# Patient Record
Sex: FEMALE | Race: WHITE | Employment: OTHER | URBAN - METROPOLITAN AREA
[De-identification: names, ages, dates, MRNs, and addresses within clinical notes are randomized per-mention and may not be internally consistent; named-entity substitution may affect disease eponyms.]

---

## 2017-03-02 ENCOUNTER — HOSPITAL ENCOUNTER (OUTPATIENT)
Age: 67
Setting detail: OBSERVATION
Discharge: HOME | End: 2017-03-03
Attending: EMERGENCY MEDICINE | Admitting: INTERNAL MEDICINE
Payer: MEDICARE

## 2017-03-02 ENCOUNTER — APPOINTMENT (OUTPATIENT)
Dept: GENERAL RADIOLOGY | Age: 67
End: 2017-03-02
Attending: EMERGENCY MEDICINE
Payer: MEDICARE

## 2017-03-02 DIAGNOSIS — I20.0 UNSTABLE ANGINA (HCC): Primary | ICD-10-CM

## 2017-03-02 PROBLEM — I10 HTN (HYPERTENSION), BENIGN: Status: ACTIVE | Noted: 2017-03-02

## 2017-03-02 PROBLEM — R07.9 CHEST PAIN WITH MODERATE RISK FOR CARDIAC ETIOLOGY: Status: ACTIVE | Noted: 2017-03-02

## 2017-03-02 LAB
ALBUMIN SERPL BCP-MCNC: 3.6 G/DL (ref 3.5–5)
ALBUMIN/GLOB SERPL: 1.1 {RATIO} (ref 1.1–2.2)
ALP SERPL-CCNC: 109 U/L (ref 45–117)
ALT SERPL-CCNC: 24 U/L (ref 12–78)
ANION GAP BLD CALC-SCNC: 6 MMOL/L (ref 5–15)
AST SERPL W P-5'-P-CCNC: 16 U/L (ref 15–37)
BASOPHILS # BLD AUTO: 0 K/UL (ref 0–0.1)
BASOPHILS # BLD: 0 % (ref 0–1)
BILIRUB SERPL-MCNC: 0.5 MG/DL (ref 0.2–1)
BUN SERPL-MCNC: 27 MG/DL (ref 6–20)
BUN/CREAT SERPL: 36 (ref 12–20)
CALCIUM SERPL-MCNC: 8.5 MG/DL (ref 8.5–10.1)
CHLORIDE SERPL-SCNC: 105 MMOL/L (ref 97–108)
CK MB CFR SERPL CALC: 2.3 % (ref 0–2.5)
CK MB SERPL-MCNC: 1.3 NG/ML (ref 5–25)
CK SERPL-CCNC: 57 U/L (ref 26–192)
CO2 SERPL-SCNC: 30 MMOL/L (ref 21–32)
CREAT SERPL-MCNC: 0.74 MG/DL (ref 0.55–1.02)
DIFFERENTIAL METHOD BLD: ABNORMAL
EOSINOPHIL # BLD: 0.3 K/UL (ref 0–0.4)
EOSINOPHIL NFR BLD: 2 % (ref 0–7)
ERYTHROCYTE [DISTWIDTH] IN BLOOD BY AUTOMATED COUNT: 13.8 % (ref 11.5–14.5)
GLOBULIN SER CALC-MCNC: 3.2 G/DL (ref 2–4)
GLUCOSE SERPL-MCNC: 115 MG/DL (ref 65–100)
HCT VFR BLD AUTO: 42.8 % (ref 35–47)
HGB BLD-MCNC: 14 G/DL (ref 11.5–16)
LYMPHOCYTES # BLD AUTO: 15 % (ref 12–49)
LYMPHOCYTES # BLD: 2.6 K/UL (ref 0.8–3.5)
MCH RBC QN AUTO: 30.2 PG (ref 26–34)
MCHC RBC AUTO-ENTMCNC: 32.7 G/DL (ref 30–36.5)
MCV RBC AUTO: 92.4 FL (ref 80–99)
MONOCYTES # BLD: 1.6 K/UL (ref 0–1)
MONOCYTES NFR BLD AUTO: 9 % (ref 5–13)
NEUTS SEG # BLD: 12.4 K/UL (ref 1.8–8)
NEUTS SEG NFR BLD AUTO: 74 % (ref 32–75)
PLATELET # BLD AUTO: 264 K/UL (ref 150–400)
POTASSIUM SERPL-SCNC: 4.2 MMOL/L (ref 3.5–5.1)
PROT SERPL-MCNC: 6.8 G/DL (ref 6.4–8.2)
RBC # BLD AUTO: 4.63 M/UL (ref 3.8–5.2)
SODIUM SERPL-SCNC: 141 MMOL/L (ref 136–145)
TROPONIN I SERPL-MCNC: 0.09 NG/ML
WBC # BLD AUTO: 16.8 K/UL (ref 3.6–11)

## 2017-03-02 PROCEDURE — 74011250636 HC RX REV CODE- 250/636: Performed by: INTERNAL MEDICINE

## 2017-03-02 PROCEDURE — 93005 ELECTROCARDIOGRAM TRACING: CPT

## 2017-03-02 PROCEDURE — 99218 HC RM OBSERVATION: CPT

## 2017-03-02 PROCEDURE — 94761 N-INVAS EAR/PLS OXIMETRY MLT: CPT

## 2017-03-02 PROCEDURE — 84484 ASSAY OF TROPONIN QUANT: CPT | Performed by: EMERGENCY MEDICINE

## 2017-03-02 PROCEDURE — 36415 COLL VENOUS BLD VENIPUNCTURE: CPT | Performed by: EMERGENCY MEDICINE

## 2017-03-02 PROCEDURE — 74011000250 HC RX REV CODE- 250: Performed by: EMERGENCY MEDICINE

## 2017-03-02 PROCEDURE — 96361 HYDRATE IV INFUSION ADD-ON: CPT

## 2017-03-02 PROCEDURE — 96374 THER/PROPH/DIAG INJ IV PUSH: CPT

## 2017-03-02 PROCEDURE — 80053 COMPREHEN METABOLIC PANEL: CPT | Performed by: EMERGENCY MEDICINE

## 2017-03-02 PROCEDURE — 74011250637 HC RX REV CODE- 250/637: Performed by: EMERGENCY MEDICINE

## 2017-03-02 PROCEDURE — 82550 ASSAY OF CK (CPK): CPT | Performed by: EMERGENCY MEDICINE

## 2017-03-02 PROCEDURE — 85025 COMPLETE CBC W/AUTO DIFF WBC: CPT | Performed by: EMERGENCY MEDICINE

## 2017-03-02 PROCEDURE — 74011250636 HC RX REV CODE- 250/636: Performed by: EMERGENCY MEDICINE

## 2017-03-02 PROCEDURE — 99285 EMERGENCY DEPT VISIT HI MDM: CPT

## 2017-03-02 PROCEDURE — 71010 XR CHEST PORT: CPT

## 2017-03-02 RX ORDER — SODIUM CHLORIDE 0.9 % (FLUSH) 0.9 %
5-10 SYRINGE (ML) INJECTION EVERY 8 HOURS
Status: DISCONTINUED | OUTPATIENT
Start: 2017-03-02 | End: 2017-03-03 | Stop reason: HOSPADM

## 2017-03-02 RX ORDER — SODIUM CHLORIDE 0.9 % (FLUSH) 0.9 %
5-10 SYRINGE (ML) INJECTION AS NEEDED
Status: DISCONTINUED | OUTPATIENT
Start: 2017-03-02 | End: 2017-03-03 | Stop reason: HOSPADM

## 2017-03-02 RX ORDER — ASPIRIN 325 MG
325 TABLET ORAL ONCE
Status: COMPLETED | OUTPATIENT
Start: 2017-03-02 | End: 2017-03-02

## 2017-03-02 RX ORDER — MELATONIN
1000 DAILY
Status: DISCONTINUED | OUTPATIENT
Start: 2017-03-03 | End: 2017-03-03 | Stop reason: HOSPADM

## 2017-03-02 RX ORDER — FAMOTIDINE 10 MG/ML
20 INJECTION INTRAVENOUS
Status: COMPLETED | OUTPATIENT
Start: 2017-03-02 | End: 2017-03-02

## 2017-03-02 RX ORDER — LANOLIN ALCOHOL/MO/W.PET/CERES
1 CREAM (GRAM) TOPICAL
Status: DISCONTINUED | OUTPATIENT
Start: 2017-03-03 | End: 2017-03-03 | Stop reason: HOSPADM

## 2017-03-02 RX ORDER — LEVOTHYROXINE SODIUM 112 UG/1
112 TABLET ORAL
Status: DISCONTINUED | OUTPATIENT
Start: 2017-03-03 | End: 2017-03-02

## 2017-03-02 RX ORDER — DIPHENHYDRAMINE HYDROCHLORIDE 50 MG/ML
12.5 INJECTION, SOLUTION INTRAMUSCULAR; INTRAVENOUS
Status: DISCONTINUED | OUTPATIENT
Start: 2017-03-02 | End: 2017-03-03 | Stop reason: HOSPADM

## 2017-03-02 RX ORDER — ENOXAPARIN SODIUM 100 MG/ML
40 INJECTION SUBCUTANEOUS EVERY 24 HOURS
Status: DISCONTINUED | OUTPATIENT
Start: 2017-03-02 | End: 2017-03-03 | Stop reason: HOSPADM

## 2017-03-02 RX ORDER — PANTOPRAZOLE SODIUM 40 MG/1
40 TABLET, DELAYED RELEASE ORAL
Status: DISCONTINUED | OUTPATIENT
Start: 2017-03-03 | End: 2017-03-03

## 2017-03-02 RX ORDER — PROCHLORPERAZINE EDISYLATE 5 MG/ML
5 INJECTION INTRAMUSCULAR; INTRAVENOUS
Status: DISCONTINUED | OUTPATIENT
Start: 2017-03-02 | End: 2017-03-03 | Stop reason: HOSPADM

## 2017-03-02 RX ORDER — LEVOTHYROXINE SODIUM 150 UG/1
150 TABLET ORAL
Status: DISCONTINUED | OUTPATIENT
Start: 2017-03-03 | End: 2017-03-03 | Stop reason: HOSPADM

## 2017-03-02 RX ORDER — SODIUM CHLORIDE 9 MG/ML
75 INJECTION, SOLUTION INTRAVENOUS CONTINUOUS
Status: DISCONTINUED | OUTPATIENT
Start: 2017-03-02 | End: 2017-03-03 | Stop reason: HOSPADM

## 2017-03-02 RX ADMIN — NITROGLYCERIN 0.5 INCH: 20 OINTMENT TOPICAL at 17:35

## 2017-03-02 RX ADMIN — SODIUM CHLORIDE 500 ML: 900 INJECTION, SOLUTION INTRAVENOUS at 18:12

## 2017-03-02 RX ADMIN — ASPIRIN 325 MG: 325 TABLET ORAL at 17:35

## 2017-03-02 RX ADMIN — Medication 10 ML: at 22:24

## 2017-03-02 RX ADMIN — FAMOTIDINE 20 MG: 10 INJECTION, SOLUTION INTRAVENOUS at 18:12

## 2017-03-02 RX ADMIN — SODIUM CHLORIDE 75 ML/HR: 900 INJECTION, SOLUTION INTRAVENOUS at 22:42

## 2017-03-02 NOTE — ED TRIAGE NOTES
Pt brought to treatment area with c/o \"right chest/breast pain and nausea that started 5 mins ago while I was standing. \"  Pt reports \"SOB, nausea and pain that radiates to my neck. \"  Dr. Micah Shukla at bedside to evaluate.

## 2017-03-02 NOTE — IP AVS SNAPSHOT
[address] [place] [address] 
[phone] Patient: Reford Collet MRN: [id number] ITX:0/1/8507 You are allergic to the following Allergen Reactions Adhesive Tape-Silicones Other (comments) BAD BLISTERS AND TENDS TO GET INFECTED Albuterol Palpitations Aspirin Hives Other (comments) \"it makes my stomach bleed\" Butter Flavor Anaphylaxis Butter AND CREME Demerol (Meperidine) Other (comments) HYPOTENSION Fentanyl Other (comments) HYPOTENSION Gluten Diarrhea  
 bloating Keflex (Cephalexin) Anaphylaxis Levaquin (Levofloxacin) Unproven on Challenge PT DOESN'T REMEMBER HAVING THAT Morphine Other (comments) HYPOTENSION Nitroglycerin Other (comments) IN PILL FORM  - HYPOTENSION 
CAN TOLERATE PATCH FOR SHORT TIME Pcn (Penicillins) Anaphylaxis Percocet (Oxycodone-Acetaminophen) Unknown (comments) HYPOTENSION AND HALLUCINATIONS. Can take tylenol ok, oxycodone is allergy per patient. Tapazole (Methimazole) Unknown (comments) Recent Documentation Height 1.6 m Emergency Contacts Name Discharge Info Relation Home Work Mobile Autumn Mini  Spouse [3] [phone] Refused,Refused DECLINED CAREGIVER [4] Edgar Matt  Daughter [21] [phone] About your hospitalization You were admitted on:  March 2, 2017 You last received care in the:  [facility] 3 PRO CARE TELE 2 You were discharged on:  March 3, 2017 Unit phone number:  [phone] Why you were hospitalized Your primary diagnosis was:  Chest Pain With Moderate Risk For Cardiac Etiology Your diagnoses also included:  Cad (Coronary Artery Disease), Iron Deficiency Anemia, Unspecified, Unspecified Hypothyroidism, Esophageal Reflux, Htn (Hypertension), Benign Providers Seen During Your Hospitalizations Provider Role Specialty Primary office phone Josue Franklin MD Attending Provider Emergency Medicine [phone] Sheyla Parra MD Attending Provider Internal Medicine [phone] Virgina Bosworth, MD Attending Provider Hospitalist [phone] Your Primary Care Physician (PCP) Primary Care Physician Office Phone Office Fax Josue BLAIR [phone] Follow-up Information Follow up With Details Comments Contact Info Chapin Stroud MD   [facility] [address] [place] [address] 
[phone] Ronit Lewis MD Schedule an appointment as soon as possible for a visit in 1 week  [address] [facility] 1007 [facility] 
[phone] Current Discharge Medication List  
  
CONTINUE these medications which have NOT CHANGED Dose & Instructions Dispensing Information Comments Morning Noon Evening Bedtime  
 ascorbic acid (vitamin C) 500 mg tablet Commonly known as:  VITAMIN C Your next dose is: Today, Tomorrow Other:  _________ Take  by mouth daily. Refills:  0 Calcium Carbonate-Vit D3-Min 1,200 mgcalcium -1,000 unit Karina Proffer Your next dose is: Today, Tomorrow Other:  _________ Take  by mouth. Refills:  0  
     
   
   
   
  
 cyanocobalamin 1,000 mcg/mL injection Commonly known as:  VITAMIN B12 Your next dose is: Today, Tomorrow Other:  _________ INJECT 1 ML INTO THE MUSCLE EVERY 30 DAYS Quantity:  10 mL Refills:  0  
     
   
   
   
  
 dilTIAZem 30 mg tablet Commonly known as:  CARDIZEM Your next dose is: Today, Tomorrow Other:  _________ Dose:  30 mg Take 30 mg by mouth as needed. PT STATES USES ONLY AS NEEDED Refills:  0  
     
   
   
   
  
 doxycycline 100 mg capsule Commonly known as:  Anabel Rodas Your next dose is: Today, Tomorrow Other:  _________ Dose:  100 mg Take 100 mg by mouth once. Refills:  0  
     
   
   
   
  
 ferrous sulfate 325 mg (65 mg iron) tablet Your next dose is: Today, Tomorrow Other:  _________ Take  by mouth Daily (before breakfast). Refills:  0  
     
   
   
   
  
 fluconazole 200 mg tablet Commonly known as:  DIFLUCAN Your next dose is: Today, Tomorrow Other:  _________ Dose:  200 mg Take 200 mg by mouth daily. Refills:  0  
     
   
   
   
  
 LASIX 20 mg tablet Generic drug:  furosemide Your next dose is: Today, Tomorrow Other:  _________ Dose:  20 mg Take 20 mg by mouth as needed. Refills:  0  
     
   
   
   
  
 levothyroxine 150 mcg tablet Commonly known as:  SYNTHROID Your next dose is: Today, Tomorrow Other:  _________ Dose:  150 mcg Take 150 mcg by mouth Daily (before breakfast). Refills:  0  
     
   
   
   
  
 multivitamin, tx-iron-ca-min 9 mg iron-400 mcg Tab tablet Commonly known as:  THERA-M w/ IRON Your next dose is: Today, Tomorrow Other:  _________ Dose:  1 Tab Take 1 Tab by mouth daily. Refills:  0 phenazopyridine 200 mg tablet Commonly known as:  PYRIDIUM Your next dose is: Today, Tomorrow Other:  _________ Dose:  200 mg Take 200 mg by mouth three (3) times daily as needed for Pain. Refills:  0 PREVACID 30 mg capsule Generic drug:  lansoprazole Your next dose is: Today, Tomorrow Other:  _________ Dose:  30 mg Take 30 mg by mouth Daily (before breakfast). Refills:  0 Syringe with Needle (Disp) 3 mL 25 gauge x 1\" Syrg Your next dose is: Today, Tomorrow Other:  _________ Dose:  1 Units 1 Units by Does Not Apply route every thirty (30) days. Quantity:  50 Units Refills:  1  
     
   
   
   
  
 traMADol 50 mg tablet Commonly known as:  ULTRAM  
   
Your next dose is: Today, Tomorrow Other:  _________ Dose:  50 mg Take 1 Tab by mouth every six (6) hours as needed for Pain. Max Daily Amount: 200 mg. Quantity:  15 Tab Refills:  0  
     
   
   
   
  
 URIBEL PO Your next dose is: Today, Tomorrow Other:  _________ Dose:  118 mg Take 118 mg by mouth two (2) times a day. Refills:  0  
     
   
   
   
  
 VITAMIN D3 1,000 unit Cap Generic drug:  cholecalciferol Your next dose is: Today, Tomorrow Other:  _________ Dose:  1 Cap Take 1 Cap by mouth daily. Refills:  0 Discharge Instructions ACUTE DIAGNOSES: 
Chest pain CHRONIC MEDICAL DIAGNOSES: 
Problem List as of 3/3/2017  Date Reviewed: 3/3/2017 Codes Class Noted - Resolved HTN (hypertension), benign ICD-10-CM: I10 
ICD-9-CM: 401.1  3/2/2017 - Present Bony exostosis ICD-10-CM: Z85.3Y6 ICD-9-CM: 726.91  1/10/2013 - Present Synovitis of ankle ICD-10-CM: M65.9 ICD-9-CM: 727.06  1/10/2013 - Present Pes cavus ICD-10-CM: M21.6X9 ICD-9-CM: 736.73  1/10/2013 - Present Unspecified hypothyroidism ICD-10-CM: E03.9 ICD-9-CM: 244.9  3/23/2012 - Present Iron deficiency anemia, unspecified ICD-10-CM: D50.9 ICD-9-CM: 280.9  10/13/2011 - Present Gluten intolerance ICD-10-CM: K90.0 ICD-9-CM: 579.0  Unknown - Present Esophageal reflux ICD-10-CM: K21.9 ICD-9-CM: 530.81  1/8/2011 - Present Pernicious anemia ICD-10-CM: D51.0 ICD-9-CM: 281.0  12/5/2009 - Present Asthma ICD-10-CM: A56.332 ICD-9-CM: 493.90  6/29/2009 - Present CAD (coronary artery disease) ICD-10-CM: I25.10 ICD-9-CM: 414.00  6/29/2009 - Present * (Principal)RESOLVED: Chest pain with moderate risk for cardiac etiology ICD-10-CM: R07.9 ICD-9-CM: 786.50  3/2/2017 - 3/3/2017 RESOLVED: Ankle instability ICD-10-CM: M25.373 ICD-9-CM: 718.87  6/15/2012 - 6/16/2012 RESOLVED: Sprain of calcaneofibular ligament of left ankle ICD-10-CM: T81.413T ICD-9-CM: 845.02  6/15/2012 - 6/16/2012 RESOLVED: Sprain of posterior talofibular ligament of ankle ICD-10-CM: R64.881E ICD-9-CM: 845.09  6/15/2012 - 6/16/2012 RESOLVED: Exostosis ICD-10-CM: P22.9F6 ICD-9-CM: 726.91  6/15/2012 - 6/16/2012 RESOLVED: Pes cavus ICD-10-CM: M21.6X9 ICD-9-CM: 736.73  6/15/2012 - 6/16/2012 RESOLVED: Atrial fibrillation ([facility] [address] ICD-10-CM: I48.91 
ICD-9-CM: 427.31  6/29/2009 - 5/14/2012 RESOLVED: Hypothyroidism ICD-10-CM: E03.9 ICD-9-CM: 244.9  6/29/2009 - 12/17/2012 RESOLVED: Vitamin B12 deficiency ICD-10-CM: E53.8 ICD-9-CM: 266.2  6/29/2009 - 12/5/2009 DISCHARGE MEDICATIONS:  
  
 
 
· It is important that you take the medication exactly as they are prescribed. · Keep your medication in the bottles provided by the pharmacist and keep a list of the medication names, dosages, and times to be taken in your wallet. · Do not take other medications without consulting your doctor. DIET:  Cardiac Diet ACTIVITY: Activity as tolerated ADDITIONAL INFORMATION: If you experience any of the following symptoms then please call your primary care physician or return to the emergency room if you cannot get hold of your doctor: Fever, chills, nausea, vomiting, diarrhea, change in mentation, falling, bleeding, shortness of breath. FOLLOW UP CARE: 
Dr. Brittany Crowley MD  you are to call and set up an appointment to see them in 2 weeks. Follow-up with cardiologist  
 
 
Information obtained by : 
I understand that if any problems occur once I am at home I am to contact my physician. I understand and acknowledge receipt of the instructions indicated above. Physician's or R.N.'s Signature                                                                  Date/Time Patient or Representative Signature                                                          Date/Time Discharge Orders None [employer] Announcement We are excited to announce that we are making your provider's discharge notes available to you in [employer]. You will see these notes when they are completed and signed by the physician that discharged you from your recent hospital stay. If you have any questions or concerns about any information you see in [employer], please call the Health Information Department where you were seen or reach out to your Primary Care Provider for more information about your plan of care. Introducing [facility]! Dear Kriss Hooker: Thank you for requesting a [employer] account. Our records indicate that you already have an active [employer] account. You can access your account anytime at [email or web address]. [email or web address] Did you know that you can access your hospital and ER discharge instructions at any time in [employer]? You can also review all of your test results from your hospital stay or ER visit. Additional Information If you have questions, please visit the Frequently Asked Questions section of the [employer] website at [email or web address] Remember, [employer] is NOT to be used for urgent needs. For medical emergencies, dial 911. Now available from your iPhone and Android! General Information Please provide this summary of care documentation to your next provider. Patient Signature:  ____________________________________________________________ Date:  ____________________________________________________________  
  
Orbie Calixto Provider Signature:  ____________________________________________________________ Date:  ____________________________________________________________

## 2017-03-02 NOTE — ED NOTES
Multiple IV attempts unsuccessful. Dr. Lopez Heading at bedside with US machine to find peripherial IV.

## 2017-03-02 NOTE — ED NOTES
Dr. Treviño Poster inserted 20G angiocath into R AC. IVF infusing to gravity without difficulty. Will continue to monitor closely.

## 2017-03-02 NOTE — ED PROVIDER NOTES
HPI Comments: 70 yo WF with hx cad, afib, stroke presents with c/o chest pain which began 10 minutes pta. Reports pain as under right breast initially and pressure now moving to mid chest. States it is going into her neck, + nausea no vomiting, + sob. Was standing in the pet store when it happened. States has had MI in the past and last stent placed by Dr Lorenzo Herrera in 2008. Last cath was 3 years ago and no intervention at that time. Has not had a stress test since. Pt reports not feeling well over the past few days and feels like her heart is beating slow and thumping at times. Pt reports she is not on any blood thinners because she did not do well with them and states she was pulled off aspirin after having a work up for anemia by heme onc and told she had some bleeding and to stop it. Denies having hives and swelling from aspirin. Reports having low bp with nitro and pain meds in the past    Patient is a 69 y.o. female presenting with chest pain. The history is provided by the patient. Chest Pain (Angina)    Associated symptoms include diaphoresis, nausea and shortness of breath. Pertinent negatives include no abdominal pain, no fever and no vomiting. Past Medical History:   Diagnosis Date    Anemia     Arrhythmia     Arthritis     Asthma 6/29/2009    CAUSED BY MOLD    Atrial fibrillation ([address] 6/29/2009    CAD (coronary artery disease) 6/29/2009    MI X2     Chronic pain     RT. ANKLE    GERD (gastroesophageal reflux disease)     Gluten intolerance     Hypothyroidism 6/29/2009    Personal history of MI (myocardial infarction)     Stroke ([address]     TIA (NO RESIDUAL)    Syncope     Thromboembolus ([address] 2004    RT.     Thyroid disease     HYPO    TIA (transient ischemic attack)     2004 & 2006    Unspecified adverse effect of anesthesia     \"IS A LIGHT WEIGHT\"    Unspecified adverse effect of anesthesia     DIFFICULTY AWAKENING    Vitamin B12 deficiency 6/29/2009       Past Surgical History:   Procedure Laterality Date    CARDIAC SURG PROCEDURE UNLIST      ABLATION    HX ADENOIDECTOMY      HX APPENDECTOMY      HX CHOLECYSTECTOMY  6/16/11    HX GI      COLONOSCOPY AND ENDOSCOPY    HX HEART CATHETERIZATION  2010    2 STENTS    HX HYSTERECTOMY      HX LUMBAR LAMINECTOMY  2000    L4-L5    HX ORTHOPAEDIC  1993-6/2012    RT. FOOT SURGERY X 6/calcaneal osteotomy    HX TONSILLECTOMY  1964    STENT INSERTION           Family History:   Problem Relation Age of Onset   [facility] Delayed Awakening Mother     Heart Disease Mother     Coronary Artery Disease Mother     Hypertension Mother     Stroke Mother     Delayed Awakening Sister     Hypertension Sister     Lung Disease Father     Cancer Father      colon    Hypertension Father     Hypertension Brother        Social History     Social History    Marital status:      Spouse name: N/A    Number of children: N/A    Years of education: N/A     Occupational History    Not on file. Social History Main Topics    Smoking status: Former Smoker     Packs/day: 1.00     Years: 30.00     Quit date: 6/14/2002    Smokeless tobacco: Never Used    Alcohol use No    Drug use: No    Sexual activity: No     Other Topics Concern    Not on file     Social History Narrative         ALLERGIES: Adhesive tape-silicones; Albuterol; Aspirin; Butter flavor; Demerol [meperidine]; Fentanyl; Gluten; Keflex [cephalexin]; Levaquin [levofloxacin]; Morphine; Nitroglycerin; Pcn [penicillins]; Percocet [oxycodone-acetaminophen]; and Tapazole [methimazole]    Review of Systems   Constitutional: Positive for diaphoresis. Negative for fever. Respiratory: Positive for shortness of breath. Cardiovascular: Positive for chest pain. Gastrointestinal: Positive for nausea. Negative for abdominal pain and vomiting. All other systems reviewed and are negative.       Vitals:    03/02/17 1708 03/02/17 1711   BP: (!) 161/99    Pulse: 80    Resp: 10    SpO2: 96% Weight:  72.6 kg (160 lb)   Height:  5' 3\" (1.6 m)            Physical Exam   Constitutional: She is oriented to person, place, and time. She appears well-developed and well-nourished. No distress. HENT:   Head: Normocephalic and atraumatic. Eyes: EOM are normal. Pupils are equal, round, and reactive to light. Neck: Normal range of motion. Neck supple. Cardiovascular: Normal rate, regular rhythm, normal heart sounds and intact distal pulses. Exam reveals no friction rub. No murmur heard. Pulmonary/Chest: Effort normal and breath sounds normal. No respiratory distress. She has no wheezes. She has no rales. Abdominal: Soft. Bowel sounds are normal. She exhibits no distension. There is no tenderness. There is no rebound and no guarding. Musculoskeletal: Normal range of motion. She exhibits no edema. Neurological: She is alert and oriented to person, place, and time. Coordination normal.   Skin: Skin is warm and dry. She is not diaphoretic. No pallor. Psychiatric: She has a normal mood and affect. Her behavior is normal.   Nursing note and vitals reviewed. MDM  Number of Diagnoses or Management Options  Diagnosis management comments: No concern for PE sounds cardiac and given hx will give aspirin with pepcid and nitro paste and fluids to prevent hypotension. Check troponin. Spoke with pt and she will need admission. She wants to be transferred to [facility] where her cardiologist is       Amount and/or Complexity of Data Reviewed  Clinical lab tests: ordered and reviewed  Tests in the radiology section of CPT®: ordered and reviewed  Discuss the patient with other providers: yes (Cardiology and hospitalist)  Independent visualization of images, tracings, or specimens: yes (ekg)    Patient Progress  Patient progress: stable    ED Course       Procedures  EKG interpretation: (Preliminary)  Rhythm: normal sinus rhythm; and regular .  Rate (approx.): 85; Axis: normal; P wave: normal; QRS interval: normal ; ST/T wave: depressed; minimal depression less than 1 mm in v2 - v6    EKG interpretation: (Preliminary)  Rhythm: normal sinus rhythm; and regular . Rate (approx.): 70; Axis: normal; P wave: normal; QRS interval: normal ; ST/T wave: non-specific changes     Procedure Note- Peripheral IV Access  6:10 PM  Performed by: Naman Quan MD     gained IV access using  20 gauge needle because the patient had no vascular access. After cleaning the site with alcohol prep, the Right  brachial vein was localized with ultrasound guidance in an anterior approach. Line confirmation was obtained by direct visualization and good blood return. No anaesthetic was used. The line was successfully flushed with normal saline and was secured with transparent tape. The procedure took 1-15 minutes, and pt tolerated well. Written by Naman Quan MD,    6:16 PM  Pt reports feeling much better. Has had her aspirin and nitropaste. States she does not want to go to  to see Dr Cassidy Maldonado and wants to go to [facility]. Awaiting blood work    7:00 PM  Spoke with dr gorman. Discussed hx and lab results. agrees with plan. Does not recommend anything further than asprin at this time. Will trend troponins. Pt is currently pain free    7:06 PM  Spoke with dr Nel Lora who will admit pt    7:12 PM  Change of shift. Care of patient signed over to dr Keiry Quiroga  Handoff complete.

## 2017-03-03 ENCOUNTER — APPOINTMENT (OUTPATIENT)
Dept: NUCLEAR MEDICINE | Age: 67
End: 2017-03-03
Attending: INTERNAL MEDICINE
Payer: MEDICARE

## 2017-03-03 VITALS
RESPIRATION RATE: 18 BRPM | SYSTOLIC BLOOD PRESSURE: 131 MMHG | OXYGEN SATURATION: 96 % | TEMPERATURE: 97.5 F | HEIGHT: 63 IN | HEART RATE: 54 BPM | BODY MASS INDEX: 29.46 KG/M2 | WEIGHT: 166.3 LBS | DIASTOLIC BLOOD PRESSURE: 82 MMHG

## 2017-03-03 PROBLEM — R07.9 CHEST PAIN WITH MODERATE RISK FOR CARDIAC ETIOLOGY: Status: RESOLVED | Noted: 2017-03-02 | Resolved: 2017-03-03

## 2017-03-03 LAB
APPEARANCE UR: CLEAR
ATRIAL RATE: 72 BPM
ATRIAL RATE: 86 BPM
ATTENDING PHYSICIAN, CST07: NORMAL
BACTERIA URNS QL MICRO: NEGATIVE /HPF
BASOPHILS # BLD AUTO: 0 K/UL (ref 0–0.1)
BASOPHILS # BLD: 0 % (ref 0–1)
BILIRUB UR QL: NEGATIVE
CALCULATED P AXIS, ECG09: 68 DEGREES
CALCULATED P AXIS, ECG09: 74 DEGREES
CALCULATED R AXIS, ECG10: 19 DEGREES
CALCULATED R AXIS, ECG10: 5 DEGREES
CALCULATED T AXIS, ECG11: 52 DEGREES
CALCULATED T AXIS, ECG11: 59 DEGREES
COLOR UR: ABNORMAL
DIAGNOSIS, 93000: NORMAL
DUKE TM SCORE RESULT, CST14: NORMAL
DUKE TREADMILL SCORE, CST13: NORMAL
ECG INTERP BEFORE EX, CST11: NORMAL
ECG INTERP DURING EX, CST12: NORMAL
EOSINOPHIL # BLD: 0.3 K/UL (ref 0–0.4)
EOSINOPHIL NFR BLD: 2 % (ref 0–7)
EPITH CASTS URNS QL MICRO: ABNORMAL /LPF
ERYTHROCYTE [DISTWIDTH] IN BLOOD BY AUTOMATED COUNT: 13.9 % (ref 11.5–14.5)
FUNCTIONAL CAPACITY, CST17: NORMAL
GLUCOSE UR STRIP.AUTO-MCNC: NEGATIVE MG/DL
HCT VFR BLD AUTO: 44.4 % (ref 35–47)
HGB BLD-MCNC: 14.6 G/DL (ref 11.5–16)
HGB UR QL STRIP: NEGATIVE
HYALINE CASTS URNS QL MICRO: ABNORMAL /LPF (ref 0–5)
KETONES UR QL STRIP.AUTO: NEGATIVE MG/DL
KNOWN CARDIAC CONDITION, CST08: NORMAL
LEUKOCYTE ESTERASE UR QL STRIP.AUTO: ABNORMAL
LYMPHOCYTES # BLD AUTO: 17 % (ref 12–49)
LYMPHOCYTES # BLD: 2.3 K/UL (ref 0.8–3.5)
MAX. DIASTOLIC BP, CST04: 80 MMHG
MAX. HEART RATE, CST05: 136 BPM
MAX. SYSTOLIC BP, CST03: 160 MMHG
MCH RBC QN AUTO: 30.2 PG (ref 26–34)
MCHC RBC AUTO-ENTMCNC: 32.9 G/DL (ref 30–36.5)
MCV RBC AUTO: 91.9 FL (ref 80–99)
MONOCYTES # BLD: 1 K/UL (ref 0–1)
MONOCYTES NFR BLD AUTO: 7 % (ref 5–13)
NEUTS SEG # BLD: 10 K/UL (ref 1.8–8)
NEUTS SEG NFR BLD AUTO: 74 % (ref 32–75)
NITRITE UR QL STRIP.AUTO: NEGATIVE
OVERALL BP RESPONSE TO EXERCISE, CST16: NORMAL
OVERALL HR RESPONSE TO EXERCISE, CST15: NORMAL
P-R INTERVAL, ECG05: 138 MS
P-R INTERVAL, ECG05: 140 MS
PEAK EX METS, CST10: 4.6 METS
PH UR STRIP: 6.5 [PH] (ref 5–8)
PLATELET # BLD AUTO: 259 K/UL (ref 150–400)
PROT UR STRIP-MCNC: NEGATIVE MG/DL
PROTOCOL NAME, CST01: NORMAL
Q-T INTERVAL, ECG07: 370 MS
Q-T INTERVAL, ECG07: 422 MS
QRS DURATION, ECG06: 74 MS
QRS DURATION, ECG06: 76 MS
QTC CALCULATION (BEZET), ECG08: 442 MS
QTC CALCULATION (BEZET), ECG08: 462 MS
RBC # BLD AUTO: 4.83 M/UL (ref 3.8–5.2)
RBC #/AREA URNS HPF: ABNORMAL /HPF (ref 0–5)
SP GR UR REFRACTOMETRY: 1.01 (ref 1–1.03)
TEST INDICATION, CST09: NORMAL
TROPONIN I SERPL-MCNC: <0.04 NG/ML
TROPONIN I SERPL-MCNC: <0.04 NG/ML
TSH SERPL DL<=0.05 MIU/L-ACNC: 1.41 UIU/ML (ref 0.36–3.74)
UROBILINOGEN UR QL STRIP.AUTO: 0.2 EU/DL (ref 0.2–1)
VENTRICULAR RATE, ECG03: 72 BPM
VENTRICULAR RATE, ECG03: 86 BPM
WBC # BLD AUTO: 13.6 K/UL (ref 3.6–11)
WBC URNS QL MICRO: ABNORMAL /HPF (ref 0–4)

## 2017-03-03 PROCEDURE — 96361 HYDRATE IV INFUSION ADD-ON: CPT

## 2017-03-03 PROCEDURE — 81001 URINALYSIS AUTO W/SCOPE: CPT | Performed by: INTERNAL MEDICINE

## 2017-03-03 PROCEDURE — 84484 ASSAY OF TROPONIN QUANT: CPT | Performed by: INTERNAL MEDICINE

## 2017-03-03 PROCEDURE — 99218 HC RM OBSERVATION: CPT

## 2017-03-03 PROCEDURE — 84443 ASSAY THYROID STIM HORMONE: CPT | Performed by: INTERNAL MEDICINE

## 2017-03-03 PROCEDURE — 93306 TTE W/DOPPLER COMPLETE: CPT

## 2017-03-03 PROCEDURE — 93017 CV STRESS TEST TRACING ONLY: CPT

## 2017-03-03 PROCEDURE — 74011250636 HC RX REV CODE- 250/636: Performed by: INTERNAL MEDICINE

## 2017-03-03 PROCEDURE — 96372 THER/PROPH/DIAG INJ SC/IM: CPT

## 2017-03-03 PROCEDURE — 74011250637 HC RX REV CODE- 250/637: Performed by: INTERNAL MEDICINE

## 2017-03-03 PROCEDURE — 36415 COLL VENOUS BLD VENIPUNCTURE: CPT | Performed by: INTERNAL MEDICINE

## 2017-03-03 PROCEDURE — A9500 TC99M SESTAMIBI: HCPCS

## 2017-03-03 PROCEDURE — 85025 COMPLETE CBC W/AUTO DIFF WBC: CPT | Performed by: INTERNAL MEDICINE

## 2017-03-03 RX ORDER — LEVOTHYROXINE SODIUM 150 UG/1
150 TABLET ORAL
COMMUNITY
End: 2019-02-22

## 2017-03-03 RX ORDER — LIDOCAINE 50 MG/G
1 PATCH TOPICAL EVERY 24 HOURS
Status: DISCONTINUED | OUTPATIENT
Start: 2017-03-03 | End: 2017-03-03 | Stop reason: HOSPADM

## 2017-03-03 RX ORDER — ACETAMINOPHEN 325 MG/1
650 TABLET ORAL
Status: DISCONTINUED | OUTPATIENT
Start: 2017-03-03 | End: 2017-03-03 | Stop reason: HOSPADM

## 2017-03-03 RX ORDER — PANTOPRAZOLE SODIUM 40 MG/1
40 TABLET, DELAYED RELEASE ORAL
Status: DISCONTINUED | OUTPATIENT
Start: 2017-03-03 | End: 2017-03-03 | Stop reason: HOSPADM

## 2017-03-03 RX ADMIN — VITAMIN D, TAB 1000IU (100/BT) 1000 UNITS: 25 TAB at 08:50

## 2017-03-03 RX ADMIN — PANTOPRAZOLE SODIUM 40 MG: 40 TABLET, DELAYED RELEASE ORAL at 08:50

## 2017-03-03 RX ADMIN — Medication 325 MG: at 08:50

## 2017-03-03 RX ADMIN — ENOXAPARIN SODIUM 40 MG: 40 INJECTION SUBCUTANEOUS at 00:06

## 2017-03-03 RX ADMIN — ACETAMINOPHEN 650 MG: 325 TABLET ORAL at 09:16

## 2017-03-03 RX ADMIN — Medication 10 ML: at 06:05

## 2017-03-03 RX ADMIN — LEVOTHYROXINE SODIUM 150 MCG: 150 TABLET ORAL at 10:03

## 2017-03-03 RX ADMIN — Medication 10 ML: at 14:17

## 2017-03-03 NOTE — ED NOTES
TRANSFER - OUT REPORT:    Verbal report given to JINNY SALINAS RN(name) on Tima Gregg  being transferred to [address] [place] [address] 33O(unit) for routine progression of care       Report consisted of patients Situation, Background, Assessment and   Recommendations(SBAR). Information from the following report(s) SBAR, ED Summary, MAR, Recent Results and Cardiac Rhythm NSR was reviewed with the receiving nurse. Lines:   Peripheral IV 03/02/17 Right Antecubital (Active)   Site Assessment Clean, dry, & intact 3/2/2017  6:15 PM   Phlebitis Assessment 0 3/2/2017  6:15 PM   Infiltration Assessment 0 3/2/2017  6:15 PM   Dressing Status Clean, dry, & intact 3/2/2017  6:15 PM   Dressing Type Transparent 3/2/2017  6:15 PM   Hub Color/Line Status Pink 3/2/2017  6:15 PM        Opportunity for questions and clarification was provided.       Patient transported with:   Monitor   EKG X2  EMTALA  20 gauge saline lock RAC

## 2017-03-03 NOTE — ED NOTES
Bedside and Verbal shift change report given to Jocelin Layne RN (oncoming nurse) by Ben Wyman RN (offgoing nurse). Report included the following information SBAR, Kardex, ED Summary, [facility] and Recent Results.

## 2017-03-03 NOTE — DISCHARGE INSTRUCTIONS
ACUTE DIAGNOSES:  Chest pain    CHRONIC MEDICAL DIAGNOSES:  Problem List as of 3/3/2017  Date Reviewed: 3/3/2017          Codes Class Noted - Resolved    HTN (hypertension), benign ICD-10-CM: I10  ICD-9-CM: 401.1  3/2/2017 - Present        Bony exostosis ICD-10-CM: M89.8X9  ICD-9-CM: 726.91  1/10/2013 - Present        Synovitis of ankle ICD-10-CM: M65.9  ICD-9-CM: 727.06  1/10/2013 - Present        Pes cavus ICD-10-CM: U10.2L8  ICD-9-CM: 736.73  1/10/2013 - Present        Unspecified hypothyroidism ICD-10-CM: E03.9  ICD-9-CM: 244.9  3/23/2012 - Present        Iron deficiency anemia, unspecified ICD-10-CM: D50.9  ICD-9-CM: 280.9  10/13/2011 - Present        Gluten intolerance ICD-10-CM: K90.0  ICD-9-CM: 579.0  Unknown - Present        Esophageal reflux ICD-10-CM: K21.9  ICD-9-CM: 530.81  1/8/2011 - Present        Pernicious anemia ICD-10-CM: D51.0  ICD-9-CM: 281.0  12/5/2009 - Present        Asthma ICD-10-CM: J45.909  ICD-9-CM: 493.90  6/29/2009 - Present        CAD (coronary artery disease) ICD-10-CM: I25.10  ICD-9-CM: 414.00  6/29/2009 - Present        * (Principal)RESOLVED: Chest pain with moderate risk for cardiac etiology ICD-10-CM: R07.9  ICD-9-CM: 786.50  3/2/2017 - 3/3/2017        RESOLVED: Ankle instability ICD-10-CM: M25.373  ICD-9-CM: 718.87  6/15/2012 - 6/16/2012        RESOLVED: Sprain of calcaneofibular ligament of left ankle ICD-10-CM: S14.715P  ICD-9-CM: 845.02  6/15/2012 - 6/16/2012        RESOLVED: Sprain of posterior talofibular ligament of ankle ICD-10-CM: X38.444Z  ICD-9-CM: 845.09  6/15/2012 - 6/16/2012        RESOLVED: Exostosis ICD-10-CM: M89.8X9  ICD-9-CM: 726.91  6/15/2012 - 6/16/2012        RESOLVED: Pes cavus ICD-10-CM: M21.6X9  ICD-9-CM: 736.73  6/15/2012 - 6/16/2012        RESOLVED: Atrial fibrillation ([facility] [address] ICD-10-CM: I48.91  ICD-9-CM: 427.31  6/29/2009 - 5/14/2012        RESOLVED: Hypothyroidism ICD-10-CM: E03.9  ICD-9-CM: 244.9  6/29/2009 - 12/17/2012        RESOLVED: Vitamin B12 deficiency ICD-10-CM: E53.8  ICD-9-CM: 266.2  6/29/2009 - 12/5/2009              DISCHARGE MEDICATIONS:          · It is important that you take the medication exactly as they are prescribed. · Keep your medication in the bottles provided by the pharmacist and keep a list of the medication names, dosages, and times to be taken in your wallet. · Do not take other medications without consulting your doctor. DIET:  Cardiac Diet    ACTIVITY: Activity as tolerated    ADDITIONAL INFORMATION: If you experience any of the following symptoms then please call your primary care physician or return to the emergency room if you cannot get hold of your doctor: Fever, chills, nausea, vomiting, diarrhea, change in mentation, falling, bleeding, shortness of breath. FOLLOW UP CARE:  Dr. Reina Trejo MD  you are to call and set up an appointment to see them in 2 weeks. Follow-up with cardiologist       Information obtained by :  I understand that if any problems occur once I am at home I am to contact my physician. I understand and acknowledge receipt of the instructions indicated above.                                                                                                                                            Physician's or R.N.'s Signature                                                                  Date/Time                                                                                                                                              Patient or Representative Signature                                                          Date/Time

## 2017-03-03 NOTE — DISCHARGE SUMMARY
Hospitalist Discharge Summary     Patient ID:    Tima Gregg  [id number]  70 y.o.  1950    Admit date: 3/2/2017    Discharge date and time: 3/3/2017    Admission Diagnoses: Chest pain    Chronic Diagnoses:    Problem List as of 3/3/2017  Date Reviewed: 3/3/2017          Codes Class Noted - Resolved    HTN (hypertension), benign ICD-10-CM: I10  ICD-9-CM: 401.1  3/2/2017 - Present        Bony exostosis ICD-10-CM: M89.8X9  ICD-9-CM: 726.91  1/10/2013 - Present        Synovitis of ankle ICD-10-CM: M65.9  ICD-9-CM: 727.06  1/10/2013 - Present        Pes cavus ICD-10-CM: M21.6X9  ICD-9-CM: 736.73  1/10/2013 - Present        Unspecified hypothyroidism ICD-10-CM: E03.9  ICD-9-CM: 244.9  3/23/2012 - Present        Iron deficiency anemia, unspecified ICD-10-CM: D50.9  ICD-9-CM: 280.9  10/13/2011 - Present        Gluten intolerance ICD-10-CM: K90.0  ICD-9-CM: 579.0  Unknown - Present        Esophageal reflux ICD-10-CM: K21.9  ICD-9-CM: 530.81  1/8/2011 - Present        Pernicious anemia ICD-10-CM: D51.0  ICD-9-CM: 281.0  12/5/2009 - Present        Asthma ICD-10-CM: J45.909  ICD-9-CM: 493.90  6/29/2009 - Present        CAD (coronary artery disease) ICD-10-CM: I25.10  ICD-9-CM: 414.00  6/29/2009 - Present        * (Principal)RESOLVED: Chest pain with moderate risk for cardiac etiology ICD-10-CM: R07.9  ICD-9-CM: 786.50  3/2/2017 - 3/3/2017        RESOLVED: Ankle instability ICD-10-CM: M25.373  ICD-9-CM: 718.87  6/15/2012 - 6/16/2012        RESOLVED: Sprain of calcaneofibular ligament of left ankle ICD-10-CM: E24.588K  ICD-9-CM: 845.02  6/15/2012 - 6/16/2012        RESOLVED: Sprain of posterior talofibular ligament of ankle ICD-10-CM: A77.186H  ICD-9-CM: 845.09  6/15/2012 - 6/16/2012        RESOLVED: Exostosis ICD-10-CM: M89.8X9  ICD-9-CM: 726.91  6/15/2012 - 6/16/2012        RESOLVED: Pes cavus ICD-10-CM: M21.6X9  ICD-9-CM: 736.73  6/15/2012 - 6/16/2012        RESOLVED: Atrial fibrillation ([address] ICD-10-CM: I48.91  ICD-9-CM: 427.31  6/29/2009 - 5/14/2012        RESOLVED: Hypothyroidism ICD-10-CM: E03.9  ICD-9-CM: 244.9  6/29/2009 - 12/17/2012        RESOLVED: Vitamin B12 deficiency ICD-10-CM: E53.8  ICD-9-CM: 266.2  6/29/2009 - 12/5/2009              Discharge Medications:   Current Discharge Medication List      CONTINUE these medications which have NOT CHANGED    Details   levothyroxine (SYNTHROID) 150 mcg tablet Take 150 mcg by mouth Daily (before breakfast). multivitamin, tx-iron-ca-min (THERA-M W/ IRON) 9 mg iron-400 mcg tab tablet Take 1 Tab by mouth daily. ascorbic acid (VITAMIN C) 500 mg tablet Take  by mouth daily. ferrous sulfate 325 mg (65 mg iron) tablet Take  by mouth Daily (before breakfast). lansoprazole (PREVACID) 30 mg capsule Take 30 mg by mouth Daily (before breakfast). Associated Diagnoses: Esophageal reflux      doxycycline (MONODOX) 100 mg capsule Take 100 mg by mouth once. phenazopyridine (PYRIDIUM) 200 mg tablet Take 200 mg by mouth three (3) times daily as needed for Pain. fluconazole (DIFLUCAN) 200 mg tablet Take 200 mg by mouth daily. MTH/ME BLUE/SOD PHOS/PHEN/HYOS (URIBEL PO) Take 118 mg by mouth two (2) times a day. traMADol (ULTRAM) 50 mg tablet Take 1 Tab by mouth every six (6) hours as needed for Pain. Max Daily Amount: 200 mg. Qty: 15 Tab, Refills: 0      cyanocobalamin (VITAMIN B12) 1,000 mcg/mL injection INJECT 1 ML INTO THE MUSCLE EVERY 30 DAYS  Qty: 10 mL, Refills: 0      CALCIUM CARB/VIT D3/MINERALS (CALCIUM CARBONATE-VIT D3-MIN) 1,200 mgcalcium -1,000 unit Chew Take  by mouth. Syringe with Needle, Disp, 3 mL 25 x 1\" Syrg 1 Units by Does Not Apply route every thirty (30) days. Qty: 50 Units, Refills: 1    Associated Diagnoses: Pernicious anemia      Cholecalciferol, Vitamin D3, (VITAMIN D3) 1,000 unit Cap Take 1 Cap by mouth daily. furosemide (LASIX) 20 mg tablet Take 20 mg by mouth as needed.       diltiazem (CARDIZEM) 30 mg tablet Take 30 mg by mouth as needed. PT STATES USES ONLY AS NEEDED             Follow up Care:    1. Yvonne Arriola MD in 1-2 weeks  2. cardiology    Diet:  Cardiac Diet    Disposition:  Home. Advanced Directive:    Discharge Exam:  See today's note. CONSULTATIONS: Cardiology    Significant Diagnostic Studies:   Recent Labs      03/03/17   0840  03/02/17   1808   WBC  13.6*  16.8*   HGB  14.6  14.0   HCT  44.4  42.8   PLT  259  264     Recent Labs      03/02/17   1808   NA  141   K  4.2   CL  105   CO2  30   BUN  27*   CREA  0.74   GLU  115*   CA  8.5     Recent Labs      03/02/17   1808   SGOT  16   ALT  24   AP  109   TBILI  0.5   TP  6.8   ALB  3.6   GLOB  3.2     No results for input(s): INR, PTP, APTT in the last 72 hours. No lab exists for component: INREXT   No results for input(s): FE, TIBC, PSAT, FERR in the last 72 hours. No results for input(s): PH, PCO2, PO2 in the last 72 hours. Recent Labs      03/02/17   1808   CPK  57   CKMB  1.3     Lab Results   Component Value Date/Time    Glucose (POC) 144 06/09/2014 03:00 AM    Glucose (POC) 141 01/25/2013 02:11 PM    Glucose (POC) 113 06/15/2012 12:40 PM    Glucose (POC) 108 12/03/2010 10:59 AM    Glucose (POC) 148 09/22/2010 11:41 AM             HOSPITAL COURSE & TREATMENT RENDERED:   1. Chest pain with moderate risk for cardiac etiology (3/2/2017)/ CAD (coronary artery disease) (6/29/2009): Check serial cardiac enzymes. Echocardiogram and stress test are unremarkable. Evaluated by Cardiology. Allergic to Asprin although she did take it in the ED. FU with her cardiologist.        2. Iron deficiency anemia, unspecified (10/13/2011)/ Pernicious anemia (12/5/2009): continue iron supplementation      3. Esophageal reflux (1/8/2011): On PPIs      4. Unspecified hypothyroidism (3/23/2012): TSH is normal. On Levothyroxine      5. HTN (hypertension), benign (3/2/2017): not on meds. BP well controlled.       6. Leukocytosis POA: unclear etiology. Afebrile. No focus of infection. Pt is discharged in improved condition.        Signed:  Mojgan Pérez MD  3/3/2017  2:17 PM

## 2017-03-03 NOTE — ED NOTES
Dr. Irene Boogie at bedside to explain results and admission to [facility]. Pt and family verbalize good understanding of plan.

## 2017-03-03 NOTE — PROGRESS NOTES
1518 - Chart accessed for d/c purposes. Pt provided with printed d/c instructions and education materials. Pt verbalizes understanding of given information. Telemetry and IV removed. Pt A&O and in no acute distress. Pt denies further questions/concerns at this time. Personal belongings accounted for.

## 2017-03-03 NOTE — PROGRESS NOTES
7559: Patient with mild headache, requesting pain medication. Notified Dr. Josue Schmitz. Orders for tylenol q6 PRN. Will continue to monitor. 1410: Patient back from second part of stress test. Patient requesting a diet. Notified Dr. Josue Schmitz. Verbal orders for diet. OK to D/C fluids.

## 2017-03-03 NOTE — PROGRESS NOTES
3-3-2017 CASE MANAGEMENT NOTE:  I met with the pt and , Hieu Gamble ([phone]), to determine potential discharge needs. The live in a one story house. She is independent with her ADL's and drives. She has a cane, rollator, wheelchair, shower chair, bedside commode and raised toilet seat but rarely uses any DME-from previous foot surgeries. Her PCP is Dr. Celi Rushing. She has prescription drug coverage and gets her medications from [facility] in [facility]. After the pt went down for a test, I took the Observation letter to the room and her  signed it, was given a copy and the original placed on her chart. No discharge needs were identified. Care Management Interventions  PCP Verified by CM:  Yes (Dr. Celi Rushing)  Discharge Durable Medical Equipment: No  Physical Therapy Consult: No  Occupational Therapy Consult: No  Speech Therapy Consult: No  Current Support Network: Lives with Spouse, Own Home  Confirm Follow Up Transport: Family  Plan discussed with Pt/Family/Caregiver: Yes  Discharge Location  Discharge Placement:  (Home)    NIDHI Jay, CM

## 2017-03-03 NOTE — ED NOTES
Patient notified that bed has been assigned but waiting for room to be cleaned before calling report.

## 2017-03-03 NOTE — PROGRESS NOTES
TSBAR, Kardex, ED Summary, Intake/Output, Recent Results, Med Rec Status and Cardiac Rhythm RANSFER - IN REPORT:    Verbal report received from Jason Marin RN(name) on Javier Fuchs  being received from ED(unit) for routine progression of care      Report consisted of patients Situation, Background, Assessment and   Recommendations(SBAR). Information from the following report(NSRSBAR, Kardex, ED Summary, Intake/Output, Recent Results, Med Rec Status and Cardiac Rhythm NSR was reviewed with the receiving nurse. Opportunity for questions and clarification was provided. Assessment completed upon patients arrival to unit and care assumed. Duel Skin Assessment    Primary Nurse Kojo Callejas and Hugo Gonzalez RN performed a dual skin assessment on this patient; Scabbed dime size area on back of Left hand, No drainage noted. Clean, dry, open to air. No impairment noted  Saúl score is 23    Shift Change Report:    Bedside and Verbal shift change report given to Mala Gordon RN (oncoming nurse) by Taisha Ch RN (offgoing nurse).  Report included the following information SBAR, Kardex, ED Summary, Intake/Output, Recent Results, Med Rec Status and Cardiac Rhythm SA.

## 2017-03-03 NOTE — PROGRESS NOTES
[address]  [address] [facility], [address] [place] [address]  [phone]      Medical Progress Note      NAME: Siddharth Bernard   :  1950  MRM:  [id number]    Date/Time: 3/3/2017  7:52 AM       Assessment and Plan:   1. Chest pain with moderate risk for cardiac etiology (3/2/2017)/ CAD (coronary artery disease) (2009): Check serial cardiac enzymes. Echocardiogram. Consult Cardiology. Allergic to Asprin although she did take it in the ED      2. Iron deficiency anemia, unspecified (10/13/2011)/ Pernicious anemia (2009): continue iron supplementation     3.  Esophageal reflux (2011): On PPIs     4. Unspecified hypothyroidism (3/23/2012): TSH is normal. On Levothyroxine     5. HTN (hypertension), benign (3/2/2017): not on meds. BP well controlled.       6.  Leukocytosis POA: unclear etiology. Afebrile. No focus of infection. Subjective:     Chief Complaint:  Denies chest pain     ROS:  (bold if positive, if negative)      Tolerating PT  Tolerating Diet        Objective:     Last 24hrs VS reviewed since prior progress note.  Most recent are:    Visit Vitals    /55 (BP 1 Location: Left arm, BP Patient Position: At rest)    Pulse (!) 57    Temp 98.8 °F (37.1 °C)    Resp 18    Ht 5' 3\" (1.6 m)    Wt 75.4 kg (166 lb 4.8 oz)    SpO2 95%    BMI 29.46 kg/m2     SpO2 Readings from Last 6 Encounters:   17 95%   16 95%   01/11/15 95%   14 94%   14 95%   14 97%          Intake/Output Summary (Last 24 hours) at 17 0752  Last data filed at 17 0606   Gross per 24 hour   Intake                0 ml   Output              100 ml   Net             -100 ml        Physical Exam:    Gen:  Well-developed, well-nourished, in no acute distress  HEENT:  Pink conjunctivae, PERRL, hearing intact to voice, moist mucous membranes  Neck:  Supple, without masses, thyroid non-tender  Resp:  No accessory muscle use, clear breath sounds without wheezes rales or rhonchi  Card:  No murmurs, normal S1, S2 without thrills, bruits or peripheral edema  Abd:  Soft, non-tender, non-distended, normoactive bowel sounds are present, no palpable organomegaly and no detectable hernias  Lymph:  No cervical or inguinal adenopathy  Musc:  No cyanosis or clubbing  Skin:  No rashes or ulcers, skin turgor is good  Neuro:  Cranial nerves are grossly intact, no focal motor weakness, follows commands appropriately  Psych:  Good insight, oriented to person, place and time, alert  __________________________________________________________________  Medications Reviewed: (see below)  Medications:     Current Facility-Administered Medications   Medication Dose Route Frequency    lidocaine (LIDODERM) 5 % patch 1 Patch  1 Patch TransDERmal Q24H    pantoprazole (PROTONIX) tablet 40 mg  40 mg Oral ACB    sodium chloride (NS) flush 5-10 mL  5-10 mL IntraVENous Q8H    sodium chloride (NS) flush 5-10 mL  5-10 mL IntraVENous PRN    diphenhydrAMINE (BENADRYL) injection 12.5 mg  12.5 mg IntraVENous Q4H PRN    prochlorperazine (COMPAZINE) injection 5 mg  5 mg IntraVENous Q8H PRN    enoxaparin (LOVENOX) injection 40 mg  40 mg SubCUTAneous Q24H    0.9% sodium chloride infusion  75 mL/hr IntraVENous CONTINUOUS    ferrous sulfate tablet 325 mg  1 Tab Oral ACB    cholecalciferol (VITAMIN D3) tablet 1,000 Units  1,000 Units Oral DAILY    levothyroxine (SYNTHROID) tablet 112 mcg  112 mcg Oral ACB        Lab Data Reviewed: (see below)  Lab Review:     Recent Labs      03/02/17   1808   WBC  16.8*   HGB  14.0   HCT  42.8   PLT  264     Recent Labs      03/02/17   1808   NA  141   K  4.2   CL  105   CO2  30   GLU  115*   BUN  27*   CREA  0.74   CA  8.5   ALB  3.6   TBILI  0.5   SGOT  16   ALT  24     Lab Results   Component Value Date/Time    Glucose (POC) 144 06/09/2014 03:00 AM    Glucose (POC) 141 01/25/2013 02:11 PM    Glucose (POC) 113 06/15/2012 12:40 PM    Glucose (POC) 108 12/03/2010 10:59 AM    Glucose (POC) 148 09/22/2010 11:41 AM     No results for input(s): PH, PCO2, PO2, HCO3, FIO2 in the last 72 hours. No results for input(s): INR in the last 72 hours. No lab exists for component: INREXT  All Micro Results     None          I have reviewed notes of prior 24hr. Other pertinent lab:       Total time spent with patient: [address] discussed with: Patient, Nursing Staff and >50% of time spent in counseling and coordination of care    Discussed:  Care Plan    Prophylaxis:  Lovenox    Disposition:  Home w/Family           ___________________________________________________    Attending Physician: Keiry Condon MD

## 2017-03-03 NOTE — H&P
[facility]  [address]  [phone]    Admission History and Physical      NAME:              Joshua Francis   :   1950   MRN:  [id number]     PCP:  Ana Miguel MD     Date:     3/2/2017     Chief  Complaint: Chest pain    History Of Presenting Illness:       Ms. Ekaterina Murray is a 69 y.o. female who is being admitted for chest pain with moderate risk for cardiac etiology. Ms. Ekaterina Murray presented to the [facility] Emergency Department today complaining of mid chest pressure like pain while at a pet store. This was radiating to her neck, associated with SOB and felt better after she was given NGT. She denies any cough or fever. No chills or rigors. She has a hx of CAD and has had stents x2, last done in . She will be observed in hospital for further cardiac evaluations. Allergies   Allergen Reactions    Adhesive Tape-Silicones Other (comments)     BAD BLISTERS AND TENDS TO GET INFECTED    Albuterol Palpitations    Aspirin Hives and Other (comments)     \"it makes my stomach bleed\"      Butter Flavor Anaphylaxis     Butter AND CREME    Demerol [Meperidine] Other (comments)     HYPOTENSION    Fentanyl Other (comments)     HYPOTENSION    Gluten Diarrhea     bloating    Keflex [Cephalexin] Anaphylaxis    Levaquin [Levofloxacin] Unproven on Challenge     PT DOESN'T REMEMBER HAVING THAT    Morphine Other (comments)     HYPOTENSION    Nitroglycerin Other (comments)     IN PILL FORM  - HYPOTENSION  CAN TOLERATE PATCH FOR SHORT TIME    Pcn [Penicillins] Anaphylaxis    Percocet [Oxycodone-Acetaminophen] Unknown (comments)     HYPOTENSION AND HALLUCINATIONS. Can take tylenol ok, oxycodone is allergy per patient.  Tapazole [Methimazole] Unknown (comments)       Prior to Admission medications    Medication Sig Start Date End Date Taking?  Authorizing Provider ascorbic acid (VITAMIN C) 500 mg tablet Take  by mouth daily. Yolanda Lujan MD   ferrous sulfate 325 mg (65 mg iron) tablet Take  by mouth Daily (before breakfast). Yolanda Lujan MD   doxycycline (MONODOX) 100 mg capsule Take 100 mg by mouth once. Yolanda Lujan MD   phenazopyridine (PYRIDIUM) 200 mg tablet Take 200 mg by mouth three (3) times daily as needed for Pain. Yolanda Lujan MD   fluconazole (DIFLUCAN) 200 mg tablet Take 200 mg by mouth daily. MD WOJCIECH Patel/ME BLUE/SOD PHOS/PHEN/HYOS (URIBEL PO) Take 118 mg by mouth two (2) times a day. Yolanda Lujan MD   traMADol (ULTRAM) 50 mg tablet Take 1 Tab by mouth every six (6) hours as needed for Pain. Max Daily Amount: 200 mg. 4/23/16   Lady Talbot DO   cyanocobalamin (VITAMIN B12) 1,000 mcg/mL injection INJECT 1 ML INTO THE MUSCLE EVERY 30 DAYS 3/24/14   Dory Luveano MD   CALCIUM CARB/VIT D3/MINERALS (CALCIUM CARBONATE-VIT D3-MIN) 1,200 mgcalcium -1,000 unit Chew Take  by mouth. Historical Provider   Syringe with Needle, Disp, 3 mL 25 x 1\" Syrg 1 Units by Does Not Apply route every thirty (30) days. 8/30/13   Dory Luevano MD   Cholecalciferol, Vitamin D3, (VITAMIN D3) 1,000 unit Cap Take 1 Cap by mouth daily. Historical Provider   levothyroxine (SYNTHROID) 112 mcg tablet Take 1 Tab by mouth Daily (before breakfast). 6/12/12   Dory Luevano MD   furosemide (LASIX) 20 mg tablet Take 20 mg by mouth as needed. Historical Provider   diltiazem (CARDIZEM) 30 mg tablet Take 30 mg by mouth as needed. PT STATES USES ONLY AS NEEDED 1/24/11   Historical Provider   lansoprazole (PREVACID) 30 mg capsule Take 30 mg by mouth Daily (before breakfast). Historical Provider       Past Medical History:   Diagnosis Date    Anemia     Arrhythmia     Arthritis     Asthma 6/29/2009    CAUSED BY MOLD    Atrial fibrillation ([facility] [address] 6/29/2009    CAD (coronary artery disease) 6/29/2009    MI X2     Chronic pain     RT.  ANKLE    GERD (gastroesophageal reflux disease)     Gluten intolerance     Hypothyroidism 6/29/2009    Personal history of MI (myocardial infarction)     Stroke ([facility] [address]     TIA (NO RESIDUAL)    Syncope     Thromboembolus ([facility] [address] 2004    RT.  Thyroid disease     HYPO    TIA (transient ischemic attack)     2004 & 2006    Unspecified adverse effect of anesthesia     \"IS A LIGHT WEIGHT\"    Unspecified adverse effect of anesthesia     DIFFICULTY AWAKENING    Vitamin B12 deficiency 6/29/2009        Past Surgical History:   Procedure Laterality Date    CARDIAC SURG PROCEDURE UNLIST      ABLATION    HX ADENOIDECTOMY      HX APPENDECTOMY      HX CHOLECYSTECTOMY  6/16/11    HX GI      COLONOSCOPY AND ENDOSCOPY    HX HEART CATHETERIZATION  2010    2 STENTS    HX HYSTERECTOMY      HX LUMBAR LAMINECTOMY  2000    L4-L5    HX ORTHOPAEDIC  1993-6/2012    RT. FOOT SURGERY X 6/calcaneal osteotomy    HX TONSILLECTOMY  1964    STENT INSERTION         Social History   Substance Use Topics    Smoking status: Former Smoker     Packs/day: 1.00     Years: 30.00     Quit date: 6/14/2002    Smokeless tobacco: Never Used    Alcohol use No        Family History   Problem Relation Age of Onset   [facility] Delayed Awakening Mother     Heart Disease Mother     Coronary Artery Disease Mother    [facility] Hypertension Mother    [facility] Stroke Mother    [facility] Delayed Awakening Sister     Hypertension Sister     Lung Disease Father     Cancer Father      colon    Hypertension Father     Hypertension Brother       Review of Systems:    Constitutional ROS: no fever, chills, rigors or night sweats  Respiratory ROS: no cough, sputum, hemoptysis, dyspnea or pleuritic pain. Cardiovascular ROS: no palpitations, orthopnea, PND or syncope  Endocrine ROS: no polydispsia, polyuria, heat or cold intolerance or major weight change.   Gastrointestinal ROS: no dysphagia, abdominal pain, nausea, vomiting, diarrhea or any bleeding   Genito-Urinary ROS: no dysuria, frequency, hematuria, retention or flank pain  Musculoskeletal ROS: no joint pain, swelling or muscular tenderness  Neurological ROS: no headache, confusion, focal weakness or any other neurological symptoms  Psychiatric ROS: no depression, anxiety, mood swings  Dermatological ROS: no rash, pruritis, or urticaria    Examination:    Vital signs:    Visit Vitals    /56 (BP 1 Location: Left arm, BP Patient Position: Sitting)    Pulse 64    Resp 16    Ht 5' 3\" (1.6 m)    Wt 72.6 kg (160 lb)    LMP 09/29/1980    SpO2 94%    BMI 28.34 kg/m2         Physical Examination:    General:  Weak and ill looking patient in no acute distress  Eyes: Pink conjunctivae, PERRLA with no discharge. Ear, Nose & Throat: No ottorrhea, rhinorrhea and has moist mucous membranes  Respiratory:  No accessory muscle use, clear breath sounds without crackles or wheezes  Cardiovascular:  No JVD or murmurs, regular and normal S1, S2 without thrills, bruits or peripheral edema  GI & :  Soft abdomen, non-tender, non-distended, normoactive bowel sounds with no palpable organomegaly  Musculoskeletal:  No cyanosis, clubbing, atrophy or deformities  Skin:  No rashes, bruising or ulcers   Neurological: Awake and alert, speech is clear, CNs 2-12 are grossly intact and otherwise non focal  Psychiatric:  Has a good insight and is oriented x 3  ________________________________________________________________________    Data Review:    Labs:    Recent Labs      03/02/17   1808   WBC  16.8*   HGB  14.0   HCT  42.8   PLT  264     Recent Labs      03/02/17   1808   NA  141   K  4.2   CL  105   CO2  30   GLU  115*   BUN  27*   CREA  0.74   CA  8.5   ALB  3.6   SGOT  16   ALT  24     No components found for: GLPOC  No results for input(s): PH, PCO2, PO2, HCO3, FIO2 in the last 72 hours. No results for input(s): INR in the last 72 hours.     No lab exists for component: INREXT    Radiological Studies:      Chest X-ray - No acute abnormality and no change. Other Medical tests:    Personally reviewed EKG: Normal rate, rhythm, axis and intervals. and No acute changes suggestive of ischemia    I have reviewed old medical records available. Assessment & Impression:     Ms. Facundo Andres is a 69 y.o. female being evaluated for:     Principal Problem:    Chest pain with moderate risk for cardiac etiology (3/2/2017)    Active Problems:    CAD (coronary artery disease) (6/29/2009)      Pernicious anemia (12/5/2009)      Esophageal reflux (1/8/2011)      Iron deficiency anemia, unspecified (10/13/2011)      Unspecified hypothyroidism (3/23/2012)      HTN (hypertension), benign (3/2/2017)         Plan of management:    Chest pain with moderate risk for cardiac etiology (3/2/2017)/ CAD (coronary artery disease) (6/29/2009): Observe in telemetry. Serial cardiac enzymes. Echocardiogram. Consult Cardiology. Allergic to Asprin although she did take it in the ED     Iron deficiency anemia, unspecified (10/13/2011)/ Pernicious anemia (12/5/2009): resume iron supplementation    Esophageal reflux (1/8/2011): resume PPIs    Unspecified hypothyroidism (3/23/2012): check TSH. Resume Levothyroxine    HTN (hypertension), benign (3/2/2017): BP well controlled. Not on a BB. Monitor for now. Leukocytosis POA: unclear etiology. Afebrile. No focus of infection.  Re-check in AM    Code Status:  Full    Surrogate decision maker: Family    Risk of deterioration: high      Total time spent for the care of the patient: [address] discussed with: Patient, Family, Nursing Staff and ED physician    Discussed:  Code Status, Care Plan and D/C Planning    Prophylaxis:  Lovenox    Probable Disposition:  Home w/Family           ___________________________________________________    Attending Physician: Fidel Luna MD

## 2017-03-03 NOTE — ED NOTES
Transported by [facility] to [facility]; \"Feeling better. \"

## 2017-03-03 NOTE — ED NOTES
TRANSFER - OUT REPORT:    Verbal report given to Mahendra(name) on Naida Tubbs  being transferred to [facility](unit) for routine progression of care       Report consisted of patients Situation, Background, Assessment and   Recommendations(SBAR). Information from the following report(s) ED Summary was reviewed with the receiving nurse. Lines:   Peripheral IV 03/02/17 Right Antecubital (Active)   Site Assessment Clean, dry, & intact 3/2/2017  6:15 PM   Phlebitis Assessment 0 3/2/2017  6:15 PM   Infiltration Assessment 0 3/2/2017  6:15 PM   Dressing Status Clean, dry, & intact 3/2/2017  6:15 PM   Dressing Type Transparent 3/2/2017  6:15 PM   Hub Color/Line Status Pink 3/2/2017  6:15 PM        Opportunity for questions and clarification was provided.       Patient transported with:   Monitor   EKG X2  EMTALA  20 gauge saline lock RAC

## 2017-03-03 NOTE — CONSULTS
HISTORY OF PRESENTING ILLNESS      Matt Crocker is a 69 y.o. female with CAD s/p PCI (2008), HTN, PAF, hypothyroidism admitted for prolonged right sided chest pain associated with shortness of breath/nausea similar to previous anginal pain leading to PCI that persisted for 35 minutes and terminated with administration of nitroglycerin. EKG failed to demonstrate ischemic changes. Cardiac enzymes have been negative x 2. CXR unremarkable. Telemetry demonstrates sinus overnight. No recurrent chest pain. Of note, she reports undergoing hematologic workup in the past for thrombocytopenia attributed to aspirin use. She has previously been on plavix and eliquis and reports intolerance to these as well. She however cannot recall whether 'intolerance' refers to thrombocytopenia or actual bleeding episodes.         ACTIVE PROBLEM LIST     Patient Active Problem List    Diagnosis Date Noted    Chest pain with moderate risk for cardiac etiology 03/02/2017    HTN (hypertension), benign 03/02/2017    Bony exostosis 01/10/2013    Synovitis of ankle 01/10/2013    Pes cavus 01/10/2013    Unspecified hypothyroidism 03/23/2012    Iron deficiency anemia, unspecified 10/13/2011    Gluten intolerance     Esophageal reflux 01/08/2011    Pernicious anemia 12/05/2009    Asthma 06/29/2009    CAD (coronary artery disease) 06/29/2009           MEDICATIONS     Current Facility-Administered Medications   Medication Dose Route Frequency    lidocaine (LIDODERM) 5 % patch 1 Patch  1 Patch TransDERmal Q24H    pantoprazole (PROTONIX) tablet 40 mg  40 mg Oral ACB    sodium chloride (NS) flush 5-10 mL  5-10 mL IntraVENous Q8H    sodium chloride (NS) flush 5-10 mL  5-10 mL IntraVENous PRN    diphenhydrAMINE (BENADRYL) injection 12.5 mg  12.5 mg IntraVENous Q4H PRN    prochlorperazine (COMPAZINE) injection 5 mg  5 mg IntraVENous Q8H PRN    enoxaparin (LOVENOX) injection 40 mg  40 mg SubCUTAneous Q24H    0.9% sodium chloride infusion  75 mL/hr IntraVENous CONTINUOUS    ferrous sulfate tablet 325 mg  1 Tab Oral ACB    cholecalciferol (VITAMIN D3) tablet 1,000 Units  1,000 Units Oral DAILY    levothyroxine (SYNTHROID) tablet 112 mcg  112 mcg Oral ACB           PAST MEDICAL HISTORY     Past Medical History:   Diagnosis Date    Anemia     Arrhythmia     Arthritis     Asthma 6/29/2009    CAUSED BY MOLD    Atrial fibrillation ([facility] [address] 6/29/2009    CAD (coronary artery disease) 6/29/2009    MI X2     Chronic pain     RT. ANKLE    GERD (gastroesophageal reflux disease)     Gluten intolerance     Hypothyroidism 6/29/2009    Personal history of MI (myocardial infarction)     Stroke ([facility] [address]     TIA (NO RESIDUAL)    Syncope     Thromboembolus ([facility] [address] 2004    RT.  Thyroid disease     HYPO    TIA (transient ischemic attack)     2004 & 2006    Unspecified adverse effect of anesthesia     \"IS A LIGHT WEIGHT\"    Unspecified adverse effect of anesthesia     DIFFICULTY AWAKENING    Vitamin B12 deficiency 6/29/2009           PAST SURGICAL HISTORY     Past Surgical History:   Procedure Laterality Date    CARDIAC SURG PROCEDURE UNLIST      ABLATION    HX ADENOIDECTOMY      HX APPENDECTOMY      HX CHOLECYSTECTOMY  6/16/11    HX GI      COLONOSCOPY AND ENDOSCOPY    HX HEART CATHETERIZATION  2010    2 STENTS    HX HYSTERECTOMY      HX LUMBAR LAMINECTOMY  2000    L4-L5    HX ORTHOPAEDIC  1993-6/2012    RT.  FOOT SURGERY X 6/calcaneal osteotomy    HX TONSILLECTOMY  1964    STENT INSERTION            ALLERGIES     Allergies   Allergen Reactions    Adhesive Tape-Silicones Other (comments)     BAD BLISTERS AND TENDS TO GET INFECTED    Albuterol Palpitations    Aspirin Hives and Other (comments)     \"it makes my stomach bleed\"      Butter Flavor Anaphylaxis     Butter AND CREME    Demerol [Meperidine] Other (comments)     HYPOTENSION    Fentanyl Other (comments)     HYPOTENSION    Gluten Diarrhea     bloating    Keflex [Cephalexin] Anaphylaxis    Levaquin [Levofloxacin] Unproven on Challenge     PT DOESN'T REMEMBER HAVING THAT    Morphine Other (comments)     HYPOTENSION    Nitroglycerin Other (comments)     IN PILL FORM  - HYPOTENSION  CAN TOLERATE PATCH FOR SHORT TIME    Pcn [Penicillins] Anaphylaxis    Percocet [Oxycodone-Acetaminophen] Unknown (comments)     HYPOTENSION AND HALLUCINATIONS. Can take tylenol ok, oxycodone is allergy per patient.      Tapazole [Methimazole] Unknown (comments)          FAMILY HISTORY     Family History   Problem Relation Age of Onset   Cassandra Bourne Delayed Awakening Mother     Heart Disease Mother     Coronary Artery Disease Mother     Hypertension Mother    Cassandra Bourne Stroke Mother     Delayed Awakening Sister     Hypertension Sister     Lung Disease Father     Cancer Father      colon    Hypertension Father     Hypertension Brother     negative for cardiac disease       SOCIAL HISTORY     Social History     Social History    Marital status:      Spouse name: N/A    Number of children: N/A    Years of education: N/A     Social History Main Topics    Smoking status: Former Smoker     Packs/day: 1.00     Years: 30.00     Quit date: 6/14/2002    Smokeless tobacco: Never Used    Alcohol use No    Drug use: No    Sexual activity: No     Other Topics Concern    None     Social History Narrative         MEDICATIONS     Current Facility-Administered Medications   Medication Dose Route Frequency    lidocaine (LIDODERM) 5 % patch 1 Patch  1 Patch TransDERmal Q24H    pantoprazole (PROTONIX) tablet 40 mg  40 mg Oral ACB    sodium chloride (NS) flush 5-10 mL  5-10 mL IntraVENous Q8H    sodium chloride (NS) flush 5-10 mL  5-10 mL IntraVENous PRN    diphenhydrAMINE (BENADRYL) injection 12.5 mg  12.5 mg IntraVENous Q4H PRN    prochlorperazine (COMPAZINE) injection 5 mg  5 mg IntraVENous Q8H PRN    enoxaparin (LOVENOX) injection 40 mg  40 mg SubCUTAneous Q24H    0.9% sodium chloride infusion  75 mL/hr IntraVENous CONTINUOUS    ferrous sulfate tablet 325 mg  1 Tab Oral ACB    cholecalciferol (VITAMIN D3) tablet 1,000 Units  1,000 Units Oral DAILY    levothyroxine (SYNTHROID) tablet 112 mcg  112 mcg Oral ACB       I have reviewed the nurses notes, vitals, problem list, allergy list, medical history, family, social history and medications. REVIEW OF SYMPTOMS      General: Pt denies excessive weight gain or loss. Pt is able to conduct ADL's  HEENT: Denies blurred vision, headaches, hearing loss, epistaxis and difficulty swallowing. Respiratory: Denies cough, congestion, shortness of breath, CHACON, wheezing or stridor. Cardiovascular: Denies precordial pain, palpitations, edema or PND  Gastrointestinal: Denies poor appetite, indigestion, abdominal pain or blood in stool  Genitourinary: Denies hematuria, dysuria, increased urinary frequency  Musculoskeletal: Denies joint pain or swelling from muscles or joints  Neurologic: Denies tremor, paresthesias, headache, or sensory motor disturbance  Psychiatric: Denies confusion, insomnia, depression  Integumentray: Denies rash, itching or ulcers. Hematologic: Denies easy bruising, bleeding       PHYSICAL EXAMINATION      Vitals:    03/03/17 0023 03/03/17 0522 03/03/17 0700 03/03/17 0757   BP: 135/63 112/55  132/72   Pulse: (!) 59 69 (!) 57 60   Resp: 18 18  18   Temp: 97.8 °F (36.6 °C) 98.8 °F (37.1 °C)  97.3 °F (36.3 °C)   SpO2: 96% 95%  98%   Weight:  166 lb 4.8 oz (75.4 kg)     Height:  5' 3\" (1.6 m)       General: Well developed, in no acute distress. HEENT: No jaundice, oral mucosa moist, no oral ulcers  Neck: Supple, no stiffness, no lymphadenopathy, supple  Heart:  Normal S1/S2 negative S3 or S4. Regular, no murmur, gallop or rub, no jugular venous distention  Respiratory: Clear bilaterally x 4, no wheezing or rales  Abdomen:   Soft, non-tender, bowel sounds are active.   Extremities:  No edema, normal cap refill, no cyanosis.   Musculoskeletal: No clubbing, no deformities  Neuro: A&Ox3, speech clear, gait stable, cooperative, no focal neurologic deficits  Skin: Skin color is normal. No rashes or lesions. Non diaphoretic, moist.  Vascular: 2+ pulses symmetric in all extremities       DIAGNOSTIC DATA      EKG: Sinus rhythm without ischemic ST-T deviations    TELEMETRY: Sinus rhythm       LABORATORY DATA      Lab Results   Component Value Date/Time    WBC 16.8 03/02/2017 06:08 PM    Hemoglobin (POC) 15.6 06/09/2014 03:00 AM    HGB 14.0 03/02/2017 06:08 PM    Hematocrit (POC) 46 06/09/2014 03:00 AM    HCT 42.8 03/02/2017 06:08 PM    PLATELET 477 37/18/2844 06:08 PM    MCV 92.4 03/02/2017 06:08 PM      Lab Results   Component Value Date/Time    Sodium 141 03/02/2017 06:08 PM    Potassium 4.2 03/02/2017 06:08 PM    Chloride 105 03/02/2017 06:08 PM    CO2 30 03/02/2017 06:08 PM    Anion gap 6 03/02/2017 06:08 PM    Glucose 115 03/02/2017 06:08 PM    BUN 27 03/02/2017 06:08 PM    Creatinine 0.74 03/02/2017 06:08 PM    BUN/Creatinine ratio 36 03/02/2017 06:08 PM    GFR est AA >60 03/02/2017 06:08 PM    GFR est non-AA >60 03/02/2017 06:08 PM    Calcium 8.5 03/02/2017 06:08 PM    Bilirubin, total 0.5 03/02/2017 06:08 PM    AST (SGOT) 16 03/02/2017 06:08 PM    Alk. phosphatase 109 03/02/2017 06:08 PM    Protein, total 6.8 03/02/2017 06:08 PM    Albumin 3.6 03/02/2017 06:08 PM    Globulin 3.2 03/02/2017 06:08 PM    A-G Ratio 1.1 03/02/2017 06:08 PM    ALT (SGPT) 24 03/02/2017 06:08 PM           ASSESSMENT      1. Chest pain  2. CAD, native  3. Percutaneous transluminal angioplasty   A. PCI 2008  4. Atrial fibrillation   A. Paroxysmal  5. Hx CVA  6. Hypothyroidism       PLAN     Chest pain of unclear etiology. Plan for echocardiogram and nuclear stress test. Follow up with Dr. Andrey Cueto as outpatient. Thank you, Meagan Hernández MD and Dr. Diane Crews for involving me in the care of this extraordinarily pleasant female.  Please do not hesitate to contact me for further questions/concerns.          Caleen Meigs, MD  Cardiac Electrophysiology / Cardiology    [facility] [address]  [address], [address] [facility], [address] [place] [address]  [phone] / [phone] Fax   [phone] / [phone] Fax

## 2017-03-09 NOTE — ED NOTES
The pt presents to ED with complaint of right 2nd finger tingling and right upper arm discomfort that has been present since seen in ED last week,  the discomfort and numbness has not worsened. The pt states that she does not want to check in and see MD she wants counseling on the stated issue. Pt was a difficult stick and ultrasound used by MD to find a vein. There is old slight bruising remaining at the attempted IV sites. There is no swelling noted. The pt is advised to see PCP or if wanted see ED MD today. Pt states that she will wait and see if said condition improves and will return if worsening condition occurs. Good PPM noted to extremity.

## 2017-06-09 ENCOUNTER — HOSPITAL ENCOUNTER (OUTPATIENT)
Dept: MAMMOGRAPHY | Age: 67
Discharge: HOME | End: 2017-06-09
Attending: FAMILY MEDICINE
Payer: MEDICARE

## 2017-06-09 DIAGNOSIS — M89.9 DISORDER OF BONE: ICD-10-CM

## 2017-06-09 PROCEDURE — 77080 DXA BONE DENSITY AXIAL: CPT

## 2017-07-30 ENCOUNTER — HOSPITAL ENCOUNTER (EMERGENCY)
Age: 67
Discharge: HOME | End: 2017-07-30
Attending: EMERGENCY MEDICINE
Payer: MEDICARE

## 2017-07-30 ENCOUNTER — APPOINTMENT (OUTPATIENT)
Dept: CT IMAGING | Age: 67
End: 2017-07-30
Attending: EMERGENCY MEDICINE
Payer: MEDICARE

## 2017-07-30 VITALS
SYSTOLIC BLOOD PRESSURE: 128 MMHG | TEMPERATURE: 97.9 F | WEIGHT: 171.96 LBS | DIASTOLIC BLOOD PRESSURE: 66 MMHG | RESPIRATION RATE: 16 BRPM | BODY MASS INDEX: 30.47 KG/M2 | HEART RATE: 66 BPM | OXYGEN SATURATION: 94 % | HEIGHT: 63 IN

## 2017-07-30 DIAGNOSIS — R42 VERTIGO: Primary | ICD-10-CM

## 2017-07-30 PROCEDURE — 99283 EMERGENCY DEPT VISIT LOW MDM: CPT

## 2017-07-30 PROCEDURE — 74011250636 HC RX REV CODE- 250/636: Performed by: EMERGENCY MEDICINE

## 2017-07-30 PROCEDURE — 70450 CT HEAD/BRAIN W/O DYE: CPT

## 2017-07-30 RX ORDER — MECLIZINE HYDROCHLORIDE 25 MG/1
25 TABLET ORAL
Status: COMPLETED | OUTPATIENT
Start: 2017-07-30 | End: 2017-07-30

## 2017-07-30 RX ORDER — LIDOCAINE 50 MG/G
1 PATCH TOPICAL EVERY 24 HOURS
Status: ON HOLD | COMMUNITY
End: 2020-11-17 | Stop reason: SDUPTHER

## 2017-07-30 RX ORDER — MECLIZINE HYDROCHLORIDE 25 MG/1
25 TABLET ORAL
Qty: 20 TAB | Refills: 0 | Status: SHIPPED | OUTPATIENT
Start: 2017-07-30 | End: 2019-02-22

## 2017-07-30 RX ADMIN — MECLIZINE HYDROCHLORIDE 25 MG: 25 TABLET ORAL at 12:20

## 2017-07-30 NOTE — ED PROVIDER NOTES
Patient is a 69 y.o. female presenting with dizziness. The history is provided by the patient. Dizziness   This is a new problem. Episode onset: about 2 weeks ago, much worse this morning. There was no focality noted. There has been no fever. Pertinent negatives include no shortness of breath, no chest pain, no vomiting, no altered mental status, no confusion, no headaches and no nausea. There were no medications administered prior to arrival. Associated medical issues include trauma. Past Medical History:   Diagnosis Date    Anemia     Arrhythmia     Arthritis     Asthma 6/29/2009    CAUSED BY MOLD    Atrial fibrillation ([address] 6/29/2009    CAD (coronary artery disease) 6/29/2009    MI X2     Chronic pain     RT. ANKLE    GERD (gastroesophageal reflux disease)     Gluten intolerance     Hypothyroidism 6/29/2009    Personal history of MI (myocardial infarction)     Stroke ([facility] [address]     TIA (NO RESIDUAL)    Syncope     Thromboembolus ([facility] [address] 2004    RT.  Thyroid disease     HYPO    TIA (transient ischemic attack)     2004 & 2006    Unspecified adverse effect of anesthesia     \"IS A LIGHT WEIGHT\"    Unspecified adverse effect of anesthesia     DIFFICULTY AWAKENING    Vitamin B12 deficiency 6/29/2009       Past Surgical History:   Procedure Laterality Date    CARDIAC SURG PROCEDURE UNLIST      ABLATION    HX ADENOIDECTOMY      HX APPENDECTOMY      HX CHOLECYSTECTOMY  6/16/11    HX GI      COLONOSCOPY AND ENDOSCOPY    HX HEART CATHETERIZATION  2010    2 STENTS    HX HYSTERECTOMY      HX LUMBAR LAMINECTOMY  2000    L4-L5    HX ORTHOPAEDIC  1993-6/2012    RT.  FOOT SURGERY X 6/calcaneal osteotomy    HX TONSILLECTOMY  1964    STENT INSERTION           Family History:   Problem Relation Age of Onset   [facility] Delayed Awakening Mother     Heart Disease Mother     Coronary Artery Disease Mother     Hypertension Mother     Stroke Mother     Delayed Awakening Sister     Hypertension Sister    [facility] Lung Disease Father     Cancer Father      colon    Hypertension Father     Hypertension Brother        Social History     Social History    Marital status:      Spouse name: N/A    Number of children: N/A    Years of education: N/A     Occupational History    Not on file. Social History Main Topics    Smoking status: Former Smoker     Packs/day: 1.00     Years: 30.00     Quit date: 6/14/2002    Smokeless tobacco: Never Used    Alcohol use No    Drug use: No    Sexual activity: No     Other Topics Concern    Not on file     Social History Narrative         ALLERGIES: Adhesive tape-silicones; Albuterol; Aspirin; Butter flavor; Demerol [meperidine]; Fentanyl; Gluten; Keflex [cephalexin]; Levaquin [levofloxacin]; Morphine; Nitroglycerin; Pcn [penicillins]; Percocet [oxycodone-acetaminophen]; and Tapazole [methimazole]    Review of Systems   Constitutional: Negative for chills and fever. Respiratory: Negative for chest tightness and shortness of breath. Cardiovascular: Negative for chest pain. Gastrointestinal: Negative for abdominal pain, nausea and vomiting. Musculoskeletal: Negative for back pain and neck pain. Neurological: Positive for dizziness. Negative for headaches. Psychiatric/Behavioral: Negative for confusion. All other systems reviewed and are negative. Vitals:    07/30/17 1204   BP: 146/70   Pulse: 74   Resp: 16   Temp: 97.9 °F (36.6 °C)   SpO2: 96%   Weight: 78 kg (171 lb 15.3 oz)   Height: 5' 3\" (1.6 m)            Physical Exam   Constitutional: She is oriented to person, place, and time. She appears well-developed and well-nourished. No distress. HENT:   Head: Normocephalic and atraumatic.    Right Ear: Tympanic membrane and ear canal normal.   Left Ear: Tympanic membrane and ear canal normal.   Nose: Nose normal.   Mouth/Throat: Uvula is midline and oropharynx is clear and moist.   Eyes: Conjunctivae and EOM are normal. Pupils are equal, round, and reactive to light. No nystagmus noted   Neck: Normal range of motion. Neck supple. Cardiovascular: Normal rate, regular rhythm, normal heart sounds and intact distal pulses. No murmur heard. Pulmonary/Chest: Effort normal and breath sounds normal. No stridor. No respiratory distress. Abdominal: Soft. Bowel sounds are normal. There is no tenderness. Musculoskeletal: Normal range of motion. She exhibits no edema, tenderness or deformity. Neurological: She is alert and oriented to person, place, and time. No cranial nerve deficit. Skin: Skin is warm and dry. She is not diaphoretic. Psychiatric: She has a normal mood and affect. Nursing note and vitals reviewed. MDM  Number of Diagnoses or Management Options  Diagnosis management comments: Patient with positional vertigo - likely benign but patient has had symptoms for 2 weeks since a fall with head injury (hit the back of her head)  -- give po meclizine and check head Ct, will re-eval    1250 - patient feels better after meclizine, no ICB on head CT, d/c home as vertigo       Amount and/or Complexity of Data Reviewed  Tests in the radiology section of CPT®: ordered and reviewed    Patient Progress  Patient progress: improved    ED Course       Procedures    Final result (Exam End: 7/30/2017 12:37 PM) Open        Study Result      EXAM:  CT HEAD WO CONT  Clinical history: Dizziness after head trauma  INDICATION:   dizziness after head trauma     COMPARISON: None.     CONTRAST:  None.     TECHNIQUE: Unenhanced CT of the head was performed using 5 mm images. Brain and  bone windows were generated. CT dose reduction was achieved through use of a  standardized protocol tailored for this examination and automatic exposure  control for dose modulation. Adaptive statistical iterative reconstruction  (ASIR) was utilized.     FINDINGS:  Scattered hypodensities in the cerebral white matter. Sulcal and ventricular  prominence. . There is no intracranial hemorrhage, extra-axial collection, mass,  mass effect or midline shift. The basilar cisterns are open. No acute infarct  is identified. The bone windows demonstrate no abnormalities.  The visualized  portions of the paranasal sinuses and mastoid air cells are clear.     IMPRESSION:      Findings consistent with moderate chronic microvascular ischemic change.     No acute process is demonstrated.

## 2017-07-30 NOTE — ED TRIAGE NOTES
Pt presents to ED with c/o dizziness since a fall two weeks ago. Pt states it had started to resolve but is worse this am. Gait is steady. There is no nausea or vomiting.

## 2017-07-30 NOTE — DISCHARGE INSTRUCTIONS
Vertigo: Care Instructions  Your Care Instructions  Vertigo is the feeling that you or your surroundings are moving when there is no actual movement. It is often described as a feeling of spinning, whirling, falling, or tilting. Vertigo may make you vomit or feel nauseated. You may have trouble standing or walking and may lose your balance. Vertigo is often related to an inner ear problem, but it can have other more serious causes. If vertigo continues, you may need more tests to find its cause. Follow-up care is a key part of your treatment and safety. Be sure to make and go to all appointments, and call your doctor if you are having problems. Its also a good idea to know your test results and keep a list of the medicines you take. How can you care for yourself at home? · Do not lie flat on your back. Prop yourself up slightly. This may reduce the spinning feeling. Keep your eyes open. · Move slowly so that you do not fall. · If your doctor recommends medicine, take it exactly as directed. · Do not drive while you are having vertigo. Certain exercises, called Feldman-Daroff exercises, can help decrease vertigo. To do Feldman-Daroff exercises:  · Sit on the edge of a bed or sofa and quickly lie down on the side that causes the worst vertigo. Lie on your side with your ear down. · Stay in this position for at least 30 seconds or until the vertigo goes away. · Sit up. If this causes vertigo, wait for it to stop. · Repeat the procedure on the other side. · Repeat this 10 times. Do these exercises 2 times a day until the vertigo is gone. When should you call for help? Call 911 anytime you think you may need emergency care. For example, call if:  · You passed out (lost consciousness). · You have symptoms of a stroke. These may include:  ¨ Sudden numbness, tingling, weakness, or loss of movement in your face, arm, or leg, especially on only one side of your body. ¨ Sudden vision changes.   ¨ Sudden trouble speaking. ¨ Sudden confusion or trouble understanding simple statements. ¨ Sudden problems with walking or balance. ¨ A sudden, severe headache that is different from past headaches. Call your doctor now or seek immediate medical care if:  · Vertigo occurs with a fever, a headache, or ringing in your ears. · You have new or increased nausea and vomiting. Watch closely for changes in your health, and be sure to contact your doctor if:  · Vertigo gets worse or happens more often. · Vertigo has not gotten better after 2 weeks. Where can you learn more? Go to http://wayneZazubaricky.info/. Enter F665 in the search box to learn more about \"Vertigo: Care Instructions. \"  Current as of: July 29, 2016  Content Version: 11.3  © 4838-7724 [employer]. Care instructions adapted under license by [employer] (which disclaims liability or warranty for this information). If you have questions about a medical condition or this instruction, always ask your healthcare professional. [place] [address] any warranty or liability for your use of this information. We hope that we have addressed all of your medical concerns. The examination and treatment you received in the Emergency Department were for an emergent problem and were not intended as complete care. It is important that you follow up with your healthcare provider(s) for ongoing care. If your symptoms worsen or do not improve as expected, and you are unable to reach your usual health care provider(s), you should return to the Emergency Department. Today's healthcare is undergoing tremendous change, and patient satisfaction surveys are one of the many tools to assess the quality of medical care. You may receive a survey from the [employer] organization regarding your experience in the Emergency Department.   I hope that your experience has been completely positive, particularly the medical care that I provided. As such, please participate in the survey; anything less than excellent does not meet my expectations or intentions. [facility] and [facility] participate in nationally recognized quality of care measures. If your blood pressure is greater than 120/80, as reported below, we urge that you seek medical care to address the potential of high blood pressure, commonly known as hypertension. Hypertension can be hereditary or can be caused by certain medical conditions, pain, stress, or \"white coat syndrome. \"       Please make an appointment with your health care provider(s) for follow up of your Emergency Department visit. VITALS:   Patient Vitals for the past 8 hrs:   Temp Pulse Resp BP SpO2   07/30/17 1249 - 66 16 128/66 94 %   07/30/17 1204 97.9 °F (36.6 °C) 74 16 146/70 96 %          Thank you for allowing us to provide you with medical care today. We realize that you have many choices for your emergency care needs. Please choose us in the future for any continued health care needs. Sera Gross, [facility]: [phone]            No results found for this or any previous visit (from the past 24 hour(s)). Ct Head Wo Cont    Result Date: 7/30/2017  EXAM:  CT HEAD WO CONT Clinical history: Dizziness after head trauma INDICATION:   dizziness after head trauma COMPARISON: None. CONTRAST:  None. TECHNIQUE: Unenhanced CT of the head was performed using 5 mm images. Brain and bone windows were generated. CT dose reduction was achieved through use of a standardized protocol tailored for this examination and automatic exposure control for dose modulation. Adaptive statistical iterative reconstruction (ASIR) was utilized. FINDINGS: Scattered hypodensities in the cerebral white matter. Sulcal and ventricular prominence. . There is no intracranial hemorrhage, extra-axial collection, mass, mass effect or midline shift. The basilar cisterns are open. No acute infarct is identified. The bone windows demonstrate no abnormalities. The visualized portions of the paranasal sinuses and mastoid air cells are clear. IMPRESSION: Findings consistent with moderate chronic microvascular ischemic change. No acute process is demonstrated.

## 2017-09-28 ENCOUNTER — HOSPITAL ENCOUNTER (OUTPATIENT)
Dept: LAB | Age: 67
Discharge: HOME | End: 2017-09-28

## 2017-10-25 ENCOUNTER — APPOINTMENT (OUTPATIENT)
Dept: MAMMOGRAPHY | Age: 67
End: 2017-10-25
Attending: OBSTETRICS & GYNECOLOGY
Payer: MEDICARE

## 2017-10-25 ENCOUNTER — HOSPITAL ENCOUNTER (OUTPATIENT)
Dept: MAMMOGRAPHY | Age: 67
Discharge: HOME | End: 2017-10-25
Attending: OBSTETRICS & GYNECOLOGY
Payer: MEDICARE

## 2017-10-25 DIAGNOSIS — Z12.31 VISIT FOR SCREENING MAMMOGRAM: ICD-10-CM

## 2017-10-25 PROCEDURE — 77067 SCR MAMMO BI INCL CAD: CPT

## 2017-11-27 ENCOUNTER — HOSPITAL ENCOUNTER (OUTPATIENT)
Dept: GENERAL RADIOLOGY | Age: 67
Discharge: HOME | End: 2017-11-27
Attending: FAMILY MEDICINE
Payer: MEDICARE

## 2017-11-27 DIAGNOSIS — M25.512 LEFT SHOULDER PAIN: ICD-10-CM

## 2017-11-27 PROCEDURE — 73030 X-RAY EXAM OF SHOULDER: CPT

## 2018-01-04 ENCOUNTER — HOSPITAL ENCOUNTER (OUTPATIENT)
Dept: PHYSICAL THERAPY | Age: 68
Discharge: HOME | End: 2018-01-04
Payer: MEDICARE

## 2018-01-04 PROCEDURE — G8987 SELF CARE CURRENT STATUS: HCPCS | Performed by: PHYSICAL THERAPIST

## 2018-01-04 PROCEDURE — 97162 PT EVAL MOD COMPLEX 30 MIN: CPT | Performed by: PHYSICAL THERAPIST

## 2018-01-04 PROCEDURE — G8988 SELF CARE GOAL STATUS: HCPCS | Performed by: PHYSICAL THERAPIST

## 2018-01-04 PROCEDURE — 97110 THERAPEUTIC EXERCISES: CPT | Performed by: PHYSICAL THERAPIST

## 2018-01-04 PROCEDURE — 97014 ELECTRIC STIMULATION THERAPY: CPT | Performed by: PHYSICAL THERAPIST

## 2018-01-04 PROCEDURE — 97016 VASOPNEUMATIC DEVICE THERAPY: CPT | Performed by: PHYSICAL THERAPIST

## 2018-01-04 NOTE — PROGRESS NOTES
PT INITIAL EVALUATION NOTE 2-15    Patient Name: Natali Lancaster  Date:2018  : 1950  [x]  Patient  Verified  Payor: [place] MEDICARE / Plan: [place] MEDICARE PART A & B / Product Type: Medicare /    In time:1:40 PM  Out time:2:40 PM  Total Treatment Time (min): 60  Visit #: 1     Treatment Area: Pain in left shoulder [M25.512]    SUBJECTIVE  Pain Level (0-10 scale): 6  Any medication changes, allergies to medications, adverse drug reactions, diagnosis change, or new procedure performed?: [] No    [x] Yes (see summary sheet for update)  Subjective:     Chronic left shoulder pain  PLOF: No limitations with sleeping, reaching overhead, dressing  Mechanism of Injury: Patient fell in September and landed on her back. For several weeks she had a muscular knot located in between her shoulder blades. The knot resolved, however in October she began to have difficulty sleeping on the left side. The pain gradually became worse to the point where she has had significant difficulty reaching behind her back and overhead. Previous Treatment/Compliance: She saw her PCP who gave her pain medication and referred her to PT. X-rays were negative. PMHx/Surgical Hx: Lumbar fusion at L3-L4 in . Multiple R foot surgeries following a fracture in . Heart disease. Osteoporosis. Work Hx: Retired  Living Situation: lives at home with family  Pt Goals: to improve ROM and decrease pain  Barriers: chronicity, PMH  Motivation: very motivated  Substance use: low   FABQ Score: elevated  Cognition: A & O x 3        OBJECTIVE/EXAMINATION  Posture: Forward head, rounded shoulders, kyphotic thoracic spine   Palpation: TTP along the lateral shoulder/deltoid  Joint Mobility: NT  Scapulohumoral Control / Rhythm:   Able to eccentrically lower with good control?  Left: [] Yes  [x] No         Right: [x] Yes  [] No      Shoulder AROM, PROM:         R   L  Shoulder Flexion  170   140, pain       Shoulder Abduction  170   130, pain  Shoulder Extension  45   30, pain  Shoulder ER   60   45, pain  Shoulder IR   45   45   FIR   T12   Greater trochanter             MMT:   Shoulder flexion  5/5   4/5, pain  Shoulder ER   5/5   4/5, pain  Shoulder IR   5/5   5/5    Neurological: Sensations: Intact     Special Tests:   Painful Arc: Positive   Gallo: Positive   Neer's: Positive   Empty/Full Can Test: Positive   Drop Arm:Negative   ER Lag: Negative   Lift Off test: Negative         Modality rationale: decrease pain and increase tissue extensibility to improve the patients ability to reach overhead without pain   Min Type Additional Details   15 [x] Estim: []Att   [x]Unatt        []TENS instruct                  [x]IFC  []Premod   []NMES                     []Other:  []w/US   []w/ice   [x]w/heat  Position: supine  Location: left shoulder    []  Traction: [] Cervical       []Lumbar                       [] Prone          []Supine                       []Intermittent   []Continuous Lbs:  [] before manual  [] after manual  []w/heat    []  Ultrasound: []Continuous   [] Pulsed at:                            []1MHz   []3MHz Location:  W/cm2:    []  Paraffin         Location:  []w/heat    []  Ice     []  Heat  []  Ice massage Position:  Location:    []  Laser  []  Other: Position:  Location:    []  Vasopneumatic Device Pressure:       [] lo [] med [] hi   Temperature:    [x] Skin assessment post-treatment:  [x]intact []redness- no adverse reaction    []redness - adverse reaction:     25 min Therapeutic Exercise:  [x] See flow sheet :   Rationale: increase ROM, increase strength and improve coordination to improve the patients ability to reach overhead without pain          With   [] TE   [] TA   [] neuro   [] other: Patient Education: [x] Review HEP    [] Progressed/Changed HEP based on:   [] positioning   [] body mechanics   [] transfers   [] heat/ice application    [] other:        Other Objective/Functional Measures:    Pain Level (0-10 scale) post treatment: 2      ASSESSMENT:      [x]  See Plan of [employer], PT , DPT, OCS, Cert.  DN   1/4/2018  2:04 PM

## 2018-01-04 NOTE — PROGRESS NOTES
[address] [facility] [address]  Phone: [phone]  Fax: [phone]    Plan of Care/ Statement of Necessity for Physical Therapy Services 2-15    Patient name: Vernon Etienne  : 1950  Provider#: [id number]  Referral source: Melissa Roldan MD      Medical/Treatment Diagnosis: Pain in left shoulder [M25.512]     Prior Hospitalization: see medical history     Comorbidities: PMH of l/s surgery, foot surgery, heart disease, osteoporosis  Prior Level of Function: see initial eval  Medications: Verified on Patient Summary List    Start of Care: 18      Onset Date: 2017       The Plan of Care and following information is based on the information from the initial evaluation. Assessment/ key information: Patient presents with left shoulder pain, likely subacromial impingement, following a fall several months ago. Today she presented with decreased ROM, decreased rotator cuff and scapular strength, and poor ability to reach overhead. Evaluation Complexity History MEDIUM  Complexity : 1-2 comorbidities / personal factors will impact the outcome/ POC ; Examination MEDIUM Complexity : 3 Standardized tests and measures addressing body structure, function, activity limitation and / or participation in recreation  ;Presentation MEDIUM Complexity : Evolving with changing characteristics  ; Clinical Decision Making MEDIUM Complexity : FOTO score of 26-74  Overall Complexity Rating: MEDIUM    Problem List: pain affecting function, decrease ROM, decrease strength, decrease ADL/ functional abilitiies, decrease activity tolerance, decrease flexibility/ joint mobility and decrease transfer abilities   Treatment Plan may include any combination of the following: Therapeutic exercise, Therapeutic activities, Neuromuscular re-education, Physical agent/modality, Manual therapy, Patient education, Self Care training and Functional mobility training  Patient / Family readiness to learn indicated by: asking questions, trying to perform skills and interest  Persons(s) to be included in education: patient (P)  Barriers to Learning/Limitations: None  Patient Goal (s): I want to be able to reach overhead without pain.   Patient Self Reported Health Status: excellent  Rehabilitation Potential: excellent    Short Term Goals: To be accomplished in 8 treatments:  1. Patient will be able to demonstrate FIR >L3 to allow for improved ability to tuck in a shirt. 2. Patient will be able to demonstrate >120 degrees of AROM flexion with <2/10 pain to allow for improved ability to reach a high shelf. 3. Patient will be able to carry 20# at her left side for 50 ft. With <2/10 pain   Long Term Goals: To be accomplished in 16 treatments:  1. Patient will be able to demonstrate FIR >L1 to allow for improved ability to put on a jacket. 2. Patient will be able to lift 1# over her head with no pain or limitation. 3. Patient will be able to sleep through the night without being woken up by shoulder pain. Frequency / Duration: Patient to be seen 2 times per week for 8 weeks. Patient/ Caregiver education and instruction: self care, activity modification and exercises    G-Codes (GP)  Self Care   Current  CJ= 20-39%   Goal  CJ= 20-39%    The severity rating is based on clinical judgment and the FOTO Score score. Certification Period: 1/4/18 - 4/4/18    [x]  Plan of care has been reviewed with PTA    Claudette Andrew, PT , DPT, OCS, Cert. DN   1/4/2018 2:23 PM    ________________________________________________________________________    I certify that the above Therapy Services are being furnished while the patient is under my care. I agree with the treatment plan and certify that this therapy is necessary.     [de-identified] Signature:____________________  Date:____________Time: _________

## 2018-01-09 ENCOUNTER — HOSPITAL ENCOUNTER (OUTPATIENT)
Dept: PHYSICAL THERAPY | Age: 68
Discharge: HOME | End: 2018-01-09
Payer: MEDICARE

## 2018-01-09 PROCEDURE — 97014 ELECTRIC STIMULATION THERAPY: CPT | Performed by: PHYSICAL THERAPIST

## 2018-01-09 PROCEDURE — 97110 THERAPEUTIC EXERCISES: CPT | Performed by: PHYSICAL THERAPIST

## 2018-01-09 NOTE — PROGRESS NOTES
PT DAILY TREATMENT NOTE - [facility] 2-15    Patient Name: Tara Garcia  Date:2018  : 1950  [x]  Patient  Verified  Payor: [place] MEDICARE / Plan: [place] MEDICARE PART A & B / Product Type: Medicare /    In time:10:00 AM  Out time:10:55 AM  Total Treatment Time (min): 55  Total Timed Codes (min): 40  1:1 Treatment Time ( only): 40   Visit #: 2     Treatment Area: Pain in left shoulder [M25.512]    SUBJECTIVE  Pain Level (0-10 scale): 3  Any medication changes, allergies to medications, adverse drug reactions, diagnosis change, or new procedure performed?: [x] No    [] Yes (see summary sheet for update)  Subjective functional status/changes:   [] No changes reported  Patient reports that she continues to have to sleep in a recliner or with a pillow propped under her arm to reduce the shoulder pain. She has been able to perform her HEP daily since her last visit.     OBJECTIVE    Modality rationale: decrease pain and increase tissue extensibility to improve the patients ability to reach overhead without pain   Min Type Additional Details   15 [x] Estim: []Att   [x]Unatt        []TENS instruct                  [x]IFC  []Premod   []NMES                     []Other:  []w/US   []w/ice   [x]w/heat  Position: supine  Location: left shoulder    []  Traction: [] Cervical       []Lumbar                       [] Prone          []Supine                       []Intermittent   []Continuous Lbs:  [] before manual  [] after manual  []w/heat    []  Ultrasound: []Continuous   [] Pulsed at:                           []1MHz   []3MHz Location:  W/cm2:    [] Paraffin         Location:   []w/heat    []  Ice     []  Heat  []  Ice massage Position:  Location:    []  Laser  []  Other: Position:  Location:      []  Vasopneumatic Device Pressure:       [] lo [] med [] hi   Temperature:      [x] Skin assessment post-treatment:  [x]intact []redness- no adverse reaction    []redness - adverse reaction:     40 min Therapeutic Exercise:  [x] See flow sheet :   Rationale: increase ROM, increase strength and improve coordination to improve the patients ability to lift overhead without pain          With   [] TE   [] TA   [] neuro   [] other: Patient Education: [x] Review HEP    [] Progressed/Changed HEP based on:   [] positioning   [] body mechanics   [] transfers   [] heat/ice application    [] other:      Other Objective/Functional Measures:      Pain Level (0-10 scale) post treatment: 0    ASSESSMENT/Changes in Function:     Patient will continue to benefit from skilled PT services to modify and progress therapeutic interventions, address functional mobility deficits, address ROM deficits, address strength deficits, analyze and address soft tissue restrictions, analyze and cue movement patterns, analyze and modify body mechanics/ergonomics and assess and modify postural abnormalities to attain remaining goals. []  See Plan of Care  []  See progress note/recertification  []  See Discharge Summary         Progress towards goals / Updated goals:  Patient tolerated today's progression of therapeutic exercises very well and is showing good progress towards short term goals. PLAN  [x]  Upgrade activities as tolerated     [x]  Continue plan of care  []  Update interventions per flow sheet       []  Discharge due to:_  []  Other:_      Subha Other, PT  , DPT, OCS, Cert.  DN   1/9/2018  10:50 AM

## 2018-01-11 ENCOUNTER — HOSPITAL ENCOUNTER (OUTPATIENT)
Dept: PHYSICAL THERAPY | Age: 68
Discharge: HOME | End: 2018-01-11
Payer: MEDICARE

## 2018-01-11 PROCEDURE — 97014 ELECTRIC STIMULATION THERAPY: CPT | Performed by: PHYSICAL THERAPIST

## 2018-01-11 PROCEDURE — 97110 THERAPEUTIC EXERCISES: CPT | Performed by: PHYSICAL THERAPIST

## 2018-01-11 NOTE — PROGRESS NOTES
PT DAILY TREATMENT NOTE - [facility] 2-15    Patient Name: Griffin Rayo  Date:2018  : 1950  [x]  Patient  Verified  Payor: Tawanna Salomon / Plan: [place] MEDICARE PART A & B / Product Type: Medicare /    In time:10:30 AM  Out time:11:25 AM  Total Treatment Time (min): 55  Total Timed Codes (min): 40  1:1 Treatment Time ( only): 40   Visit #: 4    Treatment Area: Pain in left shoulder [M25.512]    SUBJECTIVE  Pain Level (0-10 scale): 2  Any medication changes, allergies to medications, adverse drug reactions, diagnosis change, or new procedure performed?: [x] No    [] Yes (see summary sheet for update)  Subjective functional status/changes:   [] No changes reported  Patient reports that a significant improvement in her overall pain level since her last visit and is very happy with her progress.     OBJECTIVE    Modality rationale: decrease pain and increase tissue extensibility to improve the patients ability to reach overhead without pain   Min Type Additional Details   15 [x] Estim: []Att   [x]Unatt        []TENS instruct                  [x]IFC  []Premod   []NMES                     []Other:  []w/US   []w/ice   [x]w/heat  Position: supine  Location: left shoulder    []  Traction: [] Cervical       []Lumbar                       [] Prone          []Supine                       []Intermittent   []Continuous Lbs:  [] before manual  [] after manual  []w/heat    []  Ultrasound: []Continuous   [] Pulsed at:                           []1MHz   []3MHz Location:  W/cm2:    [] Paraffin         Location:   []w/heat    []  Ice     []  Heat  []  Ice massage Position:  Location:    []  Laser  []  Other: Position:  Location:      []  Vasopneumatic Device Pressure:       [] lo [] med [] hi   Temperature:      [x] Skin assessment post-treatment:  [x]intact []redness- no adverse reaction    []redness - adverse reaction:     40 min Therapeutic Exercise:  [x] See flow sheet :   Rationale: increase ROM, increase strength and improve coordination to improve the patients ability to lift overhead without pain          With   [] TE   [] TA   [] neuro   [] other: Patient Education: [x] Review HEP    [] Progressed/Changed HEP based on:   [] positioning   [] body mechanics   [] transfers   [] heat/ice application    [] other:      Other Objective/Functional Measures:      Pain Level (0-10 scale) post treatment: 0    ASSESSMENT/Changes in Function:     Patient will continue to benefit from skilled PT services to modify and progress therapeutic interventions, address functional mobility deficits, address ROM deficits, address strength deficits, analyze and address soft tissue restrictions, analyze and cue movement patterns, analyze and modify body mechanics/ergonomics and assess and modify postural abnormalities to attain remaining goals. []  See Plan of Care  []  See progress note/recertification  []  See Discharge Summary         Progress towards goals / Updated goals:  Patient continues to show excellent improvement towards short term goals and demonstrates improved AROM flexion. PLAN  [x]  Upgrade activities as tolerated     [x]  Continue plan of care  []  Update interventions per flow sheet       []  Discharge due to:_  []  Other:_      Brandt Armenta PT  , DPT, OCS, Cert.  DN   1/11/2018  10:50 AM

## 2018-01-16 ENCOUNTER — HOSPITAL ENCOUNTER (OUTPATIENT)
Dept: PHYSICAL THERAPY | Age: 68
Discharge: HOME | End: 2018-01-16
Payer: MEDICARE

## 2018-01-16 PROCEDURE — 97014 ELECTRIC STIMULATION THERAPY: CPT

## 2018-01-16 PROCEDURE — 97110 THERAPEUTIC EXERCISES: CPT

## 2018-01-16 NOTE — PROGRESS NOTES
PT DAILY TREATMENT NOTE - [facility] 2-15    Patient Name: Andre Mcclain  Date:2018  : 1950  [x]  Patient  Verified  Payor: Anabell Bang / Plan: [place] MEDICARE PART A & B / Product Type: Medicare /    In time:3:30a  Out time:4:15p  Total Treatment Time (min): 45  Total Timed Codes (min): 30  1:1 Treatment Time ( only): 30  Visit #: 4    Treatment Area: Pain in left shoulder [M25.512]    SUBJECTIVE  Pain Level (0-10 scale): 7  Any medication changes, allergies to medications, adverse drug reactions, diagnosis change, or new procedure performed?: [x] No    [] Yes (see summary sheet for update)  Subjective functional status/changes:   [] No changes reported  Patient reports she thinks might have done too much last session as she woke up the next day and was unable to use left arm without sharp pain anterior shoulder.       OBJECTIVE    Modality rationale: decrease pain and increase tissue extensibility to improve the patients ability to reach overhead without pain   Min Type Additional Details   15 [x] Estim: []Att   [x]Unatt        []TENS instruct                  [x]IFC  []Premod   []NMES                     []Other:  []w/US   []w/ice   [x]w/heat  Position: supine  Location: left shoulder    []  Traction: [] Cervical       []Lumbar                       [] Prone          []Supine                       []Intermittent   []Continuous Lbs:  [] before manual  [] after manual  []w/heat    []  Ultrasound: []Continuous   [] Pulsed at:                           []1MHz   []3MHz Location:  W/cm2:    [] Paraffin         Location:   []w/heat    []  Ice     []  Heat  []  Ice massage Position:  Location:    []  Laser  []  Other: Position:  Location:      []  Vasopneumatic Device Pressure:       [] lo [] med [] hi   Temperature:      [x] Skin assessment post-treatment:  [x]intact []redness- no adverse reaction    []redness - adverse reaction:     30 min Therapeutic Exercise:  [x] See flow sheet :   Rationale: increase ROM, increase strength and improve coordination to improve the patients ability to lift overhead without pain          With   [] TE   [] TA   [] neuro   [] other: Patient Education: [x] Review HEP    [] Progressed/Changed HEP based on:   [] positioning   [] body mechanics   [] transfers   [] heat/ice application    [] other:      Other Objective/Functional Measures:      Pain Level (0-10 scale) post treatment: 5    ASSESSMENT/Changes in Function:     Patient will continue to benefit from skilled PT services to modify and progress therapeutic interventions, address functional mobility deficits, address ROM deficits, address strength deficits, analyze and address soft tissue restrictions, analyze and cue movement patterns, analyze and modify body mechanics/ergonomics and assess and modify postural abnormalities to attain remaining goals. []  See Plan of Care  []  See progress note/recertification  []  See Discharge Summary         Progress towards goals / Updated goals:   Patient with poor exercise tolerance today and was unable to advance at this time. Will continue to monitor patient and advance as tolerated at next visit.      PLAN  [x]  Upgrade activities as tolerated     [x]  Continue plan of care  []  Update interventions per flow sheet       []  Discharge due to:_  []  Other:_      Piyush Yamilka  PTA  1/16/2018  10:50 AM

## 2018-01-23 ENCOUNTER — APPOINTMENT (OUTPATIENT)
Dept: PHYSICAL THERAPY | Age: 68
End: 2018-01-23
Payer: MEDICARE

## 2018-01-25 ENCOUNTER — APPOINTMENT (OUTPATIENT)
Dept: PHYSICAL THERAPY | Age: 68
End: 2018-01-25
Payer: MEDICARE

## 2018-01-30 ENCOUNTER — HOSPITAL ENCOUNTER (OUTPATIENT)
Dept: CT IMAGING | Age: 68
Discharge: HOME | End: 2018-01-30
Attending: FAMILY MEDICINE

## 2018-01-30 ENCOUNTER — APPOINTMENT (OUTPATIENT)
Dept: PHYSICAL THERAPY | Age: 68
End: 2018-01-30
Payer: MEDICARE

## 2018-01-30 DIAGNOSIS — M99.07 SEGMENTAL AND SOMATIC DYSFUNCTION OF UPPER EXTREMITY: ICD-10-CM

## 2018-02-01 ENCOUNTER — APPOINTMENT (OUTPATIENT)
Dept: PHYSICAL THERAPY | Age: 68
End: 2018-02-01
Payer: MEDICARE

## 2018-02-07 ENCOUNTER — HOSPITAL ENCOUNTER (OUTPATIENT)
Dept: MRI IMAGING | Age: 68
Discharge: HOME | End: 2018-02-07
Attending: FAMILY MEDICINE
Payer: MEDICARE

## 2018-02-07 DIAGNOSIS — M25.512 LEFT SHOULDER PAIN: ICD-10-CM

## 2018-02-07 PROCEDURE — 73221 MRI JOINT UPR EXTREM W/O DYE: CPT

## 2018-02-20 ENCOUNTER — HOSPITAL ENCOUNTER (OUTPATIENT)
Dept: PHYSICAL THERAPY | Age: 68
Discharge: HOME | End: 2018-02-20
Payer: MEDICARE

## 2018-02-20 PROCEDURE — 97014 ELECTRIC STIMULATION THERAPY: CPT | Performed by: PHYSICAL THERAPIST

## 2018-02-20 PROCEDURE — 97110 THERAPEUTIC EXERCISES: CPT | Performed by: PHYSICAL THERAPIST

## 2018-02-20 PROCEDURE — 97140 MANUAL THERAPY 1/> REGIONS: CPT | Performed by: PHYSICAL THERAPIST

## 2018-02-20 NOTE — PROGRESS NOTES
[facility]  [place]  [address]  Phone: [phone]      Fax:  [phone]    Progress Note    Name: Fede Lancaster   : 1950   MD: Anabel Ferguson MD       Treatment Diagnosis: Pain in right shoulder [M25.511]  Start of Care: 18    Visits from Start of Care: 5  Missed Visits: 0    Summary of Care:Mrs. Urbano Marques has shown improvement since her recent cortisone injection, however she continues to have difficulty reaching overhead and demonstrates impingement symptoms. Recent MRI diagnosed a tear at the supraspinatus tendon, but the orthopedist has decided that PT continues to be the best option. She should still continue to tolerate therapy well and will gradually progress therapeutic exercises towards short term goals. Assessment / Recommendations:     Short Term Goals: To be accomplished in 8 treatments:  1. Patient will be able to demonstrate FIR >L3 to allow for improved ability to tuck in a shirt. Progressing towards goal.  2. Patient will be able to demonstrate >120 degrees of AROM flexion with <2/10 pain to allow for improved ability to reach a high shelf. Progressing towards goal.  3. Patient will be able to carry 20# at her left side for 50 ft. With <2/10 pain. Progressing towards goal.    Patient will continue to benefit from PT under the current plan of care to achieve all functional goals. G-Codes (GP)  Self Care   Current  CK= 40-59%  [id number] Goal  CJ= 20-39%    The severity rating is based on the FOTO Score score. Brannon Marshall, PT , DPT, OCS, Cert. DN   2018 10:34 AM    ________________________________________________________________________  NOTE TO PHYSICIAN:  Please complete the following and fax to: [facility]: Fax: [phone]. Leila Mesa Retain this original for your records.   If you are unable to process this request in 24 hours, please contact our office.        ____ I have read the above report and request that my patient continue therapy with the following changes/special instructions:  ____ I have read the above report and request that my patient be discharged from therapy    Physician's Signature:_________________ Date:___________Time:__________

## 2018-02-20 NOTE — PROGRESS NOTES
PT DAILY TREATMENT NOTE - [facility] 2-15    Patient Name: Manish Lehman  Date:2018  : 1950  [x]  Patient  Verified  Payor: Gerald Mendoza / Plan: [place] MED[place] / Product Type: Medicare /    In time:9:30 AM  Out time:10:25 AM  Total Treatment Time (min): 55  Total Timed Codes (min): 40  1:1 Treatment Time ( [address] only): 40  Visit #: 5    Treatment Area: There are no admission diagnoses documented for this encounter. SUBJECTIVE  Pain Level (0-10 scale): 4  Any medication changes, allergies to medications, adverse drug reactions, diagnosis change, or new procedure performed?: [x] No    [] Yes (see summary sheet for update)  Subjective functional status/changes:   [] No changes reported  Patient returns to the clinic following a 4 week absence in which she was getting an MRI done. She saw Dr. Fanny Yanez and he diagnosed her with frozen shoulder secondary to a RTC tear. The tear at the supraspinatus was small enough that he felt that she wasn't a good candidate for surgery. He gave her a cortisone injection and told her to continue with PT. She feels better since the injection, but still is having difficulty with reaching overhead.       OBJECTIVE    Modality rationale: decrease pain and increase tissue extensibility to improve the patients ability to reach overhead without pain   Min Type Additional Details   15 [x] Estim: []Att   [x]Unatt        []TENS instruct                  [x]IFC  []Premod   []NMES                     []Other:  []w/US   []w/ice   [x]w/heat  Position: supine  Location: left shoulder    []  Traction: [] Cervical       []Lumbar                       [] Prone          []Supine                       []Intermittent   []Continuous Lbs:  [] before manual  [] after manual  []w/heat    []  Ultrasound: []Continuous   [] Pulsed at:                           []1MHz   []3MHz Location:  W/cm2:    [] Paraffin         Location:   []w/heat    []  Ice     []  Heat  []  Ice massage Position:  Location: []  Laser  []  Other: Position:  Location:      []  Vasopneumatic Device Pressure:       [] lo [] med [] hi   Temperature:      [x] Skin assessment post-treatment:  [x]intact []redness- no adverse reaction    []redness - adverse reaction:     30 min Therapeutic Exercise:  [x] See flow sheet :   Rationale: increase ROM, increase strength and improve coordination to improve the patients ability to lift overhead without pain    10 min Manual Therapy:  Grade II posterior mobilizations at the glenohumeral joint  STM to the lateral deltoid at the deltoid tubercle   Rationale: increase ROM, increase strength and improve coordination to improve the patients ability to lift overhead without pain          With   [] TE   [] TA   [] neuro   [] other: Patient Education: [x] Review HEP    [] Progressed/Changed HEP based on:   [] positioning   [] body mechanics   [] transfers   [] heat/ice application    [] other:      Other Objective/Functional Measures:    Patient continues to have difficulty tolerating any ther ex involving abduction, but tolerated all other therapeutic exercises well without pain. Pain Level (0-10 scale) post treatment: 4    ASSESSMENT/Changes in Function:     Patient will continue to benefit from skilled PT services to modify and progress therapeutic interventions, address functional mobility deficits, address ROM deficits, address strength deficits, analyze and address soft tissue restrictions, analyze and cue movement patterns, analyze and modify body mechanics/ergonomics and assess and modify postural abnormalities to attain remaining goals. []  See Plan of Care  [x]  See progress note/recertification  []  See Discharge Summary           PLAN  [x]  Upgrade activities as tolerated     [x]  Continue plan of care  []  Update interventions per flow sheet       []  Discharge due to:_  []  Other:_      Yari Boudreaux, PT  , DPT, OCS, Cert.  DN   2/20/2018  10:50 AM

## 2018-02-22 ENCOUNTER — HOSPITAL ENCOUNTER (OUTPATIENT)
Dept: PHYSICAL THERAPY | Age: 68
End: 2018-02-22
Payer: MEDICARE

## 2018-02-27 ENCOUNTER — HOSPITAL ENCOUNTER (OUTPATIENT)
Dept: PHYSICAL THERAPY | Age: 68
Discharge: HOME | End: 2018-02-27
Payer: MEDICARE

## 2018-02-27 PROCEDURE — 97014 ELECTRIC STIMULATION THERAPY: CPT

## 2018-02-27 PROCEDURE — 97110 THERAPEUTIC EXERCISES: CPT

## 2018-02-27 PROCEDURE — 97140 MANUAL THERAPY 1/> REGIONS: CPT

## 2018-02-27 NOTE — PROGRESS NOTES
PT DAILY TREATMENT NOTE - [facility] 2-15    Patient Name: Jonathon Arnold  Date:2018  : 1950  [x]  Patient  Verified  Payor: [place] MEDICARE / Plan: [place] MEDICARE PART A & B / Product Type: Medicare /    In time:1:55p  Out time: 2:50p  Total Treatment Time (min): 55  Total Timed Codes (min): 40  1:1 Treatment Time ( [address] only): 40  Visit #: 6    Treatment Area: Pain in right shoulder [M25.511]    SUBJECTIVE  Pain Level (0-10 scale): 4  Any medication changes, allergies to medications, adverse drug reactions, diagnosis change, or new procedure performed?: [x] No    [] Yes (see summary sheet for update)  Subjective functional status/changes:   [] No changes reported  Patient returns to the clinic following a 4 week absence in which she was getting an MRI done. She saw Dr. Ana Lilia Soriano and he diagnosed her with frozen shoulder secondary to a RTC tear. The tear at the supraspinatus was small enough that he felt that she wasn't a good candidate for surgery. He gave her a cortisone injection and told her to continue with PT. She feels better since the injection, but still is having difficulty with reaching overhead.       OBJECTIVE    Modality rationale: decrease pain and increase tissue extensibility to improve the patients ability to reach overhead without pain   Min Type Additional Details   15 [x] Estim: []Att   [x]Unatt        []TENS instruct                  [x]IFC  []Premod   []NMES                     []Other:  []w/US   []w/ice   [x]w/heat  Position: supine  Location: left shoulder    []  Traction: [] Cervical       []Lumbar                       [] Prone          []Supine                       []Intermittent   []Continuous Lbs:  [] before manual  [] after manual  []w/heat    []  Ultrasound: []Continuous   [] Pulsed at:                           []1MHz   []3MHz Location:  W/cm2:    [] Paraffin         Location:   []w/heat    []  Ice     []  Heat  []  Ice massage Position:  Location:    []  Laser  []  Other: Position:  Location:      []  Vasopneumatic Device Pressure:       [] lo [] med [] hi   Temperature:      [x] Skin assessment post-treatment:  [x]intact []redness- no adverse reaction    []redness - adverse reaction:     30 min Therapeutic Exercise:  [x] See flow sheet :   Rationale: increase ROM, increase strength and improve coordination to improve the patients ability to lift overhead without pain    10 min Manual Therapy:  Grade II posterior mobilizations at the glenohumeral joint  STM to the lateral deltoid at the deltoid tubercle   Rationale: increase ROM, increase strength and improve coordination to improve the patients ability to lift overhead without pain          With   [] TE   [] TA   [] neuro   [] other: Patient Education: [x] Review HEP    [] Progressed/Changed HEP based on:   [] positioning   [] body mechanics   [] transfers   [] heat/ice application    [] other:      Other Objective/Functional Measures:    Patient continues to have difficulty tolerating any ther ex involving abduction, but tolerated all other therapeutic exercises well without pain. Pain Level (0-10 scale) post treatment: 2    ASSESSMENT/Changes in Function:     Patient will continue to benefit from skilled PT services to modify and progress therapeutic interventions, address functional mobility deficits, address ROM deficits, address strength deficits, analyze and address soft tissue restrictions, analyze and cue movement patterns, analyze and modify body mechanics/ergonomics and assess and modify postural abnormalities to attain remaining goals. []  See Plan of Care  []  See progress note/recertification  []  See Discharge Summary         Progress towards goals / Updated goals:   Patient continues to demonstrate decreased tolerance for interventions and will do well with continued focus on ROM to tolerance.  Patient will do well with continued slow steady progression to reach goals.      PLAN  [x]  Upgrade activities as tolerated     [x]  Continue plan of care  []  Update interventions per flow sheet       []  Discharge due to:_  []  Other:_      Binh Soler  PTA  2/27/2018  1:55 PM

## 2018-03-01 ENCOUNTER — HOSPITAL ENCOUNTER (OUTPATIENT)
Dept: PHYSICAL THERAPY | Age: 68
Discharge: HOME | End: 2018-03-01
Payer: MEDICARE

## 2018-03-01 PROCEDURE — 97140 MANUAL THERAPY 1/> REGIONS: CPT

## 2018-03-01 PROCEDURE — 97014 ELECTRIC STIMULATION THERAPY: CPT

## 2018-03-01 PROCEDURE — 97110 THERAPEUTIC EXERCISES: CPT

## 2018-03-01 NOTE — PROGRESS NOTES
PT DAILY TREATMENT NOTE - [facility] 2-15    Patient Name: Tori Guadalupe  Date:3/1/2018  : 1950  [x]  Patient  Verified  Payor: [place] MEDICARE / Plan: [place] MEDICARE PART A & B / Product Type: Medicare /    In time:3:00p Out time:3:45p  Total Treatment Time (min): 45  Total Timed Codes (min): 45  1:1 Treatment Time ( [address] only): 45  Visit #: 6    Treatment Area: Pain in right shoulder [M25.511]    SUBJECTIVE  Pain Level (0-10 scale): 7  Any medication changes, allergies to medications, adverse drug reactions, diagnosis change, or new procedure performed?: [x] No    [] Yes (see summary sheet for update)  Subjective functional status/changes:   [] No changes reported  Patient reports she is having a bad day with more pain through her shoulder and feels like her shoulder is having spasms.       OBJECTIVE    Modality rationale: decrease pain and increase tissue extensibility to improve the patients ability to reach overhead without pain   Min Type Additional Details   15 [x] Estim: []Att   [x]Unatt        []TENS instruct                  [x]IFC  []Premod   []NMES                     []Other:  []w/US   []w/ice   [x]w/heat  Position: supine  Location: left shoulder    []  Traction: [] Cervical       []Lumbar                       [] Prone          []Supine                       []Intermittent   []Continuous Lbs:  [] before manual  [] after manual  []w/heat    []  Ultrasound: []Continuous   [] Pulsed at:                           []1MHz   []3MHz Location:  W/cm2:    [] Paraffin         Location:   []w/heat    []  Ice     []  Heat  []  Ice massage Position:  Location:    []  Laser  []  Other: Position:  Location:      []  Vasopneumatic Device Pressure:       [] lo [] med [] hi   Temperature:      [x] Skin assessment post-treatment:  [x]intact []redness- no adverse reaction    []redness - adverse reaction:     20 min Therapeutic Exercise:  [x] See flow sheet :   Rationale: increase ROM, increase strength and improve coordination to improve the patients ability to lift overhead without pain    10 min Manual Therapy:  Grade II posterior mobilizations at the glenohumeral joint  STM to the lateral deltoid at the deltoid tubercle   Rationale: increase ROM, increase strength and improve coordination to improve the patients ability to lift overhead without pain          With   [] TE   [] TA   [] neuro   [] other: Patient Education: [x] Review HEP    [] Progressed/Changed HEP based on:   [] positioning   [] body mechanics   [] transfers   [] heat/ice application    [] other:      Other Objective/Functional Measures:    Pt with decreased tolerance for interventions due to increased pain. Pain Level (0-10 scale) post treatment: 5    ASSESSMENT/Changes in Function:     Patient will continue to benefit from skilled PT services to modify and progress therapeutic interventions, address functional mobility deficits, address ROM deficits, address strength deficits, analyze and address soft tissue restrictions, analyze and cue movement patterns, analyze and modify body mechanics/ergonomics and assess and modify postural abnormalities to attain remaining goals. []  See Plan of Care  []  See progress note/recertification  []  See Discharge Summary         Progress towards goals / Updated goals:   Patient continues to demonstrate decreased tolerance for interventions and will do well with continued focus on ROM to tolerance.  Patient will do well with continued slow steady progression to reach goals.      PLAN  [x]  Upgrade activities as tolerated     [x]  Continue plan of care  []  Update interventions per flow sheet       []  Discharge due to:_  []  Other:_      Guadalupe Traylor  PTA  3/1/2018  1:55 PM

## 2018-03-06 ENCOUNTER — HOSPITAL ENCOUNTER (OUTPATIENT)
Dept: PHYSICAL THERAPY | Age: 68
Discharge: HOME | End: 2018-03-06
Payer: MEDICARE

## 2018-03-06 PROCEDURE — 97110 THERAPEUTIC EXERCISES: CPT

## 2018-03-06 PROCEDURE — 97140 MANUAL THERAPY 1/> REGIONS: CPT

## 2018-03-06 PROCEDURE — 97014 ELECTRIC STIMULATION THERAPY: CPT

## 2018-03-06 NOTE — PROGRESS NOTES
PT DAILY TREATMENT NOTE - [facility] 2-15    Patient Name: Ana Wagoner  Date:3/6/2018  : 1950  [x]  Patient  Verified  Payor: [place] MEDICARE / Plan: [place] MEDICARE PART A & B / Product Type: Medicare /    In time:3:35p Out time:4:30p  Total Treatment Time (min): 55  Total Timed Codes (min): 40  1:1 Treatment Time ( only): 40  Visit #: 8    Treatment Area: Pain in right shoulder [M25.511]    SUBJECTIVE  Pain Level (0-10 scale): 4  Any medication changes, allergies to medications, adverse drug reactions, diagnosis change, or new procedure performed?: [x] No    [] Yes (see summary sheet for update)  Subjective functional status/changes:   [] No changes reported  Patient reports she is feeling better today, but still has some tenderness along the back of her shoulder.       OBJECTIVE    Modality rationale: decrease pain and increase tissue extensibility to improve the patients ability to reach overhead without pain   Min Type Additional Details   15 [x] Estim: []Att   [x]Unatt        []TENS instruct                  [x]IFC  []Premod   []NMES                     []Other:  []w/US   []w/ice   [x]w/heat  Position: supine  Location: left shoulder    []  Traction: [] Cervical       []Lumbar                       [] Prone          []Supine                       []Intermittent   []Continuous Lbs:  [] before manual  [] after manual  []w/heat    []  Ultrasound: []Continuous   [] Pulsed at:                           []1MHz   []3MHz Location:  W/cm2:    [] Paraffin         Location:   []w/heat    []  Ice     []  Heat  []  Ice massage Position:  Location:    []  Laser  []  Other: Position:  Location:      []  Vasopneumatic Device Pressure:       [] lo [] med [] hi   Temperature:      [x] Skin assessment post-treatment:  [x]intact []redness- no adverse reaction    []redness - adverse reaction:     30 min Therapeutic Exercise:  [x] See flow sheet :   Rationale: increase ROM, increase strength and improve coordination to improve the patients ability to lift overhead without pain    10 min Manual Therapy:  Grade II posterior mobilizations at the glenohumeral joint  STM to the lateral deltoid at the deltoid tubercle, UT, levator   Rationale: increase ROM, increase strength and improve coordination to improve the patients ability to lift overhead without pain          With   [] TE   [] TA   [] neuro   [] other: Patient Education: [x] Review HEP    [] Progressed/Changed HEP based on:   [] positioning   [] body mechanics   [] transfers   [] heat/ice application    [] other:      Other Objective/Functional Measures:    Pt with decreased tolerance for interventions due to increased pain. Pain Level (0-10 scale) post treatment: 5    ASSESSMENT/Changes in Function:     Patient will continue to benefit from skilled PT services to modify and progress therapeutic interventions, address functional mobility deficits, address ROM deficits, address strength deficits, analyze and address soft tissue restrictions, analyze and cue movement patterns, analyze and modify body mechanics/ergonomics and assess and modify postural abnormalities to attain remaining goals. []  See Plan of Care  []  See progress note/recertification  []  See Discharge Summary         Progress towards goals / Updated goals:   Patient continues to demonstrate decreased tolerance for interventions and will do well with continued focus on ROM to tolerance.  Patient will do well with continued slow steady progression to reach goals.      PLAN  [x]  Upgrade activities as tolerated     [x]  Continue plan of care  []  Update interventions per flow sheet       []  Discharge due to:_  []  Other:_      Lora Vásquez  PTA  3/6/2018  1:55 PM

## 2018-03-08 ENCOUNTER — HOSPITAL ENCOUNTER (OUTPATIENT)
Dept: PHYSICAL THERAPY | Age: 68
Discharge: HOME | End: 2018-03-08
Payer: MEDICARE

## 2018-03-08 PROCEDURE — 97014 ELECTRIC STIMULATION THERAPY: CPT

## 2018-03-08 PROCEDURE — 97140 MANUAL THERAPY 1/> REGIONS: CPT

## 2018-03-08 PROCEDURE — 97110 THERAPEUTIC EXERCISES: CPT

## 2018-03-08 NOTE — PROGRESS NOTES
PT DAILY TREATMENT NOTE - [facility] 2-15    Patient Name: Otis Blackwood  Date:3/8/2018  : 1950  [x]  Patient  Verified  Payor: [place] MEDICARE / Plan: [place] MEDICARE PART A & B / Product Type: Medicare /    In time:3:30p Out time:4:30p  Total Treatment Time (min): 55  Total Timed Codes (min): 40  1:1 Treatment Time ( only): 40  Visit #: 9    Treatment Area: Pain in right shoulder [M25.511]    SUBJECTIVE  Pain Level (0-10 scale): 2-3  Any medication changes, allergies to medications, adverse drug reactions, diagnosis change, or new procedure performed?: [x] No    [] Yes (see summary sheet for update)  Subjective functional status/changes:   [] No changes reported  Patient reports she is feeling much better today and feels like it is getting stronger.       OBJECTIVE    Modality rationale: decrease pain and increase tissue extensibility to improve the patients ability to reach overhead without pain   Min Type Additional Details   15 [x] Estim: []Att   [x]Unatt        []TENS instruct                  [x]IFC  []Premod   []NMES                     []Other:  []w/US   []w/ice   [x]w/heat  Position: supine  Location: left shoulder    []  Traction: [] Cervical       []Lumbar                       [] Prone          []Supine                       []Intermittent   []Continuous Lbs:  [] before manual  [] after manual  []w/heat    []  Ultrasound: []Continuous   [] Pulsed at:                           []1MHz   []3MHz Location:  W/cm2:    [] Paraffin         Location:   []w/heat    []  Ice     []  Heat  []  Ice massage Position:  Location:    []  Laser  []  Other: Position:  Location:      []  Vasopneumatic Device Pressure:       [] lo [] med [] hi   Temperature:      [x] Skin assessment post-treatment:  [x]intact []redness- no adverse reaction    []redness - adverse reaction:     30 min Therapeutic Exercise:  [x] See flow sheet :   Rationale: increase ROM, increase strength and improve coordination to improve the patients ability to lift overhead without pain    10 min Manual Therapy:  Grade II posterior mobilizations at the glenohumeral joint  STM to the lateral deltoid at the deltoid tubercle, UT, levator   Rationale: increase ROM, increase strength and improve coordination to improve the patients ability to lift overhead without pain          With   [] TE   [] TA   [] neuro   [] other: Patient Education: [x] Review HEP    [] Progressed/Changed HEP based on:   [] positioning   [] body mechanics   [] transfers   [] heat/ice application    [] other:      Other Objective/Functional Measures:    Pt demonstrates improved AROM with flexion, extension and IR, however continues to be limited with abduction     AROM L Shoulder:  Flexion:145 deg  Extension: 50 deg  ER: 45 deg   FIR: L5    Pain Level (0-10 scale) post treatment:  2    ASSESSMENT/Changes in Function:     Patient will continue to benefit from skilled PT services to modify and progress therapeutic interventions, address functional mobility deficits, address ROM deficits, address strength deficits, analyze and address soft tissue restrictions, analyze and cue movement patterns, analyze and modify body mechanics/ergonomics and assess and modify postural abnormalities to attain remaining goals. []  See Plan of Care  []  See progress note/recertification  []  See Discharge Summary         Progress towards goals / Updated goals:   Patient continues to demonstrate decreased tolerance for interventions and will do well with continued focus on ROM to tolerance. PAtient requires verbal cues for postural awareness with interventions. Short Term Goals: To be accomplished in 8 treatments:  1. Patient will be able to demonstrate FIR >L3 to allow for improved ability to tuck in a shirt. Progressing  2. Patient will be able to demonstrate >120 degrees of AROM flexion with <2/10 pain to allow for improved ability to reach a high shelf. Progressing-pain  3.  Patient will be able to carry 20# at her left side for 50 ft. With <2/10 pain Progressing  Long Term Goals: To be accomplished in 16 treatments:  1. Patient will be able to demonstrate FIR >L1 to allow for improved ability to put on a jacket. 2. Patient will be able to lift 1# over her head with no pain or limitation. 3. Patient will be able to sleep through the night without being woken up by shoulder pain.   Frequency / Duration: Patient to be seen 2 times per week for 8 weeks.        PLAN  [x]  Upgrade activities as tolerated     [x]  Continue plan of care  []  Update interventions per flow sheet       []  Discharge due to:_  []  Other:_      Pearla Organ  PTA  3/8/2018  1:55 PM

## 2018-03-15 ENCOUNTER — HOSPITAL ENCOUNTER (OUTPATIENT)
Dept: PHYSICAL THERAPY | Age: 68
Discharge: HOME | End: 2018-03-15
Payer: MEDICARE

## 2018-03-15 PROCEDURE — 97014 ELECTRIC STIMULATION THERAPY: CPT

## 2018-03-15 PROCEDURE — 97140 MANUAL THERAPY 1/> REGIONS: CPT

## 2018-03-15 PROCEDURE — 97110 THERAPEUTIC EXERCISES: CPT

## 2018-03-15 NOTE — PROGRESS NOTES
PT DAILY TREATMENT NOTE - [facility] 2-15    Patient Name: Randal Huffman  Date:3/15/2018  : 1950  [x]  Patient  Verified  Payor: [place] MEDICARE / Plan: [place] MEDICARE PART A & B / Product Type: Medicare /    In time:4:00p Out time:5:00p  Total Treatment Time (min): 60  Total Timed Codes (min): 45  1:1 Treatment Time ([address] only): 39  Visit #: 10    Treatment Area: Pain in right shoulder [M25.511]    SUBJECTIVE  Pain Level (0-10 scale): 2-3  Any medication changes, allergies to medications, adverse drug reactions, diagnosis change, or new procedure performed?: [x] No    [] Yes (see summary sheet for update)  Subjective functional status/changes:   [] No changes reported  PAtient reports her left shoulder is feeling much better, but her right shoulder is hurting really bad today.       OBJECTIVE    Modality rationale: decrease pain and increase tissue extensibility to improve the patients ability to reach overhead without pain   Min Type Additional Details   15 [x] Estim: []Att   [x]Unatt        []TENS instruct                  []IFC  [x]Premod   []NMES                     []Other:  []w/US   []w/ice   [x]w/heat  Position: supine  [facility]    []  Traction: [] Cervical       []Lumbar                       [] Prone          []Supine                       []Intermittent   []Continuous Lbs:  [] before manual  [] after manual  []w/heat    []  Ultrasound: []Continuous   [] Pulsed at:                           []1MHz   []3MHz Location:  W/cm2:    [] Paraffin         Location:   []w/heat    []  Ice     []  Heat  []  Ice massage Position:  Location:    []  Laser  []  Other: Position:  Location:      []  Vasopneumatic Device Pressure:       [] lo [] med [] hi   Temperature:      [x] Skin assessment post-treatment:  [x]intact []redness- no adverse reaction    []redness - adverse reaction:     30 min Therapeutic Exercise:  [x] See flow sheet :   Rationale: increase ROM, increase strength and improve coordination to improve the patients ability to lift overhead without pain    15 min Manual Therapy:  Grade II posterior mobilizations B glenohumeral joint  STM to the L lateral deltoid at the deltoid tubercle, B UT, B levator, R biceps   Rationale: increase ROM, increase strength and improve coordination to improve the patients ability to lift overhead without pain          With   [] TE   [] TA   [] neuro   [] other: Patient Education: [x] Review HEP    [] Progressed/Changed HEP based on:   [] positioning   [] body mechanics   [] transfers   [] heat/ice application    [] other:      Other Objective/Functional Measures: pain with cane Abduction, ER       Pain Level (0-10 scale) post treatment:  1    ASSESSMENT/Changes in Function:     Patient will continue to benefit from skilled PT services to modify and progress therapeutic interventions, address functional mobility deficits, address ROM deficits, address strength deficits, analyze and address soft tissue restrictions, analyze and cue movement patterns, analyze and modify body mechanics/ergonomics and assess and modify postural abnormalities to attain remaining goals. []  See Plan of Care  []  See progress note/recertification  []  See Discharge Summary         Progress towards goals / Updated goals:   Patient continues to be limited by pain with interventions. Patient making slow steady progress towards goals and increased ROM. Patient will do well on current program with focus on tissue extensibility and improving postural awareness. Short Term Goals: To be accomplished in 8 treatments:  1. Patient will be able to demonstrate FIR >L3 to allow for improved ability to tuck in a shirt. Progressing  2. Patient will be able to demonstrate >120 degrees of AROM flexion with <2/10 pain to allow for improved ability to reach a high shelf. Progressing-pain  3. Patient will be able to carry 20# at her left side for 50 ft. With <2/10 pain Progressing  Long Term Goals:  To be accomplished in 16 treatments:  1. Patient will be able to demonstrate FIR >L1 to allow for improved ability to put on a jacket. 2. Patient will be able to lift 1# over her head with no pain or limitation. 3. Patient will be able to sleep through the night without being woken up by shoulder pain.   Frequency / Duration: Patient to be seen 2 times per week for 8 weeks.        PLAN  [x]  Upgrade activities as tolerated     [x]  Continue plan of care  []  Update interventions per flow sheet       []  Discharge due to:_  []  Other:_      Burnfabiana Gibson  PTA  3/15/2018  1:55 PM

## 2018-03-20 ENCOUNTER — HOSPITAL ENCOUNTER (OUTPATIENT)
Dept: PHYSICAL THERAPY | Age: 68
Discharge: HOME | End: 2018-03-20
Payer: MEDICARE

## 2018-03-20 PROCEDURE — 97110 THERAPEUTIC EXERCISES: CPT

## 2018-03-20 PROCEDURE — 97014 ELECTRIC STIMULATION THERAPY: CPT

## 2018-03-20 PROCEDURE — G8985 CARRY GOAL STATUS: HCPCS | Performed by: PHYSICAL THERAPIST

## 2018-03-20 PROCEDURE — 97140 MANUAL THERAPY 1/> REGIONS: CPT

## 2018-03-20 PROCEDURE — G8984 CARRY CURRENT STATUS: HCPCS | Performed by: PHYSICAL THERAPIST

## 2018-03-20 NOTE — PROGRESS NOTES
PT DAILY TREATMENT NOTE - [facility] 2-15    Patient Name: Mariana Winston  Date:3/20/2018  : 1950  [x]  Patient  Verified  Payor: [place] MEDICARE / Plan: VA MEDICARE PART A & B / Product Type: Medicare /    In time:3:30p Out time:4:30p  Total Treatment Time (min): 60  Total Timed Codes (min): 45  1:1 Treatment Time ( [address] only): 30  Visit #: 11    Treatment Area: Pain in right shoulder [M25.511]    SUBJECTIVE  Pain Level (0-10 scale): 4  Any medication changes, allergies to medications, adverse drug reactions, diagnosis change, or new procedure performed?: [x] No    [] Yes (see summary sheet for update)  Subjective functional status/changes:   [] No changes reported  Patient reports she is a little more sore today. Patient states she is able to move her arm more without increased pain.       OBJECTIVE    Modality rationale: decrease pain and increase tissue extensibility to improve the patients ability to reach overhead without pain   Min Type Additional Details   15 [x] Estim: []Att   [x]Unatt        []TENS instruct                  []IFC  [x]Premod   []NMES                     []Other:  []w/US   []w/ice   [x]w/heat  Position: supine  [facility]    []  Traction: [] Cervical       []Lumbar                       [] Prone          []Supine                       []Intermittent   []Continuous Lbs:  [] before manual  [] after manual  []w/heat    []  Ultrasound: []Continuous   [] Pulsed at:                           []1MHz   []3MHz Location:  W/cm2:    [] Paraffin         Location:   []w/heat    []  Ice     []  Heat  []  Ice massage Position:  Location:    []  Laser  []  Other: Position:  Location:      []  Vasopneumatic Device Pressure:       [] lo [] med [] hi   Temperature:      [x] Skin assessment post-treatment:  [x]intact []redness- no adverse reaction    []redness - adverse reaction:     30 min Therapeutic Exercise:  [x] See flow sheet :   Rationale: increase ROM, increase strength and improve coordination to improve the patients ability to lift overhead without pain    15 min Manual Therapy:  Grade II posterior mobilizations B glenohumeral joint  STM to the L lateral deltoid at the deltoid tubercle, B UT, B levator, R biceps   Rationale: increase ROM, increase strength and improve coordination to improve the patients ability to lift overhead without pain          With   [] TE   [] TA   [] neuro   [] other: Patient Education: [x] Review HEP    [] Progressed/Changed HEP based on:   [] positioning   [] body mechanics   [] transfers   [] heat/ice application    [] other:      Other Objective/Functional Measures: mod fatigue with advanced exercises, min pain which subsided with verbal cues for proper mechanics      Pain Level (0-10 scale) post treatment:  3    ASSESSMENT/Changes in Function:     Patient will continue to benefit from skilled PT services to modify and progress therapeutic interventions, address functional mobility deficits, address ROM deficits, address strength deficits, analyze and address soft tissue restrictions, analyze and cue movement patterns, analyze and modify body mechanics/ergonomics and assess and modify postural abnormalities to attain remaining goals. []  See Plan of Care  []  See progress note/recertification  []  See Discharge Summary         Progress towards goals / Updated goals:   Patient continues to be limited by pain with interventions, however is able to advance several with no increased pain. Patient making slow steady progress towards goals and increased ROM. Patient will do well on current program with focus on tissue extensibility and improving postural awareness. Short Term Goals: To be accomplished in 8 treatments:  1. Patient will be able to demonstrate FIR >L3 to allow for improved ability to tuck in a shirt. Progressing  2. Patient will be able to demonstrate >120 degrees of AROM flexion with <2/10 pain to allow for improved ability to reach a high shelf. Progressing-pain  3. Patient will be able to carry 20# at her left side for 50 ft. With <2/10 pain Progressing  Long Term Goals: To be accomplished in 16 treatments:  1. Patient will be able to demonstrate FIR >L1 to allow for improved ability to put on a jacket. 2. Patient will be able to lift 1# over her head with no pain or limitation. 3. Patient will be able to sleep through the night without being woken up by shoulder pain.   Frequency / Duration: Patient to be seen 2 times per week for 8 weeks.        PLAN  [x]  Upgrade activities as tolerated     [x]  Continue plan of care  []  Update interventions per flow sheet       []  Discharge due to:_  []  Other:_      Baldomero Subramanian  PTA  3/20/2018  1:55 PM

## 2018-03-22 ENCOUNTER — HOSPITAL ENCOUNTER (OUTPATIENT)
Dept: PHYSICAL THERAPY | Age: 68
Discharge: HOME | End: 2018-03-22
Payer: MEDICARE

## 2018-03-22 PROCEDURE — 97014 ELECTRIC STIMULATION THERAPY: CPT

## 2018-03-22 PROCEDURE — 97110 THERAPEUTIC EXERCISES: CPT

## 2018-03-22 PROCEDURE — 97140 MANUAL THERAPY 1/> REGIONS: CPT

## 2018-03-22 NOTE — PROGRESS NOTES
PT DAILY TREATMENT NOTE - [facility] 2-15    Patient Name: Sunny Arambula  Date:3/22/2018  : 1950  [x]  Patient  Verified  Payor: VA MEDICARE / Plan: VA MEDICARE PART A & B / Product Type: Medicare /    In time:3:30p Out time:4:30p  Total Treatment Time (min): 60  Total Timed Codes (min): 45  1:1 Treatment Time ( [address] only): 30  Visit #: 12    Treatment Area: Pain in right shoulder [M25.511]    SUBJECTIVE  Pain Level (0-10 scale): 2-L shoulder, 4-R shoulder  Any medication changes, allergies to medications, adverse drug reactions, diagnosis change, or new procedure performed?: [x] No    [] Yes (see summary sheet for update)  Subjective functional status/changes:   [] No changes reported  Patient reports she is a little more sore today R>L UE. Patient states she is able to move her arm more without increased pain.       OBJECTIVE    Modality rationale: decrease pain and increase tissue extensibility to improve the patients ability to reach overhead without pain   Min Type Additional Details   15 [x] Estim: []Att   [x]Unatt        []TENS instruct                  []IFC  [x]Premod   []NMES                     []Other:  []w/US   []w/ice   [x]w/heat  Position: supine  Location: [facility]    []  Traction: [] Cervical       []Lumbar                       [] Prone          []Supine                       []Intermittent   []Continuous Lbs:  [] before manual  [] after manual  []w/heat    []  Ultrasound: []Continuous   [] Pulsed at:                           []1MHz   []3MHz Location:  W/cm2:    [] Paraffin         Location:   []w/heat    []  Ice     []  Heat  []  Ice massage Position:  Location:    []  Laser  []  Other: Position:  Location:      []  Vasopneumatic Device Pressure:       [] lo [] med [] hi   Temperature:      [x] Skin assessment post-treatment:  [x]intact []redness- no adverse reaction    []redness - adverse reaction:     30 min Therapeutic Exercise:  [x] See flow sheet :   Rationale: increase ROM, increase strength and improve coordination to improve the patients ability to lift overhead without pain    15 min Manual Therapy:  Grade II posterior mobilizations B glenohumeral joint  STM to the L lateral deltoid at the deltoid tubercle, B UT, B levator, B biceps   Rationale: increase ROM, increase strength and improve coordination to improve the patients ability to lift overhead without pain          With   [] TE   [] TA   [] neuro   [] other: Patient Education: [x] Review HEP    [] Progressed/Changed HEP based on:   [] positioning   [] body mechanics   [] transfers   [] heat/ice application    [] other:      Other Objective/Functional Measures: mod fatigue with advanced exercises, min pain which subsided with verbal cues for proper mechanics, pt reports right shoulder is more sore by end of session. Pain Level (0-10 scale) post treatment:  2-L shoulder, 5-R shoulder    ASSESSMENT/Changes in Function:     Patient will continue to benefit from skilled PT services to modify and progress therapeutic interventions, address functional mobility deficits, address ROM deficits, address strength deficits, analyze and address soft tissue restrictions, analyze and cue movement patterns, analyze and modify body mechanics/ergonomics and assess and modify postural abnormalities to attain remaining goals. []  See Plan of Care  []  See progress note/recertification  []  See Discharge Summary         Progress towards goals / Updated goals:   Patient continues to be limited by pain with interventions, however is able to advance several with no increased pain. Patient making slow steady progress towards goals and increased ROM. Patient will do well on current program with focus on tissue extensibility and improving postural awareness. Short Term Goals: To be accomplished in 8 treatments:  1. Patient will be able to demonstrate FIR >L3 to allow for improved ability to tuck in a shirt. Progressing  2.  Patient will be able to demonstrate >120 degrees of AROM flexion with <2/10 pain to allow for improved ability to reach a high shelf. Progressing-pain 2-3/10  3. Patient will be able to carry 20# at her left side for 50 ft. With <2/10 pain Progressing, pain 2-3/10  Long Term Goals: To be accomplished in 16 treatments:  1. Patient will be able to demonstrate FIR >L1 to allow for improved ability to put on a jacket. 2. Patient will be able to lift 1# over her head with no pain or limitation. 3. Patient will be able to sleep through the night without being woken up by shoulder pain.   Frequency / Duration: Patient to be seen 2 times per week for 8 weeks.        PLAN  [x]  Upgrade activities as tolerated     [x]  Continue plan of care  []  Update interventions per flow sheet       []  Discharge due to:_  []  Other:_      Marion Abreu  PTA  3/22/2018  1:55 PM

## 2018-03-27 ENCOUNTER — HOSPITAL ENCOUNTER (OUTPATIENT)
Dept: PHYSICAL THERAPY | Age: 68
Discharge: HOME | End: 2018-03-27
Payer: MEDICARE

## 2018-03-27 PROCEDURE — 97110 THERAPEUTIC EXERCISES: CPT

## 2018-03-27 PROCEDURE — 97014 ELECTRIC STIMULATION THERAPY: CPT

## 2018-03-27 PROCEDURE — 97140 MANUAL THERAPY 1/> REGIONS: CPT

## 2018-03-27 NOTE — PROGRESS NOTES
PT DAILY TREATMENT NOTE - [facility] 2-15    Patient Name: Jamie Willis  Date:3/27/2018  : 1950  [x]  Patient  Verified  Payor: VA MEDICARE / Plan: VA MEDICARE PART A & B / Product Type: Medicare /    In time:2:30p Out time:3:30p  Total Treatment Time (min): 60  Total Timed Codes (min): 45  1:1 Treatment Time ( [address] only): 40  Visit #: 13    Treatment Area: Pain in right shoulder [M25.511]    SUBJECTIVE  Pain Level (0-10 scale): 3-L shoulder, 5-R shoulder  Any medication changes, allergies to medications, adverse drug reactions, diagnosis change, or new procedure performed?: [x] No    [] Yes (see summary sheet for update)  Subjective functional status/changes:   [] No changes reported  Patient reports she continues to have bad days and today she feel the weather is affecting her shoulder. Patient states she felt really good yesterday with little pain through both shoulders and was able to make her coffee.         OBJECTIVE    Modality rationale: decrease pain and increase tissue extensibility to improve the patients ability to reach overhead without pain   Min Type Additional Details   15 [x] Estim: []Att   [x]Unatt        []TENS instruct                  []IFC  [x]Premod   []NMES                     []Other:  []w/US   []w/ice   [x]w/heat  Position: supine  [facility]    []  Traction: [] Cervical       []Lumbar                       [] Prone          []Supine                       []Intermittent   []Continuous Lbs:  [] before manual  [] after manual  []w/heat    []  Ultrasound: []Continuous   [] Pulsed at:                           []1MHz   []3MHz Location:  W/cm2:    [] Paraffin         Location:   []w/heat    []  Ice     []  Heat  []  Ice massage Position:  Location:    []  Laser  []  Other: Position:  Location:      []  Vasopneumatic Device Pressure:       [] lo [] med [] hi   Temperature:      [x] Skin assessment post-treatment:  [x]intact []redness- no adverse reaction    []redness - adverse reaction:     30 min Therapeutic Exercise:  [x] See flow sheet :   Rationale: increase ROM, increase strength and improve coordination to improve the patients ability to lift overhead without pain    15 min Manual Therapy:  Grade II posterior mobilizations B glenohumeral joint  STM to the R lateral deltoid at the deltoid tubercle, L UT, L levator, R biceps, PROM L shoulder   Rationale: increase ROM, increase strength and improve coordination to improve the patients ability to lift overhead without pain          With   [] TE   [] TA   [] neuro   [] other: Patient Education: [x] Review HEP    [] Progressed/Changed HEP based on:   [] positioning   [] body mechanics   [] transfers   [] heat/ice application    [] other:      Other Objective/Functional Measures: mod fatigue with advanced exercises, min pain which subsided with verbal cues for proper mechanics, pt reports right shoulder is more sore by end of session. Pain Level (0-10 scale) post treatment:  1-L shoulder, 3-R shoulder    ASSESSMENT/Changes in Function:     Patient will continue to benefit from skilled PT services to modify and progress therapeutic interventions, address functional mobility deficits, address ROM deficits, address strength deficits, analyze and address soft tissue restrictions, analyze and cue movement patterns, analyze and modify body mechanics/ergonomics and assess and modify postural abnormalities to attain remaining goals. []  See Plan of Care  []  See progress note/recertification  []  See Discharge Summary         Progress towards goals / Updated goals:   Patient demonstrates good tolerance for interventions with verbal cues for postural awareness. Patient making slow steady progress towards goals and increased ROM. Short Term Goals: To be accomplished in 8 treatments:  1. Patient will be able to demonstrate FIR >L3 to allow for improved ability to tuck in a shirt. Met  2.  Patient will be able to demonstrate >120 degrees of AROM flexion with <2/10 pain to allow for improved ability to reach a high shelf. Progressing-pain 2-3/10  3. Patient will be able to carry 20# at her left side for 50 ft. With <2/10 pain Progressing, pain 2-3/10  Long Term Goals: To be accomplished in 16 treatments:  1. Patient will be able to demonstrate FIR >L1 to allow for improved ability to put on a jacket. 2. Patient will be able to lift 1# over her head with no pain or limitation. 3. Patient will be able to sleep through the night without being woken up by shoulder pain.   Frequency / Duration: Patient to be seen 2 times per week for 8 weeks.        PLAN  [x]  Upgrade activities as tolerated     [x]  Continue plan of care  []  Update interventions per flow sheet       []  Discharge due to:_  []  Other:_      Teena Standard  PTA  3/27/2018  1:55 PM

## 2018-03-29 ENCOUNTER — HOSPITAL ENCOUNTER (OUTPATIENT)
Dept: PHYSICAL THERAPY | Age: 68
Discharge: HOME | End: 2018-03-29
Payer: MEDICARE

## 2018-03-29 PROCEDURE — 97110 THERAPEUTIC EXERCISES: CPT | Performed by: PHYSICAL THERAPIST

## 2018-03-29 PROCEDURE — 97016 VASOPNEUMATIC DEVICE THERAPY: CPT | Performed by: PHYSICAL THERAPIST

## 2018-03-29 PROCEDURE — 97140 MANUAL THERAPY 1/> REGIONS: CPT | Performed by: PHYSICAL THERAPIST

## 2018-03-29 PROCEDURE — 97014 ELECTRIC STIMULATION THERAPY: CPT | Performed by: PHYSICAL THERAPIST

## 2018-03-29 NOTE — PROGRESS NOTES
PT DAILY TREATMENT NOTE - [facility] 2-15    Patient Name: Dequan Montiel  Date:3/29/2018  : 1950  [x]  Patient  Verified  Payor: VA MEDICARE / Plan: VA MEDICARE PART A & B / Product Type: Medicare /    In time:3:30 PM Out time:4:25 PM  Total Treatment Time (min): 55  Total Timed Codes (min): 40  1:1 Treatment Time ( only): 40  Visit #: 13    Treatment Area: Pain in right shoulder [M25.511]    SUBJECTIVE  Pain Level (0-10 scale): 0-L shoulder, 4-R shoulder  Any medication changes, allergies to medications, adverse drug reactions, diagnosis change, or new procedure performed?: [x] No    [] Yes (see summary sheet for update)  Subjective functional status/changes:   [] No changes reported  Patient reports that her left shoulder continues to do very well, however her right shoulder now has similar symptoms. She has the most difficulty reaching overhead and behind her back.      OBJECTIVE    Modality rationale: decrease pain and increase tissue extensibility to improve the patients ability to reach overhead without pain   Min Type Additional Details   15 [x] Estim: []Att   [x]Unatt        []TENS instruct                  []IFC  [x]Premod   []NMES                     []Other:  []w/US   []w/ice   [x]w/heat  Position: supine  Location: Bshoulder    []  Traction: [] Cervical       []Lumbar                       [] Prone          []Supine                       []Intermittent   []Continuous Lbs:  [] before manual  [] after manual  []w/heat    []  Ultrasound: []Continuous   [] Pulsed at:                           []1MHz   []3MHz Location:  W/cm2:    [] Paraffin         Location:   []w/heat    []  Ice     []  Heat  []  Ice massage Position:  Location:    []  Laser  []  Other: Position:  Location:      []  Vasopneumatic Device Pressure:       [] lo [] med [] hi   Temperature:      [x] Skin assessment post-treatment:  [x]intact []redness- no adverse reaction    []redness - adverse reaction:     25 min Therapeutic Exercise: [x] See flow sheet :   Rationale: increase ROM, increase strength and improve coordination to improve the patients ability to lift overhead without pain    15 min Manual Therapy:  Grade II posterior mobilizations R glenohumeral joint  STM to the R lateral deltoid at the deltoid tubercle R biceps, PROM R shoulder   Rationale: increase ROM, increase strength and improve coordination to improve the patients ability to lift overhead without pain          With   [] TE   [] TA   [] neuro   [] other: Patient Education: [x] Review HEP    [] Progressed/Changed HEP based on:   [] positioning   [] body mechanics   [] transfers   [] heat/ice application    [] other:      Other Objective/Functional Measures:   Patient reports TTP along the long head of the right biceps    Pain Level (0-10 scale) post treatment:  0-L shoulder, 3-R shoulder    ASSESSMENT/Changes in Function:     Patient will continue to benefit from skilled PT services to modify and progress therapeutic interventions, address functional mobility deficits, address ROM deficits, address strength deficits, analyze and address soft tissue restrictions, analyze and cue movement patterns, analyze and modify body mechanics/ergonomics and assess and modify postural abnormalities to attain remaining goals. []  See Plan of Care  []  See progress note/recertification  []  See Discharge Summary         Progress towards goals / Updated goals:   Patient continues to make slow progress towards long term goals, especially at the right shoulder, but the left shoulder continues to show excellent long term gains from PT compared to her initial visit. She will continue to benefit from a gradual progression of overhead ROM and strength to achieve all patient goals.        PLAN  [x]  Upgrade activities as tolerated     [x]  Continue plan of care  []  Update interventions per flow sheet       []  Discharge due to:_  []  Other:_      Deana Bonilla, PT  , DPT, OCS, Cert. DN   3/29/2018  1:55 PM

## 2018-04-02 ENCOUNTER — APPOINTMENT (OUTPATIENT)
Dept: PHYSICAL THERAPY | Age: 68
End: 2018-04-02
Payer: MEDICARE

## 2018-04-05 ENCOUNTER — APPOINTMENT (OUTPATIENT)
Dept: PHYSICAL THERAPY | Age: 68
End: 2018-04-05
Payer: MEDICARE

## 2018-04-05 ENCOUNTER — HOSPITAL ENCOUNTER (OUTPATIENT)
Dept: PHYSICAL THERAPY | Age: 68
Discharge: HOME | End: 2018-04-05
Payer: MEDICARE

## 2018-04-05 PROCEDURE — 97014 ELECTRIC STIMULATION THERAPY: CPT

## 2018-04-05 PROCEDURE — 97110 THERAPEUTIC EXERCISES: CPT

## 2018-04-05 PROCEDURE — 97140 MANUAL THERAPY 1/> REGIONS: CPT

## 2018-04-05 NOTE — PROGRESS NOTES
PT DAILY TREATMENT NOTE - [facility] 2-15    Patient Name: Gretta Posada  Date:2018  : 1950  [x]  Patient  Verified  Payor: [place] MEDICARE / Plan: [place] MEDICARE PART A & B / Product Type: Medicare /    In time:3:00p Out time: 4:10p  Total Treatment Time (min): 70  Total Timed Codes (min): 55  1:1 Treatment Time ( [address] only): 54  Visit #: 15    Treatment Area: Pain in right shoulder [M25.511]    SUBJECTIVE  Pain Level (0-10 scale): 4-L shoulder, 7-R shoulder  Any medication changes, allergies to medications, adverse drug reactions, diagnosis change, or new procedure performed?: [x] No    [] Yes (see summary sheet for update)  Subjective functional status/changes:   [] No changes reported  Patient reports bother her shoulders are a little more sore since last visit. Patient states she went out of town and has been more painful since.     OBJECTIVE    Modality rationale: decrease pain and increase tissue extensibility to improve the patients ability to reach overhead without pain   Min Type Additional Details   15 [x] Estim: []Att   [x]Unatt        []TENS instruct                  []IFC  [x]Premod   []NMES                     []Other:  []w/US   []w/ice   [x]w/heat  Position: supine  [facility]    []  Traction: [] Cervical       []Lumbar                       [] Prone          []Supine                       []Intermittent   []Continuous Lbs:  [] before manual  [] after manual  []w/heat    []  Ultrasound: []Continuous   [] Pulsed at:                           []1MHz   []3MHz Location:  W/cm2:    [] Paraffin         Location:   []w/heat    []  Ice     []  Heat  []  Ice massage Position:  Location:    []  Laser  []  Other: Position:  Location:      []  Vasopneumatic Device Pressure:       [] lo [] med [] hi   Temperature:      [x] Skin assessment post-treatment:  [x]intact []redness- no adverse reaction    []redness - adverse reaction:     25 min Therapeutic Exercise:  [x] See flow sheet :   Rationale: increase ROM, increase strength and improve coordination to improve the patients ability to lift overhead without pain    30 min Manual Therapy:  Grade II posterior mobilizations R glenohumeral joint, B clavicular inferior mobs mobs  STM to the B lateral deltoid at the deltoid tubercle B biceps    Rationale: increase ROM, increase strength and improve coordination to improve the patients ability to lift overhead without pain          With   [] TE   [] TA   [] neuro   [] other: Patient Education: [x] Review HEP    [] Progressed/Changed HEP based on:   [] positioning   [] body mechanics   [] transfers   [] heat/ice application    [] other:      Other Objective/Functional Measures:   Pt completed foto    Pain Level (0-10 scale) post treatment:  0-L shoulder, 3-R shoulder    ASSESSMENT/Changes in Function:     Patient will continue to benefit from skilled PT services to modify and progress therapeutic interventions, address functional mobility deficits, address ROM deficits, address strength deficits, analyze and address soft tissue restrictions, analyze and cue movement patterns, analyze and modify body mechanics/ergonomics and assess and modify postural abnormalities to attain remaining goals. []  See Plan of Care  []  See progress note/recertification  []  See Discharge Summary         Progress towards goals / Updated goals:   Patient demonstrates good tolerance for interventions despite increased pain.  Will continue slow steady progress of interventions to reach goals.         PLAN  [x]  Upgrade activities as tolerated     [x]  Continue plan of care  []  Update interventions per flow sheet       []  Discharge due to:_  []  Other:_      Duane Sánchez  , ERIK  4/5/2018  3:55 PM

## 2018-04-10 ENCOUNTER — HOSPITAL ENCOUNTER (OUTPATIENT)
Dept: PHYSICAL THERAPY | Age: 68
Discharge: HOME | End: 2018-04-10
Payer: MEDICARE

## 2018-04-10 PROCEDURE — 97110 THERAPEUTIC EXERCISES: CPT | Performed by: PHYSICAL THERAPIST

## 2018-04-10 PROCEDURE — 97014 ELECTRIC STIMULATION THERAPY: CPT | Performed by: PHYSICAL THERAPIST

## 2018-04-10 PROCEDURE — 97140 MANUAL THERAPY 1/> REGIONS: CPT | Performed by: PHYSICAL THERAPIST

## 2018-04-10 NOTE — PROGRESS NOTES
PT DAILY TREATMENT NOTE - [facility] 2-15    Patient Name: Jasmyn Jeff  Date:4/10/2018  : 1950  [x]  Patient  Verified  Payor: [place] MEDICARE / Plan: [place] MEDICARE PART A & B / Product Type: Medicare /    In time:3:00 PM Out time: 4:10 PM  Total Treatment Time (min): 70  Total Timed Codes (min): 55  1:1 Treatment Time ( [address] only): 30  Visit #: 16    Treatment Area: Pain in right shoulder [M25.511]    SUBJECTIVE  Pain Level (0-10 scale): 3-L shoulder, 5-R shoulder  Any medication changes, allergies to medications, adverse drug reactions, diagnosis change, or new procedure performed?: [x] No    [] Yes (see summary sheet for update)  Subjective functional status/changes:   [] No changes reported  Patient reports an improvement at both shoulders, but sleeping still makes things much worse.     OBJECTIVE    Modality rationale: decrease pain and increase tissue extensibility to improve the patients ability to reach overhead without pain   Min Type Additional Details   15 [x] Estim: []Att   [x]Unatt        []TENS instruct                  []IFC  [x]Premod   []NMES                     []Other:  []w/US   []w/ice   [x]w/heat  Position: supine  [facility]    []  Traction: [] Cervical       []Lumbar                       [] Prone          []Supine                       []Intermittent   []Continuous Lbs:  [] before manual  [] after manual  []w/heat    []  Ultrasound: []Continuous   [] Pulsed at:                           []1MHz   []3MHz Location:  W/cm2:    [] Paraffin         Location:   []w/heat    []  Ice     []  Heat  []  Ice massage Position:  Location:    []  Laser  []  Other: Position:  Location:      []  Vasopneumatic Device Pressure:       [] lo [] med [] hi   Temperature:      [x] Skin assessment post-treatment:  [x]intact []redness- no adverse reaction    []redness - adverse reaction:     25 min Therapeutic Exercise:  [x] See flow sheet :   Rationale: increase ROM, increase strength and improve coordination to improve the patients ability to lift overhead without pain    30 min Manual Therapy:  Grade II posterior mobilizations R glenohumeral joint, B clavicular inferior mobs mobs  STM to the B lateral deltoid at the deltoid tubercle B biceps    Rationale: increase ROM, increase strength and improve coordination to improve the patients ability to lift overhead without pain          With   [] TE   [] TA   [] neuro   [] other: Patient Education: [x] Review HEP    [] Progressed/Changed HEP based on:   [] positioning   [] body mechanics   [] transfers   [] heat/ice application    [] other:      Other Objective/Functional Measures:       Pain Level (0-10 scale) post treatment:  0-L shoulder, 3-R shoulder    ASSESSMENT/Changes in Function:     Patient will continue to benefit from skilled PT services to modify and progress therapeutic interventions, address functional mobility deficits, address ROM deficits, address strength deficits, analyze and address soft tissue restrictions, analyze and cue movement patterns, analyze and modify body mechanics/ergonomics and assess and modify postural abnormalities to attain remaining goals. []  See Plan of Care  []  See progress note/recertification  []  See Discharge Summary         Progress towards goals / Updated goals:   Patient continues to do well with PT, but has poor carryover in between visits.  Will continue to progress rotator cuff strengthening exercises next visit as tolerated.     PLAN  [x]  Upgrade activities as tolerated     [x]  Continue plan of care  []  Update interventions per flow sheet       []  Discharge due to:_  []  Other:_      Enriqueta Pantoja, PT  , PTA  4/10/2018  3:55 PM

## 2018-04-12 ENCOUNTER — HOSPITAL ENCOUNTER (OUTPATIENT)
Dept: PHYSICAL THERAPY | Age: 68
Discharge: HOME | End: 2018-04-12
Payer: MEDICARE

## 2018-04-12 PROCEDURE — 97014 ELECTRIC STIMULATION THERAPY: CPT

## 2018-04-12 PROCEDURE — 97140 MANUAL THERAPY 1/> REGIONS: CPT

## 2018-04-12 PROCEDURE — 97110 THERAPEUTIC EXERCISES: CPT

## 2018-04-12 NOTE — PROGRESS NOTES
PT DAILY TREATMENT NOTE - [facility] 2-15    Patient Name: Willy Jules  Date:2018  : 1950  [x]  Patient  Verified  Payor: [place] MEDICARE / Plan: VA MEDICARE PART A & B / Product Type: Medicare /    In time:3:30 PM Out time: 4:40 PM  Total Treatment Time (min): 70  Total Timed Codes (min): 55  1:1 Treatment Time ( only): 40  Visit #: 17    Treatment Area: Pain in right shoulder [M25.511]    SUBJECTIVE  Pain Level (0-10 scale): 5-L shoulder, 7-R shoulder  Any medication changes, allergies to medications, adverse drug reactions, diagnosis change, or new procedure performed?: [x] No    [] Yes (see summary sheet for update)  Subjective functional status/changes:   [] No changes reported  Patient reports her niece went to hug her and lifted her shoulder up and caused more pain.     OBJECTIVE    Modality rationale: decrease pain and increase tissue extensibility to improve the patients ability to reach overhead without pain   Min Type Additional Details   15 [x] Estim: []Att   [x]Unatt        []TENS instruct                  []IFC  [x]Premod   []NMES                     []Other:  []w/US   []w/ice   [x]w/heat  Position: supine  [facility]    []  Traction: [] Cervical       []Lumbar                       [] Prone          []Supine                       []Intermittent   []Continuous Lbs:  [] before manual  [] after manual  []w/heat    []  Ultrasound: []Continuous   [] Pulsed at:                           []1MHz   []3MHz Location:  W/cm2:    [] Paraffin         Location:   []w/heat    []  Ice     []  Heat  []  Ice massage Position:  Location:    []  Laser  []  Other: Position:  Location:      []  Vasopneumatic Device Pressure:       [] lo [] med [] hi   Temperature:      [x] Skin assessment post-treatment:  [x]intact []redness- no adverse reaction    []redness - adverse reaction:     25 min Therapeutic Exercise:  [x] See flow sheet :   Rationale: increase ROM, increase strength and improve coordination to improve the patients ability to lift overhead without pain    30 min Manual Therapy:  Grade II posterior mobilizations R glenohumeral joint, B clavicular inferior mobs, STM to the B lateral deltoid at the deltoid tubercle B biceps    Rationale: increase ROM, increase strength and improve coordination to improve the patients ability to lift overhead without pain          With   [] TE   [] TA   [] neuro   [] other: Patient Education: [x] Review HEP    [] Progressed/Changed HEP based on:   [] positioning   [] body mechanics   [] transfers   [] heat/ice application    [] other:      Other Objective/Functional Measures:       Pain Level (0-10 scale) post treatment:  2-L shoulder, 5-R shoulder    ASSESSMENT/Changes in Function:     Patient will continue to benefit from skilled PT services to modify and progress therapeutic interventions, address functional mobility deficits, address ROM deficits, address strength deficits, analyze and address soft tissue restrictions, analyze and cue movement patterns, analyze and modify body mechanics/ergonomics and assess and modify postural abnormalities to attain remaining goals. []  See Plan of Care  []  See progress note/recertification  []  See Discharge Summary         Progress towards goals / Updated goals:   Patient continues to do well with PT, but has poor carryover in between visits.  Patient unable to progress interventions at visit due to increased pain, will progress at next visit as tolerated.     PLAN  [x]  Upgrade activities as tolerated     [x]  Continue plan of care  []  Update interventions per flow sheet       []  Discharge due to:_  []  Other:_      José Miguel Alamo  , ERIK  4/12/2018  3:55 PM

## 2018-04-17 ENCOUNTER — HOSPITAL ENCOUNTER (OUTPATIENT)
Dept: PHYSICAL THERAPY | Age: 68
Discharge: HOME | End: 2018-04-17
Payer: MEDICARE

## 2018-04-17 PROCEDURE — 97110 THERAPEUTIC EXERCISES: CPT

## 2018-04-17 PROCEDURE — G8986 CARRY D/C STATUS: HCPCS | Performed by: PHYSICAL THERAPIST

## 2018-04-17 PROCEDURE — 97014 ELECTRIC STIMULATION THERAPY: CPT

## 2018-04-17 PROCEDURE — G8985 CARRY GOAL STATUS: HCPCS | Performed by: PHYSICAL THERAPIST

## 2018-04-17 PROCEDURE — 97140 MANUAL THERAPY 1/> REGIONS: CPT

## 2018-04-17 NOTE — PROGRESS NOTES
PT DAILY TREATMENT NOTE - [facility] 2-15    Patient Name: Liliam Cruz  Date:2018  : 1950  [x]  Patient  Verified  Payor: Eva Mathias / Plan: [place] MEDICARE PART A & B / Product Type: Medicare /    In time:3:00p Out time: 4:00p  Total Treatment Time (min): 60  Total Timed Codes (min): 45  1:1 Treatment Time ( [address] only): 45  Visit #: 18    Treatment Area: Pain in right shoulder [M25.511]    SUBJECTIVE  Pain Level (0-10 scale): 4-L shoulder, 7-R shoulder  Any medication changes, allergies to medications, adverse drug reactions, diagnosis change, or new procedure performed?: [x] No    [] Yes (see summary sheet for update)  Subjective functional status/changes:   [] No changes reported  Patient reports she has felt about the same since last session.     OBJECTIVE    Modality rationale: decrease pain and increase tissue extensibility to improve the patients ability to reach overhead without pain   Min Type Additional Details   15 [x] Estim: []Att   [x]Unatt        []TENS instruct                  []IFC  [x]Premod   []NMES                     []Other:  []w/US   []w/ice   [x]w/heat  Position: supine  Location: B shoulder    []  Traction: [] Cervical       []Lumbar                       [] Prone          []Supine                       []Intermittent   []Continuous Lbs:  [] before manual  [] after manual  []w/heat    []  Ultrasound: []Continuous   [] Pulsed at:                           []1MHz   []3MHz Location:  W/cm2:    [] Paraffin         Location:   []w/heat    []  Ice     []  Heat  []  Ice massage Position:  Location:    []  Laser  []  Other: Position:  Location:      []  Vasopneumatic Device Pressure:       [] lo [] med [] hi   Temperature:      [x] Skin assessment post-treatment:  [x]intact []redness- no adverse reaction    []redness - adverse reaction:     15 min Therapeutic Exercise:  [x] See flow sheet :   Rationale: increase ROM, increase strength and improve coordination to improve the patients ability to lift overhead without pain    30 min Manual Therapy:  Grade II posterior mobilizations R glenohumeral joint, B clavicular inferior mobs, STM to the B lateral deltoid at the deltoid tubercle B biceps, R triceps   Rationale: increase ROM, increase strength and improve coordination to improve the patients ability to lift overhead without pain          With   [] TE   [] TA   [] neuro   [] other: Patient Education: [x] Review HEP    [] Progressed/Changed HEP based on:   [] positioning   [] body mechanics   [] transfers   [] heat/ice application    [] other:      Other Objective/Functional Measures:       Pain Level (0-10 scale) post treatment:  2-L shoulder, 5-R shoulder    ASSESSMENT/Changes in Function:     Patient will continue to benefit from skilled PT services to modify and progress therapeutic interventions, address functional mobility deficits, address ROM deficits, address strength deficits, analyze and address soft tissue restrictions, analyze and cue movement patterns, analyze and modify body mechanics/ergonomics and assess and modify postural abnormalities to attain remaining goals. []  See Plan of Care  []  See progress note/recertification  []  See Discharge Summary         Progress towards goals / Updated goals:   Patient continues to do well with PT, but has poor carryover in between visits.  Patient unable to progress interventions at visit due to increased pain, will progress at next visit as tolerated.     PLAN  [x]  Upgrade activities as tolerated     [x]  Continue plan of care  []  Update interventions per flow sheet       []  Discharge due to:_  []  Other:_      Paz Hastings  , ERIK  4/17/2018  3:55 PM

## 2018-04-19 ENCOUNTER — HOSPITAL ENCOUNTER (OUTPATIENT)
Dept: PHYSICAL THERAPY | Age: 68
End: 2018-04-19
Payer: MEDICARE

## 2018-04-24 ENCOUNTER — APPOINTMENT (OUTPATIENT)
Dept: PHYSICAL THERAPY | Age: 68
End: 2018-04-24
Payer: MEDICARE

## 2018-04-26 ENCOUNTER — APPOINTMENT (OUTPATIENT)
Dept: PHYSICAL THERAPY | Age: 68
End: 2018-04-26
Payer: MEDICARE

## 2018-06-11 NOTE — PROGRESS NOTES
[facility]. [address] [place] [address] Justo Echols  Phone: [phone] Fax: [phone]      Discharge Summary 2-15    Patient name: Myrna Shahid  : 1950  Provider#: [id number]  Referral source: Michelle Trent MD      Medical/Treatment Diagnosis: Pain in right shoulder [M25.511]     Prior Hospitalization: see medical history     Comorbidities: See Plan of Care  Prior Level of Function: See Plan of Care  Medications: Verified on Patient Summary List    Start of Care: 18      Onset Date:2017   Visits from Start of Care: 18     Missed Visits: Two cancellations  Reporting Period : 18 to 18    Assessment/Summary of care: Mrs. Hudson Began tolerated an additional two weeks of PT following her last progress report on 18. She did not exhibit a change in functional status and was referred back to her orthopedist. After consulting with Dr. Mayur Hamlin, they decided to take a hold on PT until further diagnostic treatment was done. She requested discharge from PT, but is welcome to return once therapy is deemed more appropraite. Short Term Goals: To be accomplished in 8 treatments:  1. Patient will be able to demonstrate FIR >L3 to allow for improved ability to tuck in a shirt. Not met  2. Patient will be able to demonstrate >120 degrees of AROM flexion with <2/10 pain to allow for improved ability to reach a high shelf. Not met  3. Patient will be able to carry 20# at her left side for 50 ft. With <2/10 pain Not met  Long Term Goals: To be accomplished in 16 treatments:  1. Patient will be able to demonstrate FIR >L1 to allow for improved ability to put on a jacket. Not met  2. Patient will be able to lift 1# over her head with no pain or limitation. Not met  3. Patient will be able to sleep through the night without being woken up by shoulder pain.  Not met    G-Codes (GP)  Carry    Goal  CJ= 20-39%   D/C  CK= 40-59%    The severity rating is based on clinical judgment and the FOTO Score score. RECOMMENDATIONS:  [x]Discontinue therapy: []Patient has reached or is progressing toward set goals     []Patient is non-compliant or has abdicated     [x]Due to lack of appreciable progress towards set goals     []Other  Gema Montes PT , DPT, OCS, Cert.  DN   6/11/2018 10:56 AM

## 2018-07-10 ENCOUNTER — HOSPITAL ENCOUNTER (OUTPATIENT)
Dept: GENERAL RADIOLOGY | Age: 68
Discharge: HOME | End: 2018-07-10
Attending: NURSE PRACTITIONER
Payer: MEDICARE

## 2018-07-10 DIAGNOSIS — R06.02 SHORTNESS OF BREATH: ICD-10-CM

## 2018-07-10 PROCEDURE — 71046 X-RAY EXAM CHEST 2 VIEWS: CPT

## 2018-07-15 ENCOUNTER — HOSPITAL ENCOUNTER (EMERGENCY)
Age: 68
Discharge: HOME | End: 2018-07-15
Attending: EMERGENCY MEDICINE
Payer: MEDICARE

## 2018-07-15 VITALS
OXYGEN SATURATION: 96 % | HEIGHT: 63 IN | HEART RATE: 103 BPM | WEIGHT: 175 LBS | TEMPERATURE: 97.8 F | SYSTOLIC BLOOD PRESSURE: 119 MMHG | DIASTOLIC BLOOD PRESSURE: 67 MMHG | RESPIRATION RATE: 16 BRPM | BODY MASS INDEX: 31.01 KG/M2

## 2018-07-15 DIAGNOSIS — T78.40XA ALLERGIC REACTION, INITIAL ENCOUNTER: Primary | ICD-10-CM

## 2018-07-15 LAB
ANION GAP SERPL CALC-SCNC: 10 MMOL/L (ref 5–15)
BASOPHILS # BLD: 0.1 K/UL (ref 0–0.1)
BASOPHILS NFR BLD: 0 % (ref 0–1)
BUN SERPL-MCNC: 19 MG/DL (ref 6–20)
BUN/CREAT SERPL: 21 (ref 12–20)
CALCIUM SERPL-MCNC: 9.4 MG/DL (ref 8.5–10.1)
CHLORIDE SERPL-SCNC: 103 MMOL/L (ref 97–108)
CO2 SERPL-SCNC: 28 MMOL/L (ref 21–32)
CREAT SERPL-MCNC: 0.9 MG/DL (ref 0.55–1.02)
DIFFERENTIAL METHOD BLD: ABNORMAL
EOSINOPHIL # BLD: 0 K/UL (ref 0–0.4)
EOSINOPHIL NFR BLD: 0 % (ref 0–7)
ERYTHROCYTE [DISTWIDTH] IN BLOOD BY AUTOMATED COUNT: 12.7 % (ref 11.5–14.5)
GLUCOSE SERPL-MCNC: 286 MG/DL (ref 65–100)
HCT VFR BLD AUTO: 44.3 % (ref 35–47)
HGB BLD-MCNC: 14.6 G/DL (ref 11.5–16)
IMM GRANULOCYTES # BLD: 0.3 K/UL (ref 0–0.04)
IMM GRANULOCYTES NFR BLD AUTO: 2 % (ref 0–0.5)
LYMPHOCYTES # BLD: 1.6 K/UL (ref 0.8–3.5)
LYMPHOCYTES NFR BLD: 8 % (ref 12–49)
MCH RBC QN AUTO: 31.6 PG (ref 26–34)
MCHC RBC AUTO-ENTMCNC: 33 G/DL (ref 30–36.5)
MCV RBC AUTO: 95.9 FL (ref 80–99)
MONOCYTES # BLD: 0.7 K/UL (ref 0–1)
MONOCYTES NFR BLD: 4 % (ref 5–13)
NEUTS SEG # BLD: 17.9 K/UL (ref 1.8–8)
NEUTS SEG NFR BLD: 87 % (ref 32–75)
NRBC # BLD: 0 K/UL (ref 0–0.01)
NRBC BLD-RTO: 0 PER 100 WBC
PLATELET # BLD AUTO: 326 K/UL (ref 150–400)
PMV BLD AUTO: 10 FL (ref 8.9–12.9)
POTASSIUM SERPL-SCNC: 3.9 MMOL/L (ref 3.5–5.1)
RBC # BLD AUTO: 4.62 M/UL (ref 3.8–5.2)
SODIUM SERPL-SCNC: 141 MMOL/L (ref 136–145)
TROPONIN I SERPL-MCNC: <0.05 NG/ML
WBC # BLD AUTO: 20.6 K/UL (ref 3.6–11)

## 2018-07-15 PROCEDURE — 85025 COMPLETE CBC W/AUTO DIFF WBC: CPT | Performed by: EMERGENCY MEDICINE

## 2018-07-15 PROCEDURE — 80048 BASIC METABOLIC PNL TOTAL CA: CPT | Performed by: EMERGENCY MEDICINE

## 2018-07-15 PROCEDURE — 84484 ASSAY OF TROPONIN QUANT: CPT | Performed by: EMERGENCY MEDICINE

## 2018-07-15 PROCEDURE — 93005 ELECTROCARDIOGRAM TRACING: CPT

## 2018-07-15 PROCEDURE — 99285 EMERGENCY DEPT VISIT HI MDM: CPT

## 2018-07-15 RX ORDER — FAMOTIDINE 20 MG/1
20 TABLET, FILM COATED ORAL 2 TIMES DAILY
Qty: 20 TAB | Refills: 0 | Status: SHIPPED | OUTPATIENT
Start: 2018-07-15 | End: 2018-07-25

## 2018-07-15 NOTE — ED NOTES
Assumed care of patient. Introduced self as primary nurse using [address] [place] [address] Nw. Stretcher in low locked position with call bell within reach. Pt updated on plan of care and wait times. Pt instructed to use call bell if assistance is needed.

## 2018-07-15 NOTE — ED TRIAGE NOTES
Patient arrives by EMS from [facility] with complaints of shortness of breath after possibly eating butter, known butter allergy. EMS called to speak with provider en route for treatment, as patient has significant allergy hx. Atrovent, dexamethasone 10mg given IM, epi 0.3 given en route. Pt took benadryl PTA to [facility]. Patient arrives on non-rebreather mask.

## 2018-07-15 NOTE — ED NOTES
Bedside and Verbal shift change report given to Yuko Reaves (oncoming nurse) by Zaid Marshall (offgoing nurse). Report included the following information SBAR, ED Summary, MAR, Recent Results and Cardiac Rhythm sinus tach.

## 2018-07-15 NOTE — ED PROVIDER NOTES
HPI Comments: 77 y.o. female with past medical history significant for A-fib, TIA, MI, CAD, GERD, arrhythmia, hypothyroidism, anemia, and asthma who presents via EMS from home with chief complaint of an allergic reaction. Patient states she was out to eat with her daughter - both allergic to butter - she made sure to inform the staff prior to ordering. Patient and her daughter then ate dinner rolls that she states, \"tasted better than usual.\" Patient asked the staff if the rolls contained butter - they said yes. Patient states \"(she) felt like (her) throat was closing up\" with associated wheezing and shortness of breath. Patient called EMS - took 2 Benadryl PTA. Patient denies Hx of COPD and asthma. Patient denies fever, chills, chest pain, and leg swelling. There are no other acute medical concerns at this time. Social hx: Former tobacco smoker (1ppd, quit 6/14/02); Denies EtOH use; Denies illicit drug use  PCP: Chinyere Martinez MD    Note written by Ramona Baird, as dictated by Sofy Huitron. Amanda Hyde MD 7:08 PM    The history is provided by the patient. No  was used. Past Medical History:   Diagnosis Date    Anemia     Arrhythmia     Arthritis     Asthma 6/29/2009    CAUSED BY MOLD    Atrial fibrillation ([address] 6/29/2009    CAD (coronary artery disease) 6/29/2009    MI X2     Chronic pain     RT. ANKLE    GERD (gastroesophageal reflux disease)     Gluten intolerance     Hypothyroidism 6/29/2009    Personal history of MI (myocardial infarction)     Stroke ([address]     TIA (NO RESIDUAL)    Syncope     Thromboembolus ([address] 2004    RT.     Thyroid disease     HYPO    TIA (transient ischemic attack)     2004 & 2006    Unspecified adverse effect of anesthesia     \"IS A LIGHT WEIGHT\"    Unspecified adverse effect of anesthesia     DIFFICULTY AWAKENING    Vitamin B12 deficiency 6/29/2009       Past Surgical History:   Procedure Laterality Date    CARDIAC SURG PROCEDURE UNLIST      ABLATION    HX ADENOIDECTOMY      HX APPENDECTOMY      HX CHOLECYSTECTOMY  6/16/11    HX GI      COLONOSCOPY AND ENDOSCOPY    HX HEART CATHETERIZATION  2010    2 STENTS    HX HYSTERECTOMY      HX LUMBAR LAMINECTOMY  2000    L4-L5    HX ORTHOPAEDIC  1993-6/2012    RT. FOOT SURGERY X 6/calcaneal osteotomy    HX TONSILLECTOMY  1964    STENT INSERTION           Family History:   Problem Relation Age of Onset    Delayed Awakening Mother     Heart Disease Mother     Coronary Artery Disease Mother     Hypertension Mother     Stroke Mother     Delayed Awakening Sister     Hypertension Sister     Lung Disease Father     Cancer Father      colon    Hypertension Father     Hypertension Brother     Breast Cancer Maternal Aunt     Breast Cancer Maternal Aunt     Breast Cancer Cousin     Breast Cancer Maternal Aunt        Social History     Social History    Marital status:      Spouse name: N/A    Number of children: N/A    Years of education: N/A     Occupational History    Not on file. Social History Main Topics    Smoking status: Former Smoker     Packs/day: 1.00     Years: 30.00     Quit date: 6/14/2002    Smokeless tobacco: Never Used    Alcohol use No    Drug use: No    Sexual activity: No     Other Topics Concern    Not on file     Social History Narrative         ALLERGIES: Adhesive tape-silicones; Albuterol; Aspirin; Butter flavor; Demerol [meperidine]; Fentanyl; Gluten; Keflex [cephalexin]; Levaquin [levofloxacin]; Morphine; Nitroglycerin; Pcn [penicillins]; Percocet [oxycodone-acetaminophen]; and Tapazole [methimazole]    Review of Systems   Constitutional: Negative for chills and fever. HENT: Negative for ear pain and sore throat. Eyes: Negative for pain. Respiratory: Positive for shortness of breath and wheezing. Negative for chest tightness. Cardiovascular: Negative for chest pain and leg swelling.    Gastrointestinal: Negative for abdominal pain, nausea and vomiting. Genitourinary: Negative for dysuria and flank pain. Musculoskeletal: Negative for back pain. Skin: Negative for rash. Allergic/Immunologic: Positive for food allergies (butter). Neurological: Negative for headaches. All other systems reviewed and are negative. Vitals:    07/15/18 1830 07/15/18 1845 07/15/18 1852 07/15/18 1915   BP: 160/81 (!) 170/126  135/66   Pulse:   (!) 116 (!) 105   Resp:   17 17   Temp:       SpO2: 95% 91% 98% 93%   Weight:       Height:                Physical Exam   Constitutional: No distress. Appears elderly. HENT:   Head: Normocephalic and atraumatic. Mouth/Throat: Oropharynx is clear and moist.   Eyes: Conjunctivae are normal. Pupils are equal, round, and reactive to light. No scleral icterus. Neck: Neck supple. No tracheal deviation present. Cardiovascular: Regular rhythm and intact distal pulses. Tachycardia present. Tachycardic. Pulmonary/Chest: Effort normal. No respiratory distress. She has no wheezes. She has no rales. Abdominal: Soft. She exhibits no distension. There is no tenderness. Genitourinary:   Genitourinary Comments: deferred   Musculoskeletal: She exhibits no edema or deformity. Neurological: She is alert. Skin: Skin is warm and dry. Psychiatric: She has a normal mood and affect. Nursing note and vitals reviewed. Note written by Ramona Decker, as dictated by Ludwin Heller. Omer Lu MD 7:08 PM    MDM  Number of Diagnoses or Management Options  Allergic reaction, initial encounter:   Diagnosis management comments: 76 yo female with anaphylactic reaction from butter. EMS called ahead for permission to give IM epi. - Patient observed for 2 hours with resolution of symptoms and wished to be discharged. Advised her that the normal observation in 4 hours. Pt stated she will return if symptoms come back.   - Rx pepcid  - f//u with PCP  - Given return precautions            ED Course uncomplicated    Procedures    ED EKG interpretation:  Rhythm: sinus tachycardia with frequent PVCs; and irregular. Rate (approx.): 113. Note written by Ramona Anderson, as dictated by Arlet Slade. Valerie Martinez MD 6:44 PM      Arlet Slade.  Valerie Martinez MD

## 2018-07-16 LAB
ATRIAL RATE: 113 BPM
CALCULATED P AXIS, ECG09: 56 DEGREES
CALCULATED R AXIS, ECG10: 5 DEGREES
CALCULATED T AXIS, ECG11: 66 DEGREES
DIAGNOSIS, 93000: NORMAL
P-R INTERVAL, ECG05: 160 MS
Q-T INTERVAL, ECG07: 324 MS
QRS DURATION, ECG06: 74 MS
QTC CALCULATION (BEZET), ECG08: 444 MS
VENTRICULAR RATE, ECG03: 113 BPM

## 2018-07-16 NOTE — DISCHARGE INSTRUCTIONS
Allergic Reaction: Care Instructions  Your Care Instructions    An allergic reaction is an excessive response from your immune system to a medicine, chemical, food, insect bite, or other substance. A reaction can range from mild to life-threatening. Some people have a mild rash, hives, and itching or stomach cramps. In severe reactions, swelling of your tongue and throat can close up your airway so that you cannot breathe. Follow-up care is a key part of your treatment and safety. Be sure to make and go to all appointments, and call your doctor if you are having problems. It's also a good idea to know your test results and keep a list of the medicines you take. How can you care for yourself at home? · If you know what caused your allergic reaction, be sure to avoid it. Your allergy may become more severe each time you have a reaction. · Take an over-the-counter antihistamine, such as cetirizine (Zyrtec) or loratadine (Claritin), to treat mild symptoms. Read and follow directions on the label. Some antihistamines can make you feel sleepy. Do not give antihistamines to a child unless you have checked with your doctor first. Mild symptoms include sneezing or an itchy or runny nose; an itchy mouth; a few hives or mild itching; and mild nausea or stomach discomfort. · Do not scratch hives or a rash. Put a cold, moist towel on them or take cool baths to relieve itching. Put ice packs on hives, swelling, or insect stings for 10 to 15 minutes at a time. Put a thin cloth between the ice pack and your skin. Do not take hot baths or showers. They will make the itching worse. · Your doctor may prescribe a shot of epinephrine to carry with you in case you have a severe reaction. Learn how to give yourself the shot and keep it with you at all times. Make sure it is not . · Go to the emergency room every time you have a severe reaction, even if you have used your shot of epinephrine and are feeling better. Symptoms can come back after a shot. · Wear medical alert jewelry that lists your allergies. You can buy this at most drugstores. · If your child has a severe allergy, make sure that his or her teachers, babysitters, coaches, and other caregivers know about the allergy. They should have an epinephrine shot, know how and when to give it, and have a plan to take your child to the hospital.  When should you call for help? Give an epinephrine shot if:    · You think you are having a severe allergic reaction.     · You have symptoms in more than one body area, such as mild nausea and an itchy mouth.    After giving an epinephrine shot call 911, even if you feel better.   Call 911 if:    · You have symptoms of a severe allergic reaction. These may include:  ¨ Sudden raised, red areas (hives) all over your body. ¨ Swelling of the throat, mouth, lips, or tongue. ¨ Trouble breathing. ¨ Passing out (losing consciousness). Or you may feel very lightheaded or suddenly feel weak, confused, or restless.     · You have been given an epinephrine shot, even if you feel better.    Call your doctor now or seek immediate medical care if:    · You have symptoms of an allergic reaction, such as:  ¨ A rash or hives (raised, red areas on the skin). ¨ Itching. ¨ Swelling. ¨ Belly pain, nausea, or vomiting.    Watch closely for changes in your health, and be sure to contact your doctor if:    · You do not get better as expected. Where can you learn more? Go to http://wayne-ricky.info/. Enter H125 in the search box to learn more about \"Allergic Reaction: Care Instructions. \"  Current as of: October 6, 2017  Content Version: 11.7  © 8658-2516 [employer]. Care instructions adapted under license by [employer] (which disclaims liability or warranty for this information).  If you have questions about a medical condition or this instruction, always ask your healthcare professional. [employer] disclaims any warranty or liability for your use of this information.

## 2018-07-20 ENCOUNTER — APPOINTMENT (OUTPATIENT)
Dept: CT IMAGING | Age: 68
End: 2018-07-20
Attending: EMERGENCY MEDICINE
Payer: MEDICARE

## 2018-07-20 ENCOUNTER — HOSPITAL ENCOUNTER (EMERGENCY)
Age: 68
Discharge: HOME | End: 2018-07-20
Attending: EMERGENCY MEDICINE
Payer: MEDICARE

## 2018-07-20 VITALS
HEIGHT: 63 IN | HEART RATE: 77 BPM | OXYGEN SATURATION: 96 % | DIASTOLIC BLOOD PRESSURE: 92 MMHG | BODY MASS INDEX: 30.12 KG/M2 | WEIGHT: 170 LBS | RESPIRATION RATE: 16 BRPM | SYSTOLIC BLOOD PRESSURE: 109 MMHG | TEMPERATURE: 99.2 F

## 2018-07-20 DIAGNOSIS — N39.0 URINARY TRACT INFECTION WITHOUT HEMATURIA, SITE UNSPECIFIED: Primary | ICD-10-CM

## 2018-07-20 DIAGNOSIS — N20.0 KIDNEY STONE: ICD-10-CM

## 2018-07-20 LAB
ANION GAP SERPL CALC-SCNC: 10 MMOL/L (ref 5–15)
APPEARANCE UR: ABNORMAL
BACTERIA URNS QL MICRO: ABNORMAL /HPF
BASOPHILS # BLD: 0 K/UL (ref 0–0.1)
BASOPHILS NFR BLD: 0 % (ref 0–1)
BILIRUB UR QL: NEGATIVE
BUN SERPL-MCNC: 18 MG/DL (ref 6–20)
BUN/CREAT SERPL: 24 (ref 12–20)
CALCIUM SERPL-MCNC: 9 MG/DL (ref 8.5–10.1)
CHLORIDE SERPL-SCNC: 104 MMOL/L (ref 97–108)
CO2 SERPL-SCNC: 29 MMOL/L (ref 21–32)
COLOR UR: ABNORMAL
CREAT SERPL-MCNC: 0.75 MG/DL (ref 0.55–1.02)
DIFFERENTIAL METHOD BLD: ABNORMAL
EOSINOPHIL # BLD: 0 K/UL (ref 0–0.4)
EOSINOPHIL NFR BLD: 0 % (ref 0–7)
EPITH CASTS URNS QL MICRO: ABNORMAL /LPF
ERYTHROCYTE [DISTWIDTH] IN BLOOD BY AUTOMATED COUNT: 13.2 % (ref 11.5–14.5)
GLUCOSE SERPL-MCNC: 175 MG/DL (ref 65–100)
GLUCOSE UR STRIP.AUTO-MCNC: NEGATIVE MG/DL
HCT VFR BLD AUTO: 44.4 % (ref 35–47)
HGB BLD-MCNC: 14.8 G/DL (ref 11.5–16)
HGB UR QL STRIP: ABNORMAL
KETONES UR QL STRIP.AUTO: NEGATIVE MG/DL
LEUKOCYTE ESTERASE UR QL STRIP.AUTO: ABNORMAL
LYMPHOCYTES # BLD: 1.9 K/UL (ref 0.8–3.5)
LYMPHOCYTES NFR BLD: 7 % (ref 12–49)
MCH RBC QN AUTO: 31.8 PG (ref 26–34)
MCHC RBC AUTO-ENTMCNC: 33.3 G/DL (ref 30–36.5)
MCV RBC AUTO: 95.3 FL (ref 80–99)
MONOCYTES # BLD: 2.4 K/UL (ref 0–1)
MONOCYTES NFR BLD: 9 % (ref 5–13)
NEUTS SEG # BLD: 22.8 K/UL (ref 1.8–8)
NEUTS SEG NFR BLD: 84 % (ref 32–75)
NITRITE UR QL STRIP.AUTO: POSITIVE
PH UR STRIP: 6.5 [PH] (ref 5–8)
PLATELET # BLD AUTO: 257 K/UL (ref 150–400)
PMV BLD AUTO: 10.4 FL (ref 8.9–12.9)
POTASSIUM SERPL-SCNC: 4.2 MMOL/L (ref 3.5–5.1)
PROT UR STRIP-MCNC: ABNORMAL MG/DL
RBC # BLD AUTO: 4.66 M/UL (ref 3.8–5.2)
RBC #/AREA URNS HPF: ABNORMAL /HPF (ref 0–5)
RBC MORPH BLD: ABNORMAL
SODIUM SERPL-SCNC: 143 MMOL/L (ref 136–145)
SP GR UR REFRACTOMETRY: 1 (ref 1–1.03)
UR CULT HOLD, URHOLD: NORMAL
UROBILINOGEN UR QL STRIP.AUTO: 0.2 EU/DL (ref 0.2–1)
WBC # BLD AUTO: 27.1 K/UL (ref 3.6–11)
WBC URNS QL MICRO: ABNORMAL /HPF (ref 0–4)
XXWBCSUS: 1

## 2018-07-20 PROCEDURE — 96361 HYDRATE IV INFUSION ADD-ON: CPT

## 2018-07-20 PROCEDURE — 87077 CULTURE AEROBIC IDENTIFY: CPT | Performed by: EMERGENCY MEDICINE

## 2018-07-20 PROCEDURE — 81001 URINALYSIS AUTO W/SCOPE: CPT | Performed by: EMERGENCY MEDICINE

## 2018-07-20 PROCEDURE — 74176 CT ABD & PELVIS W/O CONTRAST: CPT

## 2018-07-20 PROCEDURE — 96374 THER/PROPH/DIAG INJ IV PUSH: CPT

## 2018-07-20 PROCEDURE — 74011250636 HC RX REV CODE- 250/636: Performed by: EMERGENCY MEDICINE

## 2018-07-20 PROCEDURE — 85025 COMPLETE CBC W/AUTO DIFF WBC: CPT | Performed by: EMERGENCY MEDICINE

## 2018-07-20 PROCEDURE — 87086 URINE CULTURE/COLONY COUNT: CPT | Performed by: EMERGENCY MEDICINE

## 2018-07-20 PROCEDURE — 74011250637 HC RX REV CODE- 250/637: Performed by: EMERGENCY MEDICINE

## 2018-07-20 PROCEDURE — 80048 BASIC METABOLIC PNL TOTAL CA: CPT | Performed by: EMERGENCY MEDICINE

## 2018-07-20 PROCEDURE — 99283 EMERGENCY DEPT VISIT LOW MDM: CPT

## 2018-07-20 PROCEDURE — 87186 SC STD MICRODIL/AGAR DIL: CPT | Performed by: EMERGENCY MEDICINE

## 2018-07-20 PROCEDURE — 36415 COLL VENOUS BLD VENIPUNCTURE: CPT | Performed by: EMERGENCY MEDICINE

## 2018-07-20 RX ORDER — LEVOFLOXACIN 750 MG/1
750 TABLET ORAL
Status: COMPLETED | OUTPATIENT
Start: 2018-07-20 | End: 2018-07-20

## 2018-07-20 RX ORDER — KETOROLAC TROMETHAMINE 30 MG/ML
15 INJECTION, SOLUTION INTRAMUSCULAR; INTRAVENOUS
Status: COMPLETED | OUTPATIENT
Start: 2018-07-20 | End: 2018-07-20

## 2018-07-20 RX ORDER — LEVOFLOXACIN 750 MG/1
750 TABLET ORAL DAILY
Qty: 5 TAB | Refills: 0 | Status: SHIPPED | OUTPATIENT
Start: 2018-07-20 | End: 2018-07-25

## 2018-07-20 RX ADMIN — SODIUM CHLORIDE 1000 ML: 900 INJECTION, SOLUTION INTRAVENOUS at 16:18

## 2018-07-20 RX ADMIN — KETOROLAC TROMETHAMINE 15 MG: 30 INJECTION INTRAMUSCULAR; INTRAVENOUS at 16:18

## 2018-07-20 RX ADMIN — LEVOFLOXACIN 750 MG: 750 TABLET, FILM COATED ORAL at 17:14

## 2018-07-20 NOTE — DISCHARGE INSTRUCTIONS
Kidney Stone: Care Instructions  Your Care Instructions    Kidney stones are formed when salts, minerals, and other substances normally found in the urine clump together. They can be as small as grains of sand or, rarely, as large as golf balls. While the stone is traveling through the ureter, which is the tube that carries urine from the kidney to the bladder, you will probably feel pain. The pain may be mild or very severe. You may also have some blood in your urine. As soon as the stone reaches the bladder, any intense pain should go away. If a stone is too large to pass on its own, you may need a medical procedure to help you pass the stone. The doctor has checked you carefully, but problems can develop later. If you notice any problems or new symptoms, get medical treatment right away. Follow-up care is a key part of your treatment and safety. Be sure to make and go to all appointments, and call your doctor if you are having problems. It's also a good idea to know your test results and keep a list of the medicines you take. How can you care for yourself at home? · Drink plenty of fluids, enough so that your urine is light yellow or clear like water. If you have kidney, heart, or liver disease and have to limit fluids, talk with your doctor before you increase the amount of fluids you drink. · Take pain medicines exactly as directed. Call your doctor if you think you are having a problem with your medicine. ¨ If the doctor gave you a prescription medicine for pain, take it as prescribed. ¨ If you are not taking a prescription pain medicine, ask your doctor if you can take an over-the-counter medicine. Read and follow all instructions on the label. · Your doctor may ask you to strain your urine so that you can collect your kidney stone when it passes. You can use a kitchen strainer or a tea strainer to catch the stone. Store it in a plastic bag until you see your doctor again.   Preventing future kidney stones  Some changes in your diet may help prevent kidney stones. Depending on the cause of your stones, your doctor may recommend that you:  · Drink plenty of fluids, enough so that your urine is light yellow or clear like water. If you have kidney, heart, or liver disease and have to limit fluids, talk with your doctor before you increase the amount of fluids you drink. · Limit coffee, tea, and alcohol. Also avoid grapefruit juice. · Do not take more than the recommended daily dose of vitamins C and D.  · Avoid antacids such as Gaviscon, Maalox, Mylanta, or Tums. · Limit the amount of salt (sodium) in your diet. · Eat a balanced diet that is not too high in protein. · Limit foods that are high in a substance called oxalate, which can cause kidney stones. These foods include dark green vegetables, rhubarb, chocolate, wheat bran, nuts, cranberries, and beans. When should you call for help? Call your doctor now or seek immediate medical care if:    · You cannot keep down fluids.     · Your pain gets worse.     · You have a fever or chills.     · You have new or worse pain in your back just below your rib cage (the flank area).     · You have new or more blood in your urine.    Watch closely for changes in your health, and be sure to contact your doctor if:    · You do not get better as expected. Where can you learn more? Go to http://wayne-ricky.info/. Enter C699 in the search box to learn more about \"Kidney Stone: Care Instructions. \"  Current as of: May 12, 2017  Content Version: 11.7  © 7159-4629 [employer]. Care instructions adapted under license by [employer] (which disclaims liability or warranty for this information). If you have questions about a medical condition or this instruction, always ask your healthcare professional. [address] any warranty or liability for your use of this information.          Urinary Tract Infection in Women: Care Instructions  Your Care Instructions    A urinary tract infection, or UTI, is a general term for an infection anywhere between the kidneys and the urethra (where urine comes out). Most UTIs are bladder infections. They often cause pain or burning when you urinate. UTIs are caused by bacteria and can be cured with antibiotics. Be sure to complete your treatment so that the infection goes away. Follow-up care is a key part of your treatment and safety. Be sure to make and go to all appointments, and call your doctor if you are having problems. It's also a good idea to know your test results and keep a list of the medicines you take. How can you care for yourself at home? · Take your antibiotics as directed. Do not stop taking them just because you feel better. You need to take the full course of antibiotics. · Drink extra water and other fluids for the next day or two. This may help wash out the bacteria that are causing the infection. (If you have kidney, heart, or liver disease and have to limit fluids, talk with your doctor before you increase your fluid intake.)  · Avoid drinks that are carbonated or have caffeine. They can irritate the bladder. · Urinate often. Try to empty your bladder each time. · To relieve pain, take a hot bath or lay a heating pad set on low over your lower belly or genital area. Never go to sleep with a heating pad in place. To prevent UTIs  · Drink plenty of water each day. This helps you urinate often, which clears bacteria from your system. (If you have kidney, heart, or liver disease and have to limit fluids, talk with your doctor before you increase your fluid intake.)  · Urinate when you need to. · Urinate right after you have sex. · Change sanitary pads often. · Avoid douches, bubble baths, feminine hygiene sprays, and other feminine hygiene products that have deodorants. · After going to the bathroom, wipe from front to back.   When should you call for help? Call your doctor now or seek immediate medical care if:    · Symptoms such as fever, chills, nausea, or vomiting get worse or appear for the first time.     · You have new pain in your back just below your rib cage. This is called flank pain.     · There is new blood or pus in your urine.     · You have any problems with your antibiotic medicine.    Watch closely for changes in your health, and be sure to contact your doctor if:    · You are not getting better after taking an antibiotic for 2 days.     · Your symptoms go away but then come back. Where can you learn more? Go to http://wayne-ricky.info/. Enter R451 in the search box to learn more about \"Urinary Tract Infection in Women: Care Instructions. \"  Current as of: May 12, 2017  Content Version: 11.7  © 5778-8867 [employer]. Care instructions adapted under license by [employer] (which disclaims liability or warranty for this information). If you have questions about a medical condition or this instruction, always ask your healthcare professional. [place] [address] any warranty or liability for your use of this information.

## 2018-07-20 NOTE — ED NOTES
Patient discharged by Provider. IV removed without difficulty. Pt did no finish IV fluids. Dr. Liu Pages okay with discharging patient without completion of IV fluids because she is able to drink and is not nauseous. No questions regarding discharge.

## 2018-07-21 NOTE — ED PROVIDER NOTES
Patient is a 77 y.o. female presenting with abdominal pain and hematuria. The history is provided by the patient. Abdominal Pain    This is a new problem. The current episode started 6 to 12 hours ago. The problem occurs constantly. The problem has not changed since onset. Associated with: urinary frequency, hematuria. The pain is located in the RLQ. The pain is at a severity of 7/10. The pain is moderate. Associated symptoms include nausea, frequency and hematuria. Pertinent negatives include no fever, no diarrhea, no vomiting, no constipation, no dysuria, no headaches, no chest pain and no back pain. Nothing worsens the pain. The pain is relieved by nothing. Her past medical history is significant for kidney stones. The patient's surgical history includes appendectomy. Blood in Urine    Associated symptoms include nausea, frequency, hematuria and abdominal pain. Pertinent negatives include no chills, no vomiting, no flank pain and no back pain. Her past medical history is significant for kidney stones. Past Medical History:   Diagnosis Date    Anemia     Arrhythmia     Arthritis     Asthma 6/29/2009    CAUSED BY MOLD    Atrial fibrillation ([address] 6/29/2009    CAD (coronary artery disease) 6/29/2009    MI X2     Chronic pain     RT. ANKLE    GERD (gastroesophageal reflux disease)     Gluten intolerance     Hypothyroidism 6/29/2009    Personal history of MI (myocardial infarction)     Stroke ([address]     TIA (NO RESIDUAL)    Syncope     Thromboembolus ([address] 2004    RT.     Thyroid disease     HYPO    TIA (transient ischemic attack)     2004 & 2006    Unspecified adverse effect of anesthesia     \"IS A LIGHT WEIGHT\"    Unspecified adverse effect of anesthesia     DIFFICULTY AWAKENING    Vitamin B12 deficiency 6/29/2009       Past Surgical History:   Procedure Laterality Date    CARDIAC SURG PROCEDURE UNLIST      ABLATION    HX ADENOIDECTOMY      HX APPENDECTOMY      HX CHOLECYSTECTOMY 6/16/11    HX GI      COLONOSCOPY AND ENDOSCOPY    HX HEART CATHETERIZATION  2010    2 STENTS    HX HYSTERECTOMY      HX LUMBAR LAMINECTOMY  2000    L4-L5    HX ORTHOPAEDIC  1993-6/2012    RT. FOOT SURGERY X 6/calcaneal osteotomy    HX TONSILLECTOMY  1964    STENT INSERTION           Family History:   Problem Relation Age of Onset    Delayed Awakening Mother     Heart Disease Mother     Coronary Artery Disease Mother     Hypertension Mother     Stroke Mother     Delayed Awakening Sister     Hypertension Sister     Lung Disease Father     Cancer Father      colon    Hypertension Father     Hypertension Brother     Breast Cancer Maternal Aunt     Breast Cancer Maternal Aunt     Breast Cancer Cousin     Breast Cancer Maternal Aunt        Social History     Social History    Marital status:      Spouse name: N/A    Number of children: N/A    Years of education: N/A     Occupational History    Not on file. Social History Main Topics    Smoking status: Former Smoker     Packs/day: 1.00     Years: 30.00     Quit date: 6/14/2002    Smokeless tobacco: Never Used    Alcohol use No    Drug use: No    Sexual activity: No     Other Topics Concern    Not on file     Social History Narrative         ALLERGIES: Adhesive tape-silicones; Albuterol; Aspirin; Butter flavor; Demerol [meperidine]; Fentanyl; Gluten; Keflex [cephalexin]; Levaquin [levofloxacin]; Morphine; Nitroglycerin; Pcn [penicillins]; Percocet [oxycodone-acetaminophen]; and Tapazole [methimazole]    Review of Systems   Constitutional: Negative for chills and fever. HENT: Negative for ear pain and sore throat. Eyes: Negative for pain. Respiratory: Negative for chest tightness and shortness of breath. Cardiovascular: Negative for chest pain and leg swelling. Gastrointestinal: Positive for abdominal pain and nausea. Negative for constipation, diarrhea and vomiting.    Genitourinary: Positive for frequency and hematuria. Negative for dysuria and flank pain. Musculoskeletal: Negative for back pain. Skin: Negative for rash. Neurological: Negative for headaches. All other systems reviewed and are negative. Vitals:    07/20/18 1528 07/20/18 1718 07/20/18 1730   BP: 175/80 119/87 (!) 109/92   Pulse: (!) 106 78 77   Resp: 16 16    Temp: 99.2 °F (37.3 °C)     SpO2: 97% 98% 96%   Weight: 77.1 kg (170 lb)     Height: 5' 3\" (1.6 m)              Physical Exam   Constitutional: No distress. Elderly female   HENT:   Head: Normocephalic and atraumatic. Mouth/Throat: Oropharynx is clear and moist.   Eyes: Conjunctivae are normal. Pupils are equal, round, and reactive to light. No scleral icterus. Neck: Neck supple. No tracheal deviation present. Cardiovascular: Normal rate, regular rhythm and intact distal pulses. Pulmonary/Chest: Effort normal. No respiratory distress. She has no wheezes. She has no rales. Abdominal: Soft. She exhibits no distension. There is tenderness (mild RLQ). Genitourinary:   Genitourinary Comments: deferred   Musculoskeletal: She exhibits no edema or deformity. Neurological: She is alert. Skin: Skin is warm and dry. Psychiatric: She has a normal mood and affect. Nursing note and vitals reviewed. MDM  Number of Diagnoses or Management Options  Kidney stone:   Urinary tract infection without hematuria, site unspecified:   Diagnosis management comments: Plan: levaquin (tolerated dose in ED). F/u with PCP. Likely passed kidney stone. Return to the emergency department for new/ worsening symptoms or other concerns     Sheila Arzate.  Jodene Siemens, MD        ED Course       Procedures

## 2018-07-22 LAB
BACTERIA SPEC CULT: ABNORMAL
CC UR VC: ABNORMAL
SERVICE CMNT-IMP: ABNORMAL

## 2018-10-29 ENCOUNTER — HOSPITAL ENCOUNTER (OUTPATIENT)
Dept: MAMMOGRAPHY | Age: 68
Discharge: HOME | End: 2018-10-29
Attending: OBSTETRICS & GYNECOLOGY

## 2018-10-29 DIAGNOSIS — Z12.39 SCREENING BREAST EXAMINATION: ICD-10-CM

## 2018-10-30 ENCOUNTER — TELEPHONE (OUTPATIENT)
Dept: OBGYN CLINIC | Age: 68
End: 2018-10-30

## 2018-10-30 DIAGNOSIS — N63.10 LUMP OF RIGHT BREAST: Primary | ICD-10-CM

## 2018-10-30 NOTE — TELEPHONE ENCOUNTER
Patient is needs to have a diagnostic mammogram RIGHT breast and RIGHT breast ultrasound order placed for the [facility]. Please fax order to [phone]. She went in recently for her screening mammogram and a right breast lump was founded that had been present x 1 week.

## 2018-11-05 ENCOUNTER — HOSPITAL ENCOUNTER (OUTPATIENT)
Dept: MAMMOGRAPHY | Age: 68
Discharge: HOME | End: 2018-11-05
Attending: OBSTETRICS & GYNECOLOGY
Payer: MEDICARE

## 2018-11-05 DIAGNOSIS — N63.10 LUMP OF BREAST, RIGHT: ICD-10-CM

## 2018-11-05 DIAGNOSIS — N63.10 LUMP OF RIGHT BREAST: ICD-10-CM

## 2018-11-05 PROCEDURE — 77066 DX MAMMO INCL CAD BI: CPT

## 2018-11-05 PROCEDURE — 76642 ULTRASOUND BREAST LIMITED: CPT

## 2019-02-22 ENCOUNTER — HOSPITAL ENCOUNTER (EMERGENCY)
Age: 69
Discharge: HOME | End: 2019-02-22
Attending: EMERGENCY MEDICINE
Payer: MEDICARE

## 2019-02-22 ENCOUNTER — APPOINTMENT (OUTPATIENT)
Dept: GENERAL RADIOLOGY | Age: 69
End: 2019-02-22
Attending: EMERGENCY MEDICINE
Payer: MEDICARE

## 2019-02-22 VITALS
DIASTOLIC BLOOD PRESSURE: 74 MMHG | TEMPERATURE: 97.6 F | SYSTOLIC BLOOD PRESSURE: 129 MMHG | OXYGEN SATURATION: 93 % | RESPIRATION RATE: 17 BRPM | HEART RATE: 76 BPM

## 2019-02-22 DIAGNOSIS — R06.09 DOE (DYSPNEA ON EXERTION): ICD-10-CM

## 2019-02-22 DIAGNOSIS — R07.89 ATYPICAL CHEST PAIN: Primary | ICD-10-CM

## 2019-02-22 LAB
ALBUMIN SERPL-MCNC: 3.6 G/DL (ref 3.5–5)
ALBUMIN/GLOB SERPL: 0.9 {RATIO} (ref 1.1–2.2)
ALP SERPL-CCNC: 135 U/L (ref 45–117)
ALT SERPL-CCNC: 27 U/L (ref 12–78)
ANION GAP SERPL CALC-SCNC: 10 MMOL/L (ref 5–15)
APPEARANCE UR: CLEAR
AST SERPL-CCNC: 19 U/L (ref 15–37)
ATRIAL RATE: 93 BPM
BACTERIA URNS QL MICRO: NEGATIVE /HPF
BASOPHILS # BLD: 0 K/UL (ref 0–0.1)
BASOPHILS NFR BLD: 0 % (ref 0–1)
BILIRUB SERPL-MCNC: 0.4 MG/DL (ref 0.2–1)
BILIRUB UR QL: NEGATIVE
BNP SERPL-MCNC: 45 PG/ML (ref 0–125)
BUN SERPL-MCNC: 20 MG/DL (ref 6–20)
BUN/CREAT SERPL: 22 (ref 12–20)
CALCIUM SERPL-MCNC: 9.3 MG/DL (ref 8.5–10.1)
CALCULATED P AXIS, ECG09: 67 DEGREES
CALCULATED R AXIS, ECG10: 4 DEGREES
CALCULATED T AXIS, ECG11: 69 DEGREES
CHLORIDE SERPL-SCNC: 104 MMOL/L (ref 97–108)
CO2 SERPL-SCNC: 28 MMOL/L (ref 21–32)
COLOR UR: ABNORMAL
COMMENT, HOLDF: NORMAL
CREAT SERPL-MCNC: 0.89 MG/DL (ref 0.55–1.02)
D DIMER PPP FEU-MCNC: 0.33 MG/L FEU (ref 0–0.65)
DIAGNOSIS, 93000: NORMAL
DIFFERENTIAL METHOD BLD: ABNORMAL
EOSINOPHIL # BLD: 0 K/UL (ref 0–0.4)
EOSINOPHIL NFR BLD: 0 % (ref 0–7)
EPITH CASTS URNS QL MICRO: ABNORMAL /LPF
ERYTHROCYTE [DISTWIDTH] IN BLOOD BY AUTOMATED COUNT: 13.3 % (ref 11.5–14.5)
GLOBULIN SER CALC-MCNC: 3.8 G/DL (ref 2–4)
GLUCOSE SERPL-MCNC: 160 MG/DL (ref 65–100)
GLUCOSE UR STRIP.AUTO-MCNC: NEGATIVE MG/DL
HCT VFR BLD AUTO: 44.3 % (ref 35–47)
HGB BLD-MCNC: 14 G/DL (ref 11.5–16)
HGB UR QL STRIP: NEGATIVE
IMM GRANULOCYTES # BLD AUTO: 0 K/UL (ref 0–0.04)
IMM GRANULOCYTES NFR BLD AUTO: 0 % (ref 0–0.5)
KETONES UR QL STRIP.AUTO: NEGATIVE MG/DL
LEUKOCYTE ESTERASE UR QL STRIP.AUTO: ABNORMAL
LYMPHOCYTES # BLD: 1.5 K/UL (ref 0.8–3.5)
LYMPHOCYTES NFR BLD: 8 % (ref 12–49)
MCH RBC QN AUTO: 30.5 PG (ref 26–34)
MCHC RBC AUTO-ENTMCNC: 31.6 G/DL (ref 30–36.5)
MCV RBC AUTO: 96.5 FL (ref 80–99)
MONOCYTES # BLD: 0.9 K/UL (ref 0–1)
MONOCYTES NFR BLD: 5 % (ref 5–13)
NEUTS BAND NFR BLD MANUAL: 2 %
NEUTS SEG # BLD: 15.9 K/UL (ref 1.8–8)
NEUTS SEG NFR BLD: 85 % (ref 32–75)
NITRITE UR QL STRIP.AUTO: NEGATIVE
P-R INTERVAL, ECG05: 134 MS
PH UR STRIP: 7 [PH] (ref 5–8)
PLATELET # BLD AUTO: 270 K/UL (ref 150–400)
PMV BLD AUTO: 10.1 FL (ref 8.9–12.9)
POTASSIUM SERPL-SCNC: 4.1 MMOL/L (ref 3.5–5.1)
PROT SERPL-MCNC: 7.4 G/DL (ref 6.4–8.2)
PROT UR STRIP-MCNC: NEGATIVE MG/DL
Q-T INTERVAL, ECG07: 356 MS
QRS DURATION, ECG06: 72 MS
QTC CALCULATION (BEZET), ECG08: 442 MS
RBC # BLD AUTO: 4.59 M/UL (ref 3.8–5.2)
RBC #/AREA URNS HPF: ABNORMAL /HPF (ref 0–5)
RBC MORPH BLD: ABNORMAL
SAMPLES BEING HELD,HOLD: NORMAL
SODIUM SERPL-SCNC: 142 MMOL/L (ref 136–145)
SP GR UR REFRACTOMETRY: 1 (ref 1–1.03)
T4 FREE SERPL-MCNC: 0.9 NG/DL (ref 0.8–1.5)
TROPONIN I BLD-MCNC: <0.04 NG/ML (ref 0–0.08)
TROPONIN I BLD-MCNC: <0.04 NG/ML (ref 0–0.08)
TSH SERPL DL<=0.05 MIU/L-ACNC: 4.33 UIU/ML (ref 0.36–3.74)
UR CULT HOLD, URHOLD: NORMAL
UROBILINOGEN UR QL STRIP.AUTO: 0.2 EU/DL (ref 0.2–1)
VENTRICULAR RATE, ECG03: 93 BPM
WBC # BLD AUTO: 18.3 K/UL (ref 3.6–11)
WBC URNS QL MICRO: ABNORMAL /HPF (ref 0–4)

## 2019-02-22 PROCEDURE — 93005 ELECTROCARDIOGRAM TRACING: CPT

## 2019-02-22 PROCEDURE — 84439 ASSAY OF FREE THYROXINE: CPT

## 2019-02-22 PROCEDURE — 36415 COLL VENOUS BLD VENIPUNCTURE: CPT

## 2019-02-22 PROCEDURE — 83880 ASSAY OF NATRIURETIC PEPTIDE: CPT

## 2019-02-22 PROCEDURE — 84484 ASSAY OF TROPONIN QUANT: CPT

## 2019-02-22 PROCEDURE — 71045 X-RAY EXAM CHEST 1 VIEW: CPT

## 2019-02-22 PROCEDURE — 80053 COMPREHEN METABOLIC PANEL: CPT

## 2019-02-22 PROCEDURE — 99285 EMERGENCY DEPT VISIT HI MDM: CPT

## 2019-02-22 PROCEDURE — 84443 ASSAY THYROID STIM HORMONE: CPT

## 2019-02-22 PROCEDURE — 81001 URINALYSIS AUTO W/SCOPE: CPT

## 2019-02-22 PROCEDURE — 85379 FIBRIN DEGRADATION QUANT: CPT

## 2019-02-22 PROCEDURE — 85025 COMPLETE CBC W/AUTO DIFF WBC: CPT

## 2019-02-22 RX ORDER — BUMETANIDE 2 MG/1
2 TABLET ORAL DAILY
COMMUNITY
End: 2019-06-27 | Stop reason: SDUPTHER

## 2019-02-22 RX ORDER — LEVOTHYROXINE AND LIOTHYRONINE 57; 13.5 UG/1; UG/1
90 TABLET ORAL DAILY
COMMUNITY
End: 2020-02-21

## 2019-02-22 NOTE — DISCHARGE INSTRUCTIONS
Patient Education        Chest Pain: Care Instructions  Your Care Instructions    There are many things that can cause chest pain. Some are not serious and will get better on their own in a few days. But some kinds of chest pain need more testing and treatment. Your doctor may have recommended a follow-up visit in the next 8 to 12 hours. If you are not getting better, you may need more tests or treatment. Even though your doctor has released you, you still need to watch for any problems. The doctor carefully checked you, but sometimes problems can develop later. If you have new symptoms or if your symptoms do not get better, get medical care right away. If you have worse or different chest pain or pressure that lasts more than 5 minutes or you passed out (lost consciousness), call 911 or seek other emergency help right away. A medical visit is only one step in your treatment. Even if you feel better, you still need to do what your doctor recommends, such as going to all suggested follow-up appointments and taking medicines exactly as directed. This will help you recover and help prevent future problems. How can you care for yourself at home? · Rest until you feel better. · Take your medicine exactly as prescribed. Call your doctor if you think you are having a problem with your medicine. · Do not drive after taking a prescription pain medicine. When should you call for help? Call 911 if:    · You passed out (lost consciousness).     · You have severe difficulty breathing.     · You have symptoms of a heart attack. These may include:  ? Chest pain or pressure, or a strange feeling in your chest.  ? Sweating. ? Shortness of breath. ? Nausea or vomiting. ? Pain, pressure, or a strange feeling in your back, neck, jaw, or upper belly or in one or both shoulders or arms. ? Lightheadedness or sudden weakness. ? A fast or irregular heartbeat.   After you call 911, the  may tell you to chew 1 adult-strength or 2 to 4 low-dose aspirin. Wait for an ambulance. Do not try to drive yourself.    Call your doctor today if:    · You have any trouble breathing.     · Your chest pain gets worse.     · You are dizzy or lightheaded, or you feel like you may faint.     · You are not getting better as expected.     · You are having new or different chest pain. Where can you learn more? Go to http://wayne-ricky.info/. Enter A120 in the search box to learn more about \"Chest Pain: Care Instructions. \"  Current as of: September 23, 2018  Content Version: 11.9  © 5240-2878 [employer]. Care instructions adapted under license by [employer] (which disclaims liability or warranty for this information). If you have questions about a medical condition or this instruction, always ask your healthcare professional. [place] [address] any warranty or liability for your use of this information. Patient Education        Shortness of Breath: Care Instructions  Your Care Instructions  Shortness of breath has many causes. Sometimes conditions such as anxiety can lead to shortness of breath. Some people get mild shortness of breath when they exercise. Trouble breathing also can be a symptom of a serious problem, such as asthma, lung disease, emphysema, heart problems, and pneumonia. If your shortness of breath continues, you may need tests and treatment. Watch for any changes in your breathing and other symptoms. Follow-up care is a key part of your treatment and safety. Be sure to make and go to all appointments, and call your doctor if you are having problems. It's also a good idea to know your test results and keep a list of the medicines you take. How can you care for yourself at home? · Do not smoke or allow others to smoke around you. If you need help quitting, talk to your doctor about stop-smoking programs and medicines.  These can increase your chances of quitting for good. · Get plenty of rest and sleep. · Take your medicines exactly as prescribed. Call your doctor if you think you are having a problem with your medicine. · Find healthy ways to deal with stress. ? Exercise daily. ? Get plenty of sleep. ? Eat regularly and well. When should you call for help? Call 911 anytime you think you may need emergency care. For example, call if:    · You have severe shortness of breath.     · You have symptoms of a heart attack. These may include:  ? Chest pain or pressure, or a strange feeling in the chest.  ? Sweating. ? Shortness of breath. ? Nausea or vomiting. ? Pain, pressure, or a strange feeling in the back, neck, jaw, or upper belly or in one or both shoulders or arms. ? Lightheadedness or sudden weakness. ? A fast or irregular heartbeat. After you call 911, the  may tell you to chew 1 adult-strength or 2 to 4 low-dose aspirin. Wait for an ambulance. Do not try to drive yourself.    Call your doctor now or seek immediate medical care if:    · Your shortness of breath gets worse or you start to wheeze. Wheezing is a high-pitched sound when you breathe.     · You wake up at night out of breath or have to prop your head up on several pillows to breathe.     · You are short of breath after only light activity or while at rest.    Watch closely for changes in your health, and be sure to contact your doctor if:    · You do not get better over the next 1 to 2 days. Where can you learn more? Go to http://wayne-ricky.info/. Enter S780 in the search box to learn more about \"Shortness of Breath: Care Instructions. \"  Current as of: September 5, 2018  Content Version: 11.9  © 9698-9928 [employer]. Care instructions adapted under license by [employer] (which disclaims liability or warranty for this information).  If you have questions about a medical condition or this instruction, always ask your healthcare professional. [address] any warranty or liability for your use of this information.

## 2019-02-22 NOTE — ED NOTES
Bedside, Verbal and Written shift change report given to Julio Friedman by Max Perera. Report included the following information SBAR, Kardex, ED Summary, [facility] and Recent Results.

## 2019-02-22 NOTE — ED NOTES
6:53 AM  Change of shift. Care of patient taken over from Dr. Roxane Alejo; H&P reviewed, bedside handoff complete. Awaiting repeat trop. 7:46 AM  Second trop is negative. Discussed results and plan with patient. Patient will be discharged home with Cardiology and endocrinology follow up. Patient instructed to return to the emergency room for any worsening symptoms or any other concerns.

## 2019-02-22 NOTE — ED NOTES
Purposeful rounding done. Pt sitting up on stretcher. Offered assist with any needs. Pt states \"pain 0 level  and no needs at this time. \" Call bell in reach will continue to monitor.

## 2019-02-22 NOTE — ED NOTES
Pt advised of a repeat troponin draw at 0730. Pt's lights are dimmed so the she may rest. Will continue to monitor pt. Pt denies any needs at this time.

## 2019-02-22 NOTE — ED NOTES
Pt is resting comfortably on stretcher. Family at bedside. Call bell within reach. Pt is awaiting test results. Pt denies any needs at this time.

## 2019-02-22 NOTE — ED TRIAGE NOTES
Pt presents with rt sided chest pain and shortness of breath. Pt has hx of 2 MIs and 2 stents. Pt had cardiac cath in January with no blockages. Pt is on a diuretic for holding fluid. Family at bedside. Call bell within reach.

## 2019-02-22 NOTE — ED NOTES
Pt was discharged and given instructions by Dr Neil Winkler. Pt verbalized good understanding of all discharge instructions, and F/U care. All questions answered. Pt in stable condition on discharge.

## 2019-02-22 NOTE — ED NOTES
Dr. Jesisca Mosley at bedside to discuss plan of care. Pt denies any chest pain at this time. Pt remains on monitor. Family at bedside. Call bell within reach.

## 2019-02-22 NOTE — ED PROVIDER NOTES
Petr Mendoza is a 67 yo F with history of a-fib, MI, TIA, GERD and hypothyroidism who presents to the ED with chest pain and shortness of breath. She states that she has had shortness of breath and dyspnea with exertion for over a month and she saw her cardiologist, Dr Gala Marroquin who did a cardiac catheterization and stress test and the patient states that the results did not show a problem. She has had increased swelling in her legs and her PCP changed her diuretic from Lasix to Bumex. This morning around 2 am she awoke with chest pain. She wiated and hoped that it would go away but it did not. When she stood up from bed it improved so she decided to wait but when she lied back down it returned so she came in. She is currently pain free. The pain was a pressure located towards the right side of the middle of her chest.  It did not radiate. She is currently on prednisone for management of her RA             Past Medical History:   Diagnosis Date    Anemia     Arrhythmia     Arthritis     Asthma 6/29/2009    CAUSED BY MOLD    Atrial fibrillation ([address] 6/29/2009    CAD (coronary artery disease) 6/29/2009    MI X2     Chronic pain     RT. ANKLE    GERD (gastroesophageal reflux disease)     Gluten intolerance     Hypothyroidism 6/29/2009    Personal history of MI (myocardial infarction)     Stroke ([address]     TIA (NO RESIDUAL)    Syncope     Thromboembolus ([address] 2004    RT.     Thyroid disease     HYPO    TIA (transient ischemic attack)     2004 & 2006    Unspecified adverse effect of anesthesia     \"IS A LIGHT WEIGHT\"    Unspecified adverse effect of anesthesia     DIFFICULTY AWAKENING    Vitamin B12 deficiency 6/29/2009       Past Surgical History:   Procedure Laterality Date    CARDIAC SURG PROCEDURE UNLIST      ABLATION    HX ADENOIDECTOMY      HX APPENDECTOMY      HX CHOLECYSTECTOMY  6/16/11    HX GI      COLONOSCOPY AND ENDOSCOPY    HX HEART CATHETERIZATION  2010    2 STENTS    HX HYSTERECTOMY      HX LUMBAR LAMINECTOMY  2000    L4-L5    HX ORTHOPAEDIC  [id number]    RT. FOOT SURGERY X 6/calcaneal osteotomy    HX TONSILLECTOMY  1964    STENT INSERTION           Family History:   Problem Relation Age of Onset    Delayed Awakening Mother     Heart Disease Mother     Coronary Artery Disease Mother     Hypertension Mother     Stroke Mother     Delayed Awakening Sister     Hypertension Sister     Lung Disease Father     Cancer Father         colon    Hypertension Father     Hypertension Brother     Breast Cancer Maternal Aunt     Breast Cancer Maternal Aunt     Breast Cancer Cousin         maternal 1st cousin    Breast Cancer Maternal Aunt     Breast Cancer Cousin         maternal 1st cousin    Breast Cancer Cousin         maternal 1st cousin       Social History     Socioeconomic History    Marital status:      Spouse name: Not on file    Number of children: Not on file    Years of education: Not on file    Highest education level: Not on file   Social Needs    Financial resource strain: Not on file    Food insecurity - worry: Not on file    Food insecurity - inability: Not on file   [employer] needs - medical: Not on file   [employer] needs - non-medical: Not on file   Occupational History    Not on file   Tobacco Use    Smoking status: Former Smoker     Packs/day: 1.00     Years: 30.00     Pack years: 30.00     Last attempt to quit: 2002     Years since quittin.7    Smokeless tobacco: Never Used   Substance and Sexual Activity    Alcohol use: No    Drug use: No    Sexual activity: No   Other Topics Concern    Not on file   Social History Narrative    Not on file         ALLERGIES: Adhesive tape-silicones; Albuterol; Aspirin; Butter flavor; Demerol [meperidine]; Fentanyl; Gluten; Keflex [cephalexin]; Levaquin [levofloxacin]; Morphine; Nitroglycerin; Pcn [penicillins];  Percocet [oxycodone-acetaminophen]; and Tapazole [methimazole]    Review of Systems   Constitutional: Negative for fever. HENT: Negative for sore throat. Eyes: Negative for visual disturbance. Respiratory: Positive for shortness of breath. Negative for cough. Cardiovascular: Positive for chest pain and leg swelling. Gastrointestinal: Negative for abdominal pain. Genitourinary: Negative for dysuria. Musculoskeletal: Negative for back pain. Skin: Negative for rash. Neurological: Negative for headaches. Vitals:    02/22/19 0330 02/22/19 0345 02/22/19 0347 02/22/19 0400   BP: (!) 168/94 143/81 (!) 163/95 145/71   Pulse:  85 81 87   Resp:  18 15 18   Temp:   97.6 °F (36.4 °C)    SpO2: 98% 96% 98% 95%            Physical Exam   Constitutional: She appears well-developed and well-nourished. No distress. HENT:   Head: Normocephalic and atraumatic. Mouth/Throat: Oropharynx is clear and moist.   Eyes: Conjunctivae and EOM are normal.   Neck: Normal range of motion and phonation normal.   Cardiovascular: Normal rate. No murmur heard. Pulmonary/Chest: Effort normal and breath sounds normal. No respiratory distress. She has no wheezes. She has no rales. Abdominal: She exhibits no distension. Musculoskeletal: Normal range of motion. She exhibits no tenderness. Neurological: She is alert. She is not disoriented. She exhibits normal muscle tone. Skin: Skin is warm and dry. Nursing note and vitals reviewed. ED EKG interpretation:  Rhythm: normal sinus rhythm; and regular . Rate (approx.): 93; Axis: normal; P wave: normal; QRS interval: normal ; ST/T wave: normal; Other findings: normal. This EKG was interpreted by Alfredito Loza MD,ED Provider. [facility]       4:27 AM  Obtained Cardiac Cath report from 1/4/19 at [facility].  All stents widely patent with no new stenosis. 5:48 AM  Patient reassessed and remains chest pain free. Reviewed labs with patient. Her TSH is elevated at 4.33. Added on T4 but is send off.   Patient states that she will follow-up with her endocrinologist with the results to have her medications adjusted. Patient's WBC also elevated at 10.3 but this is consistent with prior values and is likely due to her chronic prednisone use for RA. UA and CXR with no signs of infectious process. Plan for repeat trop at 7:30am.  If normal will discharge home with instructions to follow-up with her cardiologist.       6:51 AM  Change of shift. Care of patient signed over to Dr. Alber Calles. Bedside handoff complete.   Procedures

## 2019-03-12 ENCOUNTER — OFFICE VISIT (OUTPATIENT)
Dept: CARDIOLOGY CLINIC | Age: 69
End: 2019-03-12

## 2019-03-12 VITALS
HEART RATE: 100 BPM | SYSTOLIC BLOOD PRESSURE: 128 MMHG | DIASTOLIC BLOOD PRESSURE: 86 MMHG | WEIGHT: 186 LBS | HEIGHT: 63 IN | BODY MASS INDEX: 32.96 KG/M2 | RESPIRATION RATE: 16 BRPM

## 2019-03-12 DIAGNOSIS — I10 HTN (HYPERTENSION), BENIGN: ICD-10-CM

## 2019-03-12 DIAGNOSIS — R07.9 CHEST PAIN, UNSPECIFIED TYPE: ICD-10-CM

## 2019-03-12 DIAGNOSIS — R06.02 SOB (SHORTNESS OF BREATH): Primary | ICD-10-CM

## 2019-03-12 DIAGNOSIS — I25.10 CORONARY ARTERY DISEASE INVOLVING NATIVE CORONARY ARTERY OF NATIVE HEART WITHOUT ANGINA PECTORIS: ICD-10-CM

## 2019-03-12 RX ORDER — PREDNISONE 2.5 MG/1
2.5 TABLET ORAL EVERY EVENING
Status: ON HOLD | COMMUNITY
Start: 2019-02-21 | End: 2020-01-28 | Stop reason: CLARIF

## 2019-03-12 RX ORDER — IBUPROFEN 200 MG
200 CAPSULE ORAL EVERY EVENING
COMMUNITY
End: 2019-09-04

## 2019-03-12 RX ORDER — METOPROLOL TARTRATE 50 MG/1
0.5 TABLET ORAL 2 TIMES DAILY
COMMUNITY
End: 2019-04-11

## 2019-03-12 RX ORDER — PREDNISONE 10 MG/1
5 TABLET ORAL DAILY
COMMUNITY
Start: 2019-02-21 | End: 2020-01-02 | Stop reason: DRUGHIGH

## 2019-03-12 RX ORDER — POTASSIUM CHLORIDE 750 MG/1
CAPSULE, EXTENDED RELEASE ORAL
COMMUNITY
Start: 2015-06-29 | End: 2019-04-11 | Stop reason: SDUPTHER

## 2019-03-12 NOTE — PROGRESS NOTES
Manuel Mosley MD. [facility]              Patient: Shirlene Lizama  : 1950      Today's Date: 3/14/2019          HISTORY OF PRESENT ILLNESS:     History of Present Illness:  She is here to establish a cardiology relationship for SOB. She has seen Dr. Bailey Schmidt. She saw pulmonary and asked for a further opinion on her heart. She recently had a stress test which she says was Raine Mela. She can;t do much without SOB. Was OK prior to  so there has been a big change recently. She had chest tightness in  and Feb but no she just stops and does not push herself. She has SOB lying down, but goes away when still. She had a MI and cath Juma 3, 2019 -  SOB /CP since then. PAST MEDICAL HISTORY:     Past Medical History:   Diagnosis Date    Anemia     iron defic anemia    Arthritis     Asthma 2009    CAUSED BY MOLD    Atrial fibrillation ([address] 2009    CAD (coronary artery disease) 2009    MI X2  -- prior stenting     Chronic pain     RT. ANKLE    GERD (gastroesophageal reflux disease)     Gluten intolerance     Hypothyroidism 2009    Personal history of MI (myocardial infarction)     Rheumatoid arthritis ([address]     Stroke ([address]     TIA (NO RESIDUAL)    Syncope     Thromboembolus ([address]     RT. -- DVT after surgery     Thyroid disease     HYPO    TIA (transient ischemic attack)      &     Unspecified adverse effect of anesthesia     \"IS A LIGHT WEIGHT\"    Unspecified adverse effect of anesthesia     DIFFICULTY AWAKENING    Vitamin B12 deficiency 2009       Past Surgical History:   Procedure Laterality Date    CARDIAC SURG PROCEDURE UNLIST      ABLATION    HX ADENOIDECTOMY      HX APPENDECTOMY      HX CHOLECYSTECTOMY  11    HX GI      COLONOSCOPY AND ENDOSCOPY    HX HEART CATHETERIZATION  2010    2 STENTS    HX HYSTERECTOMY      HX LUMBAR LAMINECTOMY      L4-L5    HX ORTHOPAEDIC  -2012    RT.  FOOT SURGERY X 6/calcaneal osteotomy    HX TONSILLECTOMY  1964    STENT INSERTION             MEDICATIONS:     Current Outpatient Medications   Medication Sig Dispense Refill    metoprolol tartrate (LOPRESSOR) 50 mg tablet Take 0.5 Tabs by mouth two (2) times a day.  predniSONE (DELTASONE) 10 mg tablet Take 10 mg by mouth daily.  predniSONE (DELTASONE) 2.5 mg tablet Take 2.5 mg by mouth daily.  potassium chloride SA (MICRO-K) 10 mEq capsule potassium chloride ER 10 mEq capsule,extended release      calcium citrate 200 mg (950 mg) tablet Take 2,000 mg by mouth daily.  flaxseed oil (OMEGA 3 PO) Take  by mouth.  vit C/vit E ac/lut/copper/zinc (PRESERVISION LUTEIN PO) Take  by mouth daily.  bumetanide (BUMEX) 2 mg tablet Take 2 mg by mouth daily.  thyroid, Pork, (NP THYROID) 90 mg tablet Take 90 mg by mouth daily.  lidocaine (LIDODERM) 5 % 1 Patch by TransDERmal route every twenty-four (24) hours. Apply patch to the affected area for 12 hours a day and remove for 12 hours a day.  ascorbic acid (VITAMIN C) 500 mg tablet Take  by mouth daily.  ferrous sulfate 325 mg (65 mg iron) tablet Take  by mouth Daily (before breakfast).  cyanocobalamin (VITAMIN B12) 1,000 mcg/mL injection INJECT 1 ML INTO THE MUSCLE EVERY 30 DAYS 10 mL 0    lansoprazole (PREVACID) 30 mg capsule Take 30 mg by mouth Daily (before breakfast).  CALCIUM CARB/VIT D3/MINERALS (CALCIUM CARBONATE-VIT D3-MIN) 1,200 mgcalcium -1,000 unit Chew Take  by mouth.  Syringe with Needle, Disp, 3 mL 25 x 1\" Syrg 1 Units by Does Not Apply route every thirty (30) days. 50 Units 1    Cholecalciferol, Vitamin D3, (VITAMIN D3) 1,000 unit Cap Take 1 Cap by mouth daily.            Allergies   Allergen Reactions    Adhesive Tape-Silicones Other (comments)     BAD BLISTERS AND TENDS TO GET INFECTED    Albuterol Palpitations    Aspirin Hives and Other (comments)     \"it makes my stomach bleed\"      Butter Flavor Anaphylaxis Butter AND CREME    Demerol [Meperidine] Other (comments)     HYPOTENSION    Fentanyl Other (comments)     HYPOTENSION    Gluten Diarrhea     bloating    Keflex [Cephalexin] Anaphylaxis    Levaquin [Levofloxacin] Unproven on Challenge     PT DOESN'T REMEMBER HAVING THAT    Morphine Other (comments)     HYPOTENSION    Nitroglycerin Other (comments)     IN PILL FORM  - HYPOTENSION  CAN TOLERATE PATCH FOR SHORT TIME    Pcn [Penicillins] Anaphylaxis    Percocet [Oxycodone-Acetaminophen] Unknown (comments)     HYPOTENSION AND HALLUCINATIONS. Can take tylenol ok, oxycodone is allergy per patient.  Tapazole [Methimazole] Unknown (comments)             SOCIAL HISTORY:     Social History     Tobacco Use    Smoking status: Former Smoker     Packs/day: 1.00     Years: 30.00     Pack years: 30.00     Last attempt to quit: 2002     Years since quittin.7    Smokeless tobacco: Never Used   Substance Use Topics    Alcohol use: No    Drug use: No         FAMILY HISTORY:     Family History   Problem Relation Age of Onset    Delayed Awakening Mother     Heart Disease Mother     Coronary Artery Disease Mother     Hypertension Mother     Stroke Mother     Delayed Awakening Sister     Hypertension Sister     Lung Disease Father     Cancer Father         colon    Hypertension Father     Hypertension Brother     Breast Cancer Maternal Aunt     Breast Cancer Maternal Aunt     Breast Cancer Cousin         maternal 1st cousin    Breast Cancer Maternal Aunt     Breast Cancer Cousin         maternal 1st cousin    Breast Cancer Cousin         maternal 1st cousin             REVIEW OF SYMPTOMS:     Review of Symptoms:  Constitutional: Negative for fever, chills  HEENT: Negative for nosebleeds, tinnitus  Respiratory: + wheezing  Cardiovascular: Negative for syncope;  + orthopnea   Gastrointestinal: Negative for abdominal pain, diarrhea, melena.    Genitourinary: Negative for dysuria  Musculoskeletal: + joint pain, RA  Skin: Negative for rash  Heme: No problems bleeding. Neurological: Negative for speech change and focal weakness. PHYSICAL EXAM:     Physical Exam:  Visit Vitals  /86 (BP 1 Location: Left arm, BP Patient Position: Sitting)   Pulse 100   Resp 16   Ht 5' 3\" (1.6 m)   Wt 186 lb (84.4 kg)   LMP 09/29/1980 (LMP Unknown)   BMI 32.95 kg/m²     Patient appears generally well, mood and affect are appropriate and pleasant. HEENT:  Hearing intact, non-icteric, normocephalic, atraumatic. Neck Exam: Supple  Lung Exam: Clear to auscultation, even breath sounds. Cardiac Exam: Regular rate and rhythm with no murmur  Abdomen: Soft, non-tender, normal bowel sounds. Extremities: Moves all ext well. Mild bilat lower extremity edema. Vascular: 2+ dorsalis pedis pulses bilaterally. Psych: Appropriate affect  Neuro - Grossly intact        LABS / OTHER STUDIES:     Lab Results   Component Value Date/Time    Sodium 142 02/22/2019 03:48 AM    Potassium 4.1 02/22/2019 03:48 AM    Chloride 104 02/22/2019 03:48 AM    CO2 28 02/22/2019 03:48 AM    Anion gap 10 02/22/2019 03:48 AM    Glucose 160 (H) 02/22/2019 03:48 AM    BUN 20 02/22/2019 03:48 AM    Creatinine 0.89 02/22/2019 03:48 AM    BUN/Creatinine ratio 22 (H) 02/22/2019 03:48 AM    GFR est AA >60 02/22/2019 03:48 AM    GFR est non-AA >60 02/22/2019 03:48 AM    Calcium 9.3 02/22/2019 03:48 AM    Bilirubin, total 0.4 02/22/2019 03:48 AM    AST (SGOT) 19 02/22/2019 03:48 AM    Alk.  phosphatase 135 (H) 02/22/2019 03:48 AM    Protein, total 7.4 02/22/2019 03:48 AM    Albumin 3.6 02/22/2019 03:48 AM    Globulin 3.8 02/22/2019 03:48 AM    A-G Ratio 0.9 (L) 02/22/2019 03:48 AM    ALT (SGPT) 27 02/22/2019 03:48 AM     Lab Results   Component Value Date/Time    WBC 18.3 (H) 02/22/2019 03:48 AM    Hemoglobin (POC) 15.6 06/09/2014 03:00 AM    HGB 14.0 02/22/2019 03:48 AM    Hematocrit (POC) 46 06/09/2014 03:00 AM    HCT 44.3 02/22/2019 03:48 AM    PLATELET 270 02/22/2019 03:48 AM    MCV 96.5 02/22/2019 03:48 AM     Lab Results   Component Value Date/Time    Cholesterol, total 169 01/26/2013 02:15 AM    HDL Cholesterol 38 01/26/2013 02:15 AM    LDL, calculated 79.2 01/26/2013 02:15 AM    VLDL, calculated 51.8 01/26/2013 02:15 AM    Triglyceride 259 (H) 01/26/2013 02:15 AM    CHOL/HDL Ratio 4.4 01/26/2013 02:15 AM     Component      Latest Ref Rng & Units 2/22/2019           3:48 AM   D-dimer      0.00 - 0.65 mg/L FEU 0.33       Component      Latest Ref Rng & Units 2/22/2019           3:48 AM   NT pro-BNP      0 - 125 PG/ML 45         CARDIAC DIAGNOSTICS:     Cardiac Evaluation Includes:    Cardiac Cath 6/09 - severe mLCX disease --> stenting    Cath 8/19/10 - RCA and LCX stents patent; MLI in LAD, LVEF 60%    Event Monitor 3/11 - PVC's  Holter 3/16 - PVC's  Echo 3/17 - LVEF 60%  Lexiscan Cardiolite 3/17 - normal MPI, LVEF 58%  Cardiac Cath 1/4/19 - (Dr. Kirit Quick) - patent vessels   Holter 1/23/19 - NSR, normal study     Stress Echo 2/11/19 - walked 3:21 (2.4 METS), baseline /90, Max /90, normal stress EKG and stress echo     CXR 2/22/19 - normal     EKG 2/22/19 - NSR, normal       ASSESSMENT AND PLAN:     Assessment and Plan:  1) Chest pain and SOB   - She notes symptoms since Jan 2019   - reviewed records from other physicians  - Her recent cardiac cath 1/19 showed patent vessels. - Her stress echo 3/19 was negative for ischemia  - Her symptoms could be due to small vessel disease and/or diastolic dysfunction. Pulmonary workup is underway. Of note, proBNP 2/19 was normal (45). - Will get an echo in our office for baseline diastology and PA pressures (maybe stress test diastology study later). Have requested Cath images from [address] [place] [address] to review. Can consider RHC (with stress) later. - Will try adding Imdur 30 mg for symptoms to start. Consider Renexa or adactone later.    - needs better BP control (was high during stress test)     2) History of Iron Deficiency Anemia   - She takes no blood thinners (not even aspirin) due to anemia in past   - Dr. Deni Peters record 12/16 reviewed - He was seeing her for iron deficiency anemia thought to be from GI Bleed from aspirin and plavix     3) Lipids -   - Does not take a statin due to having muscle aches (from RA at baseline)    4) History of Afib   - ablation in past? - need to get records  - not on any blood thinners due to anemia in past (see above)   - has been in sinus lately     5) HTN  - BP seems high at times  - will address going forward     6) Dr. Kylah Herrmann record reviewed 1/31/19 - plan was for CTA for PE and PFT's    6) See me in 3-4 weeks. Patient expressed understanding of the plan - questions were answered. On a side note, I see her brother. Manuel Mosley MD, [place] [address]. [address], [address] [place] [address]  Ph: [phone]   Ph [phone]      ADDENDUM   3/21/2019  Echo 3/21/19 - LVEF 61%; grade 1 diastology (normal for age), AV sclerosis. RVSP 26 mmHg. Echo with essentially normal findings. Will have nurse call. ADDENDUM   3/25/2019  [facility] records reviewed. Went there for chest pain on 1/3/19. Labs 1/3/19 - Trop < 0.02, BNP 18, Cr 0.61, Hgb 13  Cath 1/4/19 - LAD stent patent, LCX without sig disease, RCA stent patent. Medical management was recommended. ADDENDUM   3/25/2019  Dr. Bailey Schmidt records reviewed. Records updated above and scanned.

## 2019-03-12 NOTE — PROGRESS NOTES
Chief Complaint   Patient presents with    Shortness of Breath    Leg Swelling    New Patient    Other     PT states having a slight heart attack in January 3rd, 2019 - Dr. Zunilda Jonas ([facility])    [facility] Chest Pain (Angina)    Dizziness    Other     PT has hx of 2 stents      Visit Vitals  /86 (BP 1 Location: Left arm, BP Patient Position: Sitting)   Pulse 100   Resp 16   Ht 5' 3\" (1.6 m)   Wt 186 lb (84.4 kg)   LMP 09/29/1980 (LMP Unknown)   BMI 32.95 kg/m²

## 2019-03-18 ENCOUNTER — TELEPHONE (OUTPATIENT)
Dept: CARDIOLOGY CLINIC | Age: 69
End: 2019-03-18

## 2019-03-18 RX ORDER — ISOSORBIDE MONONITRATE 30 MG/1
15 TABLET, EXTENDED RELEASE ORAL
Qty: 45 TAB | Refills: 0 | Status: SHIPPED | OUTPATIENT
Start: 2019-03-18 | End: 2019-03-25 | Stop reason: SINTOL

## 2019-03-18 NOTE — TELEPHONE ENCOUNTER
Pt notified and her echo is set for this Thursday. She is a bit concerned about the isosorbide since it is a long acting NTG. She is \"not allowed to take NTG sl because it drops my blood pressure too quickly\"  It is listed as an allergy.

## 2019-03-18 NOTE — TELEPHONE ENCOUNTER
How about this - try taking half of the 30 mg tablet to start if she wants - up to her - if she does not want to try it - I'll just see her later for other options.    Thanks,   SK

## 2019-03-18 NOTE — TELEPHONE ENCOUNTER
----- Message from Gary Reardon MD sent at 3/14/2019 11:50 PM EDT -----  Regarding: please call patient - start Imdur - get an echo   Hi Cody Underwood - can you please call patient. I reviewed her records. Still waiting to get [facility] cath CD to look at images. Can you let her know that I'd like try Imdur 30 mg daily to try and treat her chest pain -- her large vessels are open on recent cath - maybe she has small vessel disease (which is diagnosis of exclusion mostly). Also - can you please set her up for an echo as well  -- need a good look at PA pressures and diastology on the echo. I see her back in a couple of weeks.      Thanks,  [employer]

## 2019-03-22 ENCOUNTER — TELEPHONE (OUTPATIENT)
Dept: CARDIOLOGY CLINIC | Age: 69
End: 2019-03-22

## 2019-03-22 NOTE — TELEPHONE ENCOUNTER
Patient states that she gets extremely bad headaches when she takes the isosorbide mononitrate.  She would like a call back telling her what she should be taking instead    She can be reached at [phone]

## 2019-03-25 ENCOUNTER — TELEPHONE (OUTPATIENT)
Dept: CARDIOLOGY CLINIC | Age: 69
End: 2019-03-25

## 2019-03-25 RX ORDER — RANOLAZINE 500 MG/1
500 TABLET, EXTENDED RELEASE ORAL 2 TIMES DAILY
Qty: 180 TAB | Refills: 2 | Status: SHIPPED | OUTPATIENT
Start: 2019-03-25 | End: 2019-04-11

## 2019-03-25 NOTE — TELEPHONE ENCOUNTER
----- Message from Kobe Lucero MD sent at 3/21/2019 10:11 PM EDT -----  Regarding: please call pt  Please call pt. Echo 3/21/19 - LVEF 61%; grade 1 diastology (normal for age), AV sclerosis. RVSP 26 mmHg. Echo with essentially normal findings. Will have nurse call.        Thanks,  SK

## 2019-03-25 NOTE — TELEPHONE ENCOUNTER
MD Dov Kang, RN   Caller: Unspecified (3 days ago,  4:49 PM)             Stop Imdur - try renaxa 500 mg BID instead (this does not affect BP and should not cause a HA

## 2019-03-25 NOTE — TELEPHONE ENCOUNTER
Normal side effect. Will check with Dr Ashwin Reyes to see if taking tylenol 20 mins before would help.

## 2019-04-11 ENCOUNTER — OFFICE VISIT (OUTPATIENT)
Dept: CARDIOLOGY CLINIC | Age: 69
End: 2019-04-11

## 2019-04-11 VITALS
WEIGHT: 189 LBS | RESPIRATION RATE: 16 BRPM | OXYGEN SATURATION: 94 % | BODY MASS INDEX: 33.49 KG/M2 | SYSTOLIC BLOOD PRESSURE: 148 MMHG | HEART RATE: 81 BPM | HEIGHT: 63 IN | DIASTOLIC BLOOD PRESSURE: 82 MMHG

## 2019-04-11 DIAGNOSIS — R06.02 SOB (SHORTNESS OF BREATH): Primary | ICD-10-CM

## 2019-04-11 DIAGNOSIS — I25.10 CORONARY ARTERY DISEASE INVOLVING NATIVE CORONARY ARTERY OF NATIVE HEART WITHOUT ANGINA PECTORIS: ICD-10-CM

## 2019-04-11 RX ORDER — POTASSIUM CHLORIDE 750 MG/1
10 CAPSULE, EXTENDED RELEASE ORAL DAILY
Qty: 30 CAP | Refills: 12 | Status: SHIPPED | OUTPATIENT
Start: 2019-04-11 | End: 2019-04-11

## 2019-04-11 RX ORDER — SPIRONOLACTONE 25 MG/1
25 TABLET ORAL DAILY
Qty: 30 TAB | Refills: 12 | Status: SHIPPED | OUTPATIENT
Start: 2019-04-11 | End: 2019-04-25 | Stop reason: SINTOL

## 2019-04-11 RX ORDER — METOPROLOL TARTRATE 50 MG/1
50 TABLET ORAL 2 TIMES DAILY
Qty: 60 TAB | Refills: 12 | Status: SHIPPED | OUTPATIENT
Start: 2019-04-11 | End: 2019-05-16 | Stop reason: SDUPTHER

## 2019-04-11 RX ORDER — RANOLAZINE 500 MG/1
500 TABLET, EXTENDED RELEASE ORAL 2 TIMES DAILY
Qty: 180 TAB | Refills: 2
Start: 2019-04-11 | End: 2019-05-16

## 2019-04-11 NOTE — PROGRESS NOTES
Hemanth Sebastian MD. [facility]              Patient: Laurinda Severance  : 1950      Today's Date: 2019          HISTORY OF PRESENT ILLNESS:     History of Present Illness:  Here for follow-up. Chest pain is better. Still with CHACON. Has HA's. Stopped Ranexa. Has blood in urine and stool - saw Urology yesterday. PAST MEDICAL HISTORY:     Past Medical History:   Diagnosis Date    Anemia     iron defic anemia    Arthritis     Asthma 2009    CAUSED BY MOLD    Atrial fibrillation ([address] 2009    CAD (coronary artery disease) 2009    MI X2  -- prior stenting     Chronic pain     RT. ANKLE    GERD (gastroesophageal reflux disease)     Gluten intolerance     Hypothyroidism 2009    Personal history of MI (myocardial infarction)     Rheumatoid arthritis ([address]     Stroke ([address]     TIA (NO RESIDUAL)    Syncope     Thromboembolus ([address]     RT. -- DVT after surgery     Thyroid disease     HYPO    TIA (transient ischemic attack)      &     Unspecified adverse effect of anesthesia     \"IS A LIGHT WEIGHT\"    Unspecified adverse effect of anesthesia     DIFFICULTY AWAKENING    Vitamin B12 deficiency 2009       Past Surgical History:   Procedure Laterality Date    CARDIAC SURG PROCEDURE UNLIST      ABLATION    HX ADENOIDECTOMY      HX APPENDECTOMY      HX CHOLECYSTECTOMY  11    HX GI      COLONOSCOPY AND ENDOSCOPY    HX HEART CATHETERIZATION      2 STENTS    HX HYSTERECTOMY      HX LUMBAR LAMINECTOMY  2000    L4-L5    HX ORTHOPAEDIC  -2012    RT. FOOT SURGERY X 6/calcaneal osteotomy    HX TONSILLECTOMY  1964    STENT INSERTION           MEDICATIONS:     Current Outpatient Medications   Medication Sig Dispense Refill    metoprolol tartrate (LOPRESSOR) 50 mg tablet Take 0.5 Tabs by mouth two (2) times a day.  predniSONE (DELTASONE) 10 mg tablet Take 10 mg by mouth daily.       predniSONE (DELTASONE) 2.5 mg tablet Take 2.5 mg by mouth every evening.  potassium chloride SA (MICRO-K) 10 mEq capsule potassium chloride ER 10 mEq capsule,extended release      calcium citrate 200 mg (950 mg) tablet Take 2,000 mg by mouth daily.  flaxseed oil (OMEGA 3 PO) Take  by mouth.  vit C/vit E ac/lut/copper/zinc (PRESERVISION LUTEIN PO) Take  by mouth daily.  bumetanide (BUMEX) 2 mg tablet Take 2 mg by mouth daily.  thyroid, Pork, (NP THYROID) 90 mg tablet Take 90 mg by mouth daily.  lidocaine (LIDODERM) 5 % 1 Patch by TransDERmal route every twenty-four (24) hours. Apply patch to the affected area for 12 hours a day and remove for 12 hours a day.  ascorbic acid (VITAMIN C) 500 mg tablet Take  by mouth daily.  ferrous sulfate 325 mg (65 mg iron) tablet Take 85 mg by mouth Daily (before breakfast).  cyanocobalamin (VITAMIN B12) 1,000 mcg/mL injection INJECT 1 ML INTO THE MUSCLE EVERY 30 DAYS 10 mL 0    Cholecalciferol, Vitamin D3, (VITAMIN D3) 1,000 unit Cap Take 2 Caps by mouth daily.  lansoprazole (PREVACID) 30 mg capsule Take 30 mg by mouth Daily (before breakfast). Allergies   Allergen Reactions    Adhesive Tape-Silicones Other (comments)     BAD BLISTERS AND TENDS TO GET INFECTED    Albuterol Palpitations    Aspirin Hives and Other (comments)     \"it makes my stomach bleed\"      Butter Flavor Anaphylaxis     Butter AND CREME    Demerol [Meperidine] Other (comments)     HYPOTENSION    Fentanyl Other (comments)     HYPOTENSION    Gluten Diarrhea     bloating    Keflex [Cephalexin] Anaphylaxis    Levaquin [Levofloxacin] Unproven on Challenge     PT DOESN'T REMEMBER HAVING THAT    Morphine Other (comments)     HYPOTENSION    Nitroglycerin Other (comments)     IN PILL FORM  - HYPOTENSION  CAN TOLERATE PATCH FOR SHORT TIME    Pcn [Penicillins] Anaphylaxis    Percocet [Oxycodone-Acetaminophen] Unknown (comments)     HYPOTENSION AND HALLUCINATIONS.  Can take tylenol ok, oxycodone is allergy per patient.  Tapazole [Methimazole] Unknown (comments)           SOCIAL HISTORY:     Social History     Tobacco Use    Smoking status: Former Smoker     Packs/day: 1.00     Years: 30.00     Pack years: 30.00     Last attempt to quit: 2002     Years since quittin.8    Smokeless tobacco: Never Used   Substance Use Topics    Alcohol use: No    Drug use: No         FAMILY HISTORY:     Family History   Problem Relation Age of Onset    Delayed Awakening Mother     Heart Disease Mother     Coronary Artery Disease Mother     Hypertension Mother     Stroke Mother     Delayed Awakening Sister     Hypertension Sister     Lung Disease Father     Cancer Father         colon    Hypertension Father     Hypertension Brother     Breast Cancer Maternal Aunt     Breast Cancer Maternal Aunt     Breast Cancer Cousin         maternal 1st cousin    Breast Cancer Maternal Aunt     Breast Cancer Cousin         maternal 1st cousin    Breast Cancer Cousin         maternal 1st cousin              REVIEW OF SYMPTOMS:      Review of Symptoms:  Constitutional: Negative for fever, chills  HEENT: Negative for nosebleeds, tinnitus  Respiratory: + wheezing  Cardiovascular: Negative for syncope;  + orthopnea   Gastrointestinal: Negative for abdominal pain, diarrhea, melena. Genitourinary: Negative for dysuria  Musculoskeletal: + joint pain, RA  Skin: Negative for rash  Heme: + bleeding problems  Neurological: Negative for speech change and focal weakness.               PHYSICAL EXAM:      Physical Exam:  Visit Vitals  /82 (BP 1 Location: Left arm, BP Patient Position: Sitting)   Pulse 81   Resp 16   Ht 5' 3\" (1.6 m)   Wt 189 lb (85.7 kg)   LMP 1980 (LMP Unknown)   SpO2 94%   BMI 33.48 kg/m²       Patient appears generally well, mood and affect are appropriate and pleasant. HEENT:  Hearing intact, non-icteric, normocephalic, atraumatic.    Neck Exam: Supple, no JVD  Lung Exam: Clear to auscultation, even breath sounds. Cardiac Exam: Regular rate and rhythm with no murmur  Abdomen: Soft, non-tender, normal bowel sounds. Extremities: Moves all ext well. Mild bilat lower extremity edema. Psych: Appropriate affect  Neuro - Grossly intact           LABS / OTHER STUDIES:            Lab Results   Component Value Date/Time     Sodium 142 02/22/2019 03:48 AM     Potassium 4.1 02/22/2019 03:48 AM     Chloride 104 02/22/2019 03:48 AM     CO2 28 02/22/2019 03:48 AM     Anion gap 10 02/22/2019 03:48 AM     Glucose 160 (H) 02/22/2019 03:48 AM     BUN 20 02/22/2019 03:48 AM     Creatinine 0.89 02/22/2019 03:48 AM     BUN/Creatinine ratio 22 (H) 02/22/2019 03:48 AM     GFR est AA >60 02/22/2019 03:48 AM     GFR est non-AA >60 02/22/2019 03:48 AM     Calcium 9.3 02/22/2019 03:48 AM     Bilirubin, total 0.4 02/22/2019 03:48 AM     AST (SGOT) 19 02/22/2019 03:48 AM     Alk.  phosphatase 135 (H) 02/22/2019 03:48 AM     Protein, total 7.4 02/22/2019 03:48 AM     Albumin 3.6 02/22/2019 03:48 AM     Globulin 3.8 02/22/2019 03:48 AM     A-G Ratio 0.9 (L) 02/22/2019 03:48 AM     ALT (SGPT) 27 02/22/2019 03:48 AM            Lab Results   Component Value Date/Time     WBC 18.3 (H) 02/22/2019 03:48 AM     Hemoglobin (POC) 15.6 06/09/2014 03:00 AM     HGB 14.0 02/22/2019 03:48 AM     Hematocrit (POC) 46 06/09/2014 03:00 AM     HCT 44.3 02/22/2019 03:48 AM     PLATELET 528 28/16/4069 03:48 AM     MCV 96.5 02/22/2019 03:48 AM            Lab Results   Component Value Date/Time     Cholesterol, total 169 01/26/2013 02:15 AM     HDL Cholesterol 38 01/26/2013 02:15 AM     LDL, calculated 79.2 01/26/2013 02:15 AM     VLDL, calculated 51.8 01/26/2013 02:15 AM     Triglyceride 259 (H) 01/26/2013 02:15 AM     CHOL/HDL Ratio 4.4 01/26/2013 02:15 AM      Component      Latest Ref Rng & Units 2/22/2019           3:48 AM   D-dimer      0.00 - 0.65 mg/L FEU 0.33         Component      Latest Ref Rng & Units 2/22/2019           3:48 AM NT pro-BNP      0 - 125 PG/ML 45            CARDIAC DIAGNOSTICS:      Cardiac Evaluation Includes:     Cardiac Cath 6/09 - severe mLCX disease --> stenting     Cath 8/19/10 - RCA and LCX stents patent; MLI in LAD, LVEF 60%     Event Monitor 3/11 - PVC's  Holter 3/16 - PVC's  Echo 3/17 - LVEF 60%  Lexiscan Cardiolite 3/17 - normal MPI, LVEF 58%    [facility] records reviewed. Went there for chest pain on 1/3/19. Labs 1/3/19 - Trop < 0.02, BNP 18, Cr 0.61, Hgb 13  Cath 1/4/19 - LAD stent patent, LCX without sig disease, RCA stent patent. Holter 1/23/19 - NSR, normal study      Stress Echo 2/11/19 - walked 3:21 (2.4 METS), baseline /90, Max /90, normal stress EKG and stress echo      CXR 2/22/19 - normal     Echo 3/21/19 - LVEF 61%; grade 1 diastology (normal for age), AV sclerosis. RVSP 26 mmHg.         EKG 2/22/19 - NSR, normal         ASSESSMENT AND PLAN:      Assessment and Plan:  1) Chest pain and SOB   - She notes symptoms since Jan 2019   - reviewed records from other physicians  - Her recent cardiac cath 1/19 showed patent vessels. - Her stress echo 3/19 was negative for ischemia  - Her symptoms could be due to small vessel disease and/or diastolic dysfunction. Pulmonary workup is underway. Of note, proBNP 2/19 was normal (45). - [address] [place] [address] records reviewed. Went there for chest pain on 1/3/19. Labs 1/3/19 - Trop < 0.02, BNP 18, Cr 0.61, Hgb 13. Cath 1/4/19 - LAD stent patent, LCX without sig disease, RCA stent patent. Medical management was recommended. - Echo 3/21/19 - LVEF 61%; grade 1 diastology (normal for age), AV sclerosis. RVSP 26 mmHg. - Not taking Imdur due to a bad HA  - Will continue Renexa if tolerated  - needs better BP control (was high during stress test) --> increase metoprolol to 50 BID and add aldactone 25 mg daily (stop Kdur) - repeat labs in a week. Get cath films from [facility] to review. - Consider RHC if problems persist despite other measures.    - OK for Rheum to adjust RA meds as they see fit        2) History of Iron Deficiency Anemia   - She takes no blood thinners (not even aspirin) due to anemia in past   - Dr. Klaudia Moran record 12/16 reviewed - He was seeing her for iron deficiency anemia thought to be from GI Bleed from aspirin and plavix   - On 4/11/19 - notes some blood in stool and urine --> asked her to FU with Dr. Andra Schilder for this      3) Lipids -   - Does not take a statin due to having muscle aches (from RA at baseline)     4) History of Afib   - ablation in past? - need to get records  - not on any blood thinners due to anemia in past (see above)   - has been in sinus lately      5) HTN  - BP seems high at times  - Will work on optimizing meds -- increase metoprolol to 50 mg BID and add aldactone      6) Dr. Jeremi Butler record reviewed 1/31/19   - plan was for CTA for PE and PFT's -- patient says testing was OK      6) See me in 3-4 weeks. Patient expressed understanding of the plan - questions were answered.     On a side note, I see her brother.    Clive Araya MD, [address], [address]      [facility].  [address] [facility], [address] [place] [address]  Ph: [phone]                               Ph [phone]

## 2019-04-11 NOTE — PROGRESS NOTES
Chief Complaint   Patient presents with    Coronary Artery Disease    Hypertension    Shortness of Breath     Visit Vitals  /82 (BP 1 Location: Left arm, BP Patient Position: Sitting)   Pulse 81   Resp 16   Ht 5' 3\" (1.6 m)   Wt 189 lb (85.7 kg)   SpO2 94%   BMI 33.48 kg/m²       Ranexa hasn't taken since Sunday  Needs to discuss metoprolol dosage in evening?   Want to change to Cizmia; need heart clearance

## 2019-04-15 ENCOUNTER — HOSPITAL ENCOUNTER (OUTPATIENT)
Dept: LAB | Age: 69
Discharge: HOME | End: 2019-04-15
Payer: MEDICARE

## 2019-04-15 PROCEDURE — 85027 COMPLETE CBC AUTOMATED: CPT

## 2019-04-15 PROCEDURE — 83880 ASSAY OF NATRIURETIC PEPTIDE: CPT

## 2019-04-15 PROCEDURE — 36415 COLL VENOUS BLD VENIPUNCTURE: CPT

## 2019-04-15 PROCEDURE — 80048 BASIC METABOLIC PNL TOTAL CA: CPT

## 2019-04-16 ENCOUNTER — TELEPHONE (OUTPATIENT)
Dept: CARDIOLOGY CLINIC | Age: 69
End: 2019-04-16

## 2019-04-16 LAB
BUN SERPL-MCNC: 21 MG/DL (ref 8–27)
BUN/CREAT SERPL: 30 (ref 12–28)
CALCIUM SERPL-MCNC: 9.2 MG/DL (ref 8.7–10.3)
CHLORIDE SERPL-SCNC: 98 MMOL/L (ref 96–106)
CO2 SERPL-SCNC: 26 MMOL/L (ref 20–29)
CREAT SERPL-MCNC: 0.7 MG/DL (ref 0.57–1)
GLUCOSE SERPL-MCNC: 131 MG/DL (ref 65–99)
HCT VFR BLD AUTO: NORMAL %
HGB BLD-MCNC: NORMAL G/DL
NRBC BLD AUTO-RTO: NORMAL %
NT-PROBNP SERPL-MCNC: 51 PG/ML (ref 0–301)
PLATELET # BLD AUTO: NORMAL 10*3/UL
POTASSIUM SERPL-SCNC: 4 MMOL/L (ref 3.5–5.2)
RBC # BLD AUTO: NORMAL 10*6/UL
SODIUM SERPL-SCNC: 144 MMOL/L (ref 134–144)
WBC # BLD AUTO: NORMAL X10E3/UL

## 2019-04-16 NOTE — TELEPHONE ENCOUNTER
Cosmo Brower called from [facility] stating she has an issue with the patients specimen. She stated she would like to discuss this.   Phone #[phone]  Thanks

## 2019-04-17 NOTE — TELEPHONE ENCOUNTER
Patient states she received an e-mail on [facility] stating they did not collect enough blood for her blood tests and she is calling to ask how she would go about fixing this.      Phone: [phone]

## 2019-04-18 NOTE — TELEPHONE ENCOUNTER
Spoke with patient, verified two patient identifiers. Advised patient per Ryan Lugo that they did not have enough blood specimen to complete the CBC lab test, and that per Dr. Angeles Myrick, it isn't crucial to have this lab done at this time. Patient verbalized understanding and has not other questions at this time.

## 2019-04-22 ENCOUNTER — TELEPHONE (OUTPATIENT)
Dept: CARDIOLOGY CLINIC | Age: 69
End: 2019-04-22

## 2019-04-22 ENCOUNTER — APPOINTMENT (OUTPATIENT)
Dept: GENERAL RADIOLOGY | Age: 69
End: 2019-04-22
Attending: EMERGENCY MEDICINE
Payer: MEDICARE

## 2019-04-22 ENCOUNTER — HOSPITAL ENCOUNTER (EMERGENCY)
Age: 69
Discharge: HOME | End: 2019-04-23
Attending: EMERGENCY MEDICINE | Admitting: EMERGENCY MEDICINE
Payer: MEDICARE

## 2019-04-22 DIAGNOSIS — E87.6 HYPOKALEMIA: ICD-10-CM

## 2019-04-22 DIAGNOSIS — R07.89 ATYPICAL CHEST PAIN: Primary | ICD-10-CM

## 2019-04-22 DIAGNOSIS — E87.6 HYPOKALEMIA: Primary | ICD-10-CM

## 2019-04-22 LAB
ALBUMIN SERPL-MCNC: 3.6 G/DL (ref 3.5–5)
ALBUMIN/GLOB SERPL: 1 {RATIO} (ref 1.1–2.2)
ALP SERPL-CCNC: 132 U/L (ref 45–117)
ALT SERPL-CCNC: 26 U/L (ref 12–78)
ANION GAP SERPL CALC-SCNC: 11 MMOL/L (ref 5–15)
AST SERPL-CCNC: 10 U/L (ref 15–37)
BASOPHILS # BLD: 0 K/UL (ref 0–0.1)
BASOPHILS NFR BLD: 0 % (ref 0–1)
BILIRUB SERPL-MCNC: 0.4 MG/DL (ref 0.2–1)
BUN SERPL-MCNC: 26 MG/DL (ref 6–20)
BUN/CREAT SERPL: 26 (ref 12–20)
CALCIUM SERPL-MCNC: 9.1 MG/DL (ref 8.5–10.1)
CHLORIDE SERPL-SCNC: 99 MMOL/L (ref 97–108)
CO2 SERPL-SCNC: 30 MMOL/L (ref 21–32)
COMMENT, HOLDF: NORMAL
CREAT SERPL-MCNC: 1.01 MG/DL (ref 0.55–1.02)
D DIMER PPP FEU-MCNC: 0.3 MG/L FEU (ref 0–0.65)
DIFFERENTIAL METHOD BLD: ABNORMAL
EOSINOPHIL # BLD: 0.2 K/UL (ref 0–0.4)
EOSINOPHIL NFR BLD: 1 % (ref 0–7)
ERYTHROCYTE [DISTWIDTH] IN BLOOD BY AUTOMATED COUNT: 13.2 % (ref 11.5–14.5)
GLOBULIN SER CALC-MCNC: 3.7 G/DL (ref 2–4)
GLUCOSE SERPL-MCNC: 242 MG/DL (ref 65–100)
HCT VFR BLD AUTO: 44.8 % (ref 35–47)
HGB BLD-MCNC: 14.9 G/DL (ref 11.5–16)
IMM GRANULOCYTES # BLD AUTO: 0 K/UL (ref 0–0.04)
IMM GRANULOCYTES NFR BLD AUTO: 0 % (ref 0–0.5)
LYMPHOCYTES # BLD: 4.3 K/UL (ref 0.8–3.5)
LYMPHOCYTES NFR BLD: 21 % (ref 12–49)
MAGNESIUM SERPL-MCNC: 2 MG/DL (ref 1.6–2.4)
MCH RBC QN AUTO: 31.3 PG (ref 26–34)
MCHC RBC AUTO-ENTMCNC: 33.3 G/DL (ref 30–36.5)
MCV RBC AUTO: 94.1 FL (ref 80–99)
MONOCYTES # BLD: 0.8 K/UL (ref 0–1)
MONOCYTES NFR BLD: 4 % (ref 5–13)
NEUTS BAND NFR BLD MANUAL: 2 %
NEUTS SEG # BLD: 15.3 K/UL (ref 1.8–8)
NEUTS SEG NFR BLD: 72 % (ref 32–75)
NRBC # BLD: 0 K/UL
NRBC BLD-RTO: 0 PER 100 WBC
PLATELET # BLD AUTO: 295 K/UL (ref 150–400)
PMV BLD AUTO: 10.4 FL (ref 8.9–12.9)
POTASSIUM SERPL-SCNC: 3.3 MMOL/L (ref 3.5–5.1)
PROT SERPL-MCNC: 7.3 G/DL (ref 6.4–8.2)
RBC # BLD AUTO: 4.76 M/UL (ref 3.8–5.2)
RBC MORPH BLD: ABNORMAL
SAMPLES BEING HELD,HOLD: NORMAL
SODIUM SERPL-SCNC: 140 MMOL/L (ref 136–145)
TROPONIN I BLD-MCNC: <0.04 NG/ML (ref 0–0.08)
WBC # BLD AUTO: 20.6 K/UL (ref 3.6–11)

## 2019-04-22 PROCEDURE — 83735 ASSAY OF MAGNESIUM: CPT

## 2019-04-22 PROCEDURE — 85025 COMPLETE CBC W/AUTO DIFF WBC: CPT

## 2019-04-22 PROCEDURE — 36415 COLL VENOUS BLD VENIPUNCTURE: CPT

## 2019-04-22 PROCEDURE — 85379 FIBRIN DEGRADATION QUANT: CPT

## 2019-04-22 PROCEDURE — 74011250637 HC RX REV CODE- 250/637: Performed by: EMERGENCY MEDICINE

## 2019-04-22 PROCEDURE — 71046 X-RAY EXAM CHEST 2 VIEWS: CPT

## 2019-04-22 PROCEDURE — 93005 ELECTROCARDIOGRAM TRACING: CPT

## 2019-04-22 PROCEDURE — 84484 ASSAY OF TROPONIN QUANT: CPT

## 2019-04-22 PROCEDURE — 80053 COMPREHEN METABOLIC PANEL: CPT

## 2019-04-22 PROCEDURE — 99284 EMERGENCY DEPT VISIT MOD MDM: CPT

## 2019-04-22 RX ORDER — POTASSIUM CHLORIDE 750 MG/1
40 TABLET, FILM COATED, EXTENDED RELEASE ORAL
Status: COMPLETED | OUTPATIENT
Start: 2019-04-22 | End: 2019-04-22

## 2019-04-22 RX ADMIN — POTASSIUM CHLORIDE 40 MEQ: 750 TABLET, EXTENDED RELEASE ORAL at 21:48

## 2019-04-22 NOTE — TELEPHONE ENCOUNTER
Patient is worried she may be having some reactions to her medications and she's unsure what to do   Phone: [phone]

## 2019-04-22 NOTE — TELEPHONE ENCOUNTER
Return call placed to pt (IDx2). She states that she has made the medication adjustments suggested by Dr. Carmelo De La Fuente at last office visit. She has increased her metoprolol to 50mg BID, added spironolactone 25mg daily, and stopped KDur. She states is taking her Bumex 2mg BID (per Dr. Franca Lee, not daily as is marked in system). She states she doesn't have her BP or HR values written down, but they have been within goal.     Pt is having intense cramping in her chest since starting Aldactone. And she states she is up all night with \"peeing and chest cramps. \" She stake Aldactone in AM and Bumex BID.

## 2019-04-23 ENCOUNTER — TELEPHONE (OUTPATIENT)
Dept: CARDIOLOGY CLINIC | Age: 69
End: 2019-04-23

## 2019-04-23 VITALS
OXYGEN SATURATION: 94 % | RESPIRATION RATE: 20 BRPM | DIASTOLIC BLOOD PRESSURE: 59 MMHG | WEIGHT: 184.75 LBS | BODY MASS INDEX: 32.73 KG/M2 | TEMPERATURE: 97.9 F | HEART RATE: 100 BPM | SYSTOLIC BLOOD PRESSURE: 117 MMHG

## 2019-04-23 LAB
ATRIAL RATE: 118 BPM
CALCULATED P AXIS, ECG09: 52 DEGREES
CALCULATED T AXIS, ECG11: 78 DEGREES
DIAGNOSIS, 93000: NORMAL
P-R INTERVAL, ECG05: 182 MS
Q-T INTERVAL, ECG07: 306 MS
QRS DURATION, ECG06: 70 MS
QTC CALCULATION (BEZET), ECG08: 428 MS
TROPONIN I BLD-MCNC: <0.04 NG/ML (ref 0–0.08)
VENTRICULAR RATE, ECG03: 118 BPM

## 2019-04-23 PROCEDURE — 84484 ASSAY OF TROPONIN QUANT: CPT

## 2019-04-23 NOTE — ED PROVIDER NOTES
Lou Huang is a 69 yo F with history of CAD, afib and hypothyroidism who was recently started on Bumex and spironolactone by her cardiologist.  She states that yesterday she started having intermittent cramping pain in her bilateral ower chest.  The cramping has been getting progressively worse. She has had problems with dyspnea with exertion that she states proceeded her starting the new diuretics and she states that this is unchanged but she states that her leg edema is improved from prior. Past Medical History:   Diagnosis Date    Anemia     iron defic anemia    Arthritis     Asthma 6/29/2009    CAUSED BY MOLD    Atrial fibrillation ([address] 6/29/2009    CAD (coronary artery disease) 06/29/2009    MI X2  -- prior stenting     Chronic pain     RT. ANKLE    GERD (gastroesophageal reflux disease)     Gluten intolerance     Hypothyroidism 6/29/2009    Personal history of MI (myocardial infarction)     Rheumatoid arthritis ([address]     St[address]     TIA (NO RESIDUAL)    Syncope     Thromboembolus ([facility] [address] 2004    RT. -- DVT after surgery     Thyroid disease     HYPO    TIA (transient ischemic attack)     2004 & 2006    Unspecified adverse effect of anesthesia     \"IS A LIGHT WEIGHT\"    Unspecified adverse effect of anesthesia     DIFFICULTY AWAKENING    Vitamin B12 deficiency 6/29/2009       Past Surgical History:   Procedure Laterality Date    CARDIAC SURG PROCEDURE UNLIST      ABLATION    HX ADENOIDECTOMY      HX APPENDECTOMY      HX CHOLECYSTECTOMY  6/16/11    HX GI      COLONOSCOPY AND ENDOSCOPY    HX HEART CATHETERIZATION  2010    2 STENTS    HX HYSTERECTOMY      HX LUMBAR LAMINECTOMY  2000    L4-L5    HX ORTHOPAEDIC  1993-6/2012    RT.  FOOT SURGERY X 6/calcaneal osteotomy    HX TONSILLECTOMY  1964    STENT INSERTION           Family History:   Problem Relation Age of Onset    Delayed Awakening Mother     Heart Disease Mother     Coronary Artery Disease Mother    [employer] Hypertension Mother     Stroke Mother     Delayed Awakening Sister     Hypertension Sister     Lung Disease Father     Cancer Father         colon    Hypertension Father     Hypertension Brother     Breast Cancer Maternal Aunt     Breast Cancer Maternal Aunt     Breast Cancer Cousin         maternal 1st cousin    Breast Cancer Maternal Aunt     Breast Cancer Cousin         maternal 1st cousin    Breast Cancer Cousin         maternal 1st cousin       Social History     Socioeconomic History    Marital status:      Spouse name: Not on file    Number of children: Not on file    Years of education: Not on file    Highest education level: Not on file   Occupational History    Not on file   Social Needs    Financial resource strain: Not on file    Food insecurity:     Worry: Not on file     Inability: Not on file    Transportation needs:     Medical: Not on file     Non-medical: Not on file   Tobacco Use    Smoking status: Former Smoker     Packs/day: 1.00     Years: 30.00     Pack years: 30.00     Last attempt to quit: 2002     Years since quittin.8    Smokeless tobacco: Never Used   Substance and Sexual Activity    Alcohol use: No    Drug use: No    Sexual activity: Never   Lifestyle    Physical activity:     Days per week: Not on file     Minutes per session: Not on file    Stress: Not on file   Relationships    Social connections:     Talks on phone: Not on file     Gets together: Not on file     Attends Taoism service: Not on file     Active member of club or organization: Not on file     Attends meetings of clubs or organizations: Not on file     Relationship status: Not on file    Intimate partner violence:     Fear of current or ex partner: Not on file     Emotionally abused: Not on file     Physically abused: Not on file     Forced sexual activity: Not on file   Other Topics Concern    Not on file   Social History Narrative    Not on file         ALLERGIES: Adhesive tape-silicones; Albuterol; Aspirin; Butter flavor; Demerol [meperidine]; Fentanyl; Gluten; Keflex [cephalexin]; Levaquin [levofloxacin]; Morphine; Nitroglycerin; Pcn [penicillins]; Percocet [oxycodone-acetaminophen]; and Tapazole [methimazole]    Review of Systems   Constitutional: Negative for fever. HENT: Negative for sore throat. Eyes: Negative for visual disturbance. Respiratory: Negative for cough. Dyspnea with exertion, unchanged   Cardiovascular: Positive for chest pain and leg swelling (improved). Gastrointestinal: Negative for abdominal pain. Genitourinary: Negative for dysuria. Musculoskeletal: Negative for back pain. Skin: Negative for rash. Neurological: Negative for headaches. Vitals:    04/22/19 2115 04/22/19 2204 04/22/19 2232 04/22/19 2300   BP: 144/77      Pulse: (!) 101 100 100 99   Resp: 18 18 19 19   Temp:       SpO2: 92% 94% 94% 93%   Weight:                Physical Exam   Constitutional: She appears well-developed and well-nourished. No distress. HENT:   Head: Normocephalic and atraumatic. Mouth/Throat: Oropharynx is clear and moist.   Eyes: Conjunctivae and EOM are normal.   Neck: Normal range of motion and phonation normal.   Cardiovascular: Tachycardia present. Pulmonary/Chest: Effort normal. No respiratory distress. She has no rales. Abdominal: She exhibits no distension. Musculoskeletal: Normal range of motion. She exhibits no tenderness. Right lower leg: She exhibits edema. Left lower leg: She exhibits edema. Neurological: She is alert. She is not disoriented. She exhibits normal muscle tone. Skin: Skin is warm and dry. Nursing note and vitals reviewed. ED EKG interpretation:  Rhythm: sinus tachycardia; and regular . Rate (approx.): 118; Axis: normal; P wave: normal; QRS interval: normal ; ST/T wave: non-specific changes; Other findings: abnormal ekg.  This EKG was interpreted by Sweta Locke MD,ED Provider. [facility]       11:32 PM  Patient reassessed and states that she is feeling better after PO potassium. Will obtain repeat trop at midnight. ddimer normal. WBC elevated but this is chronic due to prednisone use for RA. Will obtain CXR. 12:47 AM  Repeat trop normal.  CXR with no infiltrate. Will discharge home.    Procedures

## 2019-04-23 NOTE — TELEPHONE ENCOUNTER
Return call placed to pt (IDx2). Pt states that she went to ED last night for chest cramping. They stated her potassium was low (3.3). They suggested she call the office today to reconcile her medications and confirm a regimen and when she is to get her potassium level re-checked. Pt also states that her HR is maintain 110s with rest. She states was as high as 128 last night with EKG showing 118 bpm. BP elevated today as well 140s-150s/80s. Spoke with Nancy Herrera, NP will forward to Dr. Alicia Henry for further recommendation.

## 2019-04-23 NOTE — ED TRIAGE NOTES
Pt arrived via walk in for complaint of \"leg cramps in my ribs and chest area\". Onset yesterday. Today pt feels like her heart is racing. Alert and oriented. Airway patent, breathing with regular, increased rate. Skin warm and dry and appropriate for ethnicity.

## 2019-04-23 NOTE — DISCHARGE INSTRUCTIONS
Patient Education        Chest Pain: Care Instructions  Your Care Instructions    There are many things that can cause chest pain. Some are not serious and will get better on their own in a few days. But some kinds of chest pain need more testing and treatment. Your doctor may have recommended a follow-up visit in the next 8 to 12 hours. If you are not getting better, you may need more tests or treatment. Even though your doctor has released you, you still need to watch for any problems. The doctor carefully checked you, but sometimes problems can develop later. If you have new symptoms or if your symptoms do not get better, get medical care right away. If you have worse or different chest pain or pressure that lasts more than 5 minutes or you passed out (lost consciousness), call 911 or seek other emergency help right away. A medical visit is only one step in your treatment. Even if you feel better, you still need to do what your doctor recommends, such as going to all suggested follow-up appointments and taking medicines exactly as directed. This will help you recover and help prevent future problems. How can you care for yourself at home? · Rest until you feel better. · Take your medicine exactly as prescribed. Call your doctor if you think you are having a problem with your medicine. · Do not drive after taking a prescription pain medicine. When should you call for help? Call 911 if:    · You passed out (lost consciousness).     · You have severe difficulty breathing.     · You have symptoms of a heart attack. These may include:  ? Chest pain or pressure, or a strange feeling in your chest.  ? Sweating. ? Shortness of breath. ? Nausea or vomiting. ? Pain, pressure, or a strange feeling in your back, neck, jaw, or upper belly or in one or both shoulders or arms. ? Lightheadedness or sudden weakness. ? A fast or irregular heartbeat.   After you call 911, the  may tell you to chew 1 adult-strength or 2 to 4 low-dose aspirin. Wait for an ambulance. Do not try to drive yourself.    Call your doctor today if:    · You have any trouble breathing.     · Your chest pain gets worse.     · You are dizzy or lightheaded, or you feel like you may faint.     · You are not getting better as expected.     · You are having new or different chest pain. Where can you learn more? Go to http://wayne-ricky.info/. Enter A120 in the search box to learn more about \"Chest Pain: Care Instructions. \"  Current as of: September 23, 2018  Content Version: 11.9  © 3688-9084 [employer] Care instructions adapted under license by [employer] (which disclaims liability or warranty for this information). If you have questions about a medical condition or this instruction, always ask your healthcare professional. [place] [address] any warranty or liability for your use of this information. Patient Education        Hypokalemia: Care Instructions  Your Care Instructions    Hypokalemia (say \"zc-sy-ool-JESSICA-elena-uh\") is a low level of potassium. The heart, muscles, kidneys, and nervous system all need potassium to work well. This problem has many different causes. Kidney problems, diet, and medicines like diuretics and laxatives can cause it. So can vomiting or diarrhea. In some cases, cancer is the cause. Your doctor may do tests to find the cause of your low potassium levels. You may need medicines to bring your potassium levels back to normal. You may also need regular blood tests to check your potassium. If you have very low potassium, you may need intravenous (IV) medicines. You also may need tests to check the electrical activity of your heart. Heart problems caused by low potassium levels can be very serious. Follow-up care is a key part of your treatment and safety.  Be sure to make and go to all appointments, and call your doctor if you are having problems. It's also a good idea to know your test results and keep a list of the medicines you take. How can you care for yourself at home? · If your doctor recommends it, eat foods that have a lot of potassium. These include fresh fruits, juices, and vegetables. They also include nuts, beans, and milk. · Be safe with medicines. If your doctor prescribes medicines or potassium supplements, take them exactly as directed. Call your doctor if you have any problems with your medicines. · Get your potassium levels tested as often as your doctor tells you. When should you call for help? Call 911 anytime you think you may need emergency care. For example, call if:    · You feel like your heart is missing beats. Heart problems caused by low potassium can cause death.     · You passed out (lost consciousness).     · You have a seizure.    Call your doctor now or seek immediate medical care if:    · You feel weak or unusually tired.     · You have severe arm or leg cramps.     · You have tingling or numbness.     · You feel sick to your stomach, or you vomit.     · You have belly cramps.     · You feel bloated or constipated.     · You have to urinate a lot.     · You feel very thirsty most of the time.     · You are dizzy or lightheaded, or you feel like you may faint.     · You feel depressed, or you lose touch with reality.    Watch closely for changes in your health, and be sure to contact your doctor if:    · You do not get better as expected. Where can you learn more? Go to http://wayne-ricky.info/. Enter G358 in the search box to learn more about \"Hypokalemia: Care Instructions. \"  Current as of: March 14, 2018  Content Version: 11.9  © 5080-2206 [employer]. Care instructions adapted under license by [employer] (which disclaims liability or warranty for this information).  If you have questions about a medical condition or this instruction, always ask your healthcare professional. [address] any warranty or liability for your use of this information.

## 2019-04-23 NOTE — ED NOTES
The patient was discharged home by   in stable condition. The patient is alert and oriented, in no respiratory distress and discharge vital signs obtained. The patient's diagnosis, condition and treatment were explained. The patient expressed understanding. No prescriptions given. No work/school note given. A discharge plan has been developed. A  was not involved in the process. Aftercare instructions were given. Pt ambulatory out of the ED.

## 2019-04-23 NOTE — TELEPHONE ENCOUNTER
Bishop Hoyt stated did not know if she should take her Bumex or Aldactone after ER visit yesterday  Reviewed with Colt Wilkerson . Instructed patient to take Bumex 2 mg Daily and Aldactone 25mg once a day until  evaluates further. Patient verbalized understanding and will wait for follow up call.      advise

## 2019-04-25 RX ORDER — EPLERENONE 25 MG/1
25 TABLET, FILM COATED ORAL DAILY
Qty: 30 TAB | Refills: 0 | Status: SHIPPED | OUTPATIENT
Start: 2019-04-25 | End: 2019-05-26 | Stop reason: SDUPTHER

## 2019-04-25 NOTE — TELEPHONE ENCOUNTER
Return call placed to pt (IDx2). Discussed Dr. Mogran Loving recommendations. Pt has decided to STOP Aldactone 25mg daily and START Eplerenone 25mg daily. Advised that there may be a price diffence in medications as well. Rx sent to pt preferred pharmacy. She will come  her lab slip and get labs checked in 7days. Lab slip printed and placed at [address]. Debra- Please order 30-day loop per VO of Dr. Cathi Parekh. Dx: Elevated HR, Palps    Pt expressed understanding.

## 2019-04-26 ENCOUNTER — CLINICAL SUPPORT (OUTPATIENT)
Dept: CARDIOLOGY CLINIC | Age: 69
End: 2019-04-26

## 2019-04-26 DIAGNOSIS — R00.0 TACHYCARDIA: ICD-10-CM

## 2019-04-26 DIAGNOSIS — R00.2 PALPITATIONS: Primary | ICD-10-CM

## 2019-05-02 ENCOUNTER — TELEPHONE (OUTPATIENT)
Dept: CARDIOLOGY CLINIC | Age: 69
End: 2019-05-02

## 2019-05-03 LAB
BUN SERPL-MCNC: 19 MG/DL (ref 8–27)
BUN/CREAT SERPL: 21 (ref 12–28)
CALCIUM SERPL-MCNC: 9.7 MG/DL (ref 8.7–10.3)
CHLORIDE SERPL-SCNC: 97 MMOL/L (ref 96–106)
CO2 SERPL-SCNC: 22 MMOL/L (ref 20–29)
CREAT SERPL-MCNC: 0.91 MG/DL (ref 0.57–1)
GLUCOSE SERPL-MCNC: 233 MG/DL (ref 65–99)
POTASSIUM SERPL-SCNC: 4.1 MMOL/L (ref 3.5–5.2)
SODIUM SERPL-SCNC: 142 MMOL/L (ref 134–144)

## 2019-05-16 ENCOUNTER — OFFICE VISIT (OUTPATIENT)
Dept: CARDIOLOGY CLINIC | Age: 69
End: 2019-05-16

## 2019-05-16 VITALS
HEIGHT: 63 IN | RESPIRATION RATE: 16 BRPM | HEART RATE: 80 BPM | DIASTOLIC BLOOD PRESSURE: 76 MMHG | BODY MASS INDEX: 33.84 KG/M2 | OXYGEN SATURATION: 95 % | SYSTOLIC BLOOD PRESSURE: 126 MMHG | WEIGHT: 191 LBS

## 2019-05-16 DIAGNOSIS — R06.02 SOB (SHORTNESS OF BREATH): Primary | ICD-10-CM

## 2019-05-16 DIAGNOSIS — I10 HTN (HYPERTENSION), BENIGN: ICD-10-CM

## 2019-05-16 DIAGNOSIS — I25.10 CORONARY ARTERY DISEASE INVOLVING NATIVE CORONARY ARTERY OF NATIVE HEART WITHOUT ANGINA PECTORIS: ICD-10-CM

## 2019-05-16 RX ORDER — METOPROLOL TARTRATE 50 MG/1
75 TABLET ORAL 2 TIMES DAILY
Qty: 90 TAB | Refills: 12 | Status: SHIPPED | OUTPATIENT
Start: 2019-05-16 | End: 2019-06-29 | Stop reason: SDUPTHER

## 2019-05-16 NOTE — PROGRESS NOTES
Lisa Alas MD. [facility]              Patient: Kanu Roe  : 1950      Today's Date: 2019          HISTORY OF PRESENT ILLNESS:     History of Present Illness:  Here for follow-up. BP is better but still high at times (morning and night). Still having CHACON and heart racing if she walks a lot. Feeling better overall with RA treatment. PAST MEDICAL HISTORY:     Past Medical History:   Diagnosis Date    Anemia     iron defic anemia    Arthritis     Asthma 2009    CAUSED BY MOLD    Atrial fibrillation ([address] 2009    CAD (coronary artery disease) 2009    MI X2  -- prior stenting     Chronic pain     RT. ANKLE    GERD (gastroesophageal reflux disease)     Gluten intolerance     Hypothyroidism 2009    Personal history of MI (myocardial infarction)     Rheumatoid arthritis ([address]     Stroke ([address]     TIA (NO RESIDUAL)    Syncope     Th[address]boembolus [address]     RT. -- DVT after surgery     Thyroid disease     HYPO    TIA (transient ischemic attack)      &     Unspecified adverse effect of anesthesia     \"IS A LIGHT WEIGHT\"    Unspecified adverse effect of anesthesia     DIFFICULTY AWAKENING    Vitamin B12 deficiency 2009         Past Surgical History:   Procedure Laterality Date    CARDIAC SURG PROCEDURE UNLIST      ABLATION    HX ADENOIDECTOMY      HX APPENDECTOMY      HX CHOLECYSTECTOMY  11    HX GI      COLONOSCOPY AND ENDOSCOPY    HX HEART CATHETERIZATION  2010    2 STENTS    HX HYSTERECTOMY      HX LUMBAR LAMINECTOMY  2000    L4-L5    HX ORTHOPAEDIC  -2012    RT. FOOT SURGERY X 6/calcaneal osteotomy    HX TONSILLECTOMY  1964    STENT INSERTION           MEDICATIONS:     Current Outpatient Medications   Medication Sig Dispense Refill    eplerenone (INSPRA) 25 mg tablet Take 1 Tab by mouth daily. 30 Tab 0    metoprolol tartrate (LOPRESSOR) 50 mg tablet Take 1 Tab by mouth two (2) times a day.  60 Tab 12    predniSONE (DELTASONE) 10 mg tablet Take 10 mg by mouth daily.  predniSONE (DELTASONE) 2.5 mg tablet Take 2.5 mg by mouth every evening.  calcium citrate 200 mg (950 mg) tablet Take 2,000 mg by mouth daily.  flaxseed oil (OMEGA 3 PO) Take  by mouth.  vit C/vit E ac/lut/copper/zinc (PRESERVISION LUTEIN PO) Take  by mouth daily.  bumetanide (BUMEX) 2 mg tablet Take 2 mg by mouth daily.  thyroid, Pork, (NP THYROID) 90 mg tablet Take 90 mg by mouth daily.  lidocaine (LIDODERM) 5 % 1 Patch by TransDERmal route every twenty-four (24) hours. Apply patch to the affected area for 12 hours a day and remove for 12 hours a day.  ascorbic acid (VITAMIN C) 500 mg tablet Take  by mouth daily.  ferrous sulfate 325 mg (65 mg iron) tablet Take 85 mg by mouth Daily (before breakfast).  cyanocobalamin (VITAMIN B12) 1,000 mcg/mL injection INJECT 1 ML INTO THE MUSCLE EVERY 30 DAYS 10 mL 0    Cholecalciferol, Vitamin D3, (VITAMIN D3) 1,000 unit Cap Take 2 Caps by mouth daily.  lansoprazole (PREVACID) 30 mg capsule Take 30 mg by mouth Daily (before breakfast). Allergies   Allergen Reactions    Adhesive Tape-Silicones Other (comments)     BAD BLISTERS AND TENDS TO GET INFECTED    Albuterol Palpitations    Aspirin Hives and Other (comments)     \"it makes my stomach bleed\"      Butter Flavor Anaphylaxis     Butter AND CREME    Demerol [Meperidine] Other (comments)     HYPOTENSION    Fentanyl Other (comments)     HYPOTENSION    Gluten Diarrhea     bloating    Keflex [Cephalexin] Anaphylaxis    Levaquin [Levofloxacin] Unproven on [employer]     PT DOESN'T REMEMBER HAVING THAT    Morphine Other (comments)     HYPOTENSION    Nitroglycerin Other (comments)     IN PILL FORM  - HYPOTENSION  CAN TOLERATE PATCH FOR SHORT TIME    Pcn [Penicillins] Anaphylaxis    Percocet [Oxycodone-Acetaminophen] Unknown (comments)     HYPOTENSION AND HALLUCINATIONS.  Can take tylenol ok, oxycodone is allergy per patient.  Tapazole [Methimazole] Unknown (comments)             SOCIAL HISTORY:     Social History     Tobacco Use    Smoking status: Former Smoker     Packs/day: 1.00     Years: 30.00     Pack years: 30.00     Last attempt to quit: 2002     Years since quittin.9    Smokeless tobacco: Never Used   Substance Use Topics    Alcohol use: No    Drug use: No         FAMILY HISTORY:     Family History   Problem Relation Age of Onset    Delayed Awakening Mother     Heart Disease Mother     Coronary Artery Disease Mother     Hypertension Mother     Stroke Mother     Delayed Awakening Sister     Hypertension Sister     Lung Disease Father     Cancer Father         colon    Hypertension Father     Hypertension Brother     Breast Cancer Maternal Aunt     Breast Cancer Maternal Aunt     Breast Cancer Cousin         maternal 1st cousin    Breast Cancer Maternal Aunt     Breast Cancer Cousin         maternal 1st cousin    Breast Cancer Cousin         maternal 1st cousin            REVIEW OF SYMPTOMS:      Review of Symptoms:  Constitutional: Negative for fever, chills  HEENT: Negative for nosebleeds, tinnitus  Respiratory: + wheezing  Cardiovascular: Negative for syncope;  + orthopnea   Gastrointestinal: Negative for abdominal pain, diarrhea, melena. Genitourinary: Negative for dysuria  Musculoskeletal: + joint pain, RA  Skin: Negative for rash  Heme: + bleeding problems  Neurological: Negative for speech change and focal weakness.               PHYSICAL EXAM:      Physical Exam:  Visit Vitals  /76 (BP 1 Location: Right arm, BP Patient Position: Sitting)   Pulse 80   Resp 16   Ht 5' 3\" (1.6 m)   Wt 191 lb (86.6 kg)   LMP 1980 (LMP Unknown)   SpO2 95%   BMI 33.83 kg/m²       Patient appears generally well, mood and affect are appropriate and pleasant. HEENT:  Hearing intact, non-icteric, normocephalic, atraumatic.    Neck Exam: Supple, no JVD  Lung Exam: Clear to auscultation, even breath sounds. Cardiac Exam: Regular rate and rhythm with no murmur  Abdomen: Soft, non-tender, normal bowel sounds. Extremities: Moves all ext well. Mild bilat lower extremity edema. Psych: Appropriate affect  Neuro - Grossly intact           LABS / OTHER STUDIES:      Lab Results   Component Value Date/Time    Sodium 142 05/02/2019 01:06 PM    Potassium 4.1 05/02/2019 01:06 PM    Chloride 97 05/02/2019 01:06 PM    CO2 22 05/02/2019 01:06 PM    Anion gap 11 04/22/2019 08:51 PM    Glucose 233 (H) 05/02/2019 01:06 PM    BUN 19 05/02/2019 01:06 PM    Creatinine 0.91 05/02/2019 01:06 PM    BUN/Creatinine ratio 21 05/02/2019 01:06 PM    GFR est AA 73 05/02/2019 01:06 PM    GFR est non-AA 64 05/02/2019 01:06 PM    Calcium 9.7 05/02/2019 01:06 PM    Bilirubin, total 0.4 04/22/2019 08:51 PM    AST (SGOT) 10 (L) 04/22/2019 08:51 PM    Alk.  phosphatase 132 (H) 04/22/2019 08:51 PM    Protein, total 7.3 04/22/2019 08:51 PM    Albumin 3.6 04/22/2019 08:51 PM    Globulin 3.7 04/22/2019 08:51 PM    A-G Ratio 1.0 (L) 04/22/2019 08:51 PM    ALT (SGPT) 26 04/22/2019 08:51 PM       Lab Results   Component Value Date/Time    WBC 20.6 (H) 04/22/2019 08:51 PM    Hemoglobin (POC) 15.6 06/09/2014 03:00 AM    HGB 14.9 04/22/2019 08:51 PM    Hematocrit (POC) 46 06/09/2014 03:00 AM    HCT 44.8 04/22/2019 08:51 PM    PLATELET 225 62/09/5800 08:51 PM    MCV 94.1 04/22/2019 08:51 PM         Component      Latest Ref Rng & Units 2/22/2019           3:48 AM   D-dimer      0.00 - 0.65 mg/L FEU 0.33         Component      Latest Ref Rng & Units 2/22/2019           3:48 AM   NT pro-BNP      0 - 125 PG/ML 45            CARDIAC DIAGNOSTICS:      Cardiac Evaluation Includes:     Cardiac Cath 6/09 - severe mLCX disease --> stenting     Cath 8/19/10 - RCA and LCX stents patent; MLI in LAD, LVEF 60%     Event Monitor 3/11 - PVC's  Holter 3/16 - PVC's  Echo 3/17 - LVEF 60%  Lexiscan Cardiolite 3/17 - normal MPI, LVEF 58%     [facility] records reviewed.  Went there for chest pain on 1/3/19. Labs 1/3/19 - Trop < 0.02, BNP 18, Cr 0.61, Hgb 13  Cath 1/4/19 - LAD stent patent, LCX without sig disease, RCA stent patent.          Holter 1/23/19 - NSR, normal study      Stress Echo 2/11/19 - walked 3:21 (2.4 METS), baseline /90, Max /90, normal stress EKG and stress echo      CXR 2/22/19 - normal      Echo 3/21/19 - LVEF 61%; grade 1 diastology (normal for age), AV sclerosis.  RVSP 26 mmHg.          EKG 2/22/19 - NSR, normal         ASSESSMENT AND PLAN:      Assessment and Plan:  1) Chest pain and SOB   - She notes symptoms since Jan 2019   - reviewed records from other physicians  - Her recent cardiac cath 1/19 showed patent vessels. - Her stress echo 3/19 was negative for ischemia  - Her symptoms could be due to small vessel disease and/or diastolic dysfunction. Pulmonary workup is underway.  Of note, proBNP 2/19 was normal (45). - [facility] records reviewed.  Went there for chest pain on 1/3/19. Labs 1/3/19 - Trop < 0.02, BNP 18, Cr 0.61, Hgb 13. Cath 1/4/19 - LAD stent patent, LCX without sig disease, RCA stent patent.    Medical management was recommended. - Echo 3/21/19 - LVEF 61%; grade 1 diastology (normal for age), AV sclerosis.  RVSP 26 mmHg.    - Not taking Imdur or Ranexa due to a bad HA  - needs better BP control (was high during stress test)  - On 4/19 -  increased metoprolol to 50 BID and added eplerenone  - Trying to get cath films from [address] [place] [address] to review. - Consider RHC if problems persist despite other measures. - She is taking Bumex 2mg daily and eplerenone and volume looks OK.      - She is wearing an event monitor   - On 5/16/19 --> will increase metoprolol to 75 mg BID for tighter BP control. She is trying to come off of steroids which may be the cause for a lot of her symptoms.         2) History of Iron Deficiency Anemia   - She takes no blood thinners (not even aspirin) due to anemia in past   -  [facility] record 12/16 reviewed - He was seeing her for iron deficiency anemia thought to be from GI Bleed from aspirin and plavix      3) Lipids -   - Does not take a statin due to having muscle aches (from RA at baseline)     4) History of Afib   - ablation in past? - need to get records  - not on any blood thinners due to anemia in past (see above)   - has been in sinus lately      5) HTN  - BP seems high at times at home   - see above      6) Dr. Lay record reviewed 1/31/19   - plan was for CTA for PE and PFT's -- patient says testing was OK      7) RA  - she is on prednisone --> glucose high on steroids --> ask her to see PCP for this    8) See me in 3-4 weeks.  Patient expressed understanding of the plan - questions were answered.     On a side note, I see her brother.          Cayla Sarabia MD, [facility] [address] [place], [address] [id number] [address]  [address]  [facility], [address] [place] [address]  Ph: [phone]                                [phone]

## 2019-05-16 NOTE — PROGRESS NOTES
1. Have you been to the ER, urgent care clinic since your last visit? Hospitalized since your last visit? Yes When: 4/22/2019 Where: [facility] Reason for visit: Chest pain    2. Have you seen or consulted any other health care providers outside of the [address] [place] [address] since your last visit? Include any pap smears or colon screening. No    Patient reports Med. Rec. Completed.      Chief Complaint   Patient presents with    Chest Pain (Angina)    Shortness of Breath       Visit Vitals  /76 (BP 1 Location: Right arm, BP Patient Position: Sitting)   Pulse 80   Resp 16   Ht 5' 3\" (1.6 m)   Wt 191 lb (86.6 kg)   SpO2 95%   BMI 33.83 kg/m²

## 2019-05-30 RX ORDER — EPLERENONE 25 MG/1
TABLET, FILM COATED ORAL
Qty: 30 TAB | Refills: 1 | Status: SHIPPED | OUTPATIENT
Start: 2019-05-30 | End: 2019-06-24 | Stop reason: SDUPTHER

## 2019-06-10 ENCOUNTER — TELEPHONE (OUTPATIENT)
Dept: CARDIOLOGY CLINIC | Age: 69
End: 2019-06-10

## 2019-06-10 NOTE — TELEPHONE ENCOUNTER
Fax received from Dr. Ranulfo Abarca's office requesting that we do an PARDEEP on patient. Please advise.     PARDEEP performed 6/7/19

## 2019-06-13 ENCOUNTER — TELEPHONE (OUTPATIENT)
Dept: CARDIOLOGY CLINIC | Age: 69
End: 2019-06-13

## 2019-06-13 NOTE — TELEPHONE ENCOUNTER
Call placed to discuss PARDEEP results with pt per VO of Dr. Mariam Murray. Advised of normal PARDEEP results.

## 2019-06-20 ENCOUNTER — OFFICE VISIT (OUTPATIENT)
Dept: CARDIOLOGY CLINIC | Age: 69
End: 2019-06-20

## 2019-06-20 VITALS
BODY MASS INDEX: 33.49 KG/M2 | HEART RATE: 70 BPM | SYSTOLIC BLOOD PRESSURE: 110 MMHG | RESPIRATION RATE: 15 BRPM | HEIGHT: 63 IN | WEIGHT: 189 LBS | DIASTOLIC BLOOD PRESSURE: 68 MMHG | OXYGEN SATURATION: 96 %

## 2019-06-20 DIAGNOSIS — I25.10 CORONARY ARTERY DISEASE INVOLVING NATIVE CORONARY ARTERY OF NATIVE HEART WITHOUT ANGINA PECTORIS: Primary | ICD-10-CM

## 2019-06-20 DIAGNOSIS — R06.09 DOE (DYSPNEA ON EXERTION): ICD-10-CM

## 2019-06-20 NOTE — PROGRESS NOTES
Daphne Mann is a 68 y.o. female    Room 4    Visit Vitals  /68   Pulse 70   Resp 15   Ht 5' 3\" (1.6 m)   Wt 189 lb (85.7 kg)   SpO2 96%   BMI 33.48 kg/m²       Chief Complaint   Patient presents with    Hypertension     1 mo follow up    Coronary Artery Disease     SOB, heart monitor results    Medication Refill     Bumex and Inspra refills to [facility] mail order requested     1. Have you been to the ER, urgent care clinic since your last visit? Hospitalized since your last visit? no    2. Have you seen or consulted any other health care providers outside of the [address] [place] [address] since your last visit? Include any pap smears or colon screening.  no

## 2019-06-20 NOTE — PROGRESS NOTES
Steven Wilkins MD. Formerly [facility] [place]              Patient: Tucker Fowler  : 1950      Today's Date: 2019          HISTORY OF PRESENT ILLNESS:     History of Present Illness:  She is here for follow-up. She says her palpitations is much better. Has CHACON which persists. Sleeps with head raised. No CP. She does feel a little better on meds. PAST MEDICAL HISTORY:     Past Medical History:   Diagnosis Date    Anemia     iron defic anemia    Arthritis     Asthma 2009    CAUSED BY MOLD    Atrial fibrillation ([address] 2009    CAD (coronary artery disease) 2009    MI X2  -- prior stenting     Chronic pain     RT. ANKLE    GERD (gastroesophageal reflux disease)     Gluten intolerance     Hypothyroidism 2009    Personal history of MI (myocardial infarction)     Rheumatoid arthritis ([address]     Stroke ([address]     TIA (NO RESIDUAL)    Syncope     Thromboembolus ([facility]     RT. -- DVT after surgery     Thyroid disease     HYPO    TIA (transient ischemic attack)      &     Unspecified adverse effect of anesthesia     \"IS A LIGHT WEIGHT\"    Unspecified adverse effect of anesthesia     DIFFICULTY AWAKENING    Vitamin B12 deficiency 2009       Past Surgical History:   Procedure Laterality Date    CARDIAC SURG PROCEDURE UNLIST      ABLATION    HX ADENOIDECTOMY      HX APPENDECTOMY      HX CHOLECYSTECTOMY  11    HX GI      COLONOSCOPY AND ENDOSCOPY    HX HEART CATHETERIZATION      2 STENTS    HX HYSTERECTOMY      HX LUMBAR LAMINECTOMY  2000    L4-L5    HX ORTHOPAEDIC  -2012    RT. FOOT SURGERY X 6/calcaneal osteotomy    HX TONSILLECTOMY  1964    STENT INSERTION           MEDICATIONS:     Current Outpatient Medications   Medication Sig Dispense Refill    SITagliptin (JANUVIA) 100 mg tablet Januvia 100 mg tablet   Take 1 tablet every day by oral route.       eplerenone (INSPRA) 25 mg tablet TAKE 1 TABLET BY MOUTH ONCE DAILY 30 Tab 1    metoprolol tartrate (LOPRESSOR) 50 mg tablet Take 1.5 Tabs by mouth two (2) times a day. 90 Tab 12    predniSONE (DELTASONE) 10 mg tablet Take 10 mg by mouth daily.  predniSONE (DELTASONE) 2.5 mg tablet Take 2.5 mg by mouth every evening.  calcium citrate 200 mg (950 mg) tablet Take 2,000 mg by mouth daily.  flaxseed oil (OMEGA 3 PO) Take  by mouth.  vit C/vit E ac/lut/copper/zinc (PRESERVISION LUTEIN PO) Take  by mouth daily.  bumetanide (BUMEX) 2 mg tablet Take 2 mg by mouth daily.  thyroid, Pork, (NP THYROID) 90 mg tablet Take 90 mg by mouth daily.  lidocaine (LIDODERM) 5 % 1 Patch by TransDERmal route every twenty-four (24) hours. Apply patch to the affected area for 12 hours a day and remove for 12 hours a day.  ascorbic acid (VITAMIN C) 500 mg tablet Take  by mouth daily.  ferrous sulfate 325 mg (65 mg iron) tablet Take 85 mg by mouth Daily (before breakfast).  cyanocobalamin (VITAMIN B12) 1,000 mcg/mL injection INJECT 1 ML INTO THE MUSCLE EVERY 30 DAYS 10 mL 0    Cholecalciferol, Vitamin D3, (VITAMIN D3) 1,000 unit Cap Take 2 Caps by mouth daily.  lansoprazole (PREVACID) 30 mg capsule Take 30 mg by mouth Daily (before breakfast).          Allergies   Allergen Reactions    Adhesive Tape-Silicones Other (comments)     BAD BLISTERS AND TENDS TO GET INFECTED    Albuterol Palpitations    Aspirin Hives and Other (comments)     \"it makes my stomach bleed\"      Butter Flavor Anaphylaxis     Butter AND CREME    Demerol [Meperidine] Other (comments)     HYPOTENSION    Fentanyl Other (comments)     HYPOTENSION    Gluten Diarrhea     bloating    Keflex [Cephalexin] Anaphylaxis    Levaquin [Levofloxacin] Unproven on Challenge     PT DOESN'T REMEMBER HAVING THAT    Morphine Other (comments)     HYPOTENSION    Nitroglycerin Other (comments)     IN PILL FORM  - HYPOTENSION  CAN TOLERATE PATCH FOR SHORT TIME    Pcn [Penicillins] Anaphylaxis    Percocet [Oxycodone-Acetaminophen] Unknown (comments)     HYPOTENSION AND HALLUCINATIONS. Can take tylenol ok, oxycodone is allergy per patient.  Tapazole [Methimazole] Unknown (comments)           SOCIAL HISTORY:     Social History     Tobacco Use    Smoking status: Former Smoker     Packs/day: 1.00     Years: 30.00     Pack years: 30.00     Last attempt to quit: 2002     Years since quittin.0    Smokeless tobacco: Never Used   Substance Use Topics    Alcohol use: No    Drug use: No         FAMILY HISTORY:     Family History   Problem Relation Age of Onset    Delayed Awakening Mother     Heart Disease Mother     Coronary Artery Disease Mother     Hypertension Mother     Stroke Mother     Delayed Awakening Sister     Hypertension Sister     Lung Disease Father     Cancer Father         colon    Hypertension Father     Hypertension Brother     Breast Cancer Maternal Aunt     Breast Cancer Maternal Aunt     Breast Cancer Cousin         maternal 1st cousin    Breast Cancer Maternal Aunt     Breast Cancer Cousin         maternal 1st cousin    Breast Cancer Cousin         maternal 1st cousin            REVIEW OF SYMPTOMS:      Review of Symptoms:  Constitutional: Negative for fever, chills  HEENT: Negative for nosebleeds, tinnitus  Respiratory: + wheezing  Cardiovascular: Negative for syncope;  + orthopnea   Gastrointestinal: Negative for abdominal pain, diarrhea, melena. Genitourinary: Negative for dysuria  Musculoskeletal: + joint pain, RA  Skin: Negative for rash  Heme: + bleeding problems  Neurological: Negative for speech change and focal weakness.               PHYSICAL EXAM:      Physical Exam:  Visit Vitals  /68   Pulse 70   Resp 15   Ht 5' 3\" (1.6 m)   Wt 189 lb (85.7 kg)   LMP 1980 (LMP Unknown)   SpO2 96%   BMI 33.48 kg/m²          Patient appears generally well, mood and affect are appropriate and pleasant.   HEENT:  Hearing intact, non-icteric, normocephalic, atraumatic. Neck Exam: Supple, no JVD  Lung Exam: Clear to auscultation, even breath sounds. Cardiac Exam: Regular rate and rhythm with no murmur  Abdomen: Soft, non-tender, normal bowel sounds. Extremities: Moves all ext well. Mild bilat lower extremity edema. Psych: Appropriate affect  Neuro - Grossly intact           LABS / OTHER STUDIES:      Lab Results   Component Value Date/Time    Sodium 142 05/02/2019 01:06 PM    Potassium 4.1 05/02/2019 01:06 PM    Chloride 97 05/02/2019 01:06 PM    CO2 22 05/02/2019 01:06 PM    Anion gap 11 04/22/2019 08:51 PM    Glucose 233 (H) 05/02/2019 01:06 PM    BUN 19 05/02/2019 01:06 PM    Creatinine 0.91 05/02/2019 01:06 PM    BUN/Creatinine ratio 21 05/02/2019 01:06 PM    GFR est AA 73 05/02/2019 01:06 PM    GFR est non-AA 64 05/02/2019 01:06 PM    Calcium 9.7 05/02/2019 01:06 PM    Bilirubin, total 0.4 04/22/2019 08:51 PM    AST (SGOT) 10 (L) 04/22/2019 08:51 PM    Alk.  phosphatase 132 (H) 04/22/2019 08:51 PM    Protein, total 7.3 04/22/2019 08:51 PM    Albumin 3.6 04/22/2019 08:51 PM    Globulin 3.7 04/22/2019 08:51 PM    A-G Ratio 1.0 (L) 04/22/2019 08:51 PM    ALT (SGPT) 26 04/22/2019 08:51 PM       Lab Results   Component Value Date/Time    WBC 20.6 (H) 04/22/2019 08:51 PM    Hemoglobin (POC) 15.6 06/09/2014 03:00 AM    HGB 14.9 04/22/2019 08:51 PM    Hematocrit (POC) 46 06/09/2014 03:00 AM    HCT 44.8 04/22/2019 08:51 PM    PLATELET 606 58/18/8204 08:51 PM    MCV 94.1 04/22/2019 08:51 PM          Component      Latest Ref Rng & Units 2/22/2019           3:48 AM   D-dimer      0.00 - 0.65 mg/L FEU 0.33         Component      Latest Ref Rng & Units 2/22/2019           3:48 AM   NT pro-BNP      0 - 125 PG/ML 45            CARDIAC DIAGNOSTICS:      Cardiac Evaluation Includes:     Cardiac Cath 6/09 - severe mLCX disease --> stenting     Cath 8/19/10 - RCA and LCX stents patent; MLI in LAD, LVEF 60%     Event Monitor 3/11 - PVC's  Holter 3/16 - PVC's  Echo 3/17 - LVEF 60%  Lexiscan Cardiolite 3/17 - normal MPI, LVEF 58%     [facility] records reviewed.  Went there for chest pain on 1/3/19. Labs 1/3/19 - Trop < 0.02, BNP 18, Cr 0.61, Hgb 13  Cath 1/4/19 - LAD stent patent, LCX without sig disease, RCA stent patent.          Holter 1/23/19 - NSR, normal study      Stress Echo 2/11/19 - walked 3:21 (2.4 METS), baseline /90, Max /90, normal stress EKG and stress echo      CXR 2/22/19 - normal      Echo 3/21/19 - LVEF 61%; grade 1 diastology (normal for age), AV sclerosis.  RVSP 26 mmHg.      PARDEEP's with exercise 6/7/19 - normal       EKG 2/22/19 - NSR, normal         ASSESSMENT AND PLAN:      Assessment and Plan:  1) Chest pain and SOB   - She notes symptoms since Jan 2019   - reviewed records from other physicians  - Her recent cardiac cath 1/19 showed patent vessels. - Her stress echo 3/19 was negative for ischemia  - Her symptoms could be due to small vessel disease and/or diastolic dysfunction.  Of note, proBNP 2/19 was normal (45). - She saw Pulmonary ([facility]) and she says workup was OK.    - [facility] records reviewed.  Went there for chest pain on 1/3/19.  Labs 1/3/19 - Trop < 0.02, BNP 18, Cr 0.61, Hgb 13.   Cath 1/4/19 - LAD stent patent, LCX without sig disease, RCA stent patent.    Medical management was recommended.    - Echo 3/21/19 - LVEF 61%; grade 1 diastology (normal for age), AV sclerosis.  RVSP 26 mmHg.    - Not taking Imdur or Ranexa due to a bad HA  - needs good BP control (was high during stress test)  - On 4/19 -  increased metoprolol to 50 BID and added eplerenone  - She just brought cath films for me to review --> will look at it   - Consider RHC if problems persist despite other measures.   - She is taking Bumex 2mg daily and eplerenone and volume looks OK.      - She wore an event monitor for 30 days - results pending   - On 5/16/19 --> will increase metoprolol to 75 mg BID for tighter BP control.   She is trying to come off of steroids which may be the cause for a lot of her symptoms.   - On 6/20/19 - her CHACON continues but better on eplerenone. Still unable to walk much.       2) History of Iron Deficiency Anemia   - She takes no blood thinners (not even aspirin) due to anemia in past   - Dr. Moriah Peters record 12/16 reviewed - He was seeing her for iron deficiency anemia thought to be from GI Bleed from aspirin and plavix      3) Lipids -   - Does not take a statin due to having muscle aches (from RA at baseline)     4) History of Afib   - ablation in past? - need to get records  - not on any blood thinners due to anemia in past (see above)   - has been in sinus lately      5) HTN  - BP looks better lately   - see above      6) Dr. Lay record reviewed 1/31/19   - plan was for CTA for PE and PFT's -- patient says testing was OK      7) RA  - she is on prednisone     8) DM on steroids   - seeing PCP     9) Consider RHC after reviewing [facility] images. See me in 4 weeks.   Patient expressed understanding of the plan - questions were answered.     On a side note, I see her brother.          Mario Kent MD, [address] [place], [address]      [id number]  [address]  Ph: [phone]                                [phone]       ADDENDUM   6/20/2019  Event Monitor 5/15/19-6/6/19 - normal study; symptoms correlated with sinus     I viewed cath images from 1/4/19 - the cath is without obstructive disease as the report states    Will call patient. Not sure why she is so SOB. Cardiac workup has been unremarkable. Symptoms persist despite trial of meds. We could check a RHC to assess PCWP and PA pressures if patient wants. ADDENDUM   6/21/2019  I spoke to patient and gave her results. She would like to proceed with a RHC. Will get that set up.         ADDENDUM 7/7/2019  Right Heart Cath 6/28/19 - RHC findings:   RAP=   Mean 17    mmHg  RVSP= 40/20    mmHg  PAP=     42/30/34  mmHg  PCWP=  27 mmHg  CO=        Thermal 4.1  L/min,             Faraz pending  CI=           Thermal 3.08  L/min/m2,     Faraz pending      Findings:  1. Elevated right and left sided filling pressure  2. Pulmonary hypertension, WHO group 2  3. Perserved cardiac index by Thermal    Given elevated left heart pressures, will try and lower that to see if that helps CHACON. May consider making eplerenone 50 mg BID. Will call pt. ADDENDUM   7/8/2019  I called patient. Still has CHACON. Will see her back in 10 days before making an further changes. Consider cardiac MRI. Consider sending her to [facility] to see if they have anything to add.

## 2019-06-20 NOTE — H&P (VIEW-ONLY)
Marie Nolan MD. [facility] Patient: Darshana Min : 1950 Today's Date: 2019 HISTORY OF PRESENT ILLNESS:  
 
History of Present Illness: 
She is here for follow-up. She says her palpitations is much better. Has CHACON which persists. Sleeps with head raised. No CP. She does feel a little better on meds. PAST MEDICAL HISTORY:  
 
Past Medical History:  
Diagnosis Date  Anemia   
 iron defic anemia  Arthritis  Asthma 2009 CAUSED BY MOLD  Atrial fibrillation ([address] 2009  CAD (coronary artery disease) 2009 MI X2  -- prior stenting  Chronic pain RT. ANKLE  
 GERD (gastroesophageal reflux disease)  Gluten intolerance  Hypothyroidism 2009  Personal history of MI (myocardial infarction)  Rheumatoid arthritis ([address]  Stroke ([facility] TIA (NO RESIDUAL)  Syncope  Thromboembolus ([facility]   
 RT. -- DVT after surgery  Thyroid disease HYPO  
 TIA (transient ischemic attack)  &   Unspecified adverse effect of anesthesia \"IS A LIGHT WEIGHT\"  Unspecified adverse effect of anesthesia DIFFICULTY AWAKENING  
 Vitamin B12 deficiency 2009 Past Surgical History:  
Procedure Laterality Date  CARDIAC SURG PROCEDURE UNLIST ABLATION  
 HX ADENOIDECTOMY  HX APPENDECTOMY  HX CHOLECYSTECTOMY  11  HX GI    
 COLONOSCOPY AND ENDOSCOPY  
 HX HEART CATHETERIZATION    
 2 STENTS  
 HX HYSTERECTOMY [address] L4-L5  HX ORTHOPAEDIC  -2012  
 RT. [facility]  STENT INSERTION    
 
 
 
MEDICATIONS:  
 
Current Outpatient Medications Medication Sig Dispense Refill  SITagliptin (JANUVIA) 100 mg tablet Januvia 100 mg tablet Take 1 tablet every day by oral route.     
 eplerenone (INSPRA) 25 mg tablet TAKE 1 TABLET BY MOUTH ONCE DAILY 30 Tab 1  
  metoprolol tartrate (LOPRESSOR) 50 mg tablet Take 1.5 Tabs by mouth two (2) times a day. 90 Tab 12  
 predniSONE (DELTASONE) 10 mg tablet Take 10 mg by mouth daily.  predniSONE (DELTASONE) 2.5 mg tablet Take 2.5 mg by mouth every evening.  calcium citrate 200 mg (950 mg) tablet Take 2,000 mg by mouth daily.  flaxseed oil (OMEGA 3 PO) Take  by mouth.  vit C/vit E ac/lut/copper/zinc (PRESERVISION LUTEIN PO) Take  by mouth daily.  bumetanide (BUMEX) 2 mg tablet Take 2 mg by mouth daily.  thyroid, Pork, (NP THYROID) 90 mg tablet Take 90 mg by mouth daily.  lidocaine (LIDODERM) 5 % 1 Patch by TransDERmal route every twenty-four (24) hours. Apply patch to the affected area for 12 hours a day and remove for 12 hours a day.  ascorbic acid (VITAMIN C) 500 mg tablet Take  by mouth daily.  ferrous sulfate 325 mg (65 mg iron) tablet Take 85 mg by mouth Daily (before breakfast).  cyanocobalamin (VITAMIN B12) 1,000 mcg/mL injection INJECT 1 ML INTO THE MUSCLE EVERY 30 DAYS 10 mL 0  Cholecalciferol, Vitamin D3, (VITAMIN D3) 1,000 unit Cap Take 2 Caps by mouth daily.  lansoprazole (PREVACID) 30 mg capsule Take 30 mg by mouth Daily (before breakfast). Allergies Allergen Reactions  Adhesive Tape-Silicones Other (comments) BAD BLISTERS AND TENDS TO GET INFECTED  Albuterol Palpitations  Aspirin Hives and Other (comments) \"it makes my stomach bleed\"  Butter Flavor Anaphylaxis Butter AND CREME  Demerol [Meperidine] Other (comments) HYPOTENSION  
 Fentanyl Other (comments) HYPOTENSION  
 Gluten Diarrhea  
  bloating  Keflex [Cephalexin] Anaphylaxis  Levaquin [Levofloxacin] Unproven on Challenge PT DOESN'T REMEMBER HAVING THAT  Morphine Other (comments) HYPOTENSION  
 Nitroglycerin Other (comments) IN PILL FORM  - HYPOTENSION 
CAN TOLERATE PATCH FOR SHORT TIME  Pcn [Penicillins] Anaphylaxis  Percocet [Oxycodone-Acetaminophen] Unknown (comments) HYPOTENSION AND HALLUCINATIONS. Can take tylenol ok, oxycodone is allergy per patient.  Tapazole [Methimazole] Unknown (comments) SOCIAL HISTORY:  
 
Social History Tobacco Use  Smoking status: Former Smoker Packs/day: 1.00 Years: 30.00 Pack years: 30.00 Last attempt to quit: 2002 Years since quittin.0  Smokeless tobacco: Never Used Substance Use Topics  Alcohol use: No  
 Drug use: No  
 
 
 
FAMILY HISTORY:  
 
Family History Problem Relation Age of Onset  Delayed Awakening Mother  Heart Disease Mother  Coronary Artery Disease Mother  Hypertension Mother  Stroke Mother  Delayed Awakening Sister  Hypertension Sister  Lung Disease Father  Cancer Father   
     colon  Hypertension Father  Hypertension Brother  Breast Cancer Maternal Aunt  Breast Cancer Maternal Aunt  Breast Cancer Cousin   
     maternal 1st cousin  Breast Cancer Maternal Aunt  Breast Cancer Cousin   
     maternal 1st cousin  Breast Cancer Cousin   
     maternal 1st cousin  
 
 
 
  
REVIEW OF SYMPTOMS:  
  
Review of Symptoms: 
Constitutional: Negative for fever, chills HEENT: Negative for nosebleeds, tinnitus Respiratory: + wheezing Cardiovascular: Negative for syncope;  + orthopnea Gastrointestinal: Negative for abdominal pain, diarrhea, melena. Genitourinary: Negative for dysuria Musculoskeletal: + joint pain, RA Skin: Negative for rash Heme: + bleeding problems Neurological: Negative for speech change and focal weakness.  
  
  
  
  
PHYSICAL EXAM:  
  
Physical Exam: 
Visit Vitals /68 Pulse 70 Resp 15 Ht 5' 3\" (1.6 m) Wt 189 lb (85.7 kg) LMP 1980 (LMP Unknown) SpO2 96% BMI 33.48 kg/m²  
 
 
  
Patient appears generally well, mood and affect are appropriate and pleasant. HEENT:  Hearing intact, non-icteric, normocephalic, atraumatic. Neck Exam: Supple, no JVD Lung Exam: Clear to auscultation, even breath sounds. Cardiac Exam: Regular rate and rhythm with no murmur Abdomen: Soft, non-tender, normal bowel sounds. Extremities: Moves all ext well. Mild bilat lower extremity edema. Psych: Appropriate affect Neuro - Grossly intact 
  
  
  
LABS / OTHER STUDIES:  
  
Lab Results Component Value Date/Time Sodium 142 05/02/2019 01:06 PM  
 Potassium 4.1 05/02/2019 01:06 PM  
 Chloride 97 05/02/2019 01:06 PM  
 CO2 22 05/02/2019 01:06 PM  
 Anion gap 11 04/22/2019 08:51 PM  
 Glucose 233 (H) 05/02/2019 01:06 PM  
 BUN 19 05/02/2019 01:06 PM  
 Creatinine 0.91 05/02/2019 01:06 PM  
 BUN/Creatinine ratio 21 05/02/2019 01:06 PM  
 GFR est AA 73 05/02/2019 01:06 PM  
 GFR est non-AA 64 05/02/2019 01:06 PM  
 Calcium 9.7 05/02/2019 01:06 PM  
 Bilirubin, total 0.4 04/22/2019 08:51 PM  
 AST (SGOT) 10 (L) 04/22/2019 08:51 PM  
 Alk. phosphatase 132 (H) 04/22/2019 08:51 PM  
 Protein, total 7.3 04/22/2019 08:51 PM  
 Albumin 3.6 04/22/2019 08:51 PM  
 Globulin 3.7 04/22/2019 08:51 PM  
 A-G Ratio 1.0 (L) 04/22/2019 08:51 PM  
 ALT (SGPT) 26 04/22/2019 08:51 PM  
 
 
Lab Results Component Value Date/Time  WBC 20.6 (H) 04/22/2019 08:51 PM  
 Hemoglobin (POC) 15.6 06/09/2014 03:00 AM  
 HGB 14.9 04/22/2019 08:51 PM  
 Hematocrit (POC) 46 06/09/2014 03:00 AM  
 HCT 44.8 04/22/2019 08:51 PM  
 PLATELET 523 05/58/3395 08:51 PM  
 MCV 94.1 04/22/2019 08:51 PM  
 
 
  
Component 
    Latest Ref Rng & Units 2/22/2019  
 
      3:48 AM  
D-dimer 
    0.00 - 0.65 mg/L FEU 0.33  
  
  
Component 
    Latest Ref Rng & Units 2/22/2019  
 
      3:48 AM  
NT pro-BNP 
    0 - 125 PG/ML 45  
  
  
  
CARDIAC DIAGNOSTICS:  
  
Cardiac Evaluation Includes: 
  
Cardiac Cath 6/09 - severe mLCX disease --> stenting 
  
Cath 8/19/10 - RCA and LCX stents patent; MLI in LAD, LVEF 60% 
  
 Event Monitor 3/11 - PVC's Holter 3/16 - PVC's Echo 3/17 - LVEF 60% Lexiscan Cardiolite 3/17 - normal MPI, LVEF 58% 
  
[facility] records reviewed.  Went there for chest pain on 1/3/19. Labs 1/3/19 - Trop < 0.02, BNP 18, Cr 0.61, Hgb 13 Cath 1/4/19 - LAD stent patent, LCX without sig disease, RCA stent patent.   
  
  
Holter 1/23/19 - NSR, normal study  
  
Stress Echo 2/11/19 - walked 3:21 (2.4 METS), baseline /90, Max /90, normal stress EKG and stress echo  
  
CXR 2/22/19 - normal  
  
Echo 3/21/19 - LVEF 61%; grade 1 diastology (normal for age), AV sclerosis.  RVSP 26 mmHg.   
 
PARDEEP's with exercise 6/7/19 - normal   
  
EKG 2/22/19 - NSR, normal  
  
  
ASSESSMENT AND PLAN:  
  
Assessment and Plan: 
1) Chest pain and SOB  
- She notes symptoms since Jan 2019  
- reviewed records from other physicians - Her recent cardiac cath 1/19 showed patent vessels. - Her stress echo 3/19 was negative for ischemia 
- Her symptoms could be due to small vessel disease and/or diastolic dysfunction.  Of note, proBNP 2/19 was normal (45). - She saw Pulmonary ([facility]) and she says workup was OK.   
- [facility] records reviewed.  Went there for chest pain on 1/3/19.  Labs 1/3/19 - Trop < 0.02, BNP 18, Cr 0.61, Hgb 13.   Cath 1/4/19 - LAD stent patent, LCX without sig disease, RCA stent patent.    Medical management was recommended.   
- Echo 3/21/19 - LVEF 61%; grade 1 diastology (normal for age), AV sclerosis.  RVSP 26 mmHg.   
- Not taking Imdur or Ranexa due to a bad HA 
- needs good BP control (was high during stress test) - On 4/19 -  increased metoprolol to 50 BID and added eplerenone - She just brought cath films for me to review --> will look at it  
- Consider RHC if problems persist despite other measures.  
- She is taking Bumex 2mg daily and eplerenone and volume looks OK.     
- She wore an event monitor for 30 days - results pending - On 5/16/19 --> will increase metoprolol to 75 mg BID for tighter BP control. She is trying to come off of steroids which may be the cause for a lot of her symptoms.  
- On 6/20/19 - her CHACON continues but better on eplerenone. Still unable to walk much.   
  
2) History of Iron Deficiency Anemia - She takes no blood thinners (not even aspirin) due to anemia in past  
- Dr. Lucien Cordova record 12/16 reviewed - He was seeing her for iron deficiency anemia thought to be from GI Bleed from aspirin and plavix  
  
3) Lipids -  
- Does not take a statin due to having muscle aches (from RA at baseline)   
4) History of Afib  
- ablation in past? - need to get records 
- not on any blood thinners due to anemia in past (see above)  
- has been in sinus lately   
5) HTN 
- BP looks better lately  
- see above   
6) Dr. Lay record reviewed 1/31/19  
- plan was for CTA for PE and PFT's -- patient says testing was OK  
  
7) RA 
- she is on prednisone 8) DM on steroids - seeing PCP 
  
9) Consider RHC after reviewing [facility] images. See me in 4 weeks.   Patient expressed understanding of the plan - questions were answered. 
  
On a side note, I see her brother.   
  
  
Erin Whelan MD, [facility] [address] [address], [address]      [id number] [address] [place] [address]. [address] [place] [address] Ph: [phone]                                [phone] 
  
 
ADDENDUM   6/20/2019 Event Monitor 5/15/19-6/6/19 - normal study; symptoms correlated with sinus I viewed cath images from 1/4/19 - the cath is without obstructive disease as the report states Will call patient. Not sure why she is so SOB. Cardiac workup has been unremarkable. Symptoms persist despite trial of meds. We could check a RHC to assess PCWP and PA pressures if patient wants. ADDENDUM   6/21/2019 I spoke to patient and gave her results. She would like to proceed with a C. Will get that set up.

## 2019-06-24 RX ORDER — EPLERENONE 25 MG/1
TABLET, FILM COATED ORAL
Qty: 90 TAB | Refills: 3 | Status: SHIPPED | OUTPATIENT
Start: 2019-06-24 | End: 2019-07-19 | Stop reason: SDUPTHER

## 2019-06-25 DIAGNOSIS — I25.10 CORONARY ARTERY DISEASE INVOLVING NATIVE CORONARY ARTERY OF NATIVE HEART WITHOUT ANGINA PECTORIS: Primary | ICD-10-CM

## 2019-06-25 DIAGNOSIS — R06.09 DOE (DYSPNEA ON EXERTION): ICD-10-CM

## 2019-06-26 LAB
BASOPHILS # BLD AUTO: 0.1 X10E3/UL (ref 0–0.2)
BASOPHILS NFR BLD AUTO: 0 %
BUN SERPL-MCNC: 26 MG/DL (ref 8–27)
BUN/CREAT SERPL: 37 (ref 12–28)
CALCIUM SERPL-MCNC: 9.2 MG/DL (ref 8.7–10.3)
CHLORIDE SERPL-SCNC: 100 MMOL/L (ref 96–106)
CO2 SERPL-SCNC: 25 MMOL/L (ref 20–29)
CREAT SERPL-MCNC: 0.71 MG/DL (ref 0.57–1)
EOSINOPHIL # BLD AUTO: 0.1 X10E3/UL (ref 0–0.4)
EOSINOPHIL NFR BLD AUTO: 0 %
ERYTHROCYTE [DISTWIDTH] IN BLOOD BY AUTOMATED COUNT: 13.3 % (ref 12.3–15.4)
GLUCOSE SERPL-MCNC: 143 MG/DL (ref 65–99)
HCT VFR BLD AUTO: 41.3 % (ref 34–46.6)
HGB BLD-MCNC: 14 G/DL (ref 11.1–15.9)
IMM GRANULOCYTES # BLD AUTO: 0.2 X10E3/UL (ref 0–0.1)
IMM GRANULOCYTES NFR BLD AUTO: 1 %
LYMPHOCYTES # BLD AUTO: 1.4 X10E3/UL (ref 0.7–3.1)
LYMPHOCYTES NFR BLD AUTO: 7 %
MCH RBC QN AUTO: 30.7 PG (ref 26.6–33)
MCHC RBC AUTO-ENTMCNC: 33.9 G/DL (ref 31.5–35.7)
MCV RBC AUTO: 91 FL (ref 79–97)
MONOCYTES # BLD AUTO: 1.1 X10E3/UL (ref 0.1–0.9)
MONOCYTES NFR BLD AUTO: 6 %
NEUTROPHILS # BLD AUTO: 16.3 X10E3/UL (ref 1.4–7)
NEUTROPHILS NFR BLD AUTO: 86 %
PLATELET # BLD AUTO: 268 X10E3/UL (ref 150–450)
POTASSIUM SERPL-SCNC: 4.5 MMOL/L (ref 3.5–5.2)
RBC # BLD AUTO: 4.56 X10E6/UL (ref 3.77–5.28)
SODIUM SERPL-SCNC: 144 MMOL/L (ref 134–144)
WBC # BLD AUTO: 19.1 X10E3/UL (ref 3.4–10.8)

## 2019-06-27 ENCOUNTER — TELEPHONE (OUTPATIENT)
Dept: CARDIOLOGY CLINIC | Age: 69
End: 2019-06-27

## 2019-06-27 DIAGNOSIS — R06.09 DYSPNEA ON EXERTION: Primary | ICD-10-CM

## 2019-06-27 RX ORDER — SODIUM CHLORIDE 9 MG/ML
100 INJECTION, SOLUTION INTRAVENOUS CONTINUOUS
Status: CANCELLED | OUTPATIENT
Start: 2019-06-28

## 2019-06-27 RX ORDER — SODIUM CHLORIDE 0.9 % (FLUSH) 0.9 %
5-40 SYRINGE (ML) INJECTION AS NEEDED
Status: CANCELLED | OUTPATIENT
Start: 2019-06-28

## 2019-06-27 RX ORDER — SODIUM CHLORIDE 0.9 % (FLUSH) 0.9 %
5-40 SYRINGE (ML) INJECTION EVERY 8 HOURS
Status: CANCELLED | OUTPATIENT
Start: 2019-06-28

## 2019-06-27 NOTE — TELEPHONE ENCOUNTER
Pharmacy confirmed. Pt will be out in a week. Pt stated it has 0 refills left.   Phone #[phone]  Thanks

## 2019-06-28 ENCOUNTER — HOSPITAL ENCOUNTER (OUTPATIENT)
Age: 69
Setting detail: OUTPATIENT SURGERY
Discharge: HOME | End: 2019-06-28
Attending: STUDENT IN AN ORGANIZED HEALTH CARE EDUCATION/TRAINING PROGRAM | Admitting: STUDENT IN AN ORGANIZED HEALTH CARE EDUCATION/TRAINING PROGRAM
Payer: MEDICARE

## 2019-06-28 VITALS
HEIGHT: 63 IN | TEMPERATURE: 97.8 F | DIASTOLIC BLOOD PRESSURE: 68 MMHG | BODY MASS INDEX: 33.63 KG/M2 | SYSTOLIC BLOOD PRESSURE: 129 MMHG | OXYGEN SATURATION: 96 % | WEIGHT: 189.82 LBS | RESPIRATION RATE: 15 BRPM | HEART RATE: 68 BPM

## 2019-06-28 DIAGNOSIS — R06.9 ABNORMAL RESPIRATORY RATE: ICD-10-CM

## 2019-06-28 DIAGNOSIS — R06.09 DYSPNEA ON EXERTION: ICD-10-CM

## 2019-06-28 LAB
COHGB MFR BLD: 1.1 % (ref 1–2)
GLUCOSE BLD STRIP.AUTO-MCNC: 118 MG/DL (ref 65–100)
HGB BLD OXIMETRY-MCNC: 14.1 G/DL (ref 14–17)
METHGB MFR BLD: 0.4 % (ref 0–1.4)
OXYHGB MFR BLD: 69.1 % (ref 94–97)
SAO2 % BLD: 70 % (ref 95–99)
SERVICE CMNT-IMP: ABNORMAL

## 2019-06-28 PROCEDURE — 99153 MOD SED SAME PHYS/QHP EA: CPT | Performed by: STUDENT IN AN ORGANIZED HEALTH CARE EDUCATION/TRAINING PROGRAM

## 2019-06-28 PROCEDURE — 93451 RIGHT HEART CATH: CPT | Performed by: STUDENT IN AN ORGANIZED HEALTH CARE EDUCATION/TRAINING PROGRAM

## 2019-06-28 PROCEDURE — 74011250636 HC RX REV CODE- 250/636: Performed by: STUDENT IN AN ORGANIZED HEALTH CARE EDUCATION/TRAINING PROGRAM

## 2019-06-28 PROCEDURE — C1894 INTRO/SHEATH, NON-LASER: HCPCS | Performed by: STUDENT IN AN ORGANIZED HEALTH CARE EDUCATION/TRAINING PROGRAM

## 2019-06-28 PROCEDURE — 77030008543 HC TBNG MON PRSS MRTM -A: Performed by: STUDENT IN AN ORGANIZED HEALTH CARE EDUCATION/TRAINING PROGRAM

## 2019-06-28 PROCEDURE — 82810 BLOOD GASES O2 SAT ONLY: CPT | Performed by: STUDENT IN AN ORGANIZED HEALTH CARE EDUCATION/TRAINING PROGRAM

## 2019-06-28 PROCEDURE — C1751 CATH, INF, PER/CENT/MIDLINE: HCPCS | Performed by: STUDENT IN AN ORGANIZED HEALTH CARE EDUCATION/TRAINING PROGRAM

## 2019-06-28 PROCEDURE — 82375 ASSAY CARBOXYHB QUANT: CPT

## 2019-06-28 PROCEDURE — 82962 GLUCOSE BLOOD TEST: CPT

## 2019-06-28 PROCEDURE — 77030029065 HC DRSG HEMO QCLOT ZMED -B: Performed by: STUDENT IN AN ORGANIZED HEALTH CARE EDUCATION/TRAINING PROGRAM

## 2019-06-28 PROCEDURE — 99152 MOD SED SAME PHYS/QHP 5/>YRS: CPT | Performed by: STUDENT IN AN ORGANIZED HEALTH CARE EDUCATION/TRAINING PROGRAM

## 2019-06-28 RX ORDER — SODIUM CHLORIDE 0.9 % (FLUSH) 0.9 %
5-40 SYRINGE (ML) INJECTION EVERY 8 HOURS
Status: DISCONTINUED | OUTPATIENT
Start: 2019-06-28 | End: 2019-06-28 | Stop reason: HOSPADM

## 2019-06-28 RX ORDER — SODIUM CHLORIDE 0.9 % (FLUSH) 0.9 %
5-40 SYRINGE (ML) INJECTION AS NEEDED
Status: DISCONTINUED | OUTPATIENT
Start: 2019-06-28 | End: 2019-06-28 | Stop reason: HOSPADM

## 2019-06-28 RX ORDER — SODIUM CHLORIDE 9 MG/ML
100 INJECTION, SOLUTION INTRAVENOUS CONTINUOUS
Status: DISCONTINUED | OUTPATIENT
Start: 2019-06-28 | End: 2019-06-28 | Stop reason: HOSPADM

## 2019-06-28 RX ORDER — HEPARIN SODIUM 200 [USP'U]/100ML
INJECTION, SOLUTION INTRAVENOUS
Status: COMPLETED | OUTPATIENT
Start: 2019-06-28 | End: 2019-06-28

## 2019-06-28 RX ORDER — BUMETANIDE 2 MG/1
2 TABLET ORAL DAILY
Qty: 90 TAB | Refills: 3 | Status: ON HOLD | OUTPATIENT
Start: 2019-06-28 | End: 2019-08-12

## 2019-06-28 RX ORDER — BUMETANIDE 2 MG/1
2 TABLET ORAL DAILY
Qty: 90 TAB | Refills: 3 | Status: ON HOLD | OUTPATIENT
Start: 2019-06-28 | End: 2019-06-28 | Stop reason: SDUPTHER

## 2019-06-28 NOTE — DISCHARGE INSTRUCTIONS
Discharge instructions given to patient and . Watch for bleeding or swelling at right neck site, hold firm pressure at right side of neck if it doesn't resolve call 911, if you see any swelling at right  Neck site hold firm pressure at site if it doesn't resolve call 911, look for any signs of infection , eg. Redness and warmth at the site or low grade temperature call dr. Janette Hernandez office All questions answered. Patient and  verbalized understanding.

## 2019-06-28 NOTE — INTERVAL H&P NOTE
H&P Update:  Mahendra Guardian was seen and examined. History and physical has been reviewed. The patient has been examined.  There have been no significant clinical changes since the completion of the originally dated History and Physical.

## 2019-06-28 NOTE — PROGRESS NOTES
1045 Received patient from waiting area. Armband and allergies verbally confirmed with patient. Procedure explained and consents signed. 1235 TRANSFER - OUT REPORT:    Verbal report given to Leighann(name) on Tashia Price  being transferred to cath unit(unit) for routine progression of care       Report consisted of patients Situation, Background, Assessment and   Recommendations(SBAR). Information from the following report(s) Procedure Summary was reviewed with the receiving nurse. Lines:       Opportunity for questions and clarification was provided. Patient transported with:   Registered Nurse   1335 TRANSFER - IN REPORT:    Verbal report received from Ozzie(name) on Tashia Price  being received from cath lab(unit) for routine progression of care      Report consisted of patients Situation, Background, Assessment and   Recommendations(SBAR). Information from the following report(s) Procedure Summary was reviewed with the receiving nurse. Opportunity for questions and clarification was provided. Assessment completed upon patients arrival to unit and care assumed. Dressing on right side of neck dry in tact, pt awake alert   1500 right neck dressing dry in tact  18 preparing for discharge, reviewed discharge instructions with pt and , both verbalized understanding, Copy of printed discharge instructions given to patient and  , Patient escorted via wheelchair to entrance.  driving. Patient discharged into care of . Francisco Enamorado

## 2019-06-28 NOTE — Clinical Note
TRANSFER - OUT REPORT:  
 
Verbal report given to: James. Report consisted of patient's Situation, Background, Assessment and  
Recommendations(SBAR). Opportunity for questions and clarification was provided. Patient transported with a Registered Nurse and [address] [place] [address] / [facility]. Patient transported to: Nitza Andrews.   
Requested bedside report

## 2019-06-28 NOTE — Clinical Note
TRANSFER - IN REPORT:  
 
Verbal report received from: [facility]. Report consisted of patient's Situation, Background, Assessment and  
Recommendations(SBAR). Opportunity for questions and clarification was provided. Assessment completed upon patient's arrival to unit and care assumed. Patient transported with a Registered Nurse.

## 2019-06-28 NOTE — PROCEDURES
BRIEF PROCEDURE NOTE    Date of Procedure: 6/28/2019   Preoperative Diagnosis: CHACON  Postoperative Diagnosis: same    Procedure:Right heart cath  Interventional Cardiologist: Denzel Ward DO  Anesthesia: local   Estimated Blood Loss: Minimal        RHC findings:    RAP=   Mean 17    mmHg  RVSP= 40/20    mmHg  PAP=     42/30/34  mmHg  PCWP=  27 mmHg  CO=        Thermal 4.1  L/min,  Faraz pending  CI=     Thermal 3.08  L/min/m2, Faraz pending     Complications: none    Findings:  1. Elevated right and left sided filling pressure  2. Pulmonary hypertension, WHO group 2  3.    Perserved cardiac index by Thermal    DO Samra Brown DO  [facility] [address] [place] [address]                                       Office [phone],BRITT [phone]

## 2019-06-28 NOTE — Clinical Note
Left internal jugular vein. Accessed successfully. using ultrasound guidance.  Number of attempts =  1.

## 2019-06-29 RX ORDER — METOPROLOL TARTRATE 50 MG/1
75 TABLET ORAL 2 TIMES DAILY
Qty: 90 TAB | Refills: 12 | Status: SHIPPED | OUTPATIENT
Start: 2019-06-29 | End: 2019-07-19

## 2019-06-29 RX ORDER — EPLERENONE 25 MG/1
TABLET, FILM COATED ORAL
Qty: 90 TAB | Refills: 3 | Status: CANCELLED | OUTPATIENT
Start: 2019-06-29

## 2019-06-29 NOTE — TELEPHONE ENCOUNTER
Refill for lopressor 50 mg BID per Dr. Cathy Rubin.    [address] [place] [address] 6/20/19  NOV 7/19/19

## 2019-07-19 ENCOUNTER — TELEPHONE (OUTPATIENT)
Dept: CARDIOLOGY CLINIC | Age: 69
End: 2019-07-19

## 2019-07-19 ENCOUNTER — OFFICE VISIT (OUTPATIENT)
Dept: CARDIOLOGY CLINIC | Age: 69
End: 2019-07-19

## 2019-07-19 VITALS
HEIGHT: 63 IN | HEART RATE: 60 BPM | DIASTOLIC BLOOD PRESSURE: 98 MMHG | SYSTOLIC BLOOD PRESSURE: 142 MMHG | WEIGHT: 190.2 LBS | BODY MASS INDEX: 33.7 KG/M2

## 2019-07-19 DIAGNOSIS — I10 HTN (HYPERTENSION), BENIGN: ICD-10-CM

## 2019-07-19 DIAGNOSIS — I25.10 CORONARY ARTERY DISEASE INVOLVING NATIVE CORONARY ARTERY OF NATIVE HEART WITHOUT ANGINA PECTORIS: Primary | ICD-10-CM

## 2019-07-19 DIAGNOSIS — R06.09 DOE (DYSPNEA ON EXERTION): ICD-10-CM

## 2019-07-19 RX ORDER — EPLERENONE 25 MG/1
25 TABLET, FILM COATED ORAL 2 TIMES DAILY
Qty: 180 TAB | Refills: 3 | Status: SHIPPED | OUTPATIENT
Start: 2019-07-19 | End: 2019-08-22

## 2019-07-19 RX ORDER — METOPROLOL TARTRATE 50 MG/1
50 TABLET ORAL AS NEEDED
COMMUNITY
End: 2019-08-22 | Stop reason: CLARIF

## 2019-07-19 NOTE — PROGRESS NOTES
Visit Vitals  BP (!) 142/98 (BP 1 Location: Left arm)   Pulse 60   Ht 5' 3\" (1.6 m)   Wt 190 lb 3.2 oz (86.3 kg)   LMP 09/29/1980 (LMP Unknown)   BMI 33.69 kg/m²     Patient is her for follow up. Still c/o being SOB and fatigued. Did not increase her Lopressor as instructed.

## 2019-07-19 NOTE — TELEPHONE ENCOUNTER
New referral received from Dr. Tala Garcia. Called patient who was busy . Informed  I would call again on Monday. Holding appointment on August 9th w/Dr. Anna Okeefe.

## 2019-07-19 NOTE — PROGRESS NOTES
Dhaval Ortiz MD. [facility]              Patient: Tomi Connors  : 1950      Today's Date: 2019            HISTORY OF PRESENT ILLNESS:     History of Present Illness:  Here for follow-up. Still having CHACON. Getting shots for RA. A little nauseated. She does have orthopnea and sleeps elevated. No CP. Some leg edema. PAST MEDICAL HISTORY:     Past Medical History:   Diagnosis Date    Anemia     iron defic anemia    Arthritis     Asthma 2009    CAUSED BY MOLD    Atrial fibrillation ([address] 2009    CAD (coronary artery disease) 2009    MI X2  -- prior stenting     Chronic pain     RT. ANKLE    Diastolic dysfunction     GERD (gastroesophageal reflux disease)     Gluten intolerance     Hypothyroidism 2009    Personal history of MI (myocardial infarction)     Rheumatoid arthritis ([address]     Stroke ([address]     TIA (NO RESIDUAL)    Syncope     Thromboembolus ([address]     RT. -- DVT after surgery     Thyroid disease     HYPO    TIA (transient ischemic attack)      &     Unspecified adverse effect of anesthesia     \"IS A LIGHT WEIGHT\"    Unspecified adverse effect of anesthesia     DIFFICULTY AWAKENING    Vitamin B12 deficiency 2009         Past Surgical History:   Procedure Laterality Date    CARDIAC SURG PROCEDURE UNLIST      ABLATION    HX ADENOIDECTOMY      HX APPENDECTOMY      HX CHOLECYSTECTOMY  11    HX GI      COLONOSCOPY AND ENDOSCOPY    HX HEART CATHETERIZATION      2 STENTS    HX HYSTERECTOMY      HX LUMBAR LAMINECTOMY  2000    L4-L5    HX ORTHOPAEDIC  -2012    RT. FOOT SURGERY X 6/calcaneal osteotomy    HX TONSILLECTOMY  1964    STENT INSERTION             MEDICATIONS:     Current Outpatient Medications   Medication Sig Dispense Refill    metoprolol tartrate (LOPRESSOR) 50 mg tablet Take  by mouth two (2) times a day.  bumetanide (BUMEX) 2 mg tablet Take 1 Tab by mouth daily.  90 Tab 3    eplerenone (INSPRA) 25 mg tablet TAKE 1 TABLET BY MOUTH ONCE DAILY 90 Tab 3    SITagliptin (JANUVIA) 100 mg tablet Januvia 100 mg tablet   Take 1 tablet every day by oral route.  predniSONE (DELTASONE) 10 mg tablet Take 10 mg by mouth daily.  predniSONE (DELTASONE) 2.5 mg tablet Take 2.5 mg by mouth every evening.  calcium citrate 200 mg (950 mg) tablet Take 2,000 mg by mouth daily.  flaxseed oil (OMEGA 3 PO) Take  by mouth.  vit C/vit E ac/lut/copper/zinc (PRESERVISION LUTEIN PO) Take  by mouth daily.  thyroid, Pork, (NP THYROID) 90 mg tablet Take 90 mg by mouth daily. Indications: pt takes 97.5 mg      lidocaine (LIDODERM) 5 % 1 Patch by TransDERmal route every twenty-four (24) hours. Apply patch to the affected area for 12 hours a day and remove for 12 hours a day.  ascorbic acid (VITAMIN C) 500 mg tablet Take  by mouth daily.  ferrous sulfate 325 mg (65 mg iron) tablet Take 85 mg by mouth Daily (before breakfast).  cyanocobalamin (VITAMIN B12) 1,000 mcg/mL injection INJECT 1 ML INTO THE MUSCLE EVERY 30 DAYS 10 mL 0    Cholecalciferol, Vitamin D3, (VITAMIN D3) 1,000 unit Cap Take 2 Caps by mouth daily.  lansoprazole (PREVACID) 30 mg capsule Take 30 mg by mouth Daily (before breakfast).          Allergies   Allergen Reactions    Adhesive Tape-Silicones Other (comments)     BAD BLISTERS AND TENDS TO GET INFECTED    Albuterol Palpitations    Aspirin Hives and Other (comments)     \"it makes my stomach bleed\"      Butter Flavor Anaphylaxis     Butter AND CREME    Demerol [Meperidine] Other (comments)     HYPOTENSION    Fentanyl Other (comments)     HYPOTENSION    Gluten Diarrhea     bloating    Keflex [Cephalexin] Anaphylaxis    Levaquin [Levofloxacin] Unproven on Challenge     PT DOESN'T REMEMBER HAVING THAT    Morphine Other (comments)     HYPOTENSION    Nitroglycerin Other (comments)     IN PILL FORM  - HYPOTENSION  CAN TOLERATE PATCH FOR SHORT TIME    Pcn [Penicillins] Anaphylaxis    Percocet [Oxycodone-Acetaminophen] Unknown (comments)     HYPOTENSION AND HALLUCINATIONS. Can take tylenol ok, oxycodone is allergy per patient.  Tapazole [Methimazole] Unknown (comments)           SOCIAL HISTORY:     Social History     Tobacco Use    Smoking status: Former Smoker     Packs/day: 1.00     Years: 30.00     Pack years: 30.00     Last attempt to quit: 2002     Years since quittin.1    Smokeless tobacco: Never Used   Substance Use Topics    Alcohol use: No    Drug use: No         FAMILY HISTORY:     Family History   Problem Relation Age of Onset    Delayed Awakening Mother     Heart Disease Mother     Coronary Artery Disease Mother     Hypertension Mother     Stroke Mother     Delayed Awakening Sister     Hypertension Sister     Lung Disease Father     Cancer Father         colon    Hypertension Father     Hypertension Brother     Breast Cancer Maternal Aunt     Breast Cancer Maternal Aunt     Breast Cancer Cousin         maternal 1st cousin    Breast Cancer Maternal Aunt     Breast Cancer Cousin         maternal 1st cousin    Breast Cancer Cousin         maternal 1st cousin              REVIEW OF SYMPTOMS:      Review of Symptoms:  Constitutional: Negative for fever, chills  HEENT: Negative for nosebleeds, tinnitus  Respiratory: + wheezing  Cardiovascular: Negative for syncope;  + orthopnea   Gastrointestinal: Negative for abdominal pain, diarrhea, melena.    Genitourinary: Negative for dysuria  Musculoskeletal: + joint pain, RA  Skin: Negative for rash  Heme: + bleeding problems  Neurological: Negative for speech change and focal weakness.               PHYSICAL EXAM:      Physical Exam:  Visit Vitals  BP (!) 142/98 (BP 1 Location: Left arm)   Pulse 60   Ht 5' 3\" (1.6 m)   Wt 190 lb 3.2 oz (86.3 kg)   LMP 1980 (LMP Unknown)   BMI 33.69 kg/m²          Patient appears generally well, mood and affect are appropriate and pleasant. HEENT:  Hearing intact, non-icteric, normocephalic, atraumatic. Neck Exam: Supple, no JVD sitting up   Lung Exam: Clear to auscultation, even breath sounds. Cardiac Exam: Regular rate and rhythm with no murmur  Abdomen: Soft, non-tender, normal bowel sounds. Extremities: Moves all ext well. Mild bilat lower extremity edema. Psych: Appropriate affect  Neuro - Grossly intact           LABS / OTHER STUDIES:            Lab Results   Component Value Date/Time     Sodium 142 05/02/2019 01:06 PM     Potassium 4.1 05/02/2019 01:06 PM     Chloride 97 05/02/2019 01:06 PM     CO2 22 05/02/2019 01:06 PM     Anion gap 11 04/22/2019 08:51 PM     Glucose 233 (H) 05/02/2019 01:06 PM     BUN 19 05/02/2019 01:06 PM     Creatinine 0.91 05/02/2019 01:06 PM     BUN/Creatinine ratio 21 05/02/2019 01:06 PM     GFR est AA 73 05/02/2019 01:06 PM     GFR est non-AA 64 05/02/2019 01:06 PM     Calcium 9.7 05/02/2019 01:06 PM     Bilirubin, total 0.4 04/22/2019 08:51 PM     AST (SGOT) 10 (L) 04/22/2019 08:51 PM     Alk.  phosphatase 132 (H) 04/22/2019 08:51 PM     Protein, total 7.3 04/22/2019 08:51 PM     Albumin 3.6 04/22/2019 08:51 PM     Globulin 3.7 04/22/2019 08:51 PM     A-G Ratio 1.0 (L) 04/22/2019 08:51 PM     ALT (SGPT) 26 04/22/2019 08:51 PM               Lab Results   Component Value Date/Time     WBC 20.6 (H) 04/22/2019 08:51 PM     Hemoglobin (POC) 15.6 06/09/2014 03:00 AM     HGB 14.9 04/22/2019 08:51 PM     Hematocrit (POC) 46 06/09/2014 03:00 AM     HCT 44.8 04/22/2019 08:51 PM     PLATELET 824 04/61/2659 08:51 PM     MCV 94.1 04/22/2019 08:51 PM            Component      Latest Ref Rng & Units 2/22/2019           3:48 AM   D-dimer      0.00 - 0.65 mg/L FEU 0.33         Component      Latest Ref Rng & Units 2/22/2019           3:48 AM   NT pro-BNP      0 - 125 PG/ML 45      Lab Results   Component Value Date/Time    Sodium 144 06/25/2019 04:16 PM    Potassium 4.5 06/25/2019 04:16 PM    Chloride 100 06/25/2019 04:16 PM    CO2 25 06/25/2019 04:16 PM    Anion gap 11 04/22/2019 08:51 PM    Glucose 143 (H) 06/25/2019 04:16 PM    BUN 26 06/25/2019 04:16 PM    Creatinine 0.71 06/25/2019 04:16 PM    BUN/Creatinine ratio 37 (H) 06/25/2019 04:16 PM    GFR est AA 99 06/25/2019 04:16 PM    GFR est non-AA 86 06/25/2019 04:16 PM    Calcium 9.2 06/25/2019 04:16 PM             CARDIAC DIAGNOSTICS:      Cardiac Evaluation Includes:     Cardiac Cath 6/09 - severe mLCX disease --> stenting     Cath 8/19/10 - RCA and LCX stents patent; MLI in LAD, LVEF 60%     Event Monitor 3/11 - PVC's  Holter 3/16 - PVC's  Echo 3/17 - LVEF 60%  Lexiscan Cardiolite 3/17 - normal MPI, LVEF 58%     [facility] records reviewed.  Went there for chest pain on 1/3/19. Labs 1/3/19 - Trop < 0.02, BNP 18, Cr 0.61, Hgb 13  Cath 1/4/19 - LAD stent patent, LCX without sig disease, RCA stent patent.          Holter 1/23/19 - NSR, normal study      Stress Echo 2/11/19 - walked 3:21 (2.4 METS), baseline /90, Max /90, normal stress EKG and stress echo      CXR 2/22/19 - normal      Echo 3/21/19 - LVEF 61%; grade 1 diastology (normal for age), AV sclerosis.  RVSP 26 mmHg.       PARDEEP's with exercise 6/7/19 - normal      Event Monitor 5/15/19-6/6/19 - normal study; symptoms correlated with sinus     Right Heart Cath 6/28/19 - RHC findings:   RAP=   Mean 17    mmHg  RVSP= 40/20    mmHg  PAP=     42/30/34  mmHg  PCWP=  27 mmHg  CO=        Thermal 4.1  L/min,             Faraz pending  CI=           Thermal 3.08  L/min/m2,     Faraz pending      Findings:  1.   Elevated right and left sided filling pressure  2.   Pulmonary hypertension, WHO group 2  3.   Perserved cardiac index by Thermal     EKG 2/22/19 - NSR, normal         ASSESSMENT AND PLAN:      Assessment and Plan:  1) Chest pain and SOB   - She notes symptoms since Jan 2019   - reviewed records from other physicians  - Her recent cardiac cath 1/19 showed patent vessels.    - Her stress echo 3/19 was negative for ischemia  - Her symptoms could be due to small vessel disease and/or diastolic dysfunction.  Of note, proBNP 2/19 was normal (45). - She saw Pulmonary ([facility]) and she says workup was OK.    - [facility] records reviewed.  Went there for chest pain on 1/3/19.  Labs 1/3/19 - Trop < 0.02, BNP 18, Cr 0.61, Hgb 13.   Cath 1/4/19 - LAD stent patent, LCX without sig disease, RCA stent patent.    Medical management was recommended.    - Echo 3/21/19 - LVEF 61%; grade 1 diastology (normal for age), AV sclerosis.  RVSP 26 mmHg.    - Not taking Imdur or Ranexa due to a bad HA  - needs good BP control (was high during stress test)  - On 4/19 -  increased metoprolol to 50 BID and added eplerenone  - She just brought cath films for me to review --> will look at it   - Consider RHC if problems persist despite other measures.   - She is taking Bumex 2mg daily and eplerenone and volume looks OK.       - On 6/20/19 - her CHACON continues but better on eplerenone. Still unable to walk much.    - I viewed cath images from 1/4/19 - the cath is without obstructive disease as the report states  - RHC showed elevated   - On 7/19/19 - still CHACON. PCWP was high. Suspect problems primarily due to diastolic dysfunction. She can only do Bumex once daily. Will try and increase eplerenone to 25 mg BID - follow K+. - Consider also cardiac MRI in future.  Will [facility] to [facility] to see if they have anything to add.    - She is taking cimzia -- CHF is a precaution - she will continue carefully      2) History of Iron Deficiency Anemia   - She takes no blood thinners (not even aspirin) due to anemia in past   - Dr. Mignon Marley record 12/16 reviewed - He was seeing her for iron deficiency anemia thought to be from GI Bleed from aspirin and plavix      3) Lipids -   - Does not take a statin due to having muscle aches (from RA at baseline)     4) History of Afib   - ablation in past? - need to get records  - not on any blood thinners due to anemia in past (see above)   - has been in sinus lately      5) HTN  - BP looks excellent at home (~120/70)  - see above      6) Dr. Lay record reviewed 1/31/19   - plan was for CTA for PE and PFT's -- patient says testing was OK      7) RA  - she is on prednisone      8) DM on steroids   - seeing PCP     9)  See me in 4 weeks.   Patient expressed understanding of the plan - questions were answered.     On a side note, I see her brother.          Yancy Paige MD, [address], [address] [id number]  [address]  [facility], [address] [place] [address]  Ph: [phone]                               V [phone]

## 2019-07-30 LAB
BUN SERPL-MCNC: 17 MG/DL (ref 8–27)
BUN/CREAT SERPL: 23 (ref 12–28)
CALCIUM SERPL-MCNC: 9.6 MG/DL (ref 8.7–10.3)
CHLORIDE SERPL-SCNC: 96 MMOL/L (ref 96–106)
CO2 SERPL-SCNC: 25 MMOL/L (ref 20–29)
CREAT SERPL-MCNC: 0.73 MG/DL (ref 0.57–1)
GLUCOSE SERPL-MCNC: 146 MG/DL (ref 65–99)
POTASSIUM SERPL-SCNC: 4.1 MMOL/L (ref 3.5–5.2)
SODIUM SERPL-SCNC: 143 MMOL/L (ref 134–144)

## 2019-08-08 ENCOUNTER — TELEPHONE (OUTPATIENT)
Dept: CARDIOLOGY CLINIC | Age: 69
End: 2019-08-08

## 2019-08-08 NOTE — TELEPHONE ENCOUNTER
Telephone Call RE:  Appointment reminder     Outcome:     [x] Patient confirmed appointment   [] Patient rescheduled appointment for    [] Unable to reach   [] Left message              [] Other:       Irene Sawant

## 2019-08-09 ENCOUNTER — OFFICE VISIT (OUTPATIENT)
Dept: CARDIOLOGY CLINIC | Age: 69
End: 2019-08-09

## 2019-08-09 VITALS
HEART RATE: 67 BPM | SYSTOLIC BLOOD PRESSURE: 150 MMHG | TEMPERATURE: 96.4 F | BODY MASS INDEX: 33.35 KG/M2 | HEIGHT: 63 IN | OXYGEN SATURATION: 96 % | RESPIRATION RATE: 20 BRPM | WEIGHT: 188.2 LBS | DIASTOLIC BLOOD PRESSURE: 90 MMHG

## 2019-08-09 DIAGNOSIS — I25.10 CORONARY ARTERY DISEASE INVOLVING NATIVE CORONARY ARTERY OF NATIVE HEART WITHOUT ANGINA PECTORIS: ICD-10-CM

## 2019-08-09 DIAGNOSIS — I10 HTN (HYPERTENSION), BENIGN: ICD-10-CM

## 2019-08-09 DIAGNOSIS — I50.32 CHRONIC HEART FAILURE WITH PRESERVED EJECTION FRACTION (HCC): ICD-10-CM

## 2019-08-09 DIAGNOSIS — I50.9 CHRONIC CONGESTIVE HEART FAILURE, UNSPECIFIED HEART FAILURE TYPE (HCC): Primary | ICD-10-CM

## 2019-08-09 DIAGNOSIS — R06.02 SHORTNESS OF BREATH: ICD-10-CM

## 2019-08-09 RX ORDER — LANCETS 33 GAUGE
EACH MISCELLANEOUS
Status: ON HOLD | COMMUNITY
End: 2019-08-12

## 2019-08-09 RX ORDER — LISINOPRIL 2.5 MG/1
2.5 TABLET ORAL
Qty: 30 TAB | Refills: 2 | Status: SHIPPED | OUTPATIENT
Start: 2019-08-09 | End: 2019-08-09 | Stop reason: SDUPTHER

## 2019-08-09 RX ORDER — BLOOD-GLUCOSE METER
EACH MISCELLANEOUS
Status: ON HOLD | COMMUNITY
End: 2019-08-12

## 2019-08-09 RX ORDER — LISINOPRIL 2.5 MG/1
2.5 TABLET ORAL DAILY
Qty: 90 TAB | Refills: 0 | Status: SHIPPED | OUTPATIENT
Start: 2019-08-09 | End: 2019-08-22

## 2019-08-09 RX ORDER — ONDANSETRON 4 MG/1
1 TABLET, ORALLY DISINTEGRATING ORAL AS NEEDED
COMMUNITY
Start: 2018-02-09 | End: 2019-08-12

## 2019-08-09 NOTE — PATIENT INSTRUCTIONS
Medication changes:    START  Lisinopril 2.5mg- Take one tablet by mouth daily    Please contact our office if you start to notice dizziness and lightheadedness. Please monitor your blood pressures daily prior to medications and 2 hours after taking medications. Bring a written record of your blood pressures to your next appointment. Please monitor your weights daily upon waking and after using the bathroom. Keep a written records of your weights and bring to your next appointment. If you have a weight gain of 2 or more pounds overnight OR 5 or more pounds in one week, please contact our office. Testing Ordered:    FASTING blood work (lipid and NT pro-BNP)- Please have this done within a week at [facility]. Please have nothing by mouth for 12 hours prior to the lab work.      Blood draw today    Follow up in one month with Dr. Nikki Arteaga

## 2019-08-09 NOTE — PROGRESS NOTES
[facility] Consultation Note      DOS:   8/9/2019  NAME:  Jared Ramachandran   MRN:   [id number]   REFERRING PROVIDER:  Staci Llanes MD  PRIMARY CARE PHYSICIAN: Nathan Reyes MD  PRIMARY CARDIOLOGIST: Staci Llanes MD      Chief Complaint:   Chief Complaint   Patient presents with    Shortness of Breath     Started in January    Chest Pain (Angina)     episode at the beginning this week with drop in blood pressure lasted about 25 minutes     Leg Swelling     patient reports she is also on prednisone    Fatigue     Started in January and getting worse     Nausea     For about a month       HPI: 68y.o. year old female with a history of HTN, HFpEF, CAD s/p PCI to LAD and RCA who presents for further evaluation of her chronic HF with preserved EF. She underwent LHC in 6/19 which demonstrated an elevated PCWP of 27 mmHg. Her eplerenone was increased without improvement in her dyspnea and chest pain with exertion. She denies any recent palpitations, presyncope, syncope. She admits to CHACON, orthopnea, edema, but denies PND. She does not snore and has not experienced apnea. Her home BP readings are normal with -120 mmHg. History:  Past Medical History:   Diagnosis Date    Anemia     iron defic anemia    Arthritis     Asthma 6/29/2009    CAUSED BY MOLD    Atrial fibrillation ([address] 6/29/2009    CAD (coronary artery disease) 06/29/2009    MI X2  -- prior stenting     Chronic pain     RT. ANKLE    Diastolic dysfunction     GERD (gastroesophageal reflux disease)     Gluten intolerance     Hypothyroidism 6/29/2009    Personal history of MI (myocardial infarction)     Rheumatoid arthritis ([address]     Stroke ([address]     TIA (NO RESIDUAL)    Syncope     Thromboembolus ([address] 2004    RT.  -- DVT after surgery     Thyroid disease     HYPO    TIA (transient ischemic attack)     2004 & 2006    Unspecified adverse effect of anesthesia     \"IS A LIGHT WEIGHT\"    Unspecified adverse effect of anesthesia     DIFFICULTY AWAKENING    Vitamin B12 deficiency 2009     Past Surgical History:   Procedure Laterality Date    CARDIAC SURG PROCEDURE UNLIST      ABLATION    HX ADENOIDECTOMY      HX APPENDECTOMY      HX CHOLECYSTECTOMY  11    HX GI      COLONOSCOPY AND ENDOSCOPY    HX HEART CATHETERIZATION  2010    2 STENTS    HX HYSTERECTOMY      HX LUMBAR LAMINECTOMY  2000    L4-L5    HX ORTHOPAEDIC  -2012    RT.  FOOT SURGERY X 6/calcaneal osteotomy    HX TONSILLECTOMY  1964    STENT INSERTION       Social History     Socioeconomic History    Marital status:      Spouse name: Not on file    Number of children: Not on file    Years of education: Not on file    Highest education level: Not on file   Occupational History    Not on file   Social Needs    Financial resource strain: Not on file    Food insecurity:     Worry: Not on file     Inability: Not on file    Transportation needs:     Medical: Not on file     Non-medical: Not on file   Tobacco Use    Smoking status: Former Smoker     Packs/day: 1.00     Years: 30.00     Pack years: 30.00     Last attempt to quit: 2002     Years since quittin.1    Smokeless tobacco: Never Used   Substance and Sexual Activity    Alcohol use: No    Drug use: No    Sexual activity: Never   Lifestyle    Physical activity:     Days per week: Not on file     Minutes per session: Not on file    Stress: Not on file   Relationships    Social connections:     Talks on phone: Not on file     Gets together: Not on file     Attends Buddhism service: Not on file     Active member of club or organization: Not on file     Attends meetings of clubs or organizations: Not on file     Relationship status: Not on file    Intimate partner violence:     Fear of current or ex partner: Not on file     Emotionally abused: Not on file     Physically abused: Not on file     Forced sexual activity: Not on file   Other Topics Concern    Not on file   Social History Narrative    Not on file     Family History   Problem Relation Age of Onset    Delayed Awakening Mother     Heart Disease Mother     Coronary Artery Disease Mother     Hypertension Mother     Stroke Mother     Delayed Awakening Sister     Hypertension Sister     Lung Disease Father     Cancer Father         colon    Hypertension Father     Hypertension Brother     Breast Cancer Maternal Aunt     Breast Cancer Maternal Aunt     Breast Cancer Cousin         maternal 1st cousin    Breast Cancer Maternal Aunt     Breast Cancer Cousin         maternal 1st cousin    Breast Cancer Cousin         maternal 1st cousin       Current Medications:   Current Outpatient Medications   Medication Sig Dispense Refill    bumetanide (BUMEX) 2 mg tablet Take 1 Tab by mouth daily. 90 Tab 3    metoprolol tartrate (LOPRESSOR) 50 mg tablet Take  by mouth two (2) times a day.  eplerenone (INSPRA) 25 mg tablet Take 1 Tab by mouth two (2) times a day. 180 Tab 3    SITagliptin (JANUVIA) 100 mg tablet Januvia 100 mg tablet   Take 1 tablet every day by oral route.  predniSONE (DELTASONE) 10 mg tablet Take 10 mg by mouth daily.  predniSONE (DELTASONE) 2.5 mg tablet Take 2.5 mg by mouth every evening.  calcium citrate 200 mg (950 mg) tablet Take 2,000 mg by mouth daily.  flaxseed oil (OMEGA 3 PO) Take  by mouth.  vit C/vit E ac/lut/copper/zinc (PRESERVISION LUTEIN PO) Take  by mouth daily.  thyroid, Pork, (NP THYROID) 90 mg tablet Take 90 mg by mouth daily. Indications: pt takes 97.5 mg      lidocaine (LIDODERM) 5 % 1 Patch by TransDERmal route every twenty-four (24) hours. Apply patch to the affected area for 12 hours a day and remove for 12 hours a day.  ascorbic acid (VITAMIN C) 500 mg tablet Take  by mouth daily.  ferrous sulfate 325 mg (65 mg iron) tablet Take 85 mg by mouth Daily (before breakfast).       cyanocobalamin (VITAMIN B12) 1,000 mcg/mL injection INJECT 1 ML INTO THE MUSCLE EVERY 30 DAYS 10 mL 0    Cholecalciferol, Vitamin D3, (VITAMIN D3) 1,000 unit Cap Take 2 Caps by mouth daily.  lansoprazole (PREVACID) 30 mg capsule Take 30 mg by mouth Daily (before breakfast). Allergies: Allergies   Allergen Reactions    Clopidogrel Other (comments)    Sulfa (Sulfonamide Antibiotics) Swelling    Adhesive Tape-Silicones Other (comments)     BAD BLISTERS AND TENDS TO GET INFECTED    Albuterol Palpitations    Aspirin Hives and Other (comments)     \"it makes my stomach bleed\"      Butter Flavor Anaphylaxis     Butter AND CREME    Cimzia [Certolizumab Pegol] Other (comments)     GI symptoms, blisters in the mouth and esophagus    Demerol [Meperidine] Other (comments)     HYPOTENSION    Fentanyl Other (comments)     HYPOTENSION    Gluten Diarrhea     bloating    Keflex [Cephalexin] Anaphylaxis    Levaquin [Levofloxacin] Unproven on Challenge     PT DOESN'T REMEMBER HAVING THAT    Morphine Other (comments)     HYPOTENSION    Nitroglycerin Other (comments)     IN PILL FORM  - HYPOTENSION  CAN TOLERATE PATCH FOR SHORT TIME    Pcn [Penicillins] Anaphylaxis    Percocet [Oxycodone-Acetaminophen] Unknown (comments)     HYPOTENSION AND HALLUCINATIONS. Can take tylenol ok, oxycodone is allergy per patient.      Tapazole [Methimazole] Unknown (comments)     Patient stated \"it made me feel like I had the flu\"       ROS:    General:  positive for  - fatigue  HEENT: negative  Pulmonary: positive for - orthopnea and shortness of breath  Cardiac: positive for chest pressure/discomfort, dyspnea, orthopnea, lower extremity edema, dyspnea on exertion  GI:  no abdominal pain, change in bowel habits, or black or bloody stools  Musculo: negative  Neuro: no TIA or stroke symptoms  Skin:  positive for - ecchymoses  Allergies: REMINDER:DOCUMENT ALLERGIES IN ALLERGY ACTIVITY  Psych: negative    Admission Weight: Last Weight   Weight: 188 lb 3.2 oz (85.4 kg) Weight: 188 lb 3.2 oz (85.4 kg)       Physical Exam:   Vitals:    Visit Vitals  /90 (BP 1 Location: Left arm, BP Patient Position: Sitting)   Pulse 67   Temp 96.4 °F (35.8 °C)   Resp 20   Ht 5' 3\" (1.6 m)   Wt 188 lb 3.2 oz (85.4 kg)   SpO2 96%   BMI 33.34 kg/m²       General:  alert, fatigued, cooperative  HEENT: PERRLA, EOMI and Sclera clear, anicteric  Neck:  supple, no significant adenopathy, carotids upstroke normal bilaterally, no bruits  CVP:  8 cm  ( - ) HJR  Cardiac: regular rate, regular rhythm, S1, S2 normal and S4 present  Chest: breath sounds clear and equal bilaterally  Abdomen: soft, non-tender. Bowel sounds normal. No masses, no organomegaly. Extremity: extremities normal, atraumatic, no cyanosis or edema  Neuro: Alert and oriented to person, place, and time; normal strength and tone. Normal symmetric reflexes. Normal coordination and gait  Skin:   ecchymoses - arm(s) bilateral    Recent Labs:   Labs Latest Ref Rng & Units 7/29/2019 6/25/2019   WBC 3.4 - 10.8 x10E3/uL - 19. 1(H)   RBC 3.77 - 5.28 x10E6/uL - 4.56   Hemoglobin 11.1 - 15.9 g/dL - 14.0   Hematocrit 34.0 - 46.6 % - 41.3   MCV 79 - 97 fL - 91   Platelets 938 - 871 Q15F1/XA - 268   Monocytes Not Estab. % - 6   Eosinophils Not Estab. % - 0   Basophils Not Estab. % - 0   Calcium 8.7 - 10.3 mg/dL 9.6 9.2   Glucose 65 - 99 mg/dL 146(H) 143(H)   BUN 8 - 27 mg/dL 17 26   Creatinine 0.57 - 1.00 mg/dL 0.73 0.71   Sodium 134 - 144 mmol/L 143 144   Potassium 3.5 - 5.2 mmol/L 4.1 4.5   Some recent data might be hidden     EKG:   SR, rate 65 bpm    ECHO 2008: EF 60%  ECHO 3/3/2017: EF 60%, mild TR   ECHO 3/21/2019: EF 61%, AoV sclerosis  CATH 2009: stent LCX  CATH 8/2010: LAD: m: MLI, , LCX: m 20%, patent RCA, LCX stents, EF 60%   CATH 1/4/2019: patent LAD stent, LCX without significant disease, patent RCA stent    STRESS: Myoview 2014: no ischemia  STRESS Nuc 2/2016: no ischemia,   STRESS Nuc 3/3/2017: no ischemia, EF 58%   STRESS Echo 2/11/19: normal stress EKG and stress echo    EVENT monitor 2011: PVCs  HOLTER 2013: frequent PVCs  HOLTER 1/23/19: SR, normal study    Right Heart Cath 6/28/19 [facility] findings:   RAP=   Mean 17    mmHg  RVSP= 40/20    mmHg  PAP=     42/30/34  mmHg  PCWP=  27 mmHg  CO=        Thermal 4.1  L/min,             Faraz pending  CI=           Thermal 3.08  L/min/m2,     Faraz pending        Impression / Plan:   1. CAD s/p PCI to LAD , RCA   LHC in 1/19 with patent CP - no change in anginal pattern since 1/19   Statin intolerant d/t myalgias   AS intolerant d/t GI bleeding   On metoprolol tartrate      2. HFpEF - Stage C, NYHA Class III   RHC with PCWP 27 mmHg   On bumex, eplerenone, metoprolol   Start lisinopril 2.5 mg daily   Cardiac MRI   Check iron profile, SPEP/SFLC, ATTR, TFTs    3. RA   On prednisone - attempting to wean dose    4. T2DM - steroid induced   Low carb diet   On Januvia   Wean prednisone as toelrated    5. HTN - Stage 2   Start lisinopril   Low sodium diet   Continue home BP log    6. PAF   [id number] = 4   No AC d/t history of anemia   On metoprolol tartrate for rate control   Recent Holter without AF    7. HLD   Statin intolerant d/t myalgias   Fasting lipid profile   May benefit from PCSK9 inhibitor    8. Iron Deficiency Anemia   Recent Hgb 14   Check iron profile    9. Vitamin D Deficiency   On vitamin D3   Check vitamin D level         Ayanna Hung MD, [facility], [address] [place] [address]  Chief of Cardiology, [address] [place] [address] Director  [facility] [place]  [facility], [address] [place] [address], [address] [place] [address]  Office [phone]  Fax [phone]

## 2019-08-09 NOTE — PROGRESS NOTES
ATTR genetic testing and cardiac MRI ordered by MALINI Maloney MD. ATTR drawn at clinic visit and sent in for results. Contacted coordination of care for MRI scheduling. Patient and  educated on testing and medication changes. After visit summary reviewed with patient and . They verbalized understanding and had no further question. Arcelia Moncada RN.

## 2019-08-12 ENCOUNTER — HOSPITAL ENCOUNTER (INPATIENT)
Age: 69
LOS: 1 days | Discharge: HOME | DRG: 287 | End: 2019-08-13
Attending: EMERGENCY MEDICINE | Admitting: INTERNAL MEDICINE
Payer: MEDICARE

## 2019-08-12 ENCOUNTER — APPOINTMENT (OUTPATIENT)
Dept: GENERAL RADIOLOGY | Age: 69
DRG: 287 | End: 2019-08-12
Attending: EMERGENCY MEDICINE
Payer: MEDICARE

## 2019-08-12 DIAGNOSIS — I50.9 CHRONIC CONGESTIVE HEART FAILURE, UNSPECIFIED HEART FAILURE TYPE (HCC): ICD-10-CM

## 2019-08-12 DIAGNOSIS — I25.10 CORONARY ARTERY DISEASE INVOLVING NATIVE CORONARY ARTERY OF NATIVE HEART WITHOUT ANGINA PECTORIS: ICD-10-CM

## 2019-08-12 DIAGNOSIS — I20.0 UNSTABLE ANGINA (HCC): Primary | ICD-10-CM

## 2019-08-12 DIAGNOSIS — R06.02 SHORTNESS OF BREATH: ICD-10-CM

## 2019-08-12 DIAGNOSIS — I25.10 CORONARY ARTERY DISEASE INVOLVING NATIVE CORONARY ARTERY OF NATIVE HEART WITHOUT ANGINA PECTORIS: Primary | ICD-10-CM

## 2019-08-12 DIAGNOSIS — I50.32 CHRONIC HEART FAILURE WITH PRESERVED EJECTION FRACTION (HCC): ICD-10-CM

## 2019-08-12 DIAGNOSIS — I10 HTN (HYPERTENSION), BENIGN: ICD-10-CM

## 2019-08-12 DIAGNOSIS — R07.9 CHEST PAIN, UNSPECIFIED TYPE: ICD-10-CM

## 2019-08-12 PROBLEM — M06.9 RHEUMATOID ARTHRITIS (HCC): Status: ACTIVE | Noted: 2019-08-12

## 2019-08-12 PROBLEM — R73.9 ELEVATED BLOOD SUGAR: Status: ACTIVE | Noted: 2019-08-12

## 2019-08-12 PROBLEM — E11.9 DIABETES MELLITUS TYPE 2, CONTROLLED (HCC): Status: ACTIVE | Noted: 2019-08-12

## 2019-08-12 PROBLEM — D72.829 LEUKOCYTOSIS: Status: ACTIVE | Noted: 2019-08-12

## 2019-08-12 LAB
ALBUMIN SERPL-MCNC: 3.5 G/DL (ref 3.5–5)
ALBUMIN/GLOB SERPL: 1.1 {RATIO} (ref 1.1–2.2)
ALP SERPL-CCNC: 93 U/L (ref 45–117)
ALT SERPL-CCNC: 36 U/L (ref 12–78)
ANION GAP SERPL CALC-SCNC: 7 MMOL/L (ref 5–15)
AST SERPL-CCNC: 14 U/L (ref 15–37)
ATRIAL RATE: 74 BPM
BASOPHILS # BLD: 0 K/UL (ref 0–0.1)
BASOPHILS NFR BLD: 0 % (ref 0–1)
BILIRUB SERPL-MCNC: 0.4 MG/DL (ref 0.2–1)
BNP SERPL-MCNC: 89 PG/ML (ref 0–125)
BUN SERPL-MCNC: 24 MG/DL (ref 6–20)
BUN/CREAT SERPL: 23 (ref 12–20)
CALCIUM SERPL-MCNC: 9.4 MG/DL (ref 8.5–10.1)
CALCULATED P AXIS, ECG09: 60 DEGREES
CALCULATED R AXIS, ECG10: 16 DEGREES
CALCULATED T AXIS, ECG11: 65 DEGREES
CHLORIDE SERPL-SCNC: 102 MMOL/L (ref 97–108)
CK MB CFR SERPL CALC: 2.8 % (ref 0–2.5)
CK MB SERPL-MCNC: 1.1 NG/ML (ref 5–25)
CK SERPL-CCNC: 40 U/L (ref 26–192)
CO2 SERPL-SCNC: 32 MMOL/L (ref 21–32)
COMMENT, HOLDF: NORMAL
CREAT SERPL-MCNC: 1.03 MG/DL (ref 0.55–1.02)
DIAGNOSIS, 93000: NORMAL
DIFFERENTIAL METHOD BLD: ABNORMAL
EOSINOPHIL # BLD: 0.2 K/UL (ref 0–0.4)
EOSINOPHIL NFR BLD: 1 % (ref 0–7)
ERYTHROCYTE [DISTWIDTH] IN BLOOD BY AUTOMATED COUNT: 13.7 % (ref 11.5–14.5)
GLOBULIN SER CALC-MCNC: 3.3 G/DL (ref 2–4)
GLUCOSE BLD STRIP.AUTO-MCNC: 164 MG/DL (ref 65–100)
GLUCOSE BLD STRIP.AUTO-MCNC: 211 MG/DL (ref 65–100)
GLUCOSE SERPL-MCNC: 155 MG/DL (ref 65–100)
HCT VFR BLD AUTO: 43.3 % (ref 35–47)
HGB BLD-MCNC: 14.3 G/DL (ref 11.5–16)
LYMPHOCYTES # BLD: 2.7 K/UL (ref 0.8–3.5)
LYMPHOCYTES NFR BLD: 14 % (ref 12–49)
MCH RBC QN AUTO: 32.1 PG (ref 26–34)
MCHC RBC AUTO-ENTMCNC: 33 G/DL (ref 30–36.5)
MCV RBC AUTO: 97.1 FL (ref 80–99)
MONOCYTES # BLD: 1.7 K/UL (ref 0–1)
MONOCYTES NFR BLD: 9 % (ref 5–13)
NEUTS SEG # BLD: 14.7 K/UL (ref 1.8–8)
NEUTS SEG NFR BLD: 76 % (ref 32–75)
P-R INTERVAL, ECG05: 144 MS
PLATELET # BLD AUTO: 253 K/UL (ref 150–400)
PMV BLD AUTO: 10.3 FL (ref 8.9–12.9)
POTASSIUM SERPL-SCNC: 3.5 MMOL/L (ref 3.5–5.1)
PROT SERPL-MCNC: 6.8 G/DL (ref 6.4–8.2)
Q-T INTERVAL, ECG07: 416 MS
QRS DURATION, ECG06: 74 MS
QTC CALCULATION (BEZET), ECG08: 461 MS
RBC # BLD AUTO: 4.46 M/UL (ref 3.8–5.2)
RBC MORPH BLD: ABNORMAL
SAMPLES BEING HELD,HOLD: NORMAL
SERVICE CMNT-IMP: ABNORMAL
SERVICE CMNT-IMP: ABNORMAL
SODIUM SERPL-SCNC: 141 MMOL/L (ref 136–145)
TROPONIN I SERPL-MCNC: <0.05 NG/ML
TROPONIN I SERPL-MCNC: <0.05 NG/ML
VENTRICULAR RATE, ECG03: 74 BPM
WBC # BLD AUTO: 19.3 K/UL (ref 3.6–11)

## 2019-08-12 PROCEDURE — 82962 GLUCOSE BLOOD TEST: CPT

## 2019-08-12 PROCEDURE — 99285 EMERGENCY DEPT VISIT HI MDM: CPT

## 2019-08-12 PROCEDURE — 80053 COMPREHEN METABOLIC PANEL: CPT

## 2019-08-12 PROCEDURE — 93005 ELECTROCARDIOGRAM TRACING: CPT

## 2019-08-12 PROCEDURE — 82550 ASSAY OF CK (CPK): CPT

## 2019-08-12 PROCEDURE — 74011250637 HC RX REV CODE- 250/637: Performed by: EMERGENCY MEDICINE

## 2019-08-12 PROCEDURE — 74011636637 HC RX REV CODE- 636/637: Performed by: INTERNAL MEDICINE

## 2019-08-12 PROCEDURE — 74011250637 HC RX REV CODE- 250/637: Performed by: INTERNAL MEDICINE

## 2019-08-12 PROCEDURE — 36415 COLL VENOUS BLD VENIPUNCTURE: CPT

## 2019-08-12 PROCEDURE — 65660000000 HC RM CCU STEPDOWN

## 2019-08-12 PROCEDURE — 71045 X-RAY EXAM CHEST 1 VIEW: CPT

## 2019-08-12 PROCEDURE — 94762 N-INVAS EAR/PLS OXIMTRY CONT: CPT

## 2019-08-12 PROCEDURE — 84484 ASSAY OF TROPONIN QUANT: CPT

## 2019-08-12 PROCEDURE — 74011250636 HC RX REV CODE- 250/636: Performed by: INTERNAL MEDICINE

## 2019-08-12 PROCEDURE — 85025 COMPLETE CBC W/AUTO DIFF WBC: CPT

## 2019-08-12 PROCEDURE — 83880 ASSAY OF NATRIURETIC PEPTIDE: CPT

## 2019-08-12 RX ORDER — IBUPROFEN 200 MG
200 CAPSULE ORAL DAILY
Status: DISCONTINUED | OUTPATIENT
Start: 2019-08-13 | End: 2019-08-13 | Stop reason: HOSPADM

## 2019-08-12 RX ORDER — SODIUM CHLORIDE 0.9 % (FLUSH) 0.9 %
5-40 SYRINGE (ML) INJECTION AS NEEDED
Status: DISCONTINUED | OUTPATIENT
Start: 2019-08-12 | End: 2019-08-13 | Stop reason: HOSPADM

## 2019-08-12 RX ORDER — LISINOPRIL 5 MG/1
2.5 TABLET ORAL DAILY
Status: DISCONTINUED | OUTPATIENT
Start: 2019-08-12 | End: 2019-08-13 | Stop reason: HOSPADM

## 2019-08-12 RX ORDER — METOPROLOL TARTRATE 50 MG/1
50 TABLET ORAL 2 TIMES DAILY
Status: DISCONTINUED | OUTPATIENT
Start: 2019-08-12 | End: 2019-08-13 | Stop reason: HOSPADM

## 2019-08-12 RX ORDER — LEVOTHYROXINE AND LIOTHYRONINE 19; 4.5 UG/1; UG/1
105 TABLET ORAL DAILY
Status: DISCONTINUED | OUTPATIENT
Start: 2019-08-13 | End: 2019-08-12

## 2019-08-12 RX ORDER — MELATONIN
2000 DAILY
Status: DISCONTINUED | OUTPATIENT
Start: 2019-08-12 | End: 2019-08-13 | Stop reason: HOSPADM

## 2019-08-12 RX ORDER — LEVOTHYROXINE AND LIOTHYRONINE 9.5; 2.25 UG/1; UG/1
7.5 TABLET ORAL DAILY
COMMUNITY
End: 2019-08-22 | Stop reason: SDUPTHER

## 2019-08-12 RX ORDER — ENOXAPARIN SODIUM 100 MG/ML
1 INJECTION SUBCUTANEOUS ONCE
Status: COMPLETED | OUTPATIENT
Start: 2019-08-12 | End: 2019-08-12

## 2019-08-12 RX ORDER — NITROGLYCERIN 20 MG/1
1 PATCH TRANSDERMAL
Status: COMPLETED | OUTPATIENT
Start: 2019-08-12 | End: 2019-08-13

## 2019-08-12 RX ORDER — ENOXAPARIN SODIUM 100 MG/ML
40 INJECTION SUBCUTANEOUS EVERY 24 HOURS
Status: DISCONTINUED | OUTPATIENT
Start: 2019-08-12 | End: 2019-08-12

## 2019-08-12 RX ORDER — INSULIN LISPRO 100 [IU]/ML
INJECTION, SOLUTION INTRAVENOUS; SUBCUTANEOUS
Status: DISCONTINUED | OUTPATIENT
Start: 2019-08-12 | End: 2019-08-13 | Stop reason: HOSPADM

## 2019-08-12 RX ORDER — EPLERENONE 25 MG/1
25 TABLET, FILM COATED ORAL 2 TIMES DAILY
Status: DISCONTINUED | OUTPATIENT
Start: 2019-08-13 | End: 2019-08-13 | Stop reason: HOSPADM

## 2019-08-12 RX ORDER — BUMETANIDE 1 MG/1
1 TABLET ORAL DAILY
Status: DISCONTINUED | OUTPATIENT
Start: 2019-08-13 | End: 2019-08-13 | Stop reason: HOSPADM

## 2019-08-12 RX ORDER — ASCORBIC ACID 500 MG
500 TABLET ORAL DAILY
Status: DISCONTINUED | OUTPATIENT
Start: 2019-08-12 | End: 2019-08-13 | Stop reason: HOSPADM

## 2019-08-12 RX ORDER — NALOXONE HYDROCHLORIDE 0.4 MG/ML
0.4 INJECTION, SOLUTION INTRAMUSCULAR; INTRAVENOUS; SUBCUTANEOUS AS NEEDED
Status: DISCONTINUED | OUTPATIENT
Start: 2019-08-12 | End: 2019-08-13 | Stop reason: HOSPADM

## 2019-08-12 RX ORDER — PREDNISONE 5 MG/1
2.5 TABLET ORAL EVERY EVENING
Status: DISCONTINUED | OUTPATIENT
Start: 2019-08-12 | End: 2019-08-13 | Stop reason: HOSPADM

## 2019-08-12 RX ORDER — ACETAMINOPHEN 325 MG/1
650 TABLET ORAL
Status: DISCONTINUED | OUTPATIENT
Start: 2019-08-12 | End: 2019-08-13 | Stop reason: HOSPADM

## 2019-08-12 RX ORDER — MAGNESIUM SULFATE 100 %
4 CRYSTALS MISCELLANEOUS AS NEEDED
Status: DISCONTINUED | OUTPATIENT
Start: 2019-08-12 | End: 2019-08-13 | Stop reason: HOSPADM

## 2019-08-12 RX ORDER — DEXTROSE MONOHYDRATE 100 MG/ML
125-250 INJECTION, SOLUTION INTRAVENOUS AS NEEDED
Status: DISCONTINUED | OUTPATIENT
Start: 2019-08-12 | End: 2019-08-13 | Stop reason: HOSPADM

## 2019-08-12 RX ORDER — LANSOPRAZOLE 30 MG/1
30 CAPSULE, DELAYED RELEASE ORAL
Status: DISCONTINUED | OUTPATIENT
Start: 2019-08-13 | End: 2019-08-12 | Stop reason: CLARIF

## 2019-08-12 RX ORDER — PANTOPRAZOLE SODIUM 40 MG/1
40 TABLET, DELAYED RELEASE ORAL
Status: DISCONTINUED | OUTPATIENT
Start: 2019-08-12 | End: 2019-08-13 | Stop reason: HOSPADM

## 2019-08-12 RX ORDER — BUMETANIDE 1 MG/1
2 TABLET ORAL DAILY
Status: DISCONTINUED | OUTPATIENT
Start: 2019-08-12 | End: 2019-08-12

## 2019-08-12 RX ORDER — PREDNISONE 5 MG/1
10 TABLET ORAL DAILY
Status: DISCONTINUED | OUTPATIENT
Start: 2019-08-13 | End: 2019-08-13 | Stop reason: HOSPADM

## 2019-08-12 RX ORDER — LANOLIN ALCOHOL/MO/W.PET/CERES
325 CREAM (GRAM) TOPICAL
Status: DISCONTINUED | OUTPATIENT
Start: 2019-08-13 | End: 2019-08-13 | Stop reason: HOSPADM

## 2019-08-12 RX ORDER — ATORVASTATIN CALCIUM 20 MG/1
40 TABLET, FILM COATED ORAL
Status: DISCONTINUED | OUTPATIENT
Start: 2019-08-12 | End: 2019-08-13

## 2019-08-12 RX ORDER — SODIUM CHLORIDE 0.9 % (FLUSH) 0.9 %
5-40 SYRINGE (ML) INJECTION EVERY 8 HOURS
Status: DISCONTINUED | OUTPATIENT
Start: 2019-08-12 | End: 2019-08-13 | Stop reason: HOSPADM

## 2019-08-12 RX ORDER — BUMETANIDE 1 MG/1
1 TABLET ORAL DAILY
COMMUNITY
End: 2019-08-22 | Stop reason: SDUPTHER

## 2019-08-12 RX ORDER — LEVOTHYROXINE AND LIOTHYRONINE 19; 4.5 UG/1; UG/1
90 TABLET ORAL DAILY
Status: DISCONTINUED | OUTPATIENT
Start: 2019-08-13 | End: 2019-08-13 | Stop reason: HOSPADM

## 2019-08-12 RX ORDER — THERA TABS 400 MCG
1 TAB ORAL DAILY
Status: DISCONTINUED | OUTPATIENT
Start: 2019-08-13 | End: 2019-08-13 | Stop reason: HOSPADM

## 2019-08-12 RX ADMIN — LISINOPRIL 2.5 MG: 5 TABLET ORAL at 17:57

## 2019-08-12 RX ADMIN — OXYCODONE HYDROCHLORIDE AND ACETAMINOPHEN 500 MG: 500 TABLET ORAL at 22:35

## 2019-08-12 RX ADMIN — INSULIN LISPRO 2 UNITS: 100 INJECTION, SOLUTION INTRAVENOUS; SUBCUTANEOUS at 17:58

## 2019-08-12 RX ADMIN — ENOXAPARIN SODIUM 90 MG: 100 INJECTION SUBCUTANEOUS at 16:51

## 2019-08-12 RX ADMIN — PREDNISONE 2.5 MG: 5 TABLET ORAL at 17:58

## 2019-08-12 RX ADMIN — ACETAMINOPHEN 650 MG: 325 TABLET ORAL at 16:07

## 2019-08-12 RX ADMIN — PANTOPRAZOLE SODIUM 40 MG: 40 TABLET, DELAYED RELEASE ORAL at 17:58

## 2019-08-12 RX ADMIN — Medication 10 ML: at 15:57

## 2019-08-12 RX ADMIN — Medication 10 ML: at 22:41

## 2019-08-12 RX ADMIN — VITAMIN D, TAB 1000IU (100/BT) 2000 UNITS: 25 TAB at 17:57

## 2019-08-12 NOTE — ED NOTES
Timeout performed with [facility]. Crew verbalizes understanding of patient condition and destination of [facility]. Patient stable at time of transport. Crew given EMTALA, Summary, Facesheet, and EKG. Transfer Assessment: Patient A&O x4 and in no distress. Physical re-examination reveals mild improvement in pts condition with reassessment of vital signs completed at the time of admission transfer and/or discharge.

## 2019-08-12 NOTE — H&P
[address]  [phone]    Hospitalist Admission Note      NAME:  Sarah Watkins   :   1950   MRN:  [id number]     PCP:  Waylon Gilliland MD     Date/Time:  2019 4:23 PM         Assessment / Plan:       68 y.o. female with hx of CAD, PAF, HFpEF, HTN, hypothyroid, asthma presenting with substernal chest tightness with radiation associated with dyspnea and nausea concerning for [place]      Chest pain /  CAD (coronary artery disease): 2 episodes of chest pain already today, concerning for unstable angina (described as \"squeezing\", with radiation to R arm and jaw, associated with dyspnea, nausea, and dizziness). EKG with subtle TWI. Currently CP-free after nitropaste. Complex cardiac history. Per chart review she had LHC in 2019 showing LAD stent patent, LCX without sig disease, RCA stent patent. Discussed with cardiology. Will give one dose of therapeutic Lovenox now. Antiplatelet options are limited given hx of bleeding, and numerous allergies including ASA and Plavix. No morphine due to hypotension. Place on O2. Trend cardiac biomarkers. Check TTE. Send FLP. Start atorvastatin         HFpEF /  Diastolic dysfunction: followed by advanced heart failure clinic. On eplerenone, Bumex, lisinopril, and metoprolol which we will continue      PAF (paroxysmal atrial fibrillation): in sinus. [id number] of 4 but not anticoagulated due to history of bleeding. Defer to cardiology. Continue metoprolol      HTN (hypertension), benign: BP controlled. Continue antihypertensives as above      Rheumatoid arthritis: on chronic prednisone, likely the cause of her chronic leukocytosis. Continue steroids      Elevated blood sugar / Diabetes mellitus type 2, controlled: continue Januvia. Start SSI. Send A1c      Asthma: no wheezing.  Monitor off nebs to avoid beta agonist for now      Hypothyroidism: continue thyroid replacement therapy Pernicious anemia /   Iron deficiency anemia, unspecified: on B12 injections outpatient and on iron supplements. Hg WNR      Esophageal reflux: continue PPI      Gluten intolerance (): gluten-free diet      Obesity: Body mass index is 34.39 kg/m². Would benefit from weight loss and dietary / lifestyle modifications          Code Status: FULL     Surrogate decision maker: spouse      Previous notes and lab results reviewed, including cardiology notes      Total time spent with patient: 79 Minutes   Time spent in the care of this patient included reviewing records, discussing with nursing, obtaining history and examining the patient, and discussing treatment plans, with >50% time spent counseling/coordinating care    Risk of deterioration: High                 Care Plan discussed with: ED provider, Patient, Nursing Staff, Consultant/Specialist and >50% of time spent in counseling and coordination of care    Discussed:  Care Plan and D/C Planning    Prophylaxis:  Lovenox    Disposition:  Home w/Family                 Subjective:     CHIEF COMPLAINT:     HISTORY OF PRESENT ILLNESS:     Ms. Laura Smith is a 68 y.o. female w/ hx of CAD, PAF, HFpEF who presents with chest pain. Started at about 1pm. Was sitting down, felt moderate, substernal chest pain described as squeezing, with radiation to her jaw and R arm, associated with dyspnea, nausea, and dizziness. Lasted 10 minutes before going away. Later, symptoms returned, and reminiscent of how she felt when she had her MI.    ED workup included EKG showing subtle TWI, negative troponin, and negative CXR. Her symptoms resolved after nitropaste. Ms. Laura Smith is admitted for further evaluation and management. Past Medical History:   Diagnosis Date    Anemia     iron defic anemia    Arthritis     Asthma 6/29/2009    CAUSED BY MOLD    Atrial fibrillation ([facility] [address] 6/29/2009    CAD (coronary artery disease) 06/29/2009    MI X2  -- prior stenting     Chronic pain     RT. ANKLE    Diastolic dysfunction     GERD (gastroesophageal reflux disease)     Gluten intolerance     Hypothyroidism 2009    Personal history of MI (myocardial infarction)     Rheumatoid arthritis ([facility]     Stroke ([facility]     TIA (NO RESIDUAL)    Syncope     Thromboembolus ([facility] 2004    RT. -- DVT after surgery     Thyroid disease     HYPO    TIA (transient ischemic attack)      &     Unspecified adverse effect of anesthesia     \"IS A LIGHT WEIGHT\"    Unspecified adverse effect of anesthesia     DIFFICULTY AWAKENING    Vitamin B12 deficiency 2009        Past Surgical History:   Procedure Laterality Date    CARDIAC SURG PROCEDURE UNLIST      ABLATION    HX ADENOIDECTOMY      HX APPENDECTOMY      HX CHOLECYSTECTOMY  11    HX GI      COLONOSCOPY AND ENDOSCOPY    HX HEART CATHETERIZATION  2010    2 STENTS    HX HYSTERECTOMY      HX LUMBAR LAMINECTOMY  2000    L4-L5    HX ORTHOPAEDIC  -2012    RT.  FOOT SURGERY X 6/calcaneal osteotomy    HX TONSILLECTOMY  1964    STENT INSERTION         Social History     Tobacco Use    Smoking status: Former Smoker     Packs/day: 1.00     Years: 30.00     Pack years: 30.00     Last attempt to quit: 2002     Years since quittin.1    Smokeless tobacco: Never Used   Substance Use Topics    Alcohol use: No        Family History   Problem Relation Age of Onset    Delayed Awakening Mother     Heart Disease Mother     Coronary Artery Disease Mother     Hypertension Mother    [facility] Stroke Mother     Delayed Awakening Sister     Hypertension Sister     Lung Disease Father     Cancer Father         colon    Hypertension Father     Hypertension Brother     Breast Cancer Maternal Aunt     Breast Cancer Maternal Aunt     Breast Cancer Cousin         maternal 1st cousin    Breast Cancer Maternal Aunt     Breast Cancer Cousin         maternal 1st cousin    Breast Cancer Cousin         maternal 1st cousin        Allergies Allergen Reactions    Clopidogrel Other (comments)    Sulfa (Sulfonamide Antibiotics) Swelling    Adhesive Tape-Silicones Other (comments)     BAD BLISTERS AND TENDS TO GET INFECTED    Albuterol Palpitations    Aspirin Hives and Other (comments)     \"it makes my stomach bleed\"      Butter Flavor Anaphylaxis     Butter AND CREME    Cimzia [Certolizumab Pegol] Other (comments)     GI symptoms, blisters in the mouth and esophagus    Demerol [Meperidine] Other (comments)     HYPOTENSION    Fentanyl Other (comments)     HYPOTENSION    Gluten Diarrhea     bloating    Keflex [Cephalexin] Anaphylaxis    Levaquin [Levofloxacin] Unproven on Challenge     PT DOESN'T REMEMBER HAVING THAT    Morphine Other (comments)     HYPOTENSION    Nitroglycerin Other (comments)     IN PILL FORM  - HYPOTENSION  CAN TOLERATE PATCH FOR SHORT TIME    Pcn [Penicillins] Anaphylaxis    Percocet [Oxycodone-Acetaminophen] Unknown (comments)     HYPOTENSION AND HALLUCINATIONS. Can take tylenol ok, oxycodone is allergy per patient.  Tapazole [Methimazole] Unknown (comments)     Patient stated \"it made me feel like I had the flu\"        Prior to Admission medications    Medication Sig Start Date End Date Taking? Authorizing Provider   lancets (ONE TOUCH DELICA) 33 gauge misc OneTouch Delica Lancets 33 gauge   USE AS DIRECTED DAILY   Yes Provider, Historical   Blood-Glucose Meter ([address]) [facility] OneTouch Verio System   Yes Provider, Historical   metoprolol tartrate (LOPRESSOR) 50 mg tablet Take 50 mg by mouth two (2) times a day. Yes Provider, Historical   eplerenone (INSPRA) 25 mg tablet Take 1 Tab by mouth two (2) times a day. 7/19/19  Yes Jacklyn Kilpatrick MD   bumetanide (BUMEX) 2 mg tablet Take 1 Tab by mouth daily. 6/28/19  Yes Ye Bustillo,    SITagliptin (JANUVIA) 100 mg tablet Januvia 100 mg tablet   Take 1 tablet every day by oral route.    Yes Provider, Historical   predniSONE (DELTASONE) 10 mg tablet Take 10 mg by mouth daily. Indications: taken differently; verify with patient RE dosage 2/21/19  Yes Provider, Historical   predniSONE (DELTASONE) 2.5 mg tablet Take 2.5 mg by mouth every evening. 2/21/19  Yes Provider, Historical   calcium citrate 200 mg (950 mg) tablet Take 2,000 mg by mouth daily. Yes Provider, Historical   flaxseed oil (OMEGA 3 PO) Take 1 Tab by mouth daily. Indications: FISH OIL   Yes Provider, Historical   vit C/vit E ac/lut/copper/zinc (PRESERVISION LUTEIN PO) Take 1 Tab by mouth daily. Yes Provider, Historical   thyroid, [place], (NP THYROID) 90 mg tablet Take 105 mg by mouth daily. Indications: pt takes 97.5 mg   Yes Other, MD Yolanda   lidocaine (LIDODERM) 5 % 1 Patch by TransDERmal route every twenty-four (24) hours. Apply patch to the affected area for 12 hours a day and remove for 12 hours a day. Yes Other, MD Yolanda   ascorbic acid (VITAMIN C) 500 mg tablet Take 500 mg by mouth daily. Yes Other, MD Yolanda   ferrous sulfate 325 mg (65 mg iron) tablet Take 85 mg by mouth Daily (before breakfast). Yes Other, MD Yolanda   Cholecalciferol, Vitamin D3, (VITAMIN D3) 1,000 unit Cap Take 2 Caps by mouth daily. Yes Provider, Historical   lansoprazole (PREVACID) 30 mg capsule Take 30 mg by mouth Daily (before breakfast). Yes Provider, Historical   lisinopril (PRINIVIL, ZESTRIL) 2.5 mg tablet Take 1 Tab by mouth daily. Patient taking differently: Take 2.5 mg by mouth daily. Indications: new RX.   Has not started yet 8/9/19   Ana Gutierrez MD   cyanocobalamin (VITAMIN B12) 1,000 mcg/mL injection INJECT 1 ML INTO THE MUSCLE EVERY 30 DAYS 3/24/14   Patricia Yeboah MD       Review of Systems:  (bold if positive, if negative)    Gen:  Eyes:  ENT:  CVS:  chest paindizzinessedemaPulm:  dyspneaGI:  nausea  :    MS:  Skin:  Psych:  Endo:    Hem:  Renal:    Neuro:            Objective:      VITALS:    Vital signs reviewed; most recent are:    Visit Vitals  /82 (BP 1 Location: Left arm, BP Patient Position: At rest)   Pulse 72   Temp 97.5 °F (36.4 °C)   Resp 12   Ht 5' 2\" (1.575 m)   Wt 85.3 kg (188 lb)   SpO2 98%   Breastfeeding? No   BMI 34.39 kg/m²     SpO2 Readings from Last 6 Encounters:   08/12/19 98%   08/09/19 96%   06/28/19 96%   06/20/19 96%   05/16/19 95%   04/23/19 94%        No intake or output data in the 24 hours ending 08/12/19 1623         Exam:     Physical Exam:    Gen:  Well-developed, well-nourished, in no acute distress. Pleasant   HEENT:  No scleral icterus, hearing intact to voice, moist mucous membranes  Neck:  Supple, without masses. Thyroid non-tender  Resp:  No accessory muscle use. CTAB without wheezing, rales, rhonchi  Card: RRR. Normal S1 and S2 without murmurs, rubs, or gallops. 1+ peripheral lower extremity edema. No JVD. Peripheral pulses in tact. Abd:  Normoactive bowel sounds. Soft, non-tender, non-distended. No rebound, no guarding. No appreciable hepatosplenomegaly   Lymph:  No cervical adenopathy  Musc:  No cyanosis or clubbing  Skin:  No rashes or ulcers; turgor intact. Neuro:  Cranial nerves are grossly intact, no focal motor weakness, follows commands appropriately  Psych:  Good insight, normal affect. Alert, oriented x 3. Answers questions appropriately       Labs:    Recent Labs     08/12/19  1205   WBC 19.3*   HGB 14.3   HCT 43.3        Recent Labs     08/12/19  1205      K 3.5      CO2 32   *   BUN 24*   CREA 1.03*   CA 9.4   ALB 3.5   SGOT 14*   ALT 36     No components found for: GLPOC  No results for input(s): PH, PCO2, PO2, HCO3, FIO2 in the last 72 hours. No results for input(s): INR in the last 72 hours. No lab exists for component: INREXT  No results found for: SDES  Lab Results   Component Value Date/Time    Culture result: ESCHERICHIA COLI (A) 07/20/2018 04:16 PM    Culture result: NO GROWTH 1 DAY 04/23/2016 01:40 AM     All other current labs reviewed in the computer.       Imaging/Studies:    CXR: no acute process    EKG: NSR, NSSTT, subtle TWI  EKG personally reviewed    ___________________________________________________    Attending Physician: Shiloh Kirkpatrick MD

## 2019-08-12 NOTE — Clinical Note
Sheath #1: Sheath: inserted. Sheath inserted/placed in the left radial  artery. Hemostasis achieved.

## 2019-08-12 NOTE — ED NOTES
TRANSFER - OUT REPORT:    Verbal report given to Lenny Sahu RN on Armin Chung  being transferred to [facility] for routine progression of care       Report consisted of patients Situation, Background, Assessment and   Recommendations(SBAR). Information from the following report(s) SBAR, ED Summary, MAR, Recent Results and Cardiac Rhythm NSR was reviewed with the receiving nurse. Lines:   Peripheral IV 08/12/19 Right;Upper Forearm (Active)   Site Assessment Clean, dry, & intact 8/12/2019 12:11 PM   Phlebitis Assessment 0 8/12/2019 12:11 PM   Infiltration Assessment 0 8/12/2019 12:11 PM   Dressing Status Clean, dry, & intact 8/12/2019 12:11 PM   Dressing Type Transparent;Tape 8/12/2019 12:11 PM   Hub Color/Line Status Pink;Patent; Flushed 8/12/2019 12:11 PM   Action Taken Blood drawn 8/12/2019 12:11 PM        Opportunity for questions and clarification was provided.       Patient transported with:   Monitor    Saline Lock

## 2019-08-12 NOTE — CONSULTS
Chino Medina DO  [facility] [address] [place] [address], [address] [facility], [address] [place] [address]                                       Office [phone],OFN [phone]  Office [phone],ANM [phone]      Date of  Admission: 8/12/2019 11:39 AM  PCP- Waylon Gilliland MD    Sarah Watkins is a 68 y.o. female admitted for Chest pain [R07.9]. Consult requested by Hanane Buckner MD    Assessment/Plan      Unstable angina- previously with hypotension with ISMN  CAD s/p pci of lad and rca  Chronic CHACON  HFpEF  DM2  PAF- not on AC due to anemia  RA  Pernicious anemia    - trend cardiac biomarkers  - cont AC with lovenox  - cont BB  - npo for C tomorrow  - will need to address antiplatelet therapy prior to cardiac catheterization           I appreciate the opportunity to be involved in Ms. Guzmán. See below note for details. Please do not hesitate to contact us with questions or concerns. Opal Sue DO      Subjective:  Sarah Watkins is a 68 y.o. female with hx of CAD s/p PCI of LAD and RCA presents with chest pain symptoms. Presented to [facility] today for chest pain. She reports single episode of chest pain last week last ~10 mins. Today she had two episodes of chest pain. Initial episode lasted again ~10 mins. Symptoms eased up. She began having second episode of chest discomfort that brought her into the ED. While walking out of her house her chest pain symptoms increased and she experienced severe jaw pain with symptoms. She has been evaluated recently for persistent dyspnea on exertion symptoms. Left sided filling pressures elevated. Scheduled for cardiac mri and further laboratory evaluation for amyloidosis. She denies orhtopnea/pnd. Cardiac biomarkers are negative. ECG with subtle ST changes in lateral leads.     Past Medical History:   Diagnosis Date    Anemia     iron defic anemia    Arthritis     Asthma 6/29/2009    CAUSED BY MOLD    Atrial fibrillation ([facility] 6/29/2009    CAD (coronary artery disease) 06/29/2009    MI X2  -- prior stenting     Chronic pain     RT. ANKLE    Diastolic dysfunction     GERD (gastroesophageal reflux disease)     Gluten intolerance     Hypothyroidism 6/29/2009    Personal history of MI (myocardial infarction)     Rheumatoid arthritis ([facility]     Stroke ([facility]     TIA (NO RESIDUAL)    Syncope     Thromboembolus ([facility] 2004    RT. -- DVT after surgery     Thyroid disease     HYPO    TIA (transient ischemic attack)     2004 & 2006    Unspecified adverse effect of anesthesia     \"IS A LIGHT WEIGHT\"    Unspecified adverse effect of anesthesia     DIFFICULTY AWAKENING    Vitamin B12 deficiency 6/29/2009      Past Surgical History:   Procedure Laterality Date    CARDIAC SURG PROCEDURE UNLIST      ABLATION    HX ADENOIDECTOMY      HX APPENDECTOMY      HX CHOLECYSTECTOMY  6/16/11    HX GI      COLONOSCOPY AND ENDOSCOPY    HX HEART CATHETERIZATION  2010    2 STENTS    HX HYSTERECTOMY      HX LUMBAR LAMINECTOMY  2000    L4-L5    HX ORTHOPAEDIC  1993-6/2012    RT.  FOOT SURGERY X 6/calcaneal osteotomy    HX TONSILLECTOMY  1964    STENT INSERTION       Allergies   Allergen Reactions    Clopidogrel Other (comments)    Sulfa (Sulfonamide Antibiotics) Swelling    Adhesive Tape-Silicones Other (comments)     BAD BLISTERS AND TENDS TO GET INFECTED    Albuterol Palpitations    Aspirin Hives and Other (comments)     \"it makes my stomach bleed\"      Butter Flavor Anaphylaxis     Butter AND CREME    Cimzia [Certolizumab Pegol] Other (comments)     GI symptoms, blisters in the mouth and esophagus    Demerol [Meperidine] Other (comments)     HYPOTENSION    Fentanyl Other (comments)     HYPOTENSION    Gluten Diarrhea     bloating    Keflex [Cephalexin] Anaphylaxis    Levaquin [Levofloxacin] Unproven on Challenge     PT DOESN'T REMEMBER HAVING THAT    Morphine Other (comments)     HYPOTENSION    Nitroglycerin Other (comments)     IN PILL FORM  - HYPOTENSION  CAN TOLERATE PATCH FOR SHORT TIME    Pcn [Penicillins] Anaphylaxis    Percocet [Oxycodone-Acetaminophen] Unknown (comments)     HYPOTENSION AND HALLUCINATIONS. Can take tylenol ok, oxycodone is allergy per patient.  Tapazole [Methimazole] Unknown (comments)     Patient stated \"it made me feel like I had the flu\"     Family History   Problem Relation Age of Onset    Delayed Awakening Mother     Heart Disease Mother     Coronary Artery Disease Mother     Hypertension Mother     Stroke Mother     Delayed Awakening Sister     Hypertension Sister     Lung Disease Father     Cancer Father         colon    Hypertension Father     Hypertension Brother     Breast Cancer Maternal Aunt     Breast Cancer Maternal Aunt     Breast Cancer Cousin         maternal 1st cousin    Breast Cancer Maternal Aunt     Breast Cancer Cousin         maternal 1st cousin   [place] Breast Cancer Cousin         maternal 1st cousin      Social History     Tobacco Use    Smoking status: Former Smoker     Packs/day: 1.00     Years: 30.00     Pack years: 30.00     Last attempt to quit: 2002     Years since quittin.1    Smokeless tobacco: Never Used   Substance Use Topics    Alcohol use: No    Drug use: No          Medications:  Medications Prior to Admission   Medication Sig    bumetanide (BUMEX) 1 mg tablet Take 1 mg by mouth daily.  thyroid, Pork, (ARMOUR) 15 mg tablet Take 7.5 mg by mouth daily. Takes in addition to 90 mg for a total of 97.5 mg    metoprolol tartrate (LOPRESSOR) 50 mg tablet Take 50 mg by mouth two (2) times a day.  eplerenone (INSPRA) 25 mg tablet Take 1 Tab by mouth two (2) times a day.  SITagliptin (JANUVIA) 100 mg tablet Januvia 100 mg tablet   Take 1 tablet every day by oral route.  predniSONE (DELTASONE) 10 mg tablet Take 10 mg by mouth daily.  Indications: taken differently; verify with patient RE dosage    predniSONE (DELTASONE) 2.5 mg tablet Take 2.5 mg by mouth every evening.  calcium citrate 200 mg (950 mg) tablet Take 200 mg by mouth every evening.  flaxseed oil (OMEGA 3 PO) Take 1 Tab by mouth every evening. Indications: FISH OIL    vit C/vit E ac/lut/copper/zinc (PRESERVISION LUTEIN PO) Take 1 Tab by mouth daily.  thyroid, Pork, (NP THYROID) 90 mg tablet Take 90 mg by mouth daily. Takes in addition to 7.5 mg for a total of 97.5 mg    lidocaine (LIDODERM) 5 % 1 Patch by TransDERmal route every twenty-four (24) hours. Apply patch to the affected area for 12 hours a day and remove for 12 hours a day.  ascorbic acid (VITAMIN C) 500 mg tablet Take 500 mg by mouth daily.  ferrous sulfate 325 mg (65 mg iron) tablet Take 85 mg by mouth every evening.  Cholecalciferol, Vitamin D3, (VITAMIN D3) 1,000 unit Cap Take 2 Caps by mouth every evening.  lansoprazole (PREVACID) 30 mg capsule Take 30 mg by mouth Daily (before breakfast).  lisinopril (PRINIVIL, ZESTRIL) 2.5 mg tablet Take 1 Tab by mouth daily. (Patient taking differently: Take 2.5 mg by mouth daily. Indications: new RX.   Has not started yet)    cyanocobalamin (VITAMIN B12) 1,000 mcg/mL injection INJECT 1 ML INTO THE MUSCLE EVERY 30 DAYS     Current Facility-Administered Medications   Medication Dose Route Frequency    nitroglycerin (NITRODUR) 0.1 mg/hr patch 1 Patch  1 Patch TransDERmal NOW    sodium chloride (NS) flush 5-40 mL  5-40 mL IntraVENous Q8H    sodium chloride (NS) flush 5-40 mL  5-40 mL IntraVENous PRN    acetaminophen (TYLENOL) tablet 650 mg  650 mg Oral Q6H PRN    naloxone (NARCAN) injection 0.4 mg  0.4 mg IntraVENous PRN    ascorbic acid (vitamin C) (VITAMIN C) tablet 500 mg  500 mg Oral DAILY    [START ON 8/13/2019] calcium citrate tablet 200 mg [elemental]  200 mg Oral DAILY    cholecalciferol (VITAMIN D3) tablet 2,000 Units  2,000 Units Oral DAILY    [START ON 8/13/2019] ferrous sulfate tablet 325 mg  325 mg Oral ACB    lisinopril (PRINIVIL, ZESTRIL) tablet 2.5 mg  2.5 mg Oral DAILY    metoprolol tartrate (LOPRESSOR) tablet 50 mg  50 mg Oral BID    [START ON 8/13/2019] predniSONE (DELTASONE) tablet 10 mg  10 mg Oral DAILY    predniSONE (DELTASONE) tablet 2.5 mg  2.5 mg Oral QPM    [START ON 8/13/2019] SITagliptin (JANUVIA) tablet tab 100 mg  100 mg Oral DAILY    [START ON 8/13/2019] therapeutic multivitamin (THERAGRAN) tablet 1 Tab  1 Tab Oral DAILY    insulin lispro (HUMALOG) injection   SubCUTAneous TIDAC    glucose chewable tablet 16 g  4 Tab Oral PRN    glucagon (GLUCAGEN) injection 1 mg  1 mg IntraMUSCular PRN    dextrose 10% infusion 125-250 mL  125-250 mL IntraVENous PRN    [START ON 8/13/2019] eplerenone (INSPRA) tablet 25 mg  25 mg Oral BID    atorvastatin (LIPITOR) tablet 40 mg  40 mg Oral QHS    pantoprazole (PROTONIX) tablet 40 mg  40 mg Oral ACB    [START ON 8/13/2019] bumetanide (BUMEX) tablet 1 mg  1 mg Oral DAILY    [START ON 8/13/2019] thyroid (Pork) (ARMOUR) tablet 90 mg  90 mg Oral DAILY    And    [START ON 8/13/2019] Thyroid 1/2 tablet of 15mg = 7.5mg  7.5 mg Oral DAILY         Review of Systems:  Pertinent Positive:chest pain, CHACON  Pertinent Negative: no syncope, orhtopnea, pnd  All Other systems reviewed and are negative for a Comprehensive ROS (10+)        Physical Exam:  Visit Vitals  /82 (BP 1 Location: Left arm, BP Patient Position: At rest)   Pulse (!) 57   Temp 97.5 °F (36.4 °C)   Resp 12   Ht 5' 2\" (1.575 m)   Wt 188 lb (85.3 kg)   SpO2 98%   Breastfeeding? No   BMI 34.39 kg/m²           Gen: Well-developed, well-nourished, in no acute distress  HEENT:  Pink conjunctivae, hearing intact to voice, moist mucous membranes  Neck: Supple,No JVD, No Carotid Bruit  Resp: No accessory muscle use, Clear breath sounds, No rales or rhonchi  Card: Normal Rate,Regular Rythm,Normal S1, S2, No murmurs, rubs or gallop. No thrills.    Abd:  Soft, non-tender, non-distended, normoactive bowel sounds are present   MSK: No cyanosis or clubbing  Skin: No rashes or ulcers  Neuro:  Cranial nerves are grossly intact, moving all four extremities, no focal deficit, follows commands appropriately  Psych:  Good insight, oriented to person, place and time, alert, Nml Affect  LE: No edema  Vascular:Distal Pulses 2+ and symmetric          EKG: sinus rhythm, non-specific ST changes    Cardiac Testing/ Procedures:    ECHO 2008: EF 60%  ECHO 3/3/2017: EF 60%, mild TR     CATH 2009: stent LCX  CATH 8/2010: LAD: m: MLI, , LCX: m 20%, patent RCA, LCX stents, EF 60%   CATH 1/2019 [place]: patent stents    STRESS: Myoview 2014: no ischemia  STRESS Nuc 2/2016: no ischemia,   STRESS Nuc 3/3/2017: no ischemia, EF 58%     EVENT monitor 2011: PVCs  HOLTER 2013: frequent PVCs    LABS:    Lab Results   Component Value Date/Time    WBC 19.3 (H) 08/12/2019 12:05 PM    HGB 14.3 08/12/2019 12:05 PM    HCT 43.3 08/12/2019 12:05 PM    PLATELET 784 76/93/5258 12:05 PM    MCV 97.1 08/12/2019 12:05 PM     Lab Results   Component Value Date/Time    Sodium 141 08/12/2019 12:05 PM    Potassium 3.5 08/12/2019 12:05 PM    Chloride 102 08/12/2019 12:05 PM    CO2 32 08/12/2019 12:05 PM    Anion gap 7 08/12/2019 12:05 PM    Glucose 155 (H) 08/12/2019 12:05 PM    BUN 24 (H) 08/12/2019 12:05 PM    Creatinine 1.03 (H) 08/12/2019 12:05 PM    BUN/Creatinine ratio 23 (H) 08/12/2019 12:05 PM    GFR est AA >60 08/12/2019 12:05 PM    GFR est non-AA 53 (L) 08/12/2019 12:05 PM    Calcium 9.4 08/12/2019 12:05 PM     Lab Results   Component Value Date/Time    CK 57 03/02/2017 06:08 PM    CK-MB Index 2.3 03/02/2017 06:08 PM    Troponin-I, Qt. <0.05 08/12/2019 12:05 PM     Lab Results   Component Value Date/Time    aPTT 30.7 12/03/2010 11:20 AM     Lab Results   Component Value Date/Time    INR 1.0 01/25/2013 02:00 PM    INR 1.0 12/03/2010 11:20 AM    INR 0.9 08/19/2010 11:30 AM    Prothrombin time 10.3 01/25/2013 02:00 PM    Prothrombin time 10.4 12/03/2010 11:20 AM Prothrombin time 9.4 08/19/2010 11:30 AM           Yfn Headley DO

## 2019-08-12 NOTE — PROGRESS NOTES
TRANSFER - IN REPORT:    Verbal report received from [address] [place] [address]  (name) on Felecia Joseph  being received from [facility] (unit) for routine progression of care      Report consisted of patients Situation, Background, Assessment and   Recommendations(SBAR). Information from the following report(s) SBAR, Kardex and Recent Results was reviewed with the receiving nurse. Opportunity for questions and clarification was provided. Assessment completed upon patients arrival to unit and care assumed. \\Primary Nurse Sonia Sarkar, RN and LEONARDO FONTAINE, RN performed a dual skin assessment on this patient No impairment noted  Saúl score is 23. .    7370- Pt is asking for food. . Per MD HALL until pt is seen by MD Cardiology, pt will be NPO as because pt is admitted as CP.    1800- Blood to lab. .        1930-Bedside and Verbal shift change report given to [address] [place] [address] [address] (oncoming nurse) by Mortimer Rad RN (offgoing nurse). Report included the following information SBAR, Kardex and Recent Results.

## 2019-08-12 NOTE — Clinical Note
TRANSFER - OUT REPORT:  
 
Verbal report given to: Nathalie Cheadle. Report consisted of patient's Situation, Background, Assessment and  
Recommendations(SBAR). Opportunity for questions and clarification was provided. Patient transported with a Registered Nurse. Patient transported to: [facility].

## 2019-08-12 NOTE — ED TRIAGE NOTES
Patient presents to treatment area via wheelchair. Patient states she began experiencing chest pain about 20 minutes PTA. Patient states she had nausea and shortness of breath with the episode. Chest pain resolved and then recurred about 10 minutes PTA. Patient states she remains short of breath at this time, but is no longer nauseated. States pain radiates into right arm.

## 2019-08-12 NOTE — ED PROVIDER NOTES
HPI   The patient is a 59-year-old white female status post multiple stents in the past also was followed by advanced heart failure clinic and is noted to have pulmonary hypertension as well today presents with 2 episodes of substernal severe chest pain each lasting 10 minutes with a brief period between them. It was not exertional but she became diaphoretic and did not feel right and was nauseated. She still has some mild pain at 3 out of 10 at this point. If she cannot take sublingual nitroglycerin due to severe hypotension episodes. She has had 2 MIs in the past with stenting. She has a history of atrial fibrillation as well. She also has diastolic dysfunction. Her most recent cath was January 2019 which did not show any new blockages. Past Medical History:   Diagnosis Date    Anemia     iron defic anemia    Arthritis     Asthma 6/29/2009    CAUSED BY MOLD    Atrial fibrillation ([address] 6/29/2009    CAD (coronary artery disease) 06/29/2009    MI X2  -- prior stenting     Chronic pain     RT. ANKLE    Diastolic dysfunction     GERD (gastroesophageal reflux disease)     Gluten intolerance     Hypothyroidism 6/29/2009    Personal history of MI (myocardial infarction)     Rheumatoid arthritis ([address]     Stroke ([address]     TIA (NO RESIDUAL)    Syncope     Thromboembolus ([address] 2004    RT.  -- DVT after surgery     Thyroid disease     HYPO    TIA (transient ischemic attack)     2004 & 2006    Unspecified adverse effect of anesthesia     \"IS A LIGHT WEIGHT\"    Unspecified adverse effect of anesthesia     DIFFICULTY AWAKENING    Vitamin B12 deficiency 6/29/2009       Past Surgical History:   Procedure Laterality Date    CARDIAC SURG PROCEDURE UNLIST      ABLATION    HX ADENOIDECTOMY      HX APPENDECTOMY      HX CHOLECYSTECTOMY  6/16/11    HX GI      COLONOSCOPY AND ENDOSCOPY    HX HEART CATHETERIZATION  2010    2 STENTS    HX HYSTERECTOMY      HX LUMBAR LAMINECTOMY  2000    L4-L5    HX ORTHOPAEDIC  -2012    RT.  FOOT SURGERY X 6/calcaneal osteotomy    HX TONSILLECTOMY  1964    STENT INSERTION           Family History:   Problem Relation Age of Onset    Delayed Awakening Mother     Heart Disease Mother     Coronary Artery Disease Mother     Hypertension Mother     Stroke Mother     Delayed Awakening Sister     Hypertension Sister     Lung Disease Father     Cancer Father         colon    Hypertension Father     Hypertension Brother     Breast Cancer Maternal Aunt     Breast Cancer Maternal Aunt     Breast Cancer Cousin         maternal 1st cousin    Breast Cancer Maternal Aunt     Breast Cancer Cousin         maternal 1st cousin    Breast Cancer Cousin         maternal 1st cousin       Social History     Socioeconomic History    Marital status:      Spouse name: Not on file    Number of children: Not on file    Years of education: Not on file    Highest education level: Not on file   Occupational History    Not on file   Social Needs    Financial resource strain: Not on file    Food insecurity:     Worry: Not on file     Inability: Not on file    Transportation needs:     Medical: Not on file     Non-medical: Not on file   Tobacco Use    Smoking status: Former Smoker     Packs/day: 1.00     Years: 30.00     Pack years: 30.00     Last attempt to quit: 2002     Years since quittin.1    Smokeless tobacco: Never Used   Substance and Sexual Activity    Alcohol use: No    Drug use: No    Sexual activity: Never   Lifestyle    Physical activity:     Days per week: Not on file     Minutes per session: Not on file    Stress: Not on file   Relationships    Social connections:     Talks on phone: Not on file     Gets together: Not on file     Attends [employer] service: Not on file     Active member of club or organization: Not on file     Attends meetings of clubs or organizations: Not on file     Relationship status: Not on file    Intimate partner violence:     Fear of current or ex partner: Not on file     Emotionally abused: Not on file     Physically abused: Not on file     Forced sexual activity: Not on file   Other Topics Concern    Not on file   Social History Narrative    Not on file         ALLERGIES: Clopidogrel; Sulfa (sulfonamide antibiotics); Adhesive tape-silicones; Albuterol; Aspirin; Butter flavor; Cimzia [certolizumab pegol]; Demerol [meperidine]; Fentanyl; Gluten; Keflex [cephalexin]; Levaquin [levofloxacin]; Morphine; Nitroglycerin; Pcn [penicillins]; Percocet [oxycodone-acetaminophen]; and Tapazole [methimazole]    Review of Systems   All other systems reviewed and are negative. Vitals:    08/12/19 1154   BP: 154/86   Pulse: 76   Resp: 18   Temp: 97.6 °F (36.4 °C)   SpO2: 98%   Weight: 85.3 kg (188 lb)   Height: 5' 2.5\" (1.588 m)            Physical Exam   Constitutional: She is oriented to person, place, and time. She appears well-developed and well-nourished. Chronically ill and debilitated   HENT:   Head: Normocephalic and atraumatic. Mouth/Throat: Oropharynx is clear and moist. No oropharyngeal exudate. Eyes: Conjunctivae are normal. No scleral icterus. Neck: Neck supple. No thyromegaly present. Cardiovascular: Normal rate, regular rhythm and normal heart sounds. Exam reveals no gallop and no friction rub. No murmur heard. Pulmonary/Chest: Effort normal and breath sounds normal. No stridor. No respiratory distress. She has no wheezes. She has no rales. Abdominal: Soft. Bowel sounds are normal. There is no tenderness. There is no rebound and no guarding. Musculoskeletal: Normal range of motion. There is 2+ pretibial edema   Lymphadenopathy:     She has no cervical adenopathy. Neurological: She is alert and oriented to person, place, and time. Skin: Skin is warm and dry. Psychiatric: She has a normal mood and affect. Nursing note and vitals reviewed.        MDM  Number of Diagnoses or Management Options Amount and/or Complexity of Data Reviewed  Clinical lab tests: ordered and reviewed  Tests in the radiology section of CPT®: ordered and reviewed  Tests in the medicine section of CPT®: ordered and reviewed  Discussion of test results with the performing providers: yes  Decide to obtain previous medical records or to obtain history from someone other than the patient: yes  Obtain history from someone other than the patient: yes  Review and summarize past medical records: yes  Discuss the patient with other providers: yes  Independent visualization of images, tracings, or specimens: yes    Risk of Complications, Morbidity, and/or Mortality  Presenting problems: moderate  Diagnostic procedures: moderate  Management options: moderate           Procedures        Assessment and plan       the patient's chest pain was very typical of her anginal pain and prolonged first set of enzymes are negative I spoke with Dr. Agnes Rodriguez who would like the patient to be admitted to the hospitalist service for rule out and further evaluation. He states he will see  her in consultation. Hospitalist Tess for Admission  1:36 PM    ED Room Number: WER04/04  Patient Name and age:  Felecia Joseph 68 y.o.  female  Working Diagnosis:   1. Unstable angina (HCC)      Readmission: no  Isolation Requirements:  no  Recommended Level of Care:  telemetry  Code Status:  Full Code  Department:[facility] - [phone]  Other:        ED EKG interpretation:  Rhythm:nsr  Rate 74 nsst changes  This EKG was interpreted by Alcira Lamb MD,ED Provider.

## 2019-08-12 NOTE — PROGRESS NOTES
[facility]: MED RECONCILIATION    Comments/Recommendations:     Patient able to confirm name, , allergies and preferred pharmacy  Pt reports that she has not started lisinopril 2.5 mg yet as it has not come in from mail order  Claims to take 10 mg prednisone in AM and 2.5 mg in PM - only had 5 mg prednisone this AM   Gets B12 inj at 1st of month     Medications added:     none    Medications removed:    none    Medications adjusted:    Bumex dose adjusted to 1 mg per pt   Ca Citrate dose adjusted to 200 mg per pt     Information obtained from: Patient    Allergies: Clopidogrel; Sulfa (sulfonamide antibiotics); Adhesive tape-silicones; Albuterol; Aspirin; Butter flavor; Cimzia [certolizumab pegol]; Demerol [meperidine]; Fentanyl; Gluten; Keflex [cephalexin]; Levaquin [levofloxacin]; Morphine; Nitroglycerin; Pcn [penicillins]; Percocet [oxycodone-acetaminophen]; and Tapazole [methimazole]    Prior to Admission Medications:     Prior to Admission Medications   Prescriptions Last Dose Informant Patient Reported? Taking? Cholecalciferol, Vitamin D3, (VITAMIN D3) 1,000 unit Cap 2019 at Unknown time Self Yes Yes   Sig: Take 2 Caps by mouth every evening. SITagliptin (JANUVIA) 100 mg tablet 2019 at Unknown time Self Yes Yes   Sig: Januvia 100 mg tablet   Take 1 tablet every day by oral route. ascorbic acid (VITAMIN C) 500 mg tablet 2019 at Unknown time Self Yes Yes   Sig: Take 500 mg by mouth daily. bumetanide (BUMEX) 1 mg tablet  Self Yes Yes   Sig: Take 1 mg by mouth daily. calcium citrate 200 mg (950 mg) tablet 2019 at Unknown time Self Yes Yes   Sig: Take 200 mg by mouth every evening. cyanocobalamin (VITAMIN B12) 1,000 mcg/mL injection 2019 Self No No   Sig: INJECT 1 ML INTO THE MUSCLE EVERY 30 DAYS   eplerenone (INSPRA) 25 mg tablet 2019 at Unknown time Self No Yes   Sig: Take 1 Tab by mouth two (2) times a day.    ferrous sulfate 325 mg (65 mg iron) tablet 2019 at Unknown time Self Yes Yes   Sig: Take 85 mg by mouth every evening. flaxseed oil (OMEGA 3 PO) 2019 at Unknown time Self Yes Yes   Sig: Take 1 Tab by mouth every evening. Indications: FISH OIL   lansoprazole (PREVACID) 30 mg capsule 2019 at Unknown time Self Yes Yes   Sig: Take 30 mg by mouth Daily (before breakfast). lidocaine (LIDODERM) 5 % 2019 at Unknown time Self Yes Yes   Si Patch by TransDERmal route every twenty-four (24) hours. Apply patch to the affected area for 12 hours a day and remove for 12 hours a day. lisinopril (PRINIVIL, ZESTRIL) 2.5 mg tablet Not Taking at Unknown time Self No No   Sig: Take 1 Tab by mouth daily. Patient taking differently: Take 2.5 mg by mouth daily. Indications: new RX. Has not started yet   metoprolol tartrate (LOPRESSOR) 50 mg tablet 2019 at Unknown time Self Yes Yes   Sig: Take 50 mg by mouth two (2) times a day. predniSONE (DELTASONE) 10 mg tablet 2019 at Unknown time Self Yes Yes   Sig: Take 10 mg by mouth daily. Indications: taken differently; verify with patient RE dosage   predniSONE (DELTASONE) 2.5 mg tablet 2019 at Unknown time Self Yes Yes   Sig: Take 2.5 mg by mouth every evening. thyroid, Pork, (ARMOUR) 15 mg tablet 2019 at Unknown time Self Yes Yes   Sig: Take 7.5 mg by mouth daily. Takes in addition to 90 mg for a total of 97.5 mg   thyroid, Pork, (NP THYROID) 90 mg tablet 2019 at Unknown time Self Yes Yes   Sig: Take 90 mg by mouth daily. Takes in addition to 7.5 mg for a total of 97.5 mg   vit C/vit E ac/lut/copper/zinc (PRESERVISION LUTEIN PO) 2019 at Unknown time Self Yes Yes   Sig: Take 1 Tab by mouth daily. Facility-Administered Medications: None           [employer]. DIANA    Clinical Staff Pharmacist  [address] [place] [address]  [phone]

## 2019-08-12 NOTE — ED NOTES
Called [facility] charge nurse to begin admission process, primary RN to return a call.

## 2019-08-13 ENCOUNTER — APPOINTMENT (OUTPATIENT)
Dept: NON INVASIVE DIAGNOSTICS | Age: 69
DRG: 287 | End: 2019-08-13
Attending: INTERNAL MEDICINE
Payer: MEDICARE

## 2019-08-13 VITALS
HEART RATE: 57 BPM | RESPIRATION RATE: 14 BRPM | DIASTOLIC BLOOD PRESSURE: 69 MMHG | SYSTOLIC BLOOD PRESSURE: 106 MMHG | WEIGHT: 187.39 LBS | HEIGHT: 62 IN | BODY MASS INDEX: 34.48 KG/M2 | OXYGEN SATURATION: 94 % | TEMPERATURE: 98 F

## 2019-08-13 LAB
ALBUMIN SERPL-MCNC: 3.2 G/DL (ref 3.5–5)
ALBUMIN/GLOB SERPL: 1.2 {RATIO} (ref 1.1–2.2)
ALP SERPL-CCNC: 81 U/L (ref 45–117)
ALT SERPL-CCNC: 30 U/L (ref 12–78)
ANION GAP SERPL CALC-SCNC: 6 MMOL/L (ref 5–15)
APPEARANCE UR: CLEAR
AST SERPL-CCNC: 19 U/L (ref 15–37)
BACTERIA URNS QL MICRO: NEGATIVE /HPF
BASOPHILS # BLD: 0.1 K/UL (ref 0–0.1)
BASOPHILS NFR BLD: 0 % (ref 0–1)
BILIRUB SERPL-MCNC: 0.3 MG/DL (ref 0.2–1)
BILIRUB UR QL: NEGATIVE
BUN SERPL-MCNC: 22 MG/DL (ref 6–20)
BUN/CREAT SERPL: 31 (ref 12–20)
CALCIUM SERPL-MCNC: 8.7 MG/DL (ref 8.5–10.1)
CHLORIDE SERPL-SCNC: 106 MMOL/L (ref 97–108)
CHOLEST SERPL-MCNC: 203 MG/DL
CK MB CFR SERPL CALC: NORMAL % (ref 0–2.5)
CK MB SERPL-MCNC: <1 NG/ML (ref 5–25)
CK SERPL-CCNC: 40 U/L (ref 26–192)
CO2 SERPL-SCNC: 29 MMOL/L (ref 21–32)
COLOR UR: ABNORMAL
CREAT SERPL-MCNC: 0.72 MG/DL (ref 0.55–1.02)
DIFFERENTIAL METHOD BLD: ABNORMAL
ECHO AO ROOT DIAM: 3.57 CM
ECHO AV AREA PEAK VELOCITY: 2.3 CM2
ECHO AV AREA/BSA PEAK VELOCITY: 1.2 CM2/M2
ECHO AV PEAK GRADIENT: 5.5 MMHG
ECHO AV PEAK VELOCITY: 117.35 CM/S
ECHO EST RA PRESSURE: 3 MMHG
ECHO LA MAJOR AXIS: 3.58 CM
ECHO LA TO AORTIC ROOT RATIO: 1
ECHO LA VOL 2C: 40.97 ML (ref 22–52)
ECHO LA VOL 4C: 47.67 ML (ref 22–52)
ECHO LA VOL BP: 47.96 ML (ref 22–52)
ECHO LA VOL/BSA BIPLANE: 25.79 ML/M2 (ref 16–28)
ECHO LA VOLUME INDEX A2C: 22.03 ML/M2 (ref 16–28)
ECHO LA VOLUME INDEX A4C: 25.64 ML/M2 (ref 16–28)
ECHO LV INTERNAL DIMENSION DIASTOLIC: 5.12 CM (ref 3.9–5.3)
ECHO LV INTERNAL DIMENSION SYSTOLIC: 3.43 CM
ECHO LV IVSD: 0.74 CM (ref 0.6–0.9)
ECHO LV MASS 2D: 182.7 G (ref 67–162)
ECHO LV MASS INDEX 2D: 98.3 G/M2 (ref 43–95)
ECHO LV POSTERIOR WALL DIASTOLIC: 1 CM (ref 0.6–0.9)
ECHO LVOT DIAM: 2.13 CM
ECHO LVOT PEAK GRADIENT: 2.3 MMHG
ECHO LVOT PEAK VELOCITY: 75.8 CM/S
ECHO MV A VELOCITY: 57.82 CM/S
ECHO MV E DECELERATION TIME (DT): 259.9 MS
ECHO MV E VELOCITY: 57.82 CM/S
ECHO MV E/A RATIO: 1
ECHO PV MAX VELOCITY: 57.89 CM/S
ECHO PV PEAK GRADIENT: 1.3 MMHG
ECHO RIGHT VENTRICULAR SYSTOLIC PRESSURE (RVSP): 30.2 MMHG
ECHO RV INTERNAL DIMENSION: 3.43 CM
ECHO TV REGURGITANT MAX VELOCITY: 260.78 CM/S
ECHO TV REGURGITANT PEAK GRADIENT: 27.2 MMHG
EOSINOPHIL # BLD: 0.1 K/UL (ref 0–0.4)
EOSINOPHIL NFR BLD: 1 % (ref 0–7)
EPITH CASTS URNS QL MICRO: ABNORMAL /LPF
ERYTHROCYTE [DISTWIDTH] IN BLOOD BY AUTOMATED COUNT: 13.2 % (ref 11.5–14.5)
EST. AVERAGE GLUCOSE BLD GHB EST-MCNC: 157 MG/DL
GLOBULIN SER CALC-MCNC: 2.6 G/DL (ref 2–4)
GLUCOSE BLD STRIP.AUTO-MCNC: 104 MG/DL (ref 65–100)
GLUCOSE BLD STRIP.AUTO-MCNC: 117 MG/DL (ref 65–100)
GLUCOSE BLD STRIP.AUTO-MCNC: 150 MG/DL (ref 65–100)
GLUCOSE BLD STRIP.AUTO-MCNC: 187 MG/DL (ref 65–100)
GLUCOSE SERPL-MCNC: 139 MG/DL (ref 65–100)
GLUCOSE UR STRIP.AUTO-MCNC: NEGATIVE MG/DL
HBA1C MFR BLD: 7.1 % (ref 4.2–6.3)
HCT VFR BLD AUTO: 41.1 % (ref 35–47)
HDLC SERPL-MCNC: 37 MG/DL
HDLC SERPL: 5.5 {RATIO} (ref 0–5)
HGB BLD-MCNC: 13 G/DL (ref 11.5–16)
HGB UR QL STRIP: NEGATIVE
HYALINE CASTS URNS QL MICRO: ABNORMAL /LPF (ref 0–5)
IMM GRANULOCYTES # BLD AUTO: 0.1 K/UL (ref 0–0.04)
IMM GRANULOCYTES NFR BLD AUTO: 1 % (ref 0–0.5)
KETONES UR QL STRIP.AUTO: NEGATIVE MG/DL
LDLC SERPL CALC-MCNC: ABNORMAL MG/DL (ref 0–100)
LDLC SERPL DIRECT ASSAY-MCNC: 98 MG/DL (ref 0–100)
LEUKOCYTE ESTERASE UR QL STRIP.AUTO: ABNORMAL
LIPID PROFILE,FLP: ABNORMAL
LYMPHOCYTES # BLD: 1.8 K/UL (ref 0.8–3.5)
LYMPHOCYTES NFR BLD: 13 % (ref 12–49)
MAGNESIUM SERPL-MCNC: 2.1 MG/DL (ref 1.6–2.4)
MCH RBC QN AUTO: 30.9 PG (ref 26–34)
MCHC RBC AUTO-ENTMCNC: 31.6 G/DL (ref 30–36.5)
MCV RBC AUTO: 97.6 FL (ref 80–99)
MONOCYTES # BLD: 1 K/UL (ref 0–1)
MONOCYTES NFR BLD: 7 % (ref 5–13)
NEUTS SEG # BLD: 10.9 K/UL (ref 1.8–8)
NEUTS SEG NFR BLD: 78 % (ref 32–75)
NITRITE UR QL STRIP.AUTO: NEGATIVE
NRBC # BLD: 0 K/UL (ref 0–0.01)
NRBC BLD-RTO: 0 PER 100 WBC
PH UR STRIP: 6.5 [PH] (ref 5–8)
PHOSPHATE SERPL-MCNC: 3.4 MG/DL (ref 2.6–4.7)
PLATELET # BLD AUTO: 221 K/UL (ref 150–400)
PMV BLD AUTO: 10.5 FL (ref 8.9–12.9)
POTASSIUM SERPL-SCNC: 4.3 MMOL/L (ref 3.5–5.1)
PROT SERPL-MCNC: 5.8 G/DL (ref 6.4–8.2)
PROT UR STRIP-MCNC: NEGATIVE MG/DL
RBC # BLD AUTO: 4.21 M/UL (ref 3.8–5.2)
RBC #/AREA URNS HPF: ABNORMAL /HPF (ref 0–5)
SERVICE CMNT-IMP: ABNORMAL
SODIUM SERPL-SCNC: 141 MMOL/L (ref 136–145)
SP GR UR REFRACTOMETRY: 1.01 (ref 1–1.03)
TRIGL SERPL-MCNC: 495 MG/DL (ref ?–150)
TROPONIN I SERPL-MCNC: <0.05 NG/ML
UA: UC IF INDICATED,UAUC: ABNORMAL
UROBILINOGEN UR QL STRIP.AUTO: 0.2 EU/DL (ref 0.2–1)
VLDLC SERPL CALC-MCNC: ABNORMAL MG/DL
WBC # BLD AUTO: 14.1 K/UL (ref 3.6–11)
WBC URNS QL MICRO: ABNORMAL /HPF (ref 0–4)

## 2019-08-13 PROCEDURE — 74011636320 HC RX REV CODE- 636/320: Performed by: STUDENT IN AN ORGANIZED HEALTH CARE EDUCATION/TRAINING PROGRAM

## 2019-08-13 PROCEDURE — 77030029065 HC DRSG HEMO QCLOT ZMED -B

## 2019-08-13 PROCEDURE — 74011636637 HC RX REV CODE- 636/637: Performed by: INTERNAL MEDICINE

## 2019-08-13 PROCEDURE — C1887 CATHETER, GUIDING: HCPCS | Performed by: STUDENT IN AN ORGANIZED HEALTH CARE EDUCATION/TRAINING PROGRAM

## 2019-08-13 PROCEDURE — 80053 COMPREHEN METABOLIC PANEL: CPT

## 2019-08-13 PROCEDURE — 74011250637 HC RX REV CODE- 250/637: Performed by: INTERNAL MEDICINE

## 2019-08-13 PROCEDURE — 82550 ASSAY OF CK (CPK): CPT

## 2019-08-13 PROCEDURE — 74011250637 HC RX REV CODE- 250/637: Performed by: SPECIALIST

## 2019-08-13 PROCEDURE — 77030004532 HC CATH ANGI DX IMP BSC -A: Performed by: STUDENT IN AN ORGANIZED HEALTH CARE EDUCATION/TRAINING PROGRAM

## 2019-08-13 PROCEDURE — 36415 COLL VENOUS BLD VENIPUNCTURE: CPT

## 2019-08-13 PROCEDURE — 85025 COMPLETE CBC W/AUTO DIFF WBC: CPT

## 2019-08-13 PROCEDURE — 99152 MOD SED SAME PHYS/QHP 5/>YRS: CPT | Performed by: STUDENT IN AN ORGANIZED HEALTH CARE EDUCATION/TRAINING PROGRAM

## 2019-08-13 PROCEDURE — 82962 GLUCOSE BLOOD TEST: CPT

## 2019-08-13 PROCEDURE — C1894 INTRO/SHEATH, NON-LASER: HCPCS | Performed by: STUDENT IN AN ORGANIZED HEALTH CARE EDUCATION/TRAINING PROGRAM

## 2019-08-13 PROCEDURE — 77030008543 HC TBNG MON PRSS MRTM -A: Performed by: STUDENT IN AN ORGANIZED HEALTH CARE EDUCATION/TRAINING PROGRAM

## 2019-08-13 PROCEDURE — 93306 TTE W/DOPPLER COMPLETE: CPT

## 2019-08-13 PROCEDURE — 74011000250 HC RX REV CODE- 250: Performed by: STUDENT IN AN ORGANIZED HEALTH CARE EDUCATION/TRAINING PROGRAM

## 2019-08-13 PROCEDURE — 93458 L HRT ARTERY/VENTRICLE ANGIO: CPT | Performed by: STUDENT IN AN ORGANIZED HEALTH CARE EDUCATION/TRAINING PROGRAM

## 2019-08-13 PROCEDURE — 80061 LIPID PANEL: CPT

## 2019-08-13 PROCEDURE — 84100 ASSAY OF PHOSPHORUS: CPT

## 2019-08-13 PROCEDURE — 83036 HEMOGLOBIN GLYCOSYLATED A1C: CPT

## 2019-08-13 PROCEDURE — 81001 URINALYSIS AUTO W/SCOPE: CPT

## 2019-08-13 PROCEDURE — B2111ZZ FLUOROSCOPY OF MULTIPLE CORONARY ARTERIES USING LOW OSMOLAR CONTRAST: ICD-10-PCS | Performed by: STUDENT IN AN ORGANIZED HEALTH CARE EDUCATION/TRAINING PROGRAM

## 2019-08-13 PROCEDURE — 77030019569 HC BND COMPR RAD TERU -B: Performed by: STUDENT IN AN ORGANIZED HEALTH CARE EDUCATION/TRAINING PROGRAM

## 2019-08-13 PROCEDURE — 4A023N7 MEASUREMENT OF CARDIAC SAMPLING AND PRESSURE, LEFT HEART, PERCUTANEOUS APPROACH: ICD-10-PCS | Performed by: STUDENT IN AN ORGANIZED HEALTH CARE EDUCATION/TRAINING PROGRAM

## 2019-08-13 PROCEDURE — 87086 URINE CULTURE/COLONY COUNT: CPT

## 2019-08-13 PROCEDURE — 94760 N-INVAS EAR/PLS OXIMETRY 1: CPT

## 2019-08-13 PROCEDURE — 83735 ASSAY OF MAGNESIUM: CPT

## 2019-08-13 PROCEDURE — 83721 ASSAY OF BLOOD LIPOPROTEIN: CPT

## 2019-08-13 PROCEDURE — 84484 ASSAY OF TROPONIN QUANT: CPT

## 2019-08-13 PROCEDURE — 74011250637 HC RX REV CODE- 250/637

## 2019-08-13 PROCEDURE — 74011250636 HC RX REV CODE- 250/636

## 2019-08-13 PROCEDURE — 74011250636 HC RX REV CODE- 250/636: Performed by: STUDENT IN AN ORGANIZED HEALTH CARE EDUCATION/TRAINING PROGRAM

## 2019-08-13 RX ORDER — NITROGLYCERIN 20 MG/1
1 PATCH TRANSDERMAL DAILY
Qty: 30 PATCH | Refills: 0 | Status: SHIPPED | OUTPATIENT
Start: 2019-08-13 | End: 2020-08-10

## 2019-08-13 RX ORDER — SODIUM CHLORIDE 0.9 % (FLUSH) 0.9 %
5-40 SYRINGE (ML) INJECTION AS NEEDED
Status: CANCELLED | OUTPATIENT
Start: 2019-08-13

## 2019-08-13 RX ORDER — NITROGLYCERIN 20 MG/1
1 PATCH TRANSDERMAL DAILY
Qty: 30 PATCH | Refills: 0 | OUTPATIENT
Start: 2019-08-13 | End: 2019-08-13

## 2019-08-13 RX ORDER — ROSUVASTATIN CALCIUM 5 MG/1
5 TABLET, COATED ORAL
Qty: 30 TAB | Refills: 0 | OUTPATIENT
Start: 2019-08-13 | End: 2019-08-13

## 2019-08-13 RX ORDER — MIDAZOLAM HYDROCHLORIDE 1 MG/ML
INJECTION, SOLUTION INTRAMUSCULAR; INTRAVENOUS AS NEEDED
Status: DISCONTINUED | OUTPATIENT
Start: 2019-08-13 | End: 2019-08-13 | Stop reason: HOSPADM

## 2019-08-13 RX ORDER — HEPARIN SODIUM 200 [USP'U]/100ML
INJECTION, SOLUTION INTRAVENOUS
Status: COMPLETED | OUTPATIENT
Start: 2019-08-13 | End: 2019-08-13

## 2019-08-13 RX ORDER — NITROGLYCERIN 20 MG/1
1 PATCH TRANSDERMAL DAILY
Status: DISCONTINUED | OUTPATIENT
Start: 2019-08-13 | End: 2019-08-13 | Stop reason: HOSPADM

## 2019-08-13 RX ORDER — ROSUVASTATIN CALCIUM 5 MG/1
5 TABLET, COATED ORAL
Qty: 30 TAB | Refills: 0 | Status: SHIPPED | OUTPATIENT
Start: 2019-08-13 | End: 2019-10-08

## 2019-08-13 RX ORDER — ROSUVASTATIN CALCIUM 10 MG/1
5 TABLET, COATED ORAL
Status: DISCONTINUED | OUTPATIENT
Start: 2019-08-13 | End: 2019-08-13 | Stop reason: HOSPADM

## 2019-08-13 RX ORDER — VERAPAMIL HYDROCHLORIDE 2.5 MG/ML
INJECTION, SOLUTION INTRAVENOUS AS NEEDED
Status: DISCONTINUED | OUTPATIENT
Start: 2019-08-13 | End: 2019-08-13 | Stop reason: HOSPADM

## 2019-08-13 RX ORDER — FENTANYL CITRATE 50 UG/ML
INJECTION, SOLUTION INTRAMUSCULAR; INTRAVENOUS AS NEEDED
Status: DISCONTINUED | OUTPATIENT
Start: 2019-08-13 | End: 2019-08-13 | Stop reason: HOSPADM

## 2019-08-13 RX ORDER — LIDOCAINE HYDROCHLORIDE 10 MG/ML
INJECTION INFILTRATION; PERINEURAL AS NEEDED
Status: DISCONTINUED | OUTPATIENT
Start: 2019-08-13 | End: 2019-08-13 | Stop reason: HOSPADM

## 2019-08-13 RX ORDER — SODIUM CHLORIDE 0.9 % (FLUSH) 0.9 %
5-40 SYRINGE (ML) INJECTION EVERY 8 HOURS
Status: CANCELLED | OUTPATIENT
Start: 2019-08-13

## 2019-08-13 RX ORDER — HEPARIN SODIUM 1000 [USP'U]/ML
INJECTION, SOLUTION INTRAVENOUS; SUBCUTANEOUS AS NEEDED
Status: DISCONTINUED | OUTPATIENT
Start: 2019-08-13 | End: 2019-08-13 | Stop reason: HOSPADM

## 2019-08-13 RX ORDER — TRAMADOL HYDROCHLORIDE 50 MG/1
50 TABLET ORAL
Status: DISCONTINUED | OUTPATIENT
Start: 2019-08-13 | End: 2019-08-13 | Stop reason: HOSPADM

## 2019-08-13 RX ADMIN — ROSUVASTATIN CALCIUM 5 MG: 10 TABLET, COATED ORAL at 16:02

## 2019-08-13 RX ADMIN — PANTOPRAZOLE SODIUM 40 MG: 40 TABLET, DELAYED RELEASE ORAL at 09:04

## 2019-08-13 RX ADMIN — VITAMIN D, TAB 1000IU (100/BT) 2000 UNITS: 25 TAB at 09:02

## 2019-08-13 RX ADMIN — TRAMADOL HYDROCHLORIDE 50 MG: 50 TABLET, FILM COATED ORAL at 06:17

## 2019-08-13 RX ADMIN — METOPROLOL TARTRATE 50 MG: 50 TABLET ORAL at 09:01

## 2019-08-13 RX ADMIN — CALCIUM CITRATE 200 MG (950 MG) TABLET 200 MG: at 09:03

## 2019-08-13 RX ADMIN — PREDNISONE 10 MG: 5 TABLET ORAL at 09:01

## 2019-08-13 RX ADMIN — EPLERENONE 25 MG: 25 TABLET, FILM COATED ORAL at 09:03

## 2019-08-13 RX ADMIN — THERA TABS 1 TABLET: TAB at 09:01

## 2019-08-13 RX ADMIN — Medication 10 ML: at 05:07

## 2019-08-13 RX ADMIN — ACETAMINOPHEN 650 MG: 325 TABLET ORAL at 15:41

## 2019-08-13 RX ADMIN — SITAGLIPTIN 100 MG: 25 TABLET, FILM COATED ORAL at 15:59

## 2019-08-13 RX ADMIN — FERROUS SULFATE TAB 325 MG (65 MG ELEMENTAL FE) 325 MG: 325 (65 FE) TAB at 09:02

## 2019-08-13 RX ADMIN — ACETAMINOPHEN 650 MG: 325 TABLET ORAL at 00:20

## 2019-08-13 RX ADMIN — LEVOTHYROXINE, LIOTHYRONINE 90 MG: 19; 4.5 TABLET ORAL at 07:37

## 2019-08-13 NOTE — PROGRESS NOTES
8- Reason for Admission:   Chest Pain                  RRAT Score:   16               Do you (patient/family) have any concerns for transition/discharge? Plan for utilizing home health:   TBD    Current Advanced Directive/Advance Care Plan:  Full Code            Transition of Care Plan:        1. Await results of cardiac cath    I met with the patient, her , Marie Talley ([phone]), daughter, Erika Graves ([phone]), and son, Tracie Nuñez ([phone]), to determine potential discharge needs. She and her  live in a one level house with 2 entry steps. She is independent with her ADL's, uses no assistive devices and drives. Her PCP is Dr. Allyson Hernandez who has no nurse navigator. She has prescription drug coverage and gets her medications from [address] [place] [address] in [place]. CM will follow and assist with discharge needs as indicated.      Care Management Interventions  PCP Verified by CM: Yes(Dr. Walt Friedman nurse navigator)  Discharge Durable Medical Equipment: No  Physical Therapy Consult: No  Occupational Therapy Consult: No  Speech Therapy Consult: No  Current Support Network: Lives with Spouse, Own Home  Confirm Follow Up Transport: Family  Plan discussed with Pt/Family/Caregiver: Yes  Discharge Location  Discharge Placement: (Home with )    Twan Valverde, [address]

## 2019-08-13 NOTE — PROGRESS NOTES
Cardiology Progress Note                             [address] [facility], [place], [address] [facility]                                 Phone [phone]; Fax [phone]        2019 10:16 AM     Admit Date:           2019  Admit Diagnosis:  Chest pain [R07.9]  :          1950   MRN:          [id number]   ASSESSMENT/RECOMMENDATION:   Unstable angina- previously with hypotension with ISMN: negative troponin x 3. -C this AM at 11 AM/NPO. Reviewed procedure with the patient and family. -echo pending     CAD s/p pci of lad and rca () : BB/statin  -asa allergy   -trig 495!! Consider starting fenofibrate /tricor in the future if able to tolerate lipitor - she has had a lot of issues tolerating medications . HTN: controlled   -ACEI/BB/    Chronic CHACON    HFpEF per echo 3/2019- echo pending now   -inspira/bumex    DM2    PAF- not on AC due to anemia- on BB    RA    Pernicious anemia: Fe supplement     CARDIOLOGY ATTENDING  Patient personally seen and examined. All the elements of history and examination were personally performed. Assessment and plan was discussed and agree as written above    Saw Ms. Colton Eduardo before and after cath. Cath images reviewed (no obstructive CAD). Will medically manage for angina / diastolic dysfunction. Will continue outpatient regimen. She agrees to try Crestor 5 mg once a week (she avoids statins due  To muscle pain)  Also agrees to try nitropatch low dose (as needed) - she can tolerate the patch here although drops BP with SL tablets. OK to go home today. Salo Moore MD, [facility]                   No intake/output data recorded.     Last 3 Recorded Weights in this Encounter    19 1154 19 1552 19 0943   Weight: 188 lb (85.3 kg) 188 lb (85.3 kg) 187 lb 6.3 oz (85 kg)         1901 -  0700  In: 8637 [P.O.:1230]  Out: 720 [JYFIE:912]    SUBJECTIVE               Annie Cola denies palpitations, irregular heart beat  + 2 episodes of chest pressure overnight - mild compared to CP she came in with. Relieved w tramadol -no relied w Tylenol   + chronic SOB   + chronic LE edema.     No lightheadedness or dizziness          Current Facility-Administered Medications   Medication Dose Route Frequency    traMADol (ULTRAM) tablet 50 mg  50 mg Oral Q6H PRN    sodium chloride (NS) flush 5-40 mL  5-40 mL IntraVENous Q8H    sodium chloride (NS) flush 5-40 mL  5-40 mL IntraVENous PRN    acetaminophen (TYLENOL) tablet 650 mg  650 mg Oral Q6H PRN    naloxone (NARCAN) injection 0.4 mg  0.4 mg IntraVENous PRN    ascorbic acid (vitamin C) (VITAMIN C) tablet 500 mg  500 mg Oral DAILY    calcium citrate tablet 200 mg [elemental]  200 mg Oral DAILY    cholecalciferol (VITAMIN D3) tablet 2,000 Units  2,000 Units Oral DAILY    ferrous sulfate tablet 325 mg  325 mg Oral ACB    lisinopril (PRINIVIL, ZESTRIL) tablet 2.5 mg  2.5 mg Oral DAILY    metoprolol tartrate (LOPRESSOR) tablet 50 mg  50 mg Oral BID    predniSONE (DELTASONE) tablet 10 mg  10 mg Oral DAILY    predniSONE (DELTASONE) tablet 2.5 mg  2.5 mg Oral QPM    SITagliptin (JANUVIA) tablet tab 100 mg  100 mg Oral DAILY    therapeutic multivitamin (THERAGRAN) tablet 1 Tab  1 Tab Oral DAILY    insulin lispro (HUMALOG) injection   SubCUTAneous TIDAC    glucose chewable tablet 16 g  4 Tab Oral PRN    glucagon (GLUCAGEN) injection 1 mg  1 mg IntraMUSCular PRN    dextrose 10% infusion 125-250 mL  125-250 mL IntraVENous PRN    eplerenone (INSPRA) tablet 25 mg  25 mg Oral BID    atorvastatin (LIPITOR) tablet 40 mg  40 mg Oral QHS    pantoprazole (PROTONIX) tablet 40 mg  40 mg Oral ACB    bumetanide (BUMEX) tablet 1 mg  1 mg Oral DAILY    thyroid (Pork) (ARMOUR) tablet 90 mg  90 mg Oral DAILY    And    Thyroid 1/2 tablet of 15mg = 7.5mg  7.5 mg Oral DAILY      OBJECTIVE Intake/Output Summary (Last 24 hours) at 8/13/2019 1016  Last data filed at 8/12/2019 2300  Gross per 24 hour   Intake 1230 ml   Output 720 ml   Net 510 ml       Review of Systems - History obtained from the patient AS PER  HPI    Telemetry NSR- SB    PHYSICAL EXAM        Visit Vitals  /63   Pulse 69   Temp 97.6 °F (36.4 °C)   Resp 14   Ht 5' 2\" (1.575 m)   Wt 187 lb 6.3 oz (85 kg)   LMP 09/29/1980 (LMP Unknown)   SpO2 96%   Breastfeeding? No   BMI 34.27 kg/m²       Gen: Well-developed, obese, in no acute distress  alert and oriented x 3  HEENT:  Pink conjunctivae, Hearing grossly normal.No scleral icterus or conjunctival, moist mucous membranes  Neck: Supple,No JVD  Resp: No accessory muscle use, Clear breath sounds, No rales or rhonchi  Card: Regular Rate,Rythm, 2/6 murmurs, no rubs or gallop. No thrills.    GI:          soft, non-tender   MSK: No cyanosis or clubbing, good capillary refill  Skin: No rashes or ulcers, + bruising  Neuro:  Cranial nerves are grossly intact, moving all four extremities, no focal deficit, follows commands appropriately  Psych:  Good insight, oriented to person, place and time, alert, Nml Affect  LE: +1 pitting BLE edema       DATA REVIEW            Laboratory and Imaging have been reviewed by me and are notable for  Recent Labs     08/13/19  0146 08/12/19  1811 08/12/19  1205   CPK 40 40  --    CKMB <1.0 1.1  --    TROIQ <0.05 <0.05 <0.05     Recent Labs     08/13/19  0146 08/12/19  1205    141   K 4.3 3.5   CO2 29 32   BUN 22* 24*   CREA 0.72 1.03*   * 155*   PHOS 3.4  --    MG 2.1  --    WBC 14.1* 19.3*   HGB 13.0 14.3   HCT 41.1 43.3    [facility], NP

## 2019-08-13 NOTE — ROUTINE PROCESS
Attempted to schedule PCP JULES apt with Dr. Hira Sheth, left voicemail and currently awaiting return call

## 2019-08-13 NOTE — PROGRESS NOTES
Bedside and Verbal shift change report given to Khris RN (oncoming nurse) by Joette Severance RN (offgoing nurse). Report included the following information SBAR, Kardex, ED Summary, Procedure Summary, MAR, Accordion, Recent Results and Cardiac Rhythm NSR.     0900 Morning meds given. Januvia held until after cath    1245 pt off floor to cath lab. Telemetry notified    [phone] received report from Ed in Cath lab recovery. 25 of fentanyl & 1 of versed, 5000 of heparin given. L radial approach with no interventions or complications. Time of hemostasis 1515 & 1hr and 30 min of vitals completed. Nitrogylcerin patch applied to R deltoid by Ed RN DC expected b/t 5-6pm tonight. .    1715 pt educated on home use of nitro patch and cath site care.

## 2019-08-13 NOTE — PROCEDURES
BRIEF PROCEDURE NOTE    Date of Procedure: 8/13/2019   Preoperative Diagnosis:  Unstable angina  Postoperative Diagnosis: same    Procedure: Left heart cath, coronary angiography  Interventional Cardiologist: Monet Wiley DO  Assistant: None  Anesthesia: local + IV moderate sedation   Estimated Blood Loss: Minimal    Access: left radial artery, 6F  Catheters:  Left coronary: JL 3.5 5F  Right coronary: JR 4 5F    Findings:   L Main: large caliber, normal  LAD: moderate caliber, tortuous, bifurcates into moderate sized diagonal and small distal LAD, no   LCx: large caliber, essentially continues as single marginal, no significant ISR without proximal stent, distal vessel with mild disease  RCA: moderate caliber, supplies small pda and pl branches, mild proximal vessel ISR    LVEDP:  17-20 mmhg        Specimens Removed: None    Implants: None    Closure Device: radial TR band    See full cath note. Complications: none    Findings:  1. Non-obstructive CAD, patent Lcx and proximal RCA stents  2.   Mildly elevated lvedp      DO Dl Martinez DO  [facility] [address] , [address]                                       Office [phone],WDO [phone]

## 2019-08-13 NOTE — PROGRESS NOTES
In preparation for discharge, I have completed AVS med-updates, Care Plans and Education in [employer] and have sent CMS a message to assist with scheduling of PCP appointment. CV appointment has been scheduled and added to AVS. Patient is post cath and will be discharging around 5-6 pm if stable. 1715  Discharge instructions, including information on new medications, were reviewed with patient. All questions were answered. IV and heart monitor were removed. Patient received a copy of her discharge papers and 2 prescriptions and will be discharge home with her . Patient was told to follow up with PCP regarding hospital follow up appointment because CMS was waiting for a call back.

## 2019-08-13 NOTE — DISCHARGE INSTRUCTIONS
Radial Cardiac Catheterization/Angiography Discharge Instructions   It is normal to feel tired the first couple days. Take it easy and follow the physicians instructions. CHECK THE CATHETER INSERTION SITE DAILY:   Remove the wrist dressing 24 hours after the procedure. You may shower 24 hours after the procedure. Wash with soap and water and pat dry. Gentle cleaning of the site with soap and water is sufficient, cover with a dry clean dressing or bandage. Do not apply creams or powders to the area. No soaking the wrist for 3 days. Leave the puncture site open to air after 24 hours post-procedure. CALL THE PHYSICIANS:   If the site becomes red, swollen or feels warm to the touch   If there is bleeding or drainage or if there is unusual pain at the radial site. If there is any minor oozing, you may apply a band-aid and remove after 12 hours. If the bleeding continues, hold pressure with the middle finger against the puncture site and the thumb against the back of the wrist,call 911 to be transported to the hospital.   DO NOT DRIVE YOURSELF, [facility]. ACTIVITY:   For the first 24 hours do not manipulate the wrist.   No lifting, pushing or pulling over 3-5 pounds with the affected wrist for 7 daysand no straining the insertion site. Do not life grocery bags or the garbage can, do not run the vacuum  or  for 7 days. Start with short walks as in the hospital and gradually increase as tolerated each day. It is recommended to walk 30 minutes 5-7 days per week. Follow your physicians instructions on activity. Avoid walking outside in extremes of heat or cold. Walk inside when it is cold and windy or hot and humid. Things to keep in mind:   No driving for at least 24 hours, or as designated by your physician. Limit the number of times you go up and down the stairs   Take rests and pace yourself with activity.    Be careful and do not strain with bowel movements. MEDICATIONS:   Take all medications as prescribed   Call your physician if you have any questions   Keep an updated list of your medications with you at all times and give a list to your physician and pharmacist   SIGNS AND SYMPTOMS:   Be cautious of symptoms of angina or recurrent symptoms such as chest discomfort, unusual shortness of breath or fatigue. These could be symptoms of restenosis, a new blockage or a heart attack. If your symptoms are relieved with rest it is still recommended that you notify your physician of recurrent chest pain or discomfort. For CHEST PAIN or symptoms of angina not relieved with rest: If the discomfort is not relieved with rest, and you have been prescribed Nitroglycerin, take as directed (taken under the tongue, one at a time 5 minutes apart for a total of 3 doses). If the discomfort is not relieved after the 3rd nitroglycerin, call 911. If you have not been prescribed Nitroglycerin and your chest discomfort is not relieved with rest, call 911. AFTER CARE:   Follow up with your physician as instructed. Follow a heart healthy diet with proper portion control, daily stress management, daily exercise, blood pressure and cholesterol control , and smoking cessation. HOSPITALIST DISCHARGE INSTRUCTIONS  NAME: Dilia Garcia   :  1950   MRN:  [id number]     Date/Time:  2019 4:28 PM    ADMIT DATE: 2019     DISCHARGE DATE: 2019     ADMITTING DIAGNOSIS:  Chest pain     DISCHARGE DIAGNOSIS:  Chest pain; non-obstructive CAD on cardiac cath     MEDICATIONS:     · It is important that you take the medication exactly as they are prescribed. · Keep your medication in the bottles provided by the pharmacist and keep a list of the medication names, dosages, and times to be taken in your wallet. · Do not take other medications without consulting your doctor.      Pain Management: per above medications    What to do at Home    Recommended diet: Cardiac Diet    Recommended activity: Activity as tolerated    If you experience any of the following symptoms then please call your primary care physician or return to the emergency room if you cannot get hold of your doctor:  Fever, chills, nausea, vomiting, diarrhea, change in mentation, falling, bleeding, shortness of breath, chest pain     Follow Up:  Dr. Lucrecia Barthel, MD  you are to call and set up an appointment to see them in 2 weeks. Follow-up Information     Follow up With Specialties Details Why Contact Info    Lucrecia Barthel, MD [address]  [phone]      Howie Kothari MD Cardiology On 8/22/2019 [address] [id number] [address]  [phone]          Information obtained by :  I understand that if any problems occur once I am at home I am to contact my physician. I understand and acknowledge receipt of the instructions indicated above.                                                                                                                                            Physician's or R.N.'s Signature                                                                  Date/Time                                                                                                                                              Patient or Representative Signature                                                          Date/Time

## 2019-08-13 NOTE — PROGRESS NOTES
Bedside and Verbal shift change report given to Aileen Mcmahan (oncoming nurse) by Sachin Marie RN (offgoing nurse). Report included the following information SBAR, Kardex, ED Summary, Intake/Output, Recent Results, Med Rec Status and Cardiac Rhythm NSR/SB. 2235: pt alert, oriented, c/o mid sternum chest pain 4/10, refusing tylenol at this time. Held scheduled Lopressor 50 mg as HR at 57-58 Sinus Richie; B/P 105/59. Pt refused Scheduled Lipitor 40 mg as pt reports it causes muscle aches. 2796: Pt alert, oriented, c/o mid to right sided chest pain 4/10. Pt reports Tramadol works for her. Notified Dr. Fletcher Ortiz of pt status. Dr Arline Rios Tramadol 50 mg PO every 6 hours PRN    Bedside and Verbal shift change report given to John Bland, MINAL and Lola Gomez RN (oncoming nurse) by Stewart Wade RN (offgoing nurse). Report included the following information SBAR, Kardex, ED Summary, Intake/Output, Recent Results, Med Rec Status and Cardiac Rhythm NSR.

## 2019-08-13 NOTE — PHYSICIAN ADVISORY
Short Stay Review       Pt Name:  Mahendra Ornelas   MR#  [id number]   CSN#   [id number]   [address] [place] [address]  CATH/PL  @ [facility]   Hospitalization date  8/12/2019 11:39 AM  No discharge date for patient encounter. Current Attending Physician  Jonah Gregory MD     A discharge order has been placed for this episode of hospital care for Ms. Mahendra Ornelas; since this hospital stay is less than two midnights, I reviewed Ms. Olga Lidia Hamilton chart. Ms. Olga Lidia Hamilton healthcare insurance/benefit include:  Payor: [place] MEDICARE / Plan: VA MEDICARE PART A & B / Product Type: Medicare /     Utilization Review related case summary:   Age  68 y.o.   BMI  Body mass index is 34.27 kg/m².     PMHx includes  CAD, HTN, PAF, HFpEF, Asthma etc.    Hospital course  The pt was evaluated for chest pain and she went through cardiac catheterization    Risk of deterioration at the time this patient  was hospitalized  High             On the basis of chart review, this patient's hospitalization status      is appropriate for Geovany Parker MD MPH FACP   Cell : [phone]  Physician [address] [place] [address]   Utilization Review, Care Management         CSN:  [id number]   NABEEL:   [id number]  Admitted on :  8/12/2019   Discharge order

## 2019-08-13 NOTE — PROGRESS NOTES
1250    Patient arrived. ID and allergies verified verbally with patient. Pt voices understanding of procedure to be performed. Consent obtained. Pt prepped for procedure. [facility]     1320    TRANSFER - OUT REPORT:    Verbal report given to [facility] AT THE [facility] RT (name) on [facility]  being transferred to  Cath (unit) for routine progression of care       Report consisted of patients Situation, Background, Assessment and   Recommendations(SBAR). Information from the following report(s) SBAR was reviewed with the receiving nurse. Lines:   Peripheral IV 08/12/19 Right;Upper Forearm (Active)   Site Assessment Clean, dry, & intact 8/13/2019  9:15 AM   Phlebitis Assessment 0 8/13/2019  9:15 AM   Infiltration Assessment 0 8/13/2019  9:15 AM   Dressing Status Clean, dry, & intact 8/13/2019  9:15 AM   Dressing Type Tape;Transparent 8/13/2019  9:15 AM   Hub Color/Line Status Blue 8/13/2019  9:15 AM   Action Taken Open ports on tubing capped 8/13/2019  9:15 AM   Alcohol Cap Used Yes 8/13/2019  9:15 AM        Opportunity for questions and clarification was provided. Patient transported with:   Registered Nurse    [address]ia Galicia REPORT:    Verbal report received from [facility] AT THE [facility] RT (name) on [facility]  being received from  cath (unit) for routine progression of care      Report consisted of patients Situation, Background, Assessment and   Recommendations(SBAR). Information from the following report(s) SBAR was reviewed with the receiving nurse. Opportunity for questions and clarification was provided. Assessment completed upon patients arrival to unit and care assumed. 1500    3 ml air released from TR Band. No bleeding or hematoma noted. Radial and Ulnar pulse on left  wrist palpable. Pt tolerated well. Will continue to monitor. 1505    3 ml air released from TR Band. No bleeding or hematoma noted. Radial and Ulnar pulse on left  wrist palpable. Pt tolerated well.  Will continue to monitor. 1510    3 ml air released from TR Band. No bleeding or hematoma noted. Radial and Ulnar pulse on left  wrist palpable. Pt tolerated well. Will continue to monitor. 1515    3 ml air released from TR Band. No bleeding or hematoma noted. Radial and Ulnar pulse on left  wrist palpable. Pt tolerated well. Will continue to monitor. Air release completed. TR Band removed from left  wrist. No bleeding or  Hematoma. Dressing applied. Wrist immobilizer in place. Radial and ulnar pulse remain palpable on affected extremity. Pt tolerated well. Instructions given to pt regarding movement and activity restrictions. Pt voiced understanding. 1525    TRANSFER - OUT REPORT:    Verbal report given to [facility]) on Lincoln Stephens  being transferred to [facility] (unit) for routine progression of care       Report consisted of patients Situation, Background, Assessment and   Recommendations(SBAR). Information from the following report(s) SBAR was reviewed with the receiving nurse. Lines:   Peripheral IV 08/12/19 Right;Upper Forearm (Active)   Site Assessment Clean, dry, & intact 8/13/2019  9:15 AM   Phlebitis Assessment 0 8/13/2019  9:15 AM   Infiltration Assessment 0 8/13/2019  9:15 AM   Dressing Status Clean, dry, & intact 8/13/2019  9:15 AM   Dressing Type Tape;Transparent 8/13/2019  9:15 AM   Hub Color/Line Status Blue 8/13/2019  9:15 AM   Action Taken Open ports on tubing capped 8/13/2019  9:15 AM   Alcohol Cap Used Yes 8/13/2019  9:15 AM        Opportunity for questions and clarification was provided.       Patient transported with:   Registered Nurse

## 2019-08-13 NOTE — PROGRESS NOTES
Bedside and Verbal shift change report given to Ana M (oncoming nurse) by Nitin Benz (offgoing nurse). Report included the following information SBAR, Kardex, ED Summary, Procedure Summary, Intake/Output, MAR, Recent Results and Cardiac Rhythm nsr. Pt has been NPO since midnight for cath today. Pt took home thyroid med with night RN.      [id number] pt still off floor in cath lab, per Sultana Uriostegui can be dcd  After return to floor, can resume diet    1710 pt refusing insulin, would like to take 1800 meds after she gets home

## 2019-08-14 LAB
BACTERIA SPEC CULT: NORMAL
CC UR VC: NORMAL
SERVICE CMNT-IMP: NORMAL

## 2019-08-14 RX ORDER — FENOFIBRATE 48 MG/1
48 TABLET, COATED ORAL DAILY
Qty: 30 TAB | Refills: 3 | Status: SHIPPED | OUTPATIENT
Start: 2019-08-14 | End: 2019-09-04 | Stop reason: SDUPTHER

## 2019-08-16 ENCOUNTER — TELEPHONE (OUTPATIENT)
Dept: CARDIOLOGY CLINIC | Age: 69
End: 2019-08-16

## 2019-08-16 NOTE — TELEPHONE ENCOUNTER
Spoke to pt,  Per Dr. Tala Garcia: \"Stop lisinopril. Do not take eplerenone if SBP < 105. Follow BP -- Do not take any BP meds when SBP < 100. \"

## 2019-08-16 NOTE — TELEPHONE ENCOUNTER
Pt called in 98/59. Very fatigued. Just want to sleep. Took lisinopril 2.5 mg daily last night for the first time. Pulse 73. Heart feels fluttery. Staying hydrated. Worried about taking eplerenone 25 mg as well.     Denied swelling, cp, n/v/d.

## 2019-08-19 ENCOUNTER — TELEPHONE (OUTPATIENT)
Dept: CARDIOLOGY CLINIC | Age: 69
End: 2019-08-19

## 2019-08-19 NOTE — TELEPHONE ENCOUNTER
Spoke with patient and informed her of lab results and new medication per Dr. Sushma Reynoso. Patient made aware new prescription for Tricor sent to [facility] and she should take one 48mg tablet by mouth daily. Patient advised to contact our office should any questions or concerns arise. Patient verbalized understanding and had no further questions.  Claudette Parra Rn.

## 2019-08-19 NOTE — TELEPHONE ENCOUNTER
----- Message from Lia Crandall MD sent at 8/14/2019  7:23 PM EDT -----  Please contact Ms. Eloise Eric to let her know that I sent a Rx to her pharmacy for tricor 48 mg daily for her elevated TG.    BTW, I reviewed her cath at [facility] and saw that she did not have any interventions. Her BP was well controlled with the lisinopril that we added.     Thanks

## 2019-08-21 LAB
25(OH)D3+25(OH)D2 SERPL-MCNC: 33.7 NG/ML (ref 30–100)
ALBUMIN SERPL ELPH-MCNC: 3.6 G/DL (ref 2.9–4.4)
ALBUMIN/GLOB SERPL: 1.3 {RATIO} (ref 0.7–1.7)
ALPHA1 GLOB SERPL ELPH-MCNC: 0.2 G/DL (ref 0–0.4)
ALPHA2 GLOB SERPL ELPH-MCNC: 1.2 G/DL (ref 0.4–1)
B-GLOBULIN SERPL ELPH-MCNC: 0.9 G/DL (ref 0.7–1.3)
CHOLEST SERPL-MCNC: 226 MG/DL (ref 100–199)
GAMMA GLOB SERPL ELPH-MCNC: 0.4 G/DL (ref 0.4–1.8)
GLOBULIN SER CALC-MCNC: 2.7 G/DL (ref 2.2–3.9)
HDLC SERPL-MCNC: 45 MG/DL
IRON SATN MFR SERPL: 51 % (ref 15–55)
IRON SERPL-MCNC: 122 UG/DL (ref 27–139)
KAPPA LC FREE SER-MCNC: 20.2 MG/L (ref 3.3–19.4)
KAPPA LC FREE/LAMBDA FREE SER: 1.28 {RATIO} (ref 0.26–1.65)
LAMBDA LC FREE SERPL-MCNC: 15.8 MG/L (ref 5.7–26.3)
LDLC SERPL CALC-MCNC: ABNORMAL MG/DL (ref 0–99)
M PROTEIN SERPL ELPH-MCNC: ABNORMAL G/DL
NT-PROBNP SERPL-MCNC: 99 PG/ML (ref 0–301)
PLEASE NOTE, 011150: ABNORMAL
PROT SERPL-MCNC: 6.3 G/DL (ref 6–8.5)
SPECIMEN STATUS REPORT, ROLRST: NORMAL
T3FREE SERPL-MCNC: 2.3 PG/ML (ref 2–4.4)
T4 SERPL-MCNC: 6.2 UG/DL (ref 4.5–12)
TIBC SERPL-MCNC: 237 UG/DL (ref 250–450)
TRIGL SERPL-MCNC: 451 MG/DL (ref 0–149)
TSH SERPL DL<=0.005 MIU/L-ACNC: 10.07 UIU/ML (ref 0.45–4.5)
UIBC SERPL-MCNC: 115 UG/DL (ref 118–369)
VLDLC SERPL CALC-MCNC: ABNORMAL MG/DL (ref 5–40)

## 2019-08-22 ENCOUNTER — OFFICE VISIT (OUTPATIENT)
Dept: CARDIOLOGY CLINIC | Age: 69
End: 2019-08-22

## 2019-08-22 VITALS
BODY MASS INDEX: 34.23 KG/M2 | HEIGHT: 62 IN | WEIGHT: 186 LBS | RESPIRATION RATE: 16 BRPM | SYSTOLIC BLOOD PRESSURE: 118 MMHG | DIASTOLIC BLOOD PRESSURE: 74 MMHG | HEART RATE: 72 BPM

## 2019-08-22 DIAGNOSIS — R07.9 CHEST PAIN, UNSPECIFIED TYPE: Primary | ICD-10-CM

## 2019-08-22 DIAGNOSIS — I51.89 DIASTOLIC DYSFUNCTION: ICD-10-CM

## 2019-08-22 DIAGNOSIS — I10 HTN (HYPERTENSION), BENIGN: ICD-10-CM

## 2019-08-22 DIAGNOSIS — I25.10 CORONARY ARTERY DISEASE INVOLVING NATIVE CORONARY ARTERY OF NATIVE HEART WITHOUT ANGINA PECTORIS: ICD-10-CM

## 2019-08-22 RX ORDER — LEVOTHYROXINE AND LIOTHYRONINE 9.5; 2.25 UG/1; UG/1
TABLET ORAL DAILY
COMMUNITY
End: 2020-02-21

## 2019-08-22 RX ORDER — METOPROLOL SUCCINATE 50 MG/1
50 TABLET, EXTENDED RELEASE ORAL DAILY
Qty: 30 TAB | Refills: 12 | Status: SHIPPED | OUTPATIENT
Start: 2019-08-22 | End: 2019-10-14 | Stop reason: SDUPTHER

## 2019-08-22 RX ORDER — BUMETANIDE 1 MG/1
1 TABLET ORAL DAILY
Qty: 90 TAB | Refills: 3 | Status: SHIPPED | OUTPATIENT
Start: 2019-08-22 | End: 2019-10-11 | Stop reason: SDUPTHER

## 2019-08-22 RX ORDER — EPLERENONE 25 MG/1
12.5 TABLET, FILM COATED ORAL DAILY
Qty: 30 TAB | Refills: 6
Start: 2019-08-22 | End: 2019-09-10

## 2019-08-22 NOTE — PROGRESS NOTES
Lillian Ellis MD. [facility]              Patient: Guillermo Saldana  : 1950      Today's Date: 2019            HISTORY OF PRESENT ILLNESS:     History of Present Illness:  Here for follow-up. No further chest pain. Still with SOB. BP is up and down - had to cut back medicines recently. She has not taken many BP meds past couple of days. PAST MEDICAL HISTORY:     Past Medical History:   Diagnosis Date    Anemia     iron defic anemia    Arthritis     Asthma 2009    CAUSED BY MOLD    Atrial fibrillation ([address] 2009    CAD (coronary artery disease) 2009    MI X2  -- prior stenting     Chronic pain     RT. ANKLE    Diastolic dysfunction     GERD (gastroesophageal reflux disease)     Gluten intolerance     Hypothyroidism 2009    Personal history of MI (myocardial infarction)     Rheumatoid arthritis ([address]     Stroke ([address]     TIA (NO RESIDUAL)    Syncope     Thromboembolus ([address]     RT. -- DVT after surgery     Thyroid disease     HYPO    TIA (transient ischemic attack)      &     Unspecified adverse effect of anesthesia     \"IS A LIGHT WEIGHT\"    Unspecified adverse effect of anesthesia     DIFFICULTY AWAKENING    Vitamin B12 deficiency 2009       Past Surgical History:   Procedure Laterality Date    CARDIAC SURG PROCEDURE UNLIST      ABLATION    HX ADENOIDECTOMY      HX APPENDECTOMY      HX CHOLECYSTECTOMY  11    HX GI      COLONOSCOPY AND ENDOSCOPY    HX HEART CATHETERIZATION      2 STENTS    HX HYSTERECTOMY      HX LUMBAR LAMINECTOMY  2000    L4-L5    HX ORTHOPAEDIC  -2012    RT. FOOT SURGERY X 6/calcaneal osteotomy    HX TONSILLECTOMY  1964    STENT INSERTION             MEDICATIONS:     Current Outpatient Medications   Medication Sig Dispense Refill    thyroid, Pork, (NP THYROID) 15 mg tablet Take  by mouth daily. Takes in addition to the [address]       nitroglycerin (NITRODUR) 0.1 mg/hr 1 Patch by TransDERmal route daily. 30 Patch 0    rosuvastatin (CRESTOR) 5 mg tablet Take 1 Tab by mouth nightly. (Patient taking differently: Take 5 mg by mouth nightly. Take one tablet one day a week.) 30 Tab 0    bumetanide (BUMEX) 1 mg tablet Take 1 mg by mouth daily.  metoprolol tartrate (LOPRESSOR) 50 mg tablet Take 50 mg by mouth as needed.  SITagliptin (JANUVIA) 100 mg tablet Take 100 mg by mouth daily.  predniSONE (DELTASONE) 10 mg tablet Take 10 mg by mouth daily. Indications: taken differently; verify with patient RE dosage      predniSONE (DELTASONE) 2.5 mg tablet Take 2.5 mg by mouth every evening.  calcium citrate 200 mg (950 mg) tablet Take 200 mg by mouth every evening.  flaxseed oil (OMEGA 3 PO) Take 1 Tab by mouth every evening. Indications: FISH OIL      vit C/vit E ac/lut/copper/zinc (PRESERVISION LUTEIN PO) Take 1 Tab by mouth daily.  thyroid, Pork, (NP THYROID) 90 mg tablet Take 90 mg by mouth daily. Takes in addition to 7.5 mg for a total of 97.5 mg      lidocaine (LIDODERM) 5 % 1 Patch by TransDERmal route every twenty-four (24) hours. Apply patch to the affected area for 12 hours a day and remove for 12 hours a day.  ascorbic acid (VITAMIN C) 500 mg tablet Take 500 mg by mouth daily.  ferrous sulfate 325 mg (65 mg iron) tablet Take  by mouth every evening.  cyanocobalamin (VITAMIN B12) 1,000 mcg/mL injection INJECT 1 ML INTO THE MUSCLE EVERY 30 DAYS 10 mL 0    Cholecalciferol, Vitamin D3, (VITAMIN D3) 1,000 unit Cap Take 2 Caps by mouth every evening.  lansoprazole (PREVACID) 30 mg capsule Take 30 mg by mouth Daily (before breakfast).  fenofibrate nanocrystallized (TRICOR) 48 mg tablet Take 1 Tab by mouth daily.  30 Tab 3       Allergies   Allergen Reactions    Clopidogrel Other (comments)    Sulfa (Sulfonamide Antibiotics) Swelling    Adhesive Tape-Silicones Other (comments)     BAD BLISTERS AND TENDS TO GET INFECTED    Albuterol Palpitations    Aspirin Hives and Other (comments)     \"it makes my stomach bleed\"      Butter Flavor Anaphylaxis     Butter AND CREME    Cimzia [Certolizumab Pegol] Other (comments)     GI symptoms, blisters in the mouth and esophagus    Demerol [Meperidine] Other (comments)     HYPOTENSION    Fentanyl Other (comments)     HYPOTENSION    Gluten Diarrhea     bloating    Keflex [Cephalexin] Anaphylaxis    Levaquin [Levofloxacin] Unproven on Challenge     PT DOESN'T REMEMBER HAVING THAT    Morphine Other (comments)     HYPOTENSION    Nitroglycerin Other (comments)     IN PILL FORM  - HYPOTENSION  CAN TOLERATE PATCH FOR SHORT TIME    Pcn [Penicillins] Anaphylaxis    Percocet [Oxycodone-Acetaminophen] Unknown (comments)     HYPOTENSION AND HALLUCINATIONS. Can take tylenol ok, oxycodone is allergy per patient.      Tapazole [Methimazole] Unknown (comments)     Patient stated \"it made me feel like I had the flu\"             SOCIAL HISTORY:     Social History     Tobacco Use    Smoking status: Former Smoker     Packs/day: 1.00     Years: 30.00     Pack years: 30.00     Last attempt to quit: 2002     Years since quittin.2    Smokeless tobacco: Never Used   Substance Use Topics    Alcohol use: No    Drug use: No         FAMILY HISTORY:     Family History   Problem Relation Age of Onset    Delayed Awakening Mother     Heart Disease Mother     Coronary Artery Disease Mother     Hypertension Mother     Stroke Mother     Delayed Awakening Sister     Hypertension Sister     Lung Disease Father     Cancer Father         colon    Hypertension Father     Hypertension Brother     Breast Cancer Maternal Aunt     Breast Cancer Maternal Aunt     Breast Cancer Cousin         maternal 1st cousin    Breast Cancer Maternal Aunt     Breast Cancer Cousin         maternal 1st cousin   Burak Bang Breast Cancer Cousin         maternal 1st cousin               REVIEW OF SYMPTOMS:      Review of Symptoms:  Constitutional: Negative for fever, chills  HEENT: Negative for nosebleeds, tinnitus  Respiratory: + wheezing  Cardiovascular: Negative for syncope;  + orthopnea   Gastrointestinal: Negative for abdominal pain, diarrhea, melena. Genitourinary: Negative for dysuria  Musculoskeletal: + joint pain, RA  Skin: Negative for rash  Heme: + bleeding problems  Neurological: Negative for speech change and focal weakness.               PHYSICAL EXAM:      Physical Exam:  Visit Vitals  /74 (BP 1 Location: Left arm)   Pulse 72   Resp 16   Ht 5' 2\" (1.575 m)   Wt 186 lb (84.4 kg)   LMP 09/29/1980 (LMP Unknown)   BMI 34.02 kg/m²          Patient appears generally well, mood and affect are appropriate and pleasant. HEENT:  Hearing intact, non-icteric, normocephalic, atraumatic. Neck Exam: Supple, no JVD sitting up   Lung Exam: Clear to auscultation, even breath sounds. Cardiac Exam: Regular rate and rhythm with no murmur  Abdomen: Soft, non-tender, normal bowel sounds. Extremities: Moves all ext well. Mild bilat lower extremity edema. Psych: Appropriate affect  Neuro - Grossly intact           LABS / OTHER STUDIES:     Lab Results   Component Value Date/Time    Sodium 141 08/13/2019 01:46 AM    Potassium 4.3 08/13/2019 01:46 AM    Chloride 106 08/13/2019 01:46 AM    CO2 29 08/13/2019 01:46 AM    Anion gap 6 08/13/2019 01:46 AM    Glucose 139 (H) 08/13/2019 01:46 AM    BUN 22 (H) 08/13/2019 01:46 AM    Creatinine 0.72 08/13/2019 01:46 AM    BUN/Creatinine ratio 31 (H) 08/13/2019 01:46 AM    GFR est AA >60 08/13/2019 01:46 AM    GFR est non-AA >60 08/13/2019 01:46 AM    Calcium 8.7 08/13/2019 01:46 AM    Bilirubin, total 0.3 08/13/2019 01:46 AM    AST (SGOT) 19 08/13/2019 01:46 AM    Alk.  phosphatase 81 08/13/2019 01:46 AM    Protein, total 5.8 (L) 08/13/2019 01:46 AM    Albumin 3.2 (L) 08/13/2019 01:46 AM    Globulin 2.6 08/13/2019 01:46 AM    A-G Ratio 1.2 08/13/2019 01:46 AM    ALT (SGPT) 30 08/13/2019 01:46 AM     Lab Results   Component Value Date/Time    Cholesterol, total 203 (H) 08/13/2019 01:46 AM    HDL Cholesterol 37 08/13/2019 01:46 AM    LDL,Direct 98 08/13/2019 01:46 AM    LDL, calculated Not calculated due to elevated triglyceride level 08/13/2019 01:46 AM    VLDL, calculated  08/13/2019 01:46 AM     Calculation not valid with this patient's other Lipid values. Triglyceride 495 (H) 08/13/2019 01:46 AM    CHOL/HDL Ratio 5.5 (H) 08/13/2019 01:46 AM                  CARDIAC DIAGNOSTICS:      Cardiac Evaluation Includes:     Cardiac Cath 6/09 - severe mLCX disease --> stenting     Cath 8/19/10 - RCA and LCX stents patent; MLI in LAD, LVEF 60%     Event Monitor 3/11 - PVC's  Holter 3/16 - PVC's  Echo 3/17 - LVEF 60%  Lexiscan Cardiolite 3/17 - normal MPI, LVEF 58%     [facility] records reviewed.  Went there for chest pain on 1/3/19. Labs 1/3/19 - Trop < 0.02, BNP 18, Cr 0.61, Hgb 13  Cath 1/4/19 - LAD stent patent, LCX without sig disease, RCA stent patent.       Holter 1/23/19 - NSR, normal study      Stress Echo 2/11/19 - walked 3:21 (2.4 METS), baseline /90, Max /90, normal stress EKG and stress echo      CXR 2/22/19 - normal      Echo 3/21/19 - LVEF 61%; grade 1 diastology (normal for age), AV sclerosis.   RVSP 26 mmHg.       PARDEEP's with exercise 6/7/19 - normal       Event Monitor 5/15/19-6/6/19 - normal study; symptoms correlated with sinus      Right Heart Cath 6/28/19 [facility] findings:   RAP=   Mean 17    mmHg  RVSP= 40/20    mmHg  PAP=     42/30/34  mmHg  PCWP=  27 mmHg  CO=        Thermal 4.1  L/min,             Faraz pending  CI=           Thermal 3.08  L/min/m2,     Faraz pending      Findings:  1.   Elevated right and left sided filling pressure  2.   Pulmonary hypertension, WHO group 2  3.   Perserved cardiac index by Thermal     Echo 8/13/19 - LVEF 50-55%, \"The following segments are hypokinetic: basal inferior, basal inferolateral and mid inferolateral\"    Cardiac Cath 8/13/19 - Non-obstructive CAD with patent stents in LCX and RCA. LVEDP 16 mmHg. EKG 2/22/19 - NSR, normal   EKG 8/12/19 - NSR, NSST changes           ASSESSMENT AND PLAN:      Assessment and Plan:  1) Chest pain and SOB   - She notes symptoms since Jan 2019   - Cardiac cath 1/19 showed patent vessels. - Her stress echo 3/19 was negative for ischemia  - She saw Pulmonary ([facility]) and she says workup was OK.    - RHC 6/28/19 with PCWP 27   - Cardiac cath repeated 8/13/19 showed non-obs CAD with LVEDP 16 mmHg  - Her symptoms could be due to small vessel disease and/or diastolic dysfunction.   - Plan for good BP control and optimize anti-anginals.   - Not taking Imdur or Ranexa due to a bad HA  - She saw Dr. Fercho Peterson (initiated workup for restrictive disease)     - Plan for cardiac MRI soon   - On 8/22/19 - she has been holding BP meds due to low BP. Will slowly resume some of her meds (proceed with Toprol XL 50, eplerenone 12.5 mg, Bumex 1  Mg daily)      2) History of Iron Deficiency Anemia   - She takes no blood thinners (not even aspirin) due to anemia in past   - Dr. Thais Carpio record 12/16 reviewed - He was seeing her for iron deficiency anemia thought to be from GI Bleed from aspirin and plavix      3) Lipids -   - Did not take a statin for long timw due to having muscle aches (from RA at baseline)  - She has agreed to crestor once a week - also cont Tricor      4) History of Afib   - question of ablation in past?   - not on any blood thinners due to anemia in past (see above)   - has been in sinus during time seen by me      5) HTN  - see above      6) Dr. Lay record reviewed 1/31/19   - plan was for CTA for PE and PFT's -- patient says testing was OK      7) RA  - she is on prednisone      8) DM on steroids   - seeing PCP     9)  See me in 4 weeks.   Patient expressed understanding of the plan - questions were answered.   On a side note, I see her brother.          Bert Hilario MD, [address] [facility] [address]  [address] [place], [address] [place] [address]      [id number]  [address]  [facility], [address] [place] [address]  Ph: [phone]                                [phone]

## 2019-08-22 NOTE — PROGRESS NOTES
Visit Vitals  /74 (BP 1 Location: Left arm)   Pulse 72   Resp 16   Ht 5' 2\" (1.575 m)   Wt 186 lb (84.4 kg)   LMP 09/29/1980 (LMP Unknown)   BMI 34.02 kg/m²       121/75 - patient's machine

## 2019-08-27 ENCOUNTER — TELEPHONE (OUTPATIENT)
Dept: CARDIOLOGY CLINIC | Age: 69
End: 2019-08-27

## 2019-08-27 NOTE — TELEPHONE ENCOUNTER
----- Message from Dulce Souza MD sent at 8/27/2019 12:20 AM EDT -----  Marilia Montero,    Please let Farzana Wade know that we can review her labs at the next visit. Although her TSH is elevated, her free T3 and T4 are normal.so no need to change her thyroid dose.     Ty  ----- Message -----  From: Ajay Florian Lab Results In  Sent: 8/21/2019   2:16 PM EDT  To: Dulce Souza MD

## 2019-08-27 NOTE — TELEPHONE ENCOUNTER
Contacted patient and informed her of results. Message received from [facility] stating tube mislabeled for ATTR testing and patient will need redraw. Reviewed with Dr. Jeremy Handy who recommended redraw be done at next appointment as long as patient consents. Patient  informed of need to redraw ATTR genetic testing. Patient in agreement for redraw. She verbalized understanding and had no further questions at this time. Katharine Alejandro RN.

## 2019-09-03 NOTE — DISCHARGE SUMMARY
Physician Discharge Summary     Patient ID:  Alexus Newell  [id number]  68 y.o.  1950    Admit date: 8/12/2019    Discharge date and time:  8/13/2019     Admission Diagnoses: Chest pain [R07.9]    Discharge Diagnoses/Hospital Course   68 y.o. female with hx of CAD, PAF, HFpEF, HTN, hypothyroid, asthma presenting with substernal chest tightness with radiation associated with dyspnea and nausea concerning for [place]     Chest pain /  CAD (coronary artery disease): s/p cath due to prior h/o CABG. Results as follows:   L Main: large caliber, normal  LAD: moderate caliber, tortuous, bifurcates into moderate sized diagonal and small distal LAD, no   LCx: large caliber, essentially continues as single marginal, no significant ISR without proximal stent, distal vessel with mild disease  RCA: moderate caliber, supplies small pda and pl branches, mild proximal vessel ISR    No obstructive CAD    No interventions required. Was started on nitropatch PRN as has been unable to tolerate SL nitro with chest pain. Also started on a statin due to suboptimal control of cholesterol      HFpEF /  Diastolic dysfunction: followed by advanced heart failure clinic. On eplerenone, Bumex, lisinopril, and metoprolol       PAF (paroxysmal atrial fibrillation): in sinus. [id number] of 4 but not anticoagulated due to history of bleeding. On metoprolol for HR control        HTN (hypertension), benign: BP controlled.        Rheumatoid arthritis: on chronic prednisone, likely the cause of her chronic leukocytosis.        Elevated blood sugar / Diabetes mellitus type 2, controlled: on Januvia. A1c 7.1       Asthma: no wheezing. Followed by pulmonary       Hypothyroidism: continue thyroid replacement therapy       Pernicious anemia /   Iron deficiency anemia, unspecified: on B12 injections outpatient and on iron supplements.  Hg WNL       Esophageal reflux: continue PPI       Gluten intolerance (): gluten-free diet       Obesity: Body mass index is 34.39 kg/m². Would benefit from weight loss and dietary / lifestyle modifications     PCP: Gillian Mcelroy MD     Consults: cardiology     Discharge Exam:  Visit Vitals  /69   Pulse (!) 57   Temp 98 °F (36.7 °C)   Resp 14   Ht 5' 2\" (1.575 m)   Wt 85 kg (187 lb 6.3 oz)   SpO2 94%   Breastfeeding? No   BMI 34.27 kg/m²     Gen:  Well-developed, well-nourished, in no acute distress. Pleasant   HEENT:  No scleral icterus, hearing intact to voice, moist mucous membranes  Neck:  Supple, without masses. Thyroid non-tender  Resp:  No accessory muscle use. CTAB without wheezing, rales, rhonchi  Card: RRR. Normal S1 and S2 without murmurs, rubs, or gallops. 1+ peripheral lower extremity edema. No JVD. Peripheral pulses in tact. Abd:  Normoactive bowel sounds. Soft, non-tender, non-distended. No rebound, no guarding. No appreciable hepatosplenomegaly   Lymph:  No cervical adenopathy  Musc:  No cyanosis or clubbing  Skin:  No rashes or ulcers; turgor intact. Neuro:  Cranial nerves are grossly intact, no focal motor weakness, follows commands appropriately  Psych:  Good insight, normal affect. Alert, oriented x 3. Answers questions appropriately     Disposition: home    Patient Instructions:   Discharge Medication List as of 8/13/2019  5:13 PM      CONTINUE these medications which have CHANGED    Details   nitroglycerin (NITRODUR) 0.1 mg/hr 1 Patch by TransDERmal route daily. , Print, Disp-30 Patch, R-0      rosuvastatin (CRESTOR) 5 mg tablet Take 1 Tab by mouth nightly. , Print, Disp-30 Tab, R-0         CONTINUE these medications which have NOT CHANGED    Details   bumetanide (BUMEX) 1 mg tablet Take 1 mg by mouth daily. , Historical Med      !! thyroid, Pork, (ARMOUR) 15 mg tablet Take 7.5 mg by mouth daily.  Takes in addition to 90 mg for a total of 97.5 mg, Historical Med      metoprolol tartrate (LOPRESSOR) 50 mg tablet Take 50 mg by mouth two (2) times a day., Historical Med      eplerenone (INSPRA) 25 mg tablet Take 1 Tab by mouth two (2) times a day., NormalPlease consider 90 day supplies to promote better adherenceDisp-180 Tab, R-3      SITagliptin (JANUVIA) 100 mg tablet Januvia 100 mg tablet   Take 1 tablet every day by oral route., Historical Med      !! predniSONE (DELTASONE) 10 mg tablet Take 10 mg by mouth daily. Indications: taken differently; verify with patient RE dosage, Historical Med      !! predniSONE (DELTASONE) 2.5 mg tablet Take 2.5 mg by mouth every evening., Historical Med      calcium citrate 200 mg (950 mg) tablet Take 200 mg by mouth every evening., Historical Med      flaxseed oil (OMEGA 3 PO) Take 1 Tab by mouth every evening. Indications: FISH OIL, Historical Med      vit C/vit E ac/lut/copper/zinc (PRESERVISION LUTEIN PO) Take 1 Tab by mouth daily. , Historical Med      !! thyroid, Pork, (NP THYROID) 90 mg tablet Take 90 mg by mouth daily. Takes in addition to 7.5 mg for a total of 97.5 mg, Historical Med      lidocaine (LIDODERM) 5 % 1 Patch by TransDERmal route every twenty-four (24) hours. Apply patch to the affected area for 12 hours a day and remove for 12 hours a day., Historical Med      ascorbic acid (VITAMIN C) 500 mg tablet Take 500 mg by mouth daily. , Historical Med      ferrous sulfate 325 mg (65 mg iron) tablet Take 85 mg by mouth every evening., Historical Med      Cholecalciferol, Vitamin D3, (VITAMIN D3) 1,000 unit Cap Take 2 Caps by mouth every evening., Historical Med      lansoprazole (PREVACID) 30 mg capsule Take 30 mg by mouth Daily (before breakfast). , Historical Med      lisinopril (PRINIVIL, ZESTRIL) 2.5 mg tablet Take 1 Tab by mouth daily. , Normal, Disp-90 Tab, R-0      cyanocobalamin (VITAMIN B12) 1,000 mcg/mL injection INJECT 1 ML INTO THE MUSCLE EVERY 30 DAYS, Normal, Disp-10 mL, R-0       !! - Potential duplicate medications found. Please discuss with provider.         Activity: as tolerated   Diet: resume prior diet     Follow-up with Zafar Zamudio MD   Follow-up Information     Follow up With Specialties Details Why Contact Info    Marie Monsalve MD [facility]   [address]  [phone]      Christinia Scheuermann, MD Cardiology On 8/22/2019 [address] [facility]  [address] [address]  [address] [facility]  [phone]            Approximate time spent in patient care on day of discharge: 35 minutes     Signed:  Sirena Summers MD  9/2/2019  10:15 PM .

## 2019-09-04 ENCOUNTER — OFFICE VISIT (OUTPATIENT)
Dept: CARDIOLOGY CLINIC | Age: 69
End: 2019-09-04

## 2019-09-04 VITALS
SYSTOLIC BLOOD PRESSURE: 120 MMHG | WEIGHT: 183.4 LBS | DIASTOLIC BLOOD PRESSURE: 80 MMHG | HEIGHT: 62 IN | OXYGEN SATURATION: 97 % | HEART RATE: 86 BPM | BODY MASS INDEX: 33.75 KG/M2 | RESPIRATION RATE: 24 BRPM | TEMPERATURE: 97.5 F

## 2019-09-04 DIAGNOSIS — I50.30 HEART FAILURE WITH PRESERVED EJECTION FRACTION, UNSPECIFIED HF CHRONICITY (HCC): ICD-10-CM

## 2019-09-04 DIAGNOSIS — I25.10 CORONARY ARTERY DISEASE INVOLVING NATIVE CORONARY ARTERY OF NATIVE HEART WITHOUT ANGINA PECTORIS: Primary | ICD-10-CM

## 2019-09-04 PROBLEM — I25.118 CORONARY ARTERY DISEASE WITH STABLE ANGINA PECTORIS (HCC): Status: ACTIVE | Noted: 2019-09-04

## 2019-09-04 RX ORDER — FENOFIBRATE 48 MG/1
48 TABLET, COATED ORAL DAILY
Qty: 90 TAB | Refills: 3 | Status: SHIPPED | OUTPATIENT
Start: 2019-09-04 | End: 2020-06-22

## 2019-09-04 NOTE — PROGRESS NOTES
[facility] Consultation Note      DOS:   9/4/2019  NAME:  Lincoln Stephens   MRN:   [id number]   REFERRING PROVIDER:  Denisa Keller MD  PRIMARY CARE PHYSICIAN: Justus Amor MD  PRIMARY CARDIOLOGIST: Denisa Keller MD      Chief Complaint:   Chief Complaint   Patient presents with    CHF    Shortness of Breath    Leg Swelling       HPI: 68y.o. year old female with a history of HTN, HFpEF, CAD s/p PCI to LAD and RCA who presents for further evaluation of her chronic HF with preserved EF. She underwent LHC in 6/19 which demonstrated an elevated PCWP of 27 mmHg. Her eplerenone was increased without improvement in her dyspnea and chest pain with exertion. She denies any recent palpitations, presyncope, syncope. She admits to CHACON, orthopnea, edema, but denies PND. She does not snore and has not experienced apnea. Venkatesh Velazquez returns for follow up today. She developed symptomatic hypotension with low dose lisinopril. She saw Dr. Sumeet Flores who discontinued the lisinopril and transitioned her metoprolol tartrate to metoprolol succinate. Her BP has been well controlled without symptomatic hypotension. She does complain of extreme fatigue, most notable upon arising in the morning. She is taking her metoprolol succinate at bedtime. She is tolerating crestor 5 mg once a week without myalgias. History:  Past Medical History:   Diagnosis Date    Anemia     iron defic anemia    Arthritis     Asthma 6/29/2009    CAUSED BY MOLD    Atrial fibrillation ([address] 6/29/2009    CAD (coronary artery disease) 06/29/2009    MI X2  -- prior stenting     Chronic pain     RT. ANKLE    Diastolic dysfunction     GERD (gastroesophageal reflux disease)     Gluten intolerance     Hypothyroidism 6/29/2009    Personal history of MI (myocardial infarction)     Rheumatoid arthritis ([address]     Stroke ([address]     TIA (NO RESIDUAL)    Syncope     Thromboembolus ([address] 2004    RT.  -- DVT after surgery     Thyroid disease HYPO    TIA (transient ischemic attack)      &     Unspecified adverse effect of anesthesia     \"IS A LIGHT WEIGHT\"    Unspecified adverse effect of anesthesia     DIFFICULTY AWAKENING    Vitamin B12 deficiency 2009     Past Surgical History:   Procedure Laterality Date    CARDIAC SURG PROCEDURE UNLIST      ABLATION    HX ADENOIDECTOMY      HX APPENDECTOMY      HX CHOLECYSTECTOMY  11    HX GI      COLONOSCOPY AND ENDOSCOPY    HX HEART CATHETERIZATION  2010    2 STENTS    HX HYSTERECTOMY      HX LUMBAR LAMINECTOMY  2000    L4-L5    HX ORTHOPAEDIC  -2012    RT.  FOOT SURGERY X 6/calcaneal osteotomy    HX TONSILLECTOMY  1964    STENT INSERTION       Social History     Socioeconomic History    Marital status:      Spouse name: Not on file    Number of children: Not on file    Years of education: Not on file    Highest education level: Not on file   Occupational History    Not on file   Social Needs    Financial resource strain: Not on file    Food insecurity:     Worry: Not on file     Inability: Not on file    Transportation needs:     Medical: Not on file     Non-medical: Not on file   Tobacco Use    Smoking status: Former Smoker     Packs/day: 1.00     Years: 30.00     Pack years: 30.00     Last attempt to quit: 2002     Years since quittin.2    Smokeless tobacco: Never Used   Substance and Sexual Activity    Alcohol use: No    Drug use: No    Sexual activity: Never   Lifestyle    Physical activity:     Days per week: Not on file     Minutes per session: Not on file    Stress: Not on file   Relationships    Social connections:     Talks on phone: Not on file     Gets together: Not on file     Attends Islam service: Not on file     Active member of club or organization: Not on file     Attends meetings of clubs or organizations: Not on file     Relationship status: Not on file    Intimate partner violence:     Fear of current or ex partner: Not on file     Emotionally abused: Not on file     Physically abused: Not on file     Forced sexual activity: Not on file   Other Topics Concern    Not on file   Social History Narrative    Not on file     Family History   Problem Relation Age of Onset    Delayed Awakening Mother     Heart Disease Mother     Coronary Artery Disease Mother     Hypertension Mother     Stroke Mother     Delayed Awakening Sister     Hypertension Sister     Lung Disease Father     Cancer Father         colon    Hypertension Father     Hypertension Brother     Breast Cancer Maternal Aunt     Breast Cancer Maternal Aunt     Breast Cancer Cousin         maternal 1st cousin   [facility] Breast Cancer Maternal Aunt     Breast Cancer Cousin         maternal 1st cousin    Breast Cancer Cousin         maternal 1st cousin       Current Medications:   Current Outpatient Medications   Medication Sig Dispense Refill    thyroid, Pork, (NP THYROID) 15 mg tablet Take  by mouth daily. Takes in addition to the [address]  metoprolol succinate (TOPROL-XL) 50 mg XL tablet Take 1 Tab by mouth daily. 30 Tab 12    eplerenone (INSPRA) 25 mg tablet Take 0.5 Tabs by mouth daily. 30 Tab 6    bumetanide (BUMEX) 1 mg tablet Take 1 Tab by mouth daily. 90 Tab 3    fenofibrate nanocrystallized (TRICOR) 48 mg tablet Take 1 Tab by mouth daily. 30 Tab 3    nitroglycerin (NITRODUR) 0.1 mg/hr 1 Patch by TransDERmal route daily. 30 Patch 0    rosuvastatin (CRESTOR) 5 mg tablet Take 1 Tab by mouth nightly. (Patient taking differently: Take 5 mg by mouth nightly. Take one tablet one day a week.) 30 Tab 0    SITagliptin (JANUVIA) 100 mg tablet Take 100 mg by mouth daily.  predniSONE (DELTASONE) 10 mg tablet Take 10 mg by mouth daily. Indications: taken differently; verify with patient RE dosage      predniSONE (DELTASONE) 2.5 mg tablet Take 2.5 mg by mouth every evening.       calcium citrate 200 mg (950 mg) tablet Take 200 mg by mouth every evening.  flaxseed oil (OMEGA 3 PO) Take 1 Tab by mouth every evening. Indications: FISH OIL      vit C/vit E ac/lut/copper/zinc (PRESERVISION LUTEIN PO) Take 1 Tab by mouth daily.  thyroid, Pork, (NP THYROID) 90 mg tablet Take 90 mg by mouth daily. Takes in addition to 7.5 mg for a total of 97.5 mg      lidocaine (LIDODERM) 5 % 1 Patch by TransDERmal route every twenty-four (24) hours. Apply patch to the affected area for 12 hours a day and remove for 12 hours a day.  ascorbic acid (VITAMIN C) 500 mg tablet Take 500 mg by mouth daily.  ferrous sulfate 325 mg (65 mg iron) tablet Take  by mouth every evening.  cyanocobalamin (VITAMIN B12) 1,000 mcg/mL injection INJECT 1 ML INTO THE MUSCLE EVERY 30 DAYS 10 mL 0    Cholecalciferol, Vitamin D3, (VITAMIN D3) 1,000 unit Cap Take 2 Caps by mouth every evening.  lansoprazole (PREVACID) 30 mg capsule Take 30 mg by mouth Daily (before breakfast).  glucose blood VI test strips (ONETOUCH VERIO) strip OneTouch Verio strips         Allergies:    Allergies   Allergen Reactions    Clopidogrel Other (comments)    Sulfa (Sulfonamide Antibiotics) Swelling    Adhesive Tape-Silicones Other (comments)     BAD BLISTERS AND TENDS TO GET INFECTED    Albuterol Palpitations    Aspirin Hives and Other (comments)     \"it makes my stomach bleed\"      Butter Flavor Anaphylaxis     Butter AND CREME    Cimzia [Certolizumab Pegol] Other (comments)     GI symptoms, blisters in the mouth and esophagus    Demerol [Meperidine] Other (comments)     HYPOTENSION    Fentanyl Other (comments)     HYPOTENSION    Gluten Diarrhea     bloating    Keflex [Cephalexin] Anaphylaxis    Levaquin [Levofloxacin] Unproven on Challenge     PT DOESN'T REMEMBER HAVING THAT    Morphine Other (comments)     HYPOTENSION    Nitroglycerin Other (comments)     IN PILL FORM  - HYPOTENSION  CAN TOLERATE PATCH FOR SHORT TIME    Pcn [Penicillins] Anaphylaxis    Percocet [Oxycodone-Acetaminophen] Unknown (comments)     HYPOTENSION AND HALLUCINATIONS. Can take tylenol ok, oxycodone is allergy per patient.  Tapazole [Methimazole] Unknown (comments)     Patient stated \"it made me feel like I had the flu\"       ROS:    General:  positive for  - fatigue  HEENT: negative  Pulmonary: positive for - orthopnea and shortness of breath  Cardiac: positive for chest pressure/discomfort, dyspnea, orthopnea, lower extremity edema, dyspnea on exertion  GI:  no abdominal pain, change in bowel habits, or black or bloody stools  Musculo: negative  Neuro: no TIA or stroke symptoms  Skin:  positive for - ecchymoses  Allergies: REMINDER:DOCUMENT ALLERGIES IN ALLERGY ACTIVITY  Psych: negative    Admission Weight: Last Weight   Weight: 183 lb 6.4 oz (83.2 kg) Weight: 183 lb 6.4 oz (83.2 kg)       Physical Exam:   Vitals:    Visit Vitals  /80 (BP 1 Location: Right arm, BP Patient Position: Sitting)   Pulse 86   Temp 97.5 °F (36.4 °C) (Oral)   Resp 24   Ht 5' 2\" (1.575 m)   Wt 183 lb 6.4 oz (83.2 kg)   SpO2 97%   BMI 33.54 kg/m²       General:  alert, fatigued, cooperative  HEENT: PERRLA, EOMI and Sclera clear, anicteric  Neck:  supple, no significant adenopathy, carotids upstroke normal bilaterally, no bruits  CVP:  8 cm  ( - ) HJR  Cardiac: regular rate, regular rhythm, S1, S2 normal and S4 present  Chest: breath sounds clear and equal bilaterally  Abdomen: soft, non-tender. Bowel sounds normal. No masses, no organomegaly. Extremity: extremities normal, atraumatic, no cyanosis or edema  Neuro: Alert and oriented to person, place, and time; normal strength and tone. Normal symmetric reflexes. Normal coordination and gait  Skin:   ecchymoses - arm(s) bilateral    Recent Labs:   Labs Latest Ref Rng & Units 8/13/2019 8/12/2019 7/29/2019   WBC 3.6 - 11.0 K/uL 14. 1(H) 19. 3(H) -   RBC 3.80 - 5.20 M/uL 4.21 4.46 -   Hemoglobin 11.5 - 16.0 g/dL 13.0 14.3 -   Hematocrit 35.0 - 47.0 % 41.1 43.3 -   MCV 80.0 - 99.0 FL 97.6 97.1 -   Platelets 004 - 619 K/uL 221 253 -   Lymphocytes 12 - 49 % 13 14 -   Monocytes 5 - 13 % 7 9 -   Eosinophils 0 - 7 % 1 1 -   Basophils 0 - 1 % 0 0 -   Albumin 3.5 - 5.0 g/dL 3.2(L) 3.5 -   Calcium 8.5 - 10.1 MG/DL 8.7 9.4 9.6   SGOT 15 - 37 U/L 19 14(L) -   Glucose 65 - 100 mg/dL 139(H) 155(H) 146(H)   BUN 6 - 20 MG/DL 22(H) 24(H) 17   Creatinine 0.55 - 1.02 MG/DL 0.72 1.03(H) 0.73   Sodium 136 - 145 mmol/L 141 141 143   Potassium 3.5 - 5.1 mmol/L 4.3 3.5 4.1   TSH 0.450 - 4.500 uIU/mL - 10.070(H) -   Some recent data might be hidden     EKG:   SR, rate 65 bpm    ECHO 2008: EF 60%  ECHO 3/3/2017: EF 60%, mild TR   ECHO 3/21/2019: EF 61%, AoV sclerosis  CATH 2009: stent LCX  CATH 8/2010: LAD: m: MLI, , LCX: m 20%, patent RCA, LCX stents, EF 60%   CATH 1/4/2019: patent LAD stent, LCX without significant disease, patent RCA stent  CATH 8/13/2019  Left Main   The vessel is large. The vessel is angiographically normal.   Left Anterior Descending   The proximal segment of the vessel is moderate in size. The middle segment of the vessel is small. The distal segment of the vessel is small. There is mild diffuse disease. Left Circumflex   The vessel is large. There is mild diffuse disease. Previously placed Prox Cx to Mid Cx stent (unknown type) is widely patent. Right Coronary Artery   The vessel is moderate in size. There is mild diffuse disease. Prox RCA to Mid RCA lesion 20% stenosed. . The lesion was previously treated using a stent (unknown type). Mid RCA lesion 40% stenosed. .   Inferior Septal   The vessel is small. The vessel exhibits minimal luminal irregularities. Right Posterior Atrioventricular Branch   The vessel is small. The vessel exhibits minimal luminal irregularities.        STRESS: Myoview 2014: no ischemia  STRESS Nuc 2/2016: no ischemia,   STRESS Nuc 3/3/2017: no ischemia, EF 58%   STRESS Echo 2/11/19: normal stress EKG and stress echo    EVENT monitor 2011: PVCs  HOLTER 2013: frequent PVCs  HOLTER 1/23/19: SR, normal study    Right Heart Cath 6/28/19 [facility] findings:   RAP=   Mean 17    mmHg  RVSP= 40/20    mmHg  PAP=     42/30/34  mmHg  PCWP=  27 mmHg  CO=        Thermal 4.1  L/min,             Faraz pending  CI=           Thermal 3.08  L/min/m2,     Faraz pending        Impression / Plan:   1. CAD s/p PCI to LAD , RCA   LHC in 8/19 - no intervention   Tolerating crestor   Recommend coenzyme Q10 200 mg daily and vitamin D3 2000 international units daily   ASA intolerant d/t GI bleeding   On metoprolol succinate   Recommend discontinuation of calcium supplements - eat foods high in calcium    2. HFpEF - Stage C, NYHA Class III   RHC with PCWP 27 mmHg   On bumex, eplerenone, metoprolol   Cardiac MRI pending   SPEP - no M spike   Iron profile - normal   Elevated TSH with normal free T3 and T4   ATTR pending    3. RA   On prednisone - attempting to wean dose    4. T2DM - steroid induced   Low carb diet   On Januvia   Wean prednisone as tolerated    5. HTN - well controlled   Continue metoprolol succinate   Low sodium diet   Continue home BP log    6. PAF   [id number] = 4   No AC d/t history of anemia   On metoprolol succinate for rate control   Recent Holter without AF    7. HLD   Fasting lipid profile with LDL 98,    Tolerating crestor 5 mg once weekly and tricor   May benefit from PCSK9 inhibitor   Encourage exercise    8. Iron Deficiency Anemia   Iron level normal    9. Vitamin D Deficiency   On vitamin D3     10. Hypothyroidism   Thyroid supplement increased    Repeat TFTs in 6-8 weeks      Ayanna Lopez MD, [facility], [address] [place] [address]  Chief of Cardiology, [address] [facility] Director  [address] [place] [address], [address] [facility], [address] [place] [address]  Office [phone]  Fax [phone]

## 2019-09-04 NOTE — PATIENT INSTRUCTIONS
1. Take vitamin D and coenzyme Q10 daily - purchase supplements with GMP seal  2. Discontinue calcium supplements  3. Eat foods high in calcium  4. Cardiac MRI  5.  Follow up in the [facility] in 4 weeks to discuss cMRI results

## 2019-09-04 NOTE — LETTER
9/4/2019 6:05 PM 
 
Patient:  Waldemar Siemens YOB: 1950 Date of Visit: 9/4/2019 Dear Ayo Valiente MD 
[facility] [id number] VIA In Basket 
 : Thank you for referring Ms. Anupam Back to me for evaluation/treatment. Below are the relevant portions of my assessment and plan of care. If you have questions, please do not hesitate to call me. I look forward to following Ms. Samantha Carlos along with you. Sincerely, Gladis Urrutia MD

## 2019-09-09 ENCOUNTER — HOSPITAL ENCOUNTER (EMERGENCY)
Age: 69
Discharge: HOME | End: 2019-09-09
Attending: EMERGENCY MEDICINE
Payer: MEDICARE

## 2019-09-09 ENCOUNTER — APPOINTMENT (OUTPATIENT)
Dept: ULTRASOUND IMAGING | Age: 69
End: 2019-09-09
Attending: EMERGENCY MEDICINE
Payer: MEDICARE

## 2019-09-09 ENCOUNTER — TELEPHONE (OUTPATIENT)
Dept: CARDIOLOGY CLINIC | Age: 69
End: 2019-09-09

## 2019-09-09 VITALS
HEIGHT: 62 IN | RESPIRATION RATE: 16 BRPM | OXYGEN SATURATION: 98 % | WEIGHT: 186.29 LBS | BODY MASS INDEX: 34.28 KG/M2 | HEART RATE: 77 BPM | DIASTOLIC BLOOD PRESSURE: 66 MMHG | SYSTOLIC BLOOD PRESSURE: 119 MMHG | TEMPERATURE: 98.2 F

## 2019-09-09 DIAGNOSIS — M79.672 PAIN IN BOTH FEET: Primary | ICD-10-CM

## 2019-09-09 DIAGNOSIS — M79.671 PAIN IN BOTH FEET: Primary | ICD-10-CM

## 2019-09-09 LAB
ALBUMIN SERPL-MCNC: 3.5 G/DL (ref 3.5–5)
ALBUMIN/GLOB SERPL: 1.1 {RATIO} (ref 1.1–2.2)
ALP SERPL-CCNC: 85 U/L (ref 45–117)
ALT SERPL-CCNC: 28 U/L (ref 12–78)
ANION GAP SERPL CALC-SCNC: 9 MMOL/L (ref 5–15)
AST SERPL-CCNC: 16 U/L (ref 15–37)
BASOPHILS # BLD: 0 K/UL (ref 0–0.1)
BASOPHILS NFR BLD: 0 % (ref 0–1)
BILIRUB SERPL-MCNC: 0.4 MG/DL (ref 0.2–1)
BUN SERPL-MCNC: 21 MG/DL (ref 6–20)
BUN/CREAT SERPL: 24 (ref 12–20)
CALCIUM SERPL-MCNC: 8.9 MG/DL (ref 8.5–10.1)
CHLORIDE SERPL-SCNC: 102 MMOL/L (ref 97–108)
CO2 SERPL-SCNC: 30 MMOL/L (ref 21–32)
COMMENT, HOLDF: NORMAL
CREAT SERPL-MCNC: 0.89 MG/DL (ref 0.55–1.02)
DIFFERENTIAL METHOD BLD: ABNORMAL
EOSINOPHIL # BLD: 0.4 K/UL (ref 0–0.4)
EOSINOPHIL NFR BLD: 2 % (ref 0–7)
ERYTHROCYTE [DISTWIDTH] IN BLOOD BY AUTOMATED COUNT: 13.3 % (ref 11.5–14.5)
GLOBULIN SER CALC-MCNC: 3.1 G/DL (ref 2–4)
GLUCOSE SERPL-MCNC: 143 MG/DL (ref 65–100)
HCT VFR BLD AUTO: 43.1 % (ref 35–47)
HGB BLD-MCNC: 14.2 G/DL (ref 11.5–16)
IMM GRANULOCYTES # BLD AUTO: 0 K/UL (ref 0–0.04)
IMM GRANULOCYTES NFR BLD AUTO: 0 % (ref 0–0.5)
LYMPHOCYTES # BLD: 2 K/UL (ref 0.8–3.5)
LYMPHOCYTES NFR BLD: 10 % (ref 12–49)
MCH RBC QN AUTO: 32 PG (ref 26–34)
MCHC RBC AUTO-ENTMCNC: 32.9 G/DL (ref 30–36.5)
MCV RBC AUTO: 97.1 FL (ref 80–99)
MONOCYTES # BLD: 1.2 K/UL (ref 0–1)
MONOCYTES NFR BLD: 6 % (ref 5–13)
NEUTS SEG # BLD: 15.9 K/UL (ref 1.8–8)
NEUTS SEG NFR BLD: 82 % (ref 32–75)
PLATELET # BLD AUTO: 235 K/UL (ref 150–400)
PMV BLD AUTO: 10.5 FL (ref 8.9–12.9)
POTASSIUM SERPL-SCNC: 3.4 MMOL/L (ref 3.5–5.1)
PROT SERPL-MCNC: 6.6 G/DL (ref 6.4–8.2)
RBC # BLD AUTO: 4.44 M/UL (ref 3.8–5.2)
RBC MORPH BLD: ABNORMAL
SAMPLES BEING HELD,HOLD: NORMAL
SODIUM SERPL-SCNC: 141 MMOL/L (ref 136–145)
WBC # BLD AUTO: 19.5 K/UL (ref 3.6–11)

## 2019-09-09 PROCEDURE — 85025 COMPLETE CBC W/AUTO DIFF WBC: CPT

## 2019-09-09 PROCEDURE — 36415 COLL VENOUS BLD VENIPUNCTURE: CPT

## 2019-09-09 PROCEDURE — 80053 COMPREHEN METABOLIC PANEL: CPT

## 2019-09-09 PROCEDURE — 86757 RICKETTSIA ANTIBODY: CPT

## 2019-09-09 PROCEDURE — 93970 EXTREMITY STUDY: CPT

## 2019-09-09 PROCEDURE — 99283 EMERGENCY DEPT VISIT LOW MDM: CPT

## 2019-09-09 NOTE — DISCHARGE INSTRUCTIONS
Return to ER for any fever, chills, nausea, vomiting, swelling, open wounds, increasing or change in pain. Continue your normal medicines. Foot Pain: Care Instructions  Your Care Instructions  Foot injuries that cause pain and swelling are fairly common. Almost all sports or home repair projects can cause a misstep that ends up as foot pain. Normal wear and tear, especially as you get older, also can cause foot pain. Most minor foot injuries will heal on their own, and home treatment is usually all you need to do. If you have a severe injury, you may need tests and treatment. Follow-up care is a key part of your treatment and safety. Be sure to make and go to all appointments, and call your doctor if you are having problems. It's also a good idea to know your test results and keep a list of the medicines you take. How can you care for yourself at home? · Take pain medicines exactly as directed. ? If the doctor gave you a prescription medicine for pain, take it as prescribed. ? If you are not taking a prescription pain medicine, ask your doctor if you can take an over-the-counter medicine. · Rest and protect your foot. Take a break from any activity that may cause pain. · Put ice or a cold pack on your foot for 10 to 20 minutes at a time. Put a thin cloth between the ice and your skin. · Prop up the sore foot on a pillow when you ice it or anytime you sit or lie down during the next 3 days. Try to keep it above the level of your heart. This will help reduce swelling. · Your doctor may recommend that you wrap your foot with an elastic bandage. Keep your foot wrapped for as long as your doctor advises. · If your doctor recommends crutches, use them as directed. · Wear roomy footwear. · As soon as pain and swelling end, begin gentle exercises of your foot. Your doctor can tell you which exercises will help. When should you call for help? Call 911 anytime you think you may need emergency care.  For example, call if:    · Your foot turns pale, white, blue, or cold.    Call your doctor now or seek immediate medical care if:    · You cannot move or stand on your foot.     · Your foot looks twisted or out of its normal position.     · Your foot is not stable when you step down.     · You have signs of infection, such as:  ? Increased pain, swelling, warmth, or redness. ? Red streaks leading from the sore area. ? Pus draining from a place on your foot. ? A fever.     · Your foot is numb or tingly.    Watch closely for changes in your health, and be sure to contact your doctor if:    · You do not get better as expected.     · You have bruises from an injury that last longer than 2 weeks. Where can you learn more? Go to http://wayne-ricky.info/. Enter C144 in the search box to learn more about \"Foot Pain: Care Instructions. \"  Current as of: September 20, 2018  Content Version: 12.1  © 1471-9672 [employer]. Care instructions adapted under license by [employer] (which disclaims liability or warranty for this information). If you have questions about a medical condition or this instruction, always ask your healthcare professional. [place] [address] any warranty or liability for your use of this information.

## 2019-09-09 NOTE — ED PROVIDER NOTES
68-year-old female presenting to the emergency room for evaluation of foot redness and pain. Patient reports that on Thursday of last week, 4 days ago her feet began to hurt. Pain is worse in the morning and gradually improves through the day and then returns at night. Pain is described as an aching sensation. It does not radiate. No associated nausea or vomiting, fevers or chills. No numbness or tingling. Pain is worse with walking and movement. Patient is taking Tylenol and Aleve with minimal relief. No known precipitating event. Patient has a history of DVT and concerned that the patient may be clot. No injuries or traumas. No completely alleviating factors. Pain currently 4/10. Pt declines pain medicine at this time. Social hx    +smoker           Past Medical History:   Diagnosis Date    Anemia     iron defic anemia    Arthritis     Asthma 6/29/2009    CAUSED BY MOLD    Atrial fibrillation ([address] 6/29/2009    CAD (coronary artery disease) 06/29/2009    MI X2  -- prior stenting     Chronic pain     RT. ANKLE    Diastolic dysfunction     GERD (gastroesophageal reflux disease)     Gluten intolerance     Hypothyroidism 6/29/2009    Personal history of MI (myocardial infarction)     Rheumatoid arthritis ([address]     Stroke ([address]     TIA (NO RESIDUAL)    Syncope     Thromboembolus ([address] 2004    RT.  -- DVT after surgery     Thyroid disease     HYPO    TIA (transient ischemic attack)     2004 & 2006    Unspecified adverse effect of anesthesia     \"IS A LIGHT WEIGHT\"    Unspecified adverse effect of anesthesia     DIFFICULTY AWAKENING    Vitamin B12 deficiency 6/29/2009       Past Surgical History:   Procedure Laterality Date    CARDIAC SURG PROCEDURE UNLIST      ABLATION    HX ADENOIDECTOMY      HX APPENDECTOMY      HX CHOLECYSTECTOMY  6/16/11    HX GI      COLONOSCOPY AND ENDOSCOPY    HX HEART CATHETERIZATION  2010    2 STENTS    HX HYSTERECTOMY      HX LUMBAR LAMINECTOMY 2000    L4-L5    HX ORTHOPAEDIC  -2012    RT.  FOOT SURGERY X 6/calcaneal osteotomy    HX TONSILLECTOMY  1964    STENT INSERTION           Family History:   Problem Relation Age of Onset    Delayed Awakening Mother     Heart Disease Mother     Coronary Artery Disease Mother     Hypertension Mother     Stroke Mother     Delayed Awakening Sister     Hypertension Sister     Lung Disease Father     Cancer Father         colon    Hypertension Father     Hypertension Brother     Breast Cancer Maternal Aunt     Breast Cancer Maternal Aunt     Breast Cancer Cousin         maternal 1st cousin    Breast Cancer Maternal Aunt     Breast Cancer Cousin         maternal 1st cousin    Breast Cancer Cousin         maternal 1st cousin       Social History     Socioeconomic History    Marital status:      Spouse name: Not on file    Number of children: Not on file    Years of education: Not on file    Highest education level: Not on file   Occupational History    Not on file   Social Needs    Financial resource strain: Not on file    Food insecurity:     Worry: Not on file     Inability: Not on file    Transportation needs:     Medical: Not on file     Non-medical: Not on file   Tobacco Use    Smoking status: Former Smoker     Packs/day: 1.00     Years: 30.00     Pack years: 30.00     Last attempt to quit: 2002     Years since quittin.2    Smokeless tobacco: Never Used   Substance and Sexual Activity    Alcohol use: No    Drug use: No    Sexual activity: Never   Lifestyle    Physical activity:     Days per week: Not on file     Minutes per session: Not on file    Stress: Not on file   Relationships    Social connections:     Talks on phone: Not on file     Gets together: Not on file     Attends [employer] service: Not on file     Active member of club or organization: Not on file     Attends meetings of clubs or organizations: Not on file     Relationship status: Not on file   Brenda Schulz Intimate partner violence:     Fear of current or ex partner: Not on file     Emotionally abused: Not on file     Physically abused: Not on file     Forced sexual activity: Not on file   Other Topics Concern    Not on file   Social History Narrative    Not on file         ALLERGIES: Clopidogrel; Sulfa (sulfonamide antibiotics); Adhesive tape-silicones; Albuterol; Aspirin; Butter flavor; Cimzia [certolizumab pegol]; Demerol [meperidine]; Fentanyl; Gluten; Keflex [cephalexin]; Levaquin [levofloxacin]; Morphine; Nitroglycerin; Pcn [penicillins]; Percocet [oxycodone-acetaminophen]; and Tapazole [methimazole]    Review of Systems   Constitutional: Negative for chills and fever. Respiratory: Negative for cough and shortness of breath. Cardiovascular: Negative for chest pain. Gastrointestinal: Negative for abdominal pain, nausea and vomiting. Genitourinary: Negative for difficulty urinating and dysuria. Musculoskeletal: Negative for back pain and myalgias. Skin: Positive for color change. Negative for rash and wound. Neurological: Negative for dizziness and headaches. All other systems reviewed and are negative. Vitals:    09/09/19 1451   BP: 157/81   Pulse: 88   Resp: 20   Temp: 98.2 °F (36.8 °C)   SpO2: 95%   Weight: 84.5 kg (186 lb 4.6 oz)   Height: 5' 2\" (1.575 m)            Physical Exam   Constitutional: She is oriented to person, place, and time. She appears well-developed and well-nourished. No distress. HENT:   Head: Normocephalic and atraumatic. Eyes: Pupils are equal, round, and reactive to light. Conjunctivae are normal.   Neck: Normal range of motion. Neck supple. Cardiovascular: Normal rate and regular rhythm. Pulmonary/Chest: Effort normal and breath sounds normal.   Abdominal: Soft. She exhibits no distension and no mass. There is no tenderness. There is no rebound and no guarding. No hernia. Musculoskeletal: Normal range of motion.    Legs bilaterally:  Erythema to feet to mid lower leg Bilaterally R>L. No warmth. No open wounds. No edema. No ecchymosis. Mild tenderness to palpation. 2+ dorsalis pedis bilaterally. Cap refill <3 seconds. FROM of toes. Neurological: She is alert and oriented to person, place, and time. Skin: Skin is warm and dry. There is erythema. Psychiatric: She has a normal mood and affect. Her behavior is normal. Judgment and thought content normal.   Nursing note and vitals reviewed. MDM  Number of Diagnoses or Management Options  Pain in both feet:   Diagnosis management comments: 64-year-old female presenting for bilateral lower leg redness pain. There is mild erythema the dorsum of the feet to the mid lower legs bilaterally. Right greater than left. There is no warmth. Patient is afebrile. Nontoxic-appearing. Abdomen is soft and nontender. Lungs are clear. Plan: Vascular study, labs. 6:18 PM  Wbc elevated. Elevated in past. No fever. No signs of infection. No cellulitis or abscesses. Patient is well hydrated, well appearing, and in no respiratory distress. Physical exam is reassuring, and without signs of serious illness. Will discharge patient home with supportive care, and follow-up with PCP within the next few days. Patient's results have been reviewed with them. Patient and/or family have verbally conveyed their understanding and agreement of the patient's signs, symptoms, diagnosis, treatment and prognosis and additionally agree to follow up as recommended or return to the Emergency Room should their condition change prior to follow-up. Discharge instructions have also been provided to the patient with some educational information regarding their diagnosis as well a list of reasons why they would want to return to the ER prior to their follow-up appointment should their condition change. Procedures    ED EKG interpretation:  Rhythm: normal sinus rhythm; and regular .  Rate (approx.): 75; Axis: normal; P wave: normal; QRS interval: prolonged; ST/T wave: no acute st changes. EKG documented by Estee Padron PA-C, adam Meier MD, ED MD.        Pt has been seen and evaluated by attending physician who has reviewed lab work and imaging tests. She agrees with discharge and prescription plan.

## 2019-09-09 NOTE — ED NOTES
The patient was discharged home by [facility] in stable condition. The patient is alert and oriented, in no respiratory distress. The patient's diagnosis, condition and treatment were explained. The patient expressed understanding. A discharge plan has been developed. A  was not involved in the process. Aftercare instructions were given. Pt ambulatory out of the ED with family.

## 2019-09-10 ENCOUNTER — TELEPHONE (OUTPATIENT)
Dept: CARDIOLOGY CLINIC | Age: 69
End: 2019-09-10

## 2019-09-10 DIAGNOSIS — E87.6 HYPOKALEMIA: Primary | ICD-10-CM

## 2019-09-10 RX ORDER — POTASSIUM CHLORIDE 20 MEQ/1
20 TABLET, EXTENDED RELEASE ORAL DAILY
Qty: 90 TAB | Refills: 2 | Status: SHIPPED | OUTPATIENT
Start: 2019-09-10 | End: 2020-01-02

## 2019-09-10 RX ORDER — POTASSIUM CHLORIDE 20 MEQ/1
20 TABLET, EXTENDED RELEASE ORAL DAILY
Qty: 7 TAB | Refills: 0 | Status: SHIPPED | OUTPATIENT
Start: 2019-09-10 | End: 2019-10-08 | Stop reason: SDUPTHER

## 2019-09-10 RX ORDER — EPLERENONE 25 MG/1
25 TABLET, FILM COATED ORAL DAILY
Qty: 30 TAB | Refills: 6 | Status: SHIPPED | OUTPATIENT
Start: 2019-09-10 | End: 2020-06-01

## 2019-09-10 NOTE — TELEPHONE ENCOUNTER
Reviewed with Dr. Urvashi Laguna who recommended V.O.R.B patient increase Inspra to 25mg (full tablet) by mouth daily and start potassium supplement 20meq by mouth daily. Contacted patient to notify. She is in agreement with plan of care. New prescription for potassium and updated prescription for inspra sent to pharmacy. Patient verbalized understanding and had no further questions. Matilda Ruelas RN.

## 2019-09-10 NOTE — TELEPHONE ENCOUNTER
Patient calling stating she went to ED last evening-does not have DVT but was told her potassium was low-3.4 and to follow up to have it replaced. Please advise, thank you.

## 2019-09-11 LAB
R RICKETTSI IGG SER QL IA: NEGATIVE
R RICKETTSI IGM SER-ACNC: 2.54 INDEX (ref 0–0.89)

## 2019-09-12 RX ORDER — DOXYCYCLINE 100 MG/1
100 TABLET ORAL 2 TIMES DAILY
Qty: 20 TAB | Refills: 0 | Status: SHIPPED | OUTPATIENT
Start: 2019-09-12 | End: 2019-09-22

## 2019-09-12 NOTE — PROGRESS NOTES
I called and spoke with patient concerning result. [facility] for doxycycline 100 mg bid x 10 d to [facility]. Discussed with patient need for follow-up. She will call doctor for further lab evaluation and follow-up.

## 2019-09-30 ENCOUNTER — HOSPITAL ENCOUNTER (OUTPATIENT)
Dept: MRI IMAGING | Age: 69
Discharge: HOME | End: 2019-09-30
Attending: INTERNAL MEDICINE
Payer: MEDICARE

## 2019-09-30 VITALS — WEIGHT: 184 LBS | BODY MASS INDEX: 33.65 KG/M2

## 2019-09-30 DIAGNOSIS — I50.9 CHRONIC CONGESTIVE HEART FAILURE, UNSPECIFIED HEART FAILURE TYPE (HCC): ICD-10-CM

## 2019-09-30 DIAGNOSIS — R06.02 SHORTNESS OF BREATH: ICD-10-CM

## 2019-09-30 DIAGNOSIS — I25.10 CORONARY ARTERY DISEASE INVOLVING NATIVE CORONARY ARTERY OF NATIVE HEART WITHOUT ANGINA PECTORIS: ICD-10-CM

## 2019-09-30 DIAGNOSIS — I10 HTN (HYPERTENSION), BENIGN: ICD-10-CM

## 2019-09-30 DIAGNOSIS — I50.32 CHRONIC HEART FAILURE WITH PRESERVED EJECTION FRACTION (HCC): ICD-10-CM

## 2019-09-30 PROCEDURE — 74011636320 HC RX REV CODE- 636/320: Performed by: INTERNAL MEDICINE

## 2019-09-30 PROCEDURE — A9579 GAD-BASE MR CONTRAST NOS,1ML: HCPCS | Performed by: INTERNAL MEDICINE

## 2019-09-30 PROCEDURE — 75561 CARDIAC MRI FOR MORPH W/DYE: CPT

## 2019-09-30 RX ADMIN — GADOPENTETATE DIMEGLUMINE 30 ML: 469.01 INJECTION INTRAVENOUS at 12:28

## 2019-10-08 ENCOUNTER — TELEPHONE (OUTPATIENT)
Dept: CARDIOLOGY CLINIC | Age: 69
End: 2019-10-08

## 2019-10-08 ENCOUNTER — OFFICE VISIT (OUTPATIENT)
Dept: CARDIOLOGY CLINIC | Age: 69
End: 2019-10-08

## 2019-10-08 VITALS
TEMPERATURE: 97.5 F | SYSTOLIC BLOOD PRESSURE: 118 MMHG | BODY MASS INDEX: 34.23 KG/M2 | WEIGHT: 186 LBS | RESPIRATION RATE: 24 BRPM | DIASTOLIC BLOOD PRESSURE: 72 MMHG | HEIGHT: 62 IN | HEART RATE: 72 BPM | OXYGEN SATURATION: 97 %

## 2019-10-08 DIAGNOSIS — I50.30 HEART FAILURE WITH PRESERVED EJECTION FRACTION, UNSPECIFIED HF CHRONICITY (HCC): ICD-10-CM

## 2019-10-08 DIAGNOSIS — I25.10 CORONARY ARTERY DISEASE INVOLVING NATIVE CORONARY ARTERY OF NATIVE HEART WITHOUT ANGINA PECTORIS: ICD-10-CM

## 2019-10-08 DIAGNOSIS — R06.02 SHORTNESS OF BREATH: ICD-10-CM

## 2019-10-08 DIAGNOSIS — R55 SYNCOPE, UNSPECIFIED SYNCOPE TYPE: ICD-10-CM

## 2019-10-08 DIAGNOSIS — E03.9 HYPOTHYROIDISM, UNSPECIFIED TYPE: Primary | ICD-10-CM

## 2019-10-08 DIAGNOSIS — R07.9 CHEST PAIN, UNSPECIFIED TYPE: ICD-10-CM

## 2019-10-08 RX ORDER — MELATONIN
1000 DAILY
COMMUNITY

## 2019-10-08 NOTE — TELEPHONE ENCOUNTER
Dr. Daisy Fernandez office called to inform there is an order for a 30 day event monitor in system. Dr. Dinesh Mauro would like for patient to get monitor though our office.      Phone: [phone]

## 2019-10-08 NOTE — PROGRESS NOTES
[facility] Note      DOS:   10/8/2019  NAME:  Debo Prabhakar   MRN:   [id number]   REFERRING PROVIDER:  Joby Alfaro MD  PRIMARY CARE PHYSICIAN: Nima Winslow MD  PRIMARY CARDIOLOGIST: Joby Alfaro MD      Chief Complaint:   Chief Complaint   Patient presents with    CHF    Shortness of Breath    Dizziness     syncopal episode 10-1-2019    Palpitations       HPI: 68y.o. year old female with a history of HTN, HFpEF, CAD s/p PCI to LAD and RCA who presents for further evaluation of her chronic HF with preserved EF. She underwent LHC in 6/19 which demonstrated an elevated PCWP of 27 mmHg. Her eplerenone was increased without improvement in her dyspnea and chest pain with exertion. She denies any recent palpitations, presyncope, syncope. She admits to CHACON, orthopnea, edema, but denies PND. She does not snore and has not experienced apnea. Chelsy Kahn returns for follow up today. She developed symptomatic hypotension with low dose lisinopril. She saw Dr. Carol Orta who discontinued the lisinopril and transitioned her metoprolol tartrate to metoprolol succinate. She does complain of extreme fatigue, most notable upon arising in the morning. She is taking her metoprolol succinate at bedtime. She discontinued crestor 5 mg due to myalgias. Chelsy Kahn had her cardiac MRI on 9/30/19 and experienced an episode of syncope the following day. She noted palpitations prior to her episode of syncope. She continues to feel extreme CHACON. Of note, she is highly allergic to mold but denies any exposure to mold or recent water leaks. History:  Past Medical History:   Diagnosis Date    Anemia     iron defic anemia    Arthritis     Asthma 6/29/2009    CAUSED BY MOLD    Atrial fibrillation ([address] 6/29/2009    CAD (coronary artery disease) 06/29/2009    MI X2  -- prior stenting     Chronic pain     RT.  ANKLE    Diastolic dysfunction     GERD (gastroesophageal reflux disease)     Gluten intolerance  Hypothyroidism 2009    Personal history of MI (myocardial infarction)     [facility]     Stroke ([facility]     TIA (NO RESIDUAL)    Syncope     Thromboembolus ([facility] 2004    RT. -- DVT after surgery     Thyroid disease     HYPO    TIA (transient ischemic attack)      &     Unspecified adverse effect of anesthesia     \"IS A LIGHT WEIGHT\"    Unspecified adverse effect of anesthesia     DIFFICULTY AWAKENING    Vitamin B12 deficiency 2009     Past Surgical History:   Procedure Laterality Date    CARDIAC SURG PROCEDURE UNLIST      ABLATION    HX ADENOIDECTOMY      HX APPENDECTOMY      HX CHOLECYSTECTOMY  11    HX GI      COLONOSCOPY AND ENDOSCOPY    HX HEART CATHETERIZATION      2 STENTS    HX HYSTERECTOMY      HX LUMBAR LAMINECTOMY  2000    L4-L5    HX ORTHOPAEDIC  -2012    RT.  FOOT SURGERY X 6/calcaneal osteotomy    HX TONSILLECTOMY  1964    STENT INSERTION       Social History     Socioeconomic History    Marital status:      Spouse name: Not on file    Number of children: Not on file    Years of education: Not on file    Highest education level: Not on file   Occupational History    Not on file   Social Needs    Financial resource strain: Not on file    Food insecurity:     Worry: Not on file     Inability: Not on file    Transportation needs:     Medical: Not on file     Non-medical: Not on file   Tobacco Use    Smoking status: Former Smoker     Packs/day: 1.00     Years: 30.00     Pack years: 30.00     Last attempt to quit: 2002     Years since quittin.3    Smokeless tobacco: Never Used   Substance and Sexual Activity    Alcohol use: No    Drug use: No    Sexual activity: Never   Lifestyle    Physical activity:     Days per week: Not on file     Minutes per session: Not on file    Stress: Not on file   Relationships    Social connections:     Talks on phone: Not on file     Gets together: Not on file     Attends Shinto service: Not on file     Active member of club or organization: Not on file     Attends meetings of clubs or organizations: Not on file     Relationship status: Not on file    Intimate partner violence:     Fear of current or ex partner: Not on file     Emotionally abused: Not on file     Physically abused: Not on file     Forced sexual activity: Not on file   Other Topics Concern    Not on file   Social History Narrative    Not on file     Family History   Problem Relation Age of Onset    Delayed Awakening Mother     Heart Disease Mother     Coronary Artery Disease Mother     Hypertension Mother     Stroke Mother     Delayed Awakening Sister     Hypertension Sister     Lung Disease Father     Cancer Father         colon    Hypertension Father     Hypertension Brother     Breast Cancer Maternal Aunt     Breast Cancer Maternal Aunt     Breast Cancer Cousin         maternal 1st cousin    Breast Cancer Maternal Aunt     Breast Cancer Cousin         maternal 1st cousin    Breast Cancer Cousin         maternal 1st cousin       Current Medications:   Current Outpatient Medications   Medication Sig Dispense Refill    cholecalciferol, vitamin D3, (VITAMIN D3) 2,000 unit tab Take 2,000 Units by mouth daily.  COQ10, UBIQUINOL, PO Take 100 mg by mouth daily.  turmeric (CURCUMIN) Take 1 g by mouth daily.  potassium chloride (K-DUR, KLOR-CON) 20 mEq tablet Take 1 Tab by mouth daily. 90 Tab 2    eplerenone (INSPRA) 25 mg tablet Take 1 Tab by mouth daily. 30 Tab 6    glucose blood VI test strips (ONETOUCH VERIO) strip OneTouch Verio strips      fenofibrate nanocrystallized (TRICOR) 48 mg tablet Take 1 Tab by mouth daily. 90 Tab 3    thyroid, Pork, (NP THYROID) 15 mg tablet Take  by mouth daily. Takes in addition to the [address].  metoprolol succinate (TOPROL-XL) 50 mg XL tablet Take 1 Tab by mouth daily. 30 Tab 12    bumetanide (BUMEX) 1 mg tablet Take 1 Tab by mouth daily.  [address] Tab 3    nitroglycerin (NITRODUR) 0.1 mg/hr 1 Patch by TransDERmal route daily. (Patient taking differently: 1 Patch by TransDERmal route as needed.) 30 Patch 0    SITagliptin (JANUVIA) 100 mg tablet Take 100 mg by mouth daily.  predniSONE (DELTASONE) 10 mg tablet Take 10 mg by mouth daily. Indications: taken differently; verify with patient RE dosage      predniSONE (DELTASONE) 2.5 mg tablet Take 2.5 mg by mouth every evening.  flaxseed oil (OMEGA 3 PO) Take 1 Tab by mouth every evening. Indications: FISH OIL      vit C/vit E ac/lut/copper/zinc (PRESERVISION LUTEIN PO) Take 1 Tab by mouth daily.  thyroid, Pork, (NP THYROID) 90 mg tablet Take 90 mg by mouth daily. Takes in addition to 7.5 mg for a total of 97.5 mg      lidocaine (LIDODERM) 5 % 1 Patch by TransDERmal route every twenty-four (24) hours. Apply patch to the affected area for 12 hours a day and remove for 12 hours a day.  ascorbic acid (VITAMIN C) 500 mg tablet Take 1,000 mg by mouth daily.  ferrous sulfate 325 mg (65 mg iron) tablet Take  by mouth every evening.  cyanocobalamin (VITAMIN B12) 1,000 mcg/mL injection INJECT 1 ML INTO THE MUSCLE EVERY 30 DAYS 10 mL 0    lansoprazole (PREVACID) 30 mg capsule Take 30 mg by mouth Daily (before breakfast). Allergies:    Allergies   Allergen Reactions    Clopidogrel Other (comments)    Sulfa (Sulfonamide Antibiotics) Swelling    Adhesive Tape-Silicones Other (comments)     BAD BLISTERS AND TENDS TO GET INFECTED    Albuterol Palpitations    Aspirin Hives and Other (comments)     \"it makes my stomach bleed\"      Butter Flavor Anaphylaxis     Butter AND CREME    Cimzia [Certolizumab Pegol] Other (comments)     GI symptoms, blisters in the mouth and esophagus    Demerol [Meperidine] Other (comments)     HYPOTENSION    Fentanyl Other (comments)     HYPOTENSION    Gluten Diarrhea     bloating    Keflex [Cephalexin] Anaphylaxis    Levaquin [Levofloxacin] Unproven on Challenge     PT DOESN'T REMEMBER HAVING THAT    Morphine Other (comments)     HYPOTENSION    Nitroglycerin Other (comments)     IN PILL FORM  - HYPOTENSION  CAN TOLERATE PATCH FOR SHORT TIME    Pcn [Penicillins] Anaphylaxis    Percocet [Oxycodone-Acetaminophen] Unknown (comments)     HYPOTENSION AND HALLUCINATIONS. Can take tylenol ok, oxycodone is allergy per patient.  Tapazole [Methimazole] Unknown (comments)     Patient stated \"it made me feel like I had the flu\"       ROS:    General:  positive for  - fatigue  HEENT: negative  Pulmonary: positive for - orthopnea and shortness of breath  Cardiac: positive for chest pressure/discomfort, dyspnea, orthopnea, lower extremity edema, dyspnea on exertion  GI:  no abdominal pain, change in bowel habits, or black or bloody stools  Musculo: negative  Neuro: no TIA or stroke symptoms  Skin:  positive for - ecchymoses  Allergies: REMINDER:DOCUMENT ALLERGIES IN ALLERGY ACTIVITY  Psych: negative    Admission Weight: Last Weight   Weight: 186 lb (84.4 kg) Weight: 186 lb (84.4 kg)       Physical Exam:   Vitals:    Visit Vitals  /72 (BP 1 Location: Left arm, BP Patient Position: Sitting)   Pulse 72   Temp 97.5 °F (36.4 °C) (Oral)   Resp 24   Ht 5' 2\" (1.575 m)   Wt 186 lb (84.4 kg)   SpO2 97%   BMI 34.02 kg/m²       General:  alert, fatigued, cooperative  HEENT: PERRLA, EOMI and Sclera clear, anicteric  Neck:  supple, no significant adenopathy, carotids upstroke normal bilaterally, no bruits  CVP:  8 cm  ( - ) HJR  Cardiac: regular rate, regular rhythm, S1, S2 normal and S4 present  Chest: breath sounds clear and equal bilaterally  Abdomen: soft, non-tender. Bowel sounds normal. No masses, no organomegaly. Extremity: extremities normal, atraumatic, no cyanosis or edema  Neuro: Alert and oriented to person, place, and time; normal strength and tone. Normal symmetric reflexes.  Normal coordination and gait  Skin:   ecchymoses - arm(s) bilateral    Recent Labs: Labs Latest Ref Rng & Units 9/9/2019 8/13/2019 8/12/2019   WBC 3.6 - 11.0 K/uL 19. 5(H) 14. 1(H) 19. 3(H)   RBC 3.80 - 5.20 M/uL 4.44 4.21 4.46   Hemoglobin 11.5 - 16.0 g/dL 14.2 13.0 14.3   Hematocrit 35.0 - 47.0 % 43.1 41.1 43.3   MCV 80.0 - 99.0 FL 97.1 97.6 97.1   Platelets 095 - 364 K/uL 235 221 253   Lymphocytes 12 - 49 % 10(L) 13 14   Monocytes 5 - 13 % 6 7 9   Eosinophils 0 - 7 % 2 1 1   Basophils 0 - 1 % 0 0 0   Albumin 3.5 - 5.0 g/dL 3.5 3. 2(L) 3.5   Calcium 8.5 - 10.1 MG/DL 8.9 8.7 9.4   SGOT 15 - 37 U/L 16 19 14(L)   Glucose 65 - 100 mg/dL 143(H) 139(H) 155(H)   BUN 6 - 20 MG/DL 21(H) 22(H) 24(H)   Creatinine 0.55 - 1.02 MG/DL 0.89 0.72 1.03(H)   Sodium 136 - 145 mmol/L 141 141 141   Potassium 3.5 - 5.1 mmol/L 3.4(L) 4.3 3.5   TSH 0.450 - 4.500 uIU/mL - - 10.070(H)   Some recent data might be hidden     EKG:   SR, rate 65 bpm    ECHO 2008: EF 60%  ECHO 3/3/2017: EF 60%, mild TR   ECHO 3/21/2019: EF 61%, AoV sclerosis  CATH 2009: stent LCX  CATH 8/2010: LAD: m: MLI, , LCX: m 20%, patent RCA, LCX stents, EF 60%   CATH 1/4/2019: patent LAD stent, LCX without significant disease, patent RCA stent  CATH 8/13/2019    Left Main   The vessel is large. The vessel is angiographically normal.   Left Anterior Descending   The proximal segment of the vessel is moderate in size. The middle segment of the vessel is small. The distal segment of the vessel is small. There is mild diffuse disease. Left Circumflex   The vessel is large. There is mild diffuse disease. Previously placed Prox Cx to Mid Cx stent (unknown type) is widely patent. Right Coronary Artery   The vessel is moderate in size. There is mild diffuse disease. Prox RCA to Mid RCA lesion 20% stenosed. . The lesion was previously treated using a stent (unknown type). Mid RCA lesion 40% stenosed. .   Inferior Septal   The vessel is small. The vessel exhibits minimal luminal irregularities.    Right Posterior Atrioventricular Branch   The vessel is small. The vessel exhibits minimal luminal irregularities. STRESS: Myoview 2014: no ischemia  STRESS Nuc 2/2016: no ischemia,   STRESS Nuc 3/3/2017: no ischemia, EF 58%   STRESS Echo 2/11/19: normal stress EKG and stress echo    EVENT monitor 2011: PVCs  HOLTER 2013: frequent PVCs  HOLTER 1/23/19: SR, normal study    Right Heart Cath 6/28/19 -[facility],[address] findings:   RAP=   Mean 17    mmHg  RVSP= 40/20    mmHg  PAP=     42/30/34  mmHg  PCWP=  27 mmHg  CO=        Thermal 4.1  L/min,             Faraz pending  CI=           Thermal 3.08  L/min/m2,     Faraz pending       Left Heart Cath 8/19/19    · Non-obstructive CAD with patent stents in Lcx and RCA  · LVEP 16 mmHg       Cardiac MRI:  9/30/2019    Impression:   1. Normal left ventricular cavity size with preserved left ventricular systolic  function. There is no significant regional wall motion abnormalities. LVEF 60%. 2. Normal right ventricular size and systolic motion. RVEF 60%. 3. Trace to mild mitral regurgitation. 4. On EGE and LGE study, there is a small thin subendocardial infarct involving  25% to 50% thickness of the subendocardial wall of the basal inferior wall. This  infarct is in RCA territory. There is reasonable viability surrounding this  infarct. The mid to distal inferior wall and inferoseptal wall does not  demonstrate any infarct. There are no other infarct. There is no features of  infiltrative sarcoidosis or cardiac amyloidosis. There is no features of  inflammatory myocarditis. All other myocardial walls are otherwise completely  viable. There is no features of hemochromatosis. 5. Normal pericardium without significant pericardial effusion. 30/2019    Impression / Plan:   1.  CAD s/p PCI to LAD , RCA   LHC in 8/19 - no intervention   Tolerating crestor   Continue coenzyme Q10 200 mg daily and vitamin D3 2000 international units daily   ASA intolerant d/t GI bleeding   On metoprolol succinate   Recommend discontinuation of calcium supplements - eat foods high in calcium    2. HFpEF - Stage C, NYHA Class III   RHC with PCWP 27 mmHg   On bumex, eplerenone, metoprolol   Cardiac MRI - LVEF 60%, small subendocardial infarct   SPEP - no M spike   Iron profile - normal   Elevated TSH with normal free T3 and T4   ATTR pending   Consider CardioMems if dyspnea persists - she is a candidate for the GUIDE HF study    3. RA   On prednisone - attempting to wean dose    4. T2DM - steroid induced   Low carb diet   On Januvia   Wean prednisone as tolerated    5. HTN - well controlled   Continue metoprolol succinate   Low sodium diet   Continue home BP log    6. PAF   [id number] = 4   No AC d/t history of anemia   On metoprolol succinate for rate control   Recent Holter without AF    7. HLD   Fasting lipid profile with LDL 98,    Tolerating crestor 5 mg once weekly and tricor   May benefit from PCSK9 inhibitor - defer to Dr. Ashley Evangelista   Encourage exercise    8. Iron Deficiency Anemia   Iron level normal    9. Vitamin D Deficiency   On vitamin D3     10. Hypothyroidism   Thyroid supplement increased    Repeat TFTs today    11. Syncope   Check BMP, Mg, NT proBNP   Event monitor    12. Persistent Dyspnea - ? Mold Exposure   ERMI - environmental moldiness related index   VCS - visual contrast sensitivity online test   Refer to Dr. Shruti Mckeon if her ERMI and VCS are positive      Ayanna Bliss MD, [facility], [address] [place] [address]  Chief of Cardiology, [address] [place] [address] Director  [address] [place] [address], [address] [facility]  Office [phone]  Fax [phone]

## 2019-10-08 NOTE — LETTER
10/8/2019 9:33 AM 
 
Patient:  Keri Moore YOB: 1950 Date of Visit: 10/8/2019 Dear Yousuf Cornejo MD 
[address] [place] [id number] VIA In Basket 
 : Thank you for referring Ms. Karmen Marquez to me for evaluation/treatment. Below are the relevant portions of my assessment and plan of care. If you have questions, please do not hesitate to call me. I look forward to following Ms. Jc Haley along with you. Sincerely, Percy Hodgson MD

## 2019-10-08 NOTE — PATIENT INSTRUCTIONS
Testing Ordered:    Labs ordered today. Our office will contact you with any abnormal results. Event monitor has been ordered. Please  the monitor when you see Dr. Hannah Rincon on Friday. Follow up in 2 months with [facility] [place]      Please monitor your blood pressures daily prior to medications and 2 hours after taking medications. Bring a written record of your blood pressures to your next appointment. Please monitor your weights daily upon waking and after using the bathroom. Keep a written records of your weights and bring to your next appointment. If you have a weight gain of 3 or more pounds overnight OR 5 or more pounds in one week, please contact our office. Thank you for allowing us the privilege of being a part of your healthcare team! Please do not hesitate to contact our office at [phone] with any questions or concerns. ERMI Testing Lab Services  What Is ERMI? The Environmental Relative Moldiness index (ERMI) was developed by the U.S. Environmental Protection Agency, Office of Research and Development (ORD) as a research tool to investigate mold contamination in homes. The methodology is based on using mold-specific quantitative polymerase chain reaction (MSQPCR) to quantify 36 molds and calculate an index number for comparison with a database of reference homes. The [employer] was one of the first collaborators to help establish the reference database and continues to offer this service to clients for the identification of mold problems in some buildings. What is visual contrast sensitivity testing? Visual contrast sensitivity testing measures your ability to see details at low contrast levels and is often used as a nonspecific test of neurological function.  Similar in form to a standard audiometry hearing test, a VCS test generally involves the presentation of a series of images of decreasing contrast to the test subject and the recording of the contrast levels where patterns, shapes, or objects can or cannot be identified. The results of the test can then be used as an aid in the diagnosis of visual system dysfunction.        Dr. Froylan Jason, [place]

## 2019-10-08 NOTE — TELEPHONE ENCOUNTER
30 day event monitor ordered by Dr. Yosvany Hawkins. Contacted Dr. Jovita Schmidt office for scheduling of 30 day event monitor. They stated they will reach out to patient for scheduling. Scheduling staff confirmed they do have access to order. Jaison Zamorano RN.

## 2019-10-09 ENCOUNTER — CLINICAL SUPPORT (OUTPATIENT)
Dept: CARDIOLOGY CLINIC | Age: 69
End: 2019-10-09

## 2019-10-09 DIAGNOSIS — R55 SYNCOPE, UNSPECIFIED SYNCOPE TYPE: ICD-10-CM

## 2019-10-09 DIAGNOSIS — R00.2 PALPITATIONS: Primary | ICD-10-CM

## 2019-10-09 LAB
BUN SERPL-MCNC: 22 MG/DL (ref 8–27)
BUN/CREAT SERPL: 38 (ref 12–28)
CALCIUM SERPL-MCNC: 9.4 MG/DL (ref 8.7–10.3)
CHLORIDE SERPL-SCNC: 99 MMOL/L (ref 96–106)
CO2 SERPL-SCNC: 23 MMOL/L (ref 20–29)
CREAT SERPL-MCNC: 0.58 MG/DL (ref 0.57–1)
FT4I SERPL CALC-MCNC: 1.7 (ref 1.2–4.9)
GLUCOSE SERPL-MCNC: 137 MG/DL (ref 65–99)
MAGNESIUM SERPL-MCNC: 2.1 MG/DL (ref 1.6–2.3)
NT-PROBNP SERPL-MCNC: 67 PG/ML (ref 0–301)
POTASSIUM SERPL-SCNC: 4.2 MMOL/L (ref 3.5–5.2)
SODIUM SERPL-SCNC: 141 MMOL/L (ref 134–144)
T3RU NFR SERPL: 24 % (ref 24–39)
T4 SERPL-MCNC: 7 UG/DL (ref 4.5–12)
TSH SERPL DL<=0.005 MIU/L-ACNC: 7.78 UIU/ML (ref 0.45–4.5)

## 2019-10-09 NOTE — TELEPHONE ENCOUNTER
Called & l/m on both pts home & cell # to inform her that the loop monitor will be ordered & [facility]. She will wear for 30 days.

## 2019-10-11 ENCOUNTER — OFFICE VISIT (OUTPATIENT)
Dept: CARDIOLOGY CLINIC | Age: 69
End: 2019-10-11

## 2019-10-11 VITALS — OXYGEN SATURATION: 95 % | WEIGHT: 186.6 LBS | BODY MASS INDEX: 34.13 KG/M2

## 2019-10-11 DIAGNOSIS — I51.89 DIASTOLIC DYSFUNCTION: ICD-10-CM

## 2019-10-11 DIAGNOSIS — I25.118 CORONARY ARTERY DISEASE OF NATIVE ARTERY OF NATIVE HEART WITH STABLE ANGINA PECTORIS (HCC): ICD-10-CM

## 2019-10-11 DIAGNOSIS — E78.5 DYSLIPIDEMIA: ICD-10-CM

## 2019-10-11 DIAGNOSIS — R06.02 SOB (SHORTNESS OF BREATH): Primary | ICD-10-CM

## 2019-10-11 RX ORDER — BUMETANIDE 1 MG/1
TABLET ORAL
Qty: 135 TAB | Refills: 3 | Status: SHIPPED | OUTPATIENT
Start: 2019-10-11 | End: 2019-12-08 | Stop reason: SDUPTHER

## 2019-10-11 NOTE — PROGRESS NOTES
Patient says that she has shortness of breath   Patient says that she has palpitations off and on   She also has swelling in hands,legs, and feet      Visit Vitals  Wt 186 lb 9.6 oz (84.6 kg)   SpO2 95%   BMI 34.13 kg/m²        Extended / Orthostatic Vitals:  Patient Position 2: Supine  BP 2: 130/80  Pulse 2: 77  Patient Position 3: Sitting  BP 3: 130/80  Pulse 3: 78  Patient Position 4: Standing  BP 4: 122/88  Pulse 4: 82

## 2019-10-11 NOTE — PROGRESS NOTES
Hammad Couch MD. [facility]              Patient: Caroline Mendoza  : 1950      Today's Date: 10/11/2019            HISTORY OF PRESENT ILLNESS:     History of Present Illness:  Here for follow-up. She had a tick bite and [facility] Spotted fever. Still CHACON without much change. Mild dizziness. Mild, intermittent chest pain - not bad. BP has been OK at home - not low or high. PAST MEDICAL HISTORY:     Past Medical History:   Diagnosis Date    Anemia     iron defic anemia    Arthritis     Asthma 2009    CAUSED BY MOLD    Atrial fibrillation ([address] 2009    CAD (coronary artery disease) 2009    MI X2  -- prior stenting     Chronic pain     RT. ANKLE    Diastolic dysfunction     GERD (gastroesophageal reflux disease)     Gluten intolerance     Hypothyroidism 2009    Personal history of MI (myocardial infarction)     Rheumatoid arthritis ([address]     Stroke ([facility]     TIA (NO RESIDUAL)    Syncope     Thromboembolus ([facility]     RT. -- DVT after surgery     Thyroid disease     HYPO    TIA (transient ischemic attack)      &     Unspecified adverse effect of anesthesia     \"IS A LIGHT WEIGHT\"    Unspecified adverse effect of anesthesia     DIFFICULTY AWAKENING    Vitamin B12 deficiency 2009         Past Surgical History:   Procedure Laterality Date    CARDIAC SURG PROCEDURE UNLIST      ABLATION    HX ADENOIDECTOMY      HX APPENDECTOMY      HX CHOLECYSTECTOMY  11    HX GI      COLONOSCOPY AND ENDOSCOPY    HX HEART CATHETERIZATION  2010    2 STENTS    HX HYSTERECTOMY      HX LUMBAR LAMINECTOMY  2000    L4-L5    HX ORTHOPAEDIC  -2012    RT. FOOT SURGERY X 6/calcaneal osteotomy    HX TONSILLECTOMY  1964    STENT INSERTION             MEDICATIONS:     Current Outpatient Medications   Medication Sig Dispense Refill    cholecalciferol, vitamin D3, (VITAMIN D3) 2,000 unit tab Take 2,000 Units by mouth daily.       COQ10, UBIQUINOL, PO Take 100 mg by mouth daily.  turmeric (CURCUMIN) Take 1 g by mouth daily.  potassium chloride (K-DUR, KLOR-CON) 20 mEq tablet Take 1 Tab by mouth daily. 90 Tab 2    eplerenone (INSPRA) 25 mg tablet Take 1 Tab by mouth daily. 30 Tab 6    fenofibrate nanocrystallized (TRICOR) 48 mg tablet Take 1 Tab by mouth daily. 90 Tab 3    thyroid, Pork, (NP THYROID) 15 mg tablet Take  by mouth daily. Takes in addition to the [address].  metoprolol succinate (TOPROL-XL) 50 mg XL tablet Take 1 Tab by mouth daily. 30 Tab 12    bumetanide (BUMEX) 1 mg tablet Take 1 Tab by mouth daily. 90 Tab 3    nitroglycerin (NITRODUR) 0.1 mg/hr 1 Patch by TransDERmal route daily. (Patient taking differently: 1 Patch by TransDERmal route as needed.) 30 Patch 0    SITagliptin (JANUVIA) 100 mg tablet Take 100 mg by mouth daily.  predniSONE (DELTASONE) 10 mg tablet Take 10 mg by mouth daily. Indications: taken differently; verify with patient RE dosage      predniSONE (DELTASONE) 2.5 mg tablet Take 2.5 mg by mouth every evening.  flaxseed oil (OMEGA 3 PO) Take 1 Tab by mouth every evening. Indications: FISH OIL      vit C/vit E ac/lut/copper/zinc (PRESERVISION LUTEIN PO) Take 1 Tab by mouth daily.  thyroid, Pork, (NP THYROID) 90 mg tablet Take 90 mg by mouth daily. Takes in addition to 7.5 mg for a total of 97.5 mg      lidocaine (LIDODERM) 5 % 1 Patch by TransDERmal route every twenty-four (24) hours. Apply patch to the affected area for 12 hours a day and remove for 12 hours a day.  ascorbic acid (VITAMIN C) 500 mg tablet Take 1,000 mg by mouth daily.  ferrous sulfate 325 mg (65 mg iron) tablet Take  by mouth every evening.  cyanocobalamin (VITAMIN B12) 1,000 mcg/mL injection INJECT 1 ML INTO THE MUSCLE EVERY 30 DAYS 10 mL 0    lansoprazole (PREVACID) 30 mg capsule Take 30 mg by mouth Daily (before breakfast).       glucose blood VI test strips (ONETOUCH VERIO) strip OneTouch Verio strips         Allergies   Allergen Reactions    Clopidogrel Other (comments)    Sulfa (Sulfonamide Antibiotics) Swelling    Adhesive Tape-Silicones Other (comments)     BAD BLISTERS AND TENDS TO GET INFECTED    Albuterol Palpitations    Aspirin Hives and Other (comments)     \"it makes my stomach bleed\"      Butter Flavor Anaphylaxis     Butter AND CREME    Cimzia [Certolizumab Pegol] Other (comments)     GI symptoms, blisters in the mouth and esophagus    Demerol [Meperidine] Other (comments)     HYPOTENSION    Fentanyl Other (comments)     HYPOTENSION    Gluten Diarrhea     bloating    Keflex [Cephalexin] Anaphylaxis    Levaquin [Levofloxacin] Unproven on [employer]     PT DOESN'T REMEMBER HAVING THAT    Morphine Other (comments)     HYPOTENSION    Nitroglycerin Other (comments)     IN PILL FORM  - HYPOTENSION  CAN TOLERATE PATCH FOR SHORT TIME    Pcn [Penicillins] Anaphylaxis    Percocet [Oxycodone-Acetaminophen] Unknown (comments)     HYPOTENSION AND HALLUCINATIONS. Can take tylenol ok, oxycodone is allergy per patient.      Tapazole [Methimazole] Unknown (comments)     Patient stated \"it made me feel like I had the flu\"             SOCIAL HISTORY:     Social History     Tobacco Use    Smoking status: Former Smoker     Packs/day: 1.00     Years: 30.00     Pack years: 30.00     Last attempt to quit: 2002     Years since quittin.3    Smokeless tobacco: Never Used   Substance Use Topics    Alcohol use: No    Drug use: No         FAMILY HISTORY:     Family History   Problem Relation Age of Onset    Delayed Awakening Mother     Heart Disease Mother     Coronary Artery Disease Mother     Hypertension Mother     Stroke Mother     Delayed Awakening Sister     Hypertension Sister     Lung Disease Father     Cancer Father         colon    Hypertension Father     Hypertension Brother     Breast Cancer Maternal Aunt     Breast Cancer Maternal Aunt     Breast Cancer Cousin maternal 1st cousin    Breast Cancer Maternal Aunt     Breast Cancer Cousin         maternal 1st cousin    Breast Cancer Cousin         maternal 1st cousin              REVIEW OF SYMPTOMS:      Review of Symptoms:  Constitutional: Negative for fever, chills  HEENT: Negative for nosebleeds, tinnitus  Respiratory: + wheezing  Cardiovascular: Negative for syncope;  + orthopnea, CHACON   Gastrointestinal: Negative for abdominal pain, diarrhea, melena. Genitourinary: Negative for dysuria  Musculoskeletal: + joint pain, RA  Skin: Negative for rash  Heme: No acute bleeding   Neurological: Negative for speech change and focal weakness.               PHYSICAL EXAM:      Physical Exam:  Visit Vitals  Wt 186 lb 9.6 oz (84.6 kg)   LMP 09/29/1980 (LMP Unknown)   SpO2 95%   BMI 34.13 kg/m²          Patient appears generally well, mood and affect are appropriate and pleasant. HEENT:  Hearing intact, non-icteric, normocephalic, atraumatic. Neck Exam: Supple, no JVD sitting up   Lung Exam: Clear to auscultation, even breath sounds. Cardiac Exam: Regular rate and rhythm with no murmur  Abdomen: Soft, non-tender, normal bowel sounds. Extremities: Moves all ext well. Mild bilat lower extremity edema.   Psych: Appropriate affect  Neuro - Grossly intact      Extended / Orthostatic Vitals:  Patient Position 2: Supine  BP 2: 130/80  Pulse 2: 77  Patient Position 3: Sitting  BP 3: 130/80  Pulse 3: 78  Patient Position 4: Standing  BP 4: 122/88  Pulse 4: 82        LABS / OTHER STUDIES:      Lab Results   Component Value Date/Time    Sodium 141 10/08/2019 12:00 AM    Potassium 4.2 10/08/2019 12:00 AM    Chloride 99 10/08/2019 12:00 AM    CO2 23 10/08/2019 12:00 AM    Anion gap 9 09/09/2019 04:43 PM    Glucose 137 (H) 10/08/2019 12:00 AM    BUN 22 10/08/2019 12:00 AM    Creatinine 0.58 10/08/2019 12:00 AM    BUN/Creatinine ratio 38 (H) 10/08/2019 12:00 AM    GFR est  10/08/2019 12:00 AM    GFR est non-AA 93 10/08/2019 12:00 AM Calcium 9.4 10/08/2019 12:00 AM    Bilirubin, total 0.4 09/09/2019 04:43 PM    AST (SGOT) 16 09/09/2019 04:43 PM    Alk. phosphatase 85 09/09/2019 04:43 PM    Protein, total 6.6 09/09/2019 04:43 PM    Albumin 3.5 09/09/2019 04:43 PM    Globulin 3.1 09/09/2019 04:43 PM    A-G Ratio 1.1 09/09/2019 04:43 PM    ALT (SGPT) 28 09/09/2019 04:43 PM     Lab Results   Component Value Date/Time    WBC 19.5 (H) 09/09/2019 04:43 PM    Hemoglobin (POC) 15.6 06/09/2014 03:00 AM    HGB 14.2 09/09/2019 04:43 PM    Hematocrit (POC) 46 06/09/2014 03:00 AM    HCT 43.1 09/09/2019 04:43 PM    PLATELET 104 61/70/0597 04:43 PM    MCV 97.1 09/09/2019 04:43 PM       Lab Results   Component Value Date/Time    Cholesterol, total 203 (H) 08/13/2019 01:46 AM    HDL Cholesterol 37 08/13/2019 01:46 AM    LDL,Direct 98 08/13/2019 01:46 AM    LDL, calculated Not calculated due to elevated triglyceride level 08/13/2019 01:46 AM    VLDL, calculated  08/13/2019 01:46 AM     Calculation not valid with this patient's other Lipid values. Triglyceride 495 (H) 08/13/2019 01:46 AM    CHOL/HDL Ratio 5.5 (H) 08/13/2019 01:46 AM       Component      Latest Ref Rng & Units 10/8/2019          12:00 AM   NT pro-BNP      0 - 301 pg/mL 67              CARDIAC DIAGNOSTICS:      Cardiac Evaluation Includes:     Cardiac Cath 6/09 - severe mLCX disease --> stenting     Cath 8/19/10 - RCA and LCX stents patent; MLI in LAD, LVEF 60%     Event Monitor 3/11 - PVC's  Holter 3/16 - PVC's  Echo 3/17 - LVEF 60%  Lexiscan Cardiolite 3/17 - normal MPI, LVEF 58%     [facility] records reviewed.  Went there for chest pain on 1/3/19.    Labs 1/3/19 - Trop < 0.02, BNP 18, Cr 0.61, Hgb 13  Cath 1/4/19 - LAD stent patent, LCX without sig disease, RCA stent patent.       Holter 1/23/19 - NSR, normal study      Stress Echo 2/11/19 - walked 3:21 (2.4 METS), baseline /90, Max /90, normal stress EKG and stress echo      CXR 2/22/19 - normal      Echo 3/21/19 - LVEF 61%; grade 1 diastology (normal for age), AV sclerosis.  RVSP 26 mmHg.       PARDEEP's with exercise 6/7/19 - normal       Event Monitor 5/15/19-6/6/19 - normal study; symptoms correlated with sinus      Right Heart Cath 6/28/19 -[facility] findings:   RAP=   Mean 17    mmHg  RVSP= 40/20    mmHg  PAP=     42/30/34  mmHg  PCWP=  27 mmHg  CO=        Thermal 4.1  L/min,             Faraz pending  CI=           Thermal 3.08  L/min/m2,     Faraz pending      Findings:  1.   Elevated right and left sided filling pressure  2.   Pulmonary hypertension, WHO group 2  3.   Perserved cardiac index by Thermal     Echo 8/13/19 - LVEF 50-55%, \"The following segments are hypokinetic: basal inferior, basal inferolateral and mid inferolateral\"     Cardiac Cath 8/13/19 - Non-obstructive CAD with patent stents in LCX and RCA. LVEDP 16 mmHg.       Cardiac MRI 9/30/19 - 1. Normal left ventricular cavity size with preserved left ventricular systolic function. There is no significant regional wall motion abnormalities. LVEF 60%. 2. Normal right ventricular size and systolic motion. RVEF 60%. 3. Trace to mild mitral regurgitation. 4. On EGE and LGE study, there is a small thin subendocardial infarct involving 25% to 50% thickness of the subendocardial wall of the basal inferior wall. This infarct is in RCA territory. There is reasonable viability surrounding this infarct. The mid to distal inferior wall and inferoseptal wall does not demonstrate any infarct. There are no other infarct. There is no features of infiltrative sarcoidosis or cardiac amyloidosis. There is no features of inflammatory myocarditis. All other myocardial walls are otherwise completely viable. There is no features of hemochromatosis.   5. Normal pericardium without significant pericardial effusion.     EKG 2/22/19 - NSR, normal   EKG 8/12/19 - NSR, NSST changes            ASSESSMENT AND PLAN:      Assessment and Plan:  1) Chest pain and SOB (chronic diastolic HF)   - She notes symptoms since Jan 2019   - Cardiac cath 1/19 showed patent vessels. - Her stress echo 3/19 was negative for ischemia  - She saw Pulmonary ([facility]) and she says workup was OK.    - RHC 6/28/19 with PCWP 27   - Cardiac cath repeated 8/13/19 showed non-obs CAD with LVEDP 16 mmHg  - Her symptoms could be due to small vessel disease and/or diastolic dysfunction.   - Plan for good BP control and optimize anti-anginals.   - Not taking Imdur or Ranexa due to a bad HA  - She saw Dr. Maurice Sierra (initiated workup for restrictive disease)     - On 8/22/19 - she has been holding BP meds due to low BP. Will slowly resume some of her meds (proceed with Toprol XL 50, eplerenone 12.5 mg, Bumex 1  Mg daily)   - Cardiac MRI 9/30/19 - There is no features of infiltrative sarcoidosis or cardiac amyloidosis. - On 10/11/19 - she has been stable - has chronic CHACON without change. MRI looked OK. BP stable at home. Continue Bumex 1 mg in AM and add 0.5 mg in PM.  She can discuss CardioMems with Dr. Maurice Sierra in FU. 2) History of Iron Deficiency Anemia   - She takes no blood thinners (not even aspirin) due to anemia in past   - Dr. Tee Graibay record 12/16 reviewed - He was seeing her for iron deficiency anemia thought to be from GI Bleed from aspirin and plavix      3) Lipids -   - Did not take a statin for long timw due to having muscle aches (from RA at baseline)  - She is now only on Tricor   - Apply for PCSK9 Inhibitor      4) History of Afib   - question of ablation in past?   - not on any blood thinners due to anemia in past (see above)   - has been in sinus during time seen by me      5) HTN  - see above      6) Dr. Lay record reviewed 1/31/19   - plan was for CTA for PE and PFT's -- patient says testing was OK      7) RA  - she is on prednisone      8) DM on steroids   - seeing PCP    9) Thyroid - defer to Dr. Angie Sierra in 2 months.   See me in 6 months.   Patient expressed understanding of the plan - questions were answered. On a side note, I see her brother.            Bipin Fleming MD, [address], [address]  [address]  [address], [address]  [place], [address] [place] [address]  Ph: [phone]   Ph [phone]

## 2019-10-14 ENCOUNTER — TELEPHONE (OUTPATIENT)
Dept: CARDIOLOGY CLINIC | Age: 69
End: 2019-10-14

## 2019-10-14 RX ORDER — METOPROLOL SUCCINATE 50 MG/1
50 TABLET, EXTENDED RELEASE ORAL DAILY
Qty: 90 TAB | Refills: 2 | Status: SHIPPED | OUTPATIENT
Start: 2019-10-14 | End: 2019-12-08 | Stop reason: SDUPTHER

## 2019-10-14 NOTE — TELEPHONE ENCOUNTER
Per Dr. Gillespie Estimable VO:  LOV:10/11/2019  Future Appointments   Date Time Provider Inge Reaves   12/4/2019 11:00 AM Moody Cabezas MD [facility] SCHED   2/21/2020 11:20 AM Greg Montano MD [facility] SCHED     Requested Prescriptions     Pending Prescriptions Disp Refills    metoprolol succinate (TOPROL-XL) 50 mg XL tablet 30 Tab 12     Sig: Take 1 Tab by mouth daily.

## 2019-10-14 NOTE — TELEPHONE ENCOUNTER
Message also received from patient stating she did do Visual contrast sensitivity testing, but was unable to find the sample kit for the mold testing.

## 2019-10-14 NOTE — LETTER
Dear Mrs. Fanny Lorenzo, Included in this letter is the information regarding the Environmental Relative Moldiness Index testing as well as the link to the testing kit that you may order for swabbing your home. This information was taken from [email or web address] where you may also find other helpful resources regarding mold toxicity and exposure. Additionally, a handout regarding CardioMems has been included. Should you have any further questions or concerns, please do not hesitate to contact our office at [phone]. Thank you, and have a wonderful day! Office of MD Dustin Traore[address] ERMI Testing - Environmental Relative Moldiness Index Is Your Home or Building Safe For You? Mold illness comes from any indoor environment that is damaged by water intrusion. What would you do if you faced the following concerns of real patients? You are a new home . You have a history of unusual fatigue, cognitive problems, and chronic respiratory problems. Your doctor says indoor mold makes you sick. How can you tell if the beautiful new home across [facility] is safe? Now make yourself a 53-year-old  at a [employer] site. Your office had visible mold growth; you were proven to be made ill by re-exposure to the office. Your employer assures you the office has been cleaned thoroughly. Now have three sick kids in a [facility] in [place]. Your children were told they had Lyme disease, but they didn't get better with tons of antibiotics. Another physician says your kids are sick from exposure to [facility]. How do you know if toxigenic molds are in your indoors? Do you spend thousands of dollars for an  to come in and take a few air samples? There is a better and simpler way that you can do yourself to ensure you and your family are safe. What is ERMI? ERMI is the Environmental Relative Moldiness Index. Based of leading scientific technology, Quantitative Polymerase Chain Reaction (MSQPCR), the ERMI has brought to light the darkness of indoor mold testing. ERMI is an objective, standardized DNA based method of testing that will identify and quantify molds. ERMI uses the analysis of settled dust in homes and buildings to determine the concentrations of the DNA of the different species of molds. Thirty-six species were divided into 26 species/clusters of molds associated with WDB (Group 1) and 10 common species/clusters not associated with WDB. The mold index is the difference between Group 1 and Group 2 found in a building. The computer ERMI values are graphed from lowest to highest. The scale ranges from -10 up to 20. The percentages of buildings that fall into different ERMI percentages is shown. For example, an ERMI of 14 is in the top 25% of homes for relative mold burden. The automated analysis provides for rapid, reproducible results that can be reliably interpreted. For patients, prospective home-buyers, industrial hygenists and remediators alike, ERMI shows great promise for the future. Link for test kit: [employer]. com/[email or web address]

## 2019-10-14 NOTE — TELEPHONE ENCOUNTER
----- Message from Donavan Farnsworth MD sent at 10/9/2019  5:51 PM EDT -----  Obed Aquino,    Please let [place] know that her TSH has improved and the remainder of her panel is normal. Her BMP and NT proBNP are also normal.    Thanks!  ----- Message -----  From: Ajay Florian Lab Results In  Sent: 10/9/2019  12:35 PM EDT  To: Donavan Farnsworth MD

## 2019-10-15 NOTE — TELEPHONE ENCOUNTER
Spoke with patient and notified her of lab results. Inquired about VCS results. Patient stated she was positive for Biotoxins, but the report stated recent animal or insect bites may affect results. Patient stated she was recently bit by a tick and still does have a slight rash. The results were also positive for nutritional deficiency. Patient stated she was unable to find the swab ERMI testing and the only testing available required a technician to come to her home. Advised patient will send her letter with website for ordering swab testing. Also advised patient CardioMems information mailed to her. Informed patient will notify Dr. Allyson Almanzar of VCS results and contact her if any further recommendations received. Patient verbalized understanding and had no further questions. Urmila Eden RN.

## 2019-10-31 ENCOUNTER — DOCUMENTATION ONLY (OUTPATIENT)
Dept: CARDIOLOGY CLINIC | Age: 69
End: 2019-10-31

## 2019-10-31 LAB
ALBUMIN SERPL-MCNC: 4.1 G/DL (ref 3.5–4.8)
ALBUMIN/GLOB SERPL: 2.1 {RATIO} (ref 1.2–2.2)
ALP SERPL-CCNC: 91 IU/L (ref 39–117)
ALT SERPL-CCNC: 17 IU/L (ref 0–32)
AST SERPL-CCNC: 14 IU/L (ref 0–40)
BILIRUB SERPL-MCNC: 0.3 MG/DL (ref 0–1.2)
BUN SERPL-MCNC: 21 MG/DL (ref 8–27)
BUN/CREAT SERPL: 29 (ref 12–28)
CALCIUM SERPL-MCNC: 9.6 MG/DL (ref 8.7–10.3)
CHLORIDE SERPL-SCNC: 103 MMOL/L (ref 96–106)
CHOLEST SERPL-MCNC: 199 MG/DL (ref 100–199)
CO2 SERPL-SCNC: 25 MMOL/L (ref 20–29)
CREAT SERPL-MCNC: 0.73 MG/DL (ref 0.57–1)
GLOBULIN SER CALC-MCNC: 2 G/DL (ref 1.5–4.5)
GLUCOSE SERPL-MCNC: 140 MG/DL (ref 65–99)
HDLC SERPL-MCNC: 44 MG/DL
INTERPRETATION, 910389: NORMAL
LDLC SERPL CALC-MCNC: 89 MG/DL (ref 0–99)
POTASSIUM SERPL-SCNC: 4.6 MMOL/L (ref 3.5–5.2)
PROT SERPL-MCNC: 6.1 G/DL (ref 6–8.5)
SODIUM SERPL-SCNC: 144 MMOL/L (ref 134–144)
TRIGL SERPL-MCNC: 328 MG/DL (ref 0–149)
VLDLC SERPL CALC-MCNC: 66 MG/DL (ref 5–40)

## 2019-10-31 NOTE — PROGRESS NOTES
Spoke to pt,  Per Dr. Veronika Zamarripa: Jb Rather you please call to check up - let her know monitor was normal during what may have been passing out spell. \"    She stated that she did not pass out, but she had to sit down to make sure to keep from it. Pt stated that her \"BP & pulse was low. \" Reported 101/62, P 59

## 2019-10-31 NOTE — PROGRESS NOTES
Received critical loop alert      10/30/19   5:15pm   Pt Trigger  Passed Out, SOB, Light headed   Sinus Rhythm with rates up to 81 bpm    **Note from [employer]:  Attempted to contact Pt to verify passed out, left message, awaiting follow up**      Informed Dr Ming Rocha

## 2019-11-22 ENCOUNTER — HOSPITAL ENCOUNTER (OUTPATIENT)
Dept: MAMMOGRAPHY | Age: 69
Discharge: HOME | End: 2019-11-22
Attending: FAMILY MEDICINE
Payer: MEDICARE

## 2019-11-22 DIAGNOSIS — Z12.31 VISIT FOR SCREENING MAMMOGRAM: ICD-10-CM

## 2019-11-22 PROCEDURE — 77067 SCR MAMMO BI INCL CAD: CPT

## 2019-12-04 ENCOUNTER — OFFICE VISIT (OUTPATIENT)
Dept: CARDIOLOGY CLINIC | Age: 69
End: 2019-12-04

## 2019-12-04 VITALS
OXYGEN SATURATION: 94 % | HEART RATE: 92 BPM | DIASTOLIC BLOOD PRESSURE: 84 MMHG | HEIGHT: 62 IN | RESPIRATION RATE: 24 BRPM | WEIGHT: 183.8 LBS | TEMPERATURE: 96.9 F | BODY MASS INDEX: 33.82 KG/M2 | SYSTOLIC BLOOD PRESSURE: 134 MMHG

## 2019-12-04 DIAGNOSIS — R00.2 PALPITATIONS: ICD-10-CM

## 2019-12-04 DIAGNOSIS — I25.10 CORONARY ARTERY DISEASE INVOLVING NATIVE CORONARY ARTERY OF NATIVE HEART WITHOUT ANGINA PECTORIS: ICD-10-CM

## 2019-12-04 DIAGNOSIS — I50.32 CHRONIC HEART FAILURE WITH PRESERVED EJECTION FRACTION (HCC): Primary | ICD-10-CM

## 2019-12-04 DIAGNOSIS — R40.0 DAYTIME SOMNOLENCE: ICD-10-CM

## 2019-12-04 DIAGNOSIS — R06.02 SOB (SHORTNESS OF BREATH): ICD-10-CM

## 2019-12-04 DIAGNOSIS — R07.9 CHEST PAIN, UNSPECIFIED TYPE: ICD-10-CM

## 2019-12-04 NOTE — PROGRESS NOTES
[facility] Note      DOS:   12/4/2019  NAME:  Marlaine Brunner   MRN:   [id number]   REFERRING PROVIDER:  Holden Rincon MD  PRIMARY CARE PHYSICIAN: Emily Varela MD  PRIMARY CARDIOLOGIST: Holden Rincon MD      Chief Complaint:   Dyspnea  Chronic HFpEF    HPI: 68y.o. year old female with a history of HTN, HFpEF, CAD s/p PCI to LAD and RCA who presents for further evaluation of her chronic HF with preserved EF. She underwent LHC in 6/19 which demonstrated an elevated PCWP of 27 mmHg. Her eplerenone was increased without improvement in her dyspnea and chest pain with exertion. She denies any recent palpitations, presyncope, syncope. She admits to CHACON, orthopnea, edema, but denies PND. She does not snore and has not experienced apnea. She discontinued crestor 5 mg due to myalgias. Yang Garcia had her cardiac MRI on 9/30/19 and experienced an episode of syncope the following day. She noted palpitations prior to her episode of syncope but the Holter monitor did not reveal any arrhythmias surrounding the time of her syncopal episode. Recent orthostatics in Dr. Juan Carlos Cabezas office were normal.  She continues to worsening CHACON. She also experiences chest tightness with exertion that resolves at rest.    Of note, she is highly allergic to mold and recently had an issue with her well. Her tap water, which she was consuming was actually ground water rather than water from the well. The pipe has been repaired and her well was \"shocked\". Her drinking water is being tested. History:  Past Medical History:   Diagnosis Date    Anemia     iron defic anemia    Arthritis     Asthma 6/29/2009    CAUSED BY MOLD    Atrial fibrillation ([address] 6/29/2009    CAD (coronary artery disease) 06/29/2009    MI X2  -- prior stenting     Chronic pain     RT.  ANKLE    Diastolic dysfunction     GERD (gastroesophageal reflux disease)     Gluten intolerance     Hypothyroidism 6/29/2009    Personal history of MI (myocardial infarction)     [facility] arthritis [facility]     Stroke ([facility]     TIA (NO RESIDUAL)    Syncope     Thromboembolus ([facility] 2004    RT. -- DVT after surgery     Thyroid disease     HYPO    TIA (transient ischemic attack)      &     Unspecified adverse effect of anesthesia     \"IS A LIGHT WEIGHT\"    Unspecified adverse effect of anesthesia     DIFFICULTY AWAKENING    Vitamin B12 deficiency 2009     Past Surgical History:   Procedure Laterality Date    CARDIAC SURG PROCEDURE UNLIST      ABLATION    HX ADENOIDECTOMY      HX APPENDECTOMY      HX CHOLECYSTECTOMY  11    HX GI      COLONOSCOPY AND ENDOSCOPY    HX HEART CATHETERIZATION  2010    2 STENTS    HX HYSTERECTOMY      HX LUMBAR LAMINECTOMY      L4-L5    HX ORTHOPAEDIC  -2012    RT.  FOOT SURGERY X 6/calcaneal osteotomy    HX TONSILLECTOMY  1964    STENT INSERTION       Social History     Socioeconomic History    Marital status:      Spouse name: Not on file    Number of children: Not on file    Years of education: Not on file    Highest education level: Not on file   Occupational History    Not on file   Social Needs    Financial resource strain: Not on file    Food insecurity:     Worry: Not on file     Inability: Not on file    Transportation needs:     Medical: Not on file     Non-medical: Not on file   Tobacco Use    Smoking status: Former Smoker     Packs/day: 1.00     Years: 30.00     Pack years: 30.00     Last attempt to quit: 2002     Years since quittin.4    Smokeless tobacco: Never Used   Substance and Sexual Activity    Alcohol use: No    Drug use: No    Sexual activity: Never   Lifestyle    Physical activity:     Days per week: Not on file     Minutes per session: Not on file    Stress: Not on file   Relationships    Social connections:     Talks on phone: Not on file     Gets together: Not on file     Attends Gnosticism service: Not on file     Active member of club or organization: Not on file     Attends meetings of clubs or organizations: Not on file     Relationship status: Not on file    Intimate partner violence:     Fear of current or ex partner: Not on file     Emotionally abused: Not on file     Physically abused: Not on file     Forced sexual activity: Not on file   Other Topics Concern    Not on file   Social History Narrative    Not on file     Family History   Problem Relation Age of Onset    Delayed Awakening Mother     Heart Disease Mother     Coronary Artery Disease Mother     Hypertension Mother     Stroke Mother     Delayed Awakening Sister     Hypertension Sister     Lung Disease Father     Cancer Father         colon    Hypertension Father     Hypertension Brother     Breast Cancer Maternal Aunt     Breast Cancer Maternal Aunt     Breast Cancer Cousin         maternal 1st cousin    Breast Cancer Maternal Aunt     Breast Cancer Cousin         maternal 1st cousin    Breast Cancer Cousin         maternal 1st cousin       Current Medications:   Current Outpatient Medications   Medication Sig Dispense Refill    metoprolol succinate (TOPROL-XL) 50 mg XL tablet Take 1 Tab by mouth daily. 90 Tab 2    bumetanide (BUMEX) 1 mg tablet Take 1 tablet Bumex in the morning and half a tablet in the afternoon 135 Tab 3    cholecalciferol, vitamin D3, (VITAMIN D3) 2,000 unit tab Take 2,000 Units by mouth daily.  COQ10, UBIQUINOL, PO Take 100 mg by mouth daily.  turmeric (CURCUMIN) Take 1 g by mouth daily.  potassium chloride (K-DUR, KLOR-CON) 20 mEq tablet Take 1 Tab by mouth daily. 90 Tab 2    eplerenone (INSPRA) 25 mg tablet Take 1 Tab by mouth daily. 30 Tab 6    glucose blood VI test strips (ONETOUCH VERIO) strip OneTouch Verio strips      fenofibrate nanocrystallized (TRICOR) 48 mg tablet Take 1 Tab by mouth daily. 90 Tab 3    thyroid, Pork, (NP THYROID) 15 mg tablet Take  by mouth daily. Takes in addition to the [address]       nitroglycerin (NITRODUR) 0.1 mg/hr 1 Patch by TransDERmal route daily. (Patient taking differently: 1 Patch by TransDERmal route as needed.) 30 Patch 0    SITagliptin (JANUVIA) 100 mg tablet Take 100 mg by mouth daily.  predniSONE (DELTASONE) 10 mg tablet Take 10 mg by mouth daily. Indications: taken differently; verify with patient RE dosage      predniSONE (DELTASONE) 2.5 mg tablet Take 2.5 mg by mouth every evening.  flaxseed oil (OMEGA 3 PO) Take 1 Tab by mouth every evening. Indications: FISH OIL      vit C/vit E ac/lut/copper/zinc (PRESERVISION LUTEIN PO) Take 1 Tab by mouth daily.  thyroid, Pork, (NP THYROID) 90 mg tablet Take 90 mg by mouth daily. Takes in addition to 7.5 mg for a total of 97.5 mg      lidocaine (LIDODERM) 5 % 1 Patch by TransDERmal route every twenty-four (24) hours. Apply patch to the affected area for 12 hours a day and remove for 12 hours a day.  ascorbic acid (VITAMIN C) 500 mg tablet Take 1,000 mg by mouth daily.  ferrous sulfate 325 mg (65 mg iron) tablet Take  by mouth every evening.  cyanocobalamin (VITAMIN B12) 1,000 mcg/mL injection INJECT 1 ML INTO THE MUSCLE EVERY 30 DAYS 10 mL 0    lansoprazole (PREVACID) 30 mg capsule Take 30 mg by mouth Daily (before breakfast). Allergies:    Allergies   Allergen Reactions    Clopidogrel Other (comments)    Sulfa (Sulfonamide Antibiotics) Swelling    Adhesive Tape-Silicones Other (comments)     BAD BLISTERS AND TENDS TO GET INFECTED    Albuterol Palpitations    Aspirin Hives and Other (comments)     \"it makes my stomach bleed\"      Butter Flavor Anaphylaxis     Butter AND CREME    Cimzia [Certolizumab Pegol] Other (comments)     GI symptoms, blisters in the mouth and esophagus    Demerol [Meperidine] Other (comments)     HYPOTENSION    Fentanyl Other (comments)     HYPOTENSION    Gluten Diarrhea     bloating    Keflex [Cephalexin] Anaphylaxis    Levaquin [Levofloxacin] Unproven on Challenge     PT DOESN'T REMEMBER HAVING THAT    Morphine Other (comments)     HYPOTENSION    Nitroglycerin Other (comments)     IN PILL FORM  - HYPOTENSION  CAN TOLERATE PATCH FOR SHORT TIME    Pcn [Penicillins] Anaphylaxis    Percocet [Oxycodone-Acetaminophen] Unknown (comments)     HYPOTENSION AND HALLUCINATIONS. Can take tylenol ok, oxycodone is allergy per patient.  Tapazole [Methimazole] Unknown (comments)     Patient stated \"it made me feel like I had the flu\"       ROS:    General:  positive for  - fatigue  HEENT: negative  Pulmonary: positive for - orthopnea and shortness of breath  Cardiac: positive for chest pressure/discomfort, dyspnea, orthopnea, lower extremity edema, dyspnea on exertion  GI:  no abdominal pain, change in bowel habits, or black or bloody stools  Musculo: negative  Neuro: no TIA or stroke symptoms  Skin:  positive for - ecchymoses  Allergies: REMINDER:DOCUMENT ALLERGIES IN ALLERGY ACTIVITY  Psych: negative    Admission Weight: Last Weight             Physical Exam:   Vitals: There were no vitals taken for this visit. General:  alert, fatigued, cooperative  HEENT: PERRLA, EOMI and Sclera clear, anicteric  Neck:  supple, no significant adenopathy, carotids upstroke normal bilaterally, no bruits  CVP:  8 cm  ( - ) HJR  Cardiac: regular rate, regular rhythm, S1, S2 normal and S4 present  Chest: breath sounds clear and equal bilaterally  Abdomen: soft, non-tender. Bowel sounds normal. No masses, no organomegaly. Extremity: extremities normal, atraumatic, no cyanosis or edema  Neuro: Alert and oriented to person, place, and time; normal strength and tone. Normal symmetric reflexes.  Normal coordination and gait  Skin:   ecchymoses - arm(s) bilateral    Recent Labs:   Labs Latest Ref Rng & Units 10/30/2019 10/8/2019   Albumin 3.5 - 4.8 g/dL 4.1 -   Calcium 8.7 - 10.3 mg/dL 9.6 9.4   SGOT 0 - 40 IU/L 14 -   Glucose 65 - 99 mg/dL 140(H) 137(H)   BUN 8 - 27 mg/dL 21 22   Creatinine 0.57 - 1.00 mg/dL 0.73 0.58   Sodium 134 - 144 mmol/L 144 141   Potassium 3.5 - 5.2 mmol/L 4.6 4.2   TSH 0.450 - 4.500 uIU/mL - 7.780(H)   Some recent data might be hidden     EKG:   SR, rate 65 bpm    ECHO 2008: EF 60%  ECHO 3/3/2017: EF 60%, mild TR   ECHO 3/21/2019: EF 61%, AoV sclerosis  CATH 2009: stent LCX  CATH 8/2010: LAD: m: MLI, , LCX: m 20%, patent RCA, LCX stents, EF 60%   CATH 1/4/2019: patent LAD stent, LCX without significant disease, patent RCA stent  CATH 8/13/2019    Left Main   The vessel is large. The vessel is angiographically normal.   Left Anterior Descending   The proximal segment of the vessel is moderate in size. The middle segment of the vessel is small. The distal segment of the vessel is small. There is mild diffuse disease. Left Circumflex   The vessel is large. There is mild diffuse disease. Previously placed Prox Cx to Mid Cx stent (unknown type) is widely patent. Right Coronary Artery   The vessel is moderate in size. There is mild diffuse disease. Prox RCA to Mid RCA lesion 20% stenosed. . The lesion was previously treated using a stent (unknown type). Mid RCA lesion 40% stenosed. .   Inferior Septal   The vessel is small. The vessel exhibits minimal luminal irregularities. Right Posterior Atrioventricular Branch   The vessel is small. The vessel exhibits minimal luminal irregularities.        STRESS: Myoview 2014: no ischemia  STRESS Nuc 2/2016: no ischemia,   STRESS Nuc 3/3/2017: no ischemia, EF 58%   STRESS Echo 2/11/19: normal stress EKG and stress echo    EVENT monitor 2011: PVCs  HOLTER 2013: frequent PVCs  HOLTER 1/23/19: SR, normal study    Right Heart Cath 6/28/19 -[facility] findings:   RAP=   Mean 17    mmHg  RVSP= 40/20    mmHg  PAP=     42/30/34  mmHg  PCWP=  27 mmHg  CO=        Thermal 4.1  L/min,             Faraz pending  CI=           Thermal 3.08  L/min/m2,     Faraz pending       Left Heart Cath 8/19/19    · Non-obstructive CAD with patent stents in Lcx and RCA  · LVEP 16 mmHg       Cardiac MRI:  9/30/2019    Impression:   1. Normal left ventricular cavity size with preserved left ventricular systolic  function. There is no significant regional wall motion abnormalities. LVEF 60%. 2. Normal right ventricular size and systolic motion. RVEF 60%. 3. Trace to mild mitral regurgitation. 4. On EGE and LGE study, there is a small thin subendocardial infarct involving  25% to 50% thickness of the subendocardial wall of the basal inferior wall. This  infarct is in RCA territory. There is reasonable viability surrounding this  infarct. The mid to distal inferior wall and inferoseptal wall does not  demonstrate any infarct. There are no other infarct. There is no features of  infiltrative sarcoidosis or cardiac amyloidosis. There is no features of  inflammatory myocarditis. All other myocardial walls are otherwise completely  viable. There is no features of hemochromatosis. 5. Normal pericardium without significant pericardial effusion. 30/2019    Impression / Plan:   1. CAD s/p PCI to LAD , RCA   Worsening CHACON - ? Angina equivalent   LHC in 8/19 - no intervention   Intolerant to statins d/t myalgias   ASA intolerant d/t GI bleeding   On metoprolol succinate   Recommend discontinuation of calcium supplements - eat foods high in calcium   Order [facility] for Dr. Carol Orta to read    2. HFpEF - Stage C, NYHA Class III   RHC with PCWP 27 mmHg   On bumex, eplerenone, metoprolol   Cardiac MRI - LVEF 60%, small subendocardial infarct, no infiltrative disease   SPEP - no M spike   Iron profile - normal   Elevated TSH with normal free T3 and T4 - dose adjusted by endocrinology   ATTR negative   Recommend [facility] - screen for the GUIDE HF study    3. RA   On prednisone - attempting to wean dose    4. T2DM - steroid induced   Low carb diet   On Januvia   Wean prednisone as tolerated    5.  HTN - well controlled   Continue metoprolol succinate   Low sodium diet   Continue home BP log    6. PAF   [id number] = 4   No AC d/t history of anemia   On metoprolol succinate for rate control   Recent Holter without AF    7. HLD   Fasting lipid profile with LDL 98,    Intolerant of statins due to myalgias   On Tricor   Would benefit from PCSK9 inhibitor - authorization submitted to insurance by Dr. Sveta Amador   Encourage exercise    8. Iron Deficiency Anemia   Iron level normal    9. Vitamin D Deficiency   On vitamin D3     10. Hypothyroidism   TSH 7.78 - 10/8/19   On pork thyroid 15 mg daily    11. Syncope   Check BMP, Mg, NT proBNP   Event monitor - no arrhythmias     12. Persistent Dyspnea - ? Mold Exposure   Recent issue with their well water   ERMI - environmental moldiness related index - not performed   VCS - visual contrast sensitivity online test positive but may be due to recent RMSF    13. History of Tick Bite and RMSF         Ayanna Hawkins MD, [facility] - [address]  Chief of Cardiology, [address] [place] [address] Director  [facility]  [facility], [address] [place] [address], [address] [place] [address]  Office [phone]  Fax [phone]

## 2019-12-04 NOTE — LETTER
12/4/2019 12:34 PM 
 
Patient:  Vasquez Walls YOB: 1950 Date of Visit: 12/4/2019 Dear Dave Pelaez MD 
[facility] [address] [facility] [address] [id number] VIA In Basket 
 : Thank you for referring Ms. Eric Cat to me for evaluation/treatment. Below are the relevant portions of my assessment and plan of care. If you have questions, please do not hesitate to call me. I look forward to following Ms. Brandt West along with you. Sincerely, Pino Reed MD

## 2019-12-04 NOTE — PATIENT INSTRUCTIONS
Medication changes: You may take 3mg of melatonin at night to help you fall asleep. This may be obtained over the counter. Ensure you are drinking an adequate amount of fluid with a goal of 6-8 eight ounce glasses (1.5-2 liters) of fluid daily. Your urine should be light straw yellow in color whewqn you are drinking enough fluids. Testing Ordered:    A referral to sleep medicine has been placed. You will be contacted for scheduling. Research has discussed the Guide study with you today. A Lexiscan stress test has been scheduled for 2019 at 9:00am at [place] [address]. Please arrive by 8:30am to check in. In preparation for this test please do the followin. Nothing to eat or drink after midnight on  night  2. No caffeine for 24 hours prior to the test.  3. No carvedilol or cardiac medications the day before and morning of the test. You may take your medications after the test is completed. 4. Wear comfortable clothing and shoes    Follow up 1 month with Dimas Colorado with MD      Please monitor your blood pressures daily prior to medications and 2 hours after taking medications. Bring a written record of your blood pressures to your next appointment. Please monitor your weights daily upon waking and after using the bathroom. Keep a written records of your weights and bring to your next appointment. If you have a weight gain of 3 or more pounds overnight OR 5 or more pounds in one week please contact our office. Please make a note on the days you are feeling dizzy or feel like passing out. Thank you for allowing us the privilege of being a part of your healthcare team! Please do not hesitate to contact our office at [phone] with any questions or concerns.

## 2019-12-05 LAB — CREAT SERPL-MCNC: 0.7 MG/DL (ref 0.57–1)

## 2019-12-09 ENCOUNTER — HOSPITAL ENCOUNTER (OUTPATIENT)
Dept: NUCLEAR MEDICINE | Age: 69
Discharge: HOME | End: 2019-12-09
Attending: INTERNAL MEDICINE
Payer: MEDICARE

## 2019-12-09 ENCOUNTER — HOSPITAL ENCOUNTER (OUTPATIENT)
Dept: NON INVASIVE DIAGNOSTICS | Age: 69
Discharge: HOME | End: 2019-12-09
Attending: INTERNAL MEDICINE
Payer: MEDICARE

## 2019-12-09 VITALS
SYSTOLIC BLOOD PRESSURE: 125 MMHG | BODY MASS INDEX: 34.78 KG/M2 | HEIGHT: 62 IN | WEIGHT: 189 LBS | DIASTOLIC BLOOD PRESSURE: 76 MMHG

## 2019-12-09 DIAGNOSIS — I50.32 CHRONIC HEART FAILURE WITH PRESERVED EJECTION FRACTION (HCC): ICD-10-CM

## 2019-12-09 DIAGNOSIS — I25.10 CORONARY ARTERY DISEASE INVOLVING NATIVE CORONARY ARTERY OF NATIVE HEART WITHOUT ANGINA PECTORIS: ICD-10-CM

## 2019-12-09 DIAGNOSIS — R06.02 SOB (SHORTNESS OF BREATH): ICD-10-CM

## 2019-12-09 DIAGNOSIS — R07.9 CHEST PAIN, UNSPECIFIED TYPE: ICD-10-CM

## 2019-12-09 LAB
STRESS BASELINE DIAS BP: 76 MMHG
STRESS BASELINE HR: 67 BPM
STRESS BASELINE SYS BP: 125 MMHG
STRESS ESTIMATED WORKLOAD: 1 METS
STRESS EXERCISE DUR MIN: NORMAL
STRESS PEAK DIAS BP: 64 MMHG
STRESS PEAK SYS BP: 151 MMHG
STRESS PERCENT HR ACHIEVED: 70 %
STRESS POST PEAK HR: 104 BPM
STRESS RATE PRESSURE PRODUCT: NORMAL BPM*MMHG
STRESS ST DEPRESSION: 0 MM
STRESS ST ELEVATION: 0 MM
STRESS TARGET HR: 149 BPM

## 2019-12-09 PROCEDURE — 93017 CV STRESS TEST TRACING ONLY: CPT

## 2019-12-09 PROCEDURE — 74011250636 HC RX REV CODE- 250/636: Performed by: SPECIALIST

## 2019-12-09 RX ORDER — METOPROLOL SUCCINATE 50 MG/1
50 TABLET, EXTENDED RELEASE ORAL DAILY
Qty: 90 TAB | Refills: 3 | Status: SHIPPED | OUTPATIENT
Start: 2019-12-09 | End: 2020-08-10

## 2019-12-09 RX ORDER — BUMETANIDE 1 MG/1
TABLET ORAL
Qty: 135 TAB | Refills: 3 | Status: SHIPPED | OUTPATIENT
Start: 2019-12-09 | End: 2020-08-10

## 2019-12-09 RX ADMIN — REGADENOSON 0.4 MG: 0.08 INJECTION, SOLUTION INTRAVENOUS at 12:05

## 2019-12-11 ENCOUNTER — TELEPHONE (OUTPATIENT)
Dept: CARDIOLOGY CLINIC | Age: 69
End: 2019-12-11

## 2019-12-11 NOTE — TELEPHONE ENCOUNTER
Spoke to pt,   Per Dr. Elvira Romberg: Nay Elliott you please let her know that her stress test today was normal.\"

## 2019-12-12 ENCOUNTER — APPOINTMENT (OUTPATIENT)
Dept: CT IMAGING | Age: 69
End: 2019-12-12
Attending: NURSE PRACTITIONER
Payer: MEDICARE

## 2019-12-12 ENCOUNTER — HOSPITAL ENCOUNTER (EMERGENCY)
Age: 69
Discharge: HOME | End: 2019-12-12
Attending: STUDENT IN AN ORGANIZED HEALTH CARE EDUCATION/TRAINING PROGRAM
Payer: MEDICARE

## 2019-12-12 VITALS
SYSTOLIC BLOOD PRESSURE: 116 MMHG | DIASTOLIC BLOOD PRESSURE: 63 MMHG | HEART RATE: 93 BPM | RESPIRATION RATE: 20 BRPM | OXYGEN SATURATION: 92 % | BODY MASS INDEX: 33.68 KG/M2 | WEIGHT: 183 LBS | HEIGHT: 62 IN | TEMPERATURE: 99.4 F

## 2019-12-12 DIAGNOSIS — R11.2 NON-INTRACTABLE VOMITING WITH NAUSEA, UNSPECIFIED VOMITING TYPE: Primary | ICD-10-CM

## 2019-12-12 DIAGNOSIS — R19.7 DIARRHEA, UNSPECIFIED TYPE: ICD-10-CM

## 2019-12-12 LAB
ALBUMIN SERPL-MCNC: 3.2 G/DL (ref 3.5–5)
ALBUMIN/GLOB SERPL: 1 {RATIO} (ref 1.1–2.2)
ALP SERPL-CCNC: 80 U/L (ref 45–117)
ALT SERPL-CCNC: 24 U/L (ref 12–78)
ANION GAP SERPL CALC-SCNC: 8 MMOL/L (ref 5–15)
APPEARANCE UR: ABNORMAL
AST SERPL-CCNC: 18 U/L (ref 15–37)
BACTERIA URNS QL MICRO: NEGATIVE /HPF
BASOPHILS # BLD: 0 K/UL (ref 0–0.1)
BASOPHILS NFR BLD: 0 % (ref 0–1)
BILIRUB SERPL-MCNC: 0.4 MG/DL (ref 0.2–1)
BILIRUB UR QL: NEGATIVE
BUN SERPL-MCNC: 24 MG/DL (ref 6–20)
BUN/CREAT SERPL: 30 (ref 12–20)
CALCIUM SERPL-MCNC: 8.7 MG/DL (ref 8.5–10.1)
CHLORIDE SERPL-SCNC: 103 MMOL/L (ref 97–108)
CO2 SERPL-SCNC: 29 MMOL/L (ref 21–32)
COLOR UR: ABNORMAL
COMMENT, HOLDF: NORMAL
CREAT SERPL-MCNC: 0.8 MG/DL (ref 0.55–1.02)
DIFFERENTIAL METHOD BLD: ABNORMAL
EOSINOPHIL # BLD: 0.1 K/UL (ref 0–0.4)
EOSINOPHIL NFR BLD: 1 % (ref 0–7)
EPITH CASTS URNS QL MICRO: ABNORMAL /LPF
ERYTHROCYTE [DISTWIDTH] IN BLOOD BY AUTOMATED COUNT: 13.6 % (ref 11.5–14.5)
FLUAV AG NPH QL IA: NEGATIVE
FLUBV AG NOSE QL IA: NEGATIVE
GLOBULIN SER CALC-MCNC: 3.3 G/DL (ref 2–4)
GLUCOSE SERPL-MCNC: 173 MG/DL (ref 65–100)
GLUCOSE UR STRIP.AUTO-MCNC: NEGATIVE MG/DL
HCT VFR BLD AUTO: 46.3 % (ref 35–47)
HEMOCCULT STL QL: POSITIVE
HGB BLD-MCNC: 14.3 G/DL (ref 11.5–16)
HGB UR QL STRIP: ABNORMAL
INR PPP: 1 (ref 0.9–1.1)
KETONES UR QL STRIP.AUTO: NEGATIVE MG/DL
LEUKOCYTE ESTERASE UR QL STRIP.AUTO: ABNORMAL
LIPASE SERPL-CCNC: 102 U/L (ref 73–393)
LYMPHOCYTES # BLD: 0.6 K/UL (ref 0.8–3.5)
LYMPHOCYTES NFR BLD: 5 % (ref 12–49)
MCH RBC QN AUTO: 30.6 PG (ref 26–34)
MCHC RBC AUTO-ENTMCNC: 30.9 G/DL (ref 30–36.5)
MCV RBC AUTO: 99.1 FL (ref 80–99)
MONOCYTES # BLD: 0.9 K/UL (ref 0–1)
MONOCYTES NFR BLD: 8 % (ref 5–13)
NEUTS SEG # BLD: 9.7 K/UL (ref 1.8–8)
NEUTS SEG NFR BLD: 86 % (ref 32–75)
NITRITE UR QL STRIP.AUTO: NEGATIVE
PH UR STRIP: 7.5 [PH] (ref 5–8)
PLATELET # BLD AUTO: 226 K/UL (ref 150–400)
PMV BLD AUTO: 11 FL (ref 8.9–12.9)
POTASSIUM SERPL-SCNC: 3.6 MMOL/L (ref 3.5–5.1)
PROT SERPL-MCNC: 6.5 G/DL (ref 6.4–8.2)
PROT UR STRIP-MCNC: NEGATIVE MG/DL
PROTHROMBIN TIME: 9.4 SEC (ref 9–11.1)
RBC # BLD AUTO: 4.67 M/UL (ref 3.8–5.2)
RBC #/AREA URNS HPF: ABNORMAL /HPF (ref 0–5)
RBC MORPH BLD: ABNORMAL
SAMPLES BEING HELD,HOLD: NORMAL
SODIUM SERPL-SCNC: 140 MMOL/L (ref 136–145)
SP GR UR REFRACTOMETRY: <1.005 (ref 1–1.03)
UR CULT HOLD, URHOLD: NORMAL
UROBILINOGEN UR QL STRIP.AUTO: 0.2 EU/DL (ref 0.2–1)
WBC # BLD AUTO: 11.3 K/UL (ref 3.6–11)
WBC URNS QL MICRO: ABNORMAL /HPF (ref 0–4)

## 2019-12-12 PROCEDURE — 83690 ASSAY OF LIPASE: CPT

## 2019-12-12 PROCEDURE — 96375 TX/PRO/DX INJ NEW DRUG ADDON: CPT

## 2019-12-12 PROCEDURE — 85025 COMPLETE CBC W/AUTO DIFF WBC: CPT

## 2019-12-12 PROCEDURE — 36415 COLL VENOUS BLD VENIPUNCTURE: CPT

## 2019-12-12 PROCEDURE — 96374 THER/PROPH/DIAG INJ IV PUSH: CPT

## 2019-12-12 PROCEDURE — 81001 URINALYSIS AUTO W/SCOPE: CPT

## 2019-12-12 PROCEDURE — 87804 INFLUENZA ASSAY W/OPTIC: CPT

## 2019-12-12 PROCEDURE — 82272 OCCULT BLD FECES 1-3 TESTS: CPT

## 2019-12-12 PROCEDURE — C9113 INJ PANTOPRAZOLE SODIUM, VIA: HCPCS | Performed by: NURSE PRACTITIONER

## 2019-12-12 PROCEDURE — 74177 CT ABD & PELVIS W/CONTRAST: CPT

## 2019-12-12 PROCEDURE — 74011636320 HC RX REV CODE- 636/320: Performed by: STUDENT IN AN ORGANIZED HEALTH CARE EDUCATION/TRAINING PROGRAM

## 2019-12-12 PROCEDURE — 85610 PROTHROMBIN TIME: CPT

## 2019-12-12 PROCEDURE — 99284 EMERGENCY DEPT VISIT MOD MDM: CPT

## 2019-12-12 PROCEDURE — 80053 COMPREHEN METABOLIC PANEL: CPT

## 2019-12-12 PROCEDURE — 96361 HYDRATE IV INFUSION ADD-ON: CPT

## 2019-12-12 PROCEDURE — 74011250636 HC RX REV CODE- 250/636: Performed by: NURSE PRACTITIONER

## 2019-12-12 RX ORDER — ONDANSETRON 2 MG/ML
4 INJECTION INTRAMUSCULAR; INTRAVENOUS
Status: COMPLETED | OUTPATIENT
Start: 2019-12-12 | End: 2019-12-12

## 2019-12-12 RX ORDER — ONDANSETRON 4 MG/1
4 TABLET, ORALLY DISINTEGRATING ORAL
Qty: 10 TAB | Refills: 0 | Status: SHIPPED | OUTPATIENT
Start: 2019-12-12 | End: 2020-11-02

## 2019-12-12 RX ADMIN — SODIUM CHLORIDE 500 ML: 900 INJECTION, SOLUTION INTRAVENOUS at 18:33

## 2019-12-12 RX ADMIN — IOPAMIDOL 100 ML: 755 INJECTION, SOLUTION INTRAVENOUS at 19:15

## 2019-12-12 RX ADMIN — ONDANSETRON 4 MG: 2 INJECTION INTRAMUSCULAR; INTRAVENOUS at 18:37

## 2019-12-12 RX ADMIN — PANTOPRAZOLE SODIUM 40 MG: 40 INJECTION, POWDER, FOR SOLUTION INTRAVENOUS at 20:24

## 2019-12-12 NOTE — ED PROVIDER NOTES
Patient is a 33-year-old female with an extensive past medical history including atrial fibrillation, CAD (MI x2), RA, diastolic heart failure, and TIA who presents today with complaints of body aches, nausea and vomiting abdominal pain and diarrhea. She states last night her symptoms started with body aches and subjective fevers sometime in the night she woke up with persistent vomiting. Today she is vomited 2-3 times and has developed loose stools that she reports are dark black. Denies any bright red blood. Denies any hematemesis. Endorses upper and lower abdominal pain and feels that her abdomen is more swollen than normal.  She was able to keep her medications down today. He presents with an overall feeling of fatigue and lethargy. Her  states that she is more lethargic than normal.  She did get her flu shot this year. Denies any known ill contacts. Has taken no medications for symptoms. No other acute complaints. Past Medical History:   Diagnosis Date    Anemia     iron defic anemia    Arthritis     Asthma 6/29/2009    CAUSED BY MOLD    Atrial fibrillation ([address] 6/29/2009    CAD (coronary artery disease) 06/29/2009    MI X2  -- prior stenting     Chronic pain     RT. ANKLE    Diastolic dysfunction     GERD (gastroesophageal reflux disease)     Gluten intolerance     Hypothyroidism 6/29/2009    Personal history of MI (myocardial infarction)     Rheumatoid arthritis ([address]     Stroke ([address]     TIA (NO RESIDUAL)    Syncope     Thromboembolus ([address] 2004    RT.  -- DVT after surgery     Thyroid disease     HYPO    TIA (transient ischemic attack)     2004 & 2006    Unspecified adverse effect of anesthesia     \"IS A LIGHT WEIGHT\"    Unspecified adverse effect of anesthesia     DIFFICULTY AWAKENING    Vitamin B12 deficiency 6/29/2009       Past Surgical History:   Procedure Laterality Date    CARDIAC SURG PROCEDURE UNLIST      ABLATION    HX ADENOIDECTOMY      HX APPENDECTOMY      HX CHOLECYSTECTOMY  11    HX GI      COLONOSCOPY AND ENDOSCOPY    HX HEART CATHETERIZATION  2010    2 STENTS    HX HYSTERECTOMY      HX LUMBAR LAMINECTOMY  2000    L4-L5    HX ORTHOPAEDIC  -2012    RT.  FOOT SURGERY X 6/calcaneal osteotomy    HX TONSILLECTOMY  1964    STENT INSERTION           Family History:   Problem Relation Age of Onset    Delayed Awakening Mother     Heart Disease Mother     Coronary Artery Disease Mother     Hypertension Mother     Stroke Mother     Delayed Awakening Sister     Hypertension Sister     Lung Disease Father     Cancer Father         colon    Hypertension Father     Hypertension Brother     Breast Cancer Maternal Aunt     Breast Cancer Maternal Aunt     Breast Cancer Cousin         maternal 1st cousin    Breast Cancer Maternal Aunt     Breast Cancer Cousin         maternal 1st cousin    Breast Cancer Cousin         maternal 1st cousin       Social History     Socioeconomic History    Marital status:      Spouse name: Not on file    Number of children: Not on file    Years of education: Not on file    Highest education level: Not on file   Occupational History    Not on file   Social Needs    Financial resource strain: Not on file    Food insecurity:     Worry: Not on file     Inability: Not on file    Transportation needs:     Medical: Not on file     Non-medical: Not on file   Tobacco Use    Smoking status: Former Smoker     Packs/day: 1.00     Years: 30.00     Pack years: 30.00     Last attempt to quit: 2002     Years since quittin.5    Smokeless tobacco: Never Used   Substance and Sexual Activity    Alcohol use: No    Drug use: No    Sexual activity: Never   Lifestyle    Physical activity:     Days per week: Not on file     Minutes per session: Not on file    Stress: Not on file   Relationships    Social connections:     Talks on phone: Not on file     Gets together: Not on file     Attends [employer] service: Not on file     Active member of club or organization: Not on file     Attends meetings of clubs or organizations: Not on file     Relationship status: Not on file    Intimate partner violence:     Fear of current or ex partner: Not on file     Emotionally abused: Not on file     Physically abused: Not on file     Forced sexual activity: Not on file   Other Topics Concern    Not on file   Social History Narrative    Not on file         ALLERGIES: Clopidogrel; Sulfa (sulfonamide antibiotics); Adhesive tape-silicones; Albuterol; Aspirin; Butter flavor; Cimzia [certolizumab pegol]; Demerol [meperidine]; Fentanyl; Gluten; Keflex [cephalexin]; Levaquin [levofloxacin]; Morphine; Nitroglycerin; Pcn [penicillins]; Percocet [oxycodone-acetaminophen]; and Tapazole [methimazole]    Review of Systems   Constitutional: Positive for fever. Negative for chills. HENT: Positive for congestion. Respiratory: Negative for cough and shortness of breath. Cardiovascular: Negative for chest pain. Gastrointestinal: Positive for abdominal pain, diarrhea, nausea and vomiting. Genitourinary: Negative for difficulty urinating. Musculoskeletal: Positive for myalgias. Skin: Negative. Neurological: Negative for dizziness, light-headedness and headaches. All other systems reviewed and are negative. Vitals:    12/12/19 1805   Temp: 99.4 °F (37.4 °C)            Physical Exam  Vitals signs and nursing note reviewed. Constitutional:       Appearance: Normal appearance. Cardiovascular:      Rate and Rhythm: Normal rate and regular rhythm. Pulses: Normal pulses. Heart sounds: Normal heart sounds. No murmur. Pulmonary:      Effort: Pulmonary effort is normal.      Breath sounds: Normal breath sounds. Abdominal:      General: Bowel sounds are normal. There is distension. Palpations: Abdomen is soft. Tenderness: There is tenderness in the epigastric area and suprapubic area. Neurological:      Mental Status: She is alert. MDM  Number of Diagnoses or Management Options  Diarrhea, unspecified type:   Non-intractable vomiting with nausea, unspecified vomiting type:        6:23 PM  Patient presents with complaints of subjective fevers, body aches, with nausea vomiting diarrhea and diffuse abdominal pain with swelling. She reports dark stools so we will check a Hemoccult. Also consider influenza so we will check a flu swab. Will check CBC for anemia, electrolytes given her vomiting and diarrhea along with her renal function. She does have a history of diastolic heart failure but we will give her a 500 cc bolus and reassess assess if she needs more. Given her suprapubic pain, will also check a urine for infection. She has no history of constipation. She states her abdomen is larger than normal with her current symptoms will consider CT scan for further evaluation. 7:07 PM  Fecal occult positive. Stool dark in color. No melena    8:07 PM  CT scan shows no acute intra-abdominal pathology to cause her symptoms. She states she is feeling much better after the Zofran. Urine is still pending. We will try to p.o. challenge with ginger ale. If urine is negative for infection and she has no further vomiting, will plan to discharge home with Zofran and follow-up as needed.   Procedures

## 2019-12-12 NOTE — ED TRIAGE NOTES
Patient presents to treatment area via wheelchair. Patient states she began not feeling well yesterday and had chills and body aches. Began vomiting in the night and has continued vomiting throughout the day. Spouse and patient state she has been much more lethargic. Patient was asleep in the waiting room when RN went to get her. Patient also states she has had diarrhea and a cough.

## 2019-12-13 NOTE — ED NOTES
Bedside shift change report given to [facility], RN  (oncoming nurse) by ANGELA RN  (offgoing nurse). Report included the following information SBAR.

## 2019-12-13 NOTE — DISCHARGE INSTRUCTIONS
Patient Education        Nausea and Vomiting: Care Instructions  Your Care Instructions    When you are nauseated, you may feel weak and sweaty and notice a lot of saliva in your mouth. Nausea often leads to vomiting. Most of the time you do not need to worry about nausea and vomiting, but they can be signs of other illnesses. Two common causes of nausea and vomiting are stomach flu and food poisoning. Nausea and vomiting from viral stomach flu will usually start to improve within 24 hours. Nausea and vomiting from food poisoning may last from 12 to 48 hours. The doctor has checked you carefully, but problems can develop later. If you notice any problems or new symptoms, get medical treatment right away. Follow-up care is a key part of your treatment and safety. Be sure to make and go to all appointments, and call your doctor if you are having problems. It's also a good idea to know your test results and keep a list of the medicines you take. How can you care for yourself at home? · To prevent dehydration, drink plenty of fluids, enough so that your urine is light yellow or clear like water. Choose water and other caffeine-free clear liquids until you feel better. If you have kidney, heart, or liver disease and have to limit fluids, talk with your doctor before you increase the amount of fluids you drink. · Rest in bed until you feel better. · When you are able to eat, try clear soups, mild foods, and liquids until all symptoms are gone for 12 to 48 hours. Other good choices include dry toast, crackers, cooked cereal, and gelatin dessert, such as Jell-O. When should you call for help? Call 911 anytime you think you may need emergency care. For example, call if:    · You passed out (lost consciousness).    Call your doctor now or seek immediate medical care if:    · You have symptoms of dehydration, such as:  ? Dry eyes and a dry mouth. ? Passing only a little dark urine. ?  Feeling thirstier than usual.   · You have new or worsening belly pain.     · You have a new or higher fever.     · You vomit blood or what looks like coffee grounds.    Watch closely for changes in your health, and be sure to contact your doctor if:    · You have ongoing nausea and vomiting.     · Your vomiting is getting worse.     · Your vomiting lasts longer than 2 days.     · You are not getting better as expected. Where can you learn more? Go to http://wayne-ricky.info/. Enter 25 [id number] in the search box to learn more about \"Nausea and Vomiting: Care Instructions. \"  Current as of: June 26, 2019  Content Version: 12.2  © 9730-2513 [employer] [place]. Care instructions adapted under license by [employer] (which disclaims liability or warranty for this information). If you have questions about a medical condition or this instruction, always ask your healthcare professional. [address] any warranty or liability for your use of this information.

## 2019-12-13 NOTE — ED NOTES
Pt assisted to restroom with fall precautions. Pt voided without difficultly and returned to room. Purposeful rounding completed. Pain reassessed. Pt informed of time factors with lab/imaging study results. Pt resting on the stretcher in a position of comfort. Call bell within reach; will continue to monitor.

## 2019-12-13 NOTE — ED NOTES
Discharge note: The patient was discharged home in stable condition, accompanied by family member. The patient is alert and oriented, is in no respiratory distress and has vital signs. The patient's diagnosis, condition and treatment were explained to patient by Dr Kathie King and reinforced by nurse. The patient expressed understanding of discharge instructions, prescriptions, and plan of care. A discharge plan has been developed. A  was not involved in the process. Patient was assisted out to car via wheelchair tocar for discharge and to go home with family member.

## 2019-12-26 ENCOUNTER — TELEPHONE (OUTPATIENT)
Dept: CARDIOLOGY CLINIC | Age: 69
End: 2019-12-26

## 2019-12-26 NOTE — TELEPHONE ENCOUNTER
Patient is scheduled to see   Dr. Rg Hughes    1-22-20  2:20pm  [id number]    Patient was given this information and had no questions.

## 2020-01-02 ENCOUNTER — OFFICE VISIT (OUTPATIENT)
Dept: CARDIOLOGY CLINIC | Age: 70
End: 2020-01-02

## 2020-01-02 VITALS
SYSTOLIC BLOOD PRESSURE: 116 MMHG | WEIGHT: 180 LBS | RESPIRATION RATE: 18 BRPM | HEART RATE: 73 BPM | TEMPERATURE: 98 F | BODY MASS INDEX: 32.92 KG/M2 | DIASTOLIC BLOOD PRESSURE: 72 MMHG | OXYGEN SATURATION: 93 %

## 2020-01-02 DIAGNOSIS — M79.10 MYALGIA: ICD-10-CM

## 2020-01-02 DIAGNOSIS — I50.32 CHRONIC HEART FAILURE WITH PRESERVED EJECTION FRACTION (HCC): ICD-10-CM

## 2020-01-02 DIAGNOSIS — E56.9 VITAMIN DEFICIENCY: Primary | ICD-10-CM

## 2020-01-02 DIAGNOSIS — E34.9 HORMONE IMBALANCE: ICD-10-CM

## 2020-01-02 DIAGNOSIS — R06.02 SHORTNESS OF BREATH: ICD-10-CM

## 2020-01-02 DIAGNOSIS — R53.83 FATIGUE, UNSPECIFIED TYPE: ICD-10-CM

## 2020-01-02 RX ORDER — PREDNISONE 5 MG/1
5 TABLET ORAL DAILY
COMMUNITY

## 2020-01-02 NOTE — PATIENT INSTRUCTIONS
No medication changes    You will be contacted with further information regarding the injectable cholesterol medication. Testing Ordered:    Lab work ordered today. Orders provided to you. Please stop at any [facility] [employer] of your choice to have labs done. Our office will notify you of any abnormal results. A referral to [facility] has been recommended for mold exposure evaluation. She is located in [place], [place]. Please contact her office at [phone] to schedule. You may inform her office you have been referred by Dr. Azra Carranza. They may contact our office at [phone] with any questions. You are not currently a candidate for the Guide Study for cardiomems placement. However, you may still be eligible for commercial placement of a cardiomems. You will be contacted by Dr. Aleena Castro (Partner with Dr. Lisa Gamino) to set up placement of the CardioMems. Follow up 3 months with Tammie Draper [phone] with MD      Please monitor your blood pressures daily prior to medications and 2 hours after taking medications. Bring a written record of your blood pressures to your next appointment. Please monitor your weights daily upon waking and after using the bathroom. Keep a written records of your weights and bring to your next appointment. If you have a weight gain of 3 or more pounds overnight OR 5 or more pounds in one week please contact our office. Thank you for allowing us the privilege of being a part of your healthcare team! Please do not hesitate to contact our office at [phone] with any questions or concerns.

## 2020-01-02 NOTE — LETTER
1/2/2020 3:45 PM 
 
Patient:  Kanu Roe YOB: 1950 Date of Visit: 1/2/2020 Dear Markus Del Angel MD 
[address] [facility] [address] [id number] VIA In Basket Eliane Hedrick MD 
[address] [place] [phone] [facility] [id number] VIA In Basket 
 : Thank you for referring Ms. Janet Durham to me for evaluation/treatment. Below are the relevant portions of my assessment and plan of care. If you have questions, please do not hesitate to call me. I look forward to following Ms. Carmen Elver along with you. Sincerely, Sanya Ovalles MD

## 2020-01-02 NOTE — PROGRESS NOTES
[facility] Clinic Note      DOS:   1/2/2020  NAME:  Siddharth Bernard   MRN:   [id number]   REFERRING PROVIDER:  Tristan Buerger, MD  PRIMARY CARE PHYSICIAN: Jake Moreno MD  PRIMARY CARDIOLOGIST: Tristan Buerger, MD      Chief Complaint:   Dyspnea  Chronic HFpEF    HPI: 68y.o. year old female with a history of HTN, HFpEF, CAD s/p PCI to LAD and RCA who presents for further evaluation of her chronic HF with preserved EF. She underwent LHC in 6/19 which demonstrated an elevated PCWP of 27 mmHg. Her eplerenone was increased without improvement in her dyspnea and chest pain with exertion. She denies any recent palpitations, presyncope, syncope. She admits to CHACON, orthopnea, edema, but denies PND. She does not snore and has not experienced apnea. She discontinued crestor 5 mg due to myalgias. Amy Rainey had her cardiac MRI on 9/30/19 and experienced an episode of syncope the following day. She noted palpitations prior to her episode of syncope but the Holter monitor did not reveal any arrhythmias surrounding the time of her syncopal episode. Orthostatics in Dr. Saritha Maki office were normal.      Of note, she is highly allergic to mold and recently had an issue with her well. Her tap water, which she was consuming was actually ground water rather than water from the well. The pipe has been repaired and her well was \"shocked\". Her drinking water is being tested and results are expected back from [place] next week. Amy Rainey returns for follow up today. She continues to experience disabling CHACON. She is interested in pursuing a CardioMems. History:  Past Medical History:   Diagnosis Date    Anemia     iron defic anemia    Arthritis     Asthma 6/29/2009    CAUSED BY MOLD    Atrial fibrillation ([facility] [place] [address] 6/29/2009    CAD (coronary artery disease) 06/29/2009    MI X2  -- prior stenting     Chronic pain     RT.  ANKLE    Diastolic dysfunction     GERD (gastroesophageal reflux disease)     Gluten intolerance     Hypothyroidism 2009    Personal history of MI (myocardial infarction)     Rheumatoid arthritis ([facility] [address]     Stroke ([facility] [address]     TIA (NO RESIDUAL)    Syncope     Thromboembolus ([facility] [address] 2004    RT. -- DVT after surgery     Thyroid disease     HYPO    TIA (transient ischemic attack)      &     Unspecified adverse effect of anesthesia     \"IS A LIGHT WEIGHT\"    Unspecified adverse effect of anesthesia     DIFFICULTY AWAKENING    Vitamin B12 deficiency 2009     Past Surgical History:   Procedure Laterality Date    CARDIAC SURG PROCEDURE UNLIST      ABLATION    HX ADENOIDECTOMY      HX APPENDECTOMY      HX CHOLECYSTECTOMY  11    HX GI      COLONOSCOPY AND ENDOSCOPY    HX HEART CATHETERIZATION      2 STENTS    HX HYSTERECTOMY      HX LUMBAR LAMINECTOMY  2000    L4-L5    HX ORTHOPAEDIC  -2012    RT.  FOOT SURGERY X 6/calcaneal osteotomy    HX TONSILLECTOMY  1964    STENT INSERTION       Social History     Socioeconomic History    Marital status:      Spouse name: Not on file    Number of children: Not on file    Years of education: Not on file    Highest education level: Not on file   Occupational History    Not on file   Social Needs    Financial resource strain: Not on file    Food insecurity:     Worry: Not on file     Inability: Not on file    Transportation needs:     Medical: Not on file     Non-medical: Not on file   Tobacco Use    Smoking status: Former Smoker     Packs/day: 1.00     Years: 30.00     Pack years: 30.00     Last attempt to quit: 2002     Years since quittin.5    Smokeless tobacco: Never Used   Substance and Sexual Activity    Alcohol use: No    Drug use: No    Sexual activity: Never   Lifestyle    Physical activity:     Days per week: Not on file     Minutes per session: Not on file    Stress: Not on file   Relationships    Social connections:     Talks on phone: Not on file     Gets together: Not on file Attends Advent service: Not on file     Active member of club or organization: Not on file     Attends meetings of clubs or organizations: Not on file     Relationship status: Not on file    Intimate partner violence:     Fear of current or ex partner: Not on file     Emotionally abused: Not on file     Physically abused: Not on file     Forced sexual activity: Not on file   Other Topics Concern    Not on file   Social History Narrative    Not on file     Family History   Problem Relation Age of Onset    Delayed Awakening Mother     Heart Disease Mother     Coronary Artery Disease Mother     Hypertension Mother     Stroke Mother     Delayed Awakening Sister     Hypertension Sister     Lung Disease Father     Cancer Father         colon    Hypertension Father     Hypertension Brother     Breast Cancer Maternal Aunt     Breast Cancer Maternal Aunt     Breast Cancer Cousin         maternal 1st cousin    Breast Cancer Maternal Aunt     Breast Cancer Cousin         maternal 1st cousin    Breast Cancer Cousin         maternal 1st cousin       Current Medications:   Current Outpatient Medications   Medication Sig Dispense Refill    predniSONE (DELTASONE) 5 mg tablet Take 5 mg by mouth daily.  ondansetron (ZOFRAN ODT) 4 mg disintegrating tablet Take 1 Tab by mouth every eight (8) hours as needed for Nausea. 10 Tab 0    bumetanide (BUMEX) 1 mg tablet Take 1 tablet Bumex in the morning and half a tablet in the afternoon (Patient taking differently: Take 1 mg by mouth as needed. Take 1 tablet Bumex in the morning) 135 Tab 3    metoprolol succinate (TOPROL-XL) 50 mg XL tablet Take 1 Tab by mouth daily. 90 Tab 3    cholecalciferol, vitamin D3, (VITAMIN D3) 2,000 unit tab Take 2,000 Units by mouth daily.  COQ10, UBIQUINOL, PO Take 100 mg by mouth daily.  turmeric (CURCUMIN) Take 1 g by mouth daily.  eplerenone (INSPRA) 25 mg tablet Take 1 Tab by mouth daily.  30 Tab 6    glucose blood VI test strips (ONETOUCH VERIO) strip OneTouch Verio strips      fenofibrate nanocrystallized (TRICOR) 48 mg tablet Take 1 Tab by mouth daily. 90 Tab 3    thyroid, Pork, (NP THYROID) 15 mg tablet Take  by mouth daily. Takes in addition to the [address].  nitroglycerin (NITRODUR) 0.1 mg/hr 1 Patch by TransDERmal route daily. (Patient taking differently: 1 Patch by TransDERmal route as needed.) 30 Patch 0    SITagliptin (JANUVIA) 100 mg tablet Take 100 mg by mouth daily.  predniSONE (DELTASONE) 2.5 mg tablet Take 2.5 mg by mouth every evening.  flaxseed oil (OMEGA 3 PO) Take 1 Tab by mouth every evening. Indications: FISH OIL      vit C/vit E ac/lut/copper/zinc (PRESERVISION LUTEIN PO) Take 1 Tab by mouth daily.  thyroid, Pork, (NP THYROID) 90 mg tablet Take 90 mg by mouth daily. Takes in addition to 7.5 mg for a total of 97.5 mg      lidocaine (LIDODERM) 5 % 1 Patch by TransDERmal route every twenty-four (24) hours. Apply patch to the affected area for 12 hours a day and remove for 12 hours a day.  ascorbic acid (VITAMIN C) 500 mg tablet Take 1,000 mg by mouth daily.  ferrous sulfate 325 mg (65 mg iron) tablet Take  by mouth every evening.  cyanocobalamin (VITAMIN B12) 1,000 mcg/mL injection INJECT 1 ML INTO THE MUSCLE EVERY 30 DAYS 10 mL 0    lansoprazole (PREVACID) 30 mg capsule Take 30 mg by mouth Daily (before breakfast). Allergies:    Allergies   Allergen Reactions    Clopidogrel Other (comments)    Sulfa (Sulfonamide Antibiotics) Swelling    Adhesive Tape-Silicones Other (comments)     BAD BLISTERS AND TENDS TO GET INFECTED    Albuterol Palpitations    Aspirin Hives and Other (comments)     \"it makes my stomach bleed\"      Butter Flavor Anaphylaxis     Butter AND CREME    Cimzia [Certolizumab Pegol] Other (comments)     GI symptoms, blisters in the mouth and esophagus    Demerol [Meperidine] Other (comments)     HYPOTENSION    Fentanyl Other (comments) HYPOTENSION    Gluten Diarrhea     bloating    Keflex [Cephalexin] Anaphylaxis    Levaquin [Levofloxacin] Unproven on [employer]     PT DOESN'T REMEMBER HAVING THAT    Morphine Other (comments)     HYPOTENSION    Nitroglycerin Other (comments)     IN PILL FORM  - HYPOTENSION  CAN TOLERATE PATCH FOR SHORT TIME    Pcn [Penicillins] Anaphylaxis    Percocet [Oxycodone-Acetaminophen] Unknown (comments)     HYPOTENSION AND HALLUCINATIONS. Can take tylenol ok, oxycodone is allergy per patient.  Tapazole [Methimazole] Unknown (comments)     Patient stated \"it made me feel like I had the flu\"       ROS:    General:  positive for  - fatigue  HEENT: negative  Pulmonary: positive for - orthopnea and shortness of breath  Cardiac: positive for dyspnea, orthopnea, lower extremity edema, dyspnea on exertion  GI:  no abdominal pain, change in bowel habits, or black or bloody stools  Musculo: negative  Neuro: no TIA or stroke symptoms  Skin:  positive for - ecchymoses  Allergies: REMINDER:DOCUMENT ALLERGIES IN ALLERGY ACTIVITY  Psych: negative    Admission Weight: Last Weight   Weight: 180 lb (81.6 kg) Weight: 180 lb (81.6 kg)       Physical Exam:   Vitals:    Visit Vitals  /72 (BP 1 Location: Left arm, BP Patient Position: Sitting)   Pulse 73   Temp 98 °F (36.7 °C)   Resp 18   Wt 180 lb (81.6 kg)   SpO2 93%   BMI 32.92 kg/m²       General:  alert, fatigued, cooperative  HEENT: PERRLA, EOMI and Sclera clear, anicteric  Neck:  supple, no significant adenopathy, carotids upstroke normal bilaterally, no bruits  CVP:  8 cm  ( - ) HJR  Cardiac: regular rate, regular rhythm, S1, S2 normal and S4 present  Chest: breath sounds clear and equal bilaterally  Abdomen: soft, non-tender. Bowel sounds normal. No masses, no organomegaly. Extremity: extremities normal, atraumatic, no cyanosis or edema  Neuro: Alert and oriented to person, place, and time; normal strength and tone. Normal symmetric reflexes.  Normal coordination and gait  Skin:   ecchymoses - arm(s) bilateral    Recent Labs:   Labs Latest Ref Rng & Units 12/12/2019 12/4/2019   WBC 3.6 - 11.0 K/uL 11. 3(H) -   RBC 3.80 - 5.20 M/uL 4.67 -   Hemoglobin 11.5 - 16.0 g/dL 14.3 -   Hematocrit 35.0 - 47.0 % 46.3 -   MCV 80.0 - 99.0 FL 99. 1(H) -   Platelets 368 - 051 K/uL 226 -   Lymphocytes 12 - 49 % 5(L) -   Monocytes 5 - 13 % 8 -   Eosinophils 0 - 7 % 1 -   Basophils 0 - 1 % 0 -   Albumin 3.5 - 5.0 g/dL 3.2(L) -   Calcium 8.5 - 10.1 MG/DL 8.7 -   SGOT 15 - 37 U/L 18 -   Glucose 65 - 100 mg/dL 173(H) -   BUN 6 - 20 MG/DL 24(H) -   Creatinine 0.55 - 1.02 MG/DL 0.80 0.70   Sodium 136 - 145 mmol/L 140 -   Potassium 3.5 - 5.1 mmol/L 3.6 -   Some recent data might be hidden     EKG:   SR, rate 65 bpm    ECHO 2008: EF 60%  ECHO 3/3/2017: EF 60%, mild TR   ECHO 3/21/2019: EF 61%, AoV sclerosis  CATH 2009: stent LCX  CATH 8/2010: LAD: m: MLI, , LCX: m 20%, patent RCA, LCX stents, EF 60%   CATH 1/4/2019: patent LAD stent, LCX without significant disease, patent RCA stent  CATH 8/13/2019    Left Main   The vessel is large. The vessel is angiographically normal.   Left Anterior Descending   The proximal segment of the vessel is moderate in size. The middle segment of the vessel is small. The distal segment of the vessel is small. There is mild diffuse disease. Left Circumflex   The vessel is large. There is mild diffuse disease. Previously placed Prox Cx to Mid Cx stent (unknown type) is widely patent. Right Coronary Artery   The vessel is moderate in size. There is mild diffuse disease. Prox RCA to Mid RCA lesion 20% stenosed. . The lesion was previously treated using a stent (unknown type). Mid RCA lesion 40% stenosed. .   Inferior Septal   The vessel is small. The vessel exhibits minimal luminal irregularities. Right Posterior Atrioventricular Branch   The vessel is small. The vessel exhibits minimal luminal irregularities.        STRESS: Myoview 2014: no ischemia  STRESS Nuc 2/2016: no ischemia,   STRESS Nuc 3/3/2017: no ischemia, EF 58%   STRESS Echo 2/11/19: normal stress EKG and stress echo  Lexiscan 12/9/2019: Negative stress test. Normal myocardial perfusion. LVEF 66%    EVENT monitor 2011: PVCs  HOLTER 2013: frequent PVCs  HOLTER 1/23/19: SR, normal study    Right Heart Cath 6/28/19 [facility] findings:   RAP=   Mean 17    mmHg  RVSP= 40/20    mmHg  PAP=     42/30/34  mmHg  PCWP=  27 mmHg  CO=        Thermal 4.1  L/min,             Faraz pending  CI=           Thermal 3.08  L/min/m2,     Faraz pending       Left Heart Cath 8/19/19    · Non-obstructive CAD with patent stents in Lcx and RCA  · LVEP 16 mmHg       Cardiac MRI:  9/30/2019    Impression:   1. Normal left ventricular cavity size with preserved left ventricular systolic  function. There is no significant regional wall motion abnormalities. LVEF 60%. 2. Normal right ventricular size and systolic motion. RVEF 60%. 3. Trace to mild mitral regurgitation. 4. On EGE and LGE study, there is a small thin subendocardial infarct involving  25% to 50% thickness of the subendocardial wall of the basal inferior wall. This  infarct is in RCA territory. There is reasonable viability surrounding this  infarct. The mid to distal inferior wall and inferoseptal wall does not  demonstrate any infarct. There are no other infarct. There is no features of  infiltrative sarcoidosis or cardiac amyloidosis. There is no features of  inflammatory myocarditis. All other myocardial walls are otherwise completely  viable. There is no features of hemochromatosis. 5. Normal pericardium without significant pericardial effusion. 30/2019    Impression / Plan:   1. CAD s/p PCI to LAD , RCA   Lexiscan 12/2019 - normal perfusion   LHC in 8/19 - no intervention   Intolerant to statins d/t myalgias   ASA intolerant d/t GI bleeding   On metoprolol succinate   Recommend discontinuation of calcium supplements - eat foods high in calcium       2.  HFpEF - Stage C, NYHA Class III   RHC with PCWP 27 mmHg   On bumex, eplerenone, metoprolol   Cardiac MRI - LVEF 60%, small subendocardial infarct, no infiltrative disease   SPEP - no M spike   Iron profile - normal   Elevated TSH with normal free T3 and T4 - dose adjusted by endocrinology   Repeat TFTs today   ATTR negative   Recommend CardioMems -not eligible for the GUIDE HF study, will attempt commercial CardioMems    3. Dyspnea   PFTs   Refer to Dr. Zohaib Villegas in [place] re: mold toxicity   Recommend CardioMems    4. RA   On prednisone - attempting to wean dose    5. T2DM - steroid induced   Low carb diet   On Januvia   Wean prednisone as tolerated    6. HTN - well controlled   Continue metoprolol succinate   Low sodium diet   Continue home BP log    7. PAF   [id number] = 4   No AC d/t history of anemia   On metoprolol succinate for rate control   Recent Holter without AF    8. HLD   Fasting lipid profile with LDL 98,    Intolerant of statins due to myalgias   Check coenzyme Q10 and vitamin D levels   On Tricor   Would benefit from PCSK9 inhibitor - authorization submitted to insurance by Dr. Desmond Garduno   Encourage exercise    9. Iron Deficiency Anemia   Iron level normal    10. Vitamin D Deficiency   On vitamin D3     11. Hypothyroidism   TSH 7.78 - 10/8/19   On pork thyroid 15 mg daily    12. Syncope   Check BMP, Mg, NT proBNP   Event monitor - no arrhythmias     12. Persistent Dyspnea - ? Mold Exposure   Recent issue with their well water   ERMI - environmental moldiness related index - not performed   VCS - visual contrast sensitivity online test positive but may be due to recent RMSF    13. History of Tick Bite and RMSF         Ayanna Fabian MD, [facility], [address] [place] [address]  Chief of Cardiology, [address] [place] [address] Director  [facility]  [facility], [address] [place] [address], [address] [place] [address]  Office [phone]  Fax [phone]

## 2020-01-07 ENCOUNTER — TELEPHONE (OUTPATIENT)
Dept: CARDIOLOGY CLINIC | Age: 70
End: 2020-01-07

## 2020-01-07 DIAGNOSIS — E53.8 VITAMIN B12 DEFICIENCY: ICD-10-CM

## 2020-01-07 DIAGNOSIS — I25.10 CORONARY ARTERY DISEASE INVOLVING NATIVE CORONARY ARTERY OF NATIVE HEART WITHOUT ANGINA PECTORIS: ICD-10-CM

## 2020-01-07 DIAGNOSIS — E61.8 DEFICIENCY OF COENZYME Q10: ICD-10-CM

## 2020-01-07 DIAGNOSIS — R06.02 SHORTNESS OF BREATH: ICD-10-CM

## 2020-01-07 DIAGNOSIS — E55.9 VITAMIN D DEFICIENCY: ICD-10-CM

## 2020-01-07 DIAGNOSIS — I50.32 CHRONIC HEART FAILURE WITH PRESERVED EJECTION FRACTION (HCC): ICD-10-CM

## 2020-01-07 DIAGNOSIS — E53.1 VITAMIN B6 DEFICIENCY: ICD-10-CM

## 2020-01-07 DIAGNOSIS — E51.9 VITAMIN B1 DEFICIENCY: ICD-10-CM

## 2020-01-07 DIAGNOSIS — I50.32 CHRONIC HEART FAILURE WITH PRESERVED EJECTION FRACTION (HCC): Primary | ICD-10-CM

## 2020-01-07 DIAGNOSIS — R06.02 SHORTNESS OF BREATH: Primary | ICD-10-CM

## 2020-01-07 NOTE — TELEPHONE ENCOUNTER
Message received from patient stating she may be charged for some of the labs ordered by Dr. Marsha Vasques may not be covered by insurance and fee was estimated at $600 per patient. Contacted patient. Informed her will speak with [employer] to provide additional codes. Lab jeannette contacted and provided additional codes. Yumiko Matt RN.

## 2020-01-07 NOTE — TELEPHONE ENCOUNTER
----- Message from Scott Brewer MD sent at 1/2/2020  4:22 PM EST -----  Regarding: CardioMEMS  Can you set up RHC/CardioMEMS near future - NOT part of GUIDE HF study    Dx HFpEF, class 3    ----- Message -----  From: Scott Sales MD  Sent: 1/2/2020   3:38 PM EST  To: Senait Welch MD, Scott Brewer MD    [facility] Thuy Duran, a patient of [employer]s, has HFpEF and would benefit from a CardioMems. She does not meet criteria for GUIDE-HF due to a her low NTproBNPs despite an elevated PCWP on her RHC. She has not had a qualifying hospital stay but has had urgent clinic visits for worsening dyspnea. Would you kindly look at her for a commercial CardioMems at [facility]? If she meets insurance approval, would you mind implanting her at [facility]? Thanks!   Chanda

## 2020-01-13 ENCOUNTER — TELEPHONE (OUTPATIENT)
Dept: CARDIOLOGY CLINIC | Age: 70
End: 2020-01-13

## 2020-01-13 NOTE — TELEPHONE ENCOUNTER
Lab work reviewed by Dr. Capri Navarro. Per Dr. Capri Navarro patient to follow up with PCP regarding elevated TSH, labs otherwise within acceptable limits. Message also received from patient stating she needed records faxed for upcoming appointment on Wednesday. Contacted patient to notify of results and discuss records request. Patient verbalized understanding of results. Per patient she is seeing Dr. Emma Blake in [place], [place] on Wednesday. Patient requested recent lab work be faxed. Informed patient will fax records. Patient verbalized understanding and had no further questions. Recent lab work as well as last office note from Dr. Capri Navarro faxed to Dr. Chi Cruz office this date. Areli Feldman RN.

## 2020-01-14 ENCOUNTER — HOSPITAL ENCOUNTER (OUTPATIENT)
Dept: PULMONOLOGY | Age: 70
Discharge: HOME | End: 2020-01-14
Attending: INTERNAL MEDICINE
Payer: MEDICARE

## 2020-01-14 DIAGNOSIS — R06.02 SHORTNESS OF BREATH: ICD-10-CM

## 2020-01-14 DIAGNOSIS — I50.32 CHRONIC DIASTOLIC HEART FAILURE (HCC): ICD-10-CM

## 2020-01-14 DIAGNOSIS — R06.02 SHORTNESS OF BREATH: Primary | ICD-10-CM

## 2020-01-14 PROCEDURE — 94060 EVALUATION OF WHEEZING: CPT

## 2020-01-14 PROCEDURE — 94729 DIFFUSING CAPACITY: CPT

## 2020-01-16 LAB
25(OH)D3+25(OH)D2 SERPL-MCNC: 31.8 NG/ML (ref 30–100)
FOLATE SERPL-MCNC: 7.2 NG/ML
NT-PROBNP SERPL-MCNC: 147 PG/ML (ref 0–301)
T3FREE SERPL-MCNC: 3.6 PG/ML (ref 2–4.4)
T4 SERPL-MCNC: 6.4 UG/DL (ref 4.5–12)
TSH SERPL DL<=0.005 MIU/L-ACNC: 20.33 UIU/ML (ref 0.45–4.5)
UBIQUINONE10 SERPL-MCNC: 2.09 UG/ML (ref 0.37–2.2)
VIT B1 BLD-SCNC: 114.2 NMOL/L (ref 66.5–200)
VIT B12 SERPL-MCNC: 1462 PG/ML (ref 232–1245)
VIT B6 SERPL-MCNC: 1.1 UG/L (ref 2–32.8)

## 2020-01-16 NOTE — PROCEDURES
FEV1 - 0.77, 42%  FVC - 1.4, 54%  FEV1/FVC - 55%  Following an inhaled bronchodilator, there is a 130ml/16% change in FEV1 and a 180ml/13% change in FVC. DLCO - 72%  DL/VA - 113%    Spirometry reveals severe airflow obstruction with moderate reduction in forced vital capacity which may be due to a restrictive ventilatory defect or air trapping. Lung volume measurements are recommended. There is no improvement after inhaled bronchodilators. Diffusing capacity is mildly reduced but corrects to normal when considering alveolar volume.

## 2020-01-17 ENCOUNTER — TELEPHONE (OUTPATIENT)
Dept: CARDIOLOGY CLINIC | Age: 70
End: 2020-01-17

## 2020-01-17 NOTE — TELEPHONE ENCOUNTER
----- Message from Jin Henning MD sent at 1/16/2020  7:12 PM EST -----  Please call Savanah Andres with her elevated TSH - we should forward the results to the physician who is managing her thyroid medications.     Thank you  ----- Message -----  From: Ajay Florian Lab Results In  Sent: 1/16/2020   3:37 AM EST  To: Jin Henning MD

## 2020-01-21 LAB
BUN SERPL-MCNC: 14 MG/DL (ref 8–27)
BUN/CREAT SERPL: 22 (ref 12–28)
CALCIUM SERPL-MCNC: 9.5 MG/DL (ref 8.7–10.3)
CHLORIDE SERPL-SCNC: 105 MMOL/L (ref 96–106)
CO2 SERPL-SCNC: 20 MMOL/L (ref 20–29)
CREAT SERPL-MCNC: 0.64 MG/DL (ref 0.57–1)
ERYTHROCYTE [DISTWIDTH] IN BLOOD BY AUTOMATED COUNT: 12.3 % (ref 11.7–15.4)
GLUCOSE SERPL-MCNC: 124 MG/DL (ref 65–99)
HCT VFR BLD AUTO: 40 % (ref 34–46.6)
HGB BLD-MCNC: 13.2 G/DL (ref 11.1–15.9)
MCH RBC QN AUTO: 30.4 PG (ref 26.6–33)
MCHC RBC AUTO-ENTMCNC: 33 G/DL (ref 31.5–35.7)
MCV RBC AUTO: 92 FL (ref 79–97)
PLATELET # BLD AUTO: 342 X10E3/UL (ref 150–450)
POTASSIUM SERPL-SCNC: 4.9 MMOL/L (ref 3.5–5.2)
RBC # BLD AUTO: 4.34 X10E6/UL (ref 3.77–5.28)
SODIUM SERPL-SCNC: 145 MMOL/L (ref 134–144)
WBC # BLD AUTO: 15.1 X10E3/UL (ref 3.4–10.8)

## 2020-01-21 NOTE — PROGRESS NOTES
Pre CardioMEMS implant lab work stable. Cx elevation in WBC due to prednisone. Ok to proceed as scheduled.

## 2020-01-22 ENCOUNTER — OFFICE VISIT (OUTPATIENT)
Dept: SLEEP MEDICINE | Age: 70
End: 2020-01-22

## 2020-01-22 VITALS
HEIGHT: 62 IN | DIASTOLIC BLOOD PRESSURE: 74 MMHG | BODY MASS INDEX: 32.74 KG/M2 | HEART RATE: 79 BPM | WEIGHT: 177.9 LBS | SYSTOLIC BLOOD PRESSURE: 117 MMHG | OXYGEN SATURATION: 94 %

## 2020-01-22 DIAGNOSIS — G47.33 OSA (OBSTRUCTIVE SLEEP APNEA): Primary | ICD-10-CM

## 2020-01-22 NOTE — PROGRESS NOTES
[address] [place]. [place], [address]  Tel.  [phone]  Fax. [address] [facility] [address]  [place], [address] [facility]  Tel.  [phone]  Fax. [phone] [address] [facility] [place]   Tel.  [phone]  Fax. [phone]       Chief Complaint       Chief Complaint   Patient presents with    Sleep Problem     NP_ref by Dr. Paez_CHF_shortness of breath_witnessed apnea       HPI      Shereen Dhillon is 65 y.o. female seen for evaluation of a sleep disorder. She is accompanied by her . She has a significant history of chronic HF with preserved EF. She is referred for evaluation of potential sleep disordered breathing. She only retires at midnight and awakens at 5 AM.  She may awaken once during the night. She does note episodes of difficulties falling asleep. She does note fatigue during the day. She may easily fall asleep watching TV, reading or writing as a passenger. She believes that she snores; her  is unable to comment in this regard. The patient has not undergone diagnostic testing for the current problems.          [place] Sleepiness Score: 11       Allergies   Allergen Reactions    Clopidogrel Other (comments)    Sulfa (Sulfonamide Antibiotics) Swelling    Adhesive Tape-Silicones Other (comments)     BAD BLISTERS AND TENDS TO GET INFECTED    Albuterol Palpitations    Aspirin Hives and Other (comments)     \"it makes my stomach bleed\"      Butter Flavor Anaphylaxis     Butter AND CREME    Cimzia [Certolizumab Pegol] Other (comments)     GI symptoms, blisters in the mouth and esophagus    Demerol [Meperidine] Other (comments)     HYPOTENSION    Fentanyl Other (comments)     HYPOTENSION    Gluten Diarrhea     bloating    Keflex [Cephalexin] Anaphylaxis    Levaquin [Levofloxacin] Unproven on Challenge     PT DOESN'T REMEMBER HAVING THAT    Morphine Other (comments)     HYPOTENSION    Nitroglycerin Other (comments) IN PILL FORM  - HYPOTENSION  CAN TOLERATE PATCH FOR SHORT TIME    Pcn [Penicillins] Anaphylaxis    Percocet [Oxycodone-Acetaminophen] Unknown (comments)     HYPOTENSION AND HALLUCINATIONS. Can take tylenol ok, oxycodone is allergy per patient.  Tapazole [Methimazole] Unknown (comments)     Patient stated \"it made me feel like I had the flu\"       Current Outpatient Medications   Medication Sig Dispense Refill    predniSONE (DELTASONE) 5 mg tablet Take 5 mg by mouth daily.  ondansetron (ZOFRAN ODT) 4 mg disintegrating tablet Take 1 Tab by mouth every eight (8) hours as needed for Nausea. 10 Tab 0    bumetanide (BUMEX) 1 mg tablet Take 1 tablet Bumex in the morning and half a tablet in the afternoon (Patient taking differently: Take 1 mg by mouth as needed. Take 1 tablet Bumex in the morning) 135 Tab 3    metoprolol succinate (TOPROL-XL) 50 mg XL tablet Take 1 Tab by mouth daily. 90 Tab 3    cholecalciferol, vitamin D3, (VITAMIN D3) 2,000 unit tab Take 2,000 Units by mouth daily.  COQ10, UBIQUINOL, PO Take 100 mg by mouth daily.  turmeric (CURCUMIN) Take 1 g by mouth daily.  glucose blood VI test strips (ONETOUCH VERIO) strip OneTouch Verio strips      fenofibrate nanocrystallized (TRICOR) 48 mg tablet Take 1 Tab by mouth daily. 90 Tab 3    thyroid, Pork, (NP THYROID) 15 mg tablet Take  by mouth daily. Takes in addition to the [address].  nitroglycerin (NITRODUR) 0.1 mg/hr 1 Patch by TransDERmal route daily. (Patient taking differently: 1 Patch by TransDERmal route as needed.) 30 Patch 0    SITagliptin (JANUVIA) 100 mg tablet Take 100 mg by mouth daily.  flaxseed oil (OMEGA 3 PO) Take 1 Tab by mouth every evening. Indications: FISH OIL      vit C/vit E ac/lut/copper/zinc (PRESERVISION LUTEIN PO) Take 1 Tab by mouth daily.  thyroid, Pork, (NP THYROID) 90 mg tablet Take 90 mg by mouth daily.  Takes in addition to 7.5 mg for a total of 97.5 mg      lidocaine (LIDODERM) 5 % 1 Patch by TransDERmal route every twenty-four (24) hours. Apply patch to the affected area for 12 hours a day and remove for 12 hours a day.  ascorbic acid (VITAMIN C) 500 mg tablet Take 1,000 mg by mouth daily.  ferrous sulfate 325 mg (65 mg iron) tablet Take  by mouth every evening.  cyanocobalamin (VITAMIN B12) 1,000 mcg/mL injection INJECT 1 ML INTO THE MUSCLE EVERY 30 DAYS 10 mL 0    lansoprazole (PREVACID) 30 mg capsule Take 30 mg by mouth Daily (before breakfast).  eplerenone (INSPRA) 25 mg tablet Take 1 Tab by mouth daily. 30 Tab 6    predniSONE (DELTASONE) 2.5 mg tablet Take 2.5 mg by mouth every evening. She  has a past medical history of Anemia, Arthritis, Asthma (6/29/2009), Atrial fibrillation ([address] (6/29/2009), CAD (coronary artery disease) (06/29/2009), Chronic pain, Diastolic dysfunction, GERD (gastroesophageal reflux disease), Gluten intolerance, Hypothyroidism (6/29/2009), Personal history of MI (myocardial infarction), Rheumatoid arthritis ([address] Stroke ([facility] [address] Syncope, Thromboembolus ([facility] [address] (2004), Thyroid disease, TIA (transient ischemic attack), Unspecified adverse effect of anesthesia, Unspecified adverse effect of anesthesia, and Vitamin B12 deficiency (6/29/2009). She  has a past surgical history that includes stent insertion; hx hysterectomy; hx cholecystectomy (6/16/11); hx heart catheterization (2010); pr cardiac surg procedure unlist; hx lumbar laminectomy (2000); hx orthopaedic (1993-6/2012); hx tonsillectomy (1964); hx gi; hx appendectomy; and hx adenoidectomy. She family history includes Breast Cancer in her cousin, cousin, cousin, maternal aunt, maternal aunt, and maternal aunt; Cancer in her father; Coronary Artery Disease in her mother; Delayed Awakening in her mother and sister; Heart Disease in her mother; Hypertension in her brother, father, mother, and sister; Lung Disease in her father; Stroke in her mother.     She  reports that she quit smoking about 17 years ago. She has a 30.00 pack-year smoking history. She has never used smokeless tobacco. She reports that she does not drink alcohol or use drugs. Review of Systems:  Review of Systems   Constitutional: Negative for chills and fever. HENT: Negative for hearing loss and tinnitus. Eyes: Negative for blurred vision and double vision. Respiratory: Positive for shortness of breath. Cardiovascular: Positive for chest pain and palpitations. Gastrointestinal: Positive for heartburn and nausea. Genitourinary: Negative for frequency and urgency. Musculoskeletal: Positive for joint pain and neck pain. Skin: Positive for rash. Negative for itching. Neurological: Positive for dizziness and headaches. Psychiatric/Behavioral: Negative for depression. Objective:     Visit Vitals  /74 (BP 1 Location: Left arm, BP Patient Position: Sitting)   Pulse 79   Ht 5' 2\" (1.575 m)   Wt 177 lb 14.4 oz (80.7 kg)   LMP 09/29/1980 (LMP Unknown)   SpO2 94%   BMI 32.54 kg/m²     Body mass index is 32.54 kg/m². General:   Conversant, cooperative   Eyes:  Pupils equal and reactive, no nystagmus   Oropharynx:   Mallampati score I, tongue mildly scalloped   Tonsils:      Neck:   No carotid bruits; Neck circ. in \"inches\": 17   Chest/Lungs:  Clear on auscultation    CVS:  Normal rate, regular rhythm   Skin:  Warm to touch; no obvious rashes   Neuro:  Speech fluent, face symmetrical, tongue movement normal   Psych:  Normal affect,  normal countenance        Assessment:       ICD-10-CM ICD-9-CM    1. ELINA (obstructive sleep apnea) G47.33 327.23      Potential sleep disordered breathing. She will be evaluated with a nocturnal polysomnogram.  She has requested a mild sleep aid to take at that time. Results of the study will be reviewed with her. Plan:     No orders of the defined types were placed in this encounter.       * Patient has a history and examination consistent with the diagnosis of sleep apnea. * Sleep testing was ordered for initial evaluation. * She was provided information on sleep apnea including corresponding risk factors and the importance of proper treatment. * Treatment options if indicated were reviewed today. Instructions:    o Do not engage in activities requiring a normal degree of alertness if fatigue is present. o The patient understands that untreated or undertreated sleep apnea could impair judgement and the ability to function normally during the day.  o Call or return if symptoms worsen or persist.          Jemma Vasquez MD, [facility]  Electronically signed 01/22/20       This note was created using voice recognition software. Despite editing, there may be syntax errors. This note will not be viewable in [address]

## 2020-01-23 DIAGNOSIS — I25.10 CORONARY ARTERY DISEASE INVOLVING NATIVE CORONARY ARTERY OF NATIVE HEART WITHOUT ANGINA PECTORIS: ICD-10-CM

## 2020-01-23 DIAGNOSIS — I50.32 CHRONIC DIASTOLIC HEART FAILURE (HCC): Primary | ICD-10-CM

## 2020-01-23 RX ORDER — SODIUM CHLORIDE 0.9 % (FLUSH) 0.9 %
5-40 SYRINGE (ML) INJECTION AS NEEDED
Status: CANCELLED | OUTPATIENT
Start: 2020-01-28

## 2020-01-23 RX ORDER — SODIUM CHLORIDE 0.9 % (FLUSH) 0.9 %
5-40 SYRINGE (ML) INJECTION EVERY 8 HOURS
Status: CANCELLED | OUTPATIENT
Start: 2020-01-28

## 2020-01-23 RX ORDER — SODIUM CHLORIDE 9 MG/ML
50 INJECTION, SOLUTION INTRAVENOUS CONTINUOUS
Status: CANCELLED | OUTPATIENT
Start: 2020-01-28

## 2020-01-23 RX ORDER — DIPHENHYDRAMINE HYDROCHLORIDE 50 MG/ML
25 INJECTION, SOLUTION INTRAMUSCULAR; INTRAVENOUS
Status: CANCELLED | OUTPATIENT
Start: 2020-01-28 | End: 2020-01-28

## 2020-01-28 ENCOUNTER — HOSPITAL ENCOUNTER (OUTPATIENT)
Age: 70
Setting detail: OUTPATIENT SURGERY
Discharge: HOME | End: 2020-01-28
Attending: SPECIALIST | Admitting: SPECIALIST
Payer: MEDICARE

## 2020-01-28 VITALS
HEART RATE: 79 BPM | WEIGHT: 176.15 LBS | OXYGEN SATURATION: 94 % | RESPIRATION RATE: 21 BRPM | DIASTOLIC BLOOD PRESSURE: 78 MMHG | SYSTOLIC BLOOD PRESSURE: 142 MMHG | HEIGHT: 62 IN | BODY MASS INDEX: 32.42 KG/M2 | TEMPERATURE: 98 F

## 2020-01-28 DIAGNOSIS — I50.32 CHRONIC DIASTOLIC HEART FAILURE (HCC): ICD-10-CM

## 2020-01-28 DIAGNOSIS — I25.10 CORONARY ARTERY DISEASE INVOLVING NATIVE CORONARY ARTERY OF NATIVE HEART WITHOUT ANGINA PECTORIS: ICD-10-CM

## 2020-01-28 LAB
GLUCOSE BLD STRIP.AUTO-MCNC: 129 MG/DL (ref 65–100)
SERVICE CMNT-IMP: ABNORMAL

## 2020-01-28 PROCEDURE — 82962 GLUCOSE BLOOD TEST: CPT

## 2020-01-28 RX ORDER — DIPHENHYDRAMINE HYDROCHLORIDE 50 MG/ML
25 INJECTION, SOLUTION INTRAMUSCULAR; INTRAVENOUS
Status: DISCONTINUED | OUTPATIENT
Start: 2020-01-28 | End: 2020-01-28 | Stop reason: HOSPADM

## 2020-01-28 RX ORDER — SODIUM CHLORIDE 9 MG/ML
50 INJECTION, SOLUTION INTRAVENOUS CONTINUOUS
Status: DISCONTINUED | OUTPATIENT
Start: 2020-01-28 | End: 2020-01-28 | Stop reason: HOSPADM

## 2020-01-28 RX ORDER — SODIUM CHLORIDE 0.9 % (FLUSH) 0.9 %
5-40 SYRINGE (ML) INJECTION EVERY 8 HOURS
Status: DISCONTINUED | OUTPATIENT
Start: 2020-01-28 | End: 2020-01-28 | Stop reason: HOSPADM

## 2020-01-28 RX ORDER — SODIUM CHLORIDE 0.9 % (FLUSH) 0.9 %
5-40 SYRINGE (ML) INJECTION AS NEEDED
Status: DISCONTINUED | OUTPATIENT
Start: 2020-01-28 | End: 2020-01-28 | Stop reason: HOSPADM

## 2020-01-28 NOTE — PROGRESS NOTES
Patient arrived. ID and allergies verified verbally with patient. Pt voices understanding of procedure to be performed. Consent obtained. Pt prepped for procedure. Accucheck is 129  07:50, here,case cancelled due to pts inability to take post procedure anti platelet meds.

## 2020-01-28 NOTE — PROGRESS NOTES
Long discussion with patient and her . She has aspirin allergy (throat swelling/discomfort) and rash. Hx of plavix induced thrombocytopenia. Previously evaluated by Dr Dinh Fernandes from hematology who told her \"never to take aspirin or plavix\". Having said that she has had PCI in the past and tolerated DAPT. Has not been on warfarin previously. Hx of GI bleeding noted. Weighed risks and benefits of cardioMEMS implant (elective diagnostic tool) vis a vis risks of bleeding with DAPT at this time. Favor deferring procedure for now and having hematology reevaluate. Mrs Carmen Raya is agreeable with this strategy. Reviewed case with Dr Kitty Leone who will facilitate hematology evaluation. Warfarin may be a reasonable strategy post implant.

## 2020-02-21 ENCOUNTER — OFFICE VISIT (OUTPATIENT)
Dept: CARDIOLOGY CLINIC | Age: 70
End: 2020-02-21

## 2020-02-21 VITALS
OXYGEN SATURATION: 96 % | HEART RATE: 68 BPM | WEIGHT: 177 LBS | SYSTOLIC BLOOD PRESSURE: 128 MMHG | RESPIRATION RATE: 18 BRPM | BODY MASS INDEX: 32.37 KG/M2 | DIASTOLIC BLOOD PRESSURE: 80 MMHG

## 2020-02-21 DIAGNOSIS — I51.89 DIASTOLIC DYSFUNCTION: ICD-10-CM

## 2020-02-21 DIAGNOSIS — I10 HTN (HYPERTENSION), BENIGN: ICD-10-CM

## 2020-02-21 DIAGNOSIS — R06.02 SOB (SHORTNESS OF BREATH): ICD-10-CM

## 2020-02-21 DIAGNOSIS — I25.118 CORONARY ARTERY DISEASE OF NATIVE ARTERY OF NATIVE HEART WITH STABLE ANGINA PECTORIS (HCC): Primary | ICD-10-CM

## 2020-02-21 RX ORDER — LEVOTHYROXINE SODIUM 137 UG/1
137 TABLET ORAL
COMMUNITY
Start: 2020-02-04 | End: 2022-02-25

## 2020-02-21 NOTE — PROGRESS NOTES
Nick Alcazar MD. [facility]              Patient: Jeffrey Huertas  : 1950      Today's Date: 2020          HISTORY OF PRESENT ILLNESS:     History of Present Illness:  She is here for follow-up. She has lost some weight - steroids are down and sugars are better. Still with CHACON. She saw a mold specialist.  She is not retaining much fluid since steroids cut back. Records reviewed and chart updated below. She had abnormal PFT's. She was unable to get Cardiomems since needs to be on a blood thinner with it. PAST MEDICAL HISTORY:     Past Medical History:   Diagnosis Date    Anemia     iron defic anemia    Arthritis     Asthma 2009    CAUSED BY MOLD    Atrial fibrillation ([address] 2009    CAD (coronary artery disease) 2009    MI X2  -- prior stenting     Chronic pain     RT. ANKLE    Diastolic dysfunction     GERD (gastroesophageal reflux disease)     Gluten intolerance     Hypothyroidism 2009    Personal history of MI (myocardial infarction)     Rheumatoid arthritis ([address]     Stroke ([address]     TIA (NO RESIDUAL)    Syncope     Thromboembolus ([address]     RT. -- DVT after surgery     Thyroid disease     HYPO    TIA (transient ischemic attack)      & 2006    Unspecified adverse effect of anesthesia     \"IS A LIGHT WEIGHT\"    Unspecified adverse effect of anesthesia     DIFFICULTY AWAKENING    Vitamin B12 deficiency 2009         Past Surgical History:   Procedure Laterality Date    CARDIAC SURG PROCEDURE UNLIST      ABLATION    HX ADENOIDECTOMY      HX APPENDECTOMY      HX CHOLECYSTECTOMY  11    HX GI      COLONOSCOPY AND ENDOSCOPY    HX HEART CATHETERIZATION  2010    2 STENTS    HX HYSTERECTOMY      HX LUMBAR LAMINECTOMY  2000    L4-L5    HX ORTHOPAEDIC  -2012    RT.  FOOT SURGERY X 6/calcaneal osteotomy    HX TONSILLECTOMY  1964    STENT INSERTION             MEDICATIONS:     Current Outpatient Medications   Medication Sig Dispense Refill    levothyroxine (SYNTHROID) 150 mcg tablet Take 150 mcg by mouth Daily (before breakfast).  predniSONE (DELTASONE) 5 mg tablet Take 5 mg by mouth daily.  bumetanide (BUMEX) 1 mg tablet Take 1 tablet Bumex in the morning and half a tablet in the afternoon (Patient taking differently: Take 1 mg by mouth as needed. Take 1 tablet Bumex in the morning) 135 Tab 3    metoprolol succinate (TOPROL-XL) 50 mg XL tablet Take 1 Tab by mouth daily. 90 Tab 3    COQ10, UBIQUINOL, PO Take 100 mg by mouth daily.  turmeric (CURCUMIN) Take 1 g by mouth daily.  eplerenone (INSPRA) 25 mg tablet Take 1 Tab by mouth daily. 30 Tab 6    glucose blood VI test strips (ONETOUCH VERIO) strip OneTouch Verio strips      fenofibrate nanocrystallized (TRICOR) 48 mg tablet Take 1 Tab by mouth daily. 90 Tab 3    SITagliptin (JANUVIA) 100 mg tablet Take 100 mg by mouth daily.  flaxseed oil (OMEGA 3 PO) Take 1 Tab by mouth every evening. Indications: FISH OIL      vit C/vit E ac/lut/copper/zinc (PRESERVISION LUTEIN PO) Take 1 Tab by mouth daily.  lidocaine (LIDODERM) 5 % 1 Patch by TransDERmal route every twenty-four (24) hours. Apply patch to the affected area for 12 hours a day and remove for 12 hours a day.  ascorbic acid (VITAMIN C) 500 mg tablet Take 1,000 mg by mouth daily.  ferrous sulfate 325 mg (65 mg iron) tablet Take  by mouth every evening.  cyanocobalamin (VITAMIN B12) 1,000 mcg/mL injection INJECT 1 ML INTO THE MUSCLE EVERY 30 DAYS 10 mL 0    lansoprazole (PREVACID) 30 mg capsule Take 30 mg by mouth Daily (before breakfast).  ondansetron (ZOFRAN ODT) 4 mg disintegrating tablet Take 1 Tab by mouth every eight (8) hours as needed for Nausea. 10 Tab 0    cholecalciferol, vitamin D3, (VITAMIN D3) 2,000 unit tab Take 2,000 Units by mouth daily.  nitroglycerin (NITRODUR) 0.1 mg/hr 1 Patch by TransDERmal route daily.  (Patient taking differently: 1 Patch by TransDERmal route as needed.) 30 Patch 0       Allergies   Allergen Reactions    Clopidogrel Other (comments)    Sulfa (Sulfonamide Antibiotics) Swelling    Adhesive Tape-Silicones Other (comments)     BAD BLISTERS AND TENDS TO GET INFECTED    Albuterol Palpitations    Aspirin Hives and Other (comments)     \"it makes my stomach bleed\"      Butter Flavor Anaphylaxis     Butter AND CREME    Cimzia [Certolizumab Pegol] Other (comments)     GI symptoms, blisters in the mouth and esophagus    Demerol [Meperidine] Other (comments)     HYPOTENSION    Fentanyl Other (comments)     HYPOTENSION    Gluten Diarrhea     bloating    Keflex [Cephalexin] Anaphylaxis    Levaquin [Levofloxacin] Unproven on Challenge     PT DOESN'T REMEMBER HAVING THAT    Morphine Other (comments)     HYPOTENSION    Nitroglycerin Other (comments)     IN PILL FORM  - HYPOTENSION  CAN TOLERATE PATCH FOR SHORT TIME    Pcn [Penicillins] Anaphylaxis    Percocet [Oxycodone-Acetaminophen] Unknown (comments)     HYPOTENSION AND HALLUCINATIONS. Can take tylenol ok, oxycodone is allergy per patient.      Tapazole [Methimazole] Unknown (comments)     Patient stated \"it made me feel like I had the flu\"             SOCIAL HISTORY:     Social History     Tobacco Use    Smoking status: Former Smoker     Packs/day: 1.00     Years: 30.00     Pack years: 30.00     Last attempt to quit: 2002     Years since quittin.7    Smokeless tobacco: Never Used   Substance Use Topics    Alcohol use: No    Drug use: No         FAMILY HISTORY:     Family History   Problem Relation Age of Onset   [employer] Delayed Awakening Mother     Heart Disease Mother     Coronary Artery Disease Mother     Hypertension Mother     Stroke Mother     Delayed Awakening Sister     Hypertension Sister     Lung Disease Father     Cancer Father         colon    Hypertension Father     Hypertension Brother     Breast Cancer Maternal Aunt     Breast Cancer Maternal Aunt  Breast Cancer Cousin         maternal 1st cousin    Breast Cancer Maternal Aunt     Breast Cancer Cousin         maternal 1st cousin    Breast Cancer Cousin         maternal 1st cousin            REVIEW OF SYMPTOMS:      Review of Symptoms:  Constitutional: Negative for fever, chills  HEENT: Negative for nosebleeds, tinnitus  Respiratory: + CHACON   Cardiovascular: Negative for syncope;  + CHACON   Gastrointestinal: Negative for abdominal pain, diarrhea, melena. Genitourinary: Negative for dysuria  Musculoskeletal: + joint pain, RA  Skin: Negative for rash  Heme: No acute bleeding   Neurological: Negative for speech change and focal weakness.               PHYSICAL EXAM:      Physical Exam:  Visit Vitals  /80 (BP 1 Location: Left arm, BP Patient Position: Sitting)   Pulse 68   Resp 18   Wt 177 lb (80.3 kg)   LMP 09/29/1980 (LMP Unknown)   SpO2 96%   BMI 32.37 kg/m²          Patient appears generally well, mood and affect are appropriate and pleasant. HEENT:  Hearing intact, non-icteric, normocephalic, atraumatic. Neck Exam: Supple, no JVD sitting up   Lung Exam: Clear to auscultation, even breath sounds. Cardiac Exam: Regular rate and rhythm with no murmur  Abdomen: Soft, non-tender, normal bowel sounds. Extremities: Moves all ext well. No lower extremity edema.   Psych: Appropriate affect  Neuro - Grossly intact             LABS / OTHER STUDIES:      Lab Results   Component Value Date/Time    Sodium 145 (H) 01/20/2020 10:56 AM    Potassium 4.9 01/20/2020 10:56 AM    Chloride 105 01/20/2020 10:56 AM    CO2 20 01/20/2020 10:56 AM    Anion gap 8 12/12/2019 06:19 PM    Glucose 124 (H) 01/20/2020 10:56 AM    BUN 14 01/20/2020 10:56 AM    Creatinine 0.64 01/20/2020 10:56 AM    BUN/Creatinine ratio 22 01/20/2020 10:56 AM    GFR est  01/20/2020 10:56 AM    GFR est non-AA 89 01/20/2020 10:56 AM    Calcium 9.5 01/20/2020 10:56 AM    Bilirubin, total 0.4 12/12/2019 06:19 PM    AST (SGOT) 18 12/12/2019 06:19 PM    Alk. phosphatase 80 12/12/2019 06:19 PM    Protein, total 6.5 12/12/2019 06:19 PM    Albumin 3.2 (L) 12/12/2019 06:19 PM    Globulin 3.3 12/12/2019 06:19 PM    A-G Ratio 1.0 (L) 12/12/2019 06:19 PM    ALT (SGPT) 24 12/12/2019 06:19 PM     Lab Results   Component Value Date/Time    WBC 15.1 (H) 01/20/2020 10:56 AM    Hemoglobin (POC) 15.6 06/09/2014 03:00 AM    HGB 13.2 01/20/2020 10:56 AM    Hematocrit (POC) 46 06/09/2014 03:00 AM    HCT 40.0 01/20/2020 10:56 AM    PLATELET 558 53/27/3683 10:56 AM    MCV 92 01/20/2020 10:56 AM       Lab Results   Component Value Date/Time    TSH 20.330 (H) 01/06/2020 01:21 PM          Component      Latest Ref Rng & Units 10/8/2019          12:00 AM   NT pro-BNP      0 - 301 pg/mL 67        Labs 11/19 - , chol 220, HDL 41,   Labs 2/12/20 -  Cr 0.63, K 4.1        CARDIAC DIAGNOSTICS:      Cardiac Evaluation Includes:     Cardiac Cath 6/09 - severe mLCX disease --> stenting     Cath 8/19/10 - RCA and LCX stents patent; MLI in LAD, LVEF 60%     Event Monitor 3/11 - PVC's  Holter 3/16 - PVC's  Echo 3/17 - LVEF 60%  Lexiscan Cardiolite 3/17 - normal MPI, LVEF 58%     [facility] records reviewed.  Went there for chest pain on 1/3/19. Labs 1/3/19 - Trop < 0.02, BNP 18, Cr 0.61, Hgb 13  Cath 1/4/19 - LAD stent patent, LCX without sig disease, RCA stent patent.       Holter 1/23/19 - NSR, normal study      Stress Echo 2/11/19 - walked 3:21 (2.4 METS), baseline /90, Max /90, normal stress EKG and stress echo      CXR 2/22/19 - normal      Echo 3/21/19 - LVEF 61%; grade 1 diastology (normal for age), AV sclerosis.   RVSP 26 mmHg.       PARDEEP's with exercise 6/7/19 - normal       Event Monitor 5/15/19-6/6/19 - normal study; symptoms correlated with sinus      Right Heart Cath 6/28/19 [facility] findings:   RAP=   Mean 17    mmHg  RVSP= 40/20    mmHg  PAP=     42/30/34  mmHg  PCWP=  27 mmHg  CO=        Thermal 4.1  L/min,             Faraz pending  CI=           Thermal 3.08  L/min/m2,     Faraz pending      Findings:  1.   Elevated right and left sided filling pressure  2.   Pulmonary hypertension, WHO group 2  3.   Perserved cardiac index by Thermal     Echo 8/13/19 - LVEF 50-55%, \"The following segments are hypokinetic: basal inferior, basal inferolateral and mid inferolateral\"     Cardiac Cath 8/13/19 - Non-obstructive CAD with patent stents in LCX and RCA.  LVEDP 16 mmHg.       Cardiac MRI 9/30/19 - 1. Normal left ventricular cavity size with preserved left ventricular systolic function. There is no significant regional wall motion abnormalities. LVEF 60%. 2. Normal right ventricular size and systolic motion. RVEF 60%. 3. Trace to mild mitral regurgitation. 4. On EGE and LGE study, there is a small thin subendocardial infarct involving 25% to 50% thickness of the subendocardial wall of the basal inferior wall. This infarct is in RCA territory. There is reasonable viability surrounding this infarct. The mid to distal inferior wall and inferoseptal wall does not demonstrate any infarct. There are no other infarct. There is no features of infiltrative sarcoidosis or cardiac amyloidosis. There is no features of inflammatory myocarditis. All other myocardial walls are otherwise completely viable. There is no features of hemochromatosis. 5. Normal pericardium without significant pericardial effusion.     Venous Doppler 9/9/19 - No DVT Bilat     Lexiscan Cardiolite 12/9/19 - Normal MPI, LVEF 66%    PFT's 1/14/20 - Spirometry reveals severe airflow obstruction with moderate reduction in forced vital capacity which may be due to a restrictive ventilatory defect or air trapping     EKG 2/22/19 - NSR, normal   EKG 8/12/19 - NSR, NSST changes            ASSESSMENT AND PLAN:      Assessment and Plan:    1) Chest pain and SOB (chronic diastolic HF)   - She notes symptoms since Jan 2019   -[facility] cath 1/19 showed patent vessels.    - Her stress echo 3/19 was negative for ischemia  - She saw Pulmonary ([facility]) and she says workup was OK.    - RHC 6/28/19 with PCWP 27   - Cardiac cath repeated 8/13/19 showed non-obs CAD with LVEDP 16 mmHg  - Her symptoms could be due to small vessel disease and/or diastolic dysfunction.   - Plan for good BP control and optimize anti-anginals.   - Not taking Imdur or Ranexa due to a bad HA  - She saw Dr. Luis Retana 8/19 (initiated workup for restrictive disease)     - Cardiac MRI 9/30/19 - There is no features of infiltrative sarcoidosis or cardiac amyloidosis. - CardioMems was considered but due to problem with blood thinners she declined proceeding with one   - She has seen an Allergy specialist in [place]. She says workup for mold in her house was OK   - On 2/22/20 - she is feeling better as steroids have been cut back. Weight is down 10 lbs. I asked her to continue Bumex 1 mg daily (given high PCWP in past) (she was just taking it twice a week). Given abnormal PFT's, I asked her to consider seeing Pulmonary again.    2) History of Iron Deficiency Anemia   - She takes no blood thinners (not even aspirin) due to anemia in past   - Dr. Yady Tenorio record 12/16 reviewed - He was seeing her for iron deficiency anemia thought to be from GI Bleed from aspirin and plavix      3) Lipids -   - Does not take a statin due to having muscle aches (from RA at baseline)  - She is now only on Tricor   - planned for a PSCK9 inhibitor in past but she is not taking it when I see her for follow-up.   Will have my nurse look into it.      4) History of Afib   - question of ablation in past?   - not on any blood thinners due to anemia in past (see above)   - has been in sinus during time seen by me      5) HTN  - see above      6) Dr. Lay record reviewed 1/31/19   - plan was for CTA for PE and PFT's -- patient says testing was OK   - PFT's abnormal 1/2020 - I asked her to FU with Pulmonary      7) RA  - she is on prednisone      8) DM on steroids   - seeing PCP     9) Thyroid - defer to Dr. Deandre Ontiveros in 1 month. See me in 6 months.   Patient expressed understanding of the plan - questions were answered. On a side note, I see her brother.             Quin Schultz MD, [address] [place] [address]  Ph: [phone]                               Ph [phone]

## 2020-02-26 ENCOUNTER — TELEPHONE (OUTPATIENT)
Dept: CARDIOLOGY CLINIC | Age: 70
End: 2020-02-26

## 2020-02-28 NOTE — TELEPHONE ENCOUNTER
Spoke to pt,  Per Dr. Jazlyn Cardoza: \"Hi Quiana Toscano - Ms. Sae Taylor should be on [address] but is not. Adrienne Minor you please see why not?  Did insurance approve it?  Did she ever get it?   Can she afford it?  Can you please call her and look into this. \"    She stated she did not look into it before. All questions answered.

## 2020-03-02 ENCOUNTER — TELEPHONE (OUTPATIENT)
Dept: CARDIOLOGY CLINIC | Age: 70
End: 2020-03-02

## 2020-03-02 NOTE — TELEPHONE ENCOUNTER
Called and spoke with patient 6/25/19    Spoke with pt concerning RHC  procedure. Instructions given to pt per VO Dr. Anum Moss. Pt. NPO after midnight; Hold Januvia 24 hours prior to the procedure and hold Bumex the day of the procedure. Have someone available to drive pt to and from procedure; pack a bag in case an overnight stay is warranted. RHC scheduled for 12:30 pm on 6/28/19. Patient advised to arrive 2 hrs prior to procedure for prep. Patient verbalized understanding. Opportunities for questions, clarifications, and concerns provided.
98

## 2020-03-02 NOTE — TELEPHONE ENCOUNTER
Left voice message for patient regarding needing to reschedule her next follow up due to a scheduling conflict.     Appointment has been rescheduled to Monday April 13th at 11:10am w/Dr. Nimisha Mcdonald

## 2020-03-13 ENCOUNTER — TELEPHONE (OUTPATIENT)
Dept: CARDIOLOGY CLINIC | Age: 70
End: 2020-03-13

## 2020-03-13 NOTE — TELEPHONE ENCOUNTER
[employer] is calling regarding a prior auth the require for the patients repatha. Please advise.     Phone: [phone]   Reference castillo: [id number]

## 2020-03-18 ENCOUNTER — HOSPITAL ENCOUNTER (OUTPATIENT)
Dept: SLEEP MEDICINE | Age: 70
Discharge: HOME | End: 2020-03-18
Payer: MEDICARE

## 2020-03-18 VITALS
WEIGHT: 178.6 LBS | OXYGEN SATURATION: 96 % | SYSTOLIC BLOOD PRESSURE: 122 MMHG | HEART RATE: 80 BPM | BODY MASS INDEX: 31.64 KG/M2 | DIASTOLIC BLOOD PRESSURE: 69 MMHG | HEIGHT: 63 IN | TEMPERATURE: 98.6 F

## 2020-03-18 DIAGNOSIS — G47.33 OSA (OBSTRUCTIVE SLEEP APNEA): ICD-10-CM

## 2020-03-18 PROCEDURE — 95810 POLYSOM 6/> YRS 4/> PARAM: CPT | Performed by: SPECIALIST

## 2020-03-27 ENCOUNTER — DOCUMENTATION ONLY (OUTPATIENT)
Dept: CARDIOLOGY CLINIC | Age: 70
End: 2020-03-27

## 2020-03-27 ENCOUNTER — TELEPHONE (OUTPATIENT)
Dept: SLEEP MEDICINE | Age: 70
End: 2020-03-27

## 2020-03-31 ENCOUNTER — DOCUMENTATION ONLY (OUTPATIENT)
Dept: CARDIOLOGY CLINIC | Age: 70
End: 2020-03-31

## 2020-04-01 ENCOUNTER — TELEPHONE (OUTPATIENT)
Dept: SLEEP MEDICINE | Age: 70
End: 2020-04-01

## 2020-04-08 ENCOUNTER — TELEPHONE (OUTPATIENT)
Dept: CARDIOLOGY CLINIC | Age: 70
End: 2020-04-08

## 2020-04-08 NOTE — TELEPHONE ENCOUNTER
Judy Preciado MD; Christine Carmichael RN; Jesenia Quezada 29 minutes ago (9:35 AM)      Amee/Sarai,   Patient would like to know if we have received results from her sleep study.  She states she has tried 4 times to contact them to try to get her results. Sharla Andersen,     Call [phone] first.    Routing comment      Contacted patient to inform her results of sleep study not yet interpreted. Informed her sleep medicine will contact her with results and recommendations once received. She verbalized understanding and had no further questions. Ele Mas RN.

## 2020-04-08 NOTE — TELEPHONE ENCOUNTER
Patient called office to reschedule upcoming appt to a virtual/telephone appt. Due to concerns over Corona Virus. Appointment has been scheduled as a virtual visit/MyChart appt. Patient was instructed to take their weight, BP, HR and temp before the appt. and have medications/list available. I informed the patient they would receive a call from our office 15 min before the appt. to provide readings. The following was confirmed with the patient:    \"We want to confirm that, for purposes of billing, this is a virtual visit with your provider for which we will submit a claim for reimbursement with your insurance company. You will be responsible for any co pays, coinsurance, amounts or other amounts not covered by your insurance company. If you do not accept this, unfortunately we will not be able to schedule a virtual visit with the provider. Do you accept? \"    Patient does accept. Patient verbalized understanding and had no further questions.

## 2020-04-09 ENCOUNTER — TELEPHONE (OUTPATIENT)
Dept: SLEEP MEDICINE | Age: 70
End: 2020-04-09

## 2020-04-09 DIAGNOSIS — G47.33 OSA (OBSTRUCTIVE SLEEP APNEA): Primary | ICD-10-CM

## 2020-04-09 NOTE — TELEPHONE ENCOUNTER
Patient called to review result.   She is seeing her other Dr on Monday, 13th at 6 and needed to share result with the Dr.    She already called 2 times in the past.

## 2020-04-12 NOTE — TELEPHONE ENCOUNTER
Nocturnal polysomnographic recording was performed. 419.5 minutes recorded of which 144 minutes spent asleep with a sleep efficiency reduced to 34.3%. Sleep onset at 41.3 minutes; REM onset at 362 minutes with total REM representing 3.5% of sleep time. All sleep stages were observed. 17 respiratory events occurred of which 16 hypopnea and 1 obstructive apnea. The overall AHI was mildly elevated at 7.1/h. Events were more prominent in REM  sleep with the REM-related AHI of 24/h. Mild snoring noted. Impression: Mild sleep disordered breathing more evident in REM sleep. The significant reduction in total  REM may have potentially exaggerated the REM events. In addition, the overall reduction in total sleep time may have provided an inaccurate representation of sleep disordered breathing. Recommendation:  Have reviewed options with patient. Will consider HSAT in 2-3 months as potentially providing a more acute assessment of patients sleep pattern. She will contact the office at that time.

## 2020-04-13 ENCOUNTER — VIRTUAL VISIT (OUTPATIENT)
Dept: CARDIOLOGY CLINIC | Age: 70
End: 2020-04-13

## 2020-04-13 VITALS
BODY MASS INDEX: 31.01 KG/M2 | HEART RATE: 69 BPM | TEMPERATURE: 97.6 F | SYSTOLIC BLOOD PRESSURE: 114 MMHG | HEIGHT: 63 IN | WEIGHT: 175 LBS | DIASTOLIC BLOOD PRESSURE: 68 MMHG

## 2020-04-13 DIAGNOSIS — I50.30 HEART FAILURE WITH PRESERVED EJECTION FRACTION, UNSPECIFIED HF CHRONICITY (HCC): Primary | ICD-10-CM

## 2020-04-13 RX ORDER — MULTIVIT WITH MINERALS/HERBS
1 TABLET ORAL DAILY
COMMUNITY
End: 2020-07-15

## 2020-04-13 RX ORDER — IVERMECTIN 3 MG/1
TABLET ORAL
COMMUNITY
Start: 2020-03-12 | End: 2020-07-15 | Stop reason: ALTCHOICE

## 2020-04-13 RX ORDER — MINOCYCLINE HYDROCHLORIDE 100 MG/1
100 CAPSULE ORAL EVERY OTHER DAY
COMMUNITY
Start: 2020-03-12 | End: 2020-07-15 | Stop reason: ALTCHOICE

## 2020-04-13 RX ORDER — MELATONIN 5 MG
5 CAPSULE ORAL
COMMUNITY
End: 2020-09-11

## 2020-04-13 RX ORDER — TINIDAZOLE 250 MG/1
TABLET ORAL EVERY OTHER DAY
COMMUNITY
Start: 2020-03-12 | End: 2020-07-15 | Stop reason: ALTCHOICE

## 2020-04-13 NOTE — PROGRESS NOTES
Deepak Wright is a 65 y.o. female evaluated via telephone on 4/13/2020.    11:10-11:26     Consent:  She and/or health care decision maker is aware that that she may receive a bill for this telephone service, depending on her insurance coverage, and has provided verbal consent to proceed: Yes    Review of Systems   Constitutional: Negative for chills and fever. Respiratory: Positive for sputum production. Negative for cough. Neurological: Positive for dizziness. Psychiatric/Behavioral: The patient has insomnia. Documentation:  I communicated with the patient and/or health care decision maker about her dyspnea, CAD, and heart failure with preserved EF. Details of this discussion including any medical advice provided:     1. CAD s/p PCI to LAD , RCA             Lexiscan 12/2019 - normal perfusion             LHC in 8/19 - no intervention             Intolerant to statins d/t myalgias             ASA intolerant d/t GI bleeding             Developed plavix induced thrombocytopenia - advised by hematology to avoid antiplatelets             On metoprolol succinate             Recommend discontinuation of calcium supplements - eat foods high in calcium                  2. HFpEF - Stage C, NYHA Class III             RHC with PCWP 27 mmHg             On bumex, eplerenone, metoprolol             Cardiac MRI - LVEF 60%, small subendocardial infarct, no infiltrative disease             SPEP - no M spike             Iron profile - normal             ATTR negative             Recommend CardioMems - will discuss with Dr. Rebecca Tinoco and Genesis Shanks, allergic to ASA an antiplatelets             Discuss short vs long-term use of warfarin for CardioMems with [employer] - waiting for a response             Follow up in the [facility] in  3 months     3.  Dyspnea             PFTs            No evidence of mold toxicity - see by Dr. Vince Sutherland     4. RA             On prednisone - attempting to wean dose     5. T2DM - steroid induced             Low carb diet             On Januvia             Wean prednisone as tolerated     6. HTN - well controlled             Continue metoprolol succinate             Low sodium diet             Continue home BP log     7. PAF             [id number] = 4             No AC d/t history of anemia             On metoprolol succinate for rate control             Recent Holter without AF     8. HLD             Fasting lipid profile with LDL 89,  - 10/30/19             Intolerant of statins due to myalgias             Check coenzyme Q10 and vitamin D levels             On Tricor             Would benefit from PCSK9 inhibitor - not approved by [employer], recommend appeal             Encourage exercise     9. Iron Deficiency Anemia             Iron level normal     10. Vitamin D Deficiency             On vitamin D3               11. Hypothyroidism             TSH 1.24, T4 15.1, FTi 17 - 4/10/20             On levothyroxine 150 mg daily             Follow up with endocrinology     12. Syncope             Check BMP, Mg, NT proBNP             Event monitor - no arrhythmias     13. History of Tick Bite and RMSF - Bartonella   On ivermectin, apricot kernels, wild cherry extract, iodine, vitamin D, B-complex, pinella drops   On minocycline, tinidazole   Follow up with Dr. Lonny Dorsey    14. Sleep Disorder   Repeat with home sleep study due to inability to sleep during outpatient PSG                 I affirm this is a Patient Initiated Episode with an Established Patient who has not had a related appointment within my department in the past 7 days or scheduled within the next 24 hours.     Total Time: minutes: 11-20 minutes    Note: not billable if this call serves to triage the patient into an appointment for the relevant concern      Nikita Guerra MD

## 2020-04-13 NOTE — PATIENT INSTRUCTIONS
No medication changes Please speak with Dr. Adam Swan office about submitting an appeal for the [employer] Other Recommendations:  
  
Ensure your drinking an adequate amount of water with a goal of 6-8 eight ounce glasses (1.5-2 liters) of fluid daily. Your urine should be clear and light yellow straw colored. If your blood pressure begins to consistently run below 90/60 and/or you begin to experience dizziness or lightheadedness, please contact the [place] [address] at [phone]. Please practice good infection prevention by diligently washing your hands, avoid touching your face, avoiding large crowds and public places, and avoid visits with anyone who is currently sick, or has recently been sick. If you begin to experience symptoms of sickness including fever, cough, shortness of breath please contact your primary care provider to discuss. Follow up 3 months with [facility] MD  
 
 
Please monitor your blood pressures daily prior to medications and 2 hours after taking medications. Bring a written record of your blood pressures to your next appointment. Please monitor your weights daily upon waking and after using the bathroom. Keep a written records of your weights and bring to your next appointment. If you have a weight gain of 3 or more pounds overnight OR 5 or more pounds in one week please contact our office. Thank you for allowing us the privilege of being a part of your healthcare team! Please do not hesitate to contact our office at [phone] with any questions or concerns. Heart Failure Patients and COVID-19 March 31, 2020 in [address] A Statement from the [address] The Heart Failure Society of [address] [place] [address]) wants to ensure that heart failure patients are appropriately informed during the novel Coronavirus COVID-19 pandemic. COVID-19 symptoms vary among infected people and can include cough, fever, shortness of breath, muscle aches, profound fatigue, loss of sense of smell and taste, and diarrhea. Not every infected person will have symptoms which is why social distancing is imperative. Most people have only mild symptoms, but others have severe symptoms that require hospitalization and occasionally intensive care. Because you are a patient with a heart failure, you are at higher risk of getting very sick if you contract Coronavirus and, therefore, it is important to practice good hand hygiene, social distancing, and staying at home as much as possible. If you are experiencing symptoms of COVID-19, please call your primary healthcare clinician. For your safety and the safety of others, and to reduce potential exposures to the Coronavirus, please do not go to an urgent care clinic or emergency room for non-urgent or non-emergency issues unless you have been instructed to do so by your primary healthcare clinician. However, if you have life-threatening symptoms like difficulty breathing, call 911. We want to reduce the spread of the Coronavirus as much as possible and make sure our healthcare resources are not overwhelmed with non-urgent cases. You may have noticed that your healthcare clinicians have converted your in-person appointments to telephone or video calls, and some hospitals are not allowing visitors. The goal is to keep patients from tiana the Coronavirus and to limit the number of healthcare workers in the hospital. If you are sick and need to be seen or get lab testing, you should still do so; otherwise, you can help by 1. Learning how to use your smartphone or computer to participate in telehealth video visits 2. Keeping track of missed visits and studies and working with your team to reschedule them once social distancing measures are relaxed Also, do not stop any of your medications unless instructed by your healthcare team to do so. It is important that you have an adequate supply of your heart failure medications and that you request extended duration of supplies and refills from your providers during these times. We have a lot to learn about COVID-19 and currently there are several ongoing clinical trials to discover therapies that might help treat the infection and vaccines that can prevent the infection. The [facility] and other scientific institutions are working hard, with our patients in mind, to get through this pandemic as quickly as possible. Finally, we recommend that you get your information from trusted, professional healthcare sources including national societies like the [employer], federal agencies like the [employer], and your local hospitals and health departments. Please access updated patient information on the [address] [facility] [phone]) [facility]. We wish you safety and health during this challenging time.

## 2020-04-30 NOTE — TELEPHONE ENCOUNTER
Reviewed sleep study results with patient. She expressed understanding. Patient is currently scheduled for HSAT pickup 7/22/20. She mentioned a sleep aid was recommended for her first study. Will defer to Dr. Mei Jones if a sleep aid will be prescribed for the upcoming HSAT.

## 2020-05-05 ENCOUNTER — TELEPHONE (OUTPATIENT)
Dept: SLEEP MEDICINE | Age: 70
End: 2020-05-05

## 2020-05-05 DIAGNOSIS — G47.33 OSA (OBSTRUCTIVE SLEEP APNEA): Primary | ICD-10-CM

## 2020-05-05 RX ORDER — CLONAZEPAM 0.5 MG/1
0.5 TABLET ORAL
Qty: 2 TAB | Refills: 0 | Status: SHIPPED | OUTPATIENT
Start: 2020-05-05 | End: 2020-08-10

## 2020-05-05 NOTE — TELEPHONE ENCOUNTER
Reviewed current status of performing HSAT. Should have better idea of ability to obtain HSAT in 1-2 weeks. Will contact the patient at that time. She did request a mild sleep aid for any study. Prescription for clonazepam 0.5 mg # 2 written.

## 2020-05-05 NOTE — TELEPHONE ENCOUNTER
Patient had prescription for APAP which will be held pending repeat HSAT study.  She is in agreement

## 2020-06-01 RX ORDER — EPLERENONE 25 MG/1
TABLET, FILM COATED ORAL
Qty: 180 TAB | Refills: 3 | Status: SHIPPED | OUTPATIENT
Start: 2020-06-01 | End: 2020-08-10

## 2020-06-11 ENCOUNTER — HOSPITAL ENCOUNTER (OUTPATIENT)
Dept: MAMMOGRAPHY | Age: 70
Discharge: HOME | End: 2020-06-11
Attending: INTERNAL MEDICINE
Payer: MEDICARE

## 2020-06-11 DIAGNOSIS — M81.0 AGE-RELATED OSTEOPOROSIS WITHOUT CURRENT PATHOLOGICAL FRACTURE: ICD-10-CM

## 2020-06-11 PROCEDURE — 77080 DXA BONE DENSITY AXIAL: CPT

## 2020-06-18 ENCOUNTER — TELEPHONE (OUTPATIENT)
Dept: CARDIOLOGY CLINIC | Age: 70
End: 2020-06-18

## 2020-06-18 NOTE — TELEPHONE ENCOUNTER
Patient would like to know if she can decrease her dosage for Metoprolol because she has noticed her heart rate is lower than usual around 50's and lower 60's. Please advise.      Phone: [phone]

## 2020-06-19 NOTE — TELEPHONE ENCOUNTER
Spoke to pt,  Per Dr. Hope Bauman: [facility] SITE in 52's and 60's sound fine and is not something for which we would cut back metoprolol dose. She can cut her metoprolol dose in half to 25 mg daily.  But I think her HR sounds fine and don't think it needs to be cut back.  If it gets slower and drops below 94'L then certainly should be cut back.  Or can cut back if is causing   low BP or side effects. \"  She stated that her HR has frequently been lower 50's. Recommended decreasing the metoprolol dose, keep track of BP, pulse for next week or so.

## 2020-06-22 RX ORDER — FENOFIBRATE 48 MG/1
TABLET, COATED ORAL
Qty: 90 TAB | Refills: 3 | Status: SHIPPED | OUTPATIENT
Start: 2020-06-22 | End: 2020-08-10

## 2020-07-10 ENCOUNTER — TELEPHONE (OUTPATIENT)
Dept: SLEEP MEDICINE | Age: 70
End: 2020-07-10

## 2020-07-10 NOTE — TELEPHONE ENCOUNTER
Patient called to confirm is she was still to do the HSAT as recommended by Dr. Andrew Fox. She already has a standing appointment on 7/22/2020 and has confirmed that she filled the sleep aid prescribed to be taken the night of the study. After review of records, informed patient to keep HSAT pickup appointment. Dr. Andrew Fox will interpret and any treatment will be ordered based on results. Patient agreed and expressed understanding.

## 2020-07-15 ENCOUNTER — OFFICE VISIT (OUTPATIENT)
Dept: CARDIOLOGY CLINIC | Age: 70
End: 2020-07-15

## 2020-07-15 VITALS
DIASTOLIC BLOOD PRESSURE: 82 MMHG | BODY MASS INDEX: 30.69 KG/M2 | RESPIRATION RATE: 12 BRPM | OXYGEN SATURATION: 96 % | HEIGHT: 63 IN | SYSTOLIC BLOOD PRESSURE: 124 MMHG | TEMPERATURE: 98.7 F | HEART RATE: 63 BPM | WEIGHT: 173.2 LBS

## 2020-07-15 DIAGNOSIS — R07.9 CHEST PAIN, UNSPECIFIED TYPE: Primary | ICD-10-CM

## 2020-07-15 RX ORDER — RANOLAZINE 500 MG/1
500 TABLET, EXTENDED RELEASE ORAL 2 TIMES DAILY
Qty: 60 TAB | Refills: 5 | Status: SHIPPED | OUTPATIENT
Start: 2020-07-15 | End: 2020-08-10

## 2020-07-15 RX ORDER — ACLIDINIUM BROMIDE 400 UG/1
1 POWDER, METERED RESPIRATORY (INHALATION) 2 TIMES DAILY
COMMUNITY
Start: 2020-05-21 | End: 2020-09-11

## 2020-07-15 NOTE — LETTER
7/15/2020 12:33 PM 
 
Patient:  Toyin Herrera YOB: 1950 Date of Visit: 7/15/2020 Dear Yobani Valles MD 
[address] [place] [address] VIA In Basket Claudia Funes MD 
[address] [facility] [address] VIA Facsimile: [phone]: Thank you for referring Ms. Emily Guerra to me for evaluation/treatment. Below are the relevant portions of my assessment and plan of care. If you have questions, please do not hesitate to call me. I look forward to following Ms. Mignon Morgan along with you. Sincerely, Kenton Burns MD

## 2020-07-15 NOTE — LETTER
7/15/2020 12:34 PM 
 
Patient:  Leo Tang YOB: 1950 Date of Visit: 7/15/2020 Dear Adam Glover MD 
[address] [facility] [address] [facility] [address] [address] VIA Facsimile: [phone]: Thank you for referring Ms. Chen Sanches to me for evaluation/treatment. Below are the relevant portions of my assessment and plan of care. If you have questions, please do not hesitate to call me. I look forward to following Ms. Melina Shelton along with you. Sincerely, Susan Chun MD

## 2020-07-15 NOTE — PATIENT INSTRUCTIONS
Medication changes:    Start ranolazine (Ranexa) 500 mg, one tab twice a day. Please take this to your pharmacy to notify them of the change in medications. Testing Ordered:    EKG completed in clinic today. Other Recommendations: Follow up with Sleep Study scheduled for 7/22/2020. Follow up with Dr. Bryan Tomlinson    Follow up with pulmonologist.     Ensure your drinking an adequate amount of water with a goal of 6-8 eight ounce glasses (1.5-2 liters) of fluid daily. Your urine should be clear and light yellow straw colored. If your blood pressure begins to consistently run below 90/60 and/or you begin to experience dizziness or lightheadedness, please contact the [facility] [employer] at [phone]. Follow up  with [facility] [employer] as needed. Please monitor your blood pressures daily prior to medications and 2 hours after taking medications. Bring a written record of your blood pressures to your next appointment. Please monitor your weights daily upon waking and after using the bathroom. Keep a written records of your weights and bring to your next appointment. If you have a weight gain of 3 or more pounds overnight OR 5 or more pounds in one week please contact our office. Thank you for allowing us the privilege of being a part of your healthcare team! Please do not hesitate to contact our office at [phone] with any questions or concerns. Ranolazine (By mouth)   Ranolazine ([id number]-sa-zeen)  Treats chronic angina (chest pain). Brand Name(s): Ranexa   There may be other brand names for this medicine. When This Medicine Should Not Be Used: This medicine is not right for everyone. Do not use it if you had an allergic reaction to ranolazine, or if you have liver cirrhosis. How to Use This Medicine:   Long Acting Tablet  · Take your medicine as directed. Your dose may need to be changed several times to find what works best for you.   · Swallow the extended-release tablet whole. Do not crush, break, or chew it. · Read and follow the patient instructions that come with this medicine. Talk to your doctor or pharmacist if you have any questions. · Missed dose: If you miss a dose of this medicine, skip the missed dose and go back to your regular dosing schedule. Do not double doses. · Store the medicine in a closed container at room temperature, away from heat, moisture, and direct light. Drugs and Foods to Avoid:   Ask your doctor or pharmacist before using any other medicine, including over-the-counter medicines, vitamins, and herbal products. · Do not use this medicine together with carbamazepine, clarithromycin, itraconazole, ketoconazole, nefazodone, phenobarbital, phenytoin, rifabutin, rifampin, rifapentine, certain medicines to treat HIV or AIDS (such as indinavir, lopinavir, nelfinavir, ritonavir, saquinavir), or Ronni's wort. · Some foods and medicines can affect how ranolazine works. Tell your doctor if you are using cyclosporine, digoxin, diltiazem, erythromycin, fluconazole, lovastatin, metformin, simvastatin, sirolimus, tacrolimus, verapamil, medicine for a heart rhythm problem (such as amiodarone, dofetilide, quinidine, sotalol), medicine for depression, or medicine to treat a mental illness (such as thioridazine, ziprasidone). · Do not eat grapefruit or drink grapefruit juice while you are using this medicine. Warnings While Using This Medicine:   · Tell your doctor if you are pregnant or breastfeeding, or if you have kidney disease, liver disease, or heart rhythm problems. · This medicine may cause the following problems:  ¨ QT prolongation (heart rhythm problem)  ¨ Kidney failure  · Do not use this medicine to treat a sudden onset of chest pain. · This medicine may make you dizzy or drowsy. Do not drive or do anything else that could be dangerous until you know how this medicine affects you.   · Your doctor will do lab tests at regular visits to check on the effects of this medicine. Keep all appointments. · Keep all medicine out of the reach of children. Never share your medicine with anyone. Possible Side Effects While Using This Medicine:   Call your doctor right away if you notice any of these side effects:  · Allergic reaction: Itching or hives, swelling in your face or hands, swelling or tingling in your mouth or throat, chest tightness, trouble breathing  · Decrease in how much or how often you urinate  · Fainting, dizziness, or lightheadedness  · Fast, pounding, or uneven heartbeat  · Rapid weight gain, swelling of your hands, ankles, or feet  · Severe or increased chest pain  If you notice other side effects that you think are caused by this medicine, tell your doctor. Call your doctor for medical advice about side effects. You may report side effects to FDA at 1-494-FDA-3783  © 2017 [address] Information is for End User's use only and may not be sold, redistributed or otherwise used for commercial purposes. The above information is an  only. It is not intended as medical advice for individual conditions or treatments. Talk to your doctor, nurse or pharmacist before following any medical regimen to see if it is safe and effective for you.

## 2020-07-15 NOTE — PROGRESS NOTES
[facility] Clinic Note      DOS:   7/15/2020  NAME:  Pascual Demarco   MRN:   [id number]   REFERRING PROVIDER:  Azul Shrestha MD  PRIMARY CARE PHYSICIAN: Song Cook MD  PRIMARY CARDIOLOGIST: Azul Shrestha MD      Chief Complaint:   Dyspnea  Chronic HFpEF    HPI: 65y.o. year old female with a history of HTN, HFpEF, CAD s/p PCI to LAD and RCA who presents for further evaluation of her chronic HF with preserved EF. She underwent LHC in 6/19 which demonstrated an elevated PCWP of 27 mmHg. Her eplerenone was increased without improvement in her dyspnea and chest pain with exertion. She denies any recent palpitations, presyncope, syncope. She discontinued crestor 5 mg due to myalgias. Ebony Temple had her cardiac MRI on 9/30/19 and experienced an episode of syncope the following day. She noted palpitations prior to her episode of syncope but the Holter monitor did not reveal any arrhythmias surrounding the time of her syncopal episode. Orthostatics in Dr. Daisy Howard office were normal.      Ebony Temple returns for follow up today. She feels much better after completing treatment for Lyme Disease. She is able to take a deeper breath. Ebony Temple experienced 3 episodes of chest pain while at rest, 9-9.5/10 intensity, which radiated to her jaw bilaterally. The chest pain was associated with dyspnea and nausea. The first episode lasted 15 minutes and resolved spontaneously. The second episode persisted for 35 minutes, resolving only after she used a nitroglycerin patch. The third episode resolved spontaneously. She has not been able to tolerate sublingual nitroglycerin or long acting nitrates due to severe headache and hypotension. History:  Past Medical History:   Diagnosis Date    Anemia     iron defic anemia    Arthritis     Asthma 6/29/2009    CAUSED BY MOLD    Atrial fibrillation ([address] 6/29/2009    CAD (coronary artery disease) 06/29/2009    MI X2  -- prior stenting     Chronic pain     RT. ANKLE    Diastolic dysfunction     Diastolic heart failure (HCC)     GERD (gastroesophageal reflux disease)     Gluten intolerance     Hypothyroidism 2009    Personal history of MI (myocardial infarction)     [facility]     Stroke ([facility]     TIA (NO RESIDUAL)    Syncope     Thromboembolus ([facility] 2004    RT. -- DVT after surgery     Thyroid disease     HYPO    TIA (transient ischemic attack)      &     Unspecified adverse effect of anesthesia     \"IS A LIGHT WEIGHT\"    Unspecified adverse effect of anesthesia     DIFFICULTY AWAKENING    Vitamin B12 deficiency 2009     Past Surgical History:   Procedure Laterality Date    CARDIAC SURG PROCEDURE UNLIST      ABLATION    HX ADENOIDECTOMY      HX APPENDECTOMY      HX CHOLECYSTECTOMY  11    HX GI      COLONOSCOPY AND ENDOSCOPY    HX HEART CATHETERIZATION      2 STENTS    HX HYSTERECTOMY      HX LUMBAR LAMINECTOMY      L4-L5    HX ORTHOPAEDIC  -2012    RT.  FOOT SURGERY X 6/calcaneal osteotomy    HX TONSILLECTOMY  1964    STENT INSERTION       Social History     Socioeconomic History    Marital status:      Spouse name: Not on file    Number of children: Not on file    Years of education: Not on file    Highest education level: Not on file   Occupational History    Not on file   Social Needs    Financial resource strain: Not on file    Food insecurity     Worry: Not on file     Inability: Not on file    Transportation needs     Medical: Not on file     Non-medical: Not on file   Tobacco Use    Smoking status: Former Smoker     Packs/day: 1.00     Years: 30.00     Pack years: 30.00     Last attempt to quit: 2002     Years since quittin.0    Smokeless tobacco: Never Used   Substance and Sexual Activity    Alcohol use: No    Drug use: No    Sexual activity: Never   Lifestyle    Physical activity     Days per week: Not on file     Minutes per session: Not on file    Stress: Not on file   Relationships    Social connections     Talks on phone: Not on file     Gets together: Not on file     Attends Hinduism service: Not on file     Active member of club or organization: Not on file     Attends meetings of clubs or organizations: Not on file     Relationship status: Not on file    Intimate partner violence     Fear of current or ex partner: Not on file     Emotionally abused: Not on file     Physically abused: Not on file     Forced sexual activity: Not on file   Other Topics Concern    Not on file   Social History Narrative    Not on file     Family History   Problem Relation Age of Onset    Delayed Awakening Mother     Heart Disease Mother     Coronary Artery Disease Mother     Hypertension Mother     Stroke Mother     Delayed Awakening Sister     Hypertension Sister     Lung Disease Father     Cancer Father         colon    Hypertension Father     Hypertension Brother     Breast Cancer Maternal Aunt     Breast Cancer Maternal Aunt     Breast Cancer Cousin         maternal 1st cousin    Breast Cancer Maternal Aunt     Breast Cancer Cousin         maternal 1st cousin    Breast Cancer Cousin         maternal 1st cousin       Current Medications:   Current Outpatient Medications   Medication Sig Dispense Refill    aclidinium bromide (Tudorza Pressair) 400 mcg/actuation inhaler Take 1 Puff by inhalation daily.  ranolazine ER (Ranexa) 500 mg SR tablet Take 1 Tab by mouth two (2) times a day. 60 Tab 5    fenofibrate nanocrystallized (TRICOR) 48 mg tablet TAKE 1 TABLET DAILY 90 Tab 3    eplerenone (INSPRA) 25 mg tablet TAKE 1 TABLET TWICE A  Tab 3    clonazePAM (KlonoPIN) 0.5 mg tablet Take 1 Tab by mouth nightly as needed for Sleep. Max Daily Amount: 0.5 mg. Take 0.5 mg tablet at hs; may repeat 2 Tab 0    melatonin 5 mg cap capsule Take 5 mg by mouth nightly.       levothyroxine (SYNTHROID) 150 mcg tablet Take 150 mcg by mouth Daily (before breakfast).  predniSONE (DELTASONE) 5 mg tablet Take 5 mg by mouth daily. 5 mg in morning and 2.5 mg afternoon      ondansetron (ZOFRAN ODT) 4 mg disintegrating tablet Take 1 Tab by mouth every eight (8) hours as needed for Nausea. 10 Tab 0    bumetanide (BUMEX) 1 mg tablet Take 1 tablet Bumex in the morning and half a tablet in the afternoon (Patient taking differently: Take 1 mg by mouth as needed. Take 1 tablet Bumex in the morning) 135 Tab 3    metoprolol succinate (TOPROL-XL) 50 mg XL tablet Take 1 Tab by mouth daily. 90 Tab 3    cholecalciferol, vitamin D3, (VITAMIN D3) 2,000 unit tab Take 2,000 Units by mouth daily.  COQ10, UBIQUINOL, PO Take 100 mg by mouth daily.  turmeric (CURCUMIN) Take 1 g by mouth daily.  glucose blood VI test strips (ONETOUCH VERIO) strip OneTouch Verio strips      nitroglycerin (NITRODUR) 0.1 mg/hr 1 Patch by TransDERmal route daily. (Patient taking differently: 1 Patch by TransDERmal route as needed.) 30 Patch 0    SITagliptin (JANUVIA) 100 mg tablet Take 100 mg by mouth daily.  flaxseed oil (OMEGA 3 PO) Take 1 Tab by mouth every evening. Indications: FISH OIL      vit C/vit E ac/lut/copper/zinc (PRESERVISION LUTEIN PO) Take 1 Tab by mouth daily.  lidocaine (LIDODERM) 5 % 1 Patch by TransDERmal route every twenty-four (24) hours. Apply patch to the affected area for 12 hours a day and remove for 12 hours a day.  ascorbic acid (VITAMIN C) 500 mg tablet Take 1,000 mg by mouth daily.  ferrous sulfate 325 mg (65 mg iron) tablet Take  by mouth every evening.  cyanocobalamin (VITAMIN B12) 1,000 mcg/mL injection INJECT 1 ML INTO THE MUSCLE EVERY 30 DAYS 10 mL 0    lansoprazole (PREVACID) 30 mg capsule Take 30 mg by mouth Daily (before breakfast). Allergies:    Allergies   Allergen Reactions    Clopidogrel Other (comments)    Sulfa (Sulfonamide Antibiotics) Swelling    Adhesive Tape-Silicones Other (comments)     BAD BLISTERS AND TENDS TO GET INFECTED    Albuterol Palpitations    Aspirin Hives and Other (comments)     \"it makes my stomach bleed\"      Butter Flavor Anaphylaxis     Butter AND CREME    Cimzia [Certolizumab Pegol] Other (comments)     GI symptoms, blisters in the mouth and esophagus    Demerol [Meperidine] Other (comments)     HYPOTENSION    Fentanyl Other (comments)     HYPOTENSION    Gluten Diarrhea     bloating    Keflex [Cephalexin] Anaphylaxis    Levaquin [Levofloxacin] Unproven on Challenge     PT DOESN'T REMEMBER HAVING THAT    Morphine Other (comments)     HYPOTENSION    Nitroglycerin Other (comments)     IN PILL FORM  - HYPOTENSION  CAN TOLERATE PATCH FOR SHORT TIME    Pcn [Penicillins] Anaphylaxis    Percocet [Oxycodone-Acetaminophen] Unknown (comments)     HYPOTENSION AND HALLUCINATIONS. Can take tylenol ok, oxycodone is allergy per patient.      Tapazole [Methimazole] Unknown (comments)     Patient stated \"it made me feel like I had the flu\"       ROS:    General:  positive for  - fatigue  HEENT: negative  Pulmonary: positive for - orthopnea and shortness of breath  Cardiac: positive for chest pain  GI:  no abdominal pain, change in bowel habits, or black or bloody stools, positive for nausea  Musculo: negative  Neuro: no TIA or stroke symptoms  Skin:  positive for - ecchymoses  Allergies: REMINDER:DOCUMENT ALLERGIES IN ALLERGY ACTIVITY  Psych: negative    Admission Weight: Last Weight   Weight: 173 lb 3.2 oz (78.6 kg) Weight: 173 lb 3.2 oz (78.6 kg)       Physical Exam:   Vitals:    Visit Vitals  /82   Pulse 63   Temp 98.7 °F (37.1 °C) (Oral)   Resp 12   Ht 5' 3\" (1.6 m)   Wt 173 lb 3.2 oz (78.6 kg)   SpO2 96%   BMI 30.68 kg/m²       General:  alert, fatigued, cooperative  HEENT: PERRLA, EOMI and Sclera clear, anicteric  Neck:  supple, no significant adenopathy, carotids upstroke normal bilaterally, no bruits  CVP:  8 cm  ( - ) HJR  Cardiac: regular rate, regular rhythm, S1, S2 normal and S4 present  Chest: breath sounds clear and equal bilaterally  Abdomen: soft, non-tender. Bowel sounds normal. No masses, no organomegaly. Extremity: extremities normal, atraumatic, no cyanosis or edema  Neuro: Alert and oriented to person, place, and time; normal strength and tone. Normal symmetric reflexes. Normal coordination and gait  Skin:   ecchymoses - arm(s) bilateral    Recent Labs:   No flowsheet data found. EK/15/2020  SR, normal limits    ECHO : EF 60%  ECHO 3/3/2017: EF 60%, mild TR   ECHO 3/21/2019: EF 61%, AoV sclerosis  CATH : stent LCX  CATH 2010: LAD: m: MLI, , LCX: m 20%, patent RCA, LCX stents, EF 60%   CATH 2019: patent LAD stent, LCX without significant disease, patent RCA stent  CATH 2019    Left Main   The vessel is large. The vessel is angiographically normal.   Left Anterior Descending   The proximal segment of the vessel is moderate in size. The middle segment of the vessel is small. The distal segment of the vessel is small. There is mild diffuse disease. Left Circumflex   The vessel is large. There is mild diffuse disease. Previously placed Prox Cx to Mid Cx stent (unknown type) is widely patent. Right Coronary Artery   The vessel is moderate in size. There is mild diffuse disease. Prox RCA to Mid RCA lesion 20% stenosed. . The lesion was previously treated using a stent (unknown type). Mid RCA lesion 40% stenosed. .   Inferior Septal   The vessel is small. The vessel exhibits minimal luminal irregularities. Right Posterior Atrioventricular Branch   The vessel is small. The vessel exhibits minimal luminal irregularities. STRESS: Myoview 2014: no ischemia  STRESS Nuc 2016: no ischemia,   STRESS Nuc 3/3/2017: no ischemia, EF 58%   STRESS Echo 19: normal stress EKG and stress echo  Lexiscan 2019: Negative stress test. Normal myocardial perfusion.  LVEF 66%    EVENT monitor : PVCs  HOLTER 2013: frequent PVCs  HOLTER 19: SR, normal study    Right Heart Cath 6/28/19 [facility] findings:   RAP=   Mean 17    mmHg  RVSP= 40/20    mmHg  PAP=     42/30/34  mmHg  PCWP=  27 mmHg  CO=        Thermal 4.1  L/min,             Faraz pending  CI=           Thermal 3.08  L/min/m2,     Faraz pending       Left Heart Cath 8/19/19    · Non-obstructive CAD with patent stents in Lcx and RCA  · LVEP 16 mmHg       Cardiac MRI:  9/30/2019    Impression:   1. Normal left ventricular cavity size with preserved left ventricular systolic  function. There is no significant regional wall motion abnormalities. LVEF 60%. 2. Normal right ventricular size and systolic motion. RVEF 60%. 3. Trace to mild mitral regurgitation. 4. On EGE and LGE study, there is a small thin subendocardial infarct involving  25% to 50% thickness of the subendocardial wall of the basal inferior wall. This  infarct is in RCA territory. There is reasonable viability surrounding this  infarct. The mid to distal inferior wall and inferoseptal wall does not  demonstrate any infarct. There are no other infarct. There is no features of  infiltrative sarcoidosis or cardiac amyloidosis. There is no features of  inflammatory myocarditis. All other myocardial walls are otherwise completely  viable. There is no features of hemochromatosis. 5. Normal pericardium without significant pericardial effusion. PFTs  1/6/2020  FEV1 - 0.77, 42%   FVC - 1.4, 54%   FEV1/FVC - 55%   Following an inhaled bronchodilator, there is a 130ml/16% change   in FEV1 and a 180ml/13% change in FVC. DLCO - 72%   DL/VA - 113%     Spirometry reveals severe airflow obstruction with moderate   reduction in forced vital capacity which may be due to a   restrictive ventilatory defect or air trapping.  Lung volume   measurements are recommended. There is no improvement after inhaled bronchodilators. Diffusing capacity is mildly reduced but corrects to normal when   considering alveolar volume. Impression / Plan:   1.  CAD s/p PCI to LAD , RCA with stable angina   EKG today - no new infarct or ischemia   Lexiscan 12/2019 - normal perfusion   [facility] in 8/19 - no intervention   Intolerant to statins d/t myalgias   ASA intolerant d/t GI bleeding   On metoprolol succinate   Start ranexa 500 mg, 1 tablet twice daily   Use nitroglycerin patch as needed   Consider repeat lexiscan if chest pain recurs after adding ranexa - defer to Dr. Jarad Barahona   Follow up with Dr. Jarad Barahona     2. HFpEF - Stage C, NYHA Class II   Dyspnea improved after treating Lyme Disease   C with PCWP 27 mmHg   On bumex, eplerenone, metoprolol   Cardiac MRI - LVEF 60%, small subendocardial infarct, no infiltrative disease   SPEP - no M spike   Iron profile - normal   Elevated TSH with normal free T3 and T4 - dose adjusted by endocrinology   ATTR negative   Allergic to ASA and antiplatelets - CardioMems not recommended   Follow up in the [facility] as needed    3. Dyspnea   PFTs with severe airflow obstruction with no improvement following bronchodilators   DLCO corrected to normal when considering alveolar volume   No evidence of mold toxicity by Dr. Regulo Campos   Completed therapy for Lyme Disease   Follow up with Dr. Soheila Hope    4. RA   On prednisone - attempting to wean dose    5. T2DM - steroid induced   Low carb diet   On Januvia   Wean prednisone as tolerated    6. HTN - well controlled   Continue metoprolol succinate   Low sodium diet   Continue home BP log    7. PAF   [id number] = 4   No AC d/t history of anemia   On metoprolol succinate for rate control    8. HLD   Fasting lipid profile with LDL 98,    Intolerant of statins due to myalgias   Check coenzyme Q10 and vitamin D levels   On Tricor   Would benefit from PCSK9 inhibitor - not recommended by [employer], recommend appeal   Encourage exercise    9. Iron Deficiency Anemia   Iron level normal    10. Vitamin D Deficiency   On vitamin D3     11. Hypothyroidism   TSH 7.78 - 10/8/19   On pork thyroid 15 mg daily    12. Syncope   Check BMP, Mg, NT proBNP   Event monitor - no arrhythmias     13. ELINA   PSG in 3/2020 may have under estimated her degree of sleep apnea due to poor sleep efficiency   Repeat HSAT   Follow up with Dr. Sofía Rosales    14. History of Tick Bite    Completed treatment for Lyme Disease         Ayanna Wu MD, [facility], [address] [place] [address]  Chief of Cardiology, [address] [place] [address] Director  [address] [place] [address], [address], [address]  Office [phone]  Fax [phone]

## 2020-07-23 ENCOUNTER — HOSPITAL ENCOUNTER (OUTPATIENT)
Dept: SLEEP MEDICINE | Age: 70
Discharge: HOME | End: 2020-07-23
Payer: MEDICARE

## 2020-07-23 PROCEDURE — 95806 SLEEP STUDY UNATT&RESP EFFT: CPT

## 2020-07-30 ENCOUNTER — TELEPHONE (OUTPATIENT)
Dept: SLEEP MEDICINE | Age: 70
End: 2020-07-30

## 2020-07-30 NOTE — TELEPHONE ENCOUNTER
AHI demonstrated overall normal AHI of 5/h. Snoring during 1.4% of the study. Minimal SaO2 briefly 72%. Pleth signal is variable throughout the recording. Events are grouped suggestive of potential REM-related episodes. Impression: Overall normal AHI. Results will be reviewed with the patient.

## 2020-07-30 NOTE — TELEPHONE ENCOUNTER
Patient called for results of sleep study, she is having surgery on 8/19 and her provider needs to know the results of her sleep test before than.  Please contact patient

## 2020-08-05 NOTE — TELEPHONE ENCOUNTER
Reviewed sleep study results with patient. She expressed understanding. Study results sent to patient via [address]

## 2020-08-10 ENCOUNTER — HOSPITAL ENCOUNTER (OUTPATIENT)
Dept: PREADMISSION TESTING | Age: 70
Discharge: HOME | End: 2020-08-10
Payer: MEDICARE

## 2020-08-10 LAB
ANION GAP SERPL CALC-SCNC: 8 MMOL/L (ref 5–15)
BUN SERPL-MCNC: 18 MG/DL (ref 6–20)
BUN/CREAT SERPL: 23 (ref 12–20)
CALCIUM SERPL-MCNC: 8.2 MG/DL (ref 8.5–10.1)
CHLORIDE SERPL-SCNC: 107 MMOL/L (ref 97–108)
CO2 SERPL-SCNC: 26 MMOL/L (ref 21–32)
CREAT SERPL-MCNC: 0.78 MG/DL (ref 0.55–1.02)
GLUCOSE SERPL-MCNC: 164 MG/DL (ref 65–100)
POTASSIUM SERPL-SCNC: 3.7 MMOL/L (ref 3.5–5.1)
SODIUM SERPL-SCNC: 141 MMOL/L (ref 136–145)

## 2020-08-10 PROCEDURE — 36415 COLL VENOUS BLD VENIPUNCTURE: CPT

## 2020-08-10 PROCEDURE — 80048 BASIC METABOLIC PNL TOTAL CA: CPT

## 2020-08-10 RX ORDER — INSULIN GLARGINE 100 [IU]/ML
16 INJECTION, SOLUTION SUBCUTANEOUS
COMMUNITY

## 2020-08-10 RX ORDER — METOPROLOL SUCCINATE 50 MG/1
50 TABLET, EXTENDED RELEASE ORAL
COMMUNITY
End: 2020-10-22

## 2020-08-10 RX ORDER — NITROGLYCERIN 20 MG/1
1 PATCH TRANSDERMAL
COMMUNITY
End: 2021-01-21 | Stop reason: SDUPTHER

## 2020-08-10 RX ORDER — GABAPENTIN 100 MG/1
100 CAPSULE ORAL
Status: ON HOLD | COMMUNITY
End: 2020-11-17 | Stop reason: SDUPTHER

## 2020-08-10 RX ORDER — FENOFIBRATE 48 MG/1
48 TABLET, COATED ORAL
COMMUNITY
End: 2021-08-13 | Stop reason: SDUPTHER

## 2020-08-10 RX ORDER — RANOLAZINE 500 MG/1
500 TABLET, EXTENDED RELEASE ORAL
COMMUNITY
End: 2020-09-11

## 2020-08-10 RX ORDER — BUMETANIDE 1 MG/1
1 TABLET ORAL DAILY
COMMUNITY
End: 2021-03-25 | Stop reason: SDUPTHER

## 2020-08-10 RX ORDER — EPLERENONE 25 MG/1
25 TABLET, FILM COATED ORAL
COMMUNITY
End: 2021-03-30 | Stop reason: SDUPTHER

## 2020-08-10 NOTE — H&P
Preoperative Evaluation                     History and Physical with Surgical Risk Stratification     8/10/2020    CC: Thumb pain  Surgery: Right thumb arthroplasty     HPI:   Baldomero Torres is a 65 y.o. female referred for pre-operative evaluation by Dr. Shanti Jeff for surgery on 8/19/20. Mrs. Deepak Hammond states a month ago she was walking in a parking lot and tripped over the barrier in front of the car. She threw her hand up onto the car and injured her right thumb. She has had previous OA in it and the impact made things worse. She is RHD and can no longer pinch or  things. She finds it hard to perform her ADL's. She has swelling at the thumb joint. Her splint makes her feel better. She is followed by several specialists including cardiology, pulmonary, sleep clinic and endocrinology. I have sent a message to Dr. Raffy Lo for clearance to surgery. He has written back that she is ok to proceed. The patient was evaluated in the surgeon's office and it was determined that the most appropriate plan of care is to proceed with surgical intervention. Patient's PCP Mateusz Nascimento MD    Review of Systems     Constitutional: Negative for chills and fever  HENT: Negative for congestion and sore throat  Eyes: negative for blurred vision and double vision  Respiratory: Negative for cough, shortness of breath and wheezing  Mouth: Negative for loose, broken or chipped teeth. Cardiovascular: Negative for chest pain and palpitations  Gastrointestinal: Negative for abdominal pain, constipation, diarrhea and nausea  Genitourinary: Negative for dysuria and hematuria  Musculoskeletal: Thumb pain, ankle pain  Skin: Negative for rash, open wounds. Bruises easily  Neurological: Negative for dizziness, tremors and headaches  Psychiatric: Negative for depression. The patient is not nervous/anxious.     Inherent Risk of Surgery     Surgical risk:  Intermediate  Low:  EIntermediate:  Joint Replacement, High: Patient Cardiac Risk Assessment     Revised Cardiac Risk Index (RCRI)  Hx of Heart FailureHx of CVD  Rate if cardiac death, nonfatal MI, nonfatal cardiac arrest by number of risk factor- 2.4%    ALFONSO/AHA 2007 Guidelines:   1) Surgery Emergency, Non-cardiac -> to surgery  2) If not, look at clinical predictors    Major Intermediate Minor   Prior MI Abnormal EKGCompensated/Prior CHF Diabetes  TIA   Blood Thinner: None    METS      EQUAL TO 4 Care for self Walk indoors around house Walk 2-3 blocks on level ground (2-3 mph) Light work around house (dust, dishes)     Other Risk Factors:   Screening for ETOH use:  Done and low risk  Smoking status:  Former     Personal or FH of bleeding problems:  No  Personal or FH of blood clots:  yes  Personal or FH of anesthesia problems:   No    Pulmonary Risk:  Asthma or COPD:  yes  There is no height or weight on file to calculate BMI. Known ELINA:  No  BUN normal    Past Medical, Surgical, Social History     Allergies:    Allergies   Allergen Reactions    Butter Flavor Anaphylaxis     Butter AND CREME    Keflex [Cephalexin] Anaphylaxis    Pcn [Penicillins] Anaphylaxis    Aspirin Hives and Other (comments)     \"it makes my stomach bleed\"      Cimzia [Certolizumab Pegol] Other (comments)     GI symptoms, blisters in the mouth and esophagus    Clopidogrel Other (comments)     GI bleed    Demerol [Meperidine] Other (comments)     HYPOTENSION    Fentanyl Other (comments)     HYPOTENSION    Morphine Other (comments)     HYPOTENSION    Nitroglycerin Other (comments)     IN PILL FORM  - HYPOTENSION  CAN TOLERATE PATCH FOR SHORT TIME    Sulfa (Sulfonamide Antibiotics) Angioedema     Lips    Tapazole [Methimazole] Unknown (comments)     Patient stated \"it made me feel like I had the flu\"    Adhesive Tape-Silicones Other (comments)     BAD BLISTERS AND TENDS TO GET INFECTED    Albuterol Palpitations    Gluten Diarrhea     bloating    Oxycodone Other (comments) Hallicination and hypotension       Medication Documentation Review Audit     Reviewed by Gloriann Dandy, RN (Registered Nurse) on 08/10/20 at 21 [phone]    Medication Sig Documenting Provider Last Dose Status Taking?   aclidinium bromide (Tudorza Pressair) 400 mcg/actuation inhaler Take 1 Puff by inhalation two (2) times a day. Provider, Historical  Active Yes   ascorbic acid (VITAMIN C) 500 mg tablet Take 1,000 mg by mouth daily. Other, MD Yolanda  Active Yes   bumetanide (BUMEX) 1 mg tablet Take 1 mg by mouth daily. Provider, Historical  Active Yes   cholecalciferol, vitamin D3, (VITAMIN D3) 2,000 unit tab Take 2,000 Units by mouth daily. Provider, Historical  Active Yes   COQ10, UBIQUINOL, PO Take 100 mg by mouth daily. Provider, Historical  Active Yes   cyanocobalamin (VITAMIN B12) 1,000 mcg/mL injection INJECT 1 ML INTO THE MUSCLE EVERY 30 DAYS Savanah Stephens MD 8/1/2020 Active No   denosumab (PROLIA SC) by SubCUTAneous route every 6 months. Provider, Historical 7/21/2020 Active Yes   eplerenone (INSPRA) 25 mg tablet Take 25 mg by mouth nightly. Provider, Historical  Active Yes   fenofibrate nanocrystallized (Tricor) 48 mg tablet Take 48 mg by mouth nightly. Provider, Historical  Active Yes   ferrous sulfate 325 mg (65 mg iron) tablet Take  by mouth every evening. Other, MD Yolanda  Active Yes   flaxseed oil (OMEGA 3 PO) Take 1 Tab by mouth every evening. Indications: FISH OIL Provider, Historical  Active Yes   gabapentin (NEURONTIN) 100 mg capsule Take 100 mg by mouth nightly. Provider, Historical  Active Yes   hypromellose (GENTEAL MILD OP) Apply  to eye. Provider, Historical  Active Yes   insulin glargine (LANTUS,BASAGLAR) 100 unit/mL (3 mL) inpn 8 Units by SubCUTAneous route daily. Indications: \"I set the pen on 8\" Provider, Historical  Active Yes   lansoprazole (PREVACID) 30 mg capsule Take 30 mg by mouth daily.  Provider, Historical  Active Yes           Med Note (Jeannei Marie Mar 12, 2019  9:04 AM) levothyroxine (SYNTHROID) 150 mcg tablet Take 150 mcg by mouth Daily (before breakfast). Provider, Historical  Active Yes   lidocaine (LIDODERM) 5 % 1 Patch by TransDERmal route every twenty-four (24) hours. Apply patch to the affected area for 12 hours a day and remove for 12 hours a day. Other, MD Yolanda  Active Yes   melatonin 5 mg cap capsule Take 5 mg by mouth nightly. Provider, Historical  Active    metoprolol succinate (Toprol XL) 50 mg XL tablet Take 50 mg by mouth nightly. Provider, Historical  Active Yes   mometasone furoate (ASMANEX HFA IN) Take 100 mcg by inhalation two (2) times a day. Provider, Historical  Active Yes   nitroglycerin (NITRODUR) 0.1 mg/hr 1 Patch by TransDERmal route daily as needed. Indications: Oral form \"causes severe BP drop  BAD\" Drs got scared, Provider, Historical  Active Yes   ondansetron (ZOFRAN ODT) 4 mg disintegrating tablet Take 1 Tab by mouth every eight (8) hours as needed for Nausea. Cierra Garay NP  Active    OTHER Indications: LIVONGO Glucose Monitor Provider, Historical  Active Yes   potassium 99 mg tablet Take 99 mg by mouth daily. Provider, Historical  Active Yes   predniSONE (DELTASONE) 5 mg tablet Take 7.5 mg by mouth daily. 5 mg in morning and 2.5 mg afternoon Provider, Historical  Active    ranolazine ER (Ranexa) 500 mg SR tablet Take 500 mg by mouth nightly. Provider, Historical  Active Yes   SITagliptin (JANUVIA) 100 mg tablet Take 100 mg by mouth daily (with dinner). Provider, Historical  Active Yes   turmeric (CURCUMIN) Take 1 g by mouth every evening. Provider, Historical  Active Yes   vit C/vit E ac/lut/copper/zinc (PRESERVISION LUTEIN PO) Take 1 Tab by mouth daily (with dinner).  Provider, Historical  Active               Past Medical History:   Diagnosis Date    Arthritis     Asthma 6/29/2009    CAUSED BY MOLD    Atrial fibrillation ([facility] 6/29/2009    CAD (coronary artery disease) 06/29/2009    Chronic pain of right ankle     Diabetes ([facility] Drug induced     Diastolic heart failure ([facility]     DVT (deep venous thrombosis) ([facility]     Right leg post surgery    GERD (gastroesophageal reflux disease)     Gluten intolerance     Hypothyroidism 2009    Iron deficiency anemia     Lyme disease     Personal history of MI (myocardial infarction)     Rheumatoid arthritis ([facility]     Slow to wake up after anesthesia     Syncope     Post testing- resolved    TIA (transient ischemic attack)  &     Vitamin B12 deficiency 2009     Past Surgical History:   Procedure Laterality Date    HX ADENOIDECTOMY      HX AFIB ABLATION      HX APPENDECTOMY      HX CHOLECYSTECTOMY  11    HX COLONOSCOPY      HX CORONARY STENT PLACEMENT      x 2    HX HEART CATHETERIZATION  2010    2 STENTS    HX HYSTERECTOMY      HX LUMBAR LAMINECTOMY      L4-L5    HX ORTHOPAEDIC  [id number]    RT.  FOOT SURGERY X 6/calcaneal osteotomy    HX TONSILLECTOMY  1964     Social History     Tobacco Use    Smoking status: Former Smoker     Packs/day: 1.00     Years: 30.00     Pack years: 30.00     Types: Cigarettes     Last attempt to quit: 2002     Years since quittin.1    Smokeless tobacco: Never Used   Substance Use Topics    Alcohol use: No    Drug use: No     Family History   Problem Relation Age of Onset    Delayed Awakening Mother     Heart Disease Mother     Coronary Artery Disease Mother     Hypertension Mother     Stroke Mother     Delayed Awakening Sister     Hypertension Sister     Lung Disease Father     Cancer Father         colon    Hypertension Father     Hypertension Brother     Breast Cancer Maternal Aunt     Breast Cancer Maternal Aunt     Breast Cancer Cousin         maternal 1st cousin    Breast Cancer Maternal Aunt     Breast Cancer Cousin         maternal 1st cousin   [facility] Breast Cancer Cousin         maternal 1st cousin    Deep Vein Thrombosis Neg Hx     Anesth Problems Neg Hx        Objective     There were no vitals filed for this visit. Constitutional:  Appears well,  No Acute Distress, Vitals noted  Psychiatric:   Affect normal, Alert and Oriented to person/place/time    Eyes:   Pupils equally round and reactive, EOMI, conjunctiva clear, eyelids normal  ENT:   External ears and nose normal, teeth normal, gums normal, TMs and Orophyarynx normal  Neck:   General inspection and Thyroid normal.  No abnormal cervical or supraclavicular nodes    Lungs:   Clear to auscultation, good respiratory effort  Heart: Ausculation normal.  Regular rhythm. No cardiac murmurs. No carotid bruits or palpable thrills  Chest wall normal  Musculoskeletal: Limited ROM to right thumb,  weak. Extremities:   Without edema, good peripheral pulses  Skin:   Warm to palpation, without rashes or suspicious lesions     Recent Results (from the past 72 hour(s))   METABOLIC PANEL, BASIC    Collection Time: 08/10/20 12:44 PM   Result Value Ref Range    Sodium 141 136 - 145 mmol/L    Potassium 3.7 3.5 - 5.1 mmol/L    Chloride 107 97 - 108 mmol/L    CO2 26 21 - 32 mmol/L    Anion gap 8 5 - 15 mmol/L    Glucose 164 (H) 65 - 100 mg/dL    BUN 18 6 - 20 MG/DL    Creatinine 0.78 0.55 - 1.02 MG/DL    BUN/Creatinine ratio 23 (H) 12 - 20      GFR est AA >60 >60 ml/min/1.73m2    GFR est non-AA >60 >60 ml/min/1.73m2    Calcium 8.2 (L) 8.5 - 10.1 MG/DL       Assessment and Plan     Assessment/Plan:   1) OA Right Thumb  2) Pre-Operative Evaluation    Labs and EKG reviewed. Preoperative Clearance  Per RCRI, the patient has a 2.4% risk of cardiac death, nonfatal MI, nonfatal cardiac arrest based on two risk factors. Per ACC/AHA guidelines, patient is intermediate risk for a(n) intermediate risk surgery and may proceed to planned surgery with the above noted risk.     Dwan Closs, NP

## 2020-08-10 NOTE — PERIOP NOTES
It is now mandated that all surgical patients be tested for COVID-19 prior to surgery. Testing has to be exactly 4 days prior to surgery. Your COVID test date is between 8:00 am and 11:00 am.       COVID testing will be performed curbside at the [address] [place] [address] [facility]. There will be signs leading you to the testing site. You will need to bring a photo ID with you to be swabbed. Patients are advised to self-quarantine at home after testing and prior to your surgery date. You will be notified if your results are positive. What to watch for:   Coronavirus (COVID-19) affects different people in different ways   It also appears with a wide range of symptoms from mild to severe   Signs usually appear 2-14 days after exposure     If you develop any of the following, notify your doctor immediately:  o Fever  o Chills, with or without a shiver  o Muscle pain  o Headache  o Sore throat  o Dry cough  o New loss of taste or smell  o Tiredness      If you develop any of the following, call 911:  o Shortness of breath  o Difficulty breathing  o Chest pain  o New confusion  Blueness of fingers and/or lips  [address]                                  [phone]   MAIN PRE OP                          [phone]                                                                                AMBULATORY PRE OP          [phone]  PRE-ADMISSION TESTING    [phone]   Surgery Date: Wednesday, August 19th*       Is surgery arrival time given by surgeon? NO  If NO, [facility] staff will call you between 3 and 7pm the day before your surgery with your arrival time. (If your surgery is on a Monday, we will call you the Friday before.)    Call [phone] after 7pm Monday-Friday if you did not receive this call.     INSTRUCTIONS BEFORE YOUR SURGERY   When You  Arrive Arrive at the 2nd Floor Admitting Desk on the day of your surgery  Have your insurance card, photo ID, and any copayment (if needed)   Food   and   Drink NO food or drink after midnight the night before surgery    This means NO water, gum, mints, coffee, juice, etc.  No alcohol (beer, wine, liquor) 24 hours before and after surgery   Medications to   TAKE   Morning of Surgery MEDICATIONS TO TAKE THE MORNING OF SURGERY WITH A SIP OF WATER:   Inhalers  Prednisone   Medications  To  STOP      7 days before surgery Non-Steroidal anti-inflammatory Drugs (NSAID's): for example, Ibuprofen (Advil, Motrin), Naproxen (Aleve)  Aspirin, if taking for pain   Herbal supplements, vitamins, and fish oil  Other:  (Pain medications not listed above, including Tylenol may be taken)   Blood  Thinners If you take  Aspirin, Plavix, Coumadin, or any blood-thinning or anti-blood clot medicine, talk to the doctor who prescribed the medications for pre-operative instructions. Bathing Clothing  Jewelry  Valuables     If you shower the morning of surgery, please do not apply anything to your skin (lotions, powders, deodorant, or makeup, especially mascara)  Follow Chlorhexidine Care Fusion body wash instructions provided to you during PAT appointment. Begin 3 days prior to surgery. Do not shave or trim anywhere 24 hours before surgery  Wear your hair loose or down; no pony-tails, buns, or metal hair clips  Wear loose, comfortable, clean clothes  Wear glasses instead of contacts  Leave money, valuables, and jewelry, including body piercings, at home   Going Home - or Spending the Night SAME-DAY SURGERY: You must have a responsible adult drive you home and stay with you 24 hours after surgery       Special Instructions Call your Endocrinologist for instructions on dosage of Diabetic medicine for the Evening Before Surgery  DO NOT TAKE DIABETIC MEDICINE ON THE DAY OF SURGERY     Follow all instructions so your surgery wont be cancelled. Please, be on time. If a situation occurs and you are delayed the day of surgery, call [phone] or [phone]. If your physical condition changes (like a fever, cold, flu, etc.) call your surgeon. Home medication(s) reviewed and verified via     LIST   VERBAL   during PAT appointment. The patient was contacted by      IN-PERSON  The patient verbalizes understanding of all instructions and   DOES NOT   need reinforcement.   o

## 2020-08-14 ENCOUNTER — DOCUMENTATION ONLY (OUTPATIENT)
Dept: SLEEP MEDICINE | Age: 70
End: 2020-08-14

## 2020-08-14 NOTE — PROGRESS NOTES
This note is being routed to Dr. Alexei Esteban. Sleep Medicine consult note and sleep study report in patient's chart for review.     Thank you for the referral.

## 2020-08-15 ENCOUNTER — HOSPITAL ENCOUNTER (OUTPATIENT)
Dept: PREADMISSION TESTING | Age: 70
Discharge: HOME | End: 2020-08-15
Attending: ORTHOPAEDIC SURGERY
Payer: MEDICARE

## 2020-08-15 PROCEDURE — 87635 SARS-COV-2 COVID-19 AMP PRB: CPT

## 2020-08-16 ENCOUNTER — HOSPITAL ENCOUNTER (EMERGENCY)
Age: 70
Discharge: HOME | End: 2020-08-16
Attending: EMERGENCY MEDICINE
Payer: MEDICARE

## 2020-08-16 ENCOUNTER — HOSPITAL ENCOUNTER (EMERGENCY)
Dept: GENERAL RADIOLOGY | Age: 70
Discharge: HOME | End: 2020-08-16
Attending: EMERGENCY MEDICINE
Payer: MEDICARE

## 2020-08-16 VITALS
WEIGHT: 173 LBS | OXYGEN SATURATION: 97 % | BODY MASS INDEX: 30.65 KG/M2 | HEIGHT: 63 IN | HEART RATE: 71 BPM | DIASTOLIC BLOOD PRESSURE: 87 MMHG | RESPIRATION RATE: 15 BRPM | TEMPERATURE: 98 F | SYSTOLIC BLOOD PRESSURE: 139 MMHG

## 2020-08-16 DIAGNOSIS — M25.511 ACUTE PAIN OF RIGHT SHOULDER: ICD-10-CM
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
## 2020-08-16 DIAGNOSIS — M79.604 DIFFUSE PAIN IN RIGHT LOWER EXTREMITY: ICD-10-CM

## 2020-08-16 DIAGNOSIS — S51.011A SKIN TEAR OF RIGHT ELBOW WITHOUT COMPLICATION, INITIAL ENCOUNTER: ICD-10-CM

## 2020-08-16 PROCEDURE — 90471 IMMUNIZATION ADMIN: CPT

## 2020-08-16 PROCEDURE — 73562 X-RAY EXAM OF KNEE 3: CPT

## 2020-08-16 PROCEDURE — 73030 X-RAY EXAM OF SHOULDER: CPT

## 2020-08-16 PROCEDURE — 73620 X-RAY EXAM OF FOOT: CPT

## 2020-08-16 PROCEDURE — 73590 X-RAY EXAM OF LOWER LEG: CPT

## 2020-08-16 PROCEDURE — 74011250637 HC RX REV CODE- 250/637: Performed by: EMERGENCY MEDICINE

## 2020-08-16 PROCEDURE — 99284 EMERGENCY DEPT VISIT MOD MDM: CPT

## 2020-08-16 PROCEDURE — 73610 X-RAY EXAM OF ANKLE: CPT

## 2020-08-16 PROCEDURE — 90715 TDAP VACCINE 7 YRS/> IM: CPT | Performed by: EMERGENCY MEDICINE

## 2020-08-16 PROCEDURE — 74011250636 HC RX REV CODE- 250/636: Performed by: EMERGENCY MEDICINE

## 2020-08-16 PROCEDURE — 73080 X-RAY EXAM OF ELBOW: CPT

## 2020-08-16 RX ORDER — ACETAMINOPHEN 325 MG/1
650 TABLET ORAL ONCE
Status: COMPLETED | OUTPATIENT
Start: 2020-08-16 | End: 2020-08-16

## 2020-08-16 RX ADMIN — TETANUS TOXOID, REDUCED DIPHTHERIA TOXOID AND ACELLULAR PERTUSSIS VACCINE, ADSORBED 0.5 ML: 5; 2.5; 8; 8; 2.5 SUSPENSION INTRAMUSCULAR at 21:40

## 2020-08-16 RX ADMIN — ACETAMINOPHEN 650 MG: 325 TABLET ORAL at 23:16

## 2020-08-17 LAB — SARS-COV-2, COV2NT: NOT DETECTED

## 2020-08-17 NOTE — ED PROVIDER NOTES
57-year-old female with a history of congestive heart failure presents by EMS after she slipped while standing on her back porch today falling onto her right side and complaining now of pain to her right foot, ankle, calf, and his knee. Also complaining of pain to her right shoulder. She is unsure of last tetanus shot. The history is provided by the patient. Fall   The accident occurred 1 to 2 hours ago. The fall occurred while standing. She fell from a height of ground level. She landed on grass. The point of impact was the right knee and right shoulder. The pain is present in the right shoulder and right knee. The pain is moderate. She was not ambulatory at the scene. There was no entrapment after the fall. There was no drug use involved in the accident. There was no alcohol use involved in the accident. Associated symptoms include tingling (Chronic in right foot). Pertinent negatives include no fever, no numbness, no abdominal pain, no nausea, no vomiting, no headaches, no extremity weakness, no loss of consciousness and no laceration. The symptoms are aggravated by activity. She has tried nothing for the symptoms. It is unknown when the patient last had a tetanus shot.         Past Medical History:   Diagnosis Date    Arthritis     Asthma 6/29/2009    CAUSED BY MOLD    Atrial fibrillation ([address] 6/29/2009    CAD (coronary artery disease) 06/29/2009    Chronic pain of right ankle     Diabetes ([address]     Drug induced     Diastolic heart failure ([address]     DVT (deep venous thrombosis) ([address] 2004    Right leg post surgery    GERD (gastroesophageal reflux disease)     Gluten intolerance     Hypothyroidism 6/29/2009    Iron deficiency anemia     Lyme disease     Personal history of MI (myocardial infarction)     Rheumatoid arthritis ([address]     Slow to wake up after anesthesia     Syncope     Post testing- resolved    TIA (transient ischemic attack) 2004 & 2006    Vitamin B12 deficiency 6/29/2009 Past Surgical History:   Procedure Laterality Date    HX ADENOIDECTOMY      HX AFIB ABLATION      HX APPENDECTOMY      HX CHOLECYSTECTOMY  11    HX COLONOSCOPY      HX CORONARY STENT PLACEMENT      x 2    HX HEART CATHETERIZATION  2010    2 STENTS    HX HYSTERECTOMY      HX LUMBAR LAMINECTOMY  2000    L4-L5    HX ORTHOPAEDIC  -2012    RT.  FOOT SURGERY X 6/calcaneal osteotomy    HX TONSILLECTOMY  1964         Family History:   Problem Relation Age of Onset    Delayed Awakening Mother     Heart Disease Mother     Coronary Artery Disease Mother     Hypertension Mother     Stroke Mother    [facility] Delayed Awakening Sister     Hypertension Sister     Lung Disease Father     Cancer Father         colon    Hypertension Father     Hypertension Brother     Breast Cancer Maternal Aunt     Breast Cancer Maternal Aunt     Breast Cancer Cousin         maternal 1st cousin    Breast Cancer Maternal Aunt     Breast Cancer Cousin         maternal 1st cousin   [facility] Breast Cancer Cousin         maternal 1st cousin    Deep Vein Thrombosis Neg Hx     Anesth Problems Neg Hx        Social History     Socioeconomic History    Marital status:      Spouse name: Not on file    Number of children: Not on file    Years of education: Not on file    Highest education level: Not on file   Occupational History    Not on file   Social Needs    Financial resource strain: Not on file    Food insecurity     Worry: Not on file     Inability: Not on file    Transportation needs     Medical: Not on file     Non-medical: Not on file   Tobacco Use    Smoking status: Former Smoker     Packs/day: 1.00     Years: 30.00     Pack years: 30.00     Types: Cigarettes     Last attempt to quit: 2002     Years since quittin.1    Smokeless tobacco: Never Used   Substance and Sexual Activity    Alcohol use: No    Drug use: No    Sexual activity: Never   Lifestyle    Physical activity     Days per week: Not on file     Minutes per session: Not on file    Stress: Not on file   Relationships    Social connections     Talks on phone: Not on file     Gets together: Not on file     Attends Gnosticism service: Not on file     Active member of club or organization: Not on file     Attends meetings of clubs or organizations: Not on file     Relationship status: Not on file    Intimate partner violence     Fear of current or ex partner: Not on file     Emotionally abused: Not on file     Physically abused: Not on file     Forced sexual activity: Not on file   Other Topics Concern    Not on file   Social History Narrative    Not on file         ALLERGIES: Butter flavor; Keflex [cephalexin]; Pcn [penicillins]; Aspirin; Cimzia [certolizumab pegol]; Clopidogrel; Demerol [meperidine]; Fentanyl; Morphine; Nitroglycerin; Sulfa (sulfonamide antibiotics); Tapazole [methimazole]; Adhesive tape-silicones; Albuterol; Gluten; and Oxycodone    Review of Systems   Constitutional: Negative for fatigue and fever. HENT: Negative for sneezing and sore throat. Respiratory: Negative for cough and shortness of breath. Cardiovascular: Negative for chest pain and leg swelling. Gastrointestinal: Negative for abdominal pain, diarrhea, nausea and vomiting. Genitourinary: Negative for difficulty urinating and dysuria. Musculoskeletal: Positive for arthralgias and myalgias. Negative for extremity weakness. Skin: Negative for color change and rash. Neurological: Positive for tingling (Chronic in right foot). Negative for loss of consciousness, weakness, numbness and headaches. Psychiatric/Behavioral: Negative for agitation and behavioral problems. Vitals:    08/16/20 2104   BP: 145/57   Pulse: 76   Resp: 16   Temp: 98 °F (36.7 °C)   SpO2: 95%   Weight: 78.5 kg (173 lb)   Height: 5' 3\" (1.6 m)            Physical Exam  Vitals signs and nursing note reviewed. Constitutional:       Appearance: Normal appearance.  She is obese.   HENT:      Head: Normocephalic and atraumatic. Nose: Nose normal.      Mouth/Throat:      Mouth: Mucous membranes are moist.      Pharynx: Oropharynx is clear. Eyes:      Extraocular Movements: Extraocular movements intact. Pupils: Pupils are equal, round, and reactive to light. Neck:      Musculoskeletal: Normal range of motion and neck supple. Cardiovascular:      Rate and Rhythm: Normal rate. Pulses: Normal pulses. Pulmonary:      Effort: Pulmonary effort is normal.      Breath sounds: Normal breath sounds. Abdominal:      Palpations: Abdomen is soft. Tenderness: There is no abdominal tenderness. Musculoskeletal:        Arms:         Legs:    Skin:     General: Skin is warm and dry. Capillary Refill: Capillary refill takes less than 2 seconds. Findings: No laceration. Neurological:      General: No focal deficit present. Mental Status: She is alert and oriented to person, place, and time. Psychiatric:         Mood and Affect: Mood normal.         Behavior: Behavior normal.          MDM  Number of Diagnoses or Management Options  Diagnosis management comments: 33-year-old female presents as above after a fall. Her work-up has been reassuring without evidence of bony injury. Small abrasion/skin tear to the right elbow. Plan to discharge with instructions to follow-up with primary care.        Amount and/or Complexity of Data Reviewed  Tests in the radiology section of CPT®: reviewed    Risk of Complications, Morbidity, and/or Mortality  Presenting problems: moderate  Management options: moderate    Patient Progress  Patient progress: stable         Procedures

## 2020-08-17 NOTE — ED TRIAGE NOTES
Pt brought to ED via EMS. Ground level all after slipping on deck. Reports this happened about an hour ago. No LOC reported. Complaint of pain to right foot, knee, hip, and shoulder.

## 2020-08-17 NOTE — ED NOTES
Wound care completed on the right knee and right elbow. Sling applied for comfort and ace bandage applied to right knee. The pt is discharged home with follow-up instructions. The pt received no prescriptions. The pt is assisted to her car and to be driven home by spouse. The pt was able stand on pivot into the car and bear weight just slightly on the right leg.

## 2020-08-18 ENCOUNTER — ANESTHESIA EVENT (OUTPATIENT)
Dept: SURGERY | Age: 70
End: 2020-08-18
Payer: MEDICARE

## 2020-08-18 NOTE — ANESTHESIA PREPROCEDURE EVALUATION
Relevant Problems   No relevant active problems       Anesthetic History   No history of anesthetic complications            Review of Systems / Medical History  Patient summary reviewed, nursing notes reviewed and pertinent labs reviewed    Pulmonary            Asthma        Neuro/Psych         TIA ( speech no residual)     Cardiovascular    Hypertension        Dysrhythmias : atrial fibrillation  Past MI and CAD      Comments: Metoprolol last night   GI/Hepatic/Renal     GERD           Endo/Other    Diabetes  Hypothyroidism  Arthritis     Other Findings   Comments: Patient recently fell and injured right shoulder and right knee. Cannot move right arm much due to shoulder pain . Patient also states that she had foot drop too from the injury but that resolved.  She states tht she was evaluated for this trauma and nothing was broken         Physical Exam    Airway  Mallampati: II  TM Distance: 4 - 6 cm  Neck ROM: normal range of motion   Mouth opening: Normal     Cardiovascular  Regular rate and rhythm,  S1 and S2 normal,  no murmur, click, rub, or gallop  Rhythm: regular  Rate: normal         Dental  No notable dental hx       Pulmonary  Breath sounds clear to auscultation               Abdominal  GI exam deferred       Other Findings            Anesthetic Plan    ASA: 4  Anesthesia type: MAC and regional - supraclavicular block            Anesthetic plan and risks discussed with: Patient

## 2020-08-19 ENCOUNTER — ANESTHESIA (OUTPATIENT)
Dept: SURGERY | Age: 70
End: 2020-08-19
Payer: MEDICARE

## 2020-08-19 ENCOUNTER — HOSPITAL ENCOUNTER (OUTPATIENT)
Age: 70
Setting detail: OUTPATIENT SURGERY
Discharge: HOME | End: 2020-08-19
Attending: ORTHOPAEDIC SURGERY | Admitting: ORTHOPAEDIC SURGERY
Payer: MEDICARE

## 2020-08-19 VITALS
HEART RATE: 69 BPM | DIASTOLIC BLOOD PRESSURE: 72 MMHG | WEIGHT: 175.71 LBS | OXYGEN SATURATION: 92 % | BODY MASS INDEX: 31.13 KG/M2 | RESPIRATION RATE: 15 BRPM | SYSTOLIC BLOOD PRESSURE: 136 MMHG | TEMPERATURE: 97.9 F

## 2020-08-19 LAB
GLUCOSE BLD STRIP.AUTO-MCNC: 175 MG/DL (ref 65–100)
SERVICE CMNT-IMP: ABNORMAL

## 2020-08-19 PROCEDURE — 74011250636 HC RX REV CODE- 250/636: Performed by: NURSE ANESTHETIST, CERTIFIED REGISTERED

## 2020-08-19 PROCEDURE — 82962 GLUCOSE BLOOD TEST: CPT

## 2020-08-19 PROCEDURE — 74011000272 HC RX REV CODE- 272: Performed by: ORTHOPAEDIC SURGERY

## 2020-08-19 PROCEDURE — 77030018836 HC SOL IRR NACL ICUM -A: Performed by: ORTHOPAEDIC SURGERY

## 2020-08-19 PROCEDURE — 77030002966 HC SUT PDS J&J -A: Performed by: ORTHOPAEDIC SURGERY

## 2020-08-19 PROCEDURE — 74011000250 HC RX REV CODE- 250: Performed by: NURSE ANESTHETIST, CERTIFIED REGISTERED

## 2020-08-19 PROCEDURE — 74011250636 HC RX REV CODE- 250/636: Performed by: ORTHOPAEDIC SURGERY

## 2020-08-19 PROCEDURE — 77030006773 HC BLD SAW OSC BRSM -A: Performed by: ORTHOPAEDIC SURGERY

## 2020-08-19 PROCEDURE — 76210000040 HC AMBSU PH I REC FIRST 0.5 HR: Performed by: ORTHOPAEDIC SURGERY

## 2020-08-19 PROCEDURE — 74011250636 HC RX REV CODE- 250/636: Performed by: ANESTHESIOLOGY

## 2020-08-19 PROCEDURE — 77030002916 HC SUT ETHLN J&J -A: Performed by: ORTHOPAEDIC SURGERY

## 2020-08-19 PROCEDURE — 77030003601 HC NDL NRV BLK BBMI -A

## 2020-08-19 PROCEDURE — 77030010777 HC CRDL FT DERY -B

## 2020-08-19 PROCEDURE — 76210000050 HC AMBSU PH II REC 0.5 TO 1 HR: Performed by: ORTHOPAEDIC SURGERY

## 2020-08-19 PROCEDURE — A4565 SLINGS: HCPCS

## 2020-08-19 PROCEDURE — 74011000250 HC RX REV CODE- 250: Performed by: ORTHOPAEDIC SURGERY

## 2020-08-19 PROCEDURE — 77030000032 HC CUF TRNQT ZIMM -B: Performed by: ORTHOPAEDIC SURGERY

## 2020-08-19 PROCEDURE — 77030040361 HC SLV COMPR DVT MDII -B

## 2020-08-19 PROCEDURE — 77030040922 HC BLNKT HYPOTHRM STRY -A

## 2020-08-19 PROCEDURE — 76060000062 HC AMB SURG ANES 1 TO 1.5 HR: Performed by: ORTHOPAEDIC SURGERY

## 2020-08-19 PROCEDURE — 77030003882 HC BIT DRL TWST BRSM -B: Performed by: ORTHOPAEDIC SURGERY

## 2020-08-19 PROCEDURE — 77030002922 HC SUT FBRWRE ARTH -B: Performed by: ORTHOPAEDIC SURGERY

## 2020-08-19 PROCEDURE — 76030000001 HC AMB SURG OR TIME 1 TO 1.5: Performed by: ORTHOPAEDIC SURGERY

## 2020-08-19 PROCEDURE — 74011250636 HC RX REV CODE- 250/636

## 2020-08-19 PROCEDURE — 77030002996 HC SUT SLK J&J -A: Performed by: ORTHOPAEDIC SURGERY

## 2020-08-19 PROCEDURE — 77030040356 HC CORD BPLR FRCP COVD -A: Performed by: ORTHOPAEDIC SURGERY

## 2020-08-19 PROCEDURE — 77030006689 HC BLD OPHTH BVR BD -A: Performed by: ORTHOPAEDIC SURGERY

## 2020-08-19 RX ORDER — EPHEDRINE SULFATE/0.9% NACL/PF 50 MG/5 ML
SYRINGE (ML) INTRAVENOUS AS NEEDED
Status: DISCONTINUED | OUTPATIENT
Start: 2020-08-19 | End: 2020-08-19 | Stop reason: HOSPADM

## 2020-08-19 RX ORDER — PROPOFOL 10 MG/ML
INJECTION, EMULSION INTRAVENOUS
Status: DISCONTINUED | OUTPATIENT
Start: 2020-08-19 | End: 2020-08-19 | Stop reason: HOSPADM

## 2020-08-19 RX ORDER — ONDANSETRON 2 MG/ML
INJECTION INTRAMUSCULAR; INTRAVENOUS AS NEEDED
Status: DISCONTINUED | OUTPATIENT
Start: 2020-08-19 | End: 2020-08-19 | Stop reason: HOSPADM

## 2020-08-19 RX ORDER — SODIUM CHLORIDE, SODIUM LACTATE, POTASSIUM CHLORIDE, CALCIUM CHLORIDE 600; 310; 30; 20 MG/100ML; MG/100ML; MG/100ML; MG/100ML
INJECTION, SOLUTION INTRAVENOUS
Status: DISCONTINUED | OUTPATIENT
Start: 2020-08-19 | End: 2020-08-19 | Stop reason: HOSPADM

## 2020-08-19 RX ORDER — BACITRACIN ZINC 500 UNIT/G
OINTMENT (GRAM) TOPICAL AS NEEDED
Status: DISCONTINUED | OUTPATIENT
Start: 2020-08-19 | End: 2020-08-19 | Stop reason: HOSPADM

## 2020-08-19 RX ORDER — CLINDAMYCIN PHOSPHATE 900 MG/50ML
900 INJECTION, SOLUTION INTRAVENOUS ONCE
Status: COMPLETED | OUTPATIENT
Start: 2020-08-19 | End: 2020-08-19

## 2020-08-19 RX ORDER — SODIUM CHLORIDE, SODIUM LACTATE, POTASSIUM CHLORIDE, CALCIUM CHLORIDE 600; 310; 30; 20 MG/100ML; MG/100ML; MG/100ML; MG/100ML
100 INJECTION, SOLUTION INTRAVENOUS CONTINUOUS
Status: DISCONTINUED | OUTPATIENT
Start: 2020-08-19 | End: 2020-08-19 | Stop reason: HOSPADM

## 2020-08-19 RX ORDER — MIDAZOLAM HYDROCHLORIDE 1 MG/ML
INJECTION, SOLUTION INTRAMUSCULAR; INTRAVENOUS AS NEEDED
Status: DISCONTINUED | OUTPATIENT
Start: 2020-08-19 | End: 2020-08-19 | Stop reason: HOSPADM

## 2020-08-19 RX ORDER — DEXAMETHASONE SODIUM PHOSPHATE 4 MG/ML
INJECTION, SOLUTION INTRA-ARTICULAR; INTRALESIONAL; INTRAMUSCULAR; INTRAVENOUS; SOFT TISSUE
Status: SHIPPED | OUTPATIENT
Start: 2020-08-19 | End: 2020-08-19

## 2020-08-19 RX ORDER — LIDOCAINE HYDROCHLORIDE 10 MG/ML
0.1 INJECTION, SOLUTION EPIDURAL; INFILTRATION; INTRACAUDAL; PERINEURAL AS NEEDED
Status: DISCONTINUED | OUTPATIENT
Start: 2020-08-19 | End: 2020-08-19 | Stop reason: HOSPADM

## 2020-08-19 RX ORDER — ROPIVACAINE HYDROCHLORIDE 5 MG/ML
INJECTION, SOLUTION EPIDURAL; INFILTRATION; PERINEURAL AS NEEDED
Status: DISCONTINUED | OUTPATIENT
Start: 2020-08-19 | End: 2020-08-19 | Stop reason: HOSPADM

## 2020-08-19 RX ORDER — HYDROMORPHONE HYDROCHLORIDE 1 MG/ML
.25-1 INJECTION, SOLUTION INTRAMUSCULAR; INTRAVENOUS; SUBCUTANEOUS
Status: DISCONTINUED | OUTPATIENT
Start: 2020-08-19 | End: 2020-08-19 | Stop reason: HOSPADM

## 2020-08-19 RX ADMIN — DEXAMETHASONE SODIUM PHOSPHATE 4 MG: 4 INJECTION, SOLUTION INTRAMUSCULAR; INTRAVENOUS at 07:52

## 2020-08-19 RX ADMIN — ONDANSETRON HYDROCHLORIDE 4 MG: 2 SOLUTION INTRAMUSCULAR; INTRAVENOUS at 09:12

## 2020-08-19 RX ADMIN — SODIUM CHLORIDE, SODIUM LACTATE, POTASSIUM CHLORIDE, AND CALCIUM CHLORIDE 100 ML/HR: 600; 310; 30; 20 INJECTION, SOLUTION INTRAVENOUS at 07:11

## 2020-08-19 RX ADMIN — CLINDAMYCIN PHOSPHATE 900 MG: 900 INJECTION, SOLUTION INTRAVENOUS at 09:08

## 2020-08-19 RX ADMIN — SODIUM CHLORIDE, POTASSIUM CHLORIDE, SODIUM LACTATE AND CALCIUM CHLORIDE: 600; 310; 30; 20 INJECTION, SOLUTION INTRAVENOUS at 09:02

## 2020-08-19 RX ADMIN — Medication 10 MG: at 09:57

## 2020-08-19 RX ADMIN — MIDAZOLAM HYDROCHLORIDE 2 MG: 2 INJECTION, SOLUTION INTRAMUSCULAR; INTRAVENOUS at 07:45

## 2020-08-19 RX ADMIN — ROPIVACAINE HYDROCHLORIDE 30 ML: 5 INJECTION, SOLUTION EPIDURAL; INFILTRATION; PERINEURAL at 07:52

## 2020-08-19 RX ADMIN — PROPOFOL 100 MCG/KG/MIN: 10 INJECTION, EMULSION INTRAVENOUS at 09:05

## 2020-08-19 NOTE — PERIOP NOTES
PACU IN REPORT FROM ANESTHESIA    Verbal report received from   Melinda   [] MD/DO-Anesthesiologist    [x] CRNA   [] with student    CHOICE ANESTHESIA:  [] GENERAL  [x] MAC  [] LOCAL  [x] REGIONAL  [] SPINAL   [] EPIDURAL   **Note the anesthesia record for medications given intraoperatively. **           [] E.R.A.S. PROTOCOL    SURGICAL PROCEDURE: Procedure(s) (LRB):  RIGHT THUMB CARPOMETACARPAL ARTHROPLASTY, RIGHT THUMB A1 PULLEY RELEASE (MAC WITH REG) (Right)     SURGEON: Nahid Vela MD.    Brief Initial Visual Assessment:    Patient Age: [] Infant(1-12mo)      []Pediatric(1-13yrs)    [] Adolescent(13-18yrs)    [] Adult(18-65yrs)      [x]Geriatric Adult(>65yrs). Patient    [] Alert           [x]Calm & Cooperative      [] Anxious  Appearance: [x] Drowsy      [] Sedated      [] Unresponsive     Oriented x  1            Airway:     [x] Patent                          [] Near-obstructed   [] Obstructed                        [] Airway improved with head/airway repositioning                       Airway Adjuncts Present: [] Oral Airway    [] Nasal Trumpet    [] ETT    [] LMA            Respiratory  [x] Even   [] Labored   [] Shallow   [] Tachypnea   [] Bradypnea  Pattern:    [x] Non-Labored  [] VENT and/or respiratory assistance     being provided. Skin:     [x] Pink [] Dusky    [] Pale        [x] Warm    [] Hot [] Cool       [] Cold   [x]Dry [] Moist [] Diaphoretic     Membranes:  [x] Pink [] Pale       [x] Moist [] Dry     [] Crusty     Pain:   [x] No Acute Discomfort. 0  /10 Scale [] Verbal Numeric   [] Moderate Discomfort.      [] V.A.S. [] Acute Discomfort. [x] A.N.V.    [] Chronic-Issue Related Discomfort.   [] F.L.A.C.C. Note E-MAR for medications administered. []Faces, Bradford/Baker    Note assessments documented in flowsheets; any assessment variants to be found in comments or narrative perioperative nurse notes.        Post-anesthesia care now assumed by Debora Ervin Radha BSN, RN-BC

## 2020-08-19 NOTE — ANESTHESIA POSTPROCEDURE EVALUATION
Procedure(s):  RIGHT THUMB CARPOMETACARPAL ARTHROPLASTY, RIGHT THUMB A1 PULLEY RELEASE (MAC WITH REG). MAC, regional    Anesthesia Post Evaluation      Multimodal analgesia: multimodal analgesia not used between 6 hours prior to anesthesia start to PACU discharge  Patient location during evaluation: PACU  Patient participation: complete - patient participated  Level of consciousness: awake and alert  Pain management: adequate  Airway patency: patent  Anesthetic complications: no  Cardiovascular status: acceptable  Respiratory status: acceptable  Hydration status: acceptable  Post anesthesia nausea and vomiting:  none  Final Post Anesthesia Temperature Assessment:  Normothermia (36.0-37.5 degrees C)      INITIAL Post-op Vital signs:   Vitals Value Taken Time   /68 8/19/2020 11:05 AM   Temp     Pulse 62 8/19/2020 11:11 AM   Resp 15 8/19/2020 11:11 AM   SpO2 93 % 8/19/2020 11:11 AM   Vitals shown include unvalidated device data.

## 2020-08-19 NOTE — ANESTHESIA PROCEDURE NOTES
Peripheral Block    Start time: 8/19/2020 7:45 AM  End time: 8/19/2020 7:52 AM  Performed by: Valorie Avilez MD  Authorized by: Valorie Avilez MD       Pre-procedure:    Indications: at surgeon's request and primary anesthetic    Preanesthetic Checklist: patient identified, risks and benefits discussed, site marked, timeout performed, anesthesia consent given and patient being monitored    Timeout Time: 07:45          Block Type:   Block Type:  Supraclavicular  Laterality:  Right  Monitoring:  Continuous pulse ox, frequent vital sign checks, heart rate, responsive to questions and oxygen  Injection Technique:  Single shot  Procedures: ultrasound guided and nerve stimulator    Patient Position: supine  Prep: chlorhexidine    Location:  Supraclavicular  Needle Type:  Stimuplex  Needle Gauge:  22 G  Needle Localization:  Anatomical landmarks and ultrasound guidance    Assessment:  Number of attempts:  1  Injection Assessment:  Incremental injection every 5 mL, local visualized surrounding nerve on ultrasound, negative aspiration for blood, no paresthesia and no intravascular symptoms  Patient tolerance:  Patient tolerated the procedure well with no immediate complications

## 2020-08-19 NOTE — BRIEF OP NOTE
Brief Postoperative Note    Patient: Parul Henry  YOB: 1950  MRN: [id number]    Date of Procedure: 8/19/2020     Pre-Op Diagnosis: Arthritis of carpometacarpal [place]) joint of right thumb [M18.11]  Trigger thumb of right hand [M65.311]    Post-Op Diagnosis: Same as preoperative diagnosis.       Procedure(s):  RIGHT THUMB CARPOMETACARPAL ARTHROPLASTY, RIGHT THUMB A1 PULLEY RELEASE (MAC WITH REG)    Surgeon(s):  Kanu Spencer MD    Surgical Assistant: None    Anesthesia: MAC     Estimated Blood Loss (mL): Minimal    Complications: None    Specimens: * No specimens in log *     Implants: * No implants in log *    Drains: * No LDAs found *    Findings: severe stage 4 CMC arthritits    Electronically Signed by J Carlos Shepard MD on 8/19/2020 at 10:25 AM

## 2020-08-19 NOTE — DISCHARGE INSTRUCTIONS
SEE DR Latonia Ying PRE-PRINTED INSTRUCTIONS  (COPY MADE TO PAPER CHART RECORD)    ___________________________________________        DISCHARGE SUMMARY from Your Nurse    PATIENT INSTRUCTIONS:    AFTER ANESTHESIA & SEDATION, and WHILE TAKING PAIN MEDICINE  After general anesthesia / intravenous sedation and the 24 hours following, and/or while taking prescription Opiates:  · Limit your activities  · Do not drive and operate hazardous machinery until you have been of all narcotics and sedatives for over 24 hours  · Do not make important personal or business decisions  · Do  not drink alcoholic beverages  · If you have not urinated within 8 hours after discharge, please contact your surgeon on call. SIGNS OF INFECTION, THINGS TO REPORT TO YOUR DOCTOR  Report the following Signs of Infection or General Problems after surgery to your surgeon:  · Excessive pain, swelling, redness, drainage, pus or odor of or around the surgical area  · Fever/ temperature over 101; Temperature over 100 if on medications (chemotherapy or medicines which affect your ability to fight infections)  · Nausea and vomiting lasting longer than 4 hours or if unable to take medications  · Any signs of decreased circulation or nerve impairment to extremity: change in color, persistent  numbness, tingling, coldness or increase pain  · If you have any questions. GOOD HELP TO FIGHT AN INFECTION  Here are a few tip to help reduce the chance of getting an infection after surgery:   Wash Your Hands   Good handwashing is the most important thing you and your caregiver can do.  Wash before and after caring for any wounds. Dry your hand with a clean towel.  Wash with soap and water for at least 20 seconds. A TIP: sing the \"Happy Birthday\" song through one time while washing to help with the timing.  Use a hand  in between washings.      Shower   When your surgeon says it is OK to take a shower, use a new bar of antibacterial soap (if that is what you use, and keep that bar of soap ONLY for your use), or antibacterial body wash.  Use a clean wash cloth or sponge when you bathe.  Dry off with a clean towel  after every bath - be careful around any wounds, skin staples, sutures or surgical glue over/on wounds.  Do not enter swimming pools, hot tubs, lakes, rivers and/or ocean until wounds are healed and your doctor/surgeon says it is OK.  Use Clean Sheets   Sleep on freshly laundered sheets after your surgery.  Keep the surgery site covered with a clean, dry bandage (if instructed to do so). If the bandage becomes soiled, reapply a new, dry, clean bandage.  Do not allow pets to sleep with you while your wound is healing.  Lifestyle Modification and Controlling Your Blood Sugar   Smoking slows wound healing. Stop smoking and limit exposure to second-hand smoke.  High blood sugar slows wound healing. Eat a well-balanced diet to provide proper nutrition while healing   Monitor your blood sugar (if you are a diabetic) and take your medications as you are suppose to so you can control you blood sugar after surgery. COUGH AND DEEP BREATHE  Breathing deep and coughing are very important exercises to do after surgery. Deep breathing and coughing open the little air tubes and air sacks in your lungs. You take deep breaths every day. You may not even notice - it is just something you do when you sigh or yawn. It is a natural exercise you do to keep these air passages open. After surgery, take deep breaths and cough, on purpose. Coughing and deep breathing help prevent bronchitis and pneumonia after surgery. If you had chest or belly surgery, use a pillow as a \"hug sudhir\" and hold it tightly to your chest or belly when you cough. DIRECTIONS:  1. Take 10 to 15 slow deep breaths every hour while awake. 2. Breathe in deeply, and hold it for 2 seconds.   3. Exhale slowly through puckered lips, like blowing up a balloon. 4. After every 4th or 5th deep breath, hug your pillow to your chest or belly and give a hard, deep cough. Yes, it will probably hurt if you had abdominal surgery. But doing this exercise is very important part of healing after surgery. Take your pain medicine to help you do this exercise without too much pain. ANKLE PUMPS    Ankle pumps increase the circulation of oxygenated blood to your lower extremities and decrease your risk for circulation problems such as blood clots. They also stretch the muscles, tendons and ligaments in your foot and ankle, and prevent joint contracture in the ankle and foot, especially after surgeries on the legs. It is important to do ankle pump exercises regularly after surgery because immobility increases your risk for developing a blood clot. Your doctor may also have you take an Aspirin for the next few days as well. If your doctor did not ask you to take an Aspirin, consult with him before starting Aspirin therapy on your own. The exercise is quite simple. · Slowly point your foot forward, feeling the muscles on the top of your lower leg stretch, and hold this position for 5 seconds. · Next, pull your foot back toward you as far as possible, stretching the calf muscles, and hold that position for 5 seconds. · Repeat with the other foot. · Perform 10 repetitions every hour while awake for both ankles if possible (down and then up with the foot once is one repetition). You should feel gentle stretching of the muscles in your lower leg when doing this exercise. If you feel pain, or your range of motion is limited, don't push too hard. Only go the limit your joint and muscles will let you go. If you have increasing pain, progressively worsening leg warmth or swelling, STOP the exercise and call your doctor. Other Wound Care information:  [] No additional recommendations.              Going Home After A  Peripheral Nerve Block    Patient Information    The anesthesiology team has provided for your pain control through a technique called regional anesthesia. As the name implies, anesthesia (decreased or no pain, sensation, or motor control) has been provided to a specific region, whether that be an arm or a leg. How does this happen?  you might ask. While you were sleepy, the anesthesia provider provided medicine to a specific group of nerves either in the neck/shoulder region or in the groin and/or buttock region, similar to the way a dentist might numb a tooth (teeth.)  They typically use an ultrasound machine to know where the medicine is placed in relation to the nerves they wish to numb up or block.   What this means is that for the next few hours, you should expect to have a numb limb. Below are some things we wish for you to read and be familiar with concerning your anesthetized limb. Caring For a Blocked Body Part    General Considerations:   The numbness may last up to 24 hours   You must protect your arm or leg. The blocked extremity is numb, weak, and difficult for your brain to locate and communicate with. To do this you should:  o Pay attention to the position of the blocked limb at all times. o Be very careful when placing hot or cod items on a numb limb. You could cause tissue damage like burns or frostbite if you are unable to feel temperature. Carefully follow your discharge instructions regarding the use of these therapies in you post-operative care. o Carefully pad the affected limb. Normally we continually move and adjust the position of our bodies without thinking about it. This movement and continuous repositioning helps to prevent injuries from immobility. When a limb is numb it still requires this care  o Be extremely careful not to bump or hit the numb body part. This can result in an unrecognized injury, at lease until the blocked limb wakes up.   It can also result in worse pain of your already post-surgical limb. Arm/Shoulder Blocks:   You may experience a droopy eyelid, nasal stuffiness, and redness of the eye after receiving an arm/shoulder block. This is called Carltons Syndrome, and is very common. There is no need for concern. You may also experience mild hoarseness, but all of these symptoms should resolve within 24 hours.  Some patients may experience mild shortness of breath. A sitting position will help alleviate this and it should resolve within 24 hours. If you experience significant or progressive worsening of the shortness of breath, seek medical attention immediately. Knee/Foot Blocks:   DO NOT use the blocked leg to walk, balance or support yourself. Your leg will not be able to balance your weight properly while any part of your leg is numb. You are at a RISK for [place].  Be careful not to drag your foot along the ground or stub your toes. Contact Phone Numbers    CALL 911 IN CASE OF AN EMERGENCY. For all other non-emergency inquiries call the [facility]  at [phone] and ask for the anesthesiologist on call to be paged. P.R.I.C.E. INSTRUCTIONS    PRICE is an acronym that stands for Protect, Rest, Ice, Compression, and Elevation (sometimes you might see the acronym RICE.)   Listed below are five activities one can do for an injured limb or soft tissue injury. While much anecdotal understanding learned through many years of experience supports these seemingly common sense treatments, building scientific evidence is showing how and why these treatment principles are proving to be so beneficial.  Below is a breakdown of what the PRICE principles entail to speed healing along. PROTECT may sound like an obvious thing to do, and really, it is common sense. After an injury or surgery, protecting the site that hurts help to prevent further injury. REST is essential for an injured limb.   Like protection, the more you are up on an injured limb, especially in the early stages of an injury, the more damage you can do. Rest means no activities that would involve the use of the injured tissues so that the early stages of healing can begin without  interruption. ICE \"is perhaps the simplest and oldest [therapy] in the treatment of soft tissue injuries. \"4    Ice help decrease swelling in inflamed and damaged tissues, can diminish the feeling of pain and decrease muscle spasms, and, immediately after an injury,  can slow cellular metabolism and help to prevent further tissue injury from oxygen starvation caused by the swelling. 5      COMPRESSION help decrease pain by limiting movement of an injured limb. Compression can be found through the use of an elastic wrap bandage, a cast, splint, or simply a snug cooling cuff or an ice pack and pillow. ELEVATION is a very important intervention. Placing the injury above the level of the heart whenever possible helps decrease swelling by using gravity to one's advantage . Placing the injury above the level of the heart also helps prevent, or at least decrease, the throbbing pain that is sometimes experienced after surgery or injury. Sources:  1. Muscle injuries: optimizing recovery (2007) Best Practice & Research Clinical Rheumatology Vol. 21, No. 2, pp 010-111, Accessed 9/26/11    2. PRICE first aid guidelines - Protection, Rest, Ice, Compression and Elevation By Michaela Gonsales [employer]. com Guide, Updated March 27, 2011, Accessed 9/26/11 http://[email or web address].com/cs/rehab/a/rice. htm    3. Rest Ice Compression Elevation: RICE for injuries, Accessed 9/26/11 [employer]. com/rest-ice-compression-elevation. html    4. The use of ice in the treatment of acute soft-tissue injury (2004) Layo, Vol. 32, No. 1, pp 251-261, Accessed 9/26/11 http://ajs.Newslabsub.com/content/32/1/251.full.pdf+html    5.  Soft tissue damage and healing; theory and techniques, www.iaaf.org, Ch. 9 of  medical page, by ariel Evans And Simona Lesches 9/27/11 [facility]. pdf                       Below is information on the medication(s) your doctor is prescribing for you: The maximum daily dose of acetaminophen was discussed with the patient. She was encouraged not to exceed 3,000 mg of acetaminophen during a 24 hour period and was asked to keep in mind that acetaminophen can also be found in many over-the-counter cold medications as well as narcotics that may be given for pain. The patient expresses understanding of these issues and questions were answered. 4 THINGS ABOUT PAIN MEDICINE I ALWAYS TALK ABOUT:  There are 4 side effects I always talk about for pain medications. 1. They make you sleepy and drowsy. Do not drive a car or operate machines while taking pain medication. Do not make any major decisions. Take a nap. Relax. Let your body recover from the affects of anesthesia and surgery. 2. Some people have quite a problem with itching and. ..  3. Nausea or vomiting. I mention these together because research tends to suggest there is a gene-related issue. While some have a hard time with these problems, others do not. These are expected and know side effects. Over-the-counter Benadryl® (the drug name is diphenhydramine) can help with the itching. Your doctor may also have given you some medicine for nausea. IF HE DID NOT, CALL HIM/HER. 4. Last but not least is the problem of constipation (not nathan able to have a bowel movement - poop.)  All pain medicine can slow down the movement of food through the gut. The slower it goes, the worse it can be. Frankly, this means hard poop adding insult to the injury of surgery. And if you had tummy surgery, like having your gall bladder removed or a hernia repair, YOU DO NOT WANT THIS PROBLEM.   There are 4 things I recommend. · Drink lots of fluids. For healthy people with no heart problems, this means at least 64 ounces of liquids or more per day. For example, a Big Gulp® from [employer] is 32 ounces. So you need to drink at least 2  Big Gulp®'s of fluids every day. If you have heart problems you may not be able to do this. Talk to your doctor about what you should do to prevent constipation. · Drinking fruit juice like apple, pear, or prune juice gives you extra \"BANG\" for your beverage. These drinks are high in natural fiber. If you are a diabetic, drink sugar-free fluids with fiber additives (see next 2 points.)  Avoid drinking extra fruit juice unless this is a regular part of your diet plan. · Eat extra fresh fruits and vegetables. · Add extra fiber-products. Fiber products like Metamucil®, Citrucel®, Miralax® or Benefiber® can help. These products are over-the-counter and you do not need a prescription from your doctor. If you have followed these recommendations and still have some difficulty having a good poop, take and over-the-counter stimulant like Dulcolax® (biscodyl)  or Senokot® (senna concentrate). These may help get things moving. Chris Cooper MEDICATION AND   SIDE EFFECT GUIDE    [facility] MEDICATION AND SIDE EFFECT GUIDE was provided to the PATIENT AND CARE PROVIDER. Information provided includes instruction about drug purpose and common side effects for the following medications:   · Dilaudid  · Zofran  · Keflex      Medication information added to discharge record on August 19, 2020 at 10:28 AM.      Some information we wish all of our patients to be familiar with and General Information for Healthy Lifestyle choices:    · Make a list of your current medications with your Primary Care Provider.   · Update this list whenever your medications are discontinued, doses are changed, or new medications (including over-the-counter products like ibuprofen, vitamins, or herbal remedies) are added. · Carry medication information at all times in case of emergency situations      No smoking / No tobacco products / Avoid exposure to second hand smoke    Surgeon General's Warning:  Quitting smoking now greatly reduces serious risk to your health. Obesity, smoking, and sedentary lifestyle greatly increases your risk for illness. A healthy diet, regular physical exercise & weight monitoring are important for maintaining a healthy lifestyle. A Note About Congestive Heart Failure: You may be retaining fluid if you have a history of heart failure or if you experience any of the following symptoms:      · Weight gain of 3 pounds or more overnight or 5 pounds in a week  · Increased swelling in our hands or feet  · Shortness of breath while lying flat in bed      Please call your doctor as soon as you notice any of these symptoms; do not wait until your next office visit. A Note About Strokes:  Recognize signs and symptoms of STROKE. The simple mnemonic, F.A.S.T., can help you remember signs of a stroke and what to do if you suspect a stroke is occuring to you or someone you are with:    F - Face looks uneven  A - Arms unable to move, or move evenly  S - Speech is slurred or non-existent  T - Time - CALL 911 as soon as signs and symptoms begin - DO NOT go to bed or wait to see if you get better - TIME IS BRAIN. Warning Signs of HEART ATTACK   Call 911 if you have these symptoms:     Chest discomfort. Most heart attacks involve discomfort in the center of the chest that lasts more than a few minutes, or that goes away and comes back. It can feel like uncomfortable pressure, squeezing, fullness, or pain.  Discomfort in other areas of the upper body. Symptoms can include pain or discomfort in one or both arms, the back, neck, jaw, or stomach.  Shortness of breath with or without chest discomfort.      Other signs may include breaking out in a cold sweat, nausea, or lightheadedness. Don't wait more than five minutes to call 911 - MINUTES MATTER! Fast action can save your life. Calling 911 is almost always the fastest way to get lifesaving treatment. Emergency Medical Services staff can begin treatment when they arrive -- up to an hour sooner than if someone gets to the hospital by car. Learning About Coronavirus [phone])  Coronavirus [phone]): Overview  What is coronavirus (COVID-19)? The coronavirus disease (COVID-19) is caused by a virus. It is an illness that was first found in [place], [place], in December 2019. It has since spread worldwide. The virus can cause fever, cough, and trouble breathing. In severe cases, it can cause pneumonia and make it hard to breathe without help. It can cause death. Coronaviruses are a large group of viruses. They cause the common cold. They also cause more serious illnesses like Middle East respiratory syndrome (MERS) and severe acute respiratory syndrome (SARS). COVID-19 is caused by a novel coronavirus. That means it's a new type that has not been seen in people before. This virus spreads person-to-person through droplets from coughing and sneezing. It can also spread when you are close to someone who is infected. And it can spread when you touch something that has the virus on it, such as a doorknob or a tabletop. What can you do to protect yourself from coronavirus (COVID-19)? The best way to protect yourself from getting sick is to:  · Avoid areas where there is an outbreak. · Avoid contact with people who may be infected. · Wash your hands often with soap or alcohol-based hand sanitizers. · Avoid crowds and try to stay at least 6 feet away from other people. · Wash your hands often, especially after you cough or sneeze. Use soap and water, and scrub for at least 20 seconds. If soap and water aren't available, use an alcohol-based hand . · Avoid touching your mouth, nose, and eyes.   What can you do to avoid spreading the virus to others? To help avoid spreading the virus to others:  · Cover your mouth with a tissue when you cough or sneeze. Then throw the tissue in the trash. · Use a disinfectant to clean things that you touch often. · Stay home if you are sick or have been exposed to the virus. Don't go to school, work, or public areas. And don't use public transportation. · If you are sick:  ? Leave your home only if you need to get medical care. But call the doctor's office first so they know you're coming. And wear a face mask, if you have one.  ? If you have a face mask, wear it whenever you're around other people. It can help stop the spread of the virus when you cough or sneeze. ? Clean and disinfect your home every day. Use household  and disinfectant wipes or sprays. Take special care to clean things that you grab with your hands. These include doorknobs, remote controls, phones, and handles on your refrigerator and microwave. And don't forget countertops, tabletops, bathrooms, and computer keyboards. When to call for help  Call 911 anytime you think you may need emergency care. For example, call if:  · You have severe trouble breathing. (You can't talk at all.)  · You have constant chest pain or pressure. · You are severely dizzy or lightheaded. · You are confused or can't think clearly. · Your face and lips have a blue color. · You pass out (lose consciousness) or are very hard to wake up. Call your doctor now if you develop symptoms such as:  · Shortness of breath. · Fever. · Cough. If you need to get care, call ahead to the doctor's office for instructions before you go. Make sure you wear a face mask, if you have one, to prevent exposing other people to the virus. Where can you get the latest information? The following health organizations are tracking and studying this virus. Their websites contain the most up-to-date information.  Saleem Lee also learn what to do if you think you may have been exposed to the virus. · U.S. Centers for Disease Control and Prevention (CDC): The CDC provides updated news about the disease and travel advice. The website also tells you how to prevent the spread of infection. www.cdc.gov  · World Health Organization [facility]): WHO offers information about the virus outbreaks. WHO also has travel advice. www.who.int  Current as of: April 1, 2020               Content Version: 12.4  © 2006-2020 Healthwise, Incorporated. Care instructions adapted under license by your healthcare professional. If you have questions about a medical condition or this instruction, always ask your healthcare professional. [place] [address] any warranty or liability for your use of this information. AT THE COMPLETION OF DISCHARGE INSTRUCTION REVIEW, WE VERIFY:  The discharge information has been reviewed with the patient and caregiver. Questions have been asked and answered meeting patient and caregiver expectations. The patient and caregiver verbalized understanding.     Your discharge nurse was Keron Sr RN-BC       Board Certified - Pain Management      CONTENTS FOUND IN YOUR DISCHARGE ENVELOPE:  [x]     Surgeon and Hospital Discharge Instructions  [x]     [facility] Surgical Services Care Provider Card  []     Medication & Side Effect Guide            (your newly prescribed medications have been marked/highlighted showing the most common side effects from the classes of drugs on your prescriptions)  [x]     Medication Prescription(s) x 3 ( [x] These have been sent electronically to your pharmacy by your surgeon,   - OR -       your surgeon has already provided these to you during a previous/pre-op office visit)  []     Physical Therapy Prescription  []     Follow-up Appointment Cards  []     Surgery-related Pictures/Media  [x]     Pain block and/or block with On-Q Catheter from Anesthesia Service (information included in your instructions above)  []     Medical device information sheets/pamphlets from their    []     School/work excuse note. []     /parent work excuse note. The following personal items collected during your admission for safe keeping are returned to you:     Dental Appliance: Dental Appliances: None  Vi bertrand:    Hearing Aid:    Jewelry: Jewelry: None  Clothing: Clothing:  Footwear, Pants, Shirt, Undergarments  Other Valuables:    Valuables sent to safe:

## 2020-08-19 NOTE — PROGRESS NOTES
POST ANESTHESIA CARE    DISCHARGE / TRANSFER NOTE    Krishan Carrero was   discharged    via  wheel chair     to  private vehicle    . Patient was escorted by    nurse     . Patient verbalized   appreciation and was very pleased with care received    throughout their stay. Patient was discharged in     pleasant mood . Pain at discharge/transfer was      0  /10. Discharge, medication and follow-up instructions were verbalized as understood prior to discharge  (if applicable for same-day procedures being discharged.)    All personal belongings have been returned to patient, and patient/family verbally confirm receiving belongings as all present.     Signed: [facility] BSN RN-BC

## 2020-09-11 ENCOUNTER — VIRTUAL VISIT (OUTPATIENT)
Dept: CARDIOLOGY CLINIC | Age: 70
End: 2020-09-11
Payer: MEDICARE

## 2020-09-11 DIAGNOSIS — I50.32 CHRONIC HEART FAILURE WITH PRESERVED EJECTION FRACTION (HCC): ICD-10-CM

## 2020-09-11 DIAGNOSIS — I10 HTN (HYPERTENSION), BENIGN: ICD-10-CM

## 2020-09-11 DIAGNOSIS — I25.10 CORONARY ARTERY DISEASE INVOLVING NATIVE CORONARY ARTERY OF NATIVE HEART WITHOUT ANGINA PECTORIS: Primary | ICD-10-CM

## 2020-09-11 DIAGNOSIS — I48.0 PAROXYSMAL ATRIAL FIBRILLATION (HCC): ICD-10-CM

## 2020-09-11 DIAGNOSIS — E78.5 DYSLIPIDEMIA: ICD-10-CM

## 2020-09-11 PROCEDURE — 99214 OFFICE O/P EST MOD 30 MIN: CPT | Performed by: NURSE PRACTITIONER

## 2020-09-11 NOTE — PROGRESS NOTES
VIRTUAL VISIT DOCUMENTATION     Pursuant to the emergency declaration under the [facility], [facility] waiver authority and the [employer] Act, this Virtual  Visit was conducted, with patient's consent, to reduce the patient's risk of exposure to COVID-19 and provide continuity of care for an established patient. Services were provided through a video synchronous discussion virtually to substitute for in-person clinic visit. CHIEF COMPLAINT      Johann Turcios is a 65 y.o. female who was seen by synchronous (real-time) audio-video technology on 9/11/2020. Patient is being seen today for routine follow-up. ASSESSMENT        ICD-10-CM ICD-9-CM    1. Coronary artery disease involving native coronary artery of native heart without angina pectoris  I25.10 414.01    2. Chronic heart failure with preserved ejection fraction (HCC)  I50.32 428.9    3. HTN (hypertension), benign  I10 401.1    4. Paroxysmal atrial fibrillation (HCC)  I48.0 427.31    5.  Dyslipidemia  E78.5 272.4         PLAN     CAD s/p PCI to LAD, RCA   LHC in 9/19 - Non-obstructive CAD with patent stents in Lcx and RCA  no anginal c/o today  Intolerant to statins d/t myalgias; ASA intolerant   On metoprolol succinate               HFpEF - Stage C, NYHA Class II  Dyspnea improved after treating Lyme Disease  On bumex, eplerenone, metoprolol; well compensated     CP / Dyspnea  Completed therapy for Lyme Disease with significant improvement in symptoms     HTN - well controlled on current meds - mostly 110s-130s/50s-70s at home.               PAF  [id number] Score 5 (7.2% stroke risk per year)  No AC d/t history of anemia; may benefit from Watchman if able to tolerate AC short term - refer to Dr. Jack Tavarez to discuss whether or not this is a good option    On metoprolol for rate control      HLD  Fasting lipid profile with LDL 98 10/2019 (goal LDL <70)  Intolerant of statins due to myalgias  On Tricor,   Would benefit from PCSK9 inhibitor - not recommended by insurance   Repeat lipids in near future     F/u in 6 month or PRN     We discussed the expected course, resolution and complications of the diagnosis(es) in detail. Medication risks, benefits, costs, interactions, and alternatives were discussed as indicated. I advised her to contact the office if her condition worsens, changes or fails to improve as anticipated. She expressed understanding with the diagnosis(es) and plan    HISTORY OF PRESENTING ILLNESS      Keri Moore is a 65 y.o. female being seen today for f/u of HFpEF. Pt states she has been doing well. Received tx for lymes disease; breathing / CP now significantly improved. States she is walking around her home without issue and able to go to the grocery store again. Swelling / weight stable. Weight 171lbs. Wt Readings from Last 3 Encounters:   08/19/20 175 lb 11.3 oz (79.7 kg)   08/16/20 173 lb (78.5 kg)   07/15/20 173 lb 3.2 oz (78.6 kg)     Home BP mostly 110s-130s/50s-70s. Infrequently 90s or 160s. Patient denies any chest pain, breathing issues, palpitations, syncope, orthopnea, or paroxysmal nocturnal dyspnea. Has occasional dizziness. Last episode of chest pain in April / May. Interested in options for stroke prevention given hx of afib.        ACTIVE PROBLEM LIST     Patient Active Problem List    Diagnosis Date Noted    Diastolic heart failure ([address]     Coronary artery disease with stable angina pectoris ([address] 09/04/2019    Chest pain 08/12/2019    Rheumatoid arthritis ([address] 08/12/2019    Leukocytosis 08/12/2019    Elevated blood sugar 08/12/2019    Diabetes mellitus type 2, controlled ([address] 91/21/2971    Diastolic dysfunction     HTN (hypertension), benign 03/02/2017    Bony exostosis 01/10/2013    Synovitis of ankle 01/10/2013    Pes cavus 01/10/2013    Hypothyroidism 03/23/2012    Iron deficiency anemia, unspecified 10/13/2011    Gluten intolerance     Esophageal reflux 01/08/2011    Pernicious anemia 12/05/2009    Asthma 06/29/2009    PAF (paroxysmal atrial fibrillation) ([address] 06/29/2009    CAD (coronary artery disease) 06/29/2009           PAST MEDICAL HISTORY     Past Medical History:   Diagnosis Date    Arthritis     Asthma 6/29/2009    CAUSED BY MOLD    Atrial fibrillation ([address] 6/29/2009    CAD (coronary artery disease) 06/29/2009    Chronic pain of right ankle     Diabetes ([address]     Drug induced     Diastolic heart failure (HCC)     DVT (deep venous thrombosis) ([address] 2004    Right leg post surgery    GERD (gastroesophageal reflux disease)     Gluten intolerance     Hypothyroidism 6/29/2009    Iron deficiency anemia     Lyme disease     Personal history of MI (myocardial infarction)     Rheumatoid arthritis ([address]     Slow to wake up after anesthesia     Syncope     Post testing- resolved    TIA (transient ischemic attack) 2004 & 2006    Vitamin B12 deficiency 6/29/2009           PAST SURGICAL HISTORY     Past Surgical History:   Procedure Laterality Date    HX ADENOIDECTOMY      HX AFIB ABLATION      HX APPENDECTOMY      HX CHOLECYSTECTOMY  6/16/11    HX COLONOSCOPY      HX CORONARY STENT PLACEMENT      x 2    HX HEART CATHETERIZATION  2010    2 STENTS    HX HYSTERECTOMY      HX LUMBAR LAMINECTOMY  2000    L4-L5    HX ORTHOPAEDIC  1993-6/2012    RT.  FOOT SURGERY X 6/calcaneal osteotomy    HX TONSILLECTOMY  1964          ALLERGIES     Allergies   Allergen Reactions    Butter Flavor Anaphylaxis     Butter AND CREME    Keflex [Cephalexin] Anaphylaxis    Pcn [Penicillins] Anaphylaxis    Aspirin Hives and Other (comments)     \"it makes my stomach bleed\"      Cimzia [Certolizumab Pegol] Other (comments)     GI symptoms, blisters in the mouth and esophagus    Clopidogrel Other (comments)     GI bleed    Demerol [Meperidine] Other (comments) HYPOTENSION    Fentanyl Other (comments)     HYPOTENSION    Morphine Other (comments)     HYPOTENSION    Nitroglycerin Other (comments)     IN PILL FORM  - HYPOTENSION  CAN TOLERATE PATCH FOR SHORT TIME    Sulfa (Sulfonamide Antibiotics) Angioedema     Lips    Tapazole [Methimazole] Unknown (comments)     Patient stated \"it made me feel like I had the flu\"    Adhesive Tape-Silicones Other (comments)     BAD BLISTERS AND TENDS TO GET INFECTED    Albuterol Palpitations    Gluten Diarrhea     bloating    Oxycodone Other (comments)     Hallicination and hypotension          FAMILY HISTORY     Family History   Problem Relation Age of Onset    Delayed Awakening Mother     Heart Disease Mother     Coronary Artery Disease Mother     Hypertension Mother     Stroke Mother     Delayed Awakening Sister     Hypertension Sister     Lung Disease Father     Cancer Father         colon    Hypertension Father     Hypertension Brother     Breast Cancer Maternal Aunt     Breast Cancer Maternal Aunt     Breast Cancer Cousin         maternal 1st cousin    Breast Cancer Maternal Aunt     Breast Cancer Cousin         maternal 1st cousin   [place] Breast Cancer Cousin         maternal 1st cousin    Deep Vein Thrombosis Neg Hx     Anesth Problems Neg Hx            SOCIAL HISTORY     Social History     Socioeconomic History    Marital status:      Spouse name: Not on file    Number of children: Not on file    Years of education: Not on file    Highest education level: Not on file   Tobacco Use    Smoking status: Former Smoker     Packs/day: 1.00     Years: 30.00     Pack years: 30.00     Types: Cigarettes     Last attempt to quit: 2002     Years since quittin.2    Smokeless tobacco: Never Used   Substance and Sexual Activity    Alcohol use: No    Drug use: No    Sexual activity: Never         MEDICATIONS     Current Outpatient Medications   Medication Sig    gabapentin (NEURONTIN) 100 mg capsule Take 100 mg by mouth nightly.  mometasone furoate (ASMANEX HFA IN) Take 100 mcg by inhalation two (2) times a day.  hypromellose (GENTEAL MILD OP) Apply  to eye.  potassium 99 mg tablet Take 99 mg by mouth daily.  insulin glargine (LANTUS,BASAGLAR) 100 unit/mL (3 mL) inpn 8 Units by SubCUTAneous route daily. Indications: \"I set the pen on 8\"    denosumab (PROLIA SC) by SubCUTAneous route every 6 months.  bumetanide (BUMEX) 1 mg tablet Take 1 mg by mouth daily.  eplerenone (INSPRA) 25 mg tablet Take 25 mg by mouth nightly.  fenofibrate nanocrystallized (Tricor) 48 mg tablet Take 48 mg by mouth nightly.  metoprolol succinate (Toprol XL) 50 mg XL tablet Take 50 mg by mouth nightly.  nitroglycerin (NITRODUR) 0.1 mg/hr 1 Patch by TransDERmal route daily as needed. Indications: Oral form \"causes severe BP drop  BAD\" Drs got scared,    OTHER Indications: LIVONGO Glucose Monitor    levothyroxine (SYNTHROID) 150 mcg tablet Take 150 mcg by mouth Daily (before breakfast).  predniSONE (DELTASONE) 5 mg tablet Take 7.5 mg by mouth daily. 5 mg in morning and 2.5 mg afternoon    ondansetron (ZOFRAN ODT) 4 mg disintegrating tablet Take 1 Tab by mouth every eight (8) hours as needed for Nausea.  cholecalciferol, vitamin D3, (VITAMIN D3) 2,000 unit tab Take 2,000 Units by mouth daily.  COQ10, UBIQUINOL, PO Take 100 mg by mouth daily.  turmeric (CURCUMIN) Take 1 g by mouth every evening.  SITagliptin (JANUVIA) 100 mg tablet Take 100 mg by mouth daily (with dinner).  flaxseed oil (OMEGA 3 PO) Take 1 Tab by mouth every evening. Indications: FISH OIL    vit C/vit E ac/lut/copper/zinc (PRESERVISION LUTEIN PO) Take 1 Tab by mouth daily (with dinner).  lidocaine (LIDODERM) 5 % 1 Patch by TransDERmal route every twenty-four (24) hours. Apply patch to the affected area for 12 hours a day and remove for 12 hours a day.  ascorbic acid (VITAMIN C) 500 mg tablet Take 1,000 mg by mouth daily.  ferrous sulfate 325 mg (65 mg iron) tablet Take  by mouth every evening.  cyanocobalamin (VITAMIN B12) 1,000 mcg/mL injection INJECT 1 ML INTO THE MUSCLE EVERY 30 DAYS    lansoprazole (PREVACID) 30 mg capsule Take 30 mg by mouth daily. No current facility-administered medications for this visit. I have reviewed the nurses notes, vitals, problem list, allergy list, medical history, family, social history and medications. REVIEW OF SYMPTOMS     Constitutional: Negative for fever, chills, and diaphoresis. Respiratory: Negative for cough, hemoptysis, sputum production, and wheezing. Cardiovascular: Negative for chest pain, palpitations, orthopnea, leg swelling and PND. Gastrointestinal: Negative for heartburn, nausea, vomiting, blood in stool and melena. Genitourinary: Negative for dysuria and flank pain. Neurological: Negative for focal weakness, seizures, loss of consciousness,   Endo/Heme/Allergies: Negative for abnormal bleeding. Psychiatric/Behavioral: Negative for memory loss. PHYSICAL EXAMINATION      Due to this being a TeleHealth evaluation, many elements of the physical examination are unable to be assessed. General: Well developed, in no acute distress, cooperative and alert  HEENT: No marked JVD visible on video.   Respiratory: No audible wheezing, no signs of respiratory distress,  Neuro: A&Ox3, speech clear, no facial droop, answering questions appropriately  Skin: Skin color is normal. Non diaphoretic on visible skin during exam       DIAGNOSTIC DATA      ECHO 2008: EF 60%  ECHO 3/3/2017: EF 60%, mild TR     CATH 2009: stent LCX  CATH 8/2010: LAD: m: MLI, , LCX: m 20%, patent RCA, LCX stents, EF 60%     STRESS: Myoview 2014: no ischemia  STRESS Nuc 2/2016: no ischemia,   STRESS Nuc 3/3/2017: no ischemia, EF 58%     EVENT monitor 2011: PVCs  HOLTER 2013: frequent PVCs       LABORATORY DATA      Lab Results   Component Value Date/Time    WBC 15.1 (H) 01/20/2020 10:56 AM    Hemoglobin (POC) 15.6 06/09/2014 03:00 AM    HGB 13.2 01/20/2020 10:56 AM    Hematocrit (POC) 46 06/09/2014 03:00 AM    HCT 40.0 01/20/2020 10:56 AM    PLATELET 379 18/39/8986 10:56 AM    MCV 92 01/20/2020 10:56 AM      Lab Results   Component Value Date/Time    Sodium 141 08/10/2020 12:44 PM    Potassium 3.7 08/10/2020 12:44 PM    Chloride 107 08/10/2020 12:44 PM    CO2 26 08/10/2020 12:44 PM    Anion gap 8 08/10/2020 12:44 PM    Glucose 164 (H) 08/10/2020 12:44 PM    BUN 18 08/10/2020 12:44 PM    Creatinine 0.78 08/10/2020 12:44 PM    BUN/Creatinine ratio 23 (H) 08/10/2020 12:44 PM    GFR est AA >60 08/10/2020 12:44 PM    GFR est non-AA >60 08/10/2020 12:44 PM    Calcium 8.2 (L) 08/10/2020 12:44 PM    Bilirubin, total 0.4 12/12/2019 06:19 PM    Alk. phosphatase 80 12/12/2019 06:19 PM    Protein, total 6.5 12/12/2019 06:19 PM    Albumin 3.2 (L) 12/12/2019 06:19 PM    Globulin 3.3 12/12/2019 06:19 PM    A-G Ratio 1.0 (L) 12/12/2019 06:19 PM    ALT (SGPT) 24 12/12/2019 06:19 PM      Lab Results   Component Value Date/Time    Cholesterol, total 199 10/30/2019 09:36 AM    HDL Cholesterol 44 10/30/2019 09:36 AM    LDL,Direct 98 08/13/2019 01:46 AM    LDL, calculated 89 10/30/2019 09:36 AM    VLDL, calculated 66 (H) 10/30/2019 09:36 AM    Triglyceride 328 (H) 10/30/2019 09:36 AM    CHOL/HDL Ratio 5.5 (H) 08/13/2019 01:46 AM     Lab Results   Component Value Date/Time    Hemoglobin A1c 7.1 (H) 08/13/2019 01:46 AM        FOLLOW-UP     Follow-up and Dispositions    · Return in about 6 months (around 3/11/2021), or if symptoms worsen or fail to improve. Patient was made aware and verbalized understanding that an appointment will be scheduled for them for a virtual visit and/or office visit within the above time frame. Patient understanding his/her responsibility to call and change time/date if he/she so chooses.     Thank you, Blayne Ugarte MD for allowing me to participate in the care of [facility]. Please do not hesitate to contact me for further questions/concerns. Greater than 25 minutes was spent in direct video patient care, planning and chart review. This visit was conducted using [employer] telemedicine services.        Isaias Mercado NP    [address]        [phone] / [phone] Fax

## 2020-09-15 ENCOUNTER — TELEPHONE (OUTPATIENT)
Dept: CARDIOLOGY CLINIC | Age: 70
End: 2020-09-15

## 2020-09-17 ENCOUNTER — TELEPHONE (OUTPATIENT)
Dept: CARDIOLOGY CLINIC | Age: 70
End: 2020-09-17

## 2020-09-17 DIAGNOSIS — I48.0 PAROXYSMAL ATRIAL FIBRILLATION (HCC): ICD-10-CM

## 2020-09-17 DIAGNOSIS — I50.32 CHRONIC HEART FAILURE WITH PRESERVED EJECTION FRACTION (HCC): ICD-10-CM

## 2020-09-17 DIAGNOSIS — I10 HTN (HYPERTENSION), BENIGN: ICD-10-CM

## 2020-09-17 DIAGNOSIS — I25.10 CORONARY ARTERY DISEASE INVOLVING NATIVE CORONARY ARTERY OF NATIVE HEART WITHOUT ANGINA PECTORIS: Primary | ICD-10-CM

## 2020-09-17 DIAGNOSIS — E78.5 DYSLIPIDEMIA: ICD-10-CM

## 2020-09-17 NOTE — TELEPHONE ENCOUNTER
Per MATTHIAS Deng, NP \"Can you have pt get updated lipids and hepatic in the next month or so?  Non-urgent.  Will need order mailed to her home. Brenda Days! \"

## 2020-09-18 ENCOUNTER — OFFICE VISIT (OUTPATIENT)
Dept: CARDIOLOGY CLINIC | Age: 70
End: 2020-09-18
Payer: MEDICARE

## 2020-09-18 VITALS
HEIGHT: 63 IN | SYSTOLIC BLOOD PRESSURE: 124 MMHG | WEIGHT: 175.6 LBS | RESPIRATION RATE: 16 BRPM | BODY MASS INDEX: 31.11 KG/M2 | DIASTOLIC BLOOD PRESSURE: 72 MMHG | HEART RATE: 72 BPM | OXYGEN SATURATION: 95 %

## 2020-09-18 DIAGNOSIS — I50.30 HEART FAILURE WITH PRESERVED EJECTION FRACTION, UNSPECIFIED HF CHRONICITY (HCC): ICD-10-CM

## 2020-09-18 DIAGNOSIS — I48.0 PAROXYSMAL ATRIAL FIBRILLATION (HCC): Primary | ICD-10-CM

## 2020-09-18 DIAGNOSIS — I51.89 DIASTOLIC DYSFUNCTION: ICD-10-CM

## 2020-09-18 DIAGNOSIS — I25.10 CORONARY ARTERY DISEASE INVOLVING NATIVE CORONARY ARTERY OF NATIVE HEART WITHOUT ANGINA PECTORIS: ICD-10-CM

## 2020-09-18 DIAGNOSIS — I50.32 CHRONIC HEART FAILURE WITH PRESERVED EJECTION FRACTION (HCC): ICD-10-CM

## 2020-09-18 DIAGNOSIS — E78.5 DYSLIPIDEMIA: ICD-10-CM

## 2020-09-18 DIAGNOSIS — I10 HTN (HYPERTENSION), BENIGN: ICD-10-CM

## 2020-09-18 DIAGNOSIS — R06.02 SHORTNESS OF BREATH: ICD-10-CM

## 2020-09-18 PROCEDURE — G8536 NO DOC ELDER MAL SCRN: HCPCS | Performed by: INTERNAL MEDICINE

## 2020-09-18 PROCEDURE — G0463 HOSPITAL OUTPT CLINIC VISIT: HCPCS | Performed by: INTERNAL MEDICINE

## 2020-09-18 PROCEDURE — G8432 DEP SCR NOT DOC, RNG: HCPCS | Performed by: INTERNAL MEDICINE

## 2020-09-18 PROCEDURE — 3017F COLORECTAL CA SCREEN DOC REV: CPT | Performed by: INTERNAL MEDICINE

## 2020-09-18 PROCEDURE — 3288F FALL RISK ASSESSMENT DOCD: CPT | Performed by: INTERNAL MEDICINE

## 2020-09-18 PROCEDURE — 99205 OFFICE O/P NEW HI 60 MIN: CPT | Performed by: INTERNAL MEDICINE

## 2020-09-18 PROCEDURE — G8427 DOCREV CUR MEDS BY ELIG CLIN: HCPCS | Performed by: INTERNAL MEDICINE

## 2020-09-18 PROCEDURE — G9899 SCRN MAM PERF RSLTS DOC: HCPCS | Performed by: INTERNAL MEDICINE

## 2020-09-18 PROCEDURE — 1100F PTFALLS ASSESS-DOCD GE2>/YR: CPT | Performed by: INTERNAL MEDICINE

## 2020-09-18 PROCEDURE — G8399 PT W/DXA RESULTS DOCUMENT: HCPCS | Performed by: INTERNAL MEDICINE

## 2020-09-18 PROCEDURE — G8754 DIAS BP LESS 90: HCPCS | Performed by: INTERNAL MEDICINE

## 2020-09-18 PROCEDURE — G8417 CALC BMI ABV UP PARAM F/U: HCPCS | Performed by: INTERNAL MEDICINE

## 2020-09-18 PROCEDURE — G8752 SYS BP LESS 140: HCPCS | Performed by: INTERNAL MEDICINE

## 2020-09-18 PROCEDURE — 1090F PRES/ABSN URINE INCON ASSESS: CPT | Performed by: INTERNAL MEDICINE

## 2020-09-18 NOTE — PROGRESS NOTES
HISTORY OF PRESENTING ILLNESS      Daphne Moss is a 65 y.o. female with paroxysmal atrial fibrillation, CAD s/p PCI, hypertension, dyslipidemia, HFpEF and anemia presenting to discuss [address] [place] [address]. She has prior intolerance to warfarin as well as aspirin. Anticoagulation has resulted in her experiencing flulike symptoms; aspirin has resulted in gastric bleeding. PAST MEDICAL HISTORY     Past Medical History:   Diagnosis Date    Arthritis     Asthma 6/29/2009    CAUSED BY MOLD    Atrial fibrillation ([address] 6/29/2009    CAD (coronary artery disease) 06/29/2009    Chronic pain of right ankle     Diabetes ([address]     Drug induced     Diastolic heart failure ([address]     DVT (deep venous thrombosis) ([address] 2004    Right leg post surgery    GERD (gastroesophageal reflux disease)     Gluten intolerance     Hypothyroidism 6/29/2009    Iron deficiency anemia     Lyme disease     Personal history of MI (myocardial infarction)     Rheumatoid arthritis ([address]     Slow to wake up after anesthesia     Syncope     Post testing- resolved    TIA (transient ischemic attack) 2004 & 2006    Vitamin B12 deficiency 6/29/2009           PAST SURGICAL HISTORY     Past Surgical History:   Procedure Laterality Date    HX ADENOIDECTOMY      HX AFIB ABLATION      HX APPENDECTOMY      HX CHOLECYSTECTOMY  6/16/11    HX COLONOSCOPY      HX CORONARY STENT PLACEMENT      x 2    HX HEART CATHETERIZATION  2010    2 STENTS    HX HYSTERECTOMY      HX LUMBAR LAMINECTOMY  2000    L4-L5    HX ORTHOPAEDIC  [id number]    RT.  FOOT SURGERY X 6/calcaneal osteotomy    HX TONSILLECTOMY  1964          ALLERGIES     Allergies   Allergen Reactions    Butter Flavor Anaphylaxis     Butter AND CREME    Keflex [Cephalexin] Anaphylaxis    Pcn [Penicillins] Anaphylaxis    Aspirin Hives and Other (comments)     \"it makes my stomach bleed\"      Cimzia [Certolizumab Pegol] Other (comments)     GI symptoms, blisters in the mouth and esophagus    Clopidogrel Other (comments)     GI bleed    Demerol [Meperidine] Other (comments)     HYPOTENSION    Fentanyl Other (comments)     HYPOTENSION    Morphine Other (comments)     HYPOTENSION    Nitroglycerin Other (comments)     IN PILL FORM  - HYPOTENSION  CAN TOLERATE PATCH FOR SHORT TIME    Sulfa (Sulfonamide Antibiotics) Angioedema     Lips    Tapazole [Methimazole] Unknown (comments)     Patient stated \"it made me feel like I had the flu\"    Adhesive Tape-Silicones Other (comments)     BAD BLISTERS AND TENDS TO GET INFECTED    Albuterol Palpitations    Gluten Diarrhea     bloating    Oxycodone Other (comments)     Hallicination and hypotension          FAMILY HISTORY     Family History   Problem Relation Age of Onset    Delayed Awakening Mother     Heart Disease Mother     Coronary Artery Disease Mother     Hypertension Mother     Stroke Mother     Delayed Awakening Sister     Hypertension Sister     Lung Disease Father     Cancer Father         colon    Hypertension Father     Hypertension Brother     Breast Cancer Maternal Aunt     Breast Cancer Maternal Aunt     Breast Cancer Cousin         maternal 1st cousin    Breast Cancer Maternal Aunt     Breast Cancer Cousin         maternal 1st cousin   [facility] Breast Cancer Cousin         maternal 1st cousin    Deep Vein Thrombosis Neg Hx     Anesth Problems Neg Hx     negative for cardiac disease       SOCIAL HISTORY     Social History     Socioeconomic History    Marital status:      Spouse name: Not on file    Number of children: Not on file    Years of education: Not on file    Highest education level: Not on file   Tobacco Use    Smoking status: Former Smoker     Packs/day: 1.00     Years: 30.00     Pack years: 30.00     Types: Cigarettes     Last attempt to quit: 2002     Years since quittin.2    Smokeless tobacco: Never Used   Substance and Sexual Activity    Alcohol use: No    Drug use: No    Sexual activity: Never         MEDICATIONS     Current Outpatient Medications   Medication Sig    gabapentin (NEURONTIN) 100 mg capsule Take 100 mg by mouth nightly.  mometasone furoate (ASMANEX HFA IN) Take 100 mcg by inhalation two (2) times a day.  hypromellose (GENTEAL MILD OP) Apply  to eye.  potassium 99 mg tablet Take 99 mg by mouth daily.  insulin glargine (LANTUS,BASAGLAR) 100 unit/mL (3 mL) inpn 8 Units by SubCUTAneous route daily. Indications: \"I set the pen on 8\"    denosumab (PROLIA SC) by SubCUTAneous route every 6 months.  bumetanide (BUMEX) 1 mg tablet Take 1 mg by mouth daily.  eplerenone (INSPRA) 25 mg tablet Take 25 mg by mouth nightly.  fenofibrate nanocrystallized (Tricor) 48 mg tablet Take 48 mg by mouth nightly.  metoprolol succinate (Toprol XL) 50 mg XL tablet Take 50 mg by mouth nightly.  nitroglycerin (NITRODUR) 0.1 mg/hr 1 Patch by TransDERmal route daily as needed. Indications: Oral form \"causes severe BP drop  BAD\" Drs got scared,    OTHER Indications: LIVONGO Glucose Monitor    levothyroxine (SYNTHROID) 150 mcg tablet Take 150 mcg by mouth Daily (before breakfast).  predniSONE (DELTASONE) 5 mg tablet Take 7.5 mg by mouth daily. 5 mg in morning and 2.5 mg afternoon    ondansetron (ZOFRAN ODT) 4 mg disintegrating tablet Take 1 Tab by mouth every eight (8) hours as needed for Nausea.  cholecalciferol, vitamin D3, (VITAMIN D3) 2,000 unit tab Take 2,000 Units by mouth daily.  COQ10, UBIQUINOL, PO Take 100 mg by mouth daily.  turmeric (CURCUMIN) Take 1 g by mouth every evening.  SITagliptin (JANUVIA) 100 mg tablet Take 100 mg by mouth daily (with dinner).  flaxseed oil (OMEGA 3 PO) Take 1 Tab by mouth every evening. Indications: FISH OIL    vit C/vit E ac/lut/copper/zinc (PRESERVISION LUTEIN PO) Take 1 Tab by mouth daily (with dinner).  lidocaine (LIDODERM) 5 % 1 Patch by TransDERmal route every twenty-four (24) hours.  Apply patch to the affected area for 12 hours a day and remove for 12 hours a day.  ascorbic acid (VITAMIN C) 500 mg tablet Take 1,000 mg by mouth daily.  ferrous sulfate 325 mg (65 mg iron) tablet Take  by mouth every evening.  cyanocobalamin (VITAMIN B12) 1,000 mcg/mL injection INJECT 1 ML INTO THE MUSCLE EVERY 30 DAYS    lansoprazole (PREVACID) 30 mg capsule Take 30 mg by mouth daily. No current facility-administered medications for this visit. I have reviewed the nurses notes, vitals, problem list, allergy list, medical history, family, social history and medications. REVIEW OF SYMPTOMS      General: Pt denies excessive weight gain or loss. Pt is able to conduct ADL's  HEENT: Denies blurred vision, headaches, hearing loss, epistaxis and difficulty swallowing. Respiratory: Denies cough, congestion, shortness of breath, CHACON, wheezing or stridor. Cardiovascular: Denies precordial pain, palpitations, edema or PND  Gastrointestinal: Denies poor appetite, indigestion, abdominal pain or blood in stool  Genitourinary: Denies hematuria, dysuria, increased urinary frequency  Musculoskeletal: Denies joint pain or swelling from muscles or joints  Neurologic: Denies tremor, paresthesias, headache, or sensory motor disturbance  Psychiatric: Denies confusion, insomnia, depression  Integumentray: Denies rash, itching or ulcers. Hematologic: Denies easy bruising, bleeding       PHYSICAL EXAMINATION      Vitals: see vitals section  General: Well developed, in no acute distress. HEENT: No jaundice, oral mucosa moist, no oral ulcers  Neck: Supple, no stiffness, no lymphadenopathy, supple  Heart:  Normal S1/S2 negative S3 or S4. Regular, no murmur, gallop or rub, no jugular venous distention  Respiratory: Clear bilaterally x 4, no wheezing or rales  Abdomen:   Soft, non-tender, bowel sounds are active. Extremities:  No edema, normal cap refill, no cyanosis.   Musculoskeletal: No clubbing, no deformities  Neuro: A&Ox3, speech clear, gait stable, cooperative, no focal neurologic deficits  Skin: Skin color is normal. No rashes or lesions. Non diaphoretic, moist.  Vascular: 2+ pulses symmetric in all extremities       DIAGNOSTIC DATA      EKG:        LABORATORY DATA      Lab Results   Component Value Date/Time    WBC 15.1 (H) 01/20/2020 10:56 AM    Hemoglobin (POC) 15.6 06/09/2014 03:00 AM    HGB 13.2 01/20/2020 10:56 AM    Hematocrit (POC) 46 06/09/2014 03:00 AM    HCT 40.0 01/20/2020 10:56 AM    PLATELET 274 45/53/5445 10:56 AM    MCV 92 01/20/2020 10:56 AM      Lab Results   Component Value Date/Time    Sodium 141 08/10/2020 12:44 PM    Potassium 3.7 08/10/2020 12:44 PM    Chloride 107 08/10/2020 12:44 PM    CO2 26 08/10/2020 12:44 PM    Anion gap 8 08/10/2020 12:44 PM    Glucose 164 (H) 08/10/2020 12:44 PM    BUN 18 08/10/2020 12:44 PM    Creatinine 0.78 08/10/2020 12:44 PM    BUN/Creatinine ratio 23 (H) 08/10/2020 12:44 PM    GFR est AA >60 08/10/2020 12:44 PM    GFR est non-AA >60 08/10/2020 12:44 PM    Calcium 8.2 (L) 08/10/2020 12:44 PM    Bilirubin, total 0.4 12/12/2019 06:19 PM    Alk. phosphatase 80 12/12/2019 06:19 PM    Protein, total 6.5 12/12/2019 06:19 PM    Albumin 3.2 (L) 12/12/2019 06:19 PM    Globulin 3.3 12/12/2019 06:19 PM    A-G Ratio 1.0 (L) 12/12/2019 06:19 PM    ALT (SGPT) 24 12/12/2019 06:19 PM           ASSESSMENT      1. Atrial fibrillation  2. Hypertension  3. CAD, native  4. Percutaneous transluminal angioplasty  5. HFpEF  6. Dyslipidemia       PLAN     I fear patient will be highly intolerant of anticoagulation/antiplatelet therapy that is recommended after endocardial left atrial appendage occlusion. I will arrange for a consultation with Dr. Nelson Palumbo to discuss Atriclip as an alternative. ICD-10-CM ICD-9-CM    1. Paroxysmal atrial fibrillation (HCC)  I48.0 427.31    2. HTN (hypertension), benign  I10 401.1    3. Chronic heart failure with preserved ejection fraction (HCC)  I50.32 428.9    4.  Coronary artery disease involving native coronary artery of native heart without angina pectoris  I25.10 414.01    5. Dyslipidemia  E78.5 272.4    6. Heart failure with preserved ejection fraction, unspecified HF chronicity (HCC)  I50.30 428.9    7. Shortness of breath  R06.02 786.05    8. Diastolic dysfunction  L79.48 429.9      No orders of the defined types were placed in this encounter. FOLLOW-UP     TBD      Thank you, Justyna Epperson MD and Altaf Jolly NP/Poonam Alonso MD for allowing me to participate in the care of this extraordinarily pleasant female. Please do not hesitate to contact me for further questions/concerns.          Zoila Da Silva MD  Cardiac Electrophysiology / Cardiology    [address]  [address] [facility], [facility], [address]  [phone] / [phone] Fax   [phone] / [phone] Fax

## 2020-09-18 NOTE — PROGRESS NOTES
ROOM # 2  WTC in August for fractured right wrist  Palpitations, SOB with exertion, swelling both lower legs     Visit Vitals  /72 (BP 1 Location: Left arm, BP Patient Position: Sitting)   Pulse 72   Resp 16   Ht 5' 3\" (1.6 m)   Wt 175 lb 9.6 oz (79.7 kg)   LMP 09/29/1980 (LMP Unknown)   SpO2 95%   BMI 31.11 kg/m²

## 2020-09-18 NOTE — H&P (VIEW-ONLY)
HISTORY OF PRESENTING ILLNESS Samuel Granados is a 65 y.o. female with paroxysmal atrial fibrillation, CAD s/p PCI, hypertension, dyslipidemia, HFpEF and anemia presenting to discuss Zunilda Bran. She has prior intolerance to warfarin as well as aspirin. Anticoagulation has resulted in her experiencing flulike symptoms; aspirin has resulted in gastric bleeding. PAST MEDICAL HISTORY Past Medical History:  
Diagnosis Date  Arthritis  Asthma 6/29/2009 CAUSED BY MOLD  Atrial fibrillation ([address] 6/29/2009  CAD (coronary artery disease) 06/29/2009  Chronic pain of right ankle  Diabetes ([address] Drug induced  Diastolic heart failure ([address]  DVT (deep venous thrombosis) ([address] 2004 Right leg post surgery  GERD (gastroesophageal reflux disease)  Gluten intolerance  Hypothyroidism 6/29/2009  Iron deficiency anemia  Lyme disease  Personal history of MI (myocardial infarction)  Rheumatoid arthritis ([address]  Slow to wake up after anesthesia  Syncope Post testing- resolved  TIA (transient ischemic attack) 2004 & 2006  Vitamin B12 deficiency 6/29/2009 PAST SURGICAL HISTORY Past Surgical History:  
Procedure Laterality Date  HX ADENOIDECTOMY  HX AFIB ABLATION    
 HX APPENDECTOMY  HX CHOLECYSTECTOMY  6/16/11  HX COLONOSCOPY    
 HX CORONARY STENT PLACEMENT    
 x 2  
 HX HEART CATHETERIZATION  2010  
 2 STENTS  
 HX HYSTERECTOMY [address] L4-L5  HX ORTHOPAEDIC  1993-6/2012  
 RT. FOOT SURGERY X 6/calcaneal osteotomy [facility] ALLERGIES Allergies Allergen Reactions  Butter Flavor Anaphylaxis Butter AND CREME  Keflex [Cephalexin] Anaphylaxis  Pcn [Penicillins] Anaphylaxis  Aspirin Hives and Other (comments) \"it makes my stomach bleed\"  Cimzia [Certolizumab Pegol] Other (comments) GI symptoms, blisters in the mouth and esophagus  Clopidogrel Other (comments) GI bleed  Demerol [Meperidine] Other (comments) HYPOTENSION  
 Fentanyl Other (comments) HYPOTENSION  
 Morphine Other (comments) HYPOTENSION  
 Nitroglycerin Other (comments) IN PILL FORM  - HYPOTENSION 
CAN TOLERATE PATCH FOR SHORT TIME  Sulfa (Sulfonamide Antibiotics) Angioedema Lips  Tapazole [Methimazole] Unknown (comments) Patient stated \"it made me feel like I had the flu\"  Adhesive Tape-Silicones Other (comments) BAD BLISTERS AND TENDS TO GET INFECTED  Albuterol Palpitations  Gluten Diarrhea  
  bloating  Oxycodone Other (comments) Hallicination and hypotension FAMILY HISTORY Family History Problem Relation Age of Onset  Delayed Awakening Mother  Heart Disease Mother  Coronary Artery Disease Mother  Hypertension Mother  Stroke Mother  Delayed Awakening Sister  Hypertension Sister  Lung Disease Father  Cancer Father   
     colon  Hypertension Father  Hypertension Brother  Breast Cancer Maternal Aunt  Breast Cancer Maternal Aunt  Breast Cancer Cousin   
     maternal 1st cousin  Breast Cancer Maternal Aunt  Breast Cancer Cousin   
     maternal 1st cousin  Breast Cancer Cousin   
     maternal 1st cousin  Deep Vein Thrombosis Neg Hx  Anesth Problems Neg Hx   
 negative for cardiac disease SOCIAL HISTORY Social History Socioeconomic History  Marital status:  Spouse name: Not on file  Number of children: Not on file  Years of education: Not on file  Highest education level: Not on file Tobacco Use  Smoking status: Former Smoker Packs/day: 1.00 Years: 30.00 Pack years: 30.00 Types: Cigarettes Last attempt to quit: 2002 Years since quittin.2  Smokeless tobacco: Never Used Substance and Sexual Activity  Alcohol use: No  
 Drug use: No  
 Sexual activity: Never MEDICATIONS Current Outpatient Medications Medication Sig  
 gabapentin (NEURONTIN) 100 mg capsule Take 100 mg by mouth nightly.  mometasone furoate (ASMANEX HFA IN) Take 100 mcg by inhalation two (2) times a day.  hypromellose (GENTEAL MILD OP) Apply  to eye.  potassium 99 mg tablet Take 99 mg by mouth daily.  insulin glargine (LANTUS,BASAGLAR) 100 unit/mL (3 mL) inpn 8 Units by SubCUTAneous route daily. Indications: \"I set the pen on 8\"  denosumab (PROLIA SC) by SubCUTAneous route every 6 months.  bumetanide (BUMEX) 1 mg tablet Take 1 mg by mouth daily.  eplerenone (INSPRA) 25 mg tablet Take 25 mg by mouth nightly.  fenofibrate nanocrystallized (Tricor) 48 mg tablet Take 48 mg by mouth nightly.  metoprolol succinate (Toprol XL) 50 mg XL tablet Take 50 mg by mouth nightly.  nitroglycerin (NITRODUR) 0.1 mg/hr 1 Patch by TransDERmal route daily as needed. Indications: Oral form \"causes severe BP drop  BAD\" Drs got scared,  
 OTHER Indications: LIVONGO Glucose Monitor  levothyroxine (SYNTHROID) 150 mcg tablet Take 150 mcg by mouth Daily (before breakfast).  predniSONE (DELTASONE) 5 mg tablet Take 7.5 mg by mouth daily. 5 mg in morning and 2.5 mg afternoon  ondansetron (ZOFRAN ODT) 4 mg disintegrating tablet Take 1 Tab by mouth every eight (8) hours as needed for Nausea.  cholecalciferol, vitamin D3, (VITAMIN D3) 2,000 unit tab Take 2,000 Units by mouth daily.  COQ10, UBIQUINOL, PO Take 100 mg by mouth daily.  turmeric (CURCUMIN) Take 1 g by mouth every evening.  SITagliptin (JANUVIA) 100 mg tablet Take 100 mg by mouth daily (with dinner).  flaxseed oil (OMEGA 3 PO) Take 1 Tab by mouth every evening. Indications: FISH OIL  vit C/vit E ac/lut/copper/zinc (PRESERVISION LUTEIN PO) Take 1 Tab by mouth daily (with dinner).  lidocaine (LIDODERM) 5 % 1 Patch by TransDERmal route every twenty-four (24) hours. Apply patch to the affected area for 12 hours a day and remove for 12 hours a day.  ascorbic acid (VITAMIN C) 500 mg tablet Take 1,000 mg by mouth daily.  ferrous sulfate 325 mg (65 mg iron) tablet Take  by mouth every evening.  cyanocobalamin (VITAMIN B12) 1,000 mcg/mL injection INJECT 1 ML INTO THE MUSCLE EVERY 30 DAYS  lansoprazole (PREVACID) 30 mg capsule Take 30 mg by mouth daily. No current facility-administered medications for this visit. I have reviewed the nurses notes, vitals, problem list, allergy list, medical history, family, social history and medications. REVIEW OF SYMPTOMS General: Pt denies excessive weight gain or loss. Pt is able to conduct ADL's HEENT: Denies blurred vision, headaches, hearing loss, epistaxis and difficulty swallowing. Respiratory: Denies cough, congestion, shortness of breath, CHACON, wheezing or stridor. Cardiovascular: Denies precordial pain, palpitations, edema or PND Gastrointestinal: Denies poor appetite, indigestion, abdominal pain or blood in stool Genitourinary: Denies hematuria, dysuria, increased urinary frequency Musculoskeletal: Denies joint pain or swelling from muscles or joints Neurologic: Denies tremor, paresthesias, headache, or sensory motor disturbance Psychiatric: Denies confusion, insomnia, depression Integumentray: Denies rash, itching or ulcers. Hematologic: Denies easy bruising, bleeding PHYSICAL EXAMINATION Vitals: see vitals section General: Well developed, in no acute distress. HEENT: No jaundice, oral mucosa moist, no oral ulcers Neck: Supple, no stiffness, no lymphadenopathy, supple Heart:  Normal S1/S2 negative S3 or S4. Regular, no murmur, gallop or rub, no jugular venous distention Respiratory: Clear bilaterally x 4, no wheezing or rales Abdomen:   Soft, non-tender, bowel sounds are active. Extremities:  No edema, normal cap refill, no cyanosis. Musculoskeletal: No clubbing, no deformities Neuro: A&Ox3, speech clear, gait stable, cooperative, no focal neurologic deficits Skin: Skin color is normal. No rashes or lesions. Non diaphoretic, moist. 
Vascular: 2+ pulses symmetric in all extremities DIAGNOSTIC DATA EKG:  
 
 
 LABORATORY DATA Lab Results Component Value Date/Time WBC 15.1 (H) 01/20/2020 10:56 AM  
 Hemoglobin (POC) 15.6 06/09/2014 03:00 AM  
 HGB 13.2 01/20/2020 10:56 AM  
 Hematocrit (POC) 46 06/09/2014 03:00 AM  
 HCT 40.0 01/20/2020 10:56 AM  
 PLATELET 657 87/74/7589 10:56 AM  
 MCV 92 01/20/2020 10:56 AM  
  
Lab Results Component Value Date/Time Sodium 141 08/10/2020 12:44 PM  
 Potassium 3.7 08/10/2020 12:44 PM  
 Chloride 107 08/10/2020 12:44 PM  
 CO2 26 08/10/2020 12:44 PM  
 Anion gap 8 08/10/2020 12:44 PM  
 Glucose 164 (H) 08/10/2020 12:44 PM  
 BUN 18 08/10/2020 12:44 PM  
 Creatinine 0.78 08/10/2020 12:44 PM  
 BUN/Creatinine ratio 23 (H) 08/10/2020 12:44 PM  
 GFR est AA >60 08/10/2020 12:44 PM  
 GFR est non-AA >60 08/10/2020 12:44 PM  
 Calcium 8.2 (L) 08/10/2020 12:44 PM  
 Bilirubin, total 0.4 12/12/2019 06:19 PM  
 Alk. phosphatase 80 12/12/2019 06:19 PM  
 Protein, total 6.5 12/12/2019 06:19 PM  
 Albumin 3.2 (L) 12/12/2019 06:19 PM  
 Globulin 3.3 12/12/2019 06:19 PM  
 A-G Ratio 1.0 (L) 12/12/2019 06:19 PM  
 ALT (SGPT) 24 12/12/2019 06:19 PM  
  
 
 
 ASSESSMENT 1. Atrial fibrillation 2. Hypertension 3. CAD, native 4. Percutaneous transluminal angioplasty 5. HFpEF 6. Dyslipidemia PLAN I fear patient will be highly intolerant of anticoagulation/antiplatelet therapy that is recommended after endocardial left atrial appendage occlusion. I will arrange for a consultation with  [facility] to discuss Atriclip as an alternative. ICD-10-CM ICD-9-CM 1.  Paroxysmal atrial fibrillation (HCC)  I48.0 427.31   
 2. HTN (hypertension), benign  I10 401.1 3. Chronic heart failure with preserved ejection fraction (HCC)  I50.32 428.9 4. Coronary artery disease involving native coronary artery of native heart without angina pectoris  I25.10 414.01   
5. Dyslipidemia  E78.5 272.4 6. Heart failure with preserved ejection fraction, unspecified HF chronicity (HCC)  I50.30 428.9 7. Shortness of breath  R06.02 786.05   
8. Diastolic dysfunction  W41.34 429.9 No orders of the defined types were placed in this encounter. FOLLOW-UP  
 
TBD Thank you, Bienvenido Jacobs MD and Milan Arzate NP/Poonam Tinoco MD for allowing me to participate in the care of this extraordinarily pleasant female. Please do not hesitate to contact me for further questions/concerns. Clary Shah MD 
Cardiac Electrophysiology / Cardiology [address], [address] [place] [address], [address] [place] [address] 
[phone] / [phone] Fax   [phone] / [phone] Fax

## 2020-10-05 ENCOUNTER — OFFICE VISIT (OUTPATIENT)
Dept: CARDIOTHORACIC SURGERY | Age: 70
End: 2020-10-05
Payer: MEDICARE

## 2020-10-05 VITALS
DIASTOLIC BLOOD PRESSURE: 72 MMHG | OXYGEN SATURATION: 95 % | TEMPERATURE: 97.7 F | HEART RATE: 79 BPM | RESPIRATION RATE: 14 BRPM | SYSTOLIC BLOOD PRESSURE: 118 MMHG

## 2020-10-05 DIAGNOSIS — I48.0 PAF (PAROXYSMAL ATRIAL FIBRILLATION) (HCC): Primary | ICD-10-CM

## 2020-10-05 PROCEDURE — G8399 PT W/DXA RESULTS DOCUMENT: HCPCS | Performed by: THORACIC SURGERY (CARDIOTHORACIC VASCULAR SURGERY)

## 2020-10-05 PROCEDURE — G8754 DIAS BP LESS 90: HCPCS | Performed by: THORACIC SURGERY (CARDIOTHORACIC VASCULAR SURGERY)

## 2020-10-05 PROCEDURE — G8536 NO DOC ELDER MAL SCRN: HCPCS | Performed by: THORACIC SURGERY (CARDIOTHORACIC VASCULAR SURGERY)

## 2020-10-05 PROCEDURE — G8417 CALC BMI ABV UP PARAM F/U: HCPCS | Performed by: THORACIC SURGERY (CARDIOTHORACIC VASCULAR SURGERY)

## 2020-10-05 PROCEDURE — G8752 SYS BP LESS 140: HCPCS | Performed by: THORACIC SURGERY (CARDIOTHORACIC VASCULAR SURGERY)

## 2020-10-05 PROCEDURE — 1090F PRES/ABSN URINE INCON ASSESS: CPT | Performed by: THORACIC SURGERY (CARDIOTHORACIC VASCULAR SURGERY)

## 2020-10-05 PROCEDURE — G8427 DOCREV CUR MEDS BY ELIG CLIN: HCPCS | Performed by: THORACIC SURGERY (CARDIOTHORACIC VASCULAR SURGERY)

## 2020-10-05 PROCEDURE — 3017F COLORECTAL CA SCREEN DOC REV: CPT | Performed by: THORACIC SURGERY (CARDIOTHORACIC VASCULAR SURGERY)

## 2020-10-05 PROCEDURE — G8432 DEP SCR NOT DOC, RNG: HCPCS | Performed by: THORACIC SURGERY (CARDIOTHORACIC VASCULAR SURGERY)

## 2020-10-05 PROCEDURE — 1100F PTFALLS ASSESS-DOCD GE2>/YR: CPT | Performed by: THORACIC SURGERY (CARDIOTHORACIC VASCULAR SURGERY)

## 2020-10-05 PROCEDURE — G9899 SCRN MAM PERF RSLTS DOC: HCPCS | Performed by: THORACIC SURGERY (CARDIOTHORACIC VASCULAR SURGERY)

## 2020-10-05 PROCEDURE — 99215 OFFICE O/P EST HI 40 MIN: CPT | Performed by: THORACIC SURGERY (CARDIOTHORACIC VASCULAR SURGERY)

## 2020-10-05 PROCEDURE — 3288F FALL RISK ASSESSMENT DOCD: CPT | Performed by: THORACIC SURGERY (CARDIOTHORACIC VASCULAR SURGERY)

## 2020-10-05 NOTE — PROGRESS NOTES
CSS Consult    Subjective:      Sultana Palencia is a 65 y.o. female who was referred for cardiac evaluation by Dr. Victorino Lee for possible SUHAS clip. The patient has had PAF for many years. She has fatigue, dizziness, and SOB when heart racing. Has had episode of syncope this year during afib. She notices these episodes 1-2 times a month. When the episodes increase in frequency she has her TSH checked and synthroid dose adjusted. She has a history of PAF s/p ablation by Dr. Zac Ramirez about 10 years ago, CAD s/p PCI, hypertension, dyslipidemia, HFpEF and anemia, RA, TIA about 7 years ago, Asthma/COPD, hypothyroid, DM type II,  lyme disease -recently completed tx. She has an intolerance to coumadin and ASA -she has developed GIB and significant thrombocytopenia on these medications. She is a former smoker. She denies alcohol. She is retired and lives with her . She has a family history of stroke and heart disease. Cardiac Testing    Cardiac catheterization 8/2019:  Diagnostic   Dominance: Right   Left Main    The vessel is large. The vessel is angiographically normal.    Left Anterior Descending    The proximal segment of the vessel is moderate in size. The middle segment of the vessel is small. The distal segment of the vessel is small. There is mild diffuse disease. Left Circumflex    The vessel is large. There is mild diffuse disease. Previously placed Prox Cx to Mid Cx stent (unknown type) is widely patent. Right Coronary Artery    The vessel is moderate in size. There is mild diffuse disease. Prox RCA to Mid RCA lesion 20% stenosed. . The lesion was previously treated using a stent (unknown type). Mid RCA lesion 40% stenosed. .    Inferior Septal    The vessel is small. The vessel exhibits minimal luminal irregularities. Right Posterior Atrioventricular Artery    The vessel is small. The vessel exhibits minimal luminal irregularities.     Intervention     No interventions have been documented. ECHO 8/2019:   Left Ventricle  Normal cavity size and wall thickness. Low normal systolic function. The estimated ejection fraction is 51 - 55%. The calculated ejection fraction is 61%. Abnormal wall motion as described on the wall scoring diagram below. There is mild (grade 1) left ventricular diastolic dysfunction. Wall Scoring  The following segments are hypokinetic: basal inferior, basal inferolateral and mid inferolateral.  All other segments are normal.             Left Atrium  Normal cavity size. Left Atrium volume index is 17.5 mL/m2. Right Ventricle  Normal cavity size and global systolic function. Right Atrium  Normal cavity size. Aortic Valve  No stenosis and no regurgitation. Probably trileaflet aortic valve. Aortic valve sclerosis with no significant stenosis. Mitral Valve  Normal valve structure and no stenosis. Trace regurgitation. Tricuspid Valve  Normal valve structure and no stenosis. Trace tricuspid valve regurgitation. Pulmonic Valve  Pulmonic valve not well visualized, but normal doppler findings. No stenosis and no regurgitation. Aorta  The aorta was not well visualized. IVC/Hepatic Veins  Normal central venous pressure (0-5 mmHg); IVC diameter is less than 21 mm and collapses more than 50% with respiration. Pericardium  No evidence of pericardial effusion.           Past Medical History:   Diagnosis Date    Arthritis     Asthma 6/29/2009    CAUSED BY MOLD    Atrial fibrillation ([address] 6/29/2009    CAD (coronary artery disease) 06/29/2009    Chronic pain of right ankle     Diabetes ([address]     Drug induced     Diastolic heart failure (HCC)     DVT (deep venous thrombosis) ([address] 2004    Right leg post surgery    GERD (gastroesophageal reflux disease)     Gluten intolerance     Hypothyroidism 6/29/2009    Iron deficiency anemia     Lyme disease     Personal history of MI (myocardial infarction)     Rheumatoid arthritis (HCC)     Slow to wake up after anesthesia     Syncope     Post testing- resolved    TIA (transient ischemic attack)  &     Vitamin B12 deficiency 2009     Past Surgical History:   Procedure Laterality Date    HX ADENOIDECTOMY      HX AFIB ABLATION      HX APPENDECTOMY      HX CHOLECYSTECTOMY  11    HX COLONOSCOPY      HX CORONARY STENT PLACEMENT      x 2    HX HEART CATHETERIZATION  2010    2 STENTS    HX HYSTERECTOMY      HX LUMBAR LAMINECTOMY  2000    L4-L5    HX ORTHOPAEDIC  [id number]    RT. FOOT SURGERY X 6/calcaneal osteotomy    HX TONSILLECTOMY  1964      Social History     Tobacco Use    Smoking status: Former Smoker     Packs/day: 1.00     Years: 30.00     Pack years: 30.00     Types: Cigarettes     Last attempt to quit: 2002     Years since quittin.3    Smokeless tobacco: Never Used   Substance Use Topics    Alcohol use: No      Family History   Problem Relation Age of Onset    Delayed Awakening Mother     Heart Disease Mother     Coronary Artery Disease Mother     Hypertension Mother     Stroke Mother     Delayed Awakening Sister     Hypertension Sister     Lung Disease Father     Cancer Father         colon    Hypertension Father     Hypertension Brother     Breast Cancer Maternal Aunt     Breast Cancer Maternal Aunt     Breast Cancer Cousin         maternal 1st cousin    Breast Cancer Maternal Aunt     Breast Cancer Cousin         maternal 1st cousin   [place] Breast Cancer Cousin         maternal 1st cousin    Deep Vein Thrombosis Neg Hx     Anesth Problems Neg Hx      Prior to Admission medications    Medication Sig Start Date End Date Taking? Authorizing Provider   gabapentin (NEURONTIN) 100 mg capsule Take 100 mg by mouth nightly. Yes Provider, Historical   mometasone furoate (ASMANEX HFA IN) Take 100 mcg by inhalation two (2) times a day. Yes Provider, Historical   hypromellose (GENTEAL MILD OP) Apply  to eye.    Yes Provider, Historical   potassium 99 mg tablet Take 99 mg by mouth daily. Yes Provider, Historical   insulin glargine (LANTUS,BASAGLAR) 100 unit/mL (3 mL) inpn 10 Units by SubCUTAneous route daily. Indications: \"I set the pen on 8\"   Yes Provider, Historical   denosumab (PROLIA SC) by SubCUTAneous route every 6 months. Yes Provider, Historical   bumetanide (BUMEX) 1 mg tablet Take 1 mg by mouth daily. Yes Provider, Historical   eplerenone (INSPRA) 25 mg tablet Take 25 mg by mouth nightly. Yes Provider, Historical   fenofibrate nanocrystallized (Tricor) 48 mg tablet Take 48 mg by mouth nightly. Yes Provider, Historical   metoprolol succinate (Toprol XL) 50 mg XL tablet Take 50 mg by mouth nightly. Yes Provider, Historical   nitroglycerin (NITRODUR) 0.1 mg/hr 1 Patch by TransDERmal route daily as needed. Indications: Oral form \"causes severe BP drop  BAD\" Drs got scared,   Yes Provider, Historical   OTHER Indications: DSI MET-[place] Glucose Monitor   Yes Provider, Historical   levothyroxine (SYNTHROID) 150 mcg tablet Take 150 mcg by mouth Daily (before breakfast). 2/4/20  Yes Provider, Historical   predniSONE (DELTASONE) 5 mg tablet Take 7.5 mg by mouth daily. 5 mg in morning and 2.5 mg afternoon   Yes Provider, Historical   ondansetron (ZOFRAN ODT) 4 mg disintegrating tablet Take 1 Tab by mouth every eight (8) hours as needed for Nausea. 12/12/19  Yes Gilberto Ly, LESLIE   cholecalciferol, vitamin D3, (VITAMIN D3) 2,000 unit tab Take 2,000 Units by mouth daily. Yes Provider, Historical   COQ10, UBIQUINOL, PO Take 100 mg by mouth daily. Yes Provider, Historical   turmeric (CURCUMIN) Take 1 g by mouth every evening. Yes Provider, Historical   SITagliptin (JANUVIA) 100 mg tablet Take 100 mg by mouth daily (with dinner). Yes Provider, Historical   flaxseed oil (OMEGA 3 PO) Take 1 Tab by mouth every evening.  Indications: FISH OIL   Yes Provider, Historical   vit C/vit E ac/lut/copper/zinc (PRESERVISION LUTEIN PO) Take 1 Tab by mouth daily (with dinner). Yes Provider, Historical   lidocaine (LIDODERM) 5 % 1 Patch by TransDERmal route every twenty-four (24) hours. Apply patch to the affected area for 12 hours a day and remove for 12 hours a day. Yes Tai, MD Yolanda   ascorbic acid (VITAMIN C) 500 mg tablet Take 1,000 mg by mouth daily. Yes Other, MD Yolanda   ferrous sulfate 325 mg (65 mg iron) tablet Take  by mouth every evening. Yes Tai, MD Yolanda   cyanocobalamin (VITAMIN B12) 1,000 mcg/mL injection INJECT 1 ML INTO THE MUSCLE EVERY 30 DAYS 3/24/14  Yes Juan Carvajal MD   lansoprazole (PREVACID) 30 mg capsule Take 30 mg by mouth daily. Yes Provider, Historical       Allergies   Allergen Reactions    Butter Flavor Anaphylaxis     Butter AND CREME    Keflex [Cephalexin] Anaphylaxis    Pcn [Penicillins] Anaphylaxis    Aspirin Hives and Other (comments)     \"it makes my stomach bleed\"      Cimzia [Certolizumab Pegol] Other (comments)     GI symptoms, blisters in the mouth and esophagus    Clopidogrel Other (comments)     GI bleed    Demerol [Meperidine] Other (comments)     HYPOTENSION    Fentanyl Other (comments)     HYPOTENSION    Morphine Other (comments)     HYPOTENSION    Nitroglycerin Other (comments)     IN PILL FORM  - HYPOTENSION  CAN TOLERATE PATCH FOR SHORT TIME    Sulfa (Sulfonamide Antibiotics) Angioedema     Lips    Tapazole [Methimazole] Unknown (comments)     Patient stated \"it made me feel like I had the flu\"    Adhesive Tape-Silicones Other (comments)     BAD BLISTERS AND TENDS TO GET INFECTED    Albuterol Palpitations    Gluten Diarrhea     bloating    Oxycodone Other (comments)     Hallicination and hypotension         Review of Systems:   Consititutional: Denies fever or chills. Eyes:  Denies use of glasses or vision problems(cataracts). ENT:  Denies hearing or swallowing difficulty. CV: Denies CP, claudication, + HTN. Resp: Denies dyspnea, productive cough. : Denies dialysis or kidney problems.   GI: Denies ulcers, esophageal strictures, liver problems. M/S: Denies joint or bone problems, or implanted artificial hardware. +RA  Skin: Denies varicose veins, edema. Neuro: Denies strokes, +TIAs. Psych: Denies anxiety or depression. Endocrine: + thyroid problems or diabetes. Heme/Lymphatic: Denies easy bruising or lymphedema. Objective:     VS:   Visit Vitals  /72   Pulse 79   Temp 97.7 °F (36.5 °C)   Resp 14   SpO2 95%       Physical Exam:    General appearance: alert, cooperative, no distress  Head: normocephalic, without obvious abnormality; atraumatic  Eyes: conjunctivae/corneas clear; EOM's intact. Nose: nares normal; no drainage. Neck: no carotid bruit and no JVD  Lungs: clear to auscultation bilaterally  Heart: regular rate and rhythm; no murmur  Abdomen: soft, non-tender; bowel sounds normal  Extremities: moves all extremities; no weakness. Skin: Skin color normal; No varicose veins or edema. Neurologic: Grossly normal      Labs: No results for input(s): WBC, HGB, HCT, PLT, NA, K, BUN, CREA, GLU, GLUCPOC, INR, HGBEXT, HCTEXT, PLTEXT, INREXT, HGBEXT, HCTEXT, PLTEXT, INREXT in the last 72 hours. No lab exists for component: GLPOC    Diagnostics:     PFTS-FEV1: 0.77L 42% predicted    EKG: NSR      Plan:     1. PAF: On metoprolol, unable to tolerate anticoagulation. SUHSA clip per Dr. Bryce Torres. 2. CAD s/p PCI: On metoprolol. No ASA/statin dt allergy  3. HFpEF (NYHA class II): on bumex, eplerenone, metoprolol. Has seen Dr. Enriqueta Hartmann. 4. HTN: Cont current meds  5. HLD: no statin dt allergy, on tricor. 6. Hx Iron deficiency anemia, b12 deficiency: Hgb 13 on most recent labs. On iron, vit C, vit B  7. GERD: On prevacid. 8. RA: on prednisone -has been on prednisone for 2 years  9. DM: On lantus and januvia. 10. Hx TIA        Signed By: Kae Christina NP     October 5, 2020      Pt seen and examined. Agree with NP Kit Filler consultation note.    She needs a SUHAS clip due to her inability to take a/c and the risk of thrombus developing in her SUHAS secondary to atrial fibrillation. We discussed the risks and benefits of a VATS SUHAS clip and she agreed to proceed  She has not had a COY, so I asked Dr. Karlie Garcia to perform a COY the week before her surgery and just before her PAT. If there is in fact thrombus present in the SUHAS, I will arrange for the procedure to be performed with the [place] device for cerebral protection. But this will require prior approval which may take some advance notice.

## 2020-10-07 ENCOUNTER — TELEPHONE (OUTPATIENT)
Dept: CARDIOLOGY CLINIC | Age: 70
End: 2020-10-07

## 2020-10-07 NOTE — TELEPHONE ENCOUNTER
Called phone # provided, spoke with patient. ID verified using two patient identifiers. Patient scheduled for COY with  [facility] (pre op  SUHAS clip per Dr. Enrrique Evangelista) @ [facility] on Thursday 10/15/20 with an arrival time of 2 pm. Patient has pre admission testing that am and needs afternoon procedure time.  Pre procedure instructions to be sent via My Chart per patient request.

## 2020-10-07 NOTE — TELEPHONE ENCOUNTER
Dr. Cohen Spike office is calling to follow up on the patient getting scheduled to have a COY done on the 13th or 14th. Please advise.     Phone: [phone]

## 2020-10-08 RX ORDER — SODIUM CHLORIDE 0.9 % (FLUSH) 0.9 %
5-40 SYRINGE (ML) INJECTION AS NEEDED
Status: CANCELLED | OUTPATIENT
Start: 2020-10-08

## 2020-10-08 RX ORDER — SODIUM CHLORIDE 0.9 % (FLUSH) 0.9 %
5-40 SYRINGE (ML) INJECTION EVERY 8 HOURS
Status: CANCELLED | OUTPATIENT
Start: 2020-10-08

## 2020-10-11 ENCOUNTER — HOSPITAL ENCOUNTER (OUTPATIENT)
Dept: LAB | Age: 70
Discharge: HOME | End: 2020-10-11
Payer: MEDICARE

## 2020-10-11 ENCOUNTER — TRANSCRIBE ORDER (OUTPATIENT)
Dept: REGISTRATION | Age: 70
End: 2020-10-11

## 2020-10-11 DIAGNOSIS — U07.1 COVID-19: Primary | ICD-10-CM

## 2020-10-11 DIAGNOSIS — U07.1 COVID-19: ICD-10-CM

## 2020-10-11 PROCEDURE — 87635 SARS-COV-2 COVID-19 AMP PRB: CPT

## 2020-10-12 LAB — SARS-COV-2, COV2NT: NOT DETECTED

## 2020-10-14 ENCOUNTER — TELEPHONE (OUTPATIENT)
Dept: CARDIOLOGY CLINIC | Age: 70
End: 2020-10-14

## 2020-10-14 NOTE — TELEPHONE ENCOUNTER
Received call from patient, ID verified using two patient identifiers. Reviewed pre procedure instructions for COY with  [facility] for tomorrow @ 3pm. Patient questions answered to her satisfaction.

## 2020-10-15 ENCOUNTER — HOSPITAL ENCOUNTER (OUTPATIENT)
Dept: PREADMISSION TESTING | Age: 70
Discharge: HOME | End: 2020-10-15
Payer: MEDICARE

## 2020-10-15 ENCOUNTER — TELEPHONE (OUTPATIENT)
Dept: CARDIOLOGY CLINIC | Age: 70
End: 2020-10-15

## 2020-10-15 ENCOUNTER — HOSPITAL ENCOUNTER (OUTPATIENT)
Dept: PREADMISSION TESTING | Age: 70
Discharge: HOME | End: 2020-10-15
Attending: THORACIC SURGERY (CARDIOTHORACIC VASCULAR SURGERY)
Payer: MEDICARE

## 2020-10-15 ENCOUNTER — HOSPITAL ENCOUNTER (OUTPATIENT)
Dept: CARDIAC CATH/INVASIVE PROCEDURES | Age: 70
Discharge: HOME | End: 2020-10-15
Attending: INTERNAL MEDICINE | Admitting: INTERNAL MEDICINE
Payer: MEDICARE

## 2020-10-15 ENCOUNTER — HOSPITAL ENCOUNTER (OUTPATIENT)
Dept: GENERAL RADIOLOGY | Age: 70
Discharge: HOME | End: 2020-10-15
Attending: NURSE PRACTITIONER
Payer: MEDICARE

## 2020-10-15 VITALS
BODY MASS INDEX: 30.86 KG/M2 | HEIGHT: 63 IN | OXYGEN SATURATION: 97 % | RESPIRATION RATE: 17 BRPM | SYSTOLIC BLOOD PRESSURE: 136 MMHG | WEIGHT: 174.16 LBS | HEART RATE: 70 BPM | DIASTOLIC BLOOD PRESSURE: 66 MMHG | TEMPERATURE: 98.5 F

## 2020-10-15 VITALS
OXYGEN SATURATION: 96 % | BODY MASS INDEX: 31.41 KG/M2 | TEMPERATURE: 97.7 F | HEIGHT: 63 IN | SYSTOLIC BLOOD PRESSURE: 120 MMHG | HEART RATE: 62 BPM | DIASTOLIC BLOOD PRESSURE: 53 MMHG | RESPIRATION RATE: 20 BRPM | WEIGHT: 177.25 LBS

## 2020-10-15 DIAGNOSIS — I48.0 PAROXYSMAL ATRIAL FIBRILLATION (HCC): Primary | ICD-10-CM

## 2020-10-15 DIAGNOSIS — I48.91 ATRIAL FIBRILLATION, UNSPECIFIED TYPE (HCC): ICD-10-CM

## 2020-10-15 DIAGNOSIS — I48.0 PAF (PAROXYSMAL ATRIAL FIBRILLATION) (HCC): Primary | ICD-10-CM

## 2020-10-15 LAB
ALBUMIN SERPL-MCNC: 3.4 G/DL (ref 3.5–5)
ALBUMIN/GLOB SERPL: 1 {RATIO} (ref 1.1–2.2)
ALP SERPL-CCNC: 86 U/L (ref 45–117)
ALT SERPL-CCNC: 27 U/L (ref 12–78)
ANION GAP SERPL CALC-SCNC: 3 MMOL/L (ref 5–15)
APPEARANCE UR: CLEAR
APTT PPP: 27.6 SEC (ref 22.1–32)
ARTERIAL PATENCY WRIST A: YES
AST SERPL-CCNC: 18 U/L (ref 15–37)
ATRIAL RATE: 52 BPM
BACTERIA URNS QL MICRO: NEGATIVE /HPF
BASE EXCESS BLD CALC-SCNC: 2 MMOL/L
BASOPHILS # BLD: 0.1 K/UL (ref 0–0.1)
BASOPHILS NFR BLD: 0 % (ref 0–1)
BDY SITE: ABNORMAL
BILIRUB SERPL-MCNC: 0.3 MG/DL (ref 0.2–1)
BILIRUB UR QL: NEGATIVE
BNP SERPL-MCNC: 126 PG/ML
BUN SERPL-MCNC: 22 MG/DL (ref 6–20)
BUN/CREAT SERPL: 29 (ref 12–20)
CA-I BLD-SCNC: 1.21 MMOL/L (ref 1.12–1.32)
CALCIUM SERPL-MCNC: 8.3 MG/DL (ref 8.5–10.1)
CALCULATED P AXIS, ECG09: 59 DEGREES
CALCULATED R AXIS, ECG10: 2 DEGREES
CALCULATED T AXIS, ECG11: 49 DEGREES
CHLORIDE SERPL-SCNC: 106 MMOL/L (ref 97–108)
CO2 SERPL-SCNC: 31 MMOL/L (ref 21–32)
COLOR UR: ABNORMAL
CREAT SERPL-MCNC: 0.75 MG/DL (ref 0.55–1.02)
DIAGNOSIS, 93000: NORMAL
DIFFERENTIAL METHOD BLD: ABNORMAL
EOSINOPHIL # BLD: 0.2 K/UL (ref 0–0.4)
EOSINOPHIL NFR BLD: 1 % (ref 0–7)
EPITH CASTS URNS QL MICRO: ABNORMAL /LPF
ERYTHROCYTE [DISTWIDTH] IN BLOOD BY AUTOMATED COUNT: 13.2 % (ref 11.5–14.5)
EST. AVERAGE GLUCOSE BLD GHB EST-MCNC: 160 MG/DL
GAS FLOW.O2 O2 DELIVERY SYS: ABNORMAL L/MIN
GLOBULIN SER CALC-MCNC: 3.5 G/DL (ref 2–4)
GLUCOSE SERPL-MCNC: 154 MG/DL (ref 65–100)
GLUCOSE UR STRIP.AUTO-MCNC: NEGATIVE MG/DL
HBA1C MFR BLD: 7.2 % (ref 4–5.6)
HCO3 BLD-SCNC: 26.6 MMOL/L (ref 22–26)
HCT VFR BLD AUTO: 46 % (ref 35–47)
HGB BLD-MCNC: 14.6 G/DL (ref 11.5–16)
HGB UR QL STRIP: NEGATIVE
HYALINE CASTS URNS QL MICRO: ABNORMAL /LPF (ref 0–5)
IMM GRANULOCYTES # BLD AUTO: 0.2 K/UL (ref 0–0.04)
IMM GRANULOCYTES NFR BLD AUTO: 1 % (ref 0–0.5)
INR PPP: 1 (ref 0.9–1.1)
KETONES UR QL STRIP.AUTO: NEGATIVE MG/DL
LEUKOCYTE ESTERASE UR QL STRIP.AUTO: ABNORMAL
LYMPHOCYTES # BLD: 1.5 K/UL (ref 0.8–3.5)
LYMPHOCYTES NFR BLD: 7 % (ref 12–49)
MAGNESIUM SERPL-MCNC: 2.4 MG/DL (ref 1.6–2.4)
MCH RBC QN AUTO: 30.8 PG (ref 26–34)
MCHC RBC AUTO-ENTMCNC: 31.7 G/DL (ref 30–36.5)
MCV RBC AUTO: 97 FL (ref 80–99)
MONOCYTES # BLD: 1.3 K/UL (ref 0–1)
MONOCYTES NFR BLD: 6 % (ref 5–13)
NEUTS SEG # BLD: 17.7 K/UL (ref 1.8–8)
NEUTS SEG NFR BLD: 85 % (ref 32–75)
NITRITE UR QL STRIP.AUTO: NEGATIVE
NRBC # BLD: 0 K/UL (ref 0–0.01)
NRBC BLD-RTO: 0 PER 100 WBC
P-R INTERVAL, ECG05: 142 MS
PCO2 BLD: 42.8 MMHG (ref 35–45)
PH BLD: 7.4 [PH] (ref 7.35–7.45)
PH UR STRIP: 7 [PH] (ref 5–8)
PLATELET # BLD AUTO: 283 K/UL (ref 150–400)
PMV BLD AUTO: 10.2 FL (ref 8.9–12.9)
PO2 BLD: 76 MMHG (ref 80–100)
POTASSIUM SERPL-SCNC: 4 MMOL/L (ref 3.5–5.1)
PROT SERPL-MCNC: 6.9 G/DL (ref 6.4–8.2)
PROT UR STRIP-MCNC: NEGATIVE MG/DL
PROTHROMBIN TIME: 10.1 SEC (ref 9–11.1)
Q-T INTERVAL, ECG07: 478 MS
QRS DURATION, ECG06: 72 MS
QTC CALCULATION (BEZET), ECG08: 444 MS
RBC # BLD AUTO: 4.74 M/UL (ref 3.8–5.2)
RBC #/AREA URNS HPF: ABNORMAL /HPF (ref 0–5)
SAO2 % BLD: 95 % (ref 92–97)
SODIUM SERPL-SCNC: 140 MMOL/L (ref 136–145)
SP GR UR REFRACTOMETRY: 1.01 (ref 1–1.03)
SPECIMEN TYPE: ABNORMAL
THERAPEUTIC RANGE,PTTT: NORMAL SECS (ref 58–77)
TOTAL RESP. RATE, ITRR: 16
TSH SERPL DL<=0.05 MIU/L-ACNC: 1.22 UIU/ML (ref 0.36–3.74)
UA: UC IF INDICATED,UAUC: ABNORMAL
UROBILINOGEN UR QL STRIP.AUTO: 0.2 EU/DL (ref 0.2–1)
VENTRICULAR RATE, ECG03: 52 BPM
WBC # BLD AUTO: 20.9 K/UL (ref 3.6–11)
WBC URNS QL MICRO: ABNORMAL /HPF (ref 0–4)

## 2020-10-15 PROCEDURE — 81001 URINALYSIS AUTO W/SCOPE: CPT

## 2020-10-15 PROCEDURE — 36415 COLL VENOUS BLD VENIPUNCTURE: CPT

## 2020-10-15 PROCEDURE — 85025 COMPLETE CBC W/AUTO DIFF WBC: CPT

## 2020-10-15 PROCEDURE — 36600 WITHDRAWAL OF ARTERIAL BLOOD: CPT

## 2020-10-15 PROCEDURE — 74011000250 HC RX REV CODE- 250: Performed by: INTERNAL MEDICINE

## 2020-10-15 PROCEDURE — 83880 ASSAY OF NATRIURETIC PEPTIDE: CPT

## 2020-10-15 PROCEDURE — 71046 X-RAY EXAM CHEST 2 VIEWS: CPT

## 2020-10-15 PROCEDURE — 80053 COMPREHEN METABOLIC PANEL: CPT

## 2020-10-15 PROCEDURE — 87635 SARS-COV-2 COVID-19 AMP PRB: CPT

## 2020-10-15 PROCEDURE — 93325 DOPPLER ECHO COLOR FLOW MAPG: CPT | Performed by: INTERNAL MEDICINE

## 2020-10-15 PROCEDURE — 93312 ECHO TRANSESOPHAGEAL: CPT

## 2020-10-15 PROCEDURE — 85730 THROMBOPLASTIN TIME PARTIAL: CPT

## 2020-10-15 PROCEDURE — 83036 HEMOGLOBIN GLYCOSYLATED A1C: CPT

## 2020-10-15 PROCEDURE — 93312 ECHO TRANSESOPHAGEAL: CPT | Performed by: INTERNAL MEDICINE

## 2020-10-15 PROCEDURE — 93320 DOPPLER ECHO COMPLETE: CPT | Performed by: INTERNAL MEDICINE

## 2020-10-15 PROCEDURE — 86923 COMPATIBILITY TEST ELECTRIC: CPT

## 2020-10-15 PROCEDURE — 83735 ASSAY OF MAGNESIUM: CPT

## 2020-10-15 PROCEDURE — 82803 BLOOD GASES ANY COMBINATION: CPT

## 2020-10-15 PROCEDURE — 99152 MOD SED SAME PHYS/QHP 5/>YRS: CPT

## 2020-10-15 PROCEDURE — 86900 BLOOD TYPING SEROLOGIC ABO: CPT

## 2020-10-15 PROCEDURE — 93005 ELECTROCARDIOGRAM TRACING: CPT

## 2020-10-15 PROCEDURE — 74011250636 HC RX REV CODE- 250/636: Performed by: INTERNAL MEDICINE

## 2020-10-15 PROCEDURE — 84443 ASSAY THYROID STIM HORMONE: CPT

## 2020-10-15 PROCEDURE — 85610 PROTHROMBIN TIME: CPT

## 2020-10-15 RX ORDER — DIPHENHYDRAMINE HYDROCHLORIDE 50 MG/ML
25-50 INJECTION, SOLUTION INTRAMUSCULAR; INTRAVENOUS
Status: DISCONTINUED | OUTPATIENT
Start: 2020-10-15 | End: 2020-10-20 | Stop reason: HOSPADM

## 2020-10-15 RX ORDER — LIDOCAINE HYDROCHLORIDE 20 MG/ML
JELLY TOPICAL AS NEEDED
Status: DISCONTINUED | OUTPATIENT
Start: 2020-10-15 | End: 2020-10-20 | Stop reason: HOSPADM

## 2020-10-15 RX ORDER — PREDNISONE 2.5 MG/1
2.5 TABLET ORAL
Status: ON HOLD | COMMUNITY
End: 2020-11-06 | Stop reason: SDUPTHER

## 2020-10-15 RX ORDER — MIDAZOLAM HYDROCHLORIDE 1 MG/ML
1-5 INJECTION, SOLUTION INTRAMUSCULAR; INTRAVENOUS
Status: DISCONTINUED | OUTPATIENT
Start: 2020-10-15 | End: 2020-10-15 | Stop reason: CLARIF

## 2020-10-15 RX ORDER — LANOLIN ALCOHOL/MO/W.PET/CERES
1 CREAM (GRAM) TOPICAL
Status: ON HOLD | COMMUNITY
End: 2021-05-30

## 2020-10-15 RX ORDER — LIDOCAINE HYDROCHLORIDE 20 MG/ML
15 SOLUTION OROPHARYNGEAL AS NEEDED
Status: DISCONTINUED | OUTPATIENT
Start: 2020-10-15 | End: 2020-10-20 | Stop reason: HOSPADM

## 2020-10-15 RX ORDER — LIDOCAINE 50 MG/G
OINTMENT TOPICAL ONCE
Status: COMPLETED | OUTPATIENT
Start: 2020-10-15 | End: 2020-10-15

## 2020-10-15 RX ORDER — SODIUM CHLORIDE 0.9 % (FLUSH) 0.9 %
5-40 SYRINGE (ML) INJECTION AS NEEDED
Status: DISCONTINUED | OUTPATIENT
Start: 2020-10-15 | End: 2020-10-20 | Stop reason: HOSPADM

## 2020-10-15 RX ORDER — CHLORHEXIDINE GLUCONATE 1.2 MG/ML
15 RINSE ORAL EVERY 12 HOURS
Qty: 420 ML | Refills: 0 | Status: SHIPPED | OUTPATIENT
Start: 2020-10-17 | End: 2020-10-22

## 2020-10-15 RX ORDER — ACLIDINIUM BROMIDE 400 UG/1
1 POWDER, METERED RESPIRATORY (INHALATION) DAILY
Status: ON HOLD | COMMUNITY
End: 2021-05-30

## 2020-10-15 RX ORDER — SODIUM CHLORIDE 0.9 % (FLUSH) 0.9 %
5-40 SYRINGE (ML) INJECTION EVERY 8 HOURS
Status: DISCONTINUED | OUTPATIENT
Start: 2020-10-15 | End: 2020-10-20 | Stop reason: HOSPADM

## 2020-10-15 RX ORDER — MIDAZOLAM HYDROCHLORIDE 1 MG/ML
1-2 INJECTION, SOLUTION INTRAMUSCULAR; INTRAVENOUS
Status: DISCONTINUED | OUTPATIENT
Start: 2020-10-15 | End: 2020-10-20 | Stop reason: HOSPADM

## 2020-10-15 RX ORDER — FENTANYL CITRATE 50 UG/ML
25-100 INJECTION, SOLUTION INTRAMUSCULAR; INTRAVENOUS
Status: DISCONTINUED | OUTPATIENT
Start: 2020-10-15 | End: 2020-10-20 | Stop reason: HOSPADM

## 2020-10-15 RX ORDER — MUPIROCIN 20 MG/G
OINTMENT TOPICAL 2 TIMES DAILY
Qty: 22 G | Refills: 0 | Status: SHIPPED | OUTPATIENT
Start: 2020-10-17 | End: 2020-10-22

## 2020-10-15 RX ADMIN — FENTANYL CITRATE 25 MCG: 50 INJECTION, SOLUTION INTRAMUSCULAR; INTRAVENOUS at 15:41

## 2020-10-15 RX ADMIN — LIDOCAINE HYDROCHLORIDE 8 ML: 20 JELLY TOPICAL at 15:43

## 2020-10-15 RX ADMIN — LIDOCAINE HYDROCHLORIDE 15 ML: 20 SOLUTION ORAL; TOPICAL at 15:30

## 2020-10-15 RX ADMIN — MIDAZOLAM 1 MG: 1 INJECTION INTRAMUSCULAR; INTRAVENOUS at 15:41

## 2020-10-15 RX ADMIN — MIDAZOLAM 1 MG: 1 INJECTION INTRAMUSCULAR; INTRAVENOUS at 15:48

## 2020-10-15 RX ADMIN — DIPHENHYDRAMINE HYDROCHLORIDE 25 MG: 50 INJECTION, SOLUTION INTRAMUSCULAR; INTRAVENOUS at 15:50

## 2020-10-15 RX ADMIN — DIPHENHYDRAMINE HYDROCHLORIDE 25 MG: 50 INJECTION, SOLUTION INTRAMUSCULAR; INTRAVENOUS at 15:42

## 2020-10-15 RX ADMIN — LIDOCAINE: 5 OINTMENT TOPICAL at 15:32

## 2020-10-15 NOTE — PERIOP NOTES
[facility]  Preoperative Instructions    Surgery Date 10/19/2020          Time of Arrival TBD  Contact # [phone]    1. On the day of your surgery, please report to the Surgical Services Registration Desk and sign in at your designated time. The Surgery Center is located to the right of the Emergency Room. 2. You must have someone with you to drive you home. You should not drive a car for 24 hours following surgery. Please make arrangements for a friend or family member to stay with you for the first 24 hours after your surgery. 3. Do not have anything to eat or drink (including water, gum, mints, coffee, juice) after midnight 10/18/2020. ? This may not apply to medications prescribed by your physician. ?(Please note below the special instructions with medications to take the morning of your procedure.)    4. We recommend you do not drink any alcoholic beverages for 24 hours before and after your surgery. 5. Contact your surgeons office for instructions on the following medications: non-steroidal anti-inflammatory drugs (i.e. Advil, Aleve), vitamins, and supplements. (Some surgeons will want you to stop these medications prior to surgery and others may allow you to take them)  **If you are currently taking Plavix, Coumadin, Aspirin and/or other blood-thinning agents, contact your surgeon for instructions. ** Your surgeon will partner with the physician prescribing these medications to determine if it is safe to stop or if you need to continue taking. Please do not stop taking these medications without instructions from your surgeon    6. Wear comfortable clothes. Wear glasses instead of contacts. Do not bring any money or jewelry. Please bring picture ID, insurance card, and any prearranged co-payment or hospital payment. Do not wear make-up, particularly mascara the morning of your surgery. Do not wear nail polish, particularly if you are having foot /hand surgery.   Wear your hair loose or down, no ponytails, buns, dylan pins or clips. All body piercings must be removed. Please shower with antibacterial soap for three consecutive days before and on the morning of surgery, but do not apply any lotions, powders or deodorants after the shower on the day of surgery. Please use a fresh towels after each shower. Please sleep in clean clothes and change bed linens the night before surgery. Please do not shave for 48 hours prior to surgery. Shaving of the face is acceptable. 7. You should understand that if you do not follow these instructions your surgery may be cancelled. If your physical condition changes (I.e. fever, cold or flu) please contact your surgeon as soon as possible. 8. It is important that you be on time. If a situation occurs where you may be late, please call [phone] (OR Holding Area). 9. If you have any questions and or problems, please call [phone] (Pre-admission Testing). 10. Your surgery time may be subject to change. You will receive a phone call the evening prior if your time changes. 11.  If having outpatient surgery, you must have someone to drive you here, stay with you during the duration of your stay, and to drive you home at time of discharge. Special Instructions:   Patient advised to self-quarantine after Covid test and up to day of surgery. Practice incentive spirometer per instructions and bring to hospital day of surgery. Stop taking your Tumeric, CoQ10, Omega 3 today 10/15/2020 per your surgeon. Start taking Peridex Solution twice per day and Mupirocin Ointment twice per day on 10/17/2020, follow your doctors instructions how to use. I understand a pre-operative phone call will be made to verify my surgery time. In the event that I am not available, I give permission for a message to be left on my answering service and/or with another person?   yes         ___________________       __________   10/15/2020 @ [phone]    (Signature of Patient)             (Witness)                (Date and Time)

## 2020-10-15 NOTE — ROUTINE PROCESS
Cardiac Cath Lab Recovery Arrival Note:      [place] arrived to Cardiac Cath Lab, Recovery Area. Staff introduced to patient. Patient identifiers verified with NAME and DATE OF BIRTH. Procedure verified with patient. Consent forms reviewed and signed by patient or authorized representative and verified. Allergies verified. Patient and family oriented to department. Patient and family informed of procedure and plan of care. Questions answered with review. Patient prepped for procedure, per orders from physician, prior to arrival.    Patient on cardiac monitor, non-invasive blood pressure, SPO2 monitor. On room air. . Patient is A&Ox 3. Patient reports no CP. Patient in stretcher, in low position, with side rails up, call bell within reach, patient instructed to call if assistance as needed.     Patient prep in: [facility] Recovery Area, [place] [address]  Prep by: DON

## 2020-10-15 NOTE — PROCEDURES
BRIEF PROCEDURE NOTE    Date of Procedure: 10/15/2020   Preoperative Diagnosis: Hx of PAF/Pre op COY - scheduled for Atrial clip by cardiac surgery   Postoperative Diagnosis: Hx of PAF/Pre op COY - scheduled for Atrial clip by cardiac surgery    Procedure:COY  Cardiologist: Faheem Calderon MD  Anesthesia: local (benzocaine spray, Viscous lidocaine gargle) + IV sedation  Estimated Blood Loss: Minimal    Informed consent obtained prior to procedure. Appropriate time out performed. Findings:     MV: Mild non specific thickening present; Mild MR  TV: Nml  AV: Trileaflet; Nml  PV: Nml    LA: Nml  SUHAS - no thrombus  IAS - bubble study neg  RA: Nml  RV: Nml  LV: Nml   LVEF: 55%    Aorta : Nml size; Mild    Atheroma    Complications:  None, no oropharyngeal trauma, VSS at end of procedure.

## 2020-10-15 NOTE — PERIOP NOTES
Hibiclens/Chlorhexidine    Preventing Infections Before and After - Your Surgery    IMPORTANT INSTRUCTIONS    Please read and follow these instructions carefully. If you are unable to comply with the below instructions your procedure will be cancelled. Every Night for Three (3) nights before your surgery:  1. Shower with an antibacterial soap, such as Dial, or the soap provided at your preassessment appointment. A shower is better than a bath for cleaning your skin. 2. If needed, ask someone to help you reach all areas of your body. Dont forget to clean your belly button with every shower. The night before your surgery: If you lose your Hibiclens/chlorhexidine please contact surgery center or you can purchase it at a local pharmacy  1. On the night before your surgery, shower with an antibacterial soap, such as Dial, or the soap provided at your preassessment appointment. 2. With one packet of Hibiclens/Chlorhexidine in hand, turn water off.  3. Apply Hibiclens antiseptic skin cleanser with a clean, freshly washed washcloth. ? Gently apply to your body from chin to toes (except the genital area) and especially the area(s) where your incision(s) will be. ? Leave Hibiclens/Chlorhexidine on your skin for at least 20 seconds. CAUTION: If needed, Hibiclens/chlorhexidine may be used to clean the folds of skin of the legs (such as in the area of the groin) and on your buttocks and hips. However, do not use Hibiclens/Chlorhexidine above the neck or in the genital area (your bottom) or put inside any area of your body. 4. Turn the water back on and rinse. 5. Dry gently with a clean, freshly washed towel. 6. After your shower, do not use any powder, deodorant, perfumes or lotion. 7. Use clean, freshly washed towels and washcloths every time you shower. 8. Wear clean, freshly washed pajamas to bed the night before surgery. 9. Sleep on clean, freshly washed sheets.   10. Do not allow pets to sleep in your bed with you. The Morning of your surgery:  1. Shower again thoroughly with an antibacterial soap, such as Dial or the soap provided at your preassessment appointment. If needed, ask someone for help to reach all areas of your body. Dont forget to clean your belly button! Rinse. 2. Dry gently with a clean, freshly washed towel. 3. After your shower, do not use any powder, deodorant, perfumes or lotion prior to surgery. 4. Put on clean, freshly washed clothing. Tips to help prevent infections after your surgery:  1. Protect your surgical wound from germs:  ? Hand washing is the most important thing you and your caregivers can do to prevent infections. ? Keep your bandage clean and dry! ? Do not touch your surgical wound. 2. Use clean, freshly washed towels and washcloths every time you shower; do not share bath linens with others. 3. Until your surgical wound is healed, wear clothing and sleep on bed linens each day that are clean and freshly washed. 4. Do not allow pets to sleep in your bed with you or touch your surgical wound. 5. Do not smoke - smoking delays wound healing. This may be a good time to stop smoking. 6. If you have diabetes, it is important for you to manage your blood sugar levels properly before your surgery as well as after your surgery. Poorly managed blood sugar levels slow down wound healing and prevent you from healing completely. If you lose your Hibiclens/chlorhexidine, please call the [facility], or it is available for purchase at your pharmacy.                ___________________      ___________________      10/15/2020 @ 3244  (Signature of Patient)          (Witness)                   (Date and Time)

## 2020-10-15 NOTE — PROGRESS NOTES
TRANSFER - OUT REPORT:    Verbal report given to [address] [address] CVT(name) on Eugene Lockett  being transferred to [facility] LAB(unit) for ordered procedure       Report consisted of patients Situation, Background, Assessment and   Recommendations(SBAR). Information from the following report(s) SBAR was reviewed with the receiving nurse. Lines:   Peripheral IV 10/15/20 Left Hand (Active)        Opportunity for questions and clarification was provided. Patient transported with:   Tech    4:30 PM  TRANSFER - IN REPORT:    Verbal report received from [facility] RN(name) on Eugene Lockett  being received from CATH LAB(unit) for routine post - op      Report consisted of patients Situation, Background, Assessment and   Recommendations(SBAR). Information from the following report(s) Procedure Summary was reviewed with the receiving nurse. Opportunity for questions and clarification was provided. Assessment completed upon patients arrival to unit and care assumed. 5:00PM  Gag reflex tested. Present. Discharge instructions reviewed with patient and family. Voiced understanding. Patient given copy of discharge instructions to take home. 5:48 PM  Discharge instructions reviewed with patient and family. Voiced understanding. Patient given copy of discharge instructions to take home.

## 2020-10-15 NOTE — PROGRESS NOTES
TRANSFER - IN REPORT:    Verbal report received from (melissa) on [employer]  being received from [facility]) for ordered procedure      Report consisted of patients Situation, Background, Assessment and   Recommendations(SBAR). Information from the following report(s) SBAR was reviewed with the receiving nurse. Opportunity for questions and clarification was provided. Assessment completed upon patients arrival to unit and care assumed.

## 2020-10-15 NOTE — PROGRESS NOTES
10/15/20 - Cxray ordered by surgical team  D/w pt/ - CT chest has been ordered per CC chart  Advised to follow up with PCP - Dr Yarelis Chavez. WBC 20 - pt states she is on prednisone for RA and her WBC runs high. ( No fever/cough)  Informed Dr Yang Felix    Exam:  2 view chest     Indication: Preop for heart surgery.     COMPARISON: 8/12/2019 and 4/22/2019     PA and lateral views demonstrate the heart size is at the upper limits of normal  and has mildly increased in the interval. The aorta is tortuous. The lungs are  deeply aerated.     Projecting medially in the right retrocardiac region in the region of the azygos  esophageal recess is a rounded area of abnormality measuring 3 cm. This appears  to be closely associated with the posterior aspect of the heart on the lateral  exam. This could represent a pulmonary mass. This could represent an enlarged  pulmonary vein or pulmonary vein varix. Exact etiology is uncertain. CT scan of  the chest is suggested for further evaluation.     Left lung is clear. No pneumonia or pulmonary edema.     Osseous structures are unremarkable.     IMPRESSION  IMPRESSION:  1. Recommend CT scan of chest evaluate abnormality identified in the right  retrocardiac region. Please see above text.  23X

## 2020-10-15 NOTE — TELEPHONE ENCOUNTER
Dr. Dimple Davies office called to let Dr. Shama Brown know that the patients procedure (left atrial appendage clip) was scheduled for 10/19 with Dr. Jeremi Avalos at [facility] If Dr. Shama Brown has any questions or concerns feel free to give them a call at [phone]. Thanks.

## 2020-10-15 NOTE — PROGRESS NOTES
TRANSFER - OUT REPORT:    Verbal report given to ([address]) on Kelton Sudeep being transferred to [facility]) for ordered procedure       Report consisted of patient's Situation, Background, Assessment and   Recommendations(SBAR). Information from the following report(s) SBAR was reviewed with the receiving nurse. Opportunity for questions and clarification was provided.       Patient transported with:   Registered Nurse

## 2020-10-15 NOTE — PERIOP NOTES
Incentive Spirometer        Using the incentive spirometer helps expand the small air sacs of your lungs, helps you breathe deeply, and helps improve your lung function. Use your incentive spirometer twice a day (10 breaths each time) prior to surgery. How to Use Your Incentive Spirometer:  1. Hold the incentive spirometer in an upright position. 2. Breathe out as usual.   3. Place the mouthpiece in your mouth and seal your lips tightly around it. 4. Take a deep breath. Breathe in slowly and as deeply as possible. Keep the blue flow rate guide between the arrows. 5. Hold your breath as long as possible. Then exhale slowly and allow the piston to fall to the bottom of the column. 6. Rest for a few seconds and repeat steps one through five at least 10 times. PAT Tidal Volume______1750__________  x___2______  Date__10/15/2020_________    Carlos Dyers THE INCENTIVE SPIROMETER WITH YOU TO THE HOSPITAL ON THE DAY OF YOUR SURGERY. Opportunity given to ask and answer questions as well as to observe return demonstration.     Patient signature_____________________________          Witness____________________________

## 2020-10-15 NOTE — INTERVAL H&P NOTE
Update History & Physical    The Patient's History and Physical of 9/18/20, was reviewed with the patient and I examined the patient. There was no change. Plan:  The risk, benefits, expected outcome, and alternative to the recommended procedure have been discussed with the patient. Patient understands and wants to proceed with the procedure. WBC - 20. [facility] reviewed     D/w Dr Isabella Davidson. COY prior to cardiac surgery procedure    COY Consent:    Patient seen and examined for COY. RIBA of procedure explained to patient. Risks include but not limited to, need for conscious sedation, bleeding, esophageal tear/rupture, VT, VF,need for emergent surgery and death. Patient denies dysphagia/Cancer of Upper GI/throat/ELINA/Esophaageal varices;ulcers;webs/Cervical spine problems/Chest radiation. All the patient's questions were answered to their satisfaction. Patient expresses understanding and agrees to undergo procedure.         Electronically signed by Jeimy Kevin MD on 10/15/2020 at 3:28 PM

## 2020-10-16 ENCOUNTER — HOSPITAL ENCOUNTER (OUTPATIENT)
Dept: CT IMAGING | Age: 70
Discharge: HOME | End: 2020-10-16
Attending: NURSE PRACTITIONER
Payer: MEDICARE

## 2020-10-16 DIAGNOSIS — I48.0 PAROXYSMAL ATRIAL FIBRILLATION (HCC): ICD-10-CM

## 2020-10-16 LAB
BACTERIA SPEC CULT: NORMAL
BACTERIA SPEC CULT: NORMAL
HEALTH STATUS, XMCV2T: NORMAL
SARS-COV-2, COV2NT: NOT DETECTED
SERVICE CMNT-IMP: NORMAL
SOURCE, COVRS: NORMAL
SPECIMEN SOURCE, FCOV2M: NORMAL
SPECIMEN TYPE, XMCV1T: NORMAL

## 2020-10-16 PROCEDURE — 71275 CT ANGIOGRAPHY CHEST: CPT

## 2020-10-16 PROCEDURE — 74011000636 HC RX REV CODE- 636

## 2020-10-16 RX ADMIN — IOPAMIDOL 100 ML: 755 INJECTION, SOLUTION INTRAVENOUS at 10:08

## 2020-10-20 ENCOUNTER — HOSPITAL ENCOUNTER (OUTPATIENT)
Dept: NON INVASIVE DIAGNOSTICS | Age: 70
Discharge: HOME | End: 2020-10-20
Attending: THORACIC SURGERY (CARDIOTHORACIC VASCULAR SURGERY)

## 2020-10-20 ENCOUNTER — ANESTHESIA (OUTPATIENT)
Dept: CARDIOTHORACIC SURGERY | Age: 70
DRG: 271 | End: 2020-10-20
Payer: MEDICARE

## 2020-10-20 ENCOUNTER — APPOINTMENT (OUTPATIENT)
Dept: GENERAL RADIOLOGY | Age: 70
DRG: 271 | End: 2020-10-20
Attending: PHYSICIAN ASSISTANT
Payer: MEDICARE

## 2020-10-20 ENCOUNTER — HOSPITAL ENCOUNTER (INPATIENT)
Age: 70
LOS: 2 days | Discharge: HOME HEALTH | DRG: 271 | End: 2020-10-22
Attending: THORACIC SURGERY (CARDIOTHORACIC VASCULAR SURGERY) | Admitting: THORACIC SURGERY (CARDIOTHORACIC VASCULAR SURGERY)
Payer: MEDICARE

## 2020-10-20 ENCOUNTER — ANESTHESIA EVENT (OUTPATIENT)
Dept: CARDIOTHORACIC SURGERY | Age: 70
DRG: 271 | End: 2020-10-20
Payer: MEDICARE

## 2020-10-20 DIAGNOSIS — Z98.890 S/P LEFT ATRIAL APPENDAGE LIGATION: Primary | ICD-10-CM

## 2020-10-20 PROBLEM — I48.91 ATRIAL FIBRILLATION (HCC): Status: ACTIVE | Noted: 2020-10-20

## 2020-10-20 PROBLEM — I48.91 A-FIB (HCC): Status: ACTIVE | Noted: 2020-10-20

## 2020-10-20 LAB
ALBUMIN SERPL-MCNC: 3 G/DL (ref 3.5–5)
ALBUMIN/GLOB SERPL: 1 {RATIO} (ref 1.1–2.2)
ALP SERPL-CCNC: 78 U/L (ref 45–117)
ALT SERPL-CCNC: 30 U/L (ref 12–78)
ANION GAP SERPL CALC-SCNC: 5 MMOL/L (ref 5–15)
APTT PPP: 26.3 SEC (ref 22.1–32)
AST SERPL-CCNC: 19 U/L (ref 15–37)
BASOPHILS # BLD: 0.4 K/UL (ref 0–0.1)
BASOPHILS NFR BLD: 2 % (ref 0–1)
BILIRUB SERPL-MCNC: 0.3 MG/DL (ref 0.2–1)
BUN SERPL-MCNC: 18 MG/DL (ref 6–20)
BUN/CREAT SERPL: 29 (ref 12–20)
CALCIUM SERPL-MCNC: 8 MG/DL (ref 8.5–10.1)
CHLORIDE SERPL-SCNC: 111 MMOL/L (ref 97–108)
CO2 SERPL-SCNC: 28 MMOL/L (ref 21–32)
CREAT SERPL-MCNC: 0.62 MG/DL (ref 0.55–1.02)
DIFFERENTIAL METHOD BLD: ABNORMAL
EOSINOPHIL # BLD: 0 K/UL (ref 0–0.4)
EOSINOPHIL NFR BLD: 0 % (ref 0–7)
ERYTHROCYTE [DISTWIDTH] IN BLOOD BY AUTOMATED COUNT: 13.3 % (ref 11.5–14.5)
GLOBULIN SER CALC-MCNC: 3.1 G/DL (ref 2–4)
GLUCOSE BLD STRIP.AUTO-MCNC: 112 MG/DL (ref 65–100)
GLUCOSE BLD STRIP.AUTO-MCNC: 113 MG/DL (ref 65–100)
GLUCOSE BLD STRIP.AUTO-MCNC: 151 MG/DL (ref 65–100)
GLUCOSE BLD STRIP.AUTO-MCNC: 187 MG/DL (ref 65–100)
GLUCOSE SERPL-MCNC: 144 MG/DL (ref 65–100)
HCT VFR BLD AUTO: 41.8 % (ref 35–47)
HGB BLD-MCNC: 13.4 G/DL (ref 11.5–16)
HISTORY CHECKED?,CKHIST: NORMAL
IMM GRANULOCYTES # BLD AUTO: 0 K/UL (ref 0–0.04)
IMM GRANULOCYTES NFR BLD AUTO: 0 % (ref 0–0.5)
INR PPP: 1 (ref 0.9–1.1)
LYMPHOCYTES # BLD: 2.4 K/UL (ref 0.8–3.5)
LYMPHOCYTES NFR BLD: 11 % (ref 12–49)
MAGNESIUM SERPL-MCNC: 2.1 MG/DL (ref 1.6–2.4)
MCH RBC QN AUTO: 31.3 PG (ref 26–34)
MCHC RBC AUTO-ENTMCNC: 32.1 G/DL (ref 30–36.5)
MCV RBC AUTO: 97.7 FL (ref 80–99)
MONOCYTES # BLD: 1.5 K/UL (ref 0–1)
MONOCYTES NFR BLD: 7 % (ref 5–13)
NEUTS SEG # BLD: 17.8 K/UL (ref 1.8–8)
NEUTS SEG NFR BLD: 80 % (ref 32–75)
NRBC # BLD: 0 K/UL (ref 0–0.01)
NRBC BLD-RTO: 0 PER 100 WBC
PLATELET # BLD AUTO: 270 K/UL (ref 150–400)
PMV BLD AUTO: 10.4 FL (ref 8.9–12.9)
POTASSIUM SERPL-SCNC: 3.8 MMOL/L (ref 3.5–5.1)
PROT SERPL-MCNC: 6.1 G/DL (ref 6.4–8.2)
PROTHROMBIN TIME: 10.3 SEC (ref 9–11.1)
RBC # BLD AUTO: 4.28 M/UL (ref 3.8–5.2)
RBC MORPH BLD: ABNORMAL
SERVICE CMNT-IMP: ABNORMAL
SODIUM SERPL-SCNC: 144 MMOL/L (ref 136–145)
THERAPEUTIC RANGE,PTTT: NORMAL SECS (ref 58–77)
WBC # BLD AUTO: 22.1 K/UL (ref 3.6–11)

## 2020-10-20 PROCEDURE — 77030012390 HC DRN CHST BTL GTNG -B: Performed by: THORACIC SURGERY (CARDIOTHORACIC VASCULAR SURGERY)

## 2020-10-20 PROCEDURE — 74011636637 HC RX REV CODE- 636/637: Performed by: PHYSICIAN ASSISTANT

## 2020-10-20 PROCEDURE — 77010033678 HC OXYGEN DAILY

## 2020-10-20 PROCEDURE — 77030031139 HC SUT VCRL2 J&J -A: Performed by: THORACIC SURGERY (CARDIOTHORACIC VASCULAR SURGERY)

## 2020-10-20 PROCEDURE — 77030020053 HC ELECTRD LAPSCP COVD -B: Performed by: THORACIC SURGERY (CARDIOTHORACIC VASCULAR SURGERY)

## 2020-10-20 PROCEDURE — 2709999900 HC NON-CHARGEABLE SUPPLY

## 2020-10-20 PROCEDURE — P9045 ALBUMIN (HUMAN), 5%, 250 ML: HCPCS | Performed by: PHYSICIAN ASSISTANT

## 2020-10-20 PROCEDURE — 74011250636 HC RX REV CODE- 250/636: Performed by: NURSE PRACTITIONER

## 2020-10-20 PROCEDURE — 85025 COMPLETE CBC W/AUTO DIFF WBC: CPT

## 2020-10-20 PROCEDURE — 51798 US URINE CAPACITY MEASURE: CPT

## 2020-10-20 PROCEDURE — 77030040922 HC BLNKT HYPOTHRM STRY -A

## 2020-10-20 PROCEDURE — 77030013797 HC KT TRNSDUC PRSSR EDWD -A

## 2020-10-20 PROCEDURE — 74011250636 HC RX REV CODE- 250/636: Performed by: NURSE ANESTHETIST, CERTIFIED REGISTERED

## 2020-10-20 PROCEDURE — 74011000250 HC RX REV CODE- 250: Performed by: NURSE ANESTHETIST, CERTIFIED REGISTERED

## 2020-10-20 PROCEDURE — 94640 AIRWAY INHALATION TREATMENT: CPT

## 2020-10-20 PROCEDURE — 85610 PROTHROMBIN TIME: CPT

## 2020-10-20 PROCEDURE — 77030010938 HC CLP LIG TELE -A: Performed by: THORACIC SURGERY (CARDIOTHORACIC VASCULAR SURGERY)

## 2020-10-20 PROCEDURE — 77030018673: Performed by: THORACIC SURGERY (CARDIOTHORACIC VASCULAR SURGERY)

## 2020-10-20 PROCEDURE — 74011250637 HC RX REV CODE- 250/637: Performed by: THORACIC SURGERY (CARDIOTHORACIC VASCULAR SURGERY)

## 2020-10-20 PROCEDURE — 77030002996 HC SUT SLK J&J -A: Performed by: THORACIC SURGERY (CARDIOTHORACIC VASCULAR SURGERY)

## 2020-10-20 PROCEDURE — B246ZZ4 ULTRASONOGRAPHY OF RIGHT AND LEFT HEART, TRANSESOPHAGEAL: ICD-10-PCS | Performed by: ANESTHESIOLOGY

## 2020-10-20 PROCEDURE — 77030020704 HC DISECT ENDOSC BLNT J&J -B: Performed by: THORACIC SURGERY (CARDIOTHORACIC VASCULAR SURGERY)

## 2020-10-20 PROCEDURE — 74011250637 HC RX REV CODE- 250/637: Performed by: PHYSICIAN ASSISTANT

## 2020-10-20 PROCEDURE — 74011000258 HC RX REV CODE- 258: Performed by: PHYSICIAN ASSISTANT

## 2020-10-20 PROCEDURE — 65620000000 HC RM CCU GENERAL

## 2020-10-20 PROCEDURE — 80053 COMPREHEN METABOLIC PANEL: CPT

## 2020-10-20 PROCEDURE — 83735 ASSAY OF MAGNESIUM: CPT

## 2020-10-20 PROCEDURE — 77030008684 HC TU ET CUF COVD -B: Performed by: ANESTHESIOLOGY

## 2020-10-20 PROCEDURE — 77030012770 HC TRCR OPT FX AMR -B: Performed by: THORACIC SURGERY (CARDIOTHORACIC VASCULAR SURGERY)

## 2020-10-20 PROCEDURE — 77030038015 HC ATRICLIP PRO GILLINOV ATRC -I1: Performed by: THORACIC SURGERY (CARDIOTHORACIC VASCULAR SURGERY)

## 2020-10-20 PROCEDURE — 77030008671 HC TU ENDO/BRNC CUF COVD -B: Performed by: ANESTHESIOLOGY

## 2020-10-20 PROCEDURE — 77030040504 HC DRN WND MDII -B: Performed by: THORACIC SURGERY (CARDIOTHORACIC VASCULAR SURGERY)

## 2020-10-20 PROCEDURE — 74011000250 HC RX REV CODE- 250: Performed by: PHYSICIAN ASSISTANT

## 2020-10-20 PROCEDURE — 77030019908 HC STETH ESOPH SIMS -A: Performed by: ANESTHESIOLOGY

## 2020-10-20 PROCEDURE — 36415 COLL VENOUS BLD VENIPUNCTURE: CPT

## 2020-10-20 PROCEDURE — 0W9D4ZZ DRAINAGE OF PERICARDIAL CAVITY, PERCUTANEOUS ENDOSCOPIC APPROACH: ICD-10-PCS | Performed by: THORACIC SURGERY (CARDIOTHORACIC VASCULAR SURGERY)

## 2020-10-20 PROCEDURE — 77030002933 HC SUT MCRYL J&J -A: Performed by: THORACIC SURGERY (CARDIOTHORACIC VASCULAR SURGERY)

## 2020-10-20 PROCEDURE — 32659 THORACOSCOPY W/SAC DRAINAGE: CPT | Performed by: THORACIC SURGERY (CARDIOTHORACIC VASCULAR SURGERY)

## 2020-10-20 PROCEDURE — 2709999900 HC NON-CHARGEABLE SUPPLY: Performed by: THORACIC SURGERY (CARDIOTHORACIC VASCULAR SURGERY)

## 2020-10-20 PROCEDURE — 77030026438 HC STYL ET INTUB CARD -A: Performed by: ANESTHESIOLOGY

## 2020-10-20 PROCEDURE — 77030008756 HC TU IRR SUC STRY -B: Performed by: THORACIC SURGERY (CARDIOTHORACIC VASCULAR SURGERY)

## 2020-10-20 PROCEDURE — 77030013797 HC KT TRNSDUC PRSSR EDWD -A: Performed by: ANESTHESIOLOGY

## 2020-10-20 PROCEDURE — 02L74CK OCCLUSION OF LEFT ATRIAL APPENDAGE WITH EXTRALUMINAL DEVICE, PERCUTANEOUS ENDOSCOPIC APPROACH: ICD-10-PCS | Performed by: THORACIC SURGERY (CARDIOTHORACIC VASCULAR SURGERY)

## 2020-10-20 PROCEDURE — 74011250636 HC RX REV CODE- 250/636: Performed by: PHYSICIAN ASSISTANT

## 2020-10-20 PROCEDURE — 82962 GLUCOSE BLOOD TEST: CPT

## 2020-10-20 PROCEDURE — 77030008608 HC TRCR ENDOSC SMTH AMR -B: Performed by: THORACIC SURGERY (CARDIOTHORACIC VASCULAR SURGERY)

## 2020-10-20 PROCEDURE — 74011250636 HC RX REV CODE- 250/636: Performed by: THORACIC SURGERY (CARDIOTHORACIC VASCULAR SURGERY)

## 2020-10-20 PROCEDURE — 85730 THROMBOPLASTIN TIME PARTIAL: CPT

## 2020-10-20 PROCEDURE — 77030013079 HC BLNKT BAIR HGGR 3M -A: Performed by: ANESTHESIOLOGY

## 2020-10-20 PROCEDURE — 76060000034 HC ANESTHESIA 1.5 TO 2 HR: Performed by: THORACIC SURGERY (CARDIOTHORACIC VASCULAR SURGERY)

## 2020-10-20 PROCEDURE — 71045 X-RAY EXAM CHEST 1 VIEW: CPT

## 2020-10-20 PROCEDURE — 77030011220 HC DEV ELECSURG COVD -B: Performed by: THORACIC SURGERY (CARDIOTHORACIC VASCULAR SURGERY)

## 2020-10-20 PROCEDURE — 77030008771 HC TU NG SALEM SUMP -A: Performed by: ANESTHESIOLOGY

## 2020-10-20 PROCEDURE — 77030016151 HC PROTCTR LNS DFOG COVD -B: Performed by: THORACIC SURGERY (CARDIOTHORACIC VASCULAR SURGERY)

## 2020-10-20 PROCEDURE — 76010000129 HC CV SURG 1.5 TO 2 HR: Performed by: THORACIC SURGERY (CARDIOTHORACIC VASCULAR SURGERY)

## 2020-10-20 PROCEDURE — 77030010516 HC APPL HEMA CLP TELE -B: Performed by: THORACIC SURGERY (CARDIOTHORACIC VASCULAR SURGERY)

## 2020-10-20 PROCEDURE — 77030018729 HC ELECTRD DEFIB PAD CARD -B: Performed by: THORACIC SURGERY (CARDIOTHORACIC VASCULAR SURGERY)

## 2020-10-20 PROCEDURE — 74011000250 HC RX REV CODE- 250: Performed by: THORACIC SURGERY (CARDIOTHORACIC VASCULAR SURGERY)

## 2020-10-20 RX ORDER — MAGNESIUM SULFATE 1 G/100ML
1 INJECTION INTRAVENOUS AS NEEDED
Status: DISCONTINUED | OUTPATIENT
Start: 2020-10-20 | End: 2020-10-22 | Stop reason: HOSPADM

## 2020-10-20 RX ORDER — DIPHENHYDRAMINE HYDROCHLORIDE 50 MG/ML
25 INJECTION, SOLUTION INTRAMUSCULAR; INTRAVENOUS
Status: DISCONTINUED | OUTPATIENT
Start: 2020-10-20 | End: 2020-10-22 | Stop reason: HOSPADM

## 2020-10-20 RX ORDER — TRAMADOL HYDROCHLORIDE 50 MG/1
50-100 TABLET ORAL
Status: DISCONTINUED | OUTPATIENT
Start: 2020-10-20 | End: 2020-10-22 | Stop reason: HOSPADM

## 2020-10-20 RX ORDER — BUPIVACAINE HYDROCHLORIDE 5 MG/ML
INJECTION, SOLUTION EPIDURAL; INTRACAUDAL AS NEEDED
Status: DISCONTINUED | OUTPATIENT
Start: 2020-10-20 | End: 2020-10-22 | Stop reason: HOSPADM

## 2020-10-20 RX ORDER — SODIUM CHLORIDE 0.9 % (FLUSH) 0.9 %
5-40 SYRINGE (ML) INJECTION EVERY 8 HOURS
Status: DISCONTINUED | OUTPATIENT
Start: 2020-10-20 | End: 2020-10-22 | Stop reason: HOSPADM

## 2020-10-20 RX ORDER — METOPROLOL SUCCINATE 50 MG/1
50 TABLET, EXTENDED RELEASE ORAL
Status: DISCONTINUED | OUTPATIENT
Start: 2020-10-20 | End: 2020-10-21

## 2020-10-20 RX ORDER — ASCORBIC ACID 500 MG
1000 TABLET ORAL DAILY
Status: DISCONTINUED | OUTPATIENT
Start: 2020-10-20 | End: 2020-10-22 | Stop reason: HOSPADM

## 2020-10-20 RX ORDER — MELATONIN
2000 DAILY
Status: DISCONTINUED | OUTPATIENT
Start: 2020-10-20 | End: 2020-10-22 | Stop reason: HOSPADM

## 2020-10-20 RX ORDER — NALOXONE HYDROCHLORIDE 0.4 MG/ML
0.4 INJECTION, SOLUTION INTRAMUSCULAR; INTRAVENOUS; SUBCUTANEOUS AS NEEDED
Status: DISCONTINUED | OUTPATIENT
Start: 2020-10-20 | End: 2020-10-22 | Stop reason: HOSPADM

## 2020-10-20 RX ORDER — FACIAL-BODY WIPES
10 EACH TOPICAL DAILY PRN
Status: DISCONTINUED | OUTPATIENT
Start: 2020-10-20 | End: 2020-10-22 | Stop reason: HOSPADM

## 2020-10-20 RX ORDER — FAMOTIDINE 20 MG/1
20 TABLET, FILM COATED ORAL EVERY 12 HOURS
Status: DISCONTINUED | OUTPATIENT
Start: 2020-10-21 | End: 2020-10-22 | Stop reason: HOSPADM

## 2020-10-20 RX ORDER — POLYETHYLENE GLYCOL 3350 17 G/17G
17 POWDER, FOR SOLUTION ORAL DAILY
Status: DISCONTINUED | OUTPATIENT
Start: 2020-10-21 | End: 2020-10-22 | Stop reason: HOSPADM

## 2020-10-20 RX ORDER — INSULIN LISPRO 100 [IU]/ML
INJECTION, SOLUTION INTRAVENOUS; SUBCUTANEOUS
Status: DISCONTINUED | OUTPATIENT
Start: 2020-10-20 | End: 2020-10-22 | Stop reason: HOSPADM

## 2020-10-20 RX ORDER — HYDROMORPHONE HYDROCHLORIDE 1 MG/ML
0.5 INJECTION, SOLUTION INTRAMUSCULAR; INTRAVENOUS; SUBCUTANEOUS
Status: DISCONTINUED | OUTPATIENT
Start: 2020-10-20 | End: 2020-10-22 | Stop reason: HOSPADM

## 2020-10-20 RX ORDER — SUCCINYLCHOLINE CHLORIDE 20 MG/ML
INJECTION INTRAMUSCULAR; INTRAVENOUS AS NEEDED
Status: DISCONTINUED | OUTPATIENT
Start: 2020-10-20 | End: 2020-10-20 | Stop reason: HOSPADM

## 2020-10-20 RX ORDER — LIDOCAINE 4 G/100G
1 PATCH TOPICAL EVERY 24 HOURS
Status: DISCONTINUED | OUTPATIENT
Start: 2020-10-20 | End: 2020-10-22 | Stop reason: HOSPADM

## 2020-10-20 RX ORDER — LIDOCAINE HYDROCHLORIDE 20 MG/ML
INJECTION, SOLUTION EPIDURAL; INFILTRATION; INTRACAUDAL; PERINEURAL AS NEEDED
Status: DISCONTINUED | OUTPATIENT
Start: 2020-10-20 | End: 2020-10-20 | Stop reason: HOSPADM

## 2020-10-20 RX ORDER — SODIUM CHLORIDE, SODIUM LACTATE, POTASSIUM CHLORIDE, CALCIUM CHLORIDE 600; 310; 30; 20 MG/100ML; MG/100ML; MG/100ML; MG/100ML
25 INJECTION, SOLUTION INTRAVENOUS CONTINUOUS
Status: DISCONTINUED | OUTPATIENT
Start: 2020-10-20 | End: 2020-10-20

## 2020-10-20 RX ORDER — ROCURONIUM BROMIDE 10 MG/ML
INJECTION, SOLUTION INTRAVENOUS AS NEEDED
Status: DISCONTINUED | OUTPATIENT
Start: 2020-10-20 | End: 2020-10-20 | Stop reason: HOSPADM

## 2020-10-20 RX ORDER — PRENATAL VIT CALC,IRON,FOLIC
1 TABLET ORAL 2 TIMES DAILY
Status: DISCONTINUED | OUTPATIENT
Start: 2020-10-20 | End: 2020-10-22 | Stop reason: HOSPADM

## 2020-10-20 RX ORDER — PREDNISONE 5 MG/1
5 TABLET ORAL
Status: DISCONTINUED | OUTPATIENT
Start: 2020-10-21 | End: 2020-10-22 | Stop reason: HOSPADM

## 2020-10-20 RX ORDER — SODIUM CHLORIDE 0.9 % (FLUSH) 0.9 %
5-40 SYRINGE (ML) INJECTION AS NEEDED
Status: DISCONTINUED | OUTPATIENT
Start: 2020-10-20 | End: 2020-10-20

## 2020-10-20 RX ORDER — PREDNISONE 5 MG/1
2.5 TABLET ORAL
Status: DISCONTINUED | OUTPATIENT
Start: 2020-10-20 | End: 2020-10-22 | Stop reason: HOSPADM

## 2020-10-20 RX ORDER — BUMETANIDE 1 MG/1
1 TABLET ORAL DAILY
Status: DISCONTINUED | OUTPATIENT
Start: 2020-10-20 | End: 2020-10-22

## 2020-10-20 RX ORDER — CYANOCOBALAMIN 1000 UG/ML
1000 INJECTION, SOLUTION INTRAMUSCULAR; SUBCUTANEOUS ONCE
Status: COMPLETED | OUTPATIENT
Start: 2020-10-20 | End: 2020-10-20

## 2020-10-20 RX ORDER — NEOSTIGMINE METHYLSULFATE 1 MG/ML
INJECTION, SOLUTION INTRAVENOUS AS NEEDED
Status: DISCONTINUED | OUTPATIENT
Start: 2020-10-20 | End: 2020-10-20 | Stop reason: HOSPADM

## 2020-10-20 RX ORDER — SPIRONOLACTONE 25 MG/1
25 TABLET ORAL DAILY
Status: DISCONTINUED | OUTPATIENT
Start: 2020-10-20 | End: 2020-10-22

## 2020-10-20 RX ORDER — IPRATROPIUM BROMIDE AND ALBUTEROL SULFATE 2.5; .5 MG/3ML; MG/3ML
3 SOLUTION RESPIRATORY (INHALATION)
Status: DISCONTINUED | OUTPATIENT
Start: 2020-10-20 | End: 2020-10-20

## 2020-10-20 RX ORDER — ONDANSETRON 2 MG/ML
INJECTION INTRAMUSCULAR; INTRAVENOUS AS NEEDED
Status: DISCONTINUED | OUTPATIENT
Start: 2020-10-20 | End: 2020-10-20 | Stop reason: HOSPADM

## 2020-10-20 RX ORDER — MIDAZOLAM HYDROCHLORIDE 1 MG/ML
INJECTION, SOLUTION INTRAMUSCULAR; INTRAVENOUS AS NEEDED
Status: DISCONTINUED | OUTPATIENT
Start: 2020-10-20 | End: 2020-10-20 | Stop reason: HOSPADM

## 2020-10-20 RX ORDER — SODIUM CHLORIDE 0.9 % (FLUSH) 0.9 %
5-40 SYRINGE (ML) INJECTION AS NEEDED
Status: DISCONTINUED | OUTPATIENT
Start: 2020-10-20 | End: 2020-10-22 | Stop reason: HOSPADM

## 2020-10-20 RX ORDER — DEXTROSE 50 % IN WATER (D50W) INTRAVENOUS SYRINGE
12.5-25 AS NEEDED
Status: DISCONTINUED | OUTPATIENT
Start: 2020-10-20 | End: 2020-10-22 | Stop reason: HOSPADM

## 2020-10-20 RX ORDER — LANOLIN ALCOHOL/MO/W.PET/CERES
1 CREAM (GRAM) TOPICAL
Status: DISCONTINUED | OUTPATIENT
Start: 2020-10-21 | End: 2020-10-22 | Stop reason: HOSPADM

## 2020-10-20 RX ORDER — ONDANSETRON 2 MG/ML
4 INJECTION INTRAMUSCULAR; INTRAVENOUS
Status: DISCONTINUED | OUTPATIENT
Start: 2020-10-20 | End: 2020-10-22 | Stop reason: HOSPADM

## 2020-10-20 RX ORDER — MUPIROCIN 20 MG/G
OINTMENT TOPICAL 2 TIMES DAILY
Status: DISCONTINUED | OUTPATIENT
Start: 2020-10-20 | End: 2020-10-22 | Stop reason: HOSPADM

## 2020-10-20 RX ORDER — ALBUMIN HUMAN 50 G/1000ML
12.5 SOLUTION INTRAVENOUS
Status: DISCONTINUED | OUTPATIENT
Start: 2020-10-20 | End: 2020-10-22 | Stop reason: HOSPADM

## 2020-10-20 RX ORDER — FENOFIBRATE 48 MG/1
48 TABLET, COATED ORAL
Status: DISCONTINUED | OUTPATIENT
Start: 2020-10-20 | End: 2020-10-22 | Stop reason: HOSPADM

## 2020-10-20 RX ORDER — CHLORHEXIDINE GLUCONATE 1.2 MG/ML
10 RINSE ORAL EVERY 12 HOURS
Status: DISCONTINUED | OUTPATIENT
Start: 2020-10-20 | End: 2020-10-22 | Stop reason: HOSPADM

## 2020-10-20 RX ORDER — POTASSIUM CHLORIDE 29.8 MG/ML
20 INJECTION INTRAVENOUS
Status: ACTIVE | OUTPATIENT
Start: 2020-10-20 | End: 2020-10-21

## 2020-10-20 RX ORDER — HYDROMORPHONE HYDROCHLORIDE 1 MG/ML
1 INJECTION, SOLUTION INTRAMUSCULAR; INTRAVENOUS; SUBCUTANEOUS
Status: DISCONTINUED | OUTPATIENT
Start: 2020-10-20 | End: 2020-10-20

## 2020-10-20 RX ORDER — LANOLIN ALCOHOL/MO/W.PET/CERES
3 CREAM (GRAM) TOPICAL
Status: DISCONTINUED | OUTPATIENT
Start: 2020-10-20 | End: 2020-10-22 | Stop reason: HOSPADM

## 2020-10-20 RX ORDER — LEVOTHYROXINE SODIUM 75 UG/1
150 TABLET ORAL
Status: DISCONTINUED | OUTPATIENT
Start: 2020-10-21 | End: 2020-10-22 | Stop reason: HOSPADM

## 2020-10-20 RX ORDER — MAGNESIUM SULFATE 100 %
4 CRYSTALS MISCELLANEOUS AS NEEDED
Status: DISCONTINUED | OUTPATIENT
Start: 2020-10-20 | End: 2020-10-22 | Stop reason: HOSPADM

## 2020-10-20 RX ORDER — BACITRACIN 500 UNIT/G
1 PACKET (EA) TOPICAL AS NEEDED
Status: DISCONTINUED | OUTPATIENT
Start: 2020-10-20 | End: 2020-10-22 | Stop reason: HOSPADM

## 2020-10-20 RX ORDER — AMOXICILLIN 250 MG
1 CAPSULE ORAL 2 TIMES DAILY
Status: DISCONTINUED | OUTPATIENT
Start: 2020-10-21 | End: 2020-10-22 | Stop reason: HOSPADM

## 2020-10-20 RX ORDER — SODIUM CHLORIDE 0.9 % (FLUSH) 0.9 %
5-40 SYRINGE (ML) INJECTION EVERY 8 HOURS
Status: DISCONTINUED | OUTPATIENT
Start: 2020-10-20 | End: 2020-10-20

## 2020-10-20 RX ORDER — HYDROMORPHONE HYDROCHLORIDE 2 MG/ML
INJECTION, SOLUTION INTRAMUSCULAR; INTRAVENOUS; SUBCUTANEOUS AS NEEDED
Status: DISCONTINUED | OUTPATIENT
Start: 2020-10-20 | End: 2020-10-20 | Stop reason: HOSPADM

## 2020-10-20 RX ORDER — IPRATROPIUM BROMIDE 0.5 MG/2.5ML
0.5 SOLUTION RESPIRATORY (INHALATION)
Status: DISCONTINUED | OUTPATIENT
Start: 2020-10-20 | End: 2020-10-22 | Stop reason: HOSPADM

## 2020-10-20 RX ORDER — DIPHENHYDRAMINE HCL 25 MG
25 CAPSULE ORAL
Status: DISCONTINUED | OUTPATIENT
Start: 2020-10-20 | End: 2020-10-22 | Stop reason: HOSPADM

## 2020-10-20 RX ORDER — LANOLIN ALCOHOL/MO/W.PET/CERES
400 CREAM (GRAM) TOPICAL 2 TIMES DAILY
Status: DISCONTINUED | OUTPATIENT
Start: 2020-10-21 | End: 2020-10-22 | Stop reason: HOSPADM

## 2020-10-20 RX ORDER — SODIUM CHLORIDE 450 MG/100ML
10 INJECTION, SOLUTION INTRAVENOUS CONTINUOUS
Status: DISCONTINUED | OUTPATIENT
Start: 2020-10-20 | End: 2020-10-22 | Stop reason: HOSPADM

## 2020-10-20 RX ORDER — ACETAMINOPHEN 325 MG/1
650 TABLET ORAL EVERY 4 HOURS
Status: DISPENSED | OUTPATIENT
Start: 2020-10-20 | End: 2020-10-22

## 2020-10-20 RX ORDER — INSULIN GLARGINE 100 [IU]/ML
10 INJECTION, SOLUTION SUBCUTANEOUS DAILY
Status: DISCONTINUED | OUTPATIENT
Start: 2020-10-20 | End: 2020-10-22 | Stop reason: HOSPADM

## 2020-10-20 RX ORDER — GLYCOPYRROLATE 0.2 MG/ML
INJECTION INTRAMUSCULAR; INTRAVENOUS AS NEEDED
Status: DISCONTINUED | OUTPATIENT
Start: 2020-10-20 | End: 2020-10-20 | Stop reason: HOSPADM

## 2020-10-20 RX ORDER — MIDAZOLAM HYDROCHLORIDE 1 MG/ML
1 INJECTION, SOLUTION INTRAMUSCULAR; INTRAVENOUS
Status: DISCONTINUED | OUTPATIENT
Start: 2020-10-20 | End: 2020-10-22 | Stop reason: HOSPADM

## 2020-10-20 RX ORDER — SODIUM CHLORIDE 9 MG/ML
9 INJECTION, SOLUTION INTRAVENOUS CONTINUOUS
Status: DISCONTINUED | OUTPATIENT
Start: 2020-10-20 | End: 2020-10-22 | Stop reason: HOSPADM

## 2020-10-20 RX ORDER — GABAPENTIN 100 MG/1
100 CAPSULE ORAL
Status: DISCONTINUED | OUTPATIENT
Start: 2020-10-20 | End: 2020-10-22 | Stop reason: HOSPADM

## 2020-10-20 RX ORDER — HYDROMORPHONE HYDROCHLORIDE 1 MG/ML
0.5 INJECTION, SOLUTION INTRAMUSCULAR; INTRAVENOUS; SUBCUTANEOUS
Status: DISCONTINUED | OUTPATIENT
Start: 2020-10-20 | End: 2020-10-20

## 2020-10-20 RX ORDER — PROPOFOL 10 MG/ML
INJECTION, EMULSION INTRAVENOUS AS NEEDED
Status: DISCONTINUED | OUTPATIENT
Start: 2020-10-20 | End: 2020-10-20 | Stop reason: HOSPADM

## 2020-10-20 RX ADMIN — VANCOMYCIN HYDROCHLORIDE 1000 MG: 1 INJECTION, POWDER, LYOPHILIZED, FOR SOLUTION INTRAVENOUS at 22:03

## 2020-10-20 RX ADMIN — IPRATROPIUM BROMIDE 0.5 MG: 0.5 SOLUTION RESPIRATORY (INHALATION) at 13:28

## 2020-10-20 RX ADMIN — CHLORHEXIDINE GLUCONATE 10 ML: 1.2 RINSE ORAL at 22:01

## 2020-10-20 RX ADMIN — VANCOMYCIN HYDROCHLORIDE 1000 MG: 1 INJECTION, POWDER, LYOPHILIZED, FOR SOLUTION INTRAVENOUS at 13:38

## 2020-10-20 RX ADMIN — HYDROMORPHONE HYDROCHLORIDE 0.5 MG: 2 INJECTION, SOLUTION INTRAMUSCULAR; INTRAVENOUS; SUBCUTANEOUS at 09:34

## 2020-10-20 RX ADMIN — FAMOTIDINE 20 MG: 10 INJECTION INTRAVENOUS at 22:02

## 2020-10-20 RX ADMIN — ACETAMINOPHEN 650 MG: 325 TABLET ORAL at 22:01

## 2020-10-20 RX ADMIN — GABAPENTIN 100 MG: 100 CAPSULE ORAL at 21:00

## 2020-10-20 RX ADMIN — HYDROMORPHONE HYDROCHLORIDE 1 MG: 1 INJECTION, SOLUTION INTRAMUSCULAR; INTRAVENOUS; SUBCUTANEOUS at 13:30

## 2020-10-20 RX ADMIN — HYDROMORPHONE HYDROCHLORIDE 1 MG: 1 INJECTION, SOLUTION INTRAMUSCULAR; INTRAVENOUS; SUBCUTANEOUS at 11:32

## 2020-10-20 RX ADMIN — SODIUM CHLORIDE, SODIUM LACTATE, POTASSIUM CHLORIDE, AND CALCIUM CHLORIDE 25 ML/HR: 600; 310; 30; 20 INJECTION, SOLUTION INTRAVENOUS at 08:24

## 2020-10-20 RX ADMIN — FAMOTIDINE 20 MG: 10 INJECTION INTRAVENOUS at 11:27

## 2020-10-20 RX ADMIN — Medication 3 MG: at 10:30

## 2020-10-20 RX ADMIN — FENOFIBRATE 48 MG: 48 TABLET, FILM COATED ORAL at 22:19

## 2020-10-20 RX ADMIN — TRAMADOL HYDROCHLORIDE 50 MG: 50 TABLET ORAL at 22:09

## 2020-10-20 RX ADMIN — HYDROMORPHONE HYDROCHLORIDE 0.5 MG: 2 INJECTION, SOLUTION INTRAMUSCULAR; INTRAVENOUS; SUBCUTANEOUS at 09:06

## 2020-10-20 RX ADMIN — HYDROMORPHONE HYDROCHLORIDE 0.5 MG: 2 INJECTION, SOLUTION INTRAMUSCULAR; INTRAVENOUS; SUBCUTANEOUS at 09:22

## 2020-10-20 RX ADMIN — IPRATROPIUM BROMIDE 0.5 MG: 0.5 SOLUTION RESPIRATORY (INHALATION) at 19:05

## 2020-10-20 RX ADMIN — ROCURONIUM BROMIDE 10 MG: 10 INJECTION INTRAVENOUS at 09:22

## 2020-10-20 RX ADMIN — MIDAZOLAM HYDROCHLORIDE 2 MG: 1 INJECTION, SOLUTION INTRAMUSCULAR; INTRAVENOUS at 08:54

## 2020-10-20 RX ADMIN — ALBUMIN (HUMAN) 12.5 G: 12.5 INJECTION, SOLUTION INTRAVENOUS at 23:03

## 2020-10-20 RX ADMIN — MIDAZOLAM HYDROCHLORIDE 2 MG: 1 INJECTION, SOLUTION INTRAMUSCULAR; INTRAVENOUS at 09:05

## 2020-10-20 RX ADMIN — Medication 3 AMPULE: at 08:30

## 2020-10-20 RX ADMIN — MUPIROCIN: 20 OINTMENT TOPICAL at 14:16

## 2020-10-20 RX ADMIN — LIDOCAINE HYDROCHLORIDE 60 MG: 20 INJECTION, SOLUTION EPIDURAL; INFILTRATION; INTRACAUDAL; PERINEURAL at 09:22

## 2020-10-20 RX ADMIN — ONDANSETRON HYDROCHLORIDE 4 MG: 2 INJECTION, SOLUTION INTRAMUSCULAR; INTRAVENOUS at 10:22

## 2020-10-20 RX ADMIN — PROPOFOL 150 MG: 10 INJECTION, EMULSION INTRAVENOUS at 09:22

## 2020-10-20 RX ADMIN — Medication 10 ML: at 22:02

## 2020-10-20 RX ADMIN — SODIUM CHLORIDE, SODIUM LACTATE, POTASSIUM CHLORIDE, AND CALCIUM CHLORIDE 25 ML/HR: 600; 310; 30; 20 INJECTION, SOLUTION INTRAVENOUS at 11:05

## 2020-10-20 RX ADMIN — GLYCOPYRROLATE 0.4 MG: 0.2 INJECTION, SOLUTION INTRAMUSCULAR; INTRAVENOUS at 10:30

## 2020-10-20 RX ADMIN — HYDROMORPHONE HYDROCHLORIDE 0.5 MG: 2 INJECTION, SOLUTION INTRAMUSCULAR; INTRAVENOUS; SUBCUTANEOUS at 08:56

## 2020-10-20 RX ADMIN — CYANOCOBALAMIN 1000 MCG: 1000 INJECTION, SOLUTION INTRAMUSCULAR; SUBCUTANEOUS at 14:53

## 2020-10-20 RX ADMIN — SUCCINYLCHOLINE CHLORIDE 140 MG: 20 INJECTION, SOLUTION INTRAMUSCULAR; INTRAVENOUS at 09:22

## 2020-10-20 RX ADMIN — PREDNISONE 2.5 MG: 5 TABLET ORAL at 14:03

## 2020-10-20 RX ADMIN — SODIUM CHLORIDE 10 ML/HR: 450 INJECTION, SOLUTION INTRAVENOUS at 14:32

## 2020-10-20 RX ADMIN — GLYCOPYRROLATE 0.2 MG: 0.2 INJECTION, SOLUTION INTRAMUSCULAR; INTRAVENOUS at 10:01

## 2020-10-20 RX ADMIN — MUPIROCIN: 20 OINTMENT TOPICAL at 17:32

## 2020-10-20 RX ADMIN — Medication 10 ML: at 14:12

## 2020-10-20 RX ADMIN — VANCOMYCIN HYDROCHLORIDE 1000 MG: 1 INJECTION, POWDER, LYOPHILIZED, FOR SOLUTION INTRAVENOUS at 08:20

## 2020-10-20 RX ADMIN — ROCURONIUM BROMIDE 20 MG: 10 INJECTION INTRAVENOUS at 09:42

## 2020-10-20 NOTE — PROGRESS NOTES
[facility] Consult Service Progress NOTE  Pulmonary, Critical Care, and Sleep Medicine    Name: Meron Lawson MRN: [id number]   : 1950 Hospital: [address] [facility]   Date: 10/20/2020  Admission Date: 10/20/2020     Chart and notes reviewed. Data reviewed. Pt seen on rounds earlier today. I have evaluated and examined the patient. Pt is unstable and acutely ill in the CCU. Patient was re-evaluated multiple times with repeated discussions with CCU/ER team throughout the day    Hospital Day: 1    I was asked by Marika Smith MD to see Meron Lawson a 65 y.o.  female  in consultation for a chief complaint of chest pain following     Excerpts from admission or consult notes as follows:     \"Rabia Branch is a 65 y.o. female who was referred for cardiac evaluation by Dr. José Miguel Almodovar for possible SUHAS clip. The patient has had PAF for many years. She has fatigue, dizziness, and SOB when heart racing. Has had episode of syncope this year during afib. She notices these episodes 1-2 times a month. When the episodes increase in frequency she has her TSH checked and synthroid dose adjusted. She has a history of PAF s/p ablation by Dr. Jesus Chery about 10 years ago, CAD s/p PCI, hypertension, dyslipidemia, HFpEF and anemia, RA, TIA about 7 years ago, Asthma/COPD, hypothyroid, DM type II,  lyme disease -recently completed tx. She has an intolerance to coumadin and ASA -she has developed GIB and significant thrombocytopenia on these medications. She is a former smoker. She denies alcohol. She is retired and lives with her . She has a family history of stroke and heart disease. \"     Pt now in CCU. No SOB. Chest hurts. No nausea. IMPRESSION:   1. s/p post LAAL via LVAT's.   2. PAF: metoprolol, unable to tolerate anticoagulation. SUHAS clip per Dr. Janey Vasquez. 3. CAD s/p PCI: On metoprolol. No ASA/statin dt allergy  4.  HFpEF (NYHA class II): on bumex, eplerenone, metoprolol. Has seen Dr. Scott Hull. 5. HTN: Cont current meds  6. HLD: no statin dt allergy, on tricor. 7. Hx Iron deficiency anemia, b12 deficiency: Hgb 13 on most recent labs. On iron, vit C, vit B  8. GERD: small sliding HH prevacid. 9. RA: on prednisone -has been on prednisone for 2 years  10. DM: On lantus and januvia. 10. Hx TIA  11. H/o TIA  12. Former remote smoker  13. Asthma? RECOMMENDATIONS/PLAN:   1. Pain meds   2. meds reviewed  3. Will be available to assist in medical management while in the CCU pending disposition     [x] High complexity decision making was performed  [x] See my orders for details     PMH:  has a past medical history of Adverse effect of anesthesia, Arthritis, Asthma (6/29/2009), Atrial fibrillation ([address] (6/29/2009), CAD (coronary artery disease) (06/29/2009), Celiac disease (2010), Chronic obstructive pulmonary disease ([address] (2004-5), Chronic pain of right ankle, Diabetes ([address] Diastolic heart failure ([address] DVT (deep venous thrombosis) ([address] (2004), GERD (gastroesophageal reflux disease), Gluten intolerance, Heart failure ([address] Hypertension, Hypothyroidism (6/29/2009), Iron deficiency anemia, Lyme disease, Personal history of MI (myocardial infarction), Rheumatoid arthritis ([address] Slow to wake up after anesthesia, Syncope, TIA (transient ischemic attack) (2004 & 2006), and Vitamin B12 deficiency (6/29/2009). PSH:   has a past surgical history that includes hx coronary stent placement; hx colonoscopy; hx afib ablation; hx heart catheterization (2010); hx heart catheterization (03/2020); hx cholecystectomy (6/16/11); hx hysterectomy; hx lumbar laminectomy (2000); hx tonsillectomy (1964); hx adenoidectomy; hx appendectomy; hx orthopaedic (1993-6/2012); and hx orthopaedic (Right, 09/19/2020). FHX: family history includes Breast Cancer in her cousin, cousin, cousin, maternal aunt, maternal aunt, and maternal aunt;  Cancer in her father; Coronary Artery Disease in her mother; Delayed Awakening in her mother and sister; Heart Disease in her mother; Hypertension in her brother, father, mother, and sister; Lung Disease in her father; Stroke in her mother. SHX:  reports that she quit smoking about 18 years ago. Her smoking use included cigarettes. She has a 30.00 pack-year smoking history. She has never used smokeless tobacco. She reports that she does not drink alcohol or use drugs. ROS:A comprehensive review of systems was negative except for that written in the HPI. Hemodynamics:    CO:    CI:    CVP:    SVR:   PAP Systolic:    PAP Diastolic:    PVR:    NG78:        Ventilator Settings:      Mode Rate TV Press PEEP FiO2 PIP Min. Vent                              Vital Signs: Telemetry:    AFIB Intake/Output:   Visit Vitals  /69   Pulse 66   Temp 97.6 °F (36.4 °C)   Resp 20   Ht 5' 3\" (1.6 m)   Wt 78.8 kg (173 lb 11.6 oz)   LMP 1980 (LMP Unknown)   SpO2 99%   BMI 30.77 kg/m²       Temp (24hrs), Av.7 °F (36.5 °C), Min:97.6 °F (36.4 °C), Max:97.7 °F (36.5 °C)        O2 Device: (P) Nasal cannula O2 Flow Rate (L/min): (P) 4 l/min       Wt Readings from Last 4 Encounters:   10/20/20 78.8 kg (173 lb 11.6 oz)   10/15/20 79 kg (174 lb 2.6 oz)   10/15/20 80.4 kg (177 lb 4 oz)   20 79.7 kg (175 lb 9.6 oz)          Intake/Output Summary (Last 24 hours) at 10/20/2020 1207  Last data filed at 10/20/2020 1046  Gross per 24 hour   Intake 600 ml   Output 200 ml   Net 400 ml       Last shift:      10/20 0701 - 10/20 1900  In: 600 [I.V.:600]  Out: 200 [Urine:200]  Last 3 shifts: No intake/output data recorded. Physical Exam:     General:  female; in no respiratory distress and acyanotic;  NC;  HEENT: NCAT, poor dentition, lips and mucosa dry  Eyes: anicteric; conjunctiva clear  Neck: no nodes, no accessory MM use.   Chest: no deformity,   Cardiac: IR regular; no murmur;   Lungs: distant breath sounds; no wheezes  Abd: soft, NT, hypoactive BS  Ext: no edema; no joint swelling;  No clubbing  : sanchez, clear urine  Neuro: sedated; ;  Psych- no agitation,  unable to assess  Skin: warm, dry, no cyanosis;   Pulses: 1-2+ Bilateral pedal, radial  Capillary: brisk; pale      DATA:    MAR reviewed and pertinent medications noted or modified as needed  MEDS:   Current Facility-Administered Medications   Medication    ascorbic acid (vitamin C) (VITAMIN C) tablet 1,000 mg    bumetanide (BUMEX) tablet 1 mg    calcium citrate-vitamin D3 (CITROCAL) tablet 1 Tab    cholecalciferol (VITAMIN D3) (1000 Units /25 mcg) tablet 2 Tab    cyanocobalamin (VITAMIN B12) injection 1,000 mcg    spironolactone (ALDACTONE) tablet 25 mg    fenofibrate nanocrystallized (TRICOR) tablet 48 mg    [START ON 10/21/2020] ferrous sulfate tablet 325 mg    gabapentin (NEURONTIN) capsule 100 mg    insulin glargine (LANTUS) injection 10 Units    [START ON 10/21/2020] levothyroxine (SYNTHROID) tablet 150 mcg    metoprolol succinate (TOPROL-XL) XL tablet 50 mg    predniSONE (DELTASONE) tablet 2.5 mg    [START ON 10/21/2020] predniSONE (DELTASONE) tablet 5 mg    alteplase (CATHFLO) 1 mg in sterile water (preservative free) 1 mL injection    bacitracin 500 unit/gram packet 1 Packet    sodium chloride (NS) flush 5-40 mL    sodium chloride (NS) flush 5-40 mL    albumin human 5% (BUMINATE) solution 12.5 g    0.45% sodium chloride infusion    0.9% sodium chloride infusion    acetaminophen (TYLENOL) tablet 650 mg    naloxone (NARCAN) injection 0.4 mg    mupirocin (BACTROBAN) 2 % ointment    ondansetron (ZOFRAN) injection 4 mg    midazolam (VERSED) injection 1 mg    chlorhexidine (PERIDEX) 0.12 % mouthwash 10 mL    famotidine (PF) (PEPCID) 20 mg in 0.9% sodium chloride 10 mL injection    [START ON 10/21/2020] famotidine (PEPCID) tablet 20 mg    [START ON 10/21/2020] magnesium oxide (MAG-OX) tablet 400 mg    calcium chloride 1 g in 0.9% sodium chloride 250 mL IVPB    bisacodyL (DULCOLAX) suppository 10 mg    [START ON 10/21/2020] senna-docusate (PERICOLACE) 8.6-50 mg per tablet 1 Tab    [START ON 10/21/2020] polyethylene glycol (MIRALAX) packet 17 g    ELECTROLYTE REPLACEMENT NOTE: Nurse to review Serum Potassium and Magnesuim levels and Initiate Electrolyte Replacement Protocol as needed    magnesium sulfate 1 g/100 ml IVPB (premix or compounded)    glucose chewable tablet 16 g    dextrose (D50W) injection syrg 12.5-25 g    glucagon (GLUCAGEN) injection 1 mg    insulin lispro (HUMALOG) injection    vancomycin (VANCOCIN) 1,000 mg in 0.9% sodium chloride (MBP/ADV) 250 mL    diphenhydrAMINE (BENADRYL) capsule 25 mg    diphenhydrAMINE (BENADRYL) injection 25 mg    melatonin tablet 3 mg    HYDROmorphone (PF) (DILAUDID) injection 1 mg    HYDROmorphone (PF) (DILAUDID) injection 0.5 mg    potassium chloride 20 mEq in 50 ml IVPB    potassium chloride 20 mEq in 50 ml IVPB    traMADoL (ULTRAM) tablet  mg    bupivacaine (PF) (MARCAINE) 0.5 % (5 mg/mL) injection    ipratropium (ATROVENT) 0.02 % nebulizer solution 0.5 mg        Labs:    No results for input(s): WBC, HGB, PLT, INR, APTT, HGBEXT, PLTEXT, INREXT in the last 72 hours. No lab exists for component: FIB, DDMER  No results for input(s): NA, K, CL, CO2, GLU, BUN, CREA, CA, MG, PHOS, LAC, ALB, TBIL, ALT, AML, LPSE in the last 72 hours. No lab exists for component: SGOT  No results for input(s): PH, PCO2, PO2, HCO3, FIO2 in the last 72 hours. No results for input(s): CPK, CKNDX, TROIQ in the last 72 hours. No lab exists for component: CPKMB  Lab Results   Component Value Date/Time    BNP 9 12/03/2010 10:40 AM      Lab Results   Component Value Date/Time    Culture result: MRSA NOT PRESENT 10/15/2020 11:22 AM    Culture result:  10/15/2020 11:22 AM         Screening of patient nares for MRSA is for surveillance purposes and, if positive, to facilitate isolation considerations in high risk settings.  It is not intended for automatic decolonization interventions per se as regimens are not sufficiently effective to warrant routine use. Culture result: NO GROWTH 1 DAY 08/13/2019 08:28 AM     Lab Results   Component Value Date/Time    TSH 1.22 10/15/2020 11:22 AM        Imaging:    Results from [place] encounter on 10/20/20   XR CHEST PORT    Narrative EXAM:  XR CHEST PORT    INDICATION:  postop heart    COMPARISON:  10/15/2020    FINDINGS: A portable AP radiograph of the chest was obtained at 1120 hours. Left  IJ catheter tip overlies SVC. Helga Boop Left atrial clip is noted in position. Left  chest tube is noted in position. .  There is no pneumothorax. There is slight  left basilar atelectasis. .  Cardiomegaly is stable. .       Impression IMPRESSION: No pneumothorax. There is slight left basilar atelectasis. Results from Hospital Encounter encounter on 10/16/20   CTA CHEST W OR W WO CONT    Narrative INDICATION:  Paroxysmal atrial fibrillation  fu on CXR from 10/15, possible  mass? EXAM: CT Angio Chest:    COMPARISON: 3/1/2016 CTA PE study. TECHNIQUE: Unenhanced localizing CT imaging of the heart/pulmonary  arteries/veins is followed by bolus injection of 100 mL Isovue 370 contrast IV,  with thin section axial Chest CT obtained and 3D image post processing performed  including coronal MIPS. CT dose reduction was achieved through use of a  standardized protocol tailored for this examination and automatic exposure  control for dose modulation. FINDINGS:  Unenhanced images show coronary artery calcification, and a simple fluid  attenuation cyst on the posterior border of the left atrium at its junction with  the right inferior pulmonary vein. The cyst is smooth in contour, conforming to  surrounding structures, exerting no mass effect on adjacent left atrium and  pulmonary vein tributaries. Cyst shows no enhancement. Cyst is crescentic in shape and measures  approximately 2.9 x 2.1 x 1.5 cm.  Cyst is stable since 2016 PE study and  compatible with benign pericardial cyst.    There is no solid mediastinal mass and no significant adenopathy. Pulmonary veins show no thrombus or stenosis. There is no accessory pulmonary  vein. Left atrium is free of thrombus/mass. Pulmonary arteries show no thrombus. Aorta is mildly calcified and tortuous without aneurysm or dissection. There is  no pericardial effusion. Lungs are clear other than minor right base scarring. There is no pleural  effusion. There is a small sliding hiatal hernia. There is hepatic steatosis. Impression IMPRESSION:  1. Small uncomplicated pericardial cyst, stable since at least 2016.  2. No pulmonary venous or left atrial thrombus. 3. Coronary artery calcification. This care involved high complexity decision making which includes independently reviewing the patient's past medical records, current laboratory results, medication profiles that were immediately available to me and actual Xray images at the bedside in order to assess, support vital system function, and to treat this degree of vital organ system failure, and to prevent further life threatening deterioration of the patients condition. I was in direct communication with the nursing staff throughout this time.     Medical Decision Making Today  · Reviewed the flowsheet and previous days notes  · Reviewed and summarized records or history from previous days note or discussions with staff, family  · Parenteral controlled substances - Reviewed/ Adjusted / [place] / Started  · High Risk Drug therapy requiring intensive monitoring for toxicity: eg steroids, pressors, antibiotics  · Review and order of Clinical lab tests  · Review and Order of Radiology tests  · Review and Order of Medicine tests  · Independent visualization of radiologic Images  · Reviewed Ventilator / NiPPV  · I have personally reviewed the patients ECG / Telemetry  · Diagnostic endoscopies with identified risk factors    My assessment/management discussed with: Consultants, Nursing, Pharmacy, Case Management, PT, OT, Respiratory Therapy, Hospitalist and Family for coordination of care        Cielo Carty MD

## 2020-10-20 NOTE — PROGRESS NOTES
1130  Pt arrived from OR via bed. Pt drowsy but oriented and able to answer questions though weak vocal quality. Pt c/o 7/10 pain and is restless and holding incision site and moaning. 0.5mg dilaudid given with minimal relief. VSS. Additional 0.5mg dilaudid given. 1200  Pain level down to 4/10 and pt resting comfortably, but responds to voice. 1330  Pt repositioned in bed and pt c/o severe pain agin at incision sites. Pt given 1mg dilaudid for 7/10 pain. 1400  Spoke with FRANKLIN Richards regarding pt meds. Orders received to hold lantus and diuretics for now  1430  Attempted to give pt PO meds and sips of water but pt very drowsy and slow to respond to verbal command. Will keep NPO for now. VSS, but pt's name called multiple times prior to responding. Pt's  at bedside and states that when it comes to pain medicine, \"it doesn't take much for her\". Pt appears comfortable. Will continue to monitor  1530  Pt still very drowsy and slow to respond, but VSS.   80  Pt able to transfer to wheelchair with 2 person moderate assist.  Pt transferred to 2267 PCU and bedside report given

## 2020-10-20 NOTE — PROGRESS NOTES
ADULT PROTOCOL: JET AEROSOL ASSESSMENT    Patient  Skye Norton     65 y.o.   female     10/20/2020  7:17 PM    Breath Sounds Pre Procedure: Right Breath Sounds: Diminished                               Left Breath Sounds: Diminished    Breath Sounds Post Procedure: Right Breath Sounds: Diminished                                 Left Breath Sounds: Diminished    Breathing pattern: Pre procedure Breathing Pattern: Regular          Post procedure Breathing Pattern: Regular    Heart Rate: Pre procedure Pulse: 91           Post procedure Pulse: 93    Resp Rate: Pre procedure Respirations: 18           Post procedure Respirations: 17      Oxygen: O2 Device: Nasal cannula        Changed: NO    SpO2: Pre procedure SpO2: 95 %   with oxygen              Post procedure SpO2: 97 %  with oxygen    Nebulizer Therapy: Current medications Aerosolized Medications: Ipratropium bromide      Changed: NO q6 resp    Smoking History: former smoker    Problem List:   Patient Active Problem List   Diagnosis Code    Asthma J45.909    PAF (paroxysmal atrial fibrillation) ([facility]) I48.0    CAD (coronary artery disease) I25.10    Pernicious anemia D51.0    Esophageal reflux K21.9    Gluten intolerance K90.41    Iron deficiency anemia, unspecified D50.9    Hypothyroidism E03.9    Bony exostosis M89.8X9    Synovitis of ankle M65.9    Pes cavus Q66.70    HTN (hypertension), benign Q40    Diastolic dysfunction R48.45    Chest pain R07.9    Rheumatoid arthritis ([facility]) M06.9    Leukocytosis D72.829    Elevated blood sugar R73.9    Diabetes mellitus type 2, controlled ([facility]) E11.9    Coronary artery disease with stable angina pectoris ([facility]) E60.382    Diastolic heart failure ([facility]) I50.30    A-fib ([facility]) I48.91    Atrial fibrillation [facility]) I48.91       Respiratory Therapist: Diana Connor RT

## 2020-10-20 NOTE — PERIOP NOTES
0945 - Updated B. Summerlin about the start of procedure    1030 - Report given to CCU     TRANSFER - OUT REPORT:    Verbal report given to [facility] on Kaci Cardona  being transferred to CCU for routine post - op       Report consisted of patients Situation, Background, Assessment and   Recommendations(SBAR). Information from the following report(s) SBAR, OR Summary and Procedure Summary was reviewed with the receiving nurse. Lines:   Quad Lumen 10/20/20 Left (Active)       Peripheral IV 10/20/20 Anterior;Distal;Right Wrist (Active)   Site Assessment Clean, dry, & intact 10/20/20 0856   Phlebitis Assessment 0 10/20/20 0856   Infiltration Assessment 0 10/20/20 0856   Dressing Status Clean, dry, & intact 10/20/20 0856   Dressing Type Transparent 10/20/20 0856   Hub Color/Line Status Blue 10/20/20 0856       Arterial Line 10/20/20 Right Radial artery (Active)        Opportunity for questions and clarification was provided. Patient transported with:   Monitor  O2 @ 3 liters    1040 - Updated B. Summerlin about finishing the case

## 2020-10-20 NOTE — PROGRESS NOTES
Cardiac Surgery Care Coordinator- Called the  of Kat Alonso. Introduced role of the Cardiac Surgery Coordinator. Reviewed plan of care and day of surgery expectations. Provided family with an update from OR. Encouraged family to verbalize and emotional support given. Will continue to update throughout the day.  Lazaro Montoya RN

## 2020-10-20 NOTE — PROGRESS NOTES
Met with Yocasta Davis family and Dr. Metro Guardian. Update given. Encouraged family to verbalize and offered emotional support. Reinforced waiting room instructions. Family to wait in the main surgical waiting room until contacted by the nursing staff.  Itz Bunch RN

## 2020-10-20 NOTE — PROGRESS NOTES
[facility] P[facility] #0 Clinical Update    Surgeon: Janis Dawn MD    Procedure: Ruby Cheese via LVATS    Complications: none    Acute issues: none. Nursing issues addressed. Vitals:  Blood pressure 116/69, pulse 66, temperature 97.6 °F (36.4 °C), resp. rate 20, height 5' 3\" (1.6 m), weight 173 lb 11.6 oz (78.8 kg), last menstrual period 1980, SpO2 99 %. Temp (24hrs), Av.7 °F (36.5 °C), Min:97.6 °F (36.4 °C), Max:97.7 °F (36.5 °C)    Pressors/Inotropes: none    EXAM:  General:   NAD, resting comfortably                                                                                           Lungs:   Clear to auscultation bilaterally. Incision:  dsg CDI   Heart:  Regular rate and rhythm, S1, S2 normal, no murmur, click, rub or gallop. Abdomen:   Soft, non-tender. Bowel sounds normal. No masses,  No organomegaly. Extremities:  No edema. PPP. Neurologic:  Gross motor and sensory apparatus intact. CXR:   CXR Results  (Last 48 hours)               10/20/20 1141  XR CHEST PORT Final result    Impression:  IMPRESSION: No pneumothorax. There is slight left basilar atelectasis. Narrative:  EXAM:  XR CHEST PORT       INDICATION:  postop heart       COMPARISON:  10/15/2020       FINDINGS: A portable AP radiograph of the chest was obtained at 1120 hours. Left   IJ catheter tip overlies SVC. Ila Armstrong Left atrial clip is noted in position. Left   chest tube is noted in position. .  There is no pneumothorax. There is slight   left basilar atelectasis. .  Cardiomegaly is stable. Ila Armstrong Rhythm: sinus bradycardia    Recent Labs     10/20/20  0815   GLUCPOC 113*       Assessment & Plan:  Chest tube output minimal, no air leak, patient is resting comfortably. Okay to remove art line  Pain controlled with dilaudid IV  Stable and progressing well. Will transfer to stepdown pending bed availability.     Malorie Allison PA-C  11:58 AM 10/20/20

## 2020-10-20 NOTE — ANESTHESIA PROCEDURE NOTES
COY        Procedure Details: probe placement, image aquisition & interpretation    Risks and benefits discussed with the patient and plans are to proceed    Procedure Note    Performed by: Alphonso Evangelista MD  Authorized by: Alphonso Evangelista MD       Indications: assessment of surgical repair and suspected pericardial effusion  Modalities: 2D, CF, CWD, PWD  Probe Type: multiplane  Insertion: atraumatic  Patient Status: intubated and sedated    Echocardiographic and Doppler Measurements   Aorta  Size  Diam(cm)  Dissection PlaqueThick(mm)  Plaque Mobile    Ascending normal  No 0-3 No    Arch normal  No 0-3 No    Descending normal  No 0-3 No          Valves  Annulus  Stenosis  Area/Grad  Regurg  Leaflet   Morph  Leaflet   Motion    Aortic normal none  0 normal normal    Mitral normal none  2+ normal normal    Tricuspid normal none  2+ normal normal          Atria  Size  SEC (smoke)  Thrombus  Tumor  Device    Rt Atrium normal No No No No    Lt Atrium dilated No No No No     Interatrial Septum Morphology: normal    Interventricular Septum Morphology: normal    Ventricle  Cavity Size  Cavity Dimension Hypertrophy  Thrombus  Gloal FXN  EF    RV normal  No no normal     LV normal   No normal 55%       Regional Function  (1 = normal, 2 = mildly hypokinetic, 3 = severely hypokinetic, 4 = akinetic, 5 = dyskinetic) LAV - [place]   ME LAV = 0  ME LAV = 90  ME LAV = 130   Basal Sept:1 Basal Ant:1 Basal Post:1   Mid Sept:1 Mid Ant:1 Mid Post:1   Apical Sept:1 Apical Ant:1 Basal Ant Sept:1   Basal Lat:1 Basal Inf:1 Mid Ant Sept:1   Mid Lat:1 Mid Inf:1    Apical Lat:1 Apical Inf:1        Pericardium: normal  Effusion: moderate    Post Intervention Follow-up Study  Ventricular Global Function: unchanged  Ventricular Regional Function: unchanged     Valve  Function  Regurgitation  Area    Aortic no change      Mitral no change      Tricuspid no change      Prosthetic        Complications: None  Comments: Pre:  SUHAS with no clot.  Moderator band seen in RV. Post: Complete occlusion of SUHAS with no flow seen. No pericardial effusion. All findings were discussed in detail with Dr. Monroe Dudley.

## 2020-10-20 NOTE — OP NOTES
[address]  OPERATIVE REPORT    Name:  Amol Romero  MR#:  [id number]  :  1950  ACCOUNT #:  [de-identified]  DATE OF SERVICE:  10/20/2020    PREOPERATIVE DIAGNOSES:  1. Longstanding persistent atrial fibrillation. 2.  Inability to tolerate anticoagulation due to gastrointestinal bleeds and thrombocytopenia. 3.  Coronary artery disease, status post percutaneous coronary intervention. 4.  Hypertension. 5.  Dyslipidemia. 6.  Rheumatoid arthritis. 7.  History of transient ischemic attack. 8.  Small-to-moderate size posterior pericardial effusion. POSTOPERATIVE DIAGNOSES:  1. Longstanding persistent atrial fibrillation. 2.  Inability to tolerate anticoagulation due to gastrointestinal bleeds and thrombocytopenia. 3.  Coronary artery disease, status post percutaneous coronary intervention. 4.  Hypertension. 5.  Dyslipidemia. 6.  Rheumatoid arthritis. 7.  History of transient ischemic attack. 8.  Small-to-moderate size posterior pericardial effusion. PROCEDURE PERFORMED:  1.  Video-assisted thoracoscopic drainage of pericardial effusion. 2.  Video-assisted thoracoscopic placement of left atrial appendage clip. SURGEON:  Aline Akers MD    ASSISTANT:  Luke Dominguez PA-C. The assistance of a PA was required due to the complex nature of the surgery and the experience of the PA. ANESTHESIA:  General endotracheal anesthesia. ANESTHESIOLOGIST:  Jus Chung MD    COMPLICATIONS:  None. SPECIMENS REMOVED:  None. IMPLANTS:  #45 AtriCure left atrial appendage clip. ESTIMATED BLOOD LOSS:  5 mL. DETAILS:  The patient is a 66-year-old woman who has had a longstanding history of atrial fibrillation. She has history of thrombocytopenia and GI bleeds so she has been unable to tolerate anticoagulation. She was referred for a left atrial appendage ligation by left atrial appendage clip. PROCEDURE:  She was prepped and draped in sterile fashion.   A time-out was performed. The right lung was isolated and the left lung was deflated. We placed three ports in the third, sixth, and eighth intercostal space. We were able to visualize the phrenic nerve, the hilum of the lung, and the collapsed lung. We opened the pericardium with electrocautery safely. We evacuated a moderate amount of pericardial fluid. I then used a Kittner to elevate the pericardium and we clearly observed the left atrial appendage. I then incised the appendage for a size-45 clip. We then brought the clip through the inferior trocar. We angled this down and placed it securely over the appendage. We took care to negotiate all lobes of the appendage into the clip. We then confirmed closure based on COY guidance. We then fired the clip. We carefully withdrew the clip lovelace. We then placed a single Elliot drain in the left chest.  We then removed the each of the trocars and sutured to close the perforation of the skin. The patient was then extubated in the operating room and transported safely to the CCU.         MD DAVE Self/[id number]  D:  10/20/2020 13:22  T:  10/20/2020 16:55  JOB #:  [id number]

## 2020-10-20 NOTE — ANESTHESIA PROCEDURE NOTES
Central Line Placement    Start time: 10/20/2020 8:47 AM  End time: 10/20/2020 9:09 AM  Performed by: Josse Jesus MD  Authorized by: Josse Jesus MD     Indications: vascular access and need for vasopressors  Preanesthetic Checklist: patient identified, risks and benefits discussed, anesthesia consent, patient being monitored and timeout performed      Pre-procedure: All elements of maximal sterile barrier technique followed?  Yes    2% Chlorhexidine for cutaneous antisepsis, Hand hygiene performed prior to catheter insertion and Ultrasound guidance    Sterile Ultrasound Technique followed?: Yes            Procedure:   Prep:  Chlorhexidine    Orientation:  Left  Patient position:  Trendelenburg  Catheter type:  Quad lumen  Catheter size:  8.5 Fr  Catheter length:  16 cm  Number of attempts:  2  Successful placement: Yes      Assessment:   Post-procedure:  Catheter secured and sterile dressing applied  Assessment:  Blood return through all ports  Insertion:  Uncomplicated  Patient tolerance:  Patient tolerated the procedure well with no immediate complications

## 2020-10-20 NOTE — ANESTHESIA PREPROCEDURE EVALUATION
Relevant Problems   No relevant active problems       Anesthetic History   No history of anesthetic complications            Review of Systems / Medical History  Patient summary reviewed, nursing notes reviewed and pertinent labs reviewed    Pulmonary            Asthma        Neuro/Psych         TIA ( speech no residual)     Cardiovascular    Hypertension        Dysrhythmias : atrial fibrillation  Past MI, CAD and cardiac stents      Comments: Metoprolol last night   GI/Hepatic/Renal     GERD           Endo/Other    Diabetes  Hypothyroidism  Arthritis     Other Findings   Comments: Patient recently fell and injured right shoulder and right knee. Cannot move right arm much due to shoulder pain . Patient also states that she had foot drop too from the injury but that resolved.  She states tht she was evaluated for this trauma and nothing was broken           Physical Exam    Airway  Mallampati: II  TM Distance: 4 - 6 cm  Neck ROM: normal range of motion   Mouth opening: Normal     Cardiovascular  Regular rate and rhythm,  S1 and S2 normal,  no murmur, click, rub, or gallop  Rhythm: regular  Rate: normal         Dental  No notable dental hx       Pulmonary  Breath sounds clear to auscultation               Abdominal  GI exam deferred       Other Findings            Anesthetic Plan    ASA: 4  Anesthesia type: general    Monitoring Plan: Arterial line and COY      Induction: Intravenous  Anesthetic plan and risks discussed with: Patient

## 2020-10-20 NOTE — ANESTHESIA PROCEDURE NOTES
Arterial Line Placement    Start time: 10/20/2020 8:44 AM  End time: 10/20/2020 8:51 AM  Performed by: River Mendez CRNA  Authorized by: Grover Baker MD     Pre-Procedure  Indications:  Arterial pressure monitoring and blood sampling  Preanesthetic Checklist: patient identified, risks and benefits discussed, patient being monitored, timeout performed and patient being monitored      Procedure:   Prep:  Chlorhexidine  Seldinger Technique?: Yes    Orientation:  Right  Location:  Radial artery  Catheter size:  20 G  Number of attempts:  1  Cont Cardiac Output Sensor: No      Assessment:   Post-procedure:  Line secured and sterile dressing applied  Patient Tolerance:  Patient tolerated the procedure well with no immediate complications

## 2020-10-20 NOTE — PERIOP NOTES
0755 - PT'S COVID TEST RESULTED  PT STATS HAS QUARANTINED SINCE TESTING. PT DENIES S/S OF COVID - NO FEVER, COLD, COUGH, SOB, N/V, DIARRHEA. ..... PRE-OP TCHING DONE - PT VERBALIZES UNDERSTANDING.    7917 -  TIMEOUT DONE - DR. GIRON AT BEDSIDE TO PLACE TANIA AND CENTRAL LINE. PT NATI WELL.  VSS.

## 2020-10-20 NOTE — BRIEF OP NOTE
BRIEF OP NOTE  Pre-Op Diagnosis: a fib    Post-Op Diagnosis: a fib      Procedure:  LEFT VIDEO ASSISTED THORACOSCOPIC LEFT ATRIAL APPENDAGE LIGATION    Surgeon: Jean-Paul Bustillo MD    Assistant(s): Kieth Terry PA-C    Anesthesia: General     Estimated Blood Loss:  10cc    Specimens: * No specimens in log *    Drains: 1 archana drain    Complications:  none    Findings: AFib    Implants:   Implant Name Type Inv.  Item Serial No.  Lot No. LRB No. Used Action   Atricure Atriclip   N/A [employer] [id number] Left 1 Implanted

## 2020-10-20 NOTE — ANESTHESIA POSTPROCEDURE EVALUATION
Post-Anesthesia Evaluation and Assessment    Patient: Danae Davison MRN: [id number]  SSN: [id number]    YOB: 1950  Age: 65 y.o. Sex: female       Cardiovascular Function/Vital Signs  Visit Vitals  /69   Pulse 66   Temp 36.4 °C (97.6 °F)   Resp 20   Ht 5' 3\" (1.6 m)   Wt 78.8 kg (173 lb 11.6 oz)   SpO2 99%   BMI 30.77 kg/m²       Patient is status post General anesthesia for Procedure(s):  VIDEO ASSISTED THORACOSCOPIC LEFT ATRIAL APPENDAGE LIGATION. Nausea/Vomiting: None    Postoperative hydration reviewed and adequate. Pain:  Pain Scale 1: Numeric (0 - 10) (10/20/20 1124)  Pain Intensity 1: 7 (10/20/20 1124)   Managed    Neurological Status:   Neuro (WDL): Within Defined Limits (10/20/20 0755)  Neuro  Neurologic State: Drowsy (10/20/20 1143)   At baseline    Mental Status and Level of Consciousness: Alert and oriented to person, place, and time    Pulmonary Status:   O2 Device: Nasal cannula (10/20/20 1143)   Adequate oxygenation and airway patent    Complications related to anesthesia: None    Post-anesthesia assessment completed. No concerns    Signed By: Reuben Son MD     October 20, 2020              Procedure(s):  VIDEO ASSISTED THORACOSCOPIC LEFT ATRIAL APPENDAGE LIGATION. general    <BSHSIANPOST>    INITIAL Post-op Vital signs:   Vitals Value Taken Time   BP     Temp     Pulse 67 10/20/2020 12:41 PM   Resp 10 10/20/2020 12:41 PM   SpO2     Vitals shown include unvalidated device data.

## 2020-10-20 NOTE — PROGRESS NOTES
Care Management:    JULES: Discharge home with ,  will transport. Follow up with physician. Reason for Admission:   A-fib and post LAAL via LVAT's. She is recovering in the ICU and  at bedside                RUR Score:    23     PCP: First and Last name: Kennsia Mcfadden   Name of Practice:   Are you a current patient: Yes   Approximate date of last visit:    Can you do a virtual visit with your PCP:  YES             Resources/supports as identified by patient/family:    , DTR and son. Top Challenges facing patient (as identified by patient/family and CM): Finances/Medication cost?   Not an issue at this time, she has her insurance and secondary. Transportation?                        Living arrangements? Lives with her  in a single story home. Self-care/ADL's/Cognition? Alert and oriented and independent with ADL's. Current Advanced Directive/Advance Care Plan:                          Full Code and  is NOK. Plan for utilizing home health:                   Not anticipated. I met with  at bedside and was able to confirm demographics. Patient is active with her PCP. She uses [facility] or [facility] in [place] for pharmacy needs. She can drive but does not do so very often,  takes her on her errands and to her appointments. She has a walker and a BSC at home. Care Management Interventions  PCP Verified by CM: Yes  Mode of Transport at Discharge: Other (see comment)()  Physical Therapy Consult: Yes  Occupational Therapy Consult: Yes  Current Support Network: Lives with Spouse(Lives with her  in a single story home . Patient is independent with ADL's . She can drive but does not very often.  DME  walker  BSC  .)  Confirm Follow Up Transport: Family()  Discharge Location  Discharge Placement: (Home with  and follow up with physicians. ) Chart reviewed and we will cont to follow for discharge needs as appropriate.      Jeremy Valentino RN ACM [id number]

## 2020-10-20 NOTE — PROGRESS NOTES
1700 TRANSFER - IN REPORT:    Bedside report received from Naty Mays [facility] (name) on Danyel Watson  being received from CCU (unit) for routine progression of care      Report consisted of patients Situation, Background, Assessment and   Recommendations(SBAR). Information from the following report(s) SBAR, Kardex, Intake/Output, MAR, Recent Results and Cardiac Rhythm NSR was reviewed with the receiving nurse. Opportunity for questions and clarification was provided. Assessment completed upon patients arrival to unit and care assumed. Primary Nurse Angel Jin and Radha Sparks RN performed a dual skin assessment on this patient. No impairment noted. Saúl score is 13.    1815 Pt resting quietly in bed at this time. Held insulin and all PO meds as pt is still very drowsy. Pt responds to voice but falls back asleep quickly. VSS. Will continue to monitor closely. 1900 Bedside shift change report given to Marsha Regan RN (oncoming nurse) by Javi Page RN (offgoing nurse). Report included the following information SBAR, Kardex, Intake/Output, MAR, Recent Results and Cardiac Rhythm NSR.

## 2020-10-21 ENCOUNTER — APPOINTMENT (OUTPATIENT)
Dept: GENERAL RADIOLOGY | Age: 70
DRG: 271 | End: 2020-10-21
Attending: PHYSICIAN ASSISTANT
Payer: MEDICARE

## 2020-10-21 LAB
ALBUMIN SERPL-MCNC: 3.1 G/DL (ref 3.5–5)
ALBUMIN/GLOB SERPL: 1.1 {RATIO} (ref 1.1–2.2)
ALP SERPL-CCNC: 59 U/L (ref 45–117)
ALT SERPL-CCNC: 24 U/L (ref 12–78)
ANION GAP SERPL CALC-SCNC: 5 MMOL/L (ref 5–15)
AST SERPL-CCNC: 19 U/L (ref 15–37)
BILIRUB SERPL-MCNC: 0.7 MG/DL (ref 0.2–1)
BUN SERPL-MCNC: 18 MG/DL (ref 6–20)
BUN/CREAT SERPL: 24 (ref 12–20)
CALCIUM SERPL-MCNC: 7.4 MG/DL (ref 8.5–10.1)
CHLORIDE SERPL-SCNC: 110 MMOL/L (ref 97–108)
CO2 SERPL-SCNC: 27 MMOL/L (ref 21–32)
CREAT SERPL-MCNC: 0.75 MG/DL (ref 0.55–1.02)
ERYTHROCYTE [DISTWIDTH] IN BLOOD BY AUTOMATED COUNT: 13.6 % (ref 11.5–14.5)
GLOBULIN SER CALC-MCNC: 2.7 G/DL (ref 2–4)
GLUCOSE BLD STRIP.AUTO-MCNC: 151 MG/DL (ref 65–100)
GLUCOSE BLD STRIP.AUTO-MCNC: 155 MG/DL (ref 65–100)
GLUCOSE BLD STRIP.AUTO-MCNC: 213 MG/DL (ref 65–100)
GLUCOSE BLD STRIP.AUTO-MCNC: 228 MG/DL (ref 65–100)
GLUCOSE SERPL-MCNC: 195 MG/DL (ref 65–100)
HCT VFR BLD AUTO: 38.4 % (ref 35–47)
HGB BLD-MCNC: 12 G/DL (ref 11.5–16)
MAGNESIUM SERPL-MCNC: 1.8 MG/DL (ref 1.6–2.4)
MCH RBC QN AUTO: 30.8 PG (ref 26–34)
MCHC RBC AUTO-ENTMCNC: 31.3 G/DL (ref 30–36.5)
MCV RBC AUTO: 98.7 FL (ref 80–99)
NRBC # BLD: 0 K/UL (ref 0–0.01)
NRBC BLD-RTO: 0 PER 100 WBC
PLATELET # BLD AUTO: 231 K/UL (ref 150–400)
PMV BLD AUTO: 10.6 FL (ref 8.9–12.9)
POTASSIUM SERPL-SCNC: 3.8 MMOL/L (ref 3.5–5.1)
PROT SERPL-MCNC: 5.8 G/DL (ref 6.4–8.2)
RBC # BLD AUTO: 3.89 M/UL (ref 3.8–5.2)
SERVICE CMNT-IMP: ABNORMAL
SODIUM SERPL-SCNC: 142 MMOL/L (ref 136–145)
WBC # BLD AUTO: 23.7 K/UL (ref 3.6–11)

## 2020-10-21 PROCEDURE — 74011250637 HC RX REV CODE- 250/637: Performed by: PHYSICIAN ASSISTANT

## 2020-10-21 PROCEDURE — 71045 X-RAY EXAM CHEST 1 VIEW: CPT

## 2020-10-21 PROCEDURE — 77030013140 HC MSK NEB VYRM -A

## 2020-10-21 PROCEDURE — P9045 ALBUMIN (HUMAN), 5%, 250 ML: HCPCS | Performed by: PHYSICIAN ASSISTANT

## 2020-10-21 PROCEDURE — 65660000000 HC RM CCU STEPDOWN

## 2020-10-21 PROCEDURE — 83735 ASSAY OF MAGNESIUM: CPT

## 2020-10-21 PROCEDURE — 2709999900 HC NON-CHARGEABLE SUPPLY

## 2020-10-21 PROCEDURE — 97530 THERAPEUTIC ACTIVITIES: CPT

## 2020-10-21 PROCEDURE — 97116 GAIT TRAINING THERAPY: CPT

## 2020-10-21 PROCEDURE — 36415 COLL VENOUS BLD VENIPUNCTURE: CPT

## 2020-10-21 PROCEDURE — 74011000250 HC RX REV CODE- 250: Performed by: THORACIC SURGERY (CARDIOTHORACIC VASCULAR SURGERY)

## 2020-10-21 PROCEDURE — 82962 GLUCOSE BLOOD TEST: CPT

## 2020-10-21 PROCEDURE — 74011250636 HC RX REV CODE- 250/636: Performed by: PHYSICIAN ASSISTANT

## 2020-10-21 PROCEDURE — 51798 US URINE CAPACITY MEASURE: CPT

## 2020-10-21 PROCEDURE — 97535 SELF CARE MNGMENT TRAINING: CPT

## 2020-10-21 PROCEDURE — 80053 COMPREHEN METABOLIC PANEL: CPT

## 2020-10-21 PROCEDURE — 97165 OT EVAL LOW COMPLEX 30 MIN: CPT | Performed by: OCCUPATIONAL THERAPIST

## 2020-10-21 PROCEDURE — 77030019905 HC CATH URETH INTMIT MDII -A

## 2020-10-21 PROCEDURE — 77010033678 HC OXYGEN DAILY

## 2020-10-21 PROCEDURE — 94640 AIRWAY INHALATION TREATMENT: CPT

## 2020-10-21 PROCEDURE — 97162 PT EVAL MOD COMPLEX 30 MIN: CPT

## 2020-10-21 PROCEDURE — 85027 COMPLETE CBC AUTOMATED: CPT

## 2020-10-21 PROCEDURE — 74011636637 HC RX REV CODE- 636/637: Performed by: PHYSICIAN ASSISTANT

## 2020-10-21 PROCEDURE — 74011000250 HC RX REV CODE- 250: Performed by: PHYSICIAN ASSISTANT

## 2020-10-21 RX ORDER — METOPROLOL SUCCINATE 25 MG/1
25 TABLET, EXTENDED RELEASE ORAL
Status: DISCONTINUED | OUTPATIENT
Start: 2020-10-21 | End: 2020-10-22 | Stop reason: HOSPADM

## 2020-10-21 RX ORDER — ALBUMIN HUMAN 50 G/1000ML
12.5 SOLUTION INTRAVENOUS ONCE
Status: COMPLETED | OUTPATIENT
Start: 2020-10-21 | End: 2020-10-21

## 2020-10-21 RX ADMIN — MUPIROCIN: 20 OINTMENT TOPICAL at 17:42

## 2020-10-21 RX ADMIN — INSULIN LISPRO 2 UNITS: 100 INJECTION, SOLUTION INTRAVENOUS; SUBCUTANEOUS at 17:42

## 2020-10-21 RX ADMIN — PREDNISONE 2.5 MG: 5 TABLET ORAL at 13:28

## 2020-10-21 RX ADMIN — Medication 2 TABLET: at 09:18

## 2020-10-21 RX ADMIN — MAGNESIUM OXIDE 400 MG (241.3 MG MAGNESIUM) TABLET 400 MG: TABLET at 17:42

## 2020-10-21 RX ADMIN — CHLORHEXIDINE GLUCONATE 10 ML: 1.2 RINSE ORAL at 21:26

## 2020-10-21 RX ADMIN — DOCUSATE SODIUM 50MG AND SENNOSIDES 8.6MG 1 TABLET: 8.6; 5 TABLET, FILM COATED ORAL at 17:42

## 2020-10-21 RX ADMIN — Medication 10 ML: at 07:05

## 2020-10-21 RX ADMIN — INSULIN LISPRO 3 UNITS: 100 INJECTION, SOLUTION INTRAVENOUS; SUBCUTANEOUS at 13:31

## 2020-10-21 RX ADMIN — DOCUSATE SODIUM 50MG AND SENNOSIDES 8.6MG 1 TABLET: 8.6; 5 TABLET, FILM COATED ORAL at 09:18

## 2020-10-21 RX ADMIN — IPRATROPIUM BROMIDE 0.5 MG: 0.5 SOLUTION RESPIRATORY (INHALATION) at 19:31

## 2020-10-21 RX ADMIN — CHLORHEXIDINE GLUCONATE 10 ML: 1.2 RINSE ORAL at 09:21

## 2020-10-21 RX ADMIN — ALBUMIN (HUMAN) 12.5 G: 12.5 INJECTION, SOLUTION INTRAVENOUS at 09:16

## 2020-10-21 RX ADMIN — TRAMADOL HYDROCHLORIDE 50 MG: 50 TABLET ORAL at 22:38

## 2020-10-21 RX ADMIN — ALBUMIN (HUMAN) 12.5 G: 12.5 INJECTION, SOLUTION INTRAVENOUS at 02:00

## 2020-10-21 RX ADMIN — FERROUS SULFATE TAB 325 MG (65 MG ELEMENTAL FE) 325 MG: 325 (65 FE) TAB at 09:18

## 2020-10-21 RX ADMIN — TRAMADOL HYDROCHLORIDE 50 MG: 50 TABLET ORAL at 09:19

## 2020-10-21 RX ADMIN — TRAMADOL HYDROCHLORIDE 50 MG: 50 TABLET ORAL at 03:58

## 2020-10-21 RX ADMIN — TRAMADOL HYDROCHLORIDE 50 MG: 50 TABLET ORAL at 13:28

## 2020-10-21 RX ADMIN — Medication 10 ML: at 15:11

## 2020-10-21 RX ADMIN — FAMOTIDINE 20 MG: 20 TABLET, FILM COATED ORAL at 21:25

## 2020-10-21 RX ADMIN — PREDNISONE 5 MG: 5 TABLET ORAL at 09:18

## 2020-10-21 RX ADMIN — IPRATROPIUM BROMIDE 0.5 MG: 0.5 SOLUTION RESPIRATORY (INHALATION) at 14:19

## 2020-10-21 RX ADMIN — FENOFIBRATE 48 MG: 48 TABLET, FILM COATED ORAL at 21:25

## 2020-10-21 RX ADMIN — ACETAMINOPHEN 650 MG: 325 TABLET ORAL at 09:18

## 2020-10-21 RX ADMIN — VANCOMYCIN HYDROCHLORIDE 1000 MG: 1 INJECTION, POWDER, LYOPHILIZED, FOR SOLUTION INTRAVENOUS at 21:25

## 2020-10-21 RX ADMIN — MUPIROCIN: 20 OINTMENT TOPICAL at 09:21

## 2020-10-21 RX ADMIN — OXYCODONE HYDROCHLORIDE AND ACETAMINOPHEN 1000 MG: 500 TABLET ORAL at 09:18

## 2020-10-21 RX ADMIN — LEVOTHYROXINE SODIUM 150 MCG: 0.1 TABLET ORAL at 07:05

## 2020-10-21 RX ADMIN — IPRATROPIUM BROMIDE 0.5 MG: 0.5 SOLUTION RESPIRATORY (INHALATION) at 09:25

## 2020-10-21 RX ADMIN — INSULIN GLARGINE 10 UNITS: 100 INJECTION, SOLUTION SUBCUTANEOUS at 09:22

## 2020-10-21 RX ADMIN — ACETAMINOPHEN 650 MG: 325 TABLET ORAL at 13:28

## 2020-10-21 RX ADMIN — GABAPENTIN 100 MG: 100 CAPSULE ORAL at 21:25

## 2020-10-21 RX ADMIN — ACETAMINOPHEN 650 MG: 325 TABLET ORAL at 03:00

## 2020-10-21 RX ADMIN — TRAMADOL HYDROCHLORIDE 50 MG: 50 TABLET ORAL at 17:42

## 2020-10-21 RX ADMIN — Medication 1 TABLET: at 09:18

## 2020-10-21 RX ADMIN — POLYETHYLENE GLYCOL 3350 17 G: 17 POWDER, FOR SOLUTION ORAL at 09:19

## 2020-10-21 RX ADMIN — VANCOMYCIN HYDROCHLORIDE 1000 MG: 1 INJECTION, POWDER, LYOPHILIZED, FOR SOLUTION INTRAVENOUS at 09:17

## 2020-10-21 RX ADMIN — IPRATROPIUM BROMIDE 0.5 MG: 0.5 SOLUTION RESPIRATORY (INHALATION) at 01:11

## 2020-10-21 RX ADMIN — ACETAMINOPHEN 650 MG: 325 TABLET ORAL at 21:25

## 2020-10-21 RX ADMIN — ACETAMINOPHEN 650 MG: 325 TABLET ORAL at 17:42

## 2020-10-21 RX ADMIN — INSULIN LISPRO 3 UNITS: 100 INJECTION, SOLUTION INTRAVENOUS; SUBCUTANEOUS at 10:25

## 2020-10-21 RX ADMIN — Medication 1 TABLET: at 17:42

## 2020-10-21 RX ADMIN — FAMOTIDINE 20 MG: 20 TABLET, FILM COATED ORAL at 07:05

## 2020-10-21 RX ADMIN — Medication 10 ML: at 21:25

## 2020-10-21 NOTE — PROGRESS NOTES
Problem: Self Care Deficits Care Plan (Adult)  Goal: *Acute Goals and Plan of Care (Insert Text)  Description:   FUNCTIONAL STATUS PRIOR TO ADMISSION: Pt was independent w/ all ADLs prior to admission. Pt's  does help w/ IADL because of pt's recent fall causing limited ROM in RUE, CMC brace on R hand, and pain in R leg. Pt was not driving, pt's  drives. HOME SUPPORT: Pt lives w/  in 1 story home and uses walk-in shower. Pt's  will assist w/ ADL/IADLs as needed. Occupational Therapy Goals  Initiated 10/21/2020  1. Patient will perform upper body dressing with modified independence within 7 day(s). 2.  Patient will perform lower body dressing with modified independence using AE as needed within 7 day(s). 3.  Patient will perform bathing with modified independence within 7 day(s). 4.  Patient will perform toilet transfers with independence within 7 day(s). 5.  Patient will participate in upper extremity therapeutic exercise/activities with modified independence for 8 minutes within 7 day(s). Outcome: Not Met   OCCUPATIONAL THERAPY EVALUATION  Patient: Royal Lorenz (73 y.o. female)  Date: 10/21/2020  Primary Diagnosis: A-fib ([facility] [address] [I48.91]  Atrial fibrillation ([facility] [address] [I48.91]  Procedure(s) (LRB):  VIDEO ASSISTED THORACOSCOPIC LEFT ATRIAL APPENDAGE LIGATION (Left) 1 Day Post-Op   Precautions:        ASSESSMENT  Based on the objective data described below, the patient presents with general weakness, impaired functional mobility, and decreased activity tolerance from recent thoracoscopic SUHAS ligation clip. Pt is limited by moderate pain from incision site and chest tube on L side of trunk, and pain/weakness on R side (RUE and RLE) from recent fall. Pt was CGA for all functional mobility and toilet transfer. Prior to walking pt was unable to adjust socks and lean forward due to current limitations.  Pt was SBA/min A to don robe, due to limited AROM in RUE and pain from L side of trunk. Despite this, the pt was willing to participate throughout the eval session. The pt would benefit from skilled OT services to increase strength and educate the pt on techniques to work around pain limitations. Pt was going to OP therapy for [facility] joint. Pt has a right splica short based wrist splint. She has a exercise program that was established OP that she is familiar with. Pt reports she is suppose to wear this device for pain and support if needed. She reported having no restrictions in regards to mobility, ROM or weight bearing. Unable to don pts right wrist splint due to large bore IV in right wrist.  Pt reported no right wrist or CMC pain during activity today. Current Level of Function Impacting Discharge (ADLs/self-care):   Feeding: Setup    Oral Facial Hygiene/Grooming: Contact guard assistance(standing)    Bathing: Additional time; Adaptive equipment; Moderate assistance(mod A to wash LB due to limitations reaching forward)    Upper Body Dressing: Stand-by assistance;Minimum assistance    Lower Body Dressing: Moderate assistance;Maximum assistance    Toileting: Contact guard assistance       Functional Outcome Measure: The patient scored 70/100 on the Barthel Index outcome measure which is indicative of moderate impairment. Other factors to consider for discharge: Pt would benefit from [facility] skilled OT services once pain is controlled. Patient will benefit from skilled therapy intervention to address the above noted impairments. PLAN :  Recommendations and Planned Interventions: self care training, functional mobility training, therapeutic exercise and endurance activities    Frequency/Duration: Patient will be followed by occupational therapy 5 times a week to address goals.     Recommendation for discharge: (in order for the patient to meet his/her long term goals)  Occupational therapy at least 2 days/week in the home AND ensure assist and/or supervision for safety with ADLs/IADLs    This discharge recommendation:  A follow-up discussion with the attending provider and/or case management is planned    IF patient discharges home will need the following DME: AE: long handled bathing and AE: long handled dressing ? SUBJECTIVE:   Patient stated Sara Cassidy I think I need to go sit down.  after walking into the hallway. OBJECTIVE DATA SUMMARY:   HISTORY:   Past Medical History:   Diagnosis Date    Adverse effect of anesthesia     slow to wake up    Arthritis     Asthma 6/29/2009    CAUSED BY MOLD    Atrial fibrillation ([address] 6/29/2009    CAD (coronary artery disease) 06/29/2009    Celiac disease 2010    Chronic obstructive pulmonary disease ([address] 2004-5    right leg    Chronic pain of right ankle     Diabetes ([address]     Drug induced prednisone, DM2    Diastolic heart failure ([address]     DVT (deep venous thrombosis) ([address] 2004    Right leg post surgery    GERD (gastroesophageal reflux disease)     Gluten intolerance     Heart failure ([address]     Hypertension     Hypothyroidism 6/29/2009    Iron deficiency anemia     Lyme disease     Personal history of MI (myocardial infarction)     Rheumatoid arthritis ([address]     Slow to wake up after anesthesia     Syncope     Post testing- resolved    TIA (transient ischemic attack) 2004 & 2006    Vitamin B12 deficiency 6/29/2009     Past Surgical History:   Procedure Laterality Date    HX ADENOIDECTOMY      HX AFIB ABLATION      HX APPENDECTOMY      HX CHOLECYSTECTOMY  6/16/11    HX COLONOSCOPY      HX CORONARY STENT PLACEMENT      x 2    HX HEART CATHETERIZATION  2010    2 STENTS    HX HEART CATHETERIZATION  03/2020    HX HYSTERECTOMY      HX LUMBAR LAMINECTOMY  2000    L4-L5    HX ORTHOPAEDIC  1993-6/2012    RT.  FOOT SURGERY X 6/calcaneal osteotomy    HX ORTHOPAEDIC Right 09/19/2020    right hand CMC joint replacement    HX TONSILLECTOMY  1964       Expanded or extensive additional review of patient history:     Home Situation  Home Environment: Private residence  # Steps to Enter: 5(2 in back)  Rails to Enter: Yes  Hand Rails : Bilateral  One/Two Story Residence: One story  Living Alone: No  Support Systems: Spouse/Significant Other/Partner  Patient Expects to be Discharged to[de-identified] Private residence  Current DME Used/Available at Home: Jace King, [address] [facility] J[address] Yogi chair  Tub or Shower Type: Shower    Hand dominance: Right    EXAMINATION OF PERFORMANCE DEFICITS:  Cognitive/Behavioral Status:  Neurologic State: Alert  Orientation Level: Oriented X4  Cognition: Follows commands; Appropriate decision making  Perception: Appears intact  Perseveration: No perseveration noted  Safety/Judgement: Awareness of environment; Insight into deficits      Hearing: Auditory  Auditory Impairment: None  Hearing Aids/Status: Does not own           Range of Motion:  AROM: Generally decreased, functional  PROM: Within functional limits                      Strength:  Strength: Generally decreased, functional                Coordination:  Coordination: Within functional limits       Gross Motor Skills-Upper: Right Impaired;Left Intact    Tone & Sensation:  Tone: Normal  Sensation: Intact                      Balance:  Sitting: Intact; Without support  Standing: Impaired; With support  Standing - Static: Good  Standing - Dynamic : Good    Functional Mobility and Transfers for ADLs:  Bed Mobility:  Rolling: (sitting in the chair )    Transfers:  Sit to Stand: Contact guard assistance  Stand to Sit: Contact guard assistance  Bed to Chair: Contact guard assistance  Toilet Transfer : Contact guard assistance    ADL Assessment:  Feeding: Setup    Oral Facial Hygiene/Grooming: Contact guard assistance(standing)    Bathing: Additional time; Adaptive equipment; Moderate assistance(mod A to wash LB due to limitations reaching forward)    Upper Body Dressing: Stand-by assistance;Minimum assistance    Lower Body Dressing:  Moderate assistance;Maximum assistance    Toileting: Contact guard assistance                ADL Intervention and task modifications:     Grooming  Position Performed: Seated in chair  Washing Hands: Set-up  Brushing Teeth: Set-up(applying moisturizing oral gel w/ swab)    Upper Body Dressing Assistance  Front Opened Shirt: Stand-by assistance;Minimum assistance(Robe)    Lower Body Dressing Assistance  Socks: Total assistance (dependent)(Unable to adjust socks due to pain in R side)  Position Performed: Seated in chair    Toileting  Toileting Assistance: (attempted to urinate but unable)  Clothing Management: Stand-by assistance    Cognitive Retraining  Safety/Judgement: Awareness of environment; Insight into deficits    Functional Measure:  Barthel Index:    Bathin  Bladder: 10  Bowels: 10  Groomin  Dressin  Feeding: 10  Mobility: 10  Stairs: 5  Toilet Use: 5  Transfer (Bed to Chair and Back): 10  Total: 70/100        The Barthel ADL Index: Guidelines  1. The index should be used as a record of what a patient does, not as a record of what a patient could do. 2. The main aim is to establish degree of independence from any help, physical or verbal, however minor and for whatever reason. 3. The need for supervision renders the patient not independent. 4. A patient's performance should be established using the best available evidence. Asking the patient, friends/relatives and nurses are the usual sources, but direct observation and common sense are also important. However direct testing is not needed. 5. Usually the patient's performance over the preceding 24-48 hours is important, but occasionally longer periods will be relevant. 6. Middle categories imply that the patient supplies over 50 per cent of the effort. 7. Use of aids to be independent is allowed. Christian Bernard., Barthel, D.W. (1131). Functional evaluation: the Barthel Index. [facility] [address] [facility] (14)2.   BRET Monique, Amadeo Choi., Greg Langford., Sudeep Moser. (1999). Measuring the change indisability after inpatient rehabilitation; comparison of the responsiveness of the Barthel Index and Functional [place] Measure. Journal of Neurology, Neurosurgery, and Psychiatry, 66(4), 059-087. ANA MARIA Shell, KARIE Otoole, & Cory Loomis M.A. (2004.) Assessment of post-stroke quality of life in cost-effectiveness studies: The usefulness of the Barthel Index and the EuroQoL-5D. Quality of Life Research, 15, 088-58       Occupational Therapy Evaluation Charge Determination   History Examination Decision-Making   LOW Complexity : Brief history review  MEDIUM Complexity : 3-5 performance deficits relating to physical, cognitive , or psychosocial skils that result in activity limitations and / or participation restrictions MEDIUM Complexity : Patient may present with comorbidities that affect occupational performnce. Miniml to moderate modification of tasks or assistance (eg, physical or verbal ) with assesment(s) is necessary to enable patient to complete evaluation       Based on the above components, the patient evaluation is determined to be of the following complexity level: MEDIUM  Pain Rating: Moderate pain from incision site and R side (RUE,RLE)    Activity Tolerance:   Fair, desaturates with exertion and requires oxygen and requires rest breaks  Please refer to the flowsheet for vital signs taken during this treatment. After treatment patient left in no apparent distress:    Sitting in chair and Call bell within reach    COMMUNICATION/EDUCATION:   The patients plan of care was discussed with: Physical therapist, Registered nurse and patient. Patient/family have participated as able in goal setting and plan of care. and Patient/family agree to work toward stated goals and plan of care. This patients plan of care is appropriate for delegation to [facility].     Thank you for this referral.  Rob Granados  Time Calculation: 29 mins   Regarding student involvement in patient care:  A student participated in this treatment session. Per CMS Medicare statements and AOTA guidelines I certify that the following was true:  1. I was present and directly observed the entire session. 2. I made all skilled judgments and clinical decisions regarding care. 3. I am the practitioner responsible for assessment, treatment, and documentation.

## 2020-10-21 NOTE — INTERDISCIPLINARY ROUNDS
Interdisciplinary team rounds were held 10/21/2020 with the following team members:Care Management, Diabetes Treatment Specialist, Nursing, Nutrition, Pharmacy, Physical Therapy, Physician, Respiratory Therapy and Clinical Coordinator. Plan of care discussed. See clinical pathway and/or care plan for interventions and desired outcomes.

## 2020-10-21 NOTE — PROGRESS NOTES
1930: Bedside shift change report given to Bety Moore RN (oncoming nurse) by Samanta Almonte RN (offgoing nurse). Report included the following information SBAR, Kardex, Intake/Output, MAR, Recent Results and Cardiac Rhythm NSR. Per dayshift report, pt was transferred via wheelchair and did not open her eyes, was unable to stand long and slumped into bed. Pt has been NPO d/t LOC. Dual skin completed. Bilateral blanchable redness to heels-floated on pillows. No pressure injury. Pt rouses briefly to voice, stimulation but cannot remain awake. SpO2 90% on 2L Increased O2 to 3L. 2030: Pt rouses briefly to loud voice, physical stimuli. Shallow resp, tachypnea. Spo2 92% on 3L. Lungs very course, diminished. Received neb at shift change. Attempted to use IS -pulls only 250mL x3 with significant encouragement. Pt coughs very weakly on command, unable to hold pillow to splint chest. Temp 99.9- pt unable to take PO due to LOC. Chest tube to wall suction. BPs soft. Incision dsg c/d/i. Hypoactive /BS. No urine output. MEWS 5. RR RN at bedside. Will try OP suction. 2050: OP suction x2. Removed some secretions but nothing significant. Bladder scan 138mL. Paging on call. 2104: Paged Dr. Doug Peterson to ask about ABGs, BiPAP.     2106: Update provided to Dr Doug Peterson. Orders received to transfer pt back to CCU.    2146: Notified spouse Cindi Ortiz of need to tx pt back to CCU. Provided room #, phone #.

## 2020-10-21 NOTE — PROGRESS NOTES
[facility] Consult Service Progress NOTE  Pulmonary, Critical Care, and Sleep Medicine    Name: Chris Montero MRN: [id number]   : 1950 Hospital: [facility]    Date: 10/21/2020  Admission Date: 10/20/2020     Chart and notes reviewed. Data reviewed. Pt seen on rounds earlier today. I have evaluated and examined the patient. Pt is unstable and acutely ill in the CCU.       10-21-20: Pt is alert and interactive this am. Has a dry mouth. Has expected chest pain, No back pain, no leg pain. Not coughing. NO dizziness. Hospital Day: 2    I was asked by Jose Cruz Tirado MD to see Chris Montero a 65 y.o.  female  in consultation for a chief complaint of chest pain following     Excerpts from admission or consult notes as follows:     \"Rabia Irby is a 65 y.o. female who was referred for cardiac evaluation by Dr. Gareth Paez for possible SUHAS clip. The patient has had PAF for many years. She has fatigue, dizziness, and SOB when heart racing. Has had episode of syncope this year during afib. She notices these episodes 1-2 times a month. When the episodes increase in frequency she has her TSH checked and synthroid dose adjusted. She has a history of PAF s/p ablation by Dr. Kayla Moser about 10 years ago, CAD s/p PCI, hypertension, dyslipidemia, HFpEF and anemia, RA, TIA about 7 years ago, Asthma/COPD, hypothyroid, DM type II,  lyme disease -recently completed tx. She has an intolerance to coumadin and ASA -she has developed GIB and significant thrombocytopenia on these medications. She is a former smoker. She denies alcohol. She is retired and lives with her . She has a family history of stroke and heart disease. \"     Pt now in CCU. No SOB. Chest hurts. No nausea. IMPRESSION:   1. s/p post LAAL via LVAT's.   2. PAF: metoprolol, unable to tolerate anticoagulation. SUHAS clip per Dr. Amada Gill. 3. CAD s/p PCI: On metoprolol.  No ASA/statin dt allergy  4. HFpEF (NYHA class II): on bumex, eplerenone, metoprolol. Has seen Dr. Kathi Asher. 5. HTN: Cont current meds  6. HLD: no statin dt allergy, on tricor. 7. Hx Iron deficiency anemia, b12 deficiency: Hgb 13 on most recent labs. On iron, vit C, vit B  8. GERD: small sliding HH prevacid. 9. RA: on prednisone -has been on prednisone for 2 years  10. DM: On lantus and januvia. 10. Hx TIA  11. H/o TIA  12. Former remote smoker  13. Asthma-reported. No PFTs or information. RECOMMENDATIONS/PLAN:   1. Pain meds   2. meds reviewed  3. Monitor Tele  4. Monitor labs. 5. Will be available to assist in medical management while in the CCU pending disposition     [] High complexity decision making was performed  [x] See my orders for details     PMH:  has a past medical history of Adverse effect of anesthesia, Arthritis, Asthma (6/29/2009), Atrial fibrillation ([address] (6/29/2009), CAD (coronary artery disease) (06/29/2009), Celiac disease (2010), Chronic obstructive pulmonary disease ([address] (2004-5), Chronic pain of right ankle, Diabetes ([address] Diastolic heart failure ([address] DVT (deep venous thrombosis) ([address] (2004), GERD (gastroesophageal reflux disease), Gluten intolerance, Heart failure ([address] Hypertension, Hypothyroidism (6/29/2009), Iron deficiency anemia, Lyme disease, Personal history of MI (myocardial infarction), Rheumatoid arthritis ([address] Slow to wake up after anesthesia, Syncope, TIA (transient ischemic attack) (2004 & 2006), and Vitamin B12 deficiency (6/29/2009). PSH:   has a past surgical history that includes hx coronary stent placement; hx colonoscopy; hx afib ablation; hx heart catheterization (2010); hx heart catheterization (03/2020); hx cholecystectomy (6/16/11); hx hysterectomy; hx lumbar laminectomy (2000); hx tonsillectomy (1964); hx adenoidectomy; hx appendectomy; hx orthopaedic (1993-6/2012); and hx orthopaedic (Right, 09/19/2020).    FHX: family history includes Breast Cancer in her cousin, cousin, cousin, maternal aunt, maternal aunt, and maternal aunt; Cancer in her father; Coronary Artery Disease in her mother; Delayed Awakening in her mother and sister; Heart Disease in her mother; Hypertension in her brother, father, mother, and sister; Lung Disease in her father; Stroke in her mother. SHX:  reports that she quit smoking about 18 years ago. Her smoking use included cigarettes. She has a 30.00 pack-year smoking history. She has never used smokeless tobacco. She reports that she does not drink alcohol or use drugs. ROS:A comprehensive review of systems was negative except for that written in the HPI. Hemodynamics:    CO:    CI:    CVP: CVP (mmHg): 10 mmHg (10/21/20 0400)  SVR:   PAP Systolic:    PAP Diastolic:    PVR:    PI59:        Ventilator Settings:      Mode Rate TV Press PEEP FiO2 PIP Min. Vent                              Vital Signs: Telemetry:    AFIB Intake/Output:   Visit Vitals  BP (!) 97/59   Pulse 82   Temp 99.1 °F (37.3 °C)   Resp 17   Ht 5' 3\" (1.6 m)   Wt 78.8 kg (173 lb 11.6 oz)   LMP 1980 (LMP Unknown)   SpO2 99%   BMI 30.77 kg/m²       Temp (24hrs), Av.7 °F (37.1 °C), Min:97.6 °F (36.4 °C), Max:99.9 °F (37.7 °C)        O2 Device: Nasal cannula O2 Flow Rate (L/min): 3 l/min       Wt Readings from Last 4 Encounters:   10/20/20 78.8 kg (173 lb 11.6 oz)   10/15/20 79 kg (174 lb 2.6 oz)   10/15/20 80.4 kg (177 lb 4 oz)   20 79.7 kg (175 lb 9.6 oz)          Intake/Output Summary (Last 24 hours) at 10/21/2020 0717  Last data filed at 10/21/2020 0630  Gross per 24 hour   Intake 2070 ml   Output 1120 ml   Net 950 ml       Last shift:      No intake/output data recorded. Last 3 shifts: 10/19 1901 - 10/21 0700  In: 2070 [P.O.:720; I.V.:1350]  Out: 1120 [Urine:710; Drains:410]       Physical Exam:     General:  female; in no respiratory distress and acyanotic;  NC; Conversant.    HEENT: NCAT,  mucosa dry  Eyes: anicteric; conjunctiva clear  Neck: no nodes, no accessory MM use. Chest: no deformity,   Cardiac: IR regular; no murmur;   Lungs: distant breath sounds; no wheezes, no rales. Abd: soft, NT, hypoactive BS  Ext: no edema; no joint swelling;  No clubbing  : sanchez, clear urine  Neuro: Alert and nonfocal.   Psych- no agitation,  unable to assess  Skin: warm, dry, no cyanosis;   Pulses: 1-2+ Bilateral pedal, radial  Capillary: brisk; pale      DATA:    MAR reviewed and pertinent medications noted or modified as needed  MEDS:   Current Facility-Administered Medications   Medication    ascorbic acid (vitamin C) (VITAMIN C) tablet 1,000 mg    bumetanide (BUMEX) tablet 1 mg    calcium citrate-vitamin D3 (CITROCAL) tablet 1 Tab    cholecalciferol (VITAMIN D3) (1000 Units /25 mcg) tablet 2 Tab    spironolactone (ALDACTONE) tablet 25 mg    fenofibrate nanocrystallized (TRICOR) tablet 48 mg    ferrous sulfate tablet 325 mg    gabapentin (NEURONTIN) capsule 100 mg    insulin glargine (LANTUS) injection 10 Units    levothyroxine (SYNTHROID) tablet 150 mcg    metoprolol succinate (TOPROL-XL) XL tablet 50 mg    predniSONE (DELTASONE) tablet 2.5 mg    predniSONE (DELTASONE) tablet 5 mg    alteplase (CATHFLO) 1 mg in sterile water (preservative free) 1 mL injection    bacitracin 500 unit/gram packet 1 Packet    sodium chloride (NS) flush 5-40 mL    sodium chloride (NS) flush 5-40 mL    albumin human 5% (BUMINATE) solution 12.5 g    0.45% sodium chloride infusion    0.9% sodium chloride infusion    acetaminophen (TYLENOL) tablet 650 mg    naloxone (NARCAN) injection 0.4 mg    mupirocin (BACTROBAN) 2 % ointment    ondansetron (ZOFRAN) injection 4 mg    midazolam (VERSED) injection 1 mg    chlorhexidine (PERIDEX) 0.12 % mouthwash 10 mL    famotidine (PEPCID) tablet 20 mg    magnesium oxide (MAG-OX) tablet 400 mg    calcium chloride 1 g in 0.9% sodium chloride 250 mL IVPB    bisacodyL (DULCOLAX) suppository 10 mg    senna-docusate (PERICOLACE) 8.6-50 mg per tablet 1 Tab    polyethylene glycol (MIRALAX) packet 17 g    ELECTROLYTE REPLACEMENT NOTE: Nurse to review Serum Potassium and Magnesuim levels and Initiate Electrolyte Replacement Protocol as needed    magnesium sulfate 1 g/100 ml IVPB (premix or compounded)    glucose chewable tablet 16 g    dextrose (D50W) injection syrg 12.5-25 g    glucagon (GLUCAGEN) injection 1 mg    insulin lispro (HUMALOG) injection    vancomycin (VANCOCIN) 1,000 mg in 0.9% sodium chloride (MBP/ADV) 250 mL    diphenhydrAMINE (BENADRYL) capsule 25 mg    diphenhydrAMINE (BENADRYL) injection 25 mg    melatonin tablet 3 mg    potassium chloride 20 mEq in 50 ml IVPB    potassium chloride 20 mEq in 50 ml IVPB    traMADoL (ULTRAM) tablet  mg    bupivacaine (PF) (MARCAINE) 0.5 % (5 mg/mL) injection    ipratropium (ATROVENT) 0.02 % nebulizer solution 0.5 mg    HYDROmorphone (PF) (DILAUDID) injection 0.5 mg    lidocaine 4 % patch 1 Patch        Labs:    Recent Labs     10/21/20  0407 10/20/20  1336   WBC 23.7* 22.1*   HGB 12.0 13.4    270   INR  --  1.0   APTT  --  26.3         Recent Labs     10/21/20  0407 10/20/20  1336    144   K 3.8 3.8   * 111*   CO2 27 28   * 144*   BUN 18 18   CREA 0.75 0.62   CA 7.4* 8.0*   MG 1.8 2.1   ALB 3.1* 3.0*   ALT 24 30     No results for input(s): PH, PCO2, PO2, HCO3, FIO2 in the last 72 hours. No results for input(s): CPK, CKNDX, TROIQ in the last 72 hours. No lab exists for component: CPKMB  Lab Results   Component Value Date/Time    BNP 9 12/03/2010 10:40 AM      Lab Results   Component Value Date/Time    Culture result: MRSA NOT PRESENT 10/15/2020 11:22 AM    Culture result:  10/15/2020 11:22 AM         Screening of patient nares for MRSA is for surveillance purposes and, if positive, to facilitate isolation considerations in high risk settings.  It is not intended for automatic decolonization interventions per se as regimens are not sufficiently effective to warrant routine use. Culture result: NO GROWTH 1 DAY 08/13/2019 08:28 AM     Lab Results   Component Value Date/Time    TSH 1.22 10/15/2020 11:22 AM        Imaging:  10-21-20: COMPARISON: 10/20/2020.     FINDINGS: Portable chest shows stable appearance. There is no pneumothorax,  pulmonary edema, apparent pleural effusion or midline shift. Chest drain and CVL  are stable. Heart size is stable. Left atrial appendage clip is again noted. IMPRESSION: Stable. Results from [place] encounter on 10/20/20   XR CHEST PORT    Narrative EXAM:  XR CHEST PORT    INDICATION:  postop heart    COMPARISON:  10/15/2020    FINDINGS: A portable AP radiograph of the chest was obtained at 1120 hours. Left  IJ catheter tip overlies SVC. [place] Left atrial clip is noted in position. Left  chest tube is noted in position. .  There is no pneumothorax. There is slight  left basilar atelectasis. .  Cardiomegaly is stable. .       Impression IMPRESSION: No pneumothorax. There is slight left basilar atelectasis. Results from Hospital Encounter encounter on 10/16/20   CTA CHEST W OR W WO CONT    Narrative INDICATION:  Paroxysmal atrial fibrillation  fu on CXR from 10/15, possible  mass? EXAM: CT Angio Chest:    COMPARISON: 3/1/2016 CTA PE study. TECHNIQUE: Unenhanced localizing CT imaging of the heart/pulmonary  arteries/veins is followed by bolus injection of 100 mL Isovue 370 contrast IV,  with thin section axial Chest CT obtained and 3D image post processing performed  including coronal MIPS. CT dose reduction was achieved through use of a  standardized protocol tailored for this examination and automatic exposure  control for dose modulation. FINDINGS:  Unenhanced images show coronary artery calcification, and a simple fluid  attenuation cyst on the posterior border of the left atrium at its junction with  the right inferior pulmonary vein.  The cyst is smooth in contour, conforming to  surrounding structures, exerting no mass effect on adjacent left atrium and  pulmonary vein tributaries. Cyst shows no enhancement. Cyst is crescentic in shape and measures  approximately 2.9 x 2.1 x 1.5 cm. Cyst is stable since 2016 PE study and  compatible with benign pericardial cyst.    There is no solid mediastinal mass and no significant adenopathy. Pulmonary veins show no thrombus or stenosis. There is no accessory pulmonary  vein. Left atrium is free of thrombus/mass. Pulmonary arteries show no thrombus. Aorta is mildly calcified and tortuous without aneurysm or dissection. There is  no pericardial effusion. Lungs are clear other than minor right base scarring. There is no pleural  effusion. There is a small sliding hiatal hernia. There is hepatic steatosis. Impression IMPRESSION:  1. Small uncomplicated pericardial cyst, stable since at least 2016.  2. No pulmonary venous or left atrial thrombus. 3. Coronary artery calcification. This care involved high complexity decision making which includes independently reviewing the patient's past medical records, current laboratory results, medication profiles that were immediately available to me and actual Xray images at the bedside in order to assess, support vital system function, and to treat this degree of vital organ system failure, and to prevent further life threatening deterioration of the patients condition. I was in direct communication with the nursing staff throughout this time.     Medical Decision Making Today  · Reviewed the flowsheet and previous days notes  · Reviewed and summarized records or history from previous days note or discussions with staff, family  · Parenteral controlled substances - Reviewed/ Adjusted / Weaned / Started  · High Risk Drug therapy requiring intensive monitoring for toxicity: eg steroids, pressors, antibiotics  · Review and order of Clinical lab tests  · Review and Order of Radiology tests  · Review and Order of Medicine tests  · Independent visualization of radiologic Images  · Reviewed Ventilator / NiPPV  · I have personally reviewed the patients ECG / Telemetry  · Diagnostic endoscopies with identified risk factors    My assessment/management discussed with: Consultants, Nursing, Pharmacy, Case Management, PT, OT, Respiratory Therapy, Hospitalist and Family for coordination of care        Teri Culp MD

## 2020-10-21 NOTE — PROGRESS NOTES
Transfer from CCU no distress. Up in chair eating her meal.  1930 reort to Erin Vargas RN at bedside.

## 2020-10-21 NOTE — PROGRESS NOTES
[facility]  Progress Note    Admit Date: 10/20/2020  POD: 1 Day Post-Op      Procedure:  Procedure(s):  VIDEO ASSISTED THORACOSCOPIC LEFT ATRIAL APPENDAGE LIGATION    Subjective:   Pt seen with Dr. Luke Tobias. Doing well overall. Transferred back to CCU after evaluation by RRT showed tachypnea & lethargy. BP soft overnight. Objective:     Visit Vitals  BP (!) 84/65   Pulse 85   Temp 98.5 °F (36.9 °C)   Resp 26   Ht 5' 3\" (1.6 m)   Wt 173 lb 11.6 oz (78.8 kg)   LMP 1980 (LMP Unknown)   SpO2 94%   BMI 30.77 kg/m²     Temp (24hrs), Av.8 °F (37.1 °C), Min:97.6 °F (36.4 °C), Max:99.9 °F (37.7 °C)      Last 24hr Input/Output:    Intake/Output Summary (Last 24 hours) at 10/21/2020 0901  Last data filed at 10/21/2020 0630  Gross per 24 hour   Intake 2070 ml   Output 1120 ml   Net 950 ml        EKG/Rhythm: SR    Oxygen: 3 LPM    CXR:  CXR Results  (Last 48 hours)               10/21/20 0602  XR CHEST PORT Final result    Impression:  IMPRESSION: Stable. Narrative:  INDICATION:  postop heart       EXAM: CXR Portable. 0519 hours. COMPARISON: 10/20/2020. FINDINGS: Portable chest shows stable appearance. There is no pneumothorax,   pulmonary edema, apparent pleural effusion or midline shift. Chest drain and CVL   are stable. Heart size is stable. Left atrial appendage clip is again noted. 10/20/20 1141  XR CHEST PORT Final result    Impression:  IMPRESSION: No pneumothorax. There is slight left basilar atelectasis. Narrative:  EXAM:  XR CHEST PORT       INDICATION:  postop heart       COMPARISON:  10/15/2020       FINDINGS: A portable AP radiograph of the chest was obtained at 1120 hours. Left   IJ catheter tip overlies SVC. Petr Bees Left atrial clip is noted in position. Left   chest tube is noted in position. .  There is no pneumothorax. There is slight   left basilar atelectasis. .  Cardiomegaly is stable. .                    Admission Weight: Last Weight   Weight: 173 lb 11.6 oz (78.8 kg) Weight: 173 lb 11.6 oz (78.8 kg)       EXAM:  General: NAD   Lungs:   Clear to auscultation bilaterally. Incision:  No erythema, drainage or swelling. Heart:  Regular rate and rhythm, S1, S2 normal, no murmur, click, rub or gallop. Abdomen:   Soft, non-tender. Bowel sounds normal. No masses,  No organomegaly. Extremities:  No edema. PPP   Neurologic:  Gross motor and sensory apparatus intact. Activity: ad moses    Diet: heart healthy    Lab Data Reviewed:   Recent Labs     10/21/20  0407 10/20/20  2237  10/20/20  1336   WBC 23.7*  --   --  22.1*   HGB 12.0  --   --  13.4   HCT 38.4  --   --  41.8     --   --  270     --   --  144   K 3.8  --   --  3.8   BUN 18  --   --  18   CREA 0.75  --   --  0.62   *  --   --  144*   GLUCPOC  --  187*   < >  --    INR  --   --   --  1.0    < > = values in this interval not displayed. Assessment:     Active Problems:    A-fib ([facility] (10/20/2020)      Atrial fibrillation ([email or web address] (10/20/2020)             Plan/Recommendations/Medical Decision Makin. PAF s/p LAAL via LVATS: cont metoprolol for rate control, lower dose due to soft BP. Keep chest tube today. 2. Hypotension: give volume this morning cautiously. Hold diuretic today.   3. CAD s/p PCI: On metoprolol. No ASA/statin dt allergy  4. HFpEF (NYHA class II): hold bumex/aldactone. Lower toprol dose for now. 5. HTN: lower meds due to soft BP overnight. 6. HLD: no statin dt allergy, on tricor.   7. Hx Iron deficiency anemia, b12 deficiency: Hgb 13 on most recent labs. On iron, vit C, vit B  8. GERD: On prevacid. 9. RA: on prednisone -has been on prednisone for 2 years  10. DM: On lantus and januvia.   11. Hx TIA  12. Leukocytosis: Pt is on chronic steroids, monitor. Stable post op at usual level. Dispo: transfer back to PCU, lower BP meds. Pain control with ultram/tylenol.     Signed By: ANA Galdamez

## 2020-10-21 NOTE — PROGRESS NOTES
0715- Bedside and Verbal shift change report given to Arsh [place] (oncoming nurse) by Senait Knott (offgoing nurse). Report included the following information SBAR, Kardex, Intake/Output, Recent Results, Cardiac Rhythm NSR and Alarm Parameters . 1190- Shift assessment completed; see flow sheet for details. Pt A/V/O; drowsy but follows commands; easily aroused. Currently in NSR; BP \"soft\"; systolic pressures mid 44R-82A. Remains on NC~3LPM; will wean as pt tolerated; lungs are clear; diminished in the bases. ABD is semi-soft; obese; BS hypoactive; will advance diet this AM. Has not voided since straight catherization this AM; PO intake encouraged. . Pt report incision discomfort 3/3; will give PRN Pain medication. 0908- PRN Albumin given for BP; pt than assisted to the chair with one person assist; tolerated transfer well. 56- Pt working with PT/OT; ambulated for a short distance in the hallway before going back to the recliner. Reports incisional discomfort is \"better. \" Didn't consume much of breakfast meal; continues with PO fluid intake. 1135- Pt up to the [facility]; voided 250ml of clear yellow urine    1210- Reassessment completed; see flow sheet for details. No acute changes in pt assessment. 0- Pt continues to rest in the recliner chair with incident. Denies pain/discomfort at this time    1735- PRN Tramadol given for incisional discomfort     1745- TRANSFER - OUT REPORT:    Verbal report given to [facility] RN(name) on Domi Arredondo  being transferred to U 2261(unit) for routine progression of care       Report consisted of patients Situation, Background, Assessment and   Recommendations(SBAR). Information from the following report(s) SBAR, Kardex, Intake/Output, Recent Results, Cardiac Rhythm NSR and Alarm Parameters  was reviewed with the receiving nurse.     Lines:   Peripheral IV 10/21/20 Left Forearm (Active)   Site Assessment Clean, dry, & intact 10/21/20 1700   Phlebitis Assessment 0 10/21/20 1700   Infiltration Assessment 0 10/21/20 1700   Dressing Status Clean, dry, & intact 10/21/20 1700   Dressing Type Transparent 10/21/20 1700   Hub Color/Line Status Blue;Flushed 10/21/20 1700        Opportunity for questions and clarification was provided.       Patient transported with:   Monitor  O2 @ 1 liters  Registered Nurse  [facility]

## 2020-10-21 NOTE — PROGRESS NOTES
2145: TRANSFER - IN REPORT:    Verbal report received from [facility]) on Nathan Gloss  being received from PCU(unit) for urgent transfer      Report consisted of patients Situation, Background, Assessment and   Recommendations(SBAR). Information from the following report(s) SBAR, Intake/Output, MAR and Cardiac Rhythm NSR/ST was reviewed with the receiving nurse. Opportunity for questions and clarification was provided. Assessment completed upon patients arrival to unit and care assumed. 2200: Patient with BP 90's. C/O surgical pain and shallow respirations due to this. Will give scheduled Tylenol and PRN Ultram 50 mg. Holding PM Toprol dose until BP more elevated. 2303: BP 80's, one PRN albumin given for volume. 0000: CVP 12. Continue to monitor VS closely. BP currently 90's. No C/O SOB. Congested upper throat congestion. Will encourage IS, turning and coughing. 0400: Bladder scan 403 ml. Assisted to [facility].    5435: Patient continues to sit on [facility] requesting some more time before straight cathing. 7907: BP back down into 80's. Will assist back to bed.    0630: Straight cath for 400 ml urine. SBP back into 90's.

## 2020-10-21 NOTE — PROGRESS NOTES
[facility] RESPONSE TEAM- Follow Up     Rounded on patient due to transfer from CCU today; s/p SUHAS clip and pericardial effusion drainage. 0840: Upon assessment, patient drowsy; opens eyes to stimulation and voice. RR 30s with mild WOB; shallow breaths. Audible secretions noted in back of throat; unable to cough/use IS due to lethargy. SpO2 low 90s on NC. NTS suction x1 with assistance of primary RN [facility]. Borderline hypotensive BP 98/55 (66). No urine output. Bladder scan <200. Upon review of anesthesia record, propofol, versed, and dilaudid was given. Glycopyrrolate x2 was also given. Primary RN following up with on call MD with CT surgery.      Verbal order to transfer patient to CCU for closer monitoring per Ancel Lundborg MD.     Joslyn Christensen RN  RRT  Ext. [phone]

## 2020-10-21 NOTE — PROGRESS NOTES
Problem: Mobility Impaired (Adult and Pediatric)  Goal: *Acute Goals and Plan of Care (Insert Text)  Description: FUNCTIONAL STATUS PRIOR TO ADMISSION: Patient was independent and active without use of DME.    HOME SUPPORT PRIOR TO ADMISSION: The patient lived with her . Physical Therapy Goals  Initiated 10/21/2020  1. Patient will move from supine to sit and sit to supine , scoot up and down, and roll side to side in bed with modified independence within 7 day(s). 2.  Patient will transfer from bed to chair and chair to bed with independence using the least restrictive device within 7 day(s). 3.  Patient will perform sit to stand with independence within 7 day(s). 4.  Patient will ambulate with independence for 150 feet with the least restrictive device within 7 day(s). 5.  Patient will ascend/descend 5 stairs with one sided handrail(s) with modified independence within 7 day(s). Outcome: Not Met   PHYSICAL THERAPY EVALUATION  Patient: Kaci Cardona (31 y.o. female)  Date: 10/21/2020  Primary Diagnosis: A-fib ([facility] [I48.91]  Atrial fibrillation ([facility] [I48.91]  Procedure(s) (LRB):  VIDEO ASSISTED THORACOSCOPIC LEFT ATRIAL APPENDAGE LIGATION (Left) 1 Day Post-Op   Precautions:        ASSESSMENT  Based on the objective data described below, the patient presents with decreased strength, decreased functional mobility, impaired balance, decreased endurance, and mildly unsteady gait s/p VATS with SUHAS clip POD 1. Patient is functioning below her baseline due to s/p SUHAS clip and pain. Patient required CGA for mobility with cardiac cart. L sided chest pain increased with mobility (chest tube site), limiting gait to 100 feet this date with cardiac cart. Anticipate patient will progress well with therapy once chest tube pulled. Current Level of Function Impacting Discharge (mobility/balance): CGA with cardiac cart    Functional Outcome Measure:   The patient scored 70/100 on the Barthel outcome measure which is indicative of moderate functional impairment. Other factors to consider for discharge: pain     Patient will benefit from skilled therapy intervention to address the above noted impairments. PLAN :  Recommendations and Planned Interventions: bed mobility training, transfer training, gait training, therapeutic exercises, patient and family training/education, and therapeutic activities      Frequency/Duration: Patient will be followed by physical therapy:  5 times a week to address goals. Recommendation for discharge: (in order for the patient to meet his/her long term goals)  Physical therapy at least 2 days/week in the home AND ensure assist and/or supervision for safety with functional mobility and ADLS     This discharge recommendation:  Has not yet been discussed the attending provider and/or case management    IF patient discharges home will need the following DME: patient owns DME required for discharge         SUBJECTIVE:   Patient stated I am having some pain in this side here.     OBJECTIVE DATA SUMMARY:   HISTORY:    Past Medical History:   Diagnosis Date    Adverse effect of anesthesia     slow to wake up    Arthritis     Asthma 6/29/2009    CAUSED BY MOLD    Atrial fibrillation ([address] 6/29/2009    CAD (coronary artery disease) 06/29/2009    Celiac disease 2010    Chronic obstructive pulmonary disease ([address] 2004-5    right leg    Chronic pain of right ankle     Diabetes ([address]     Drug induced prednisone, DM2    Diastolic heart failure (HCC)     DVT (deep venous thrombosis) ([address] 2004    Right leg post surgery    GERD (gastroesophageal reflux disease)     Gluten intolerance     Heart failure ([address]     Hypertension     Hypothyroidism 6/29/2009    Iron deficiency anemia     Lyme disease     Personal history of MI (myocardial infarction)     Rheumatoid arthritis (HCC)     Slow to wake up after anesthesia     Syncope     Post testing- resolved    TIA (transient ischemic attack) 2004 & 2006    Vitamin B12 deficiency 6/29/2009     Past Surgical History:   Procedure Laterality Date    HX ADENOIDECTOMY      HX AFIB ABLATION      HX APPENDECTOMY      HX CHOLECYSTECTOMY  6/16/11    HX COLONOSCOPY      HX CORONARY STENT PLACEMENT      x 2    HX HEART CATHETERIZATION  2010    2 STENTS    HX HEART CATHETERIZATION  03/2020    HX HYSTERECTOMY      HX LUMBAR LAMINECTOMY  2000    L4-L5    HX ORTHOPAEDIC  1993-6/2012    RT. FOOT SURGERY X 6/calcaneal osteotomy    HX ORTHOPAEDIC Right 09/19/2020    right hand [facility] joint replacement    HX TONSILLECTOMY  1964       Personal factors and/or comorbidities impacting plan of care: pain    Home Situation  Home Environment: Private residence  # Steps to Enter: 5(2 in back)  Rails to Enter: Yes  Hand Rails : Bilateral  One/Two Story Residence: One story  Living Alone: No  Support Systems: Spouse/Significant Other/Partner  Patient Expects to be Discharged to[de-identified] Private residence  Current DME Used/Available at Home: Lorilee Pipes, [address] [employer] chair  Tub or Shower Type: Shower    EXAMINATION/PRESENTATION/DECISION MAKING:   Critical Behavior:  Neurologic State: Alert  Orientation Level: Oriented X4  Cognition: Follows commands, Appropriate decision making  Safety/Judgement: Awareness of environment, Insight into deficits  Hearing:   Auditory  Auditory Impairment: None  Hearing Aids/Status: Does not own  Skin:    Edema:   Range Of Motion:  AROM: Generally decreased, functional           PROM: Within functional limits           Strength:    Strength: Generally decreased, functional                    Tone & Sensation:   Tone: Normal              Sensation: Intact               Coordination:  Coordination: Within functional limits  Vision:      Functional Mobility:  Bed Mobility:  Rolling: (sitting in the chair )           Transfers:  Sit to Stand: Contact guard assistance  Stand to Sit: Contact guard assistance        Bed to Chair: Contact guard assistance              Balance: Sitting: Intact; Without support  Standing: Impaired; With support  Standing - Static: Good  Standing - Dynamic : Good  Ambulation/Gait Training:  Distance (ft): 100 Feet (ft)  Assistive Device: Gait belt(cardiac cart )  Ambulation - Level of Assistance: Contact guard assistance     Gait Description (WDL): Exceptions to WDL  Gait Abnormalities: Antalgic;Decreased step clearance        Base of Support: Widened     Speed/Ermelinda: Pace decreased (<100 feet/min)  Step Length: Right shortened;Left shortened                  Functional Measure:  Barthel Index:    Bathin  Bladder: 10  Bowels: 10  Groomin  Dressin  Feeding: 10  Mobility: 10  Stairs: 5  Toilet Use: 5  Transfer (Bed to Chair and Back): 10  Total: 70/100       The Barthel ADL Index: Guidelines  1. The index should be used as a record of what a patient does, not as a record of what a patient could do. 2. The main aim is to establish degree of independence from any help, physical or verbal, however minor and for whatever reason. 3. The need for supervision renders the patient not independent. 4. A patient's performance should be established using the best available evidence. Asking the patient, friends/relatives and nurses are the usual sources, but direct observation and common sense are also important. However direct testing is not needed. 5. Usually the patient's performance over the preceding 24-48 hours is important, but occasionally longer periods will be relevant. 6. Middle categories imply that the patient supplies over 50 per cent of the effort. 7. Use of aids to be independent is allowed. Vimal Torres., Barthel, D.W. (2194). Functional evaluation: the Barthel Index. [facility] (14)2. BRET Freeman, Jess Rubalcava., Megan Alvarez., [place], [facility] (). Measuring the change indisability after inpatient rehabilitation; comparison of the responsiveness of the Barthel Index and Functional [place] Measure.  Journal of Neurology, Neurosurgery, and Psychiatry, 66(4), 184-438. ANA MARIA Elizabeth, KARIE Otoole, & Abena Dunham M.A. (2004.) Assessment of post-stroke quality of life in cost-effectiveness studies: The usefulness of the Barthel Index and the EuroQoL-5D. Quality of Life Research, 15, 480-06            Physical Therapy Evaluation Charge Determination   History Examination Presentation Decision-Making   MEDIUM  Complexity : 1-2 comorbidities / personal factors will impact the outcome/ POC  MEDIUM Complexity : 3 Standardized tests and measures addressing body structure, function, activity limitation and / or participation in recreation  MEDIUM Complexity : Evolving with changing characteristics  Other outcome measures Barthel   MEDIUM      Based on the above components, the patient evaluation is determined to be of the following complexity level: MEDIUM    Pain Rating:  Reports pain in L chest from chest tube    Activity Tolerance:   Fair and requires rest breaks  Please refer to the flowsheet for vital signs taken during this treatment. After treatment patient left in no apparent distress:   Sitting in chair and Call bell within reach    COMMUNICATION/EDUCATION:   The patients plan of care was discussed with: Occupational therapist, Registered nurse, and Case management. Fall prevention education was provided and the patient/caregiver indicated understanding., Patient/family have participated as able in goal setting and plan of care. , and Patient/family agree to work toward stated goals and plan of care.     Thank you for this referral.  Indra Duarte, PT, DPT   Time Calculation: 32 mins

## 2020-10-21 NOTE — PROGRESS NOTES
TRANSFER - OUT REPORT:    Verbal report given to [facility] SERV) on Edna Elena  being transferred to CCU(unit) for change in patient condition(worsening respiratory status, unable to manage secretions, MEWS 5)       Report consisted of patients Situation, Background, Assessment and   Recommendations(SBAR). Information from the following report(s) SBAR, Intake/Output, Recent Results and Cardiac Rhythm NSR was reviewed with the receiving nurse. Lines:   Quad Lumen 10/20/20 Left (Active)   Central Line Being Utilized Yes 10/20/20 1700   Criteria for Appropriate Use Limited/no vessel suitable for conventional peripheral access 10/20/20 1700   Site Assessment Clean, dry, & intact 10/20/20 1700   Infiltration Assessment 0 10/20/20 1700   Affected Extremity/Extremities Color distal to insertion site pink (or appropriate for race); Pulses palpable;Range of motion performed 10/20/20 1700   Date of Last Dressing Change 10/20/20 10/20/20 1700   Dressing Status Clean, dry, & intact 10/20/20 1700   Dressing Type Transparent;Disk with Chlorhexadine gluconate (CHG) 10/20/20 1700   Action Taken Open ports on tubing capped 10/20/20 1700   Proximal Hub Color/Line Status White;Capped;Flushed 10/20/20 1700   Positive Blood Return (Medial Site) Yes 10/20/20 1700   Medial 1 Hub Color/Line Status Blue;Flushed;Capped 10/20/20 1700   Positive Blood Return (Lateral Site) Yes 10/20/20 1700   Medial 2 Hub Color/Line Status Gray;Flushed;Capped 10/20/20 1700   Positive Blood Return (Site #3) Yes 10/20/20 1700   Distal Hub Color/Line Status Brown;Flushed;Capped 10/20/20 1700   Positive Blood Return (Site #4) Yes 10/20/20 1700   Alcohol Cap Used Yes 10/20/20 1700       Peripheral IV 10/20/20 Anterior;Distal;Right Wrist (Active)   Site Assessment Clean, dry, & intact 10/20/20 1700   Phlebitis Assessment 0 10/20/20 1700   Infiltration Assessment 0 10/20/20 1700   Dressing Status Clean, dry, & intact 10/20/20 1700   Dressing Type Tape;Transparent 10/20/20 1700   Hub Color/Line Status Blue 10/20/20 1700   Alcohol Cap Used No 10/20/20 1700        Opportunity for questions and clarification was provided.       Patient transported with:   Monitor  O2 @ 3 liters  Registered Nurse

## 2020-10-22 ENCOUNTER — APPOINTMENT (OUTPATIENT)
Dept: GENERAL RADIOLOGY | Age: 70
DRG: 271 | End: 2020-10-22
Attending: PHYSICIAN ASSISTANT
Payer: MEDICARE

## 2020-10-22 VITALS
WEIGHT: 177.91 LBS | HEIGHT: 63 IN | OXYGEN SATURATION: 91 % | DIASTOLIC BLOOD PRESSURE: 53 MMHG | RESPIRATION RATE: 18 BRPM | SYSTOLIC BLOOD PRESSURE: 97 MMHG | HEART RATE: 78 BPM | TEMPERATURE: 98.2 F | BODY MASS INDEX: 31.52 KG/M2

## 2020-10-22 LAB
ALBUMIN SERPL-MCNC: 3.1 G/DL (ref 3.5–5)
ALBUMIN/GLOB SERPL: 1 {RATIO} (ref 1.1–2.2)
ALP SERPL-CCNC: 67 U/L (ref 45–117)
ALT SERPL-CCNC: 21 U/L (ref 12–78)
ANION GAP SERPL CALC-SCNC: 6 MMOL/L (ref 5–15)
AST SERPL-CCNC: 15 U/L (ref 15–37)
BILIRUB SERPL-MCNC: 0.6 MG/DL (ref 0.2–1)
BUN SERPL-MCNC: 10 MG/DL (ref 6–20)
BUN/CREAT SERPL: 20 (ref 12–20)
CALCIUM SERPL-MCNC: 8.5 MG/DL (ref 8.5–10.1)
CHLORIDE SERPL-SCNC: 108 MMOL/L (ref 97–108)
CO2 SERPL-SCNC: 26 MMOL/L (ref 21–32)
CREAT SERPL-MCNC: 0.5 MG/DL (ref 0.55–1.02)
ERYTHROCYTE [DISTWIDTH] IN BLOOD BY AUTOMATED COUNT: 13.4 % (ref 11.5–14.5)
GLOBULIN SER CALC-MCNC: 3.2 G/DL (ref 2–4)
GLUCOSE BLD STRIP.AUTO-MCNC: 129 MG/DL (ref 65–100)
GLUCOSE SERPL-MCNC: 120 MG/DL (ref 65–100)
HCT VFR BLD AUTO: 38.9 % (ref 35–47)
HGB BLD-MCNC: 12.4 G/DL (ref 11.5–16)
MAGNESIUM SERPL-MCNC: 2.3 MG/DL (ref 1.6–2.4)
MCH RBC QN AUTO: 31.2 PG (ref 26–34)
MCHC RBC AUTO-ENTMCNC: 31.9 G/DL (ref 30–36.5)
MCV RBC AUTO: 98 FL (ref 80–99)
NRBC # BLD: 0 K/UL (ref 0–0.01)
NRBC BLD-RTO: 0 PER 100 WBC
PLATELET # BLD AUTO: 223 K/UL (ref 150–400)
PMV BLD AUTO: 10.9 FL (ref 8.9–12.9)
POTASSIUM SERPL-SCNC: 3.6 MMOL/L (ref 3.5–5.1)
PROT SERPL-MCNC: 6.3 G/DL (ref 6.4–8.2)
RBC # BLD AUTO: 3.97 M/UL (ref 3.8–5.2)
SERVICE CMNT-IMP: ABNORMAL
SODIUM SERPL-SCNC: 140 MMOL/L (ref 136–145)
WBC # BLD AUTO: 19.7 K/UL (ref 3.6–11)

## 2020-10-22 PROCEDURE — 82962 GLUCOSE BLOOD TEST: CPT

## 2020-10-22 PROCEDURE — 77010033678 HC OXYGEN DAILY

## 2020-10-22 PROCEDURE — 97530 THERAPEUTIC ACTIVITIES: CPT

## 2020-10-22 PROCEDURE — 97535 SELF CARE MNGMENT TRAINING: CPT

## 2020-10-22 PROCEDURE — 80053 COMPREHEN METABOLIC PANEL: CPT

## 2020-10-22 PROCEDURE — 74011250637 HC RX REV CODE- 250/637: Performed by: THORACIC SURGERY (CARDIOTHORACIC VASCULAR SURGERY)

## 2020-10-22 PROCEDURE — 36415 COLL VENOUS BLD VENIPUNCTURE: CPT

## 2020-10-22 PROCEDURE — 83735 ASSAY OF MAGNESIUM: CPT

## 2020-10-22 PROCEDURE — 71045 X-RAY EXAM CHEST 1 VIEW: CPT

## 2020-10-22 PROCEDURE — 74011000250 HC RX REV CODE- 250: Performed by: PHYSICIAN ASSISTANT

## 2020-10-22 PROCEDURE — 97116 GAIT TRAINING THERAPY: CPT

## 2020-10-22 PROCEDURE — 74011250637 HC RX REV CODE- 250/637: Performed by: PHYSICIAN ASSISTANT

## 2020-10-22 PROCEDURE — 74011636637 HC RX REV CODE- 636/637: Performed by: PHYSICIAN ASSISTANT

## 2020-10-22 PROCEDURE — 85027 COMPLETE CBC AUTOMATED: CPT

## 2020-10-22 PROCEDURE — 2709999900 HC NON-CHARGEABLE SUPPLY

## 2020-10-22 PROCEDURE — 94640 AIRWAY INHALATION TREATMENT: CPT

## 2020-10-22 RX ORDER — ACETAMINOPHEN 325 MG/1
650 TABLET ORAL
Status: DISCONTINUED | OUTPATIENT
Start: 2020-10-22 | End: 2020-10-22 | Stop reason: HOSPADM

## 2020-10-22 RX ORDER — ACETAMINOPHEN 325 MG/1
650 TABLET ORAL
Qty: 30 TAB | Refills: 2 | Status: SHIPPED | OUTPATIENT
Start: 2020-10-22 | End: 2020-11-02

## 2020-10-22 RX ORDER — SPIRONOLACTONE 25 MG/1
25 TABLET ORAL DAILY
Status: DISCONTINUED | OUTPATIENT
Start: 2020-10-22 | End: 2020-10-22 | Stop reason: HOSPADM

## 2020-10-22 RX ORDER — METOPROLOL SUCCINATE 25 MG/1
25 TABLET, EXTENDED RELEASE ORAL
Qty: 30 TAB | Refills: 2 | Status: SHIPPED | OUTPATIENT
Start: 2020-10-22 | End: 2020-12-10 | Stop reason: SDUPTHER

## 2020-10-22 RX ORDER — BUMETANIDE 1 MG/1
1 TABLET ORAL DAILY
Status: DISCONTINUED | OUTPATIENT
Start: 2020-10-22 | End: 2020-10-22 | Stop reason: HOSPADM

## 2020-10-22 RX ORDER — TRAMADOL HYDROCHLORIDE 50 MG/1
50 TABLET ORAL
Qty: 28 TAB | Refills: 0 | Status: SHIPPED | OUTPATIENT
Start: 2020-10-22 | End: 2020-10-29

## 2020-10-22 RX ADMIN — DOCUSATE SODIUM 50MG AND SENNOSIDES 8.6MG 1 TABLET: 8.6; 5 TABLET, FILM COATED ORAL at 08:42

## 2020-10-22 RX ADMIN — OXYCODONE HYDROCHLORIDE AND ACETAMINOPHEN 1000 MG: 500 TABLET ORAL at 08:41

## 2020-10-22 RX ADMIN — CHLORHEXIDINE GLUCONATE 10 ML: 1.2 RINSE ORAL at 08:58

## 2020-10-22 RX ADMIN — LEVOTHYROXINE SODIUM 150 MCG: 0.1 TABLET ORAL at 06:31

## 2020-10-22 RX ADMIN — Medication 2 TABLET: at 08:42

## 2020-10-22 RX ADMIN — MUPIROCIN: 20 OINTMENT TOPICAL at 09:00

## 2020-10-22 RX ADMIN — BUMETANIDE 1 MG: 1 TABLET ORAL at 10:19

## 2020-10-22 RX ADMIN — FAMOTIDINE 20 MG: 20 TABLET, FILM COATED ORAL at 08:42

## 2020-10-22 RX ADMIN — MAGNESIUM OXIDE 400 MG (241.3 MG MAGNESIUM) TABLET 400 MG: TABLET at 08:42

## 2020-10-22 RX ADMIN — ACETAMINOPHEN 650 MG: 325 TABLET ORAL at 08:58

## 2020-10-22 RX ADMIN — PREDNISONE 5 MG: 5 TABLET ORAL at 08:42

## 2020-10-22 RX ADMIN — SPIRONOLACTONE 25 MG: 25 TABLET ORAL at 10:19

## 2020-10-22 RX ADMIN — TRAMADOL HYDROCHLORIDE 50 MG: 50 TABLET ORAL at 06:36

## 2020-10-22 RX ADMIN — INSULIN GLARGINE 10 UNITS: 100 INJECTION, SOLUTION SUBCUTANEOUS at 08:43

## 2020-10-22 RX ADMIN — IPRATROPIUM BROMIDE 0.5 MG: 0.5 SOLUTION RESPIRATORY (INHALATION) at 02:06

## 2020-10-22 RX ADMIN — Medication 1 TABLET: at 08:57

## 2020-10-22 RX ADMIN — ACETAMINOPHEN 650 MG: 325 TABLET ORAL at 04:52

## 2020-10-22 RX ADMIN — TRAMADOL HYDROCHLORIDE 50 MG: 50 TABLET ORAL at 10:41

## 2020-10-22 RX ADMIN — FERROUS SULFATE TAB 325 MG (65 MG ELEMENTAL FE) 325 MG: 325 (65 FE) TAB at 08:42

## 2020-10-22 RX ADMIN — TRAMADOL HYDROCHLORIDE 50 MG: 50 TABLET ORAL at 02:32

## 2020-10-22 RX ADMIN — Medication 10 ML: at 06:31

## 2020-10-22 NOTE — PROGRESS NOTES
Problem: Mobility Impaired (Adult and Pediatric)  Goal: *Acute Goals and Plan of Care (Insert Text)  Description: FUNCTIONAL STATUS PRIOR TO ADMISSION: Patient was independent and active without use of DME.    HOME SUPPORT PRIOR TO ADMISSION: The patient lived with her . Physical Therapy Goals  Initiated 10/21/2020  1. Patient will move from supine to sit and sit to supine , scoot up and down, and roll side to side in bed with modified independence within 7 day(s). 2.  Patient will transfer from bed to chair and chair to bed with independence using the least restrictive device within 7 day(s). 3.  Patient will perform sit to stand with independence within 7 day(s). 4.  Patient will ambulate with independence for 150 feet with the least restrictive device within 7 day(s). 5.  Patient will ascend/descend 5 stairs with one sided handrail(s) with modified independence within 7 day(s). Outcome: Progressing Towards Goal   PHYSICAL THERAPY TREATMENT  Patient: Layla Hudson (6 y.o. female)  Date: 10/22/2020  Diagnosis: A-fib ([facility] [I48.91]  Atrial fibrillation ([facility] [facility] [I48.91]   <principal problem not specified>  Procedure(s) (LRB):  VIDEO ASSISTED THORACOSCOPIC LEFT ATRIAL APPENDAGE LIGATION (Left) 2 Days Post-Op  Precautions:    Chart, physical therapy assessment, plan of care and goals were reviewed. ASSESSMENT  Patient continues with skilled PT services and is progressing towards goals. Patient continues to have increased pain at chest tube site, sharp and stabbing with mobility although able to splint/brace with cardiac bear on L side of chest. Patient required SBA with RW for all mobility and ambulated 150 feet in hallway. Encouraged patient to use RW at home to conserve energy. O2 sats remained stable on RA with activity, >93%.  Returned to supine for chest tube removal.     Current Level of Function Impacting Discharge (mobility/balance): CGA-SBA with RW    Other factors to consider for discharge: pain         PLAN :  Patient continues to benefit from skilled intervention to address the above impairments. Continue treatment per established plan of care. to address goals. Recommendation for discharge: (in order for the patient to meet his/her long term goals)  Physical therapy at least 2 days/week in the home with 's support    This discharge recommendation:  Has been made in collaboration with the attending provider and/or case management    IF patient discharges home will need the following DME: patient owns DME required for discharge-recommended patient use her RW       SUBJECTIVE:   Patient stated I am going home today.     OBJECTIVE DATA SUMMARY:   Critical Behavior:  Neurologic State: Alert  Orientation Level: Oriented X4  Cognition: Appropriate for age attention/concentration, Follows commands  Safety/Judgement: Awareness of environment, Insight into deficits  Functional Mobility Training:  Bed Mobility:  Rolling: Contact guard assistance  Supine to Sit: Contact guard assistance  Sit to Supine: Minimum assistance  Scooting: Stand-by assistance        Transfers:  Sit to Stand: Stand-by assistance  Stand to Sit: Stand-by assistance        Bed to Chair: Stand-by assistance                    Balance:  Sitting: Intact; Without support  Standing: Intact; With support  Ambulation/Gait Training:  Distance (ft): 150 Feet (ft)  Assistive Device: Gait belt;Walker, rolling  Ambulation - Level of Assistance: Stand-by assistance        Gait Abnormalities: Decreased step clearance;Trunk sway increased; Antalgic        Base of Support: Widened     Speed/Ermelinda: Pace decreased (<100 feet/min)  Step Length: Right shortened;Left shortened                Patient tolerated standing at the sink x 5 minutes for grooming with supervision  Toileted self independently    Activity Tolerance:   Good and SpO2 stable on RA      After treatment patient left in no apparent distress:   Supine in bed and Call bell within reach    COMMUNICATION/COLLABORATION:   The patients plan of care was discussed with: Occupational therapist, Registered nurse, and Case management.      Tavo Pretty, PT, DPT   Time Calculation: 31 mins

## 2020-10-22 NOTE — PROGRESS NOTES
ADULT PROTOCOL: JET AEROSOL  REASSESSMENT    Patient  Danyel Watson     65 y.o.   female     10/21/2020  8:33 PM    Breath Sounds Pre Procedure: Right Breath Sounds: Diminished                               Left Breath Sounds: Diminished    Breath Sounds Post Procedure: Right Breath Sounds: Diminished                                 Left Breath Sounds: Diminished    Breathing pattern: Pre procedure Breathing Pattern: Regular          Post procedure Breathing Pattern: Regular    Heart Rate: Pre procedure Pulse: 75           Post procedure Pulse: 84    Resp Rate: Pre procedure Respirations: 18           Post procedure Respirations: 18       Incentive Spirometry:  Actual Volume (ml): 250 ml      Cough: Pre procedure Cough: Non-productive               Post procedure Cough: Non-productive    Suctioned: NO      Oxygen: O2 Device: Nasal cannula   2L     Changed: NO    SpO2: Pre procedure SpO2: 99 %   with oxygen              Post procedure SpO2: 94 %  with oxygen    Nebulizer Therapy: Current medications Aerosolized Medications: Ipratropium bromide      Changed: NO    Smoking History: former smoker    Problem List:   Patient Active Problem List   Diagnosis Code    Asthma J45.909    PAF (paroxysmal atrial fibrillation) ([facility]) I48.0    CAD (coronary artery disease) I25.10    Pernicious anemia D51.0    Esophageal reflux K21.9    Gluten intolerance K90.41    Iron deficiency anemia, unspecified D50.9    Hypothyroidism E03.9    Bony exostosis M89.8X9    Synovitis of ankle M65.9    Pes cavus Q66.70    HTN (hypertension), benign V48    Diastolic dysfunction M12.69    Chest pain R07.9    Rheumatoid arthritis ([facility]) M06.9    Leukocytosis D72.829    Elevated blood sugar R73.9    Diabetes mellitus type 2, controlled ([facility]) E11.9    Coronary artery disease with stable angina pectoris ([facility]) G03.316    Diastolic heart failure ([facility]) I50.30    A-fib ([facility]) I48.91    Atrial fibrillation ([facility]) I48.91       Respiratory Therapist: Oscar Hubbard, RT

## 2020-10-22 NOTE — PROGRESS NOTES
1900 - Bedside and Verbal shift change report given to Estrella Mae RN (oncoming nurse) by Dave Dai RN (offgoing nurse). Report included the following information SBAR, Kardex, Intake/Output, MAR, Recent Results and Cardiac Rhythm NSR. Pt up in chair at this time. States pain is okay right now. Call bell within reach. Will continue to monitor. 2125 - Pt ambulating in hallway to middle of long hallway. Pt ambulated back to chair, reported increase in pain. Pt given tylenol, will administer ultram when able. Call bell within reach. Will continue to monitor. 2238 - Pt back to bed at this time. VSS. Given lidocaine patch and ultram for pain by [address] - Ambulated pt to bathroom to void. Pt became SOB on exertion with audible wheezing. Paged respiratory for sched neb administration. Respiratory coming to bedside. Pt reports increasing pain. Will administer ultram when able. [facility] with Dr. Aster Jimenez for pt sharp pain around chest tube site when breathing. Pt states it was controlled with tylenol and ultram combination. Pt has reactions to other pain medications and tylenol had been d/c. Ordered to restart tylenol. Will administer. 5 - Paged xray to do CXR earlier for pain from chest tube. 2264 - Xray at bedside. 6028 - Pt states pain has resolved since tylenol administration. Will continue to monitor. 2743 - Administered ultram for pain control, pt reports increased pain with movement. Will continue to monitor. 0700 - Shift report given to Nicole Ahumada RN.

## 2020-10-22 NOTE — PROGRESS NOTES
Pharmacist Discharge Medication Reconciliation    Significant PMH:   Past Medical History:   Diagnosis Date    Adverse effect of anesthesia     slow to wake up    Arthritis     Asthma 6/29/2009    CAUSED BY MOLD    Atrial fibrillation ([facility] 6/29/2009    CAD (coronary artery disease) 06/29/2009    Celiac disease 2010    Chronic obstructive pulmonary disease ([facility] 2004-5    right leg    Chronic pain of right ankle     Diabetes ([facility]     Drug induced prednisone, DM2    Diastolic heart failure (HCC)     DVT (deep venous thrombosis) ([facility] 2004    Right leg post surgery    GERD (gastroesophageal reflux disease)     Gluten intolerance     Heart failure ([facility]     Hypertension     Hypothyroidism 6/29/2009    Iron deficiency anemia     Lyme disease     Personal history of MI (myocardial infarction)     Rheumatoid arthritis ([facility]     Slow to wake up after anesthesia     Syncope     Post testing- resolved    TIA (transient ischemic attack) 2004 & 2006    Vitamin B12 deficiency 6/29/2009     Chief Complaint for this Admission: No chief complaint on file. Allergies: Butter flavor; Keflex [cephalexin]; Pcn [penicillins]; Aspirin; Cimzia [certolizumab pegol]; Clopidogrel; Demerol [meperidine]; Fentanyl; Morphine; Nitroglycerin; Sulfa (sulfonamide antibiotics); Tapazole [methimazole]; Adhesive tape-silicones; Albuterol; Gluten; and Oxycodone    Discharge Medications:   Current Discharge Medication List        START taking these medications    Details   acetaminophen (TYLENOL) 325 mg tablet Take 2 Tabs by mouth every four (4) hours as needed for Pain. Qty: 30 Tab, Refills: 2      traMADoL (ULTRAM) 50 mg tablet Take 1 Tab by mouth every six (6) hours as needed for Pain for up to 7 days. Max Daily Amount: 200 mg.   Qty: 28 Tab, Refills: 0    Associated Diagnoses: S/P left atrial appendage ligation           CONTINUE these medications which have CHANGED    Details   metoprolol succinate (TOPROL-XL) 25 mg XL tablet Take 1 Tab by mouth daily (after lunch). Qty: 30 Tab, Refills: 2           CONTINUE these medications which have NOT CHANGED    Details   aclidinium bromide (Tudorza Pressair) 400 mcg/actuation inhaler Take 1 Puff by inhalation. !! predniSONE (DELTASONE) 2.5 mg tablet Take 2.5 mg by mouth daily (after lunch). gabapentin (NEURONTIN) 100 mg capsule Take 100 mg by mouth nightly. mometasone furoate (ASMANEX HFA IN) Take 100 mcg by inhalation two (2) times a day. insulin glargine (LANTUS,BASAGLAR) 100 unit/mL (3 mL) inpn 10 Units by SubCUTAneous route Daily (before breakfast). 30 minutes before breakfast  Indications: \"I set the pen on 8\"      eplerenone (INSPRA) 25 mg tablet Take 25 mg by mouth Daily (before lunch). OTHER Indications: LIVONGO Glucose Monitor      levothyroxine (SYNTHROID) 150 mcg tablet Take 150 mcg by mouth Daily (before breakfast). !! predniSONE (DELTASONE) 5 mg tablet Take 7.5 mg by mouth daily. 5 mg in morning and 2.5 mg afternoon      lidocaine (LIDODERM) 5 % 1 Patch by TransDERmal route every twenty-four (24) hours. Apply patch to the affected area for 12 hours a day and remove for 12 hours a day. lansoprazole (PREVACID) 30 mg capsule Take 30 mg by mouth daily. Associated Diagnoses: Esophageal reflux      calcium citrate-vitamin d3 (CITRACAL+D) 315 mg-5 mcg (200 unit) tab Take 1 Tab by mouth daily (with breakfast). hypromellose (GENTEAL MILD OP) Apply  to eye.      potassium 99 mg tablet Take 99 mg by mouth daily. denosumab (PROLIA SC) by SubCUTAneous route every 6 months. bumetanide (BUMEX) 1 mg tablet Take 1 mg by mouth daily. fenofibrate nanocrystallized (Tricor) 48 mg tablet Take 48 mg by mouth nightly. nitroglycerin (NITRODUR) 0.1 mg/hr 1 Patch by TransDERmal route daily as needed.  Indications: Oral form \"causes severe BP drop  BAD\" Drs got scared,      ondansetron (ZOFRAN ODT) 4 mg disintegrating tablet Take 1 Tab by mouth every eight (8) hours as needed for Nausea. Qty: 10 Tab, Refills: 0    Associated Diagnoses: Non-intractable vomiting with nausea, unspecified vomiting type      cholecalciferol, vitamin D3, (VITAMIN D3) 2,000 unit tab Take 2,000 Units by mouth daily. COQ10, UBIQUINOL, PO Take 100 mg by mouth daily. turmeric (CURCUMIN) Take 1 g by mouth every evening. SITagliptin (JANUVIA) 100 mg tablet Take 100 mg by mouth daily (with dinner). flaxseed oil (OMEGA 3 PO) Take 1 Tab by mouth every evening. Indications: FISH OIL      vit C/vit E ac/lut/copper/zinc (PRESERVISION LUTEIN PO) Take 1 Tab by mouth daily (with dinner). Once per week      ascorbic acid (VITAMIN C) 500 mg tablet Take 1,000 mg by mouth daily. ferrous sulfate 325 mg (65 mg iron) tablet Take  by mouth every evening. cyanocobalamin (VITAMIN B12) 1,000 mcg/mL injection INJECT 1 ML INTO THE MUSCLE EVERY 30 DAYS  Qty: 10 mL, Refills: 0       !! - Potential duplicate medications found. Please discuss with provider. STOP taking these medications       mupirocin (BACTROBAN) 2 % ointment Comments:   Reason for Stopping:         chlorhexidine (PERIDEX) 0.12 % solution Comments:   Reason for Stopping:               The patient's chart, MAR and AVS were reviewed by Carol Green RPH. Discharging Provider: General Maillard   (Remove the following comments before filing to note)  Recommendations/Clarifications:   Pt allergic to anticoag - gi bleed, allergic to statin but is on Tricor   Was on Eplerenone PTA but was not restarted in patient, is continuing it on AVS   Prednisone appears twice due to the fact that she takes different strengths at different intervals      Other Interventions (patient counseling, changes made to AVS, etc):      Additional Comments:     Time spent: 15 min    Thank You,     KIRAN [facility]

## 2020-10-22 NOTE — PROGRESS NOTES
Problem: Self Care Deficits Care Plan (Adult)  Goal: *Acute Goals and Plan of Care (Insert Text)  Description:   FUNCTIONAL STATUS PRIOR TO ADMISSION: Pt was independent w/ all ADLs prior to admission. Pt's  does help w/ IADL because of pt's recent fall causing limited ROM in RUE, CMC brace on R hand, and pain in R leg. Pt was not driving, pt's  drives. HOME SUPPORT: Pt lives w/  in 1 story home and uses walk-in shower. Pt's  will assist w/ ADL/IADLs as needed. Occupational Therapy Goals  Initiated 10/21/2020  1. Patient will perform upper body dressing with modified independence within 7 day(s). 2.  Patient will perform lower body dressing with modified independence using AE as needed within 7 day(s). 3.  Patient will perform bathing with modified independence within 7 day(s). 4.  Patient will perform toilet transfers with independence within 7 day(s). 5.  Patient will participate in upper extremity therapeutic exercise/activities with modified independence for 8 minutes within 7 day(s). Outcome: Resolved/Met   OCCUPATIONAL THERAPY TREATMENT/DISCHARGE  Patient: Jade Perry (63 y.o. female)  Date: 10/22/2020  Diagnosis: A-fib ([facility] [I48.91]  Atrial fibrillation ([facility] [I48.91]   <principal problem not specified>  Procedure(s) (LRB):  VIDEO ASSISTED THORACOSCOPIC LEFT ATRIAL APPENDAGE LIGATION (Left) 2 Days Post-Op  Precautions:    Chart, occupational therapy assessment, plan of care, and goals were reviewed. ASSESSMENT  Patient continues with skilled OT services and has progressed towards goals. Pt had just had chest drain removed and was preparing for D/C today. Pt was CGA/min A during bed mobility to reach seated at EOB, pt again mostly limited by moderate pain on L side of trunk where drain had been removed. Pt was SBA to [facility] gown and don dress w/ buttons.  Pt used one handed technique w/ L hand to button dress due to limited R hand function (from prior Aia 16 joint injury). Pt needed min-mod A to don slide on shoes w/ elastic laces due to limited forward trunk flexion, pt had stated prior to admission pt's  was assisting w/ socks and shoes. Pt did not use RW and was CGA during all bathroom mobility. Pt remained stable on room air during session, and does not use oxygen at home. Pt held cardiac bear to L side of trunk to put pressure over prior drain site when walking. Pt stated she has no concerns about going home and has support from her . Pt does not need any additional skilled OT services at this time. Current Level of Function (ADLs/self-care):   Grooming  Position Performed: Standing  Brushing Teeth: Set-up(applying oral gel)  Fixing hair: SBA    Upper Body Dressing Assistance  Dressing Assistance: Stand-by assistance([facility] hospital gown, donning pull over w/ button dress)  Hospital Gown: Stand-by assistance(doffing gown. While seated)    Lower Body Dressing Assistance  Shoes with Elastic Laces: Minimum assistance; Moderate assistance(limited by forward flexion at trunk)  Leg Crossed Method Used: No  Position Performed: Seated edge of bed    Toileting  Toileting Assistance: Supervision(attempted to urinate, unable)    Other factors to consider for discharge: Pt has met goals and is able to participate in self-care w/ 's assist as needed for doffing/donning socks and shoes, as well as other IADLs. PLAN :  Rationale for discharge: Other: patient being D/C today and goals met  Recommend with staff: None  Recommendation for discharge: (in order for the patient to meet his/her long term goals)  No skilled occupational therapy/ follow up rehabilitation needs identified at this time.     This discharge recommendation:  Has been made in collaboration with the attending provider and/or case management    IF patient discharges home will need the following DME:none       SUBJECTIVE:   Patient stated i'm ready to go home.    OBJECTIVE DATA SUMMARY:   Cognitive/Behavioral Status:  Neurologic State: Alert  Orientation Level: Oriented X4  Cognition: Appropriate decision making; Follows commands  Perception: Appears intact  Perseveration: No perseveration noted  Safety/Judgement: Awareness of environment    Functional Mobility and Transfers for ADLs:  Bed Mobility:  Rolling: Contact guard assistance  Supine to Sit: Contact guard assistance  Sit to Supine: Contact guard assistance;Minimum assistance  Scooting: Stand-by assistance    Transfers:  Sit to Stand: Stand-by assistance  Functional Transfers  Bathroom Mobility: Contact guard assistance;Stand-by assistance  Toilet Transfer : Stand-by assistance  Bed to Chair: Stand-by assistance    Balance:  Sitting: Intact; Without support  Standing: Intact; With support    ADL Intervention:       Grooming  Position Performed: Standing  Brushing Teeth: Set-up(applying oral gel)    Upper Body Dressing Assistance  Dressing Assistance: Stand-by assistance([facility] hospital gown, donning pull over w/ button dress)  Hospital Gown: Stand-by assistance(doffing gown. While seated)    Lower Body Dressing Assistance  Shoes with Elastic Laces: Minimum assistance; Moderate assistance(limited by forward flexion at trunk)  Leg Crossed Method Used: No  Position Performed: Seated edge of bed    Toileting  Toileting Assistance: Supervision(attempted to urinate, unable)    Cognitive Retraining  Safety/Judgement: Awareness of environment    Pain:  Moderate pain from archana drain removal    Activity Tolerance:   Fair  Please refer to the flowsheet for vital signs taken during this treatment. After treatment patient left in no apparent distress:   Sitting in chair and Call bell within reach    COMMUNICATION/COLLABORATION:   The patients plan of care was discussed with: Physical therapist, Registered nurse and patient. Mariah Franco  Time Calculation: 23 mins   Regarding student involvement in patient care:   JAISON student participated in this treatment session. Per CMS Medicare statements and AOTA guidelines I certify that the following was true:  1. I was present and directly observed the entire session. 2. I made all skilled judgments and clinical decisions regarding care. 3. I am the practitioner responsible for assessment, treatment, and documentation.

## 2020-10-22 NOTE — DISCHARGE INSTRUCTIONS
[facility]     [address]  [address]                                                           [address] [place] [address] [address] [facility]  Office- [phone]  Fax- [phone]        Office- [phone]  Fax- [phone]  _________________________________________________________________  Dr. Rian Mujica, LESLIE [place], [place]  Dr. Quin Renteria, LESLIE Donohue, ANA Watkins, LESLIE King, [facility], LESLIE Hansen, [place]                                                  Sofie Mcgill NP                                                            Name:Rabia Diggs     Surgery & Date: Procedure(s):  VIDEO ASSISTED THORACOSCOPIC LEFT ATRIAL APPENDAGE LIGATION    Discharge Date: No discharge date for patient encounter. MEDICATIONS:  Please refer to your After Visit Summary for your medication list. If you do not have a prescription for a new medication, you may purchase the medication over the counter. DO NOT TAKE ANY MEDICATIONS THAT ARE NOT ON THIS LIST    INSTRUCTIONS:  1. NO SMOKING OR TOBACCO PRODUCTS  2. Follow all the instructions in your discharge book  3. You may shower. Wash all incisions twice daily with mild soap and water. No lotions, ointments or powder. 4. Call the office immediately for any redness, swelling, or drainage from your incision. You will have a visible stitch at your chest tube site. We will remove this at your follow up visit. 5. Take your temperature daily and call for a temperature of 101 degrees or higher or for any symptoms that make you think you have and infection. 6. Weigh yourself each morning.   Call if you gain more than 5 pounds in 48 hours. 7. Use the incentive spirometer 6-8 times a day-10 breaths each time. Use a pillow or your bear to splint your breastbone when coughing or sneezing. 8. Walk several hundred feet several times daily. DIET  Eat an American Heart Association diet. If you are having trouble with your appetite, eat what you can. Try eating small, frequent meals throughout the day. Diabetic patients should follow an ADA diet. Check your blood sugars  And keep a log of your results. ACTIVITY  1. NO DRIVING--you will be evaluated to drive at your follow up visit. 2. Increase your activity by walking several times a day. Stay out of bed most of the day. 3. When sitting, keep your legs elevated. 4. You may ride in a car, but you must get out every hour and walk around. Normal Problems After Discharge:   Some fatigue and difficulty sleeping   Poor appetite initially, with temporary change in taste   Temperature variability; feeling hot and cold   Chest wall soreness and paresthesia (numbness)   Some musculoskeletal pain along joint lines in chest and back.  Tylenol or NSAIDs will help unless contraindicated        FOLLOW UP  1. Your first follow up appointment will be on 10/30/2020 at 10:30 am. Our office is located in [address] [address], [address]. Please call our office at [phone] if you are unable to make this appointment. 2. Consult you primary care physician regarding your influenza &   pneumovax vaccines. 5.   Please bring all medications (or a complete list) with you to your appointment. 6.   Do not hesitate to contact our office 24/7 with any questions or concerns.       Signature:___________________________________________________

## 2020-10-22 NOTE — PROGRESS NOTES
RAPID RESPONSE TEAM- Follow Up     Rounded on patient due to transfer from CCU; s/p atrial clip. CT x1 in place. Spoke with primary RN Kelsy Alfred. No acute concerns at this time. No RRT interventions indicated at this time. Please call back if needed.      Ladi Mock RN  RRT  Ext. [phone]

## 2020-10-22 NOTE — DISCHARGE SUMMARY
[facility]   Discharge Summary     Patient ID:  Domi Arredondo  [id number]  6 y.o.  1950    Admit date: 10/20/2020    Discharge date: 10/22/2020     Admitting Physician: Axel Vanegas MD     Referring Cardiologist: Isabella Davidson    PCP: Carina Hyatt MD    Admitting Diagnoses: Afib    Discharge Diagnoses:     Hospital Problems  Date Reviewed: 10/20/2020          Codes Class Noted POA    A-fib [facility]) ICD-10-CM: I48.91  ICD-9-CM: 427.31  10/20/2020 Unknown        Atrial fibrillation [facility]) ICD-10-CM: I48.91  ICD-9-CM: 427.31  10/20/2020 Unknown              Discharged Condition: stable    Disposition: home, see patient instructions for treatment and plan    Procedures for this admission:  Procedure(s):  VIDEO ASSISTED THORACOSCOPIC LEFT ATRIAL APPENDAGE LIGATION    Discharge Medications:      My Medications      START taking these medications      Instructions Each Dose to Equal Morning Noon Evening Bedtime   acetaminophen 325 mg tablet  Commonly known as:  TYLENOL    Your last dose was: Your next dose is: Take 2 Tabs by mouth every four (4) hours as needed for Pain. 650 mg                 traMADoL 50 mg tablet  Commonly known as:  ULTRAM    Your last dose was: Your next dose is: Take 1 Tab by mouth every six (6) hours as needed for Pain for up to 7 days. Max Daily Amount: 200 mg.   50 mg                    CHANGE how you take these medications      Instructions Each Dose to Equal Morning Noon Evening Bedtime   metoprolol succinate 25 mg XL tablet  Commonly known as:  TOPROL-XL  What changed:    · medication strength  · how much to take  · when to take this    Your last dose was: Your next dose is: Take 1 Tab by mouth daily (after lunch).    25 mg                    CONTINUE taking these medications      Instructions Each Dose to Equal Morning Noon Evening Bedtime   ascorbic acid (vitamin C) 500 mg tablet  Commonly known as:  VITAMIN C    Your last dose was: Your next dose is: Take 1,000 mg by mouth daily. 1,000 mg                 ASMANEX HFA IN    Your last dose was: Your next dose is: Take 100 mcg by inhalation two (2) times a day. 100 mcg                 bumetanide 1 mg tablet  Commonly known as:  BUMEX    Your last dose was: Your next dose is: Take 1 mg by mouth daily. 1 mg                 calcium citrate-vitamin d3 315 mg-5 mcg (200 unit) Tab  Commonly known as:  CITRACAL+D    Your last dose was: Your next dose is: Take 1 Tab by mouth daily (with breakfast). 1 Tab                 COQ10 (UBIQUINOL) PO    Your last dose was: Your next dose is: Take 100 mg by mouth daily. 100 mg                 CURCUMIN    Your last dose was: Your next dose is: Take 1 g by mouth every evening. 1 g                 cyanocobalamin 1,000 mcg/mL injection  Commonly known as:  VITAMIN B12    Your last dose was: Your next dose is:          INJECT 1 ML INTO THE MUSCLE EVERY 30 DAYS                  eplerenone 25 mg tablet  Commonly known as:  INSPRA    Your last dose was: Your next dose is: Take 25 mg by mouth Daily (before lunch). 25 mg                 ferrous sulfate 325 mg (65 mg iron) tablet    Your last dose was: Your next dose is: Take  by mouth every evening.                  gabapentin 100 mg capsule  Commonly known as:  NEURONTIN    Your last dose was: Your next dose is: Take 100 mg by mouth nightly. 100 mg                 GENTEAL MILD OP    Your last dose was: Your next dose is:          Apply  to eye.                  insulin glargine 100 unit/mL (3 mL) Inpn  Commonly known as:  LANTUS,BASAGLAR    Your last dose was: Your next dose is:          10 Units by SubCUTAneous route Daily (before breakfast).  30 minutes before breakfast  Indications: \"I set the pen on 8\"   10 Units                 Januvia 100 mg tablet  Generic drug: SITagliptin    Your last dose was: Your next dose is: Take 100 mg by mouth daily (with dinner). 100 mg                 levothyroxine 150 mcg tablet  Commonly known as:  SYNTHROID    Your last dose was: Your next dose is: Take 150 mcg by mouth Daily (before breakfast). 150 mcg                 Lidoderm 5 %  Generic drug:  lidocaine    Your last dose was: Your next dose is:          1 Patch by TransDERmal route every twenty-four (24) hours. Apply patch to the affected area for 12 hours a day and remove for 12 hours a day. 1 Patch                 nitroglycerin 0.1 mg/hr  Commonly known as:  NLTJPRYE    Your last dose was: Your next dose is:          1 Patch by TransDERmal route daily as needed. Indications: Oral form \"causes severe BP drop  BAD\" Drs got scared,   1 Patch                 OMEGA 3 PO    Your last dose was: Your next dose is: Take 1 Tab by mouth every evening. Indications: FISH OIL   1 Tab                 ondansetron 4 mg disintegrating tablet  Commonly known as:  Zofran ODT    Your last dose was: Your next dose is: Take 1 Tab by mouth every eight (8) hours as needed for Nausea. 4 mg                 OTHER    Your last dose was: Your next dose is:          Indications: LIVONGO Glucose Monitor                  potassium 99 mg tablet    Your last dose was: Your next dose is: Take 99 mg by mouth daily. 99 mg                 * predniSONE 5 mg tablet  Commonly known as:  DELTASONE    Your last dose was: Your next dose is: Take 7.5 mg by mouth daily. 5 mg in morning and 2.5 mg afternoon   7.5 mg                 * predniSONE 2.5 mg tablet  Commonly known as:  [address] last dose was: Your next dose is: Take 2.5 mg by mouth daily (after lunch). 2.5 mg                 PRESERVISION LUTEIN PO    Your last dose was: Your next dose is: Take 1 Tab by mouth daily (with dinner). Once per week   1 Tab                 Prevacid 30 mg capsule  Generic drug:  lansoprazole    Your last dose was: Your next dose is: Take 30 mg by mouth daily. 30 mg                 PROLIA SC    Your last dose was: Your next dose is:          by SubCUTAneous route every 6 months. Tricor 48 mg tablet  Generic drug:  fenofibrate nanocrystallized    Your last dose was: Your next dose is: Take 48 mg by mouth nightly. 48 mg                 Tudorza Pressair 400 mcg/actuation inhaler  Generic drug:  aclidinium bromide    Your last dose was: Your next dose is: Take 1 Puff by inhalation. 1 Puff                 Vitamin D3 50 mcg (2,000 unit) Tab  Generic drug:  cholecalciferol (vitamin D3)    Your last dose was: Your next dose is: Take 2,000 Units by mouth daily. 2,000 Units                     * This list has 2 medication(s) that are the same as other medications prescribed for you. Read the directions carefully, and ask your doctor or other care provider to review them with you. STOP taking these medications    chlorhexidine 0.12 % solution  Commonly known as:  PERIDEX        mupirocin 2 % ointment  Commonly known as:  Rufino Jin              Where to Get Your Medications      These medications were sent to [address] [place] [address]    Phone:  [phone]   · acetaminophen 325 mg tablet  · metoprolol succinate 25 mg XL tablet  · traMADoL 50 mg tablet       HPI:  Chandan Gonzales a 65 y. o. female who was referred for cardiac evaluation by Dr. Inocencia Conner for possible SUHAS clip. The patient has had PAF for many years. She has fatigue, dizziness, and SOB when heart racing. Has had episode of syncope this year during afib. She notices these episodes 1-2 times a month.  When the episodes increase in frequency she has her TSH checked and synthroid dose adjusted.     She has a history of PAF s/p ablation by Dr. Kelsey Smallwood about 10 years ago, CAD s/p PCI, hypertension, dyslipidemia, HFpEF and anemia, RA, TIA about 7 years ago, Asthma/COPD, hypothyroid, DM type II,  lyme disease -recently completed tx. She has an intolerance to coumadin and ASA -she has developed GIB and significant thrombocytopenia on these medications. She is a former smoker. She denies alcohol. She is retired and lives with her . She has a family history of stroke and heart disease.     Hospital Course: patient was admitted electively and under LAAL via LVATS by Dr. Hector Dent on 10/20/2020. Please refer to his separately dictated op note for complete details. Postoperatively, the patient progressed well. She did require an extra day in the hospital for some mild hypotension which was treated with fluid resuscitation. Chest tube was ultimately discharged on POD #2 after chest tube removal and successful weaning from oxygen. Discharge Vital Signs:   Visit Vitals  /77 (BP 1 Location: Right arm, BP Patient Position: Sitting)   Pulse 93   Temp 98.2 °F (36.8 °C)   Resp 20   Ht 5' 3\" (1.6 m)   Wt 177 lb 14.6 oz (80.7 kg)   SpO2 93%   Breastfeeding No   BMI 31.52 kg/m²       Labs:   Recent Labs     10/22/20  0718 10/22/20  0614  10/20/20  1336   WBC  --  19.7*   < > 22.1*   HGB  --  12.4   < > 13.4   HCT  --  38.9   < > 41.8   PLT  --  223   < > 270   NA  --  140   < > 144   K  --  3.6   < > 3.8   BUN  --  10   < > 18   CREA  --  0.50*   < > 0.62   GLU  --  120*   < > 144*   GLUCPOC 129*  --    < >  --    INR  --   --   --  1.0    < > = values in this interval not displayed. Diagnostics:   CXR Results  (Last 48 hours)               10/22/20 0529  XR CHEST PORT Final result    Impression:  IMPRESSION:   Removal of left IJ line in the interval. No significant change otherwise.        Narrative:  INDICATION: postop heart       EXAMINATION:  AP CHEST, PORTABLE       COMPARISON: 10/21/2020       FINDINGS: Single AP portable view of the chest demonstrates unchanged left-sided   chest tube over the left hilum. Left IJ line has been removed in the interval.   The cardiomediastinal silhouette is unchanged. Small left pleural effusion and   basilar atelectasis. No definite pneumothorax. 10/21/20 0602  XR CHEST PORT Final result    Impression:  IMPRESSION: Stable. Narrative:  INDICATION:  postop heart       EXAM: CXR Portable. 0519 hours. COMPARISON: 10/20/2020. FINDINGS: Portable chest shows stable appearance. There is no pneumothorax,   pulmonary edema, apparent pleural effusion or midline shift. Chest drain and CVL   are stable. Heart size is stable. Left atrial appendage clip is again noted. 10/20/20 1141  XR CHEST PORT Final result    Impression:  IMPRESSION: No pneumothorax. There is slight left basilar atelectasis. Narrative:  EXAM:  XR CHEST PORT       INDICATION:  postop heart       COMPARISON:  10/15/2020       FINDINGS: A portable AP radiograph of the chest was obtained at 1120 hours. Left   IJ catheter tip overlies SVC. Shea Staggers Left atrial clip is noted in position. Left   chest tube is noted in position. .  There is no pneumothorax. There is slight   left basilar atelectasis. .  Cardiomegaly is stable. .                   Patient Instructions/Follow Up Care:  Discharge instructions were reviewed with the patient and family present. Questions were also answered at this time. Prescriptions and medications were reviewed. The patient has a follow up appointment with the Nurse Practitioner or Physician Assistant on 10/30/2020 at 10:30 am. The patient was also instructed to follow up with her primary care physician as needed. The patient and family were encouraged to call with any questions or concerns.        Signed:  ANA Teixeira  10/22/2020  9:45 AM

## 2020-10-22 NOTE — ROUTINE PROCESS
Attempted to schedule PCP JULES apt with Dr. Aaron Rene, left voicemail and currently awaiting return call.

## 2020-10-22 NOTE — CARDIO/PULMONARY
Cardiac Rehab Note: chart review     Consult has been acknowledged. Pt is s/p video assisted thoracoscopic left atrial appendage ligation, PMHx of a-fib.      Patient not seen at this time due to diagnosis that does not meet criteria for enrollment in out-patient program.

## 2020-10-22 NOTE — PROGRESS NOTES
Bedside, Verbal and Written shift change report given to Aron Canales RN (oncoming nurse) by Stevie Mendoza RN (offgoing nurse). Report included the following information SBAR, Kardex, ED Summary, OR Summary, Procedure Summary, Intake/Output, MAR, Recent Results and Cardiac Rhythm NSR.     0800: Report received. Assessment completed. Patient A&O x 4. VSS. NSR on telemetry. O2 nasal cannula removed. Left chest tube site clean & intact - hooked to suction. Dr Monroe Dudley and team at bedside. Patient set to be discharged today. Up in recliner awaiting breakfast. Call bell in reach. 0915: Patient ambulating in hallway with PT. O2 sat maintaining >90% on room air. 0935: Left chest tube pulled by ANA Keating.    1010: OT at bedside assisting patient with putting on clothes in anticipation for discharge. 1045: PRN ultram given for left chest tube site incisional pain 7/10, worse with deep breathing. 1230: Discharge instructions reviewed with patient and spouse. All questions answered. PIV removed. Surgical hygiene packet given and cardiac surgery bracelet placed on patient. 1240: Patient discharged with all belongings. Carac Counseling:  I discussed with the patient the risks of Carac including but not limited to erythema, scaling, itching, weeping, crusting, and pain.

## 2020-10-22 NOTE — PROGRESS NOTES
JULES Plan:    D/C Home with [facility]- pending. F/U appointment- PCP- Dr. Krishan Alvarenga  On 10/29/20 at 1:15pm  and Cardiology - Sheron Hernandez- 10/30/20 at 3pm.  2nd Im Letter given  Daughter to provide transportation. [facility] discussed d/c plan with pt and  at he bedside . Both agreed to d/c plan. No questions or concerns at this time. FOC completed, signed and copy placed in chart for [facility]. Referral sent via [employer] to [facility]. Medicare pt has received, reviewed, and signed 2nd IM letter informing them of their right to appeal the discharge. Signed copied has been placed on pt bedside chart. Pt cleared for d/c from CM standpoint. Care Management Interventions  PCP Verified by CM: Yes  Mode of Transport at Discharge: Other (see comment)()  Hospital Transport Time of Discharge: [facility] (CM Consult): Discharge Planning, Home Health  Physical Therapy Consult: Yes  Occupational Therapy Consult: Yes  Current Support Network: Lives with Spouse(Lives with her  in a single story home . Patient is independent with ADL's . She can drive but does not very often.  DME  walker  [employer]  .)  Confirm Follow Up Transport: Family()  The Patient and/or Patient Representative was Provided with a Choice of Provider and Agrees with the Discharge Plan?: Yes  Name of the Patient Representative Who was Provided with a Choice of Provider and Agrees with the Discharge Plan: Pt   Freedom of Choice List was Provided with Basic Dialogue that Supports the Patient's Individualized Plan of Care/Goals, Treatment Preferences and Shares the Quality Data Associated with the Providers?: Yes  Discharge Location  Discharge Placement: Home with home health      [facility]  Phone: [phone]

## 2020-10-23 ENCOUNTER — DOCUMENTATION ONLY (OUTPATIENT)
Dept: CASE MANAGEMENT | Age: 70
End: 2020-10-23

## 2020-10-23 ENCOUNTER — TELEPHONE (OUTPATIENT)
Dept: CARDIOLOGY CLINIC | Age: 70
End: 2020-10-23

## 2020-10-23 LAB
ABO + RH BLD: NORMAL
BLD PROD TYP BPU: NORMAL
BLD PROD TYP BPU: NORMAL
BLOOD GROUP ANTIBODIES SERPL: NORMAL
BPU ID: NORMAL
BPU ID: NORMAL
CROSSMATCH RESULT,%XM: NORMAL
CROSSMATCH RESULT,%XM: NORMAL
SPECIMEN EXP DATE BLD: NORMAL
STATUS OF UNIT,%ST: NORMAL
STATUS OF UNIT,%ST: NORMAL
UNIT DIVISION, %UDIV: 0
UNIT DIVISION, %UDIV: 0

## 2020-10-23 NOTE — TELEPHONE ENCOUNTER
Returned call to Sagar Severino at [facility], patient ID verified using two patient identifiers. Advised her that  Jaun Saldana MD did patient's procedure and that he would be the one to give orders for Home Health. Sagar Severino verbalizes understanding of all information.

## 2020-10-23 NOTE — TELEPHONE ENCOUNTER
Eileen Forrester with [facility] is calling as the patient was discharged from [facility] and asked for the patient to have home nurse visits as well has physical therapy. Eileen Forrester would like to know if a verbal order can be given for this. Please advise.     Phone: [phone]

## 2020-10-23 NOTE — PROGRESS NOTES
Cardiac Surgery Discharge - Follow up call placed to Edna Elena. Reviewed plan of care after discharge and encouraged Edna Elena to verbalize. Reviewed discharge instructions. Encouraged continued use of the incentive spirometer. Confirmed follow up appts and reinforced importance of wearing red reminder bracelet. She stated she started with a dry mouth last night. She called her PCP today and was told she had thrush. Per pt her PCP is calling in Nystatin for her.  Felecia Drew RN

## 2020-10-28 ENCOUNTER — TELEPHONE (OUTPATIENT)
Dept: CARDIOTHORACIC SURGERY | Age: 70
End: 2020-10-28

## 2020-10-28 RX ORDER — LEVOFLOXACIN 750 MG/1
750 TABLET ORAL DAILY
Qty: 7 TAB | Refills: 0 | Status: SHIPPED | OUTPATIENT
Start: 2020-10-28 | End: 2020-11-02

## 2020-10-28 NOTE — TELEPHONE ENCOUNTER
Patient called with reported drainage from her incisions. The incision in question is the larger port site with the U stitch in place. There is no surrounding erythema. Drainage is reported as clear/serous. No fevers. Patient is due to see us on Friday for follow up. Although I don't feel this represents infection, will start levaquin to be safe and reevaluate at post op visit. Cover with dry dressing prn.

## 2020-10-29 ENCOUNTER — HOSPITAL ENCOUNTER (OUTPATIENT)
Dept: NON INVASIVE DIAGNOSTICS | Age: 70
Discharge: HOME | End: 2020-10-29
Attending: NURSE PRACTITIONER
Payer: MEDICARE

## 2020-10-29 ENCOUNTER — HOSPITAL ENCOUNTER (OUTPATIENT)
Dept: GENERAL RADIOLOGY | Age: 70
Discharge: HOME | End: 2020-10-29
Payer: MEDICARE

## 2020-10-29 ENCOUNTER — TELEPHONE (OUTPATIENT)
Dept: CARDIOTHORACIC SURGERY | Age: 70
End: 2020-10-29

## 2020-10-29 ENCOUNTER — OFFICE VISIT (OUTPATIENT)
Dept: CARDIOTHORACIC SURGERY | Age: 70
End: 2020-10-29
Payer: MEDICARE

## 2020-10-29 VITALS
DIASTOLIC BLOOD PRESSURE: 64 MMHG | TEMPERATURE: 97.5 F | BODY MASS INDEX: 30.65 KG/M2 | HEART RATE: 77 BPM | OXYGEN SATURATION: 96 % | SYSTOLIC BLOOD PRESSURE: 110 MMHG | WEIGHT: 173 LBS

## 2020-10-29 VITALS
SYSTOLIC BLOOD PRESSURE: 110 MMHG | BODY MASS INDEX: 30.66 KG/M2 | DIASTOLIC BLOOD PRESSURE: 64 MMHG | WEIGHT: 173.06 LBS | HEIGHT: 63 IN

## 2020-10-29 DIAGNOSIS — J43.8 OTHER EMPHYSEMA (HCC): ICD-10-CM

## 2020-10-29 DIAGNOSIS — I48.0 PAF (PAROXYSMAL ATRIAL FIBRILLATION) (HCC): ICD-10-CM

## 2020-10-29 DIAGNOSIS — R52 PAIN: Primary | ICD-10-CM

## 2020-10-29 DIAGNOSIS — R06.09 DOE (DYSPNEA ON EXERTION): ICD-10-CM

## 2020-10-29 DIAGNOSIS — J90 PLEURAL EFFUSION, LEFT: ICD-10-CM

## 2020-10-29 DIAGNOSIS — I50.43 ACUTE ON CHRONIC COMBINED SYSTOLIC AND DIASTOLIC ACC/AHA STAGE C CONGESTIVE HEART FAILURE (HCC): ICD-10-CM

## 2020-10-29 DIAGNOSIS — I25.10 CORONARY ARTERY DISEASE INVOLVING NATIVE CORONARY ARTERY OF NATIVE HEART WITHOUT ANGINA PECTORIS: ICD-10-CM

## 2020-10-29 DIAGNOSIS — I10 HTN (HYPERTENSION), BENIGN: ICD-10-CM

## 2020-10-29 DIAGNOSIS — J90 PLEURAL EFFUSION, LEFT: Primary | ICD-10-CM

## 2020-10-29 LAB
ECHO AV AREA PEAK VELOCITY: 1.8 CM2
ECHO AV AREA/BSA PEAK VELOCITY: 1 CM2/M2
ECHO AV PEAK GRADIENT: 6.83 MMHG
ECHO AV PEAK VELOCITY: 130.68 CM/S
ECHO EST RA PRESSURE: 10 MMHG
ECHO LA AREA 4C: 16.26 CM2
ECHO LA TO AORTIC ROOT RATIO: 1.45
ECHO LA VOL 4C: 41.79 ML (ref 22–52)
ECHO LA VOLUME INDEX A4C: 22.98 ML/M2 (ref 16–28)
ECHO LV E' LATERAL VELOCITY: 10.45 CM/S
ECHO LV E' SEPTAL VELOCITY: 7.44 CM/S
ECHO LV INTERNAL DIMENSION DIASTOLIC: 5.13 CM (ref 3.9–5.3)
ECHO LV INTERNAL DIMENSION SYSTOLIC: 3.65 CM
ECHO LV IVSD: 1.06 CM (ref 0.6–0.9)
ECHO LV MASS 2D: 186.1 G (ref 67–162)
ECHO LV MASS INDEX 2D: 102.3 G/M2 (ref 43–95)
ECHO LV POSTERIOR WALL DIASTOLIC: 0.91 CM (ref 0.6–0.9)
ECHO LVOT DIAM: 1.87 CM
ECHO LVOT PEAK GRADIENT: 2.98 MMHG
ECHO LVOT PEAK VELOCITY: 86.29 CM/S
ECHO MV A VELOCITY: 51.37 CM/S
ECHO MV E DECELERATION TIME (DT): 243.53 MS
ECHO MV E VELOCITY: 81.79 CM/S
ECHO MV E/A RATIO: 1.59
ECHO MV E/E' LATERAL: 7.83
ECHO MV E/E' RATIO (AVERAGED): 9.41
ECHO MV E/E' SEPTAL: 10.99
ECHO MV EROA PISA: 0.17 CM2
ECHO MV REGURGITANT RADIUS PISA: 0.72 CM
ECHO MV REGURGITANT VOLUME: 31.66 ML
ECHO MV REGURGITANT VTIA: 190.48 CM
ECHO PV PEAK INSTANTANEOUS GRADIENT SYSTOLIC: 1.52 MMHG
ECHO PV REGURGITANT MAX VELOCITY: 61.55 CM/S
ECHO RIGHT VENTRICULAR SYSTOLIC PRESSURE (RVSP): 40.77 MMHG
ECHO RV INTERNAL DIMENSION: 2.71 CM
ECHO TV A WAVE: 34.18 CM/S
ECHO TV E WAVE: 56.56 CM/S
ECHO TV REGURGITANT MAX VELOCITY: 277.33 CM/S
ECHO TV REGURGITANT PEAK GRADIENT: 30.77 MMHG
MR PISA PV: 554.16 CM/S

## 2020-10-29 PROCEDURE — G8399 PT W/DXA RESULTS DOCUMENT: HCPCS | Performed by: THORACIC SURGERY (CARDIOTHORACIC VASCULAR SURGERY)

## 2020-10-29 PROCEDURE — G8752 SYS BP LESS 140: HCPCS | Performed by: THORACIC SURGERY (CARDIOTHORACIC VASCULAR SURGERY)

## 2020-10-29 PROCEDURE — 93306 TTE W/DOPPLER COMPLETE: CPT

## 2020-10-29 PROCEDURE — 1100F PTFALLS ASSESS-DOCD GE2>/YR: CPT | Performed by: THORACIC SURGERY (CARDIOTHORACIC VASCULAR SURGERY)

## 2020-10-29 PROCEDURE — G8432 DEP SCR NOT DOC, RNG: HCPCS | Performed by: THORACIC SURGERY (CARDIOTHORACIC VASCULAR SURGERY)

## 2020-10-29 PROCEDURE — 71046 X-RAY EXAM CHEST 2 VIEWS: CPT

## 2020-10-29 PROCEDURE — 3288F FALL RISK ASSESSMENT DOCD: CPT | Performed by: THORACIC SURGERY (CARDIOTHORACIC VASCULAR SURGERY)

## 2020-10-29 PROCEDURE — G9899 SCRN MAM PERF RSLTS DOC: HCPCS | Performed by: THORACIC SURGERY (CARDIOTHORACIC VASCULAR SURGERY)

## 2020-10-29 PROCEDURE — G8754 DIAS BP LESS 90: HCPCS | Performed by: THORACIC SURGERY (CARDIOTHORACIC VASCULAR SURGERY)

## 2020-10-29 PROCEDURE — G8427 DOCREV CUR MEDS BY ELIG CLIN: HCPCS | Performed by: THORACIC SURGERY (CARDIOTHORACIC VASCULAR SURGERY)

## 2020-10-29 PROCEDURE — 1111F DSCHRG MED/CURRENT MED MERGE: CPT | Performed by: THORACIC SURGERY (CARDIOTHORACIC VASCULAR SURGERY)

## 2020-10-29 PROCEDURE — G8536 NO DOC ELDER MAL SCRN: HCPCS | Performed by: THORACIC SURGERY (CARDIOTHORACIC VASCULAR SURGERY)

## 2020-10-29 PROCEDURE — 1090F PRES/ABSN URINE INCON ASSESS: CPT | Performed by: THORACIC SURGERY (CARDIOTHORACIC VASCULAR SURGERY)

## 2020-10-29 PROCEDURE — 99215 OFFICE O/P EST HI 40 MIN: CPT | Performed by: THORACIC SURGERY (CARDIOTHORACIC VASCULAR SURGERY)

## 2020-10-29 PROCEDURE — 3017F COLORECTAL CA SCREEN DOC REV: CPT | Performed by: THORACIC SURGERY (CARDIOTHORACIC VASCULAR SURGERY)

## 2020-10-29 PROCEDURE — G8417 CALC BMI ABV UP PARAM F/U: HCPCS | Performed by: THORACIC SURGERY (CARDIOTHORACIC VASCULAR SURGERY)

## 2020-10-29 RX ORDER — BUMETANIDE 1 MG/1
1 TABLET ORAL 2 TIMES DAILY
Qty: 30 TAB | Refills: 0 | Status: SHIPPED | OUTPATIENT
Start: 2020-10-29 | End: 2020-11-02

## 2020-10-29 RX ORDER — IBUPROFEN 200 MG
800 TABLET ORAL 3 TIMES DAILY
Qty: 36 TAB | Refills: 0 | Status: SHIPPED | OUTPATIENT
Start: 2020-10-29 | End: 2020-11-02

## 2020-10-29 NOTE — TELEPHONE ENCOUNTER
Ms Natalia Tracy called and is short of breath. She has some incisional drainage. She is audibly wheezing after her nebulizer treatment. She says her weight is up 3 pounds and her O2 sats are 92%. I asked her to come to the office.

## 2020-10-29 NOTE — PROGRESS NOTES
Patient: Eugene Lockett   Age: 65 y.o. Patient Care Team:  Corey Espinosa MD as PCP - General (Family Medicine)  Ana Maria Louis MD (Obstetrics & Gynecology)  Pricila, Nita Andrew MD (Family Medicine)  Pari Horn MD as Physician (Sleep Medicine)  Moris Hui MD (Cardiology)  Girish Corona MD (Cardiothoracic Surgery)    Diagnosis: The primary encounter diagnosis was Pleural effusion, left. Diagnoses of PAF (paroxysmal atrial fibrillation) ([facility] Coronary artery disease involving native coronary artery of native heart without angina pectoris, HTN (hypertension), benign, Acute on chronic combined systolic and diastolic ACC/AHA stage C congestive heart failure ([address] Other emphysema ([address] and CHACON (dyspnea on exertion) were also pertinent to this visit. Problem List:   Patient Active Problem List   Diagnosis Code    Asthma J45.909    PAF (paroxysmal atrial fibrillation) ([facility]) I48.0    CAD (coronary artery disease) I25.10    Pernicious anemia D51.0    Esophageal reflux K21.9    Gluten intolerance K90.41    Iron deficiency anemia, unspecified D50.9    Hypothyroidism E03.9    Bony exostosis M89.8X9    Synovitis of ankle M65.9    Pes cavus Q66.70    HTN (hypertension), benign M15    Diastolic dysfunction F44.21    Chest pain R07.9    Rheumatoid arthritis ([facility]) M06.9    Leukocytosis D72.829    Elevated blood sugar R73.9    Diabetes mellitus type 2, controlled ([facility]) E11.9    Coronary artery disease with stable angina pectoris ([facility]) K68.794    Diastolic heart failure ([facility]) I50.30    A-fib ([facility]) I48.91    Atrial fibrillation ([facility]) I48.91            Date of Surgery: 10-    Surgery: 1. Video-assisted thoracoscopic drainage of pericardial effusion. 2.  Video-assisted thoracoscopic placement of left atrial appendage clip.       HPI:  Pt is here for acute visit for shortness of breath.  She had her steroids increased by Dr. Belgica Grant, her pulmonologist. She is on Levaquin for acute bronchitis. She feels like she cannot get air in. She does not typically have this feeling and her left chest is quite tender to touch. Her  said she had a presyncopal episode this morning. (Almost passed out and he caught her). Current Medications:   Current Outpatient Medications   Medication Sig Dispense Refill    levoFLOXacin (LEVAQUIN) 750 mg tablet Take 1 Tab by mouth daily for 7 days. 7 Tab 0    metoprolol succinate (TOPROL-XL) 25 mg XL tablet Take 1 Tab by mouth daily (after lunch). 30 Tab 2    acetaminophen (TYLENOL) 325 mg tablet Take 2 Tabs by mouth every four (4) hours as needed for Pain. 30 Tab 2    aclidinium bromide (Tudorza Pressair) 400 mcg/actuation inhaler Take 1 Puff by inhalation.  calcium citrate-vitamin d3 (CITRACAL+D) 315 mg-5 mcg (200 unit) tab Take 1 Tab by mouth daily (with breakfast).  predniSONE (DELTASONE) 2.5 mg tablet Take 2.5 mg by mouth daily (after lunch).  gabapentin (NEURONTIN) 100 mg capsule Take 100 mg by mouth nightly.  mometasone furoate (ASMANEX HFA IN) Take 100 mcg by inhalation two (2) times a day.  hypromellose (GENTEAL MILD OP) Apply  to eye.  potassium 99 mg tablet Take 99 mg by mouth daily.  insulin glargine (LANTUS,BASAGLAR) 100 unit/mL (3 mL) inpn 10 Units by SubCUTAneous route Daily (before breakfast). 30 minutes before breakfast  Indications: \"I set the pen on 8\"      denosumab (PROLIA SC) by SubCUTAneous route every 6 months.  bumetanide (BUMEX) 1 mg tablet Take 1 mg by mouth daily.  eplerenone (INSPRA) 25 mg tablet Take 25 mg by mouth Daily (before lunch).  fenofibrate nanocrystallized (Tricor) 48 mg tablet Take 48 mg by mouth nightly.  OTHER Indications: LIVONGO Glucose Monitor      levothyroxine (SYNTHROID) 150 mcg tablet Take 150 mcg by mouth Daily (before breakfast).  predniSONE (DELTASONE) 5 mg tablet Take 7.5 mg by mouth daily.  5 mg in morning and 2.5 mg afternoon      cholecalciferol, vitamin D3, (VITAMIN D3) 2,000 unit tab Take 2,000 Units by mouth daily.  COQ10, UBIQUINOL, PO Take 100 mg by mouth daily.  turmeric (CURCUMIN) Take 1 g by mouth every evening.  SITagliptin (JANUVIA) 100 mg tablet Take 100 mg by mouth daily (with dinner).  flaxseed oil (OMEGA 3 PO) Take 1 Tab by mouth every evening. Indications: FISH OIL      vit C/vit E ac/lut/copper/zinc (PRESERVISION LUTEIN PO) Take 1 Tab by mouth daily (with dinner). Once per week      lidocaine (LIDODERM) 5 % 1 Patch by TransDERmal route every twenty-four (24) hours. Apply patch to the affected area for 12 hours a day and remove for 12 hours a day.  ascorbic acid (VITAMIN C) 500 mg tablet Take 1,000 mg by mouth daily.  ferrous sulfate 325 mg (65 mg iron) tablet Take  by mouth every evening.  cyanocobalamin (VITAMIN B12) 1,000 mcg/mL injection INJECT 1 ML INTO THE MUSCLE EVERY 30 DAYS 10 mL 0    lansoprazole (PREVACID) 30 mg capsule Take 30 mg by mouth daily.  traMADoL (ULTRAM) 50 mg tablet Take 1 Tab by mouth every six (6) hours as needed for Pain for up to 7 days. Max Daily Amount: 200 mg. 28 Tab 0    nitroglycerin (NITRODUR) 0.1 mg/hr 1 Patch by TransDERmal route daily as needed. Indications: Oral form \"causes severe BP drop  BAD\" Drs got scared,      ondansetron (ZOFRAN ODT) 4 mg disintegrating tablet Take 1 Tab by mouth every eight (8) hours as needed for Nausea. 10 Tab 0       Vitals: Blood pressure 110/64, pulse 77, temperature 97.5 °F (36.4 °C), weight 173 lb (78.5 kg), last menstrual period 09/29/1980, SpO2 96 %. Allergies: is allergic to butter flavor; keflex [cephalexin]; pcn [penicillins]; aspirin; cimzia [certolizumab pegol]; clopidogrel; demerol [meperidine]; fentanyl; morphine; nitroglycerin; sulfa (sulfonamide antibiotics); tapazole [methimazole]; adhesive tape-silicones; albuterol; gluten; and oxycodone.     Physical Exam:  Wounds: clean, healing, suture remains    Lungs: clear to auscultation bilaterally,expiratory wheezing throughout, LLL crackles    Heart: irregularly irregular rhythm    Extremities: normal strength, tone, and muscle mass    Assessment/Plan:   1. Atrial Fibrillation - s/p SUHAS Clip via VATS. Xray with stable Left Pleural Effusion. Will increase Bumex to bid for 3 days and she will take an extra potassium. This seems more pain related. Ibuprofen 800 tid scheduled for three days. Will also provide Incentive Spirometer and obtain TTE. Suture removed in clinic. 2. COPD - Steroids, Levaquin and Nebs;sent for xray. Suspect the LLL fluid is making it difficult with her COPD. 3. Acute on Chronic Heart Failure Exacerbation - doubled up on Bumex/Potassium last evening. Weight up 4 pounds, see above    Pt is ready to start cardiac rehab.

## 2020-11-02 ENCOUNTER — APPOINTMENT (OUTPATIENT)
Dept: GENERAL RADIOLOGY | Age: 70
DRG: 202 | End: 2020-11-02
Attending: STUDENT IN AN ORGANIZED HEALTH CARE EDUCATION/TRAINING PROGRAM
Payer: MEDICARE

## 2020-11-02 ENCOUNTER — HOSPITAL ENCOUNTER (INPATIENT)
Age: 70
LOS: 4 days | Discharge: HOME HEALTH | DRG: 202 | End: 2020-11-06
Attending: STUDENT IN AN ORGANIZED HEALTH CARE EDUCATION/TRAINING PROGRAM | Admitting: INTERNAL MEDICINE
Payer: MEDICARE

## 2020-11-02 ENCOUNTER — APPOINTMENT (OUTPATIENT)
Dept: CT IMAGING | Age: 70
DRG: 202 | End: 2020-11-02
Attending: STUDENT IN AN ORGANIZED HEALTH CARE EDUCATION/TRAINING PROGRAM
Payer: MEDICARE

## 2020-11-02 DIAGNOSIS — I48.19 PERSISTENT ATRIAL FIBRILLATION (HCC): ICD-10-CM

## 2020-11-02 DIAGNOSIS — I25.118 CORONARY ARTERY DISEASE OF NATIVE ARTERY OF NATIVE HEART WITH STABLE ANGINA PECTORIS (HCC): ICD-10-CM

## 2020-11-02 DIAGNOSIS — R09.02 HYPOXIA: ICD-10-CM

## 2020-11-02 DIAGNOSIS — R06.03 ACUTE RESPIRATORY DISTRESS: Primary | ICD-10-CM

## 2020-11-02 DIAGNOSIS — I25.10 CORONARY ARTERY DISEASE INVOLVING NATIVE CORONARY ARTERY OF NATIVE HEART WITHOUT ANGINA PECTORIS: ICD-10-CM

## 2020-11-02 DIAGNOSIS — I50.32 CHRONIC DIASTOLIC HEART FAILURE (HCC): ICD-10-CM

## 2020-11-02 PROBLEM — R07.9 CHEST PAIN: Status: RESOLVED | Noted: 2019-08-12 | Resolved: 2020-11-02

## 2020-11-02 PROBLEM — I48.91 ATRIAL FIBRILLATION (HCC): Status: RESOLVED | Noted: 2020-10-20 | Resolved: 2020-11-02

## 2020-11-02 PROBLEM — I48.91 A-FIB (HCC): Status: RESOLVED | Noted: 2020-10-20 | Resolved: 2020-11-02

## 2020-11-02 PROBLEM — R73.9 ELEVATED BLOOD SUGAR: Status: RESOLVED | Noted: 2019-08-12 | Resolved: 2020-11-02

## 2020-11-02 LAB
ALBUMIN SERPL-MCNC: 3.3 G/DL (ref 3.5–5)
ALBUMIN/GLOB SERPL: 1 {RATIO} (ref 1.1–2.2)
ALP SERPL-CCNC: 102 U/L (ref 45–117)
ALT SERPL-CCNC: 12 U/L (ref 12–78)
ANION GAP SERPL CALC-SCNC: 9 MMOL/L (ref 5–15)
APPEARANCE UR: CLEAR
AST SERPL-CCNC: 9 U/L (ref 15–37)
BACTERIA URNS QL MICRO: NEGATIVE /HPF
BASOPHILS # BLD: 0.2 K/UL (ref 0–0.1)
BASOPHILS NFR BLD: 1 % (ref 0–1)
BILIRUB SERPL-MCNC: 0.2 MG/DL (ref 0.2–1)
BILIRUB UR QL: NEGATIVE
BNP SERPL-MCNC: 926 PG/ML (ref 0–125)
BUN SERPL-MCNC: 19 MG/DL (ref 6–20)
BUN/CREAT SERPL: 21 (ref 12–20)
CALCIUM SERPL-MCNC: 9.2 MG/DL (ref 8.5–10.1)
CHLORIDE SERPL-SCNC: 105 MMOL/L (ref 97–108)
CO2 SERPL-SCNC: 26 MMOL/L (ref 21–32)
COLOR UR: NORMAL
COMMENT, HOLDF: NORMAL
CREAT SERPL-MCNC: 0.9 MG/DL (ref 0.55–1.02)
DIFFERENTIAL METHOD BLD: ABNORMAL
EOSINOPHIL # BLD: 0.4 K/UL (ref 0–0.4)
EOSINOPHIL NFR BLD: 2 % (ref 0–7)
EPITH CASTS URNS QL MICRO: NORMAL /LPF
ERYTHROCYTE [DISTWIDTH] IN BLOOD BY AUTOMATED COUNT: 13.4 % (ref 11.5–14.5)
GLOBULIN SER CALC-MCNC: 3.2 G/DL (ref 2–4)
GLUCOSE BLD STRIP.AUTO-MCNC: 186 MG/DL (ref 65–100)
GLUCOSE SERPL-MCNC: 201 MG/DL (ref 65–100)
GLUCOSE UR STRIP.AUTO-MCNC: NEGATIVE MG/DL
HCT VFR BLD AUTO: 44.5 % (ref 35–47)
HGB BLD-MCNC: 14.1 G/DL (ref 11.5–16)
HGB UR QL STRIP: NEGATIVE
IMM GRANULOCYTES # BLD AUTO: 0.3 K/UL (ref 0–0.04)
IMM GRANULOCYTES NFR BLD AUTO: 1 % (ref 0–0.5)
KETONES UR QL STRIP.AUTO: NEGATIVE MG/DL
LEUKOCYTE ESTERASE UR QL STRIP.AUTO: NEGATIVE
LYMPHOCYTES # BLD: 1.3 K/UL (ref 0.8–3.5)
LYMPHOCYTES NFR BLD: 6 % (ref 12–49)
MCH RBC QN AUTO: 30.9 PG (ref 26–34)
MCHC RBC AUTO-ENTMCNC: 31.7 G/DL (ref 30–36.5)
MCV RBC AUTO: 97.4 FL (ref 80–99)
MONOCYTES # BLD: 1.3 K/UL (ref 0–1)
MONOCYTES NFR BLD: 6 % (ref 5–13)
NEUTS SEG # BLD: 18.3 K/UL (ref 1.8–8)
NEUTS SEG NFR BLD: 85 % (ref 32–75)
NITRITE UR QL STRIP.AUTO: NEGATIVE
NRBC # BLD: 0 K/UL (ref 0–0.01)
NRBC BLD-RTO: 0 PER 100 WBC
PH UR STRIP: 7 [PH] (ref 5–8)
PLATELET # BLD AUTO: 343 K/UL (ref 150–400)
PMV BLD AUTO: 10.5 FL (ref 8.9–12.9)
POTASSIUM SERPL-SCNC: 5 MMOL/L (ref 3.5–5.1)
PROCALCITONIN SERPL-MCNC: <0.05 NG/ML
PROT SERPL-MCNC: 6.5 G/DL (ref 6.4–8.2)
PROT UR STRIP-MCNC: NEGATIVE MG/DL
RBC # BLD AUTO: 4.57 M/UL (ref 3.8–5.2)
RBC #/AREA URNS HPF: NORMAL /HPF (ref 0–5)
SAMPLES BEING HELD,HOLD: NORMAL
SERVICE CMNT-IMP: ABNORMAL
SODIUM SERPL-SCNC: 140 MMOL/L (ref 136–145)
SP GR UR REFRACTOMETRY: 1.01 (ref 1–1.03)
TROPONIN I SERPL-MCNC: <0.05 NG/ML
UA: UC IF INDICATED,UAUC: NORMAL
UROBILINOGEN UR QL STRIP.AUTO: 0.2 EU/DL (ref 0.2–1)
WBC # BLD AUTO: 21.7 K/UL (ref 3.6–11)
WBC URNS QL MICRO: NORMAL /HPF (ref 0–4)

## 2020-11-02 PROCEDURE — 74011000636 HC RX REV CODE- 636: Performed by: STUDENT IN AN ORGANIZED HEALTH CARE EDUCATION/TRAINING PROGRAM

## 2020-11-02 PROCEDURE — 80053 COMPREHEN METABOLIC PANEL: CPT

## 2020-11-02 PROCEDURE — 71275 CT ANGIOGRAPHY CHEST: CPT

## 2020-11-02 PROCEDURE — 84484 ASSAY OF TROPONIN QUANT: CPT

## 2020-11-02 PROCEDURE — 83880 ASSAY OF NATRIURETIC PEPTIDE: CPT

## 2020-11-02 PROCEDURE — 93005 ELECTROCARDIOGRAM TRACING: CPT

## 2020-11-02 PROCEDURE — 82962 GLUCOSE BLOOD TEST: CPT

## 2020-11-02 PROCEDURE — 74011000250 HC RX REV CODE- 250: Performed by: EMERGENCY MEDICINE

## 2020-11-02 PROCEDURE — 99285 EMERGENCY DEPT VISIT HI MDM: CPT

## 2020-11-02 PROCEDURE — 81001 URINALYSIS AUTO W/SCOPE: CPT

## 2020-11-02 PROCEDURE — 85025 COMPLETE CBC W/AUTO DIFF WBC: CPT

## 2020-11-02 PROCEDURE — 36415 COLL VENOUS BLD VENIPUNCTURE: CPT

## 2020-11-02 PROCEDURE — 71046 X-RAY EXAM CHEST 2 VIEWS: CPT

## 2020-11-02 PROCEDURE — 74011250636 HC RX REV CODE- 250/636: Performed by: EMERGENCY MEDICINE

## 2020-11-02 PROCEDURE — 84145 PROCALCITONIN (PCT): CPT

## 2020-11-02 PROCEDURE — 74011250636 HC RX REV CODE- 250/636: Performed by: STUDENT IN AN ORGANIZED HEALTH CARE EDUCATION/TRAINING PROGRAM

## 2020-11-02 PROCEDURE — 65270000029 HC RM PRIVATE

## 2020-11-02 PROCEDURE — 74011250636 HC RX REV CODE- 250/636: Performed by: INTERNAL MEDICINE

## 2020-11-02 PROCEDURE — 96374 THER/PROPH/DIAG INJ IV PUSH: CPT

## 2020-11-02 RX ORDER — LEVALBUTEROL INHALATION SOLUTION 0.63 MG/3ML
0.63 SOLUTION RESPIRATORY (INHALATION) 2 TIMES DAILY
COMMUNITY

## 2020-11-02 RX ORDER — SODIUM CHLORIDE 0.9 % (FLUSH) 0.9 %
5-40 SYRINGE (ML) INJECTION AS NEEDED
Status: DISCONTINUED | OUTPATIENT
Start: 2020-11-02 | End: 2020-11-06 | Stop reason: HOSPADM

## 2020-11-02 RX ORDER — CHOLECALCIFEROL (VITAMIN D3) 125 MCG
200 CAPSULE ORAL DAILY
COMMUNITY

## 2020-11-02 RX ORDER — FUROSEMIDE 10 MG/ML
20 INJECTION INTRAMUSCULAR; INTRAVENOUS ONCE
Status: COMPLETED | OUTPATIENT
Start: 2020-11-02 | End: 2020-11-02

## 2020-11-02 RX ORDER — HYDROMORPHONE HYDROCHLORIDE 2 MG/1
2 TABLET ORAL
Status: DISCONTINUED | OUTPATIENT
Start: 2020-11-02 | End: 2020-11-06 | Stop reason: HOSPADM

## 2020-11-02 RX ORDER — INSULIN LISPRO 100 [IU]/ML
INJECTION, SOLUTION INTRAVENOUS; SUBCUTANEOUS
Status: DISCONTINUED | OUTPATIENT
Start: 2020-11-02 | End: 2020-11-06 | Stop reason: HOSPADM

## 2020-11-02 RX ORDER — DIPHENHYDRAMINE HYDROCHLORIDE 50 MG/ML
25 INJECTION, SOLUTION INTRAMUSCULAR; INTRAVENOUS
Status: COMPLETED | OUTPATIENT
Start: 2020-11-02 | End: 2020-11-02

## 2020-11-02 RX ORDER — DEXTROSE 50 % IN WATER (D50W) INTRAVENOUS SYRINGE
25-50 AS NEEDED
Status: DISCONTINUED | OUTPATIENT
Start: 2020-11-02 | End: 2020-11-06 | Stop reason: HOSPADM

## 2020-11-02 RX ORDER — ALBUTEROL SULFATE 1.25 MG/3ML
1.25 SOLUTION RESPIRATORY (INHALATION)
Status: DISCONTINUED | OUTPATIENT
Start: 2020-11-02 | End: 2020-11-06

## 2020-11-02 RX ORDER — ACETAMINOPHEN 325 MG/1
650 TABLET ORAL
Status: DISCONTINUED | OUTPATIENT
Start: 2020-11-02 | End: 2020-11-06 | Stop reason: HOSPADM

## 2020-11-02 RX ORDER — ONDANSETRON 2 MG/ML
4 INJECTION INTRAMUSCULAR; INTRAVENOUS
Status: DISCONTINUED | OUTPATIENT
Start: 2020-11-02 | End: 2020-11-06 | Stop reason: HOSPADM

## 2020-11-02 RX ORDER — DEXTROMETHORPHAN HYDROBROMIDE, GUAIFENESIN 5; 100 MG/5ML; MG/5ML
1300 LIQUID ORAL
COMMUNITY

## 2020-11-02 RX ORDER — DEXTRAN 70 AND HYPROMELLOSE 2910 1; 3 MG/ML; MG/ML
1 SOLUTION/ DROPS OPHTHALMIC DAILY
COMMUNITY

## 2020-11-02 RX ORDER — DOXYCYCLINE HYCLATE 100 MG
100 TABLET ORAL EVERY 12 HOURS
Status: DISCONTINUED | OUTPATIENT
Start: 2020-11-02 | End: 2020-11-06 | Stop reason: HOSPADM

## 2020-11-02 RX ORDER — BUMETANIDE 0.25 MG/ML
1 INJECTION INTRAMUSCULAR; INTRAVENOUS 2 TIMES DAILY
Status: DISCONTINUED | OUTPATIENT
Start: 2020-11-03 | End: 2020-11-03

## 2020-11-02 RX ORDER — NALOXONE HYDROCHLORIDE 0.4 MG/ML
0.4 INJECTION, SOLUTION INTRAMUSCULAR; INTRAVENOUS; SUBCUTANEOUS AS NEEDED
Status: DISCONTINUED | OUTPATIENT
Start: 2020-11-02 | End: 2020-11-06 | Stop reason: HOSPADM

## 2020-11-02 RX ORDER — ENOXAPARIN SODIUM 100 MG/ML
40 INJECTION SUBCUTANEOUS EVERY 24 HOURS
Status: DISCONTINUED | OUTPATIENT
Start: 2020-11-02 | End: 2020-11-06 | Stop reason: HOSPADM

## 2020-11-02 RX ORDER — GLUCOSAM/CHONDRO/HERB 149/HYAL 750-100 MG
1 TABLET ORAL EVERY EVENING
COMMUNITY

## 2020-11-02 RX ORDER — BUMETANIDE 1 MG/1
0.5 TABLET ORAL EVERY EVENING
Status: ON HOLD | COMMUNITY
End: 2020-11-19 | Stop reason: CLARIF

## 2020-11-02 RX ORDER — LEVALBUTEROL INHALATION SOLUTION 1.25 MG/3ML
1.25 SOLUTION RESPIRATORY (INHALATION)
Status: DISPENSED | OUTPATIENT
Start: 2020-11-02 | End: 2020-11-03

## 2020-11-02 RX ORDER — IPRATROPIUM BROMIDE AND ALBUTEROL SULFATE 2.5; .5 MG/3ML; MG/3ML
3 SOLUTION RESPIRATORY (INHALATION)
Status: COMPLETED | OUTPATIENT
Start: 2020-11-02 | End: 2020-11-02

## 2020-11-02 RX ORDER — SODIUM CHLORIDE 0.9 % (FLUSH) 0.9 %
5-40 SYRINGE (ML) INJECTION EVERY 8 HOURS
Status: DISCONTINUED | OUTPATIENT
Start: 2020-11-02 | End: 2020-11-06 | Stop reason: HOSPADM

## 2020-11-02 RX ORDER — MAGNESIUM SULFATE 100 %
4 CRYSTALS MISCELLANEOUS AS NEEDED
Status: DISCONTINUED | OUTPATIENT
Start: 2020-11-02 | End: 2020-11-06 | Stop reason: HOSPADM

## 2020-11-02 RX ORDER — ARFORMOTEROL TARTRATE 15 UG/2ML
15 SOLUTION RESPIRATORY (INHALATION)
Status: DISCONTINUED | OUTPATIENT
Start: 2020-11-02 | End: 2020-11-05

## 2020-11-02 RX ORDER — KETOROLAC TROMETHAMINE 30 MG/ML
15 INJECTION, SOLUTION INTRAMUSCULAR; INTRAVENOUS EVERY 6 HOURS
Status: DISCONTINUED | OUTPATIENT
Start: 2020-11-03 | End: 2020-11-03

## 2020-11-02 RX ORDER — BUDESONIDE 0.5 MG/2ML
500 INHALANT ORAL
Status: DISCONTINUED | OUTPATIENT
Start: 2020-11-02 | End: 2020-11-05

## 2020-11-02 RX ORDER — IPRATROPIUM BROMIDE AND ALBUTEROL SULFATE 2.5; .5 MG/3ML; MG/3ML
3 SOLUTION RESPIRATORY (INHALATION)
Status: DISCONTINUED | OUTPATIENT
Start: 2020-11-03 | End: 2020-11-04

## 2020-11-02 RX ADMIN — IPRATROPIUM BROMIDE AND ALBUTEROL SULFATE 3 ML: .5; 3 SOLUTION RESPIRATORY (INHALATION) at 20:04

## 2020-11-02 RX ADMIN — FUROSEMIDE 20 MG: 10 INJECTION, SOLUTION INTRAMUSCULAR; INTRAVENOUS at 20:00

## 2020-11-02 RX ADMIN — IOPAMIDOL 70 ML: 755 INJECTION, SOLUTION INTRAVENOUS at 19:18

## 2020-11-02 RX ADMIN — DIPHENHYDRAMINE HYDROCHLORIDE 25 MG: 50 INJECTION, SOLUTION INTRAMUSCULAR; INTRAVENOUS at 20:03

## 2020-11-02 RX ADMIN — METHYLPREDNISOLONE SODIUM SUCCINATE 40 MG: 40 INJECTION, POWDER, FOR SOLUTION INTRAMUSCULAR; INTRAVENOUS at 19:20

## 2020-11-02 RX ADMIN — ENOXAPARIN SODIUM 40 MG: 40 INJECTION SUBCUTANEOUS at 20:52

## 2020-11-02 NOTE — ED TRIAGE NOTES
Pt states that she had cardiac surgery on the 20th of October, noticed that wheezing has become worse these past two days. Pt states has had fluid on bottom left lung and around her heart when she was discharged from the hospital. States has been taking bumex. Pt states mediastinal pain 4/10, pt states new onset of pain \"like a kink\" in left side. Pt arrived to ED in wheelchair, audible wheezing and respiratory rate of 28.

## 2020-11-02 NOTE — ED PROVIDER NOTES
Patient is a 75-year-old female presented emergency department with difficulty breathing. Patient in obvious respiratory distress upon arrival she is breathing in an increased rate O2 sats maintaining above 90%. Upon further questioning patient states that she recently had cardiac surgery on October 20 [facility].  Chart review patient had a video-assisted left apical appendage removal.  Patient states since that time she has been having increased work of breathing with wheezing. Patient's been using her inhalers recently started on Levaquin. Patient is having chest pain a 4/10 states that it feels like a \"kink\".            Past Medical History:   Diagnosis Date    Adverse effect of anesthesia     slow to wake up    Arthritis     Asthma 6/29/2009    CAUSED BY MOLD    Atrial fibrillation ([address] 6/29/2009    CAD (coronary artery disease) 06/29/2009    Celiac disease 2010    Chronic obstructive pulmonary disease ([address] 2004-5    right leg    Chronic pain of right ankle     Diabetes ([address]     Drug induced prednisone, DM2    Diastolic heart failure ([address]     DVT (deep venous thrombosis) ([address] 2004    Right leg post surgery    GERD (gastroesophageal reflux disease)     Gluten intolerance     Heart failure ([address]     Hypertension     Hypothyroidism 6/29/2009    Iron deficiency anemia     Lyme disease     Personal history of MI (myocardial infarction)     Rheumatoid arthritis ([address]     Slow to wake up after anesthesia     Syncope     Post testing- resolved    TIA (transient ischemic attack) 2004 & 2006    Vitamin B12 deficiency 6/29/2009       Past Surgical History:   Procedure Laterality Date    HX ADENOIDECTOMY      HX AFIB ABLATION      HX APPENDECTOMY      HX CHOLECYSTECTOMY  6/16/11    HX COLONOSCOPY      HX CORONARY STENT PLACEMENT      x 2    HX HEART CATHETERIZATION  2010    2 STENTS    HX HEART CATHETERIZATION  03/2020    HX HYSTERECTOMY      HX LUMBAR LAMINECTOMY  2000 L4-L5    HX ORTHOPAEDIC  -2012    RT.  FOOT SURGERY X 6/calcaneal osteotomy    HX ORTHOPAEDIC Right 2020    right hand CMC joint replacement    HX TONSILLECTOMY  1964         Family History:   Problem Relation Age of Onset    Delayed Awakening Mother     Heart Disease Mother     Coronary Artery Disease Mother     Hypertension Mother     Stroke [place]    [place] Delayed Awakening Sister     Hypertension Sister     Lung Disease Father     Cancer Father         colon    Hypertension Father     Hypertension Brother     Breast Cancer Maternal Aunt     Breast Cancer Maternal Aunt     Breast Cancer Cousin         maternal 1st cousin    Breast Cancer Maternal Aunt     Breast Cancer Cousin         maternal 1st cousin    Breast Cancer Cousin         maternal 1st cousin    Deep Vein Thrombosis Neg Hx     Anesth Problems Neg Hx        Social History     Socioeconomic History    Marital status:      Spouse name: Not on file    Number of children: Not on file    Years of education: Not on file    Highest education level: Not on file   Occupational History    Not on file   Social Needs    Financial resource strain: Not on file    Food insecurity     Worry: Not on file     Inability: Not on file    Transportation needs     Medical: Not on file     Non-medical: Not on file   Tobacco Use    Smoking status: Former Smoker     Packs/day: 1.00     Years: 30.00     Pack years: 30.00     Types: Cigarettes     Last attempt to quit: 2002     Years since quittin.4    Smokeless tobacco: Never Used   Substance and Sexual Activity    Alcohol use: No    Drug use: Never    Sexual activity: Yes     Partners: Male   Lifestyle    Physical activity     Days per week: Not on file     Minutes per session: Not on file    Stress: Not on file   Relationships    Social connections     Talks on phone: Not on file     Gets together: Not on file     Attends [employer] service: Not on file     Active member of club or organization: Not on file     Attends meetings of clubs or organizations: Not on file     Relationship status: Not on file    Intimate partner violence     Fear of current or ex partner: Not on file     Emotionally abused: Not on file     Physically abused: Not on file     Forced sexual activity: Not on file   Other Topics Concern    Not on file   Social History Narrative    Not on file         ALLERGIES: Butter flavor; Keflex [cephalexin]; Pcn [penicillins]; Aspirin; Cimzia [certolizumab pegol]; Clopidogrel; Demerol [meperidine]; Fentanyl; Morphine; Nitroglycerin; Sulfa (sulfonamide antibiotics); Tapazole [methimazole]; Adhesive tape-silicones; Albuterol; Gluten; and Oxycodone    Review of Systems   Respiratory: Positive for chest tightness, shortness of breath and wheezing. Cardiovascular: Positive for chest pain. All other systems reviewed and are negative. Vitals:    11/02/20 1712 11/02/20 1733 11/02/20 1800   BP: (!) 152/98  (!) 140/71   Pulse: 76  60   Resp: 28  19   Temp: 98 °F (36.7 °C)     SpO2: 97% 97% 93%   Weight: 77.1 kg (170 lb)     Height: 5' 3\" (1.6 m)              Physical Exam  Vitals signs and nursing note reviewed. HENT:      Head: Normocephalic and atraumatic. Eyes:      Extraocular Movements: Extraocular movements intact. Conjunctiva/sclera: Conjunctivae normal.      Pupils: Pupils are equal, round, and reactive to light. Cardiovascular:      Rate and Rhythm: Normal rate and regular rhythm. Pulmonary:      Effort: Tachypnea and respiratory distress present. Breath sounds: Examination of the right-upper field reveals wheezing. Examination of the left-upper field reveals wheezing. Examination of the right-middle field reveals wheezing. Examination of the left-middle field reveals wheezing. Examination of the right-lower field reveals wheezing. Examination of the left-lower field reveals wheezing. Wheezing present.       Comments: Mild persistent expiratory wheezes throughout. Musculoskeletal: Normal range of motion. Skin:     General: Skin is warm and dry. Neurological:      General: No focal deficit present. Mental Status: She is alert and oriented to person, place, and time. Psychiatric:         Mood and Affect: Mood normal.         Behavior: Behavior normal.          MDM  Number of Diagnoses or Management Options  Diagnosis management comments: A/P: Airspace disease, reactive airway disease, pulmonary edema, new onset CHF. 64-year-old female presenting in respiratory distress not requiring oxygen at this time however tachypneic upon arrival with minimal exertion. EKG nonischemic no evidence of STEMI as there is no ST or T wave abnormalities. However patient has complicated recent medical course with recent left apical appendage surgery now requiring breathing treatments persistently for symptoms. Will evaluate with chest x-ray PA and lateral, labs, EKG, cardiac enzymes including BNP. Amount and/or Complexity of Data Reviewed  Clinical lab tests: ordered and reviewed  Tests in the radiology section of CPT®: ordered and reviewed    Risk of Complications, Morbidity, and/or Mortality  Presenting problems: moderate  Diagnostic procedures: moderate  Management options: moderate    Patient Progress  Patient progress: stable         Procedures        ED EKG interpretation:  Rhythm: normal sinus rhythm; and regular . Rate (approx.): 67; Axis: normal; P wave: normal; QRS interval: normal ; ST/T wave: non-specific changes; in  Lead: II ; Other findings: unchanged from previous ekg. EKG documented by Roberto Felipe MD,       [facility] Serve Consult for Admission  7:05 PM    ED Room Number: WER06/06  Patient Name and age:  Gary Chung 65 y.o.  female  Working Diagnosis:   1. Acute respiratory distress    2.  Hypoxia        COVID-19 Suspicion:  no  Sepsis present:  no  Reassessment needed: no  Code Status:  Full Code  Readmission: yes  Isolation Requirements:  no  Recommended Level of Care:  telemetry  Department:[facility] - [phone]  Other:

## 2020-11-02 NOTE — ED NOTES
Pt in stretcher, resting. Blood work obtained and sent to lab.  at bedside. Pt remains with audible wheezing and respirations of 28 breaths/min. Will continue to monitor.

## 2020-11-03 ENCOUNTER — APPOINTMENT (OUTPATIENT)
Dept: NON INVASIVE DIAGNOSTICS | Age: 70
DRG: 202 | End: 2020-11-03
Attending: INTERNAL MEDICINE
Payer: MEDICARE

## 2020-11-03 LAB
ANION GAP SERPL CALC-SCNC: 10 MMOL/L (ref 5–15)
ATRIAL RATE: 252 BPM
ATRIAL RATE: 67 BPM
ATRIAL RATE: 80 BPM
BASOPHILS # BLD: 0 K/UL (ref 0–0.1)
BASOPHILS NFR BLD: 0 % (ref 0–1)
BUN SERPL-MCNC: 19 MG/DL (ref 6–20)
BUN/CREAT SERPL: 18 (ref 12–20)
CALCIUM SERPL-MCNC: 9.4 MG/DL (ref 8.5–10.1)
CALCULATED P AXIS, ECG09: 129 DEGREES
CALCULATED P AXIS, ECG09: 48 DEGREES
CALCULATED P AXIS, ECG09: 72 DEGREES
CALCULATED R AXIS, ECG10: 22 DEGREES
CALCULATED R AXIS, ECG10: 28 DEGREES
CALCULATED R AXIS, ECG10: 32 DEGREES
CALCULATED T AXIS, ECG11: 100 DEGREES
CALCULATED T AXIS, ECG11: 114 DEGREES
CALCULATED T AXIS, ECG11: 162 DEGREES
CHLORIDE SERPL-SCNC: 101 MMOL/L (ref 97–108)
CO2 SERPL-SCNC: 26 MMOL/L (ref 21–32)
CREAT SERPL-MCNC: 1.07 MG/DL (ref 0.55–1.02)
DIAGNOSIS, 93000: NORMAL
DIFFERENTIAL METHOD BLD: ABNORMAL
ECHO EST RA PRESSURE: 3 MMHG
ECHO RIGHT VENTRICULAR SYSTOLIC PRESSURE (RVSP): 26.01 MMHG
ECHO TV REGURGITANT MAX VELOCITY: 239.86 CM/S
ECHO TV REGURGITANT PEAK GRADIENT: 23.01 MMHG
EOSINOPHIL # BLD: 0 K/UL (ref 0–0.4)
EOSINOPHIL NFR BLD: 0 % (ref 0–7)
ERYTHROCYTE [DISTWIDTH] IN BLOOD BY AUTOMATED COUNT: 13.2 % (ref 11.5–14.5)
GLUCOSE BLD STRIP.AUTO-MCNC: 190 MG/DL (ref 65–100)
GLUCOSE BLD STRIP.AUTO-MCNC: 211 MG/DL (ref 65–100)
GLUCOSE BLD STRIP.AUTO-MCNC: 252 MG/DL (ref 65–100)
GLUCOSE BLD STRIP.AUTO-MCNC: 313 MG/DL (ref 65–100)
GLUCOSE SERPL-MCNC: 261 MG/DL (ref 65–100)
HCT VFR BLD AUTO: 45 % (ref 35–47)
HGB BLD-MCNC: 14.5 G/DL (ref 11.5–16)
IMM GRANULOCYTES # BLD AUTO: 0.2 K/UL (ref 0–0.04)
IMM GRANULOCYTES NFR BLD AUTO: 1 % (ref 0–0.5)
LYMPHOCYTES # BLD: 0.7 K/UL (ref 0.8–3.5)
LYMPHOCYTES NFR BLD: 4 % (ref 12–49)
MAGNESIUM SERPL-MCNC: 2.1 MG/DL (ref 1.6–2.4)
MCH RBC QN AUTO: 31.2 PG (ref 26–34)
MCHC RBC AUTO-ENTMCNC: 32.2 G/DL (ref 30–36.5)
MCV RBC AUTO: 96.8 FL (ref 80–99)
MONOCYTES # BLD: 0.2 K/UL (ref 0–1)
MONOCYTES NFR BLD: 1 % (ref 5–13)
NEUTS SEG # BLD: 15.6 K/UL (ref 1.8–8)
NEUTS SEG NFR BLD: 94 % (ref 32–75)
NRBC # BLD: 0 K/UL (ref 0–0.01)
NRBC BLD-RTO: 0 PER 100 WBC
P-R INTERVAL, ECG05: 130 MS
P-R INTERVAL, ECG05: 138 MS
PLATELET # BLD AUTO: 336 K/UL (ref 150–400)
PMV BLD AUTO: 10 FL (ref 8.9–12.9)
POTASSIUM SERPL-SCNC: 4.5 MMOL/L (ref 3.5–5.1)
Q-T INTERVAL, ECG07: 316 MS
Q-T INTERVAL, ECG07: 378 MS
Q-T INTERVAL, ECG07: 426 MS
QRS DURATION, ECG06: 74 MS
QRS DURATION, ECG06: 78 MS
QRS DURATION, ECG06: 80 MS
QTC CALCULATION (BEZET), ECG08: 435 MS
QTC CALCULATION (BEZET), ECG08: 450 MS
QTC CALCULATION (BEZET), ECG08: 457 MS
RBC # BLD AUTO: 4.65 M/UL (ref 3.8–5.2)
RBC MORPH BLD: ABNORMAL
SERVICE CMNT-IMP: ABNORMAL
SODIUM SERPL-SCNC: 137 MMOL/L (ref 136–145)
TROPONIN I SERPL-MCNC: 1.2 NG/ML
TROPONIN I SERPL-MCNC: 1.29 NG/ML
TROPONIN I SERPL-MCNC: 1.34 NG/ML
VENTRICULAR RATE, ECG03: 126 BPM
VENTRICULAR RATE, ECG03: 67 BPM
VENTRICULAR RATE, ECG03: 80 BPM
WBC # BLD AUTO: 16.7 K/UL (ref 3.6–11)

## 2020-11-03 PROCEDURE — 93308 TTE F-UP OR LMTD: CPT

## 2020-11-03 PROCEDURE — 36415 COLL VENOUS BLD VENIPUNCTURE: CPT

## 2020-11-03 PROCEDURE — 85025 COMPLETE CBC W/AUTO DIFF WBC: CPT

## 2020-11-03 PROCEDURE — 74011000250 HC RX REV CODE- 250: Performed by: INTERNAL MEDICINE

## 2020-11-03 PROCEDURE — 74011250637 HC RX REV CODE- 250/637: Performed by: INTERNAL MEDICINE

## 2020-11-03 PROCEDURE — 97116 GAIT TRAINING THERAPY: CPT

## 2020-11-03 PROCEDURE — 97161 PT EVAL LOW COMPLEX 20 MIN: CPT

## 2020-11-03 PROCEDURE — 84484 ASSAY OF TROPONIN QUANT: CPT

## 2020-11-03 PROCEDURE — 93308 TTE F-UP OR LMTD: CPT | Performed by: INTERNAL MEDICINE

## 2020-11-03 PROCEDURE — 94664 DEMO&/EVAL PT USE INHALER: CPT

## 2020-11-03 PROCEDURE — 80048 BASIC METABOLIC PNL TOTAL CA: CPT

## 2020-11-03 PROCEDURE — 77030029684 HC NEB SM VOL KT MONA -A

## 2020-11-03 PROCEDURE — 94640 AIRWAY INHALATION TREATMENT: CPT

## 2020-11-03 PROCEDURE — 97165 OT EVAL LOW COMPLEX 30 MIN: CPT

## 2020-11-03 PROCEDURE — 83735 ASSAY OF MAGNESIUM: CPT

## 2020-11-03 PROCEDURE — 65660000000 HC RM CCU STEPDOWN

## 2020-11-03 PROCEDURE — 97535 SELF CARE MNGMENT TRAINING: CPT

## 2020-11-03 PROCEDURE — 74011636637 HC RX REV CODE- 636/637: Performed by: INTERNAL MEDICINE

## 2020-11-03 PROCEDURE — 74011250636 HC RX REV CODE- 250/636: Performed by: INTERNAL MEDICINE

## 2020-11-03 PROCEDURE — 99222 1ST HOSP IP/OBS MODERATE 55: CPT | Performed by: INTERNAL MEDICINE

## 2020-11-03 PROCEDURE — 82962 GLUCOSE BLOOD TEST: CPT

## 2020-11-03 PROCEDURE — 93005 ELECTROCARDIOGRAM TRACING: CPT

## 2020-11-03 RX ORDER — LIDOCAINE 4 G/100G
1 PATCH TOPICAL EVERY 24 HOURS
Status: DISCONTINUED | OUTPATIENT
Start: 2020-11-03 | End: 2020-11-06 | Stop reason: HOSPADM

## 2020-11-03 RX ORDER — BUMETANIDE 1 MG/1
1 TABLET ORAL DAILY
Status: DISCONTINUED | OUTPATIENT
Start: 2020-11-03 | End: 2020-11-06 | Stop reason: HOSPADM

## 2020-11-03 RX ORDER — METOPROLOL SUCCINATE 25 MG/1
25 TABLET, EXTENDED RELEASE ORAL
Status: DISCONTINUED | OUTPATIENT
Start: 2020-11-03 | End: 2020-11-06 | Stop reason: HOSPADM

## 2020-11-03 RX ORDER — ASCORBIC ACID 500 MG
500 TABLET ORAL DAILY
Status: DISCONTINUED | OUTPATIENT
Start: 2020-11-03 | End: 2020-11-06 | Stop reason: HOSPADM

## 2020-11-03 RX ORDER — INSULIN GLARGINE 100 [IU]/ML
8 INJECTION, SOLUTION SUBCUTANEOUS DAILY
Status: DISCONTINUED | OUTPATIENT
Start: 2020-11-03 | End: 2020-11-05

## 2020-11-03 RX ORDER — ASPIRIN 325 MG
325 TABLET ORAL ONCE
Status: COMPLETED | OUTPATIENT
Start: 2020-11-03 | End: 2020-11-03

## 2020-11-03 RX ORDER — FENOFIBRATE 48 MG/1
48 TABLET, COATED ORAL
Status: DISCONTINUED | OUTPATIENT
Start: 2020-11-03 | End: 2020-11-06 | Stop reason: HOSPADM

## 2020-11-03 RX ORDER — GLUCOSAM/CHONDRO/HERB 149/HYAL 750-100 MG
1 TABLET ORAL EVERY EVENING
Status: DISCONTINUED | OUTPATIENT
Start: 2020-11-03 | End: 2020-11-06 | Stop reason: HOSPADM

## 2020-11-03 RX ORDER — PANTOPRAZOLE SODIUM 40 MG/1
40 TABLET, DELAYED RELEASE ORAL
Status: DISCONTINUED | OUTPATIENT
Start: 2020-11-03 | End: 2020-11-06 | Stop reason: HOSPADM

## 2020-11-03 RX ORDER — MELATONIN
2000 DAILY
Status: DISCONTINUED | OUTPATIENT
Start: 2020-11-03 | End: 2020-11-06 | Stop reason: HOSPADM

## 2020-11-03 RX ORDER — LANOLIN ALCOHOL/MO/W.PET/CERES
325 CREAM (GRAM) TOPICAL
Status: DISCONTINUED | OUTPATIENT
Start: 2020-11-03 | End: 2020-11-06 | Stop reason: HOSPADM

## 2020-11-03 RX ORDER — NITROGLYCERIN 20 MG/1
1 PATCH TRANSDERMAL ONCE
Status: DISPENSED | OUTPATIENT
Start: 2020-11-03 | End: 2020-11-03

## 2020-11-03 RX ORDER — LEVOTHYROXINE SODIUM 150 UG/1
150 TABLET ORAL
Status: DISCONTINUED | OUTPATIENT
Start: 2020-11-03 | End: 2020-11-06 | Stop reason: HOSPADM

## 2020-11-03 RX ORDER — EPLERENONE 25 MG/1
25 TABLET, FILM COATED ORAL
Status: DISCONTINUED | OUTPATIENT
Start: 2020-11-03 | End: 2020-11-06 | Stop reason: HOSPADM

## 2020-11-03 RX ORDER — METOPROLOL SUCCINATE 25 MG/1
25 TABLET, EXTENDED RELEASE ORAL
Status: DISCONTINUED | OUTPATIENT
Start: 2020-11-03 | End: 2020-11-03

## 2020-11-03 RX ORDER — BUMETANIDE 1 MG/1
0.5 TABLET ORAL EVERY EVENING
Status: DISCONTINUED | OUTPATIENT
Start: 2020-11-03 | End: 2020-11-06 | Stop reason: HOSPADM

## 2020-11-03 RX ORDER — GABAPENTIN 100 MG/1
100 CAPSULE ORAL
Status: DISCONTINUED | OUTPATIENT
Start: 2020-11-03 | End: 2020-11-06 | Stop reason: HOSPADM

## 2020-11-03 RX ADMIN — METHYLPREDNISOLONE SODIUM SUCCINATE 40 MG: 40 INJECTION, POWDER, FOR SOLUTION INTRAMUSCULAR; INTRAVENOUS at 21:43

## 2020-11-03 RX ADMIN — ENOXAPARIN SODIUM 40 MG: 40 INJECTION SUBCUTANEOUS at 20:48

## 2020-11-03 RX ADMIN — METOPROLOL SUCCINATE 25 MG: 25 TABLET, EXTENDED RELEASE ORAL at 12:35

## 2020-11-03 RX ADMIN — IPRATROPIUM BROMIDE AND ALBUTEROL SULFATE 3 ML: .5; 3 SOLUTION RESPIRATORY (INHALATION) at 08:20

## 2020-11-03 RX ADMIN — INSULIN LISPRO 7 UNITS: 100 INJECTION, SOLUTION INTRAVENOUS; SUBCUTANEOUS at 16:31

## 2020-11-03 RX ADMIN — PANTOPRAZOLE SODIUM 40 MG: 40 TABLET, DELAYED RELEASE ORAL at 08:05

## 2020-11-03 RX ADMIN — Medication 10 ML: at 01:17

## 2020-11-03 RX ADMIN — BUDESONIDE 500 MCG: 0.5 INHALANT RESPIRATORY (INHALATION) at 08:25

## 2020-11-03 RX ADMIN — ACETAMINOPHEN 650 MG: 325 TABLET ORAL at 18:20

## 2020-11-03 RX ADMIN — FERROUS SULFATE TAB 325 MG (65 MG ELEMENTAL FE) 325 MG: 325 (65 FE) TAB at 12:35

## 2020-11-03 RX ADMIN — KETOROLAC TROMETHAMINE 15 MG: 30 INJECTION, SOLUTION INTRAMUSCULAR at 01:14

## 2020-11-03 RX ADMIN — OMEGA-3 FATTY ACIDS CAP 1000 MG 1 CAPSULE: 1000 CAP at 18:20

## 2020-11-03 RX ADMIN — BUDESONIDE 500 MCG: 0.5 INHALANT RESPIRATORY (INHALATION) at 19:43

## 2020-11-03 RX ADMIN — OXYCODONE HYDROCHLORIDE AND ACETAMINOPHEN 500 MG: 500 TABLET ORAL at 09:01

## 2020-11-03 RX ADMIN — Medication 2 TABLET: at 09:01

## 2020-11-03 RX ADMIN — DOXYCYCLINE HYCLATE 100 MG: 100 TABLET, COATED ORAL at 01:14

## 2020-11-03 RX ADMIN — BUMETANIDE 1 MG: 1 TABLET ORAL at 09:01

## 2020-11-03 RX ADMIN — Medication 5 ML: at 14:00

## 2020-11-03 RX ADMIN — INSULIN LISPRO 3 UNITS: 100 INJECTION, SOLUTION INTRAVENOUS; SUBCUTANEOUS at 09:09

## 2020-11-03 RX ADMIN — ASPIRIN 325 MG: 325 TABLET ORAL at 12:35

## 2020-11-03 RX ADMIN — FENOFIBRATE 48 MG: 48 TABLET ORAL at 01:14

## 2020-11-03 RX ADMIN — BUMETANIDE 0.5 MG: 1 TABLET ORAL at 18:20

## 2020-11-03 RX ADMIN — IPRATROPIUM BROMIDE AND ALBUTEROL SULFATE 3 ML: .5; 3 SOLUTION RESPIRATORY (INHALATION) at 14:14

## 2020-11-03 RX ADMIN — INSULIN GLARGINE 8 UNITS: 100 INJECTION, SOLUTION SUBCUTANEOUS at 09:02

## 2020-11-03 RX ADMIN — ARFORMOTEROL TARTRATE 15 MCG: 15 SOLUTION RESPIRATORY (INHALATION) at 08:25

## 2020-11-03 RX ADMIN — METHYLPREDNISOLONE SODIUM SUCCINATE 40 MG: 40 INJECTION, POWDER, FOR SOLUTION INTRAMUSCULAR; INTRAVENOUS at 13:15

## 2020-11-03 RX ADMIN — IPRATROPIUM BROMIDE AND ALBUTEROL SULFATE 3 ML: .5; 3 SOLUTION RESPIRATORY (INHALATION) at 19:41

## 2020-11-03 RX ADMIN — IPRATROPIUM BROMIDE AND ALBUTEROL SULFATE 3 ML: .5; 3 SOLUTION RESPIRATORY (INHALATION) at 02:14

## 2020-11-03 RX ADMIN — INSULIN LISPRO 5 UNITS: 100 INJECTION, SOLUTION INTRAVENOUS; SUBCUTANEOUS at 13:03

## 2020-11-03 RX ADMIN — ARFORMOTEROL TARTRATE 15 MCG: 15 SOLUTION RESPIRATORY (INHALATION) at 19:43

## 2020-11-03 RX ADMIN — DOXYCYCLINE HYCLATE 100 MG: 100 TABLET, COATED ORAL at 20:48

## 2020-11-03 RX ADMIN — METHYLPREDNISOLONE SODIUM SUCCINATE 60 MG: 125 INJECTION, POWDER, FOR SOLUTION INTRAMUSCULAR; INTRAVENOUS at 01:15

## 2020-11-03 RX ADMIN — KETOROLAC TROMETHAMINE 15 MG: 30 INJECTION, SOLUTION INTRAMUSCULAR at 08:05

## 2020-11-03 RX ADMIN — DOXYCYCLINE HYCLATE 100 MG: 100 TABLET, COATED ORAL at 09:01

## 2020-11-03 RX ADMIN — Medication 10 ML: at 08:05

## 2020-11-03 RX ADMIN — METHYLPREDNISOLONE SODIUM SUCCINATE 60 MG: 125 INJECTION, POWDER, FOR SOLUTION INTRAMUSCULAR; INTRAVENOUS at 08:05

## 2020-11-03 RX ADMIN — GABAPENTIN 100 MG: 100 CAPSULE ORAL at 20:48

## 2020-11-03 RX ADMIN — GABAPENTIN 100 MG: 100 CAPSULE ORAL at 01:14

## 2020-11-03 RX ADMIN — LEVOTHYROXINE SODIUM 150 MCG: 0.15 TABLET ORAL at 08:05

## 2020-11-03 RX ADMIN — Medication 10 ML: at 21:44

## 2020-11-03 RX ADMIN — EPLERENONE 25 MG: 25 TABLET, FILM COATED ORAL at 12:48

## 2020-11-03 RX ADMIN — FENOFIBRATE 48 MG: 48 TABLET ORAL at 20:54

## 2020-11-03 NOTE — ED NOTES
Patient arrived from [facility] via EMS. A&O x4, 98% on 3L via nasal canula. Patient reports SOB since October 20 st after surgery.

## 2020-11-03 NOTE — PROGRESS NOTES
Problem: Self Care Deficits Care Plan (Adult)  Goal: *Acute Goals and Plan of Care (Insert Text)  Description:   FUNCTIONAL STATUS PRIOR TO ADMISSION: Patient was independent for mobility without use of AD. Patient was modified independent for basic and instrumental ADLs. Pt with recent cardiac surgery last month. HOME SUPPORT: The patient lived with  but did not require assist.    Occupational Therapy Goals  Initiated 11/3/2020  1. Patient will perform grooming, standing at sink for at least 5 minutes with stable vitals, with modified independence within 7 day(s). 2.  Patient will perform lower body dressing with modified independence within 7 day(s). 3.  Patient will perform bathing, sitting and standing PRN, with supervision/set-up within 7 day(s). 4.  Patient will perform toilet transfers and all aspects of toileting with modified independence within 7 day(s). 5.  Patient will perform simple home management task with supervision/set-up within 7 day(s). 6.  Patient will participate in upper extremity therapeutic exercise/activities with modified independence for 10 minutes within 7 day(s). 7.  Patient will utilize energy conservation techniques during functional activities with verbal cues within 7 day(s). Outcome: Progressing Towards Goal     OCCUPATIONAL THERAPY EVALUATION: Late documentation for encounter on 11/3/2020  Patient: Sultana Palencia (89 y.o. female)  Date: 11/3/2020  Primary Diagnosis: Hypoxia [R09.02]        Precautions: Fall       ASSESSMENT  Based on the objective data described below, the patient presents with decreased activity tolerance and endurance following admission for hypoxia. Pt is seen in ED, is pleasant and agreeable to participate. Of note, she underwent recent cardiac sx at [facility] with d/c home on 10/22. This session, pt is able to perform mobility to bathroom with overall CGA without AD and manages toileting tasks with SBA/CGA.  She demonstrates tailor sit at EOB to manage distal LB dressing tasks with need for increased rest break between tasks due to SOB and increased WOB. After increased activity in hallway, pt with increasing unsteadiness and report of dizziness. HR increased to max 153 bpm and BP elevated with report of CP. RN present in room to manage. Supportive  present throughout session. Anticipate pt will progress as she becomes more medically stable with goal of returning home with [facility] therapy. Current Level of Function Impacting Discharge (ADLs/self-care): overall set up to SBA/CGA for ADLs; overall SBA/CGA for mobility, with one LOB requiring mod A to steady. Functional Outcome Measure: The patient scored Total: 65/100 on the Barthel Index outcome measure which is indicative of 35% impaired ability to care for basic self needs/dependency on others; inferred 50% dependency on others for instrumental ADLs. Other factors to consider for discharge: recent cardiac sx; supportive ; fall risk; tachy HR and elevated BP     Patient will benefit from skilled therapy intervention to address the above noted impairments. PLAN :  Recommendations and Planned Interventions: self care training, functional mobility training, therapeutic exercise, balance training, therapeutic activities, endurance activities, patient education, home safety training and family training/education    Frequency/Duration: Patient will be followed by occupational therapy 5 times a week to address goals.     Recommendation for discharge: (in order for the patient to meet his/her long term goals)  Occupational therapy at least 2 days/week in the home with transition to OP cardiac     This discharge recommendation:  Has been made in collaboration with the attending provider and/or case management    IF patient discharges home will need the following DME: patient owns DME required for discharge       SUBJECTIVE:   Patient stated it was aching like this at home, and it had radiated up to my jaw.     OBJECTIVE DATA SUMMARY:   HISTORY:   Past Medical History:   Diagnosis Date    Adverse effect of anesthesia     slow to wake up    Arthritis     Asthma 6/29/2009    CAUSED BY MOLD    Atrial fibrillation ([address] 6/29/2009    CAD (coronary artery disease) 06/29/2009    Celiac disease 2010    Chronic obstructive pulmonary disease ([address] 2004-5    right leg    Chronic pain of right ankle     Diabetes ([address]     Drug induced prednisone, DM2    Diastolic heart failure ([address]     DVT (deep venous thrombosis) ([address] 2004    Right leg post surgery    GERD (gastroesophageal reflux disease)     Gluten intolerance     Heart failure ([address]     Hypertension     Hypothyroidism 6/29/2009    Iron deficiency anemia     Lyme disease     Personal history of MI (myocardial infarction)     Rheumatoid arthritis ([address]     Slow to wake up after anesthesia     Syncope     Post testing- resolved    TIA (transient ischemic attack) 2004 & 2006    Vitamin B12 deficiency 6/29/2009     Past Surgical History:   Procedure Laterality Date    HX ADENOIDECTOMY      HX AFIB ABLATION      HX APPENDECTOMY      HX CHOLECYSTECTOMY  6/16/11    HX COLONOSCOPY      HX CORONARY STENT PLACEMENT      x 2    HX HEART CATHETERIZATION  2010    2 STENTS    HX HEART CATHETERIZATION  03/2020    HX HYSTERECTOMY      HX LUMBAR LAMINECTOMY  2000    L4-L5    HX ORTHOPAEDIC  1993-6/2012    RT.  FOOT SURGERY X 6/calcaneal osteotomy    HX ORTHOPAEDIC Right 09/19/2020    right hand CMC joint replacement    HX TONSILLECTOMY  1964       Expanded or extensive additional review of patient history:     Home Situation  Home Environment: Private residence  # Steps to Enter: 5  Rails to Enter: Yes  One/Two Story Residence: One story  Living Alone: Yes  Support Systems: Spouse/Significant Other/Partner  Patient Expects to be Discharged to[de-identified] Private residence  Current DME Used/Available at Home: Shower chair, Grab bars, Cane, straight  Tub or Shower Type: Shower    Hand dominance: Right    EXAMINATION OF PERFORMANCE DEFICITS:  Cognitive/Behavioral Status:  Neurologic State: Alert  Orientation Level: Oriented X4  Cognition: Appropriate decision making; Appropriate for age attention/concentration; Appropriate safety awareness; Follows commands  Perception: Appears intact  Perseveration: No perseveration noted  Safety/Judgement: Awareness of environment; Fall prevention;Good awareness of safety precautions; Home safety; Insight into deficits    Hearing: Auditory  Auditory Impairment: None    Range of Motion:  AROM: Within functional limits  PROM: Within functional limits    Strength:  Strength: Within functional limits    Coordination:  Coordination: Within functional limits  Fine Motor Skills-Upper: Left Intact; Right Intact    Gross Motor Skills-Upper: Left Intact; Right Intact    Tone & Sensation:  Tone: Normal  Sensation: Intact    Balance:  Sitting: Intact  Standing: Without support  Standing - Static: Good  Standing - Dynamic : Good;Fair(fair with prolonged activity)    Functional Mobility and Transfers for ADLs:  Bed Mobility:  Supine to Sit: Contact guard assistance; Additional time  Sit to Supine: Minimum assistance  Scooting: Stand-by assistance    Transfers:  Sit to Stand: Stand-by assistance  Stand to Sit: Stand-by assistance  Bathroom Mobility: Stand-by assistance  Toilet Transfer : Stand-by assistance    ADL Assessment:  Feeding: Independent    Oral Facial Hygiene/Grooming: Stand-by assistance;Contact guard assistance    Bathing: Stand-by assistance;Contact guard assistance(infer based on observations)    Upper Body Dressing: Setup    Lower Body Dressing: Stand-by assistance;Contact guard assistance(able to tailor sit to access distal LEs)    Toileting: Stand by assistance    ADL Intervention and task modifications:  Grooming  Grooming Assistance: Stand-by assistance  Position Performed: Standing(at sink)  Washing Face: Stand-by assistance    Lower Body Dressing Assistance  Socks: Supervision(increased time for rest break due to SOB)  Leg Crossed Method Used: Yes  Position Performed: Seated edge of bed  Cues: Don;Verbal cues provided    Toileting  Bladder Hygiene: Modified independent(seated)  Clothing Management: Stand-by assistance  Adaptive Equipment: Grab bars    Cognitive Retraining  Safety/Judgement: Awareness of environment; Fall prevention;Good awareness of safety precautions; Home safety; Insight into deficits     Functional Measure:  Barthel Index:    Bathin  Bladder: 10  Bowels: 10  Groomin  Dressin  Feeding: 10  Mobility: 10  Stairs: 0  Toilet Use: 5  Transfer (Bed to Chair and Back): 10  Total: 65/100        The Barthel ADL Index: Guidelines  1. The index should be used as a record of what a patient does, not as a record of what a patient could do. 2. The main aim is to establish degree of independence from any help, physical or verbal, however minor and for whatever reason. 3. The need for supervision renders the patient not independent. 4. A patient's performance should be established using the best available evidence. Asking the patient, friends/relatives and nurses are the usual sources, but direct observation and common sense are also important. However direct testing is not needed. 5. Usually the patient's performance over the preceding 24-48 hours is important, but occasionally longer periods will be relevant. 6. Middle categories imply that the patient supplies over 50 per cent of the effort. 7. Use of aids to be independent is allowed. Yohana Lamar., Barthel, D.W. ([id number]). Functional evaluation: the Barthel Index. [address] [facility] (14)2. BRET Charles, Harmeet Valdes., Dorcas Morse., [place], [address] (). Measuring the change indisability after inpatient rehabilitation; comparison of the responsiveness of the Barthel Index and Functional [place] Measure.  Journal of Neurology, Neurosurgery, and Psychiatry, 664), 209-135. NENA Son.JAISON, KARIE Otoole, & Andreia Marion M.A. (2004.) Assessment of post-stroke quality of life in cost-effectiveness studies: The usefulness of the Barthel Index and the EuroQoL-5D. Quality of Life Research, 15, 770-56     Occupational Therapy Evaluation Charge Determination   History Examination Decision-Making   LOW Complexity : Brief history review  MEDIUM Complexity : 3-5 performance deficits relating to physical, cognitive , or psychosocial skils that result in activity limitations and / or participation restrictions MEDIUM Complexity : Patient may present with comorbidities that affect occupational performnce. Miniml to moderate modification of tasks or assistance (eg, physical or verbal ) with assesment(s) is necessary to enable patient to complete evaluation       Based on the above components, the patient evaluation is determined to be of the following complexity level: LOW   Pain Rating:  Pt reporting aching chest/mid sternal pain at end of session; RN present to manage/assess as appropriate    Activity Tolerance:   Fair, SpO2 stable on RA, requires rest breaks and observed SOB with activity    After treatment patient left in no apparent distress:    Supine in bed, Call bell within reach, Caregiver / family present, Side rails x 3 and RN present    COMMUNICATION/EDUCATION:   The patients plan of care was discussed with: Physical therapist and Registered nurse. Home safety education was provided and the patient/caregiver indicated understanding., Patient/family have participated as able in goal setting and plan of care. and Patient/family agree to work toward stated goals and plan of care. This patients plan of care is appropriate for delegation to [facility].     Thank you for this referral.  Laverne Ballard OT  Time Calculation: 33 mins

## 2020-11-03 NOTE — ED NOTES
7:36 PM  Change of shift. Care of patient taken over from Dr. Connie Springer; H&P reviewed, bedside handoff complete. Awaiting CT result and admission to the hospitalist, the patient returned from CT and nurses alerted me to the fact that she began complaining of chest pain or shortness of breath. The patient was reexamined and appeared mildly dyspneic with crackles and wheezing on lung exam as well as mild retraction diffusely however right greater than left. EKG was reviewed and unchanged from EKG performed 2 hours ago. The patient states that her discomfort has subsided at this time. She is pain-free. Will administer DuoNeb with benadryl and the patient is agreeable to this plan as she states that usually works for her. The patient is currently awaiting bed placement.

## 2020-11-03 NOTE — PROGRESS NOTES
Occupational Therapy:  11/03/20    Orders received, chart reviewed and patient evaluated by occupational therapy. Pending progression with skilled acute occupational therapy, recommend:  Occupational therapy at least 2 days/week in the home vs none pending progress; likely transition to cardiac rehab     11/03/20 0920 11/03/20 0940 11/03/20 0947   BP: (!) 137/92  (!) 155/104   BP 1 Location: Left arm  Left arm   BP Patient Position: Sitting;Pre-activity During activity Supine; Post activity   Pulse: 74 (!) 153 (!) 136   SpO2: 94% 96% 97%     Recommend with nursing patient to complete as able in order to maintain strength, endurance and independence: OOB to chair 3x/day with 1 person assist and functional mobility to the bathroom with 1 person assist. Thank you for your assistance. Full evaluation to follow.      Thank you,  Conner Navarro OTR/L

## 2020-11-03 NOTE — PROGRESS NOTES
Pt ambulated with PT. Upon returning to room 'S, rhythm?aflutter; 12 lead EKG done. Pt c/o 5/10 chest pressure. /86, RR 28, 02 sat 98% 2lpm.Message sent to Dr Jarocho Vásquez; ER Physician in to eval pt. Pt is currently inpt status. No new orders, will continue to monitor pt.    1012- Reports felling some better. Chest tightness 3/10. Pt eating breakfast.  Will draw scheduled Trop.

## 2020-11-03 NOTE — PROGRESS NOTES
Jerardo Vyas [facility]  [address] [facility], [address] [place] [address]  [phone]      Medical Progress Note      NAME: Kat Alonso   :  1950  MRM:  [id number]    Date/Time: 11/3/2020  11:21 AM         Subjective:     Chief Complaint:  Chest pain    Pt developed chest pain while working with PT. HR increased to 140s. Pain is improved now that she is resting but still present. She reports bp problems with nitro and request the patch she uses at home. Objective:       Vitals:          Last 24hrs VS reviewed since prior progress note. Most recent are:    Visit Vitals  BP (!) 155/104 (BP 1 Location: Left arm, BP Patient Position: Supine; Post activity)   Pulse (!) 136   Temp 97.6 °F (36.4 °C)   Resp 14   Ht 5' 3\" (1.6 m)   Wt 77.1 kg (170 lb)   SpO2 97%   BMI 30.11 kg/m²     SpO2 Readings from Last 6 Encounters:   20 97%   10/29/20 96%   10/22/20 91%   10/15/20 97%   10/15/20 96%   10/05/20 95%    O2 Flow Rate (L/min): 2 l/min       Intake/Output Summary (Last 24 hours) at 11/3/2020 1121  Last data filed at 11/3/2020 0946  Gross per 24 hour   Intake --   Output 250 ml   Net -250 ml          Exam:     Physical Exam:    Gen:  Well-developed, well-nourished, in no acute distress  HEENT:  Pink conjunctivae, PERRL, hearing intact to voice, moist mucous membranes  Neck:  Supple, without masses, thyroid non-tender  Resp:  No accessory muscle use, clear breath sounds without wheezes rales or rhonchi  Card:  No murmurs, normal S1, S2 without thrills, bruits or peripheral edema  Abd:  Soft, non-tender, non-distended, normoactive bowel sounds are present  Musc:  No cyanosis or clubbing  Skin:  No rashes or ulcers, skin turgor is good  Neuro:  Cranial nerves 3-12 are grossly intact,  strength is 5/5 bilaterally and dorsi / plantarflexion is 5/5 bilaterally, follows commands appropriately  Psych:  Good insight, oriented to person, place and time, alert       Telemetry reviewed: aflutter    Medications Reviewed: (see below)    Lab Data Reviewed: (see below)    ______________________________________________________________________    Medications:     Current Facility-Administered Medications   Medication Dose Route Frequency    artificial tears (dextran 70-hypromellose) (GENTEAL TEARS MILD) 0.1-0.3 % ophthalmic solution 1 Drop  1 Drop Both Eyes DAILY PRN    ascorbic acid (vitamin C) (VITAMIN C) tablet 500 mg  500 mg Oral DAILY    bumetanide (BUMEX) tablet 1 mg  1 mg Oral DAILY    bumetanide (BUMEX) tablet 0.5 mg  0.5 mg Oral QPM    cholecalciferol (VITAMIN D3) (1000 Units /25 mcg) tablet 2 Tab  2,000 Units Oral DAILY    eplerenone (INSPRA) tablet 25 mg  25 mg Oral ACL    fenofibrate nanocrystallized (TRICOR) tablet 48 mg  48 mg Oral QHS    ferrous sulfate tablet 325 mg  325 mg Oral DAILY WITH LUNCH    gabapentin (NEURONTIN) capsule 100 mg  100 mg Oral QHS    insulin glargine (LANTUS) injection 8 Units  8 Units SubCUTAneous DAILY    pantoprazole (PROTONIX) tablet 40 mg  40 mg Oral ACB    levothyroxine (SYNTHROID) tablet 150 mcg  150 mcg Oral ACB    lidocaine 4 % patch 1 Patch  1 Patch TransDERmal Q24H    omega 3-DHA-EPA-fish oil 1,000 mg (120 mg-180 mg) capsule 1 Cap  1 Cap Oral QPM    nitroglycerin (NITRODUR) 0.1 mg/hr patch 1 Patch  1 Patch TransDERmal ONCE    aspirin tablet 325 mg  325 mg Oral ONCE    metoprolol succinate (TOPROL-XL) XL tablet 25 mg  25 mg Oral PCL    sodium chloride (NS) flush 5-40 mL  5-40 mL IntraVENous Q8H    sodium chloride (NS) flush 5-40 mL  5-40 mL IntraVENous PRN    acetaminophen (TYLENOL) tablet 650 mg  650 mg Oral Q4H PRN    naloxone (NARCAN) injection 0.4 mg  0.4 mg IntraVENous PRN    ondansetron (ZOFRAN) injection 4 mg  4 mg IntraVENous Q4H PRN    enoxaparin (LOVENOX) injection 40 mg  40 mg SubCUTAneous Q24H    glucose chewable tablet 16 g  4 Tab Oral PRN    dextrose (D50W) injection syrg 12.5-25 g  25-50 mL IntraVENous PRN    glucagon (GLUCAGEN) injection 1 mg  1 mg IntraMUSCular PRN    insulin lispro (HUMALOG) injection   SubCUTAneous AC&HS    ketorolac (TORADOL) injection 15 mg  15 mg IntraVENous Q6H    HYDROmorphone (DILAUDID) tablet 2 mg  2 mg Oral Q4H PRN    albuterol-ipratropium (DUO-NEB) 2.5 MG-0.5 MG/3 ML  3 mL Nebulization Q6H RT    budesonide (PULMICORT) 500 mcg/2 ml nebulizer suspension  500 mcg Nebulization BID RT    arformoteroL (BROVANA) neb solution 15 mcg  15 mcg Nebulization BID RT    albuterol (ACCUNEB) nebulizer solution 1.25 mg  1.25 mg Nebulization Q2H PRN    methylPREDNISolone (PF) (Solu-MEDROL) injection 60 mg  60 mg IntraVENous Q8H    doxycycline (VIBRA-TABS) tablet 100 mg  100 mg Oral Q12H     Current Outpatient Medications   Medication Sig    acetaminophen (TYLENOL) 650 mg TbER Take 1,300 mg by mouth two (2) times a day.  bumetanide (BUMEX) 1 mg tablet Take 0.5 mg by mouth every evening. 1/2 tablet = 0.5 mg    coenzyme Q-10 (Co Q-10) 200 mg capsule Take 200 mg by mouth daily.  artificial tears, dextran 70-hypromellose, (GenTeal Tears Mild) 0.1-0.3 % ophthalmic solution Administer 1 Drop to both eyes daily as needed.  carboxymethylcell/hypromellose (GENTEAL GEL OP) Apply  to eye nightly.  omega 3-DHA-EPA-fish oil 1,000 mg (120 mg-180 mg) capsule Take 1 Cap by mouth every evening.  levalbuterol (XOPENEX) 0.63 mg/3 mL nebu 0.63 mg by Nebulization route four (4) times daily.  metoprolol succinate (TOPROL-XL) 25 mg XL tablet Take 1 Tab by mouth daily (after lunch).  aclidinium bromide (Tudorza Pressair) 400 mcg/actuation inhaler Take 1 Puff by inhalation.  calcium citrate-vitamin d3 (CITRACAL+D) 315 mg-5 mcg (200 unit) tab Take 1 Tab by mouth daily (with breakfast).  predniSONE (DELTASONE) 2.5 mg tablet Take 2.5 mg by mouth daily (after lunch). 5 mg in morning and 2.5 mg afternoon    gabapentin (NEURONTIN) 100 mg capsule Take 100 mg by mouth nightly.     mometasone furoate [facility]) Take 100 mcg by inhalation two (2) times a day.  potassium 99 mg tablet Take 99 mg by mouth daily.  insulin glargine (LANTUS,BASAGLAR) 100 unit/mL (3 mL) inpn 8 Units by SubCUTAneous route Daily (before breakfast). 30 minutes before breakfast  Indications: \"I set the pen on 8\"    denosumab (PROLIA SC) by SubCUTAneous route every 6 months.  bumetanide (BUMEX) 1 mg tablet Take 1 mg by mouth daily.  eplerenone (INSPRA) 25 mg tablet Take 25 mg by mouth Daily (before lunch).  fenofibrate nanocrystallized (Tricor) 48 mg tablet Take 48 mg by mouth nightly.  nitroglycerin (NITRODUR) 0.1 mg/hr 1 Patch by TransDERmal route daily as needed. Indications: Oral form \"causes severe BP drop  BAD\" Drs got scared,    levothyroxine (SYNTHROID) 150 mcg tablet Take 150 mcg by mouth Daily (before breakfast).  predniSONE (DELTASONE) 5 mg tablet Take 5 mg by mouth daily. 5 mg in morning and 2.5 mg afternoon    cholecalciferol, vitamin D3, (VITAMIN D3) 2,000 unit tab Take 2,000 Units by mouth daily.  turmeric (CURCUMIN) Take 1 g by mouth every evening.  SITagliptin (JANUVIA) 100 mg tablet Take 100 mg by mouth daily (with dinner).  vit C/vit E ac/lut/copper/zinc (PRESERVISION LUTEIN PO) Take 1 Tab by mouth daily (with dinner). Once per week    lidocaine (LIDODERM) 5 % 1 Patch by TransDERmal route every twenty-four (24) hours. Apply patch to the affected area for 12 hours a day and remove for 12 hours a day.  ascorbic acid (VITAMIN C) 500 mg tablet Take 500 mg by mouth daily.  ferrous sulfate 325 mg (65 mg iron) tablet Take 325 mg by mouth daily (with lunch).  cyanocobalamin (VITAMIN B12) 1,000 mcg/mL injection INJECT 1 ML INTO THE MUSCLE EVERY 30 DAYS    lansoprazole (PREVACID) 30 mg capsule Take 30 mg by mouth daily.             Lab Review:     Recent Labs     11/03/20  0350 11/02/20  1802   WBC 16.7* 21.7*   HGB 14.5 14.1   HCT 45.0 44.5    343     Recent Labs 11/03/20  0350 11/02/20  1802    140   K 4.5 5.0    105   CO2 26 26   * 201*   BUN 19 19   CREA 1.07* 0.90   CA 9.4 9.2   MG 2.1  --    ALB  --  3.3*   ALT  --  12     No components found for: [place]         Assessment / Plan:     Chest Pain / elevated troponin / CAD: may represent NSTEMI. Likely driven by elevated HR. Order now dose aspirin. Order home dose nitrodur patch. Give now dose home metoprolol. May need dilt gtt, pt reports labile BP at baseline will start with po metoprolol. Trend CE. Cardiology to review    PAF: now in [address]. Sp atrial appendage clip. No anticoagulation due to h/o bleeding. Start with dose of PO metoprolol may need dilt gtt. Cardiology to review    Hypoxia: improved with diuresis now on po bumex. Monitor daily weight and I/O       Asthma (6/29/2009) - with exacerbation. Mild wheezing improved. Continue duobnebs. Decrease steroids       Hypothyroidism (3/23/2012)   -continue levothyroxine        HTN (hypertension): pt reports labile bp, requesting judicious use of bp meds       Leukocytosis (8/12/2019) - chronic. Likely 2/2 steroids. Improved today.  Monitor off abx       Diabetes mellitus type 2, controlled ([facility] [address] (8/12/2019)  -ISS   -BG checks AC TID and qHS   -continue Lantus        Diastolic heart failure (HCC) ()  -s/p IV diuresis; transition to PO in the AM              Total time spent with patient: [address] [place] [address] discussed with: Patient, Family and Nursing Staff    Discussed:  Care Plan    Prophylaxis:  Lovenox    Disposition:  Home w/Family           ___________________________________________________    Attending Physician: Chester Egan MD

## 2020-11-03 NOTE — ED NOTES
Bedside and Verbal shift change report given to Feliz Matamoros RN (oncoming nurse) by Kateryna Baker RN (offgoing nurse). Report included the following information SBAR, ED Summary, MAR and Recent Results.

## 2020-11-03 NOTE — PROGRESS NOTES
Problem: Diabetes Self-Management  Goal: *Using medications safely  Description: State effect of diabetes medications on diabetes; name diabetes medication taking, action and side effects. Outcome: Progressing Towards Goal     Problem: Falls - Risk of  Goal: *Absence of Falls  Description: Document Aydeerolanda Funes Fall Risk and appropriate interventions in the flowsheet.   Outcome: Progressing Towards Goal  Note: Fall Risk Interventions:  Mobility Interventions: Patient to call before getting OOB         Medication Interventions: Patient to call before getting OOB         History of Falls Interventions: Bed/chair exit alarm, Investigate reason for fall

## 2020-11-03 NOTE — PROGRESS NOTES
11/3/2020  10:50 AM  CM completed assessment w/ pt and , in person, CM wore mask at all times. Charted demographics verified  Reason for Readmission:    Hypoxia  Pt is 66 yo  female presents to [facility] ED c/o increasing  SOB, also periodic tachycardia and CP. PMHx: pt has extensive history including CAD,COPD,PAFib, s/p ablation, dyslipidemia, anemia,asthma,lyme disease, T2DM, hypothyroid  Admission ED [facility] 10/20-10/22/2020 for AFib  had(s/p video-assisted thoracoscopic drainage of pericardial effusion. video-assisted thoracoscopic placement of left atrial appendage clip on 10/20), was discharged to home w/ [facility] for PT      RUR Score/Risk Level:     19 %  Moderate Risk    PCP: First and Last name:  Christa Calix MD   Name of Practice:    Are you a current patient: Yes/No: Yes   Approximate date of last visit:   > 30 days   Can you participate in a virtual visit with your PCP: YES    Is a Care Conference indicated: None at his time      Did you attend your follow up appointment (s): If not, why not:  Had cardiology f/u, PCP was scheduled same day, did not yet see PCP         Resources/supports as identified by patient/family:   Pt has supportive  who can assist in her care       Top Challenges facing patient (as identified by patient/family and CM): Finances/Medication cost? Medicare A & B, [employer], [facility] DEMANDITs order or [employer] drives,  also drives    Support system or lack thereof? Pt has supportive  who she lives with    Living arrangements? Pt lives w/  in 1 story home, w/ 2 entry steps through back       Self-care/ADLs/Cognition?    Pt is able to perform ADLs independently,  AAo x 4         Current Advanced Directive/Advance Care Plan:     pt has AMD on file   Gabbi Perezmarva (C) [phone]  Dtr Adelia Rogers (C) [phone]  Son Laura Desai (I) [phone]        Plan for utilizing home health:   Pt is currently open to [facility] for PT             Transition of Care Plan:    Based on readmission, the patient's previous Plan of Care   has been evaluated and/or modified. The current Transition of Care Plan is:         1. Hospital admission for medical management  2. Cardiology consult  3. PT/OT evals  4. CM to follow through for treatment/response  5. DC when stable to home w/ [facility] and family assistance  6. Outpatient f/u PCP, cardiology  7.   [facility] will transport    Care Management Interventions  PCP Verified by CM: Bhumika Seymour MD; last visit > 30 days)  Palliative Care Criteria Met (RRAT>21 & CHF Dx)?: No  Mode of Transport at Discharge: Self()  Physical Therapy Consult: Yes  Occupational Therapy Consult: Yes  Speech Therapy Consult: No  Current Support Network: Lives with Spouse, Own Home(pt l vies w/  in pvt residence, reports to be ambulatory, iADLs drives)  Confirm Follow Up Transport: Family  Discharge Location  Discharge Placement: Home with home health  Readmission Assessment  Number of days since last admission?: 8-30 days  Previous disposition: Home with Home Health([address] for PT)  What was the patient's/caregiver's perception as to why they think they needed to return back to the hospital?: Other (Comment)(pt experiencing SOB)  Did you visit your Primary Care Physician after you left the hospital, before you returned this time?: No  Why weren't you able to visit your PCP?: Other (Comment)(Had to change appt due to conflict w/ cardiology appt)  Did you see a specialist, such as Cardiac, Pulmonary, Orthopedic Physician, etc. after you left the hospital?: Yes(cardiology)  Who advised the patient to return to the hospital?: Self-referral  Devon Pena BS

## 2020-11-03 NOTE — ED NOTES
TRANSFER - OUT REPORT:    Verbal report given to [facility]) on Kaci Cardona  being transferred to [facility] ED(unit) for routine progression of care       Report consisted of patients Situation, Background, Assessment and   Recommendations(SBAR). Information from the following report(s) SBAR, ED Summary, [facility] and Recent Results was reviewed with the receiving nurse. Lines:   Peripheral IV 11/02/20 Right;Upper Arm (Active)   Site Assessment Clean, dry, & intact 11/02/20 1807   Phlebitis Assessment 0 11/02/20 1807   Infiltration Assessment 0 11/02/20 1807   Dressing Status Clean, dry, & intact 11/02/20 1807   Dressing Type Tape;Transparent 11/02/20 1807   Hub Color/Line Status Blue;Patent; Flushed 11/02/20 1807   Action Taken Blood drawn 11/02/20 1807        Opportunity for questions and clarification was provided.       Patient transported with:   Monitor

## 2020-11-03 NOTE — PROGRESS NOTES
Bedside shift change report given to Shala(oncoming nurse) by Joel Ríos (offgoing nurse). Report included the following information SBAR, Kardex, ED Summary, Intake/Output, MAR, Recent Results and Cardiac Rhythm NSR.

## 2020-11-03 NOTE — PROGRESS NOTES
Problem: Mobility Impaired (Adult and Pediatric)  Goal: *Acute Goals and Plan of Care (Insert Text)  Description: FUNCTIONAL STATUS PRIOR TO ADMISSION: Patient was independent and active without use of DME. Recent cardiac surgery Oct 20th at [facility]. Reports ambulating without DME at home though with increased CHACON and WOB. HOME SUPPORT PRIOR TO ADMISSION: The patient lived with  and was only requiring assistance since surgery 10/20/2020. Physical Therapy Goals  Initiated 11/3/2020  1. Patient will move from supine to sit and sit to supine , scoot up and down, and roll side to side in bed with independence within 7 day(s). 2.  Patient will transfer from bed to chair and chair to bed with independence using the least restrictive device within 7 day(s). 3.  Patient will perform sit to stand with independence within 7 day(s). 4.  Patient will ambulate with independence for 250 feet with the least restrictive device within 7 day(s). 5.  Patient will ascend/descend 5 stairs with 1 handrail(s) with modified independence within 7 day(s). Outcome: Progressing Towards Goal   PHYSICAL THERAPY EVALUATION  Patient: Kelton Sheets (22 y.o. female)  Date: 11/3/2020  Primary Diagnosis: Hypoxia [R09.02]        Precautions:        ASSESSMENT  Based on the objective data described below, the patient presents with decreased activity tolerance and endurance following admission for hypoxia from home. Pt underwent cardiac surgery at [facility] 10/20 and discharge 10/22. Pt endorses increased WOB and SOB at home leading to this admission. Pt mobilizing well for short distance in room and bathroom. Ambulated 100ft prior to becoming more unsteady with path deviations and reliance on support at gait belt and HHA to maintain upright balance. Upon return to room pt reporting increased dizziness and need to immediately sit. HR up to 153BPM from 80BPM at rest. BP elevated and HR in room d/t c/o mid-sternal pain.     Current Level of Function Impacting Discharge (mobility/balance): Min A when fatigued    Functional Outcome Measure: The patient scored 19/28 on the Tinetti outcome measure which is indicative of moderate fall risk. Other factors to consider for discharge: uncontrolled HR     Patient will benefit from skilled therapy intervention to address the above noted impairments. PLAN :  Recommendations and Planned Interventions: bed mobility training, transfer training, gait training, therapeutic exercises, neuromuscular re-education, patient and family training/education, and therapeutic activities      Frequency/Duration: Patient will be followed by physical therapy:  5 times a week to address goals. Recommendation for discharge: (in order for the patient to meet his/her long term goals)  Physical therapy at least 2 days/week in the home     This discharge recommendation:  Has been made in collaboration with the attending provider and/or case management    IF patient discharges home will need the following DME: patient owns DME required for discharge         SUBJECTIVE:   Patient stated I wasn't using the walker.     OBJECTIVE DATA SUMMARY:   HISTORY:    Past Medical History:   Diagnosis Date    Adverse effect of anesthesia     slow to wake up    Arthritis     Asthma 6/29/2009    CAUSED BY MOLD    Atrial fibrillation ([address] 6/29/2009    CAD (coronary artery disease) 06/29/2009    Celiac disease 2010    Chronic obstructive pulmonary disease ([address] 2004-5    right leg    Chronic pain of right ankle     Diabetes ([address]     Drug induced prednisone, DM2    Diastolic heart failure (HCC)     DVT (deep venous thrombosis) ([address] 2004    Right leg post surgery    GERD (gastroesophageal reflux disease)     Gluten intolerance     Heart failure ([address]     Hypertension     Hypothyroidism 6/29/2009    Iron deficiency anemia     Lyme disease     Personal history of MI (myocardial infarction)     Rheumatoid arthritis (HCC)     Slow to wake up after anesthesia     Syncope     Post testing- resolved    TIA (transient ischemic attack) 2004 & 2006    Vitamin B12 deficiency 6/29/2009     Past Surgical History:   Procedure Laterality Date    HX ADENOIDECTOMY      HX AFIB ABLATION      HX APPENDECTOMY      HX CHOLECYSTECTOMY  6/16/11    HX COLONOSCOPY      HX CORONARY STENT PLACEMENT      x 2    HX HEART CATHETERIZATION  2010    2 STENTS    HX HEART CATHETERIZATION  03/2020    HX HYSTERECTOMY      HX LUMBAR LAMINECTOMY  2000    L4-L5    HX ORTHOPAEDIC  1993-6/2012    RT. FOOT SURGERY X 6/calcaneal osteotomy    HX ORTHOPAEDIC Right 09/19/2020    right hand [facility] joint replacement    HX TONSILLECTOMY  1964       Personal factors and/or comorbidities impacting plan of care: recent heart surgery, RA, lyme, MI    Home Situation  Home Environment: Private residence  # Steps to Enter: 5  Rails to Enter: Yes  One/Two Story Residence: One story  Living Alone: Yes  Support Systems: Spouse/Significant Other/Partner  Patient Expects to be Discharged to[de-identified] Private residence  Current DME Used/Available at Home: Shower chair, Grab bars, Cane, straight  Tub or Shower Type: Shower    EXAMINATION/PRESENTATION/DECISION MAKING:   Critical Behavior:  Neurologic State: Alert  Orientation Level: Oriented X4  Cognition: Appropriate decision making, Appropriate for age attention/concentration, Appropriate safety awareness, Follows commands  Safety/Judgement: Awareness of environment, Fall prevention, Good awareness of safety precautions, Home safety, Insight into deficits  Hearing: Auditory  Auditory Impairment: None  Range Of Motion:  AROM: Within functional limits           PROM: Within functional limits           Strength:    Strength:  Within functional limits                    Tone & Sensation:   Tone: Normal              Sensation: Intact               Coordination:  Coordination: Within functional limits  Vision:      Functional Mobility:  Bed Mobility:     Supine to Sit: Contact guard assistance; Additional time  Sit to Supine: Minimum assistance  Scooting: Stand-by assistance  Transfers:  Sit to Stand: Stand-by assistance  Stand to Sit: Stand-by assistance                       Balance:   Sitting: Intact  Standing: Without support  Standing - Static: Good  Standing - Dynamic : Good;Fair(fair with prolonged activity)  Ambulation/Gait Training:  Distance (ft): 180 Feet (ft)  Assistive Device: Gait belt  Ambulation - Level of Assistance: Stand-by assistance;Minimal assistance(Min A when becoming dizzy)        Gait Abnormalities: Decreased step clearance;Trunk sway increased; Path deviations(imbalance with prolonged activity)        Base of Support: Widened                    Functional Measure:  Tinetti test:    Sitting Balance: 1  Arises: 1  Attempts to Rise: 2  Immediate Standing Balance: 1  Standing Balance: 1  Nudged: 1  Eyes Closed: 1  Turn 360 Degrees - Continuous/Discontinuous: 1  Turn 360 Degrees - Steady/Unsteady: 1  Sitting Down: 1  Balance Score: 11 Balance total score  Indication of Gait: 1  R Step Length/Height: 1  L Step Length/Height: 1  R Foot Clearance: 1  L Foot Clearance: 1  Step Symmetry: 1  Step Continuity: 1  Path: 0  Trunk: 0  Walking Time: 1  Gait Score: 8 Gait total score  Total Score: 19/28 Overall total score         Tinetti Tool Score Risk of Falls  <19 = High Fall Risk  19-24 = Moderate Fall Risk  25-28 = Low Fall Risk  Tinetti ME. Performance-Oriented Assessment of Mobility Problems in Elderly Patients. [address]; [id number].  (Scoring Description: PT Bulletin Feb. 10, 1993)    Older adults: Cortez Gonzalez et al, 2009; n = 1000 [facility] elderly evaluated with ABC, EDER, ADL, and IADL)  · Mean EDER score for males aged 69-68 years = 26.21(3.40)  · Mean EDER score for females age 69-68 years = 25.16(4.30)  · Mean EDER score for males over 80 years = 23.29(6.02)  · Mean EDER score for females over 80 years = 17.20(8.32)            Physical Therapy Evaluation Charge Determination   History Examination Presentation Decision-Making   MEDIUM  Complexity : 1-2 comorbidities / personal factors will impact the outcome/ POC  MEDIUM Complexity : 3 Standardized tests and measures addressing body structure, function, activity limitation and / or participation in recreation  LOW Complexity : Stable, uncomplicated  Other outcome measures Tinetti  LOW       Based on the above components, the patient evaluation is determined to be of the following complexity level: LOW     Pain Rating:  none    Activity Tolerance:   Fair and requires rest breaks    After treatment patient left in no apparent distress:   Supine in bed and Call bell within reach    COMMUNICATION/EDUCATION:   The patients plan of care was discussed with: Occupational therapist and Registered nurse. Fall prevention education was provided and the patient/caregiver indicated understanding., Patient/family have participated as able in goal setting and plan of care. , and Patient/family agree to work toward stated goals and plan of care.     Thank you for this referral.  Selvin Parra, PT   Time Calculation: 24 mins

## 2020-11-03 NOTE — PROGRESS NOTES
Report called to [facility] on [facility]. Pt transferred to [facility] via bed and monitor.

## 2020-11-03 NOTE — H&P
[facility]  [address] [facility], [facility] [address]  [phone]    Admission History and Physical      NAME:  Danyel Watson   :   1950   MRN:  [id number]     PCP:  Singh Mascorro MD     Date/Time:  2020        Subjective:     CHIEF COMPLAINT: \"I'm short of breath\"     HISTORY OF PRESENT ILLNESS:     Ms. Bob Au is a 65 y.o. with PMH of a-fib (s/p video-assisted thoracoscopic drainage of pericardial effusion. video-assisted thoracoscopic placement of left atrial appendage clip on ), DM, diastolic CHF, asthma admitted for hypoxia and dyspnea. Per pt, has felt SOB since her surgery. (+) cough but non-productive. Also with reports of periodic tachycardia and chest pain. HR elevated at home and irregular per her report.      Past Medical History:   Diagnosis Date    Adverse effect of anesthesia     slow to wake up    Arthritis     Asthma 2009    CAUSED BY MOLD    Atrial fibrillation ([address] 2009    CAD (coronary artery disease) 2009    Celiac disease 2010    Chronic obstructive pulmonary disease ([address] 2004-    right leg    Chronic pain of right ankle     Diabetes ([address]     Drug induced prednisone, DM2    Diastolic heart failure ([address]     DVT (deep venous thrombosis) ([address]     Right leg post surgery    GERD (gastroesophageal reflux disease)     Gluten intolerance     Heart failure ([address]     Hypertension     Hypothyroidism 2009    Iron deficiency anemia     Lyme disease     Personal history of MI (myocardial infarction)     Rheumatoid arthritis ([address]     Slow to wake up after anesthesia     Syncope     Post testing- resolved    TIA (transient ischemic attack)  &     Vitamin B12 deficiency 2009        Past Surgical History:   Procedure Laterality Date    HX ADENOIDECTOMY      HX AFIB ABLATION      HX APPENDECTOMY      HX CHOLECYSTECTOMY  11    HX COLONOSCOPY      HX CORONARY STENT PLACEMENT x 2    HX HEART CATHETERIZATION      2 STENTS    HX HEART CATHETERIZATION  2020    HX HYSTERECTOMY      HX LUMBAR LAMINECTOMY      L4-L5    HX ORTHOPAEDIC  -2012    RT.  FOOT SURGERY X 6/calcaneal osteotomy    HX ORTHOPAEDIC Right 2020    right hand [facility] joint replacement    HX TONSILLECTOMY  1964       Social History     Tobacco Use    Smoking status: Former Smoker     Packs/day: 1.00     Years: 30.00     Pack years: 30.00     Types: Cigarettes     Last attempt to quit: 2002     Years since quittin.4    Smokeless tobacco: Never Used   Substance Use Topics    Alcohol use: No        Family History   Problem Relation Age of Onset    Delayed Awakening Mother     Heart Disease Mother     Coronary Artery Disease Mother     Hypertension Mother     Stroke Mother     Delayed Awakening Sister     Hypertension Sister     Lung Disease Father     Cancer Father         colon    Hypertension Father     Hypertension Brother     Breast Cancer Maternal Aunt     Breast Cancer Maternal Aunt     Breast Cancer Cousin         maternal 1st cousin    Breast Cancer Maternal Aunt     Breast Cancer Cousin         maternal 1st cousin    Breast Cancer Cousin         maternal 1st cousin    Deep Vein Thrombosis Neg Hx     Anesth Problems Neg Hx         Allergies   Allergen Reactions    Butter Flavor Anaphylaxis     Butter AND CREME    Keflex [Cephalexin] Anaphylaxis    Pcn [Penicillins] Anaphylaxis    Aspirin Hives and Other (comments)     \"it makes my stomach bleed\"      Cimzia [Certolizumab Pegol] Other (comments)     GI symptoms, blisters in the mouth and esophagus    Clopidogrel Other (comments)     GI bleed    Demerol [Meperidine] Other (comments)     HYPOTENSION    Fentanyl Other (comments)     HYPOTENSION    Ibuprofen Diarrhea     Patient reports that ibuprofen caused diarrhea    Morphine Other (comments)     HYPOTENSION    Nitroglycerin Other (comments)     IN PILL FORM  - HYPOTENSION  CAN TOLERATE PATCH FOR SHORT TIME    Sulfa (Sulfonamide Antibiotics) Angioedema     Lips    Tapazole [Methimazole] Unknown (comments)     Patient stated \"it made me feel like I had the flu\"    Tramadol Other (comments)     Patient reports thrush with tramadol use    Adhesive Tape-Silicones Other (comments)     BAD BLISTERS AND TENDS TO GET INFECTED    Albuterol Palpitations    Gluten Diarrhea     bloating    Oxycodone Other (comments)     Hallicination and hypotension        Prior to Admission medications    Medication Sig Start Date End Date Taking? Authorizing Provider   acetaminophen (TYLENOL) 650 mg TbER Take 1,300 mg by mouth two (2) times a day. Yes Provider, Historical   bumetanide (BUMEX) 1 mg tablet Take 0.5 mg by mouth every evening. 1/2 tablet = 0.5 mg   Yes Provider, Historical   coenzyme Q-10 (Co Q-10) 200 mg capsule Take 200 mg by mouth daily. Yes Provider, Historical   artificial tears, dextran 70-hypromellose, (GenTeal Tears Mild) 0.1-0.3 % ophthalmic solution Administer 1 Drop to both eyes daily as needed. Yes Provider, Historical   carboxymethylcell/hypromellose (GENTEAL GEL OP) Apply  to eye nightly. Yes Provider, Historical   omega 3-DHA-EPA-fish oil 1,000 mg (120 mg-180 mg) capsule Take 1 Cap by mouth every evening. Yes Provider, Historical   levalbuterol (XOPENEX) 0.63 mg/3 mL nebu 0.63 mg by Nebulization route four (4) times daily. Yes Provider, Historical   metoprolol succinate (TOPROL-XL) 25 mg XL tablet Take 1 Tab by mouth daily (after lunch). 10/22/20  Yes Fidelia Tenorio PA   aclidinium bromide Maura Goltz Pressair) 400 mcg/actuation inhaler Take 1 Puff by inhalation. Yes Provider, Historical   calcium citrate-vitamin d3 (CITRACAL+D) 315 mg-5 mcg (200 unit) tab Take 1 Tab by mouth daily (with breakfast). Yes Provider, Historical   predniSONE (DELTASONE) 2.5 mg tablet Take 2.5 mg by mouth daily (after lunch).  5 mg in morning and 2.5 mg afternoon Yes Provider, Historical   gabapentin (NEURONTIN) 100 mg capsule Take 100 mg by mouth nightly. Yes Provider, Historical   mometasone furoate (ASMANEX HFA IN) Take 100 mcg by inhalation two (2) times a day. Yes Provider, Historical   potassium 99 mg tablet Take 99 mg by mouth daily. Yes Provider, Historical   insulin glargine (LANTUS,BASAGLAR) 100 unit/mL (3 mL) inpn 8 Units by SubCUTAneous route Daily (before breakfast). 30 minutes before breakfast  Indications: \"I set the pen on 8\"   Yes Provider, Historical   denosumab (PROLIA SC) by SubCUTAneous route every 6 months. Yes Provider, Historical   bumetanide (BUMEX) 1 mg tablet Take 1 mg by mouth daily. Yes Provider, Historical   eplerenone (INSPRA) 25 mg tablet Take 25 mg by mouth Daily (before lunch). Yes Provider, Historical   fenofibrate nanocrystallized (Tricor) 48 mg tablet Take 48 mg by mouth nightly. Yes Provider, Historical   nitroglycerin (NITRODUR) 0.1 mg/hr 1 Patch by TransDERmal route daily as needed. Indications: Oral form \"causes severe BP drop  BAD\" Drs got scared,   Yes Provider, Historical   levothyroxine (SYNTHROID) 150 mcg tablet Take 150 mcg by mouth Daily (before breakfast). 2/4/20  Yes Provider, Historical   predniSONE (DELTASONE) 5 mg tablet Take 5 mg by mouth daily. 5 mg in morning and 2.5 mg afternoon   Yes Provider, Historical   cholecalciferol, vitamin D3, (VITAMIN D3) 2,000 unit tab Take 2,000 Units by mouth daily. Yes Provider, Historical   turmeric (CURCUMIN) Take 1 g by mouth every evening. Yes Provider, Historical   SITagliptin (JANUVIA) 100 mg tablet Take 100 mg by mouth daily (with dinner). Yes Provider, Historical   vit C/vit E ac/lut/copper/zinc (PRESERVISION LUTEIN PO) Take 1 Tab by mouth daily (with dinner). Once per week   Yes Provider, Historical   lidocaine (LIDODERM) 5 % 1 Patch by TransDERmal route every twenty-four (24) hours.  Apply patch to the affected area for 12 hours a day and remove for 12 hours a day.   Yes Tai, MD Yolanda   ascorbic acid (VITAMIN C) 500 mg tablet Take 500 mg by mouth daily. Yes Yolanda Lujan MD   ferrous sulfate 325 mg (65 mg iron) tablet Take 325 mg by mouth daily (with lunch). Yes Yolanda Lujan MD   cyanocobalamin (VITAMIN B12) 1,000 mcg/mL injection INJECT 1 ML INTO THE MUSCLE EVERY 30 DAYS 3/24/14  Yes Edgar Ballesteros MD   lansoprazole (PREVACID) 30 mg capsule Take 30 mg by mouth daily. Yes Provider, Historical         Review of Systems:  (bold if positive, if negative)    Gen:  Eyes:  ENT:  CVS:  Pulm:  GI:    :    MS:  Skin:  Psych:  Endo:    Hem:  Renal:    Neuro:     Dyspnea, wheezing        Objective:      VITALS:    Vital signs reviewed; most recent are:    Visit Vitals  /84 (BP 1 Location: Left arm, BP Patient Position: At rest)   Pulse 61   Temp 98.3 °F (36.8 °C)   Resp 19   Ht 5' 3\" (1.6 m)   Wt 77.1 kg (170 lb)   SpO2 99%   BMI 30.11 kg/m²     SpO2 Readings from Last 6 Encounters:   11/02/20 99%   10/29/20 96%   10/22/20 91%   10/15/20 97%   10/15/20 96%   10/05/20 95%    O2 Flow Rate (L/min): 3 l/min   No intake or output data in the 24 hours ending 11/03/20 0039         Exam:     Physical Exam:    Gen:  Well-developed, well-nourished, in no acute distress  HEENT:  Pink conjunctivae, PERRL, hearing intact to voice, moist mucous membranes  Neck:  Supple, without masses, thyroid non-tender  Resp: Diffuse expiratory wheezing with poor air movement. Splinting. Card:  No murmurs, normal S1, S2 without thrills, bruits or peripheral edema  Abd:  Soft, non-tender, non-distended, normoactive bowel sounds are present, no palpable organomegaly  Lymph:  No cervical adenopathy  Musc:  No cyanosis or clubbing  Skin:  No rashes or ulcers, skin turgor is good  Neuro:  Cranial nerves 3-12 are grossly intact,  strength is 5/5 bilaterally, dorsi / plantarflexion strength is 5/5 bilaterally, follows commands appropriately  Psych:  Alert with good insight.   Oriented to person, place, and time  SUHAS incision C/D/I       Labs:    Recent Labs     11/02/20  1802   WBC 21.7*   HGB 14.1   HCT 44.5        Recent Labs     11/02/20  1802      K 5.0      CO2 26   *   BUN 19   CREA 0.90   CA 9.2   ALB 3.3*   ALT 12     No components found for: GLPOC  No results for input(s): PH, PCO2, PO2, HCO3, FIO2 in the last 72 hours. No results for input(s): INR, INREXT in the last 72 hours. CTA chest =>   1. No evidence of acute pulmonary thromboembolism. 2. Small left pleural effusion. Assessment/Plan:     Hypoxia (11/2/2020) - suspect multifactorial. Splinting from surgery. Also with asthma exacerbation +/- mild volume overload with regards to the pleural effusion  -s/p IV diuresis; strict I's and O's   -resume bumex PO in the AM  -start scheduled nebs, PRN albuterol and IV steroids  -no e/o acute PNA  -will ask cardiology eval   -toradol scheduled and PRN dilaudid to assist with splinting; incentive spirometry   -OOB. PT/OT       Asthma (6/29/2009) - with exacerbation   -see above       PAF (paroxysmal atrial fibrillation) ([facility] (6/29/2009) - per pt, elevated HR and irregular this evening. Not present on arrival   -?need for holter/event   -monitor on tele in house       CAD (coronary artery disease) (6/29/2009)  -trend CE's though lower suspicion this is ischemia       Hypothyroidism (3/23/2012)   -continue levothyroxine       HTN (hypertension), benign (3/2/2017)  -continue meds      Leukocytosis (8/12/2019) - chronic.  Likely 2/2 steroids       Diabetes mellitus type 2, controlled ([facility] (8/12/2019)  -ISS   -BG checks AC TID and qHS   -continue Lantus       Diastolic heart failure (HCC) ()  -s/p IV diuresis; transition to PO in the AM     Surrogate decision maker:      Total time spent with patient: 79 [phone] discussed with: Patient and Nursing Staff    Discussed:  Code Status, Care Plan and D/C Planning    Prophylaxis: SCD's    Probable Disposition:  Home w/Family           ___________________________________________________    Attending Physician: Omid Palacios MD

## 2020-11-03 NOTE — ED NOTES
TRANSFER - OUT REPORT:    Verbal report given to [facility](name) yoseph Watson  being transferred to [facility] ED(unit) for routine progression of care       Report consisted of patients Situation, Background, Assessment and   Recommendations(SBAR). Information from the following report(s) SBAR, ED Summary, [facility] and Recent Results was reviewed with the receiving nurse. Lines:   Peripheral IV 11/02/20 Right;Upper Arm (Active)   Site Assessment Clean, dry, & intact 11/02/20 1807   Phlebitis Assessment 0 11/02/20 1807   Infiltration Assessment 0 11/02/20 1807   Dressing Status Clean, dry, & intact 11/02/20 1807   Dressing Type Tape;Transparent 11/02/20 1807   Hub Color/Line Status Blue;Patent; Flushed 11/02/20 1807   Action Taken Blood drawn 11/02/20 1807        Opportunity for questions and clarification was provided.       Patient transported with:   Monitor

## 2020-11-03 NOTE — CONSULTS
Reason for Consult:     HPI: Eugene Lockett is a 65 y.o. female with PMH of AFIB, CAD, HFpEF, HTN, HLD, RA, DM, TIA, Tobacco abuse admitted with SOB, hypoxia. She underwent SUHAS ligation via external approach using pericardiotomy and video assistance on 10/20/20 by  [facility] at [facility]. Since then she has felt SOB and cough along with chest pain. She also has reported palpitations. Her HR at home has been fluctuating. Has noted high HR and along with that she has noted SOB and chest pain described as aching mid sternal pain moderate intensity which is non radiating. The pain is intermittent. EKG demonstrated sinus rhythm with non specific ST changes and LVH pattern. Trop 0.05 TREND to 1.20    ECHO 2008: EF 60%  ECHO 3/3/2017: EF 60%, mild TR     CATH 2009: stent LCX  CATH 8/2010: LAD: m: MLI, , LCX: m 20%, patent RCA, LCX stents, EF 60%     STRESS: Myoview 2014: no ischemia  STRESS Nuc 2/2016: no ischemia,   STRESS Nuc 3/3/2017: no ischemia, EF 58%     EVENT monitor 2011: PVCs  HOLTER 2013: frequent PVCs        Plan:    1. SOB: s/p post LAAL via LVAT's 10/20/20  [facility]: Check Echo to r/o pericardial effusion. 2. Positive Troponin: Has known history of LAD and RCA stent. Had cath twice in 2019 not showing any obstructive CAD. Also had stress Nuc in 2019 with normal perfusion. CMRI in 2019 showed LV inferior infarct. Continue to sonia trops. Check Echo for WMA. If significant rise in trop then LHC otherwise stress Nuc unless has myocarditis from recent procedure. Continue metoprolol. No ASA/statin dt allergy. CT PE is negative for PE.   3. PAF: metoprolol, unable to tolerate anticoagulation due to anemia in the past. Recent SUHAS clip by Dr. Ryan Glover  4. HFpEF (NYHA class II): on bumex, eplerenone, metoprolol. Has seen Dr. Lynn Nicole  5. HTN: Cont current meds  6. HLD: no statin dt allergy, on tricor.   7. Hx Iron deficiency anemia, b12 deficiency: Hgb 13 on most recent labs. On iron, vit C, vit B  8.  GERD: small sliding HH prevacid. 9. RA: on prednisone -has been on prednisone for 2 years  10. DM: On lantus and januvia. 10. Hx TIA  11. H/o TIA  12. Former remote smoker  13. Asthma? Addendum:    While PT was walking her the HR went from 80 to 140BPM. EKG performed showed Atrial Flutter with 2:1 block. After resting in the bed she slowed down and went into Atrial Fib with HR of 90. Echo bedside does not show WMA and LVEF is normal. No pericardial effusion. RV function is normal.           ATTENTION:   This medical record was transcribed using an electronic medical records/speech recognition system. Although proofread, it may and can contain electronic, spelling and other errors. Corrections may be executed at a later time. Please feel free to contact us for any clarifications as needed.       Past Medical History:   Diagnosis Date    Adverse effect of anesthesia     slow to wake up    Arthritis     Asthma 6/29/2009    CAUSED BY MOLD    Atrial fibrillation ([address] 6/29/2009    CAD (coronary artery disease) 06/29/2009    Celiac disease 2010    Chronic obstructive pulmonary disease ([address] 2004-5    right leg    Chronic pain of right ankle     Diabetes ([address]     Drug induced prednisone, DM2    Diastolic heart failure ([address]     DVT (deep venous thrombosis) ([address] 2004    Right leg post surgery    GERD (gastroesophageal reflux disease)     Gluten intolerance     Heart failure ([address]     Hypertension     Hypothyroidism 6/29/2009    Iron deficiency anemia     Lyme disease     Personal history of MI (myocardial infarction)     Rheumatoid arthritis ([address]     Slow to wake up after anesthesia     Syncope     Post testing- resolved    TIA (transient ischemic attack) 2004 & 2006    Vitamin B12 deficiency 6/29/2009            Past Surgical History:   Procedure Laterality Date    HX ADENOIDECTOMY      HX AFIB ABLATION      HX APPENDECTOMY      HX CHOLECYSTECTOMY  6/16/11    HX COLONOSCOPY      HX CORONARY STENT PLACEMENT      x 2    HX HEART CATHETERIZATION  2010    2 STENTS    HX HEART CATHETERIZATION  2020    HX HYSTERECTOMY      HX LUMBAR LAMINECTOMY      L4-L5    HX ORTHOPAEDIC  -2012    RT.  FOOT SURGERY X 6/calcaneal osteotomy    HX ORTHOPAEDIC Right 2020    right hand [facility] joint replacement    HX TONSILLECTOMY  1964             Family History   Problem Relation Age of Onset    Delayed Awakening Mother     Heart Disease Mother     Coronary Artery Disease Mother     Hypertension Mother     Stroke Mother     Delayed Awakening Sister     Hypertension Sister     Lung Disease Father     Cancer Father         colon    Hypertension Father     Hypertension Brother     Breast Cancer Maternal Aunt     Breast Cancer Maternal Aunt     Breast Cancer Cousin         maternal 1st cousin    Breast Cancer Maternal Aunt     Breast Cancer Cousin         maternal 1st cousin   Zadie Bud Breast Cancer Cousin         maternal 1st cousin    Deep Vein Thrombosis Neg Hx     Anesth Problems Neg Hx            Social History     Socioeconomic History    Marital status:      Spouse name: Not on file    Number of children: Not on file    Years of education: Not on file    Highest education level: Not on file   Occupational History    Not on file   Social Needs    Financial resource strain: Not on file    Food insecurity     Worry: Not on file     Inability: Not on file    Transportation needs     Medical: Not on file     Non-medical: Not on file   Tobacco Use    Smoking status: Former Smoker     Packs/day: 1.00     Years: 30.00     Pack years: 30.00     Types: Cigarettes     Last attempt to quit: 2002     Years since quittin.4    Smokeless tobacco: Never Used   Substance and Sexual Activity    Alcohol use: No    Drug use: Never    Sexual activity: Yes     Partners: Male   Lifestyle    Physical activity     Days per week: Not on file     Minutes per session: Not on file  Stress: Not on file   Relationships    Social connections     Talks on phone: Not on file     Gets together: Not on file     Attends Scientologist service: Not on file     Active member of club or organization: Not on file     Attends meetings of clubs or organizations: Not on file     Relationship status: Not on file    Intimate partner violence     Fear of current or ex partner: Not on file     Emotionally abused: Not on file     Physically abused: Not on file     Forced sexual activity: Not on file   Other Topics Concern    Not on file   Social History Narrative    Not on file         Allergies   Allergen Reactions    Butter Flavor Anaphylaxis     Butter AND CREME    Keflex [Cephalexin] Anaphylaxis    Pcn [Penicillins] Anaphylaxis    Aspirin Hives and Other (comments)     \"it makes my stomach bleed\"      Cimzia [Certolizumab Pegol] Other (comments)     GI symptoms, blisters in the mouth and esophagus    Clopidogrel Other (comments)     GI bleed    Demerol [Meperidine] Other (comments)     HYPOTENSION    Fentanyl Other (comments)     HYPOTENSION    Ibuprofen Diarrhea     Patient reports that ibuprofen caused diarrhea    Morphine Other (comments)     HYPOTENSION    Nitroglycerin Other (comments)     IN PILL FORM  - HYPOTENSION  CAN TOLERATE PATCH FOR SHORT TIME    Sulfa (Sulfonamide Antibiotics) Angioedema     Lips    Tapazole [Methimazole] Unknown (comments)     Patient stated \"it made me feel like I had the flu\"    Tramadol Other (comments)     Patient reports thrush with tramadol use    Adhesive Tape-Silicones Other (comments)     BAD BLISTERS AND TENDS TO GET INFECTED    Albuterol Palpitations    Gluten Diarrhea     bloating    Oxycodone Other (comments)     Hallicination and hypotension            Current Facility-Administered Medications   Medication Dose Route Frequency Provider Last Rate Last Dose    artificial tears (dextran 70-hypromellose) (GENTEAL TEARS MILD) 0.1-0.3 % ophthalmic solution 1 Drop  1 Drop Both Eyes DAILY PRN Nancy Dowling MD        ascorbic acid (vitamin C) (VITAMIN C) tablet 500 mg  500 mg Oral DAILY Nancy Dowling MD   500 mg at 11/03/20 0901    bumetanide (BUMEX) tablet 1 mg  1 mg Oral DAILY Nancy Dowling MD   1 mg at 11/03/20 0901    bumetanide (BUMEX) tablet 0.5 mg  0.5 mg Oral QPM Brandy Patino MD        cholecalciferol (VITAMIN D3) (1000 Units /25 mcg) tablet 2 Tab  2,000 Units Oral DAILY Brandy Patino MD   2 Tab at 11/03/20 0901    eplerenone (INSPRA) tablet 25 mg  25 mg Oral ACL Nancy Dowling MD        fenofibrate nanocrystallized (TRICOR) tablet 48 mg  48 mg Oral QHS Brandy Patino MD   48 mg at 11/03/20 0114    ferrous sulfate tablet 325 mg  325 mg Oral DAILY WITH LUNCH Nancy Dowling MD        gabapentin (NEURONTIN) capsule 100 mg  100 mg Oral QHS Brandy Patino MD   100 mg at 11/03/20 0114    insulin glargine (LANTUS) injection 8 Units  8 Units SubCUTAneous DAILY Brandy Patino MD   8 Units at 11/03/20 0902    pantoprazole (PROTONIX) tablet 40 mg  40 mg Oral ACB Brandy Patino MD   40 mg at 11/03/20 0805    levothyroxine (SYNTHROID) tablet 150 mcg  150 mcg Oral ARIANNA Patino MD   150 mcg at 11/03/20 0805    lidocaine 4 % patch 1 Patch  1 Patch TransDERmal Q24H Brandy Patino MD   1 Patch at 11/03/20 0117    metoprolol succinate (TOPROL-XL) XL tablet 25 mg  25 mg Oral PCL Nancy Dowling MD        omega 3-DHA-EPA-fish oil 1,000 mg (120 mg-180 mg) capsule 1 Cap  1 Cap Oral QPM Brandy Patino MD        sodium chloride (NS) flush 5-40 mL  5-40 mL IntraVENous Q8H Brandy Patino MD   10 mL at 11/03/20 0805    sodium chloride (NS) flush 5-40 mL  5-40 mL IntraVENous PRN Nancy Dowling MD        acetaminophen (TYLENOL) tablet 650 mg  650 mg Oral Q4H PRN Nancy Dowling MD        naloxone [facility]) injection 0.4 mg  0.4 mg IntraVENous PRN Aime Kaufman MD        [facility]ron [facility]) injection 4 mg  4 mg IntraVENous Q4H PRN Katherine Dowling MD        enoxaparin (LOVENOX) injection 40 mg  40 mg SubCUTAneous Q24H Tana Dowling MD   40 mg at 11/02/20 2052    glucose chewable tablet 16 g  4 Tab Oral PRN Aime Kaufman MD        dextrose (D50W) injection syrg 12.5-25 g  25-50 mL IntraVENous PRN Aime Kaufman MD        glucagon (GLUCAGEN) injection 1 mg  1 mg IntraMUSCular PRN Aime Kaufman MD        insulin lispro (HUMALOG) injection   SubCUTAneous AC&HS Aime Kaufman MD   3 Units at 11/03/20 0909    ketorolac (TORADOL) injection 15 mg  15 mg IntraVENous Q6H Katherine Dowling MD   15 mg at 11/03/20 0805    HYDROmorphone (DILAUDID) tablet 2 mg  2 mg Oral Q4H PRN Aime Kaufman MD        albuterol-ipratropium (DUO-NEB) 2.5 MG-0.5 MG/3 ML  3 mL Nebulization Q6H RT Katherine Dowling MD   3 mL at 11/03/20 0820    budesonide (PULMICORT) 500 mcg/2 ml nebulizer suspension  500 mcg Nebulization BID RT Aime Kaufman MD   500 mcg at 11/03/20 0825    arformoteroL (BROVANA) neb solution 15 mcg  15 mcg Nebulization BID RT Aime Kaufman MD   15 mcg at 11/03/20 0825    albuterol (ACCUNEB) nebulizer solution 1.25 mg  1.25 mg Nebulization Q2H PRN Katherine Dowling MD        methylPREDNISolone (PF) (Solu-MEDROL) injection 60 mg  60 mg IntraVENous Q8H Katherine Dowling MD   60 mg at 11/03/20 0805    doxycycline (VIBRA-TABS) tablet 100 mg  100 mg Oral Q12H Aime Kaufman MD   100 mg at 11/03/20 0901     Current Outpatient Medications   Medication Sig Dispense Refill    acetaminophen (TYLENOL) 650 mg TbER Take 1,300 mg by mouth two (2) times a day.  bumetanide (BUMEX) 1 mg tablet Take 0.5 mg by mouth every evening. 1/2 tablet = 0.5 mg      coenzyme Q-10 (Co Q-10) 200 mg capsule Take 200 mg by mouth daily.       artificial tears, dextran 70-hypromellose, (GenTeal Tears Mild) 0.1-0.3 % ophthalmic solution Administer 1 Drop to both eyes daily as needed.  carboxymethylcell/hypromellose (GENTEAL GEL OP) Apply  to eye nightly.  omega 3-DHA-EPA-fish oil 1,000 mg (120 mg-180 mg) capsule Take 1 Cap by mouth every evening.  levalbuterol (XOPENEX) 0.63 mg/3 mL nebu 0.63 mg by Nebulization route four (4) times daily.  metoprolol succinate (TOPROL-XL) 25 mg XL tablet Take 1 Tab by mouth daily (after lunch). 30 Tab 2    aclidinium bromide (Tudorza Pressair) 400 mcg/actuation inhaler Take 1 Puff by inhalation.  calcium citrate-vitamin d3 (CITRACAL+D) 315 mg-5 mcg (200 unit) tab Take 1 Tab by mouth daily (with breakfast).  predniSONE (DELTASONE) 2.5 mg tablet Take 2.5 mg by mouth daily (after lunch). 5 mg in morning and 2.5 mg afternoon      gabapentin (NEURONTIN) 100 mg capsule Take 100 mg by mouth nightly.  mometasone furoate (ASMANEX HFA IN) Take 100 mcg by inhalation two (2) times a day.  potassium 99 mg tablet Take 99 mg by mouth daily.  insulin glargine (LANTUS,BASAGLAR) 100 unit/mL (3 mL) inpn 8 Units by SubCUTAneous route Daily (before breakfast). 30 minutes before breakfast  Indications: \"I set the pen on 8\"      denosumab (PROLIA SC) by SubCUTAneous route every 6 months.  bumetanide (BUMEX) 1 mg tablet Take 1 mg by mouth daily.  eplerenone (INSPRA) 25 mg tablet Take 25 mg by mouth Daily (before lunch).  fenofibrate nanocrystallized (Tricor) 48 mg tablet Take 48 mg by mouth nightly.  nitroglycerin (NITRODUR) 0.1 mg/hr 1 Patch by TransDERmal route daily as needed. Indications: Oral form \"causes severe BP drop  BAD\" Drs got scared,      levothyroxine (SYNTHROID) 150 mcg tablet Take 150 mcg by mouth Daily (before breakfast).  predniSONE (DELTASONE) 5 mg tablet Take 5 mg by mouth daily.  5 mg in morning and 2.5 mg afternoon      cholecalciferol, vitamin D3, (VITAMIN D3) 2,000 unit tab Take 2,000 Units by mouth daily.  turmeric (CURCUMIN) Take 1 g by mouth every evening.  SITagliptin (JANUVIA) 100 mg tablet Take 100 mg by mouth daily (with dinner).  vit C/vit E ac/lut/copper/zinc (PRESERVISION LUTEIN PO) Take 1 Tab by mouth daily (with dinner). Once per week      lidocaine (LIDODERM) 5 % 1 Patch by TransDERmal route every twenty-four (24) hours. Apply patch to the affected area for 12 hours a day and remove for 12 hours a day.  ascorbic acid (VITAMIN C) 500 mg tablet Take 500 mg by mouth daily.  ferrous sulfate 325 mg (65 mg iron) tablet Take 325 mg by mouth daily (with lunch).  cyanocobalamin (VITAMIN B12) 1,000 mcg/mL injection INJECT 1 ML INTO THE MUSCLE EVERY 30 DAYS 10 mL 0    lansoprazole (PREVACID) 30 mg capsule Take 30 mg by mouth daily. ROS:  12 point review of systems was performed. All negative except for HPI     Physical Exam:  Visit Vitals  BP (!) 155/104 (BP 1 Location: Left arm, BP Patient Position: Supine; Post activity)   Pulse (!) 136   Temp 97.6 °F (36.4 °C)   Resp 14   Ht 5' 3\" (1.6 m)   Wt 170 lb (77.1 kg)   LMP 09/29/1980 (LMP Unknown)   SpO2 97%   BMI 30.11 kg/m²       Gen:  Well-developed, well-nourished, in no acute distress  HEENT:  Pink conjunctivae, PERRL, hearing intact to voice, moist mucous membranes  Neck:  Supple, without masses, thyroid non-tender  Resp:  No accessory muscle use, clear breath sounds without wheezes rales or rhonchi  Card:  No murmurs, normal S1, S2 without thrills, bruits or peripheral edema  Abd:  Soft, non-tender, non-distended, normoactive bowel sounds are present, no palpable organomegaly and no detectable hernias  Lymph:  No cervical or inguinal adenopathy  Musc:  No cyanosis or clubbing  Skin:  No rashes or ulcers, skin turgor is good  Neuro:  Cranial nerves are grossly intact, no focal motor weakness, follows commands appropriately  Psych:  Good insight, oriented to person, place and time, alert Labs:     Lab Results   Component Value Date/Time    WBC 16.7 (H) 11/03/2020 03:50 AM    HGB 14.5 11/03/2020 03:50 AM    Hemoglobin (POC) 15.6 06/09/2014 03:00 AM    HCT 45.0 11/03/2020 03:50 AM    Hematocrit (POC) 46 06/09/2014 03:00 AM    PLATELET 929 72/62/2449 03:50 AM    MCV 96.8 11/03/2020 03:50 AM     Lab Results   Component Value Date/Time    Hemoglobin A1c 7.2 (H) 10/15/2020 11:22 AM    Hemoglobin A1c 7.1 (H) 08/13/2019 01:46 AM    Hemoglobin A1c 6.5 (H) 01/26/2013 02:15 AM    Glucose 261 (H) 11/03/2020 03:50 AM    Glucose (POC) 211 (H) 11/03/2020 08:16 AM    LDL,Direct 98 08/13/2019 01:46 AM    LDL, calculated 89 10/30/2019 09:36 AM    Creatinine (POC) 0.7 03/01/2016 02:06 PM    Creatinine 1.07 (H) 11/03/2020 03:50 AM      Lab Results   Component Value Date/Time    Cholesterol, total 199 10/30/2019 09:36 AM    HDL Cholesterol 44 10/30/2019 09:36 AM    LDL,Direct 98 08/13/2019 01:46 AM    LDL, calculated 89 10/30/2019 09:36 AM    Triglyceride 328 (H) 10/30/2019 09:36 AM    CHOL/HDL Ratio 5.5 (H) 08/13/2019 01:46 AM     Lab Results   Component Value Date/Time    ALT (SGPT) 12 11/02/2020 06:02 PM    Alk.  phosphatase 102 11/02/2020 06:02 PM    Bilirubin, direct 0.1 09/22/2010 11:35 AM    Bilirubin, total 0.2 11/02/2020 06:02 PM    Albumin 3.3 (L) 11/02/2020 06:02 PM    Protein, total 6.5 11/02/2020 06:02 PM    INR 1.0 10/20/2020 01:36 PM    Prothrombin time 10.3 10/20/2020 01:36 PM    PLATELET 314 27/71/2152 03:50 AM     Lab Results   Component Value Date/Time    INR 1.0 10/20/2020 01:36 PM    INR 1.0 10/15/2020 11:22 AM    INR 1.0 12/12/2019 06:19 PM    Prothrombin time 10.3 10/20/2020 01:36 PM    Prothrombin time 10.1 10/15/2020 11:22 AM    Prothrombin time 9.4 12/12/2019 06:19 PM      Lab Results   Component Value Date/Time    GFR est non-AA 50 (L) 11/03/2020 03:50 AM    GFRNA, POC >60 03/01/2016 02:06 PM    GFR est AA >60 11/03/2020 03:50 AM    GFRAA, POC >60 03/01/2016 02:06 PM    Creatinine 1.07 (H) 11/03/2020 03:50 AM    Creatinine (POC) 0.7 03/01/2016 02:06 PM    BUN 19 11/03/2020 03:50 AM    BUN (POC) 23 (H) 06/09/2014 03:00 AM    Sodium 137 11/03/2020 03:50 AM    Sodium (POC) 142 06/09/2014 03:00 AM    Potassium 4.5 11/03/2020 03:50 AM    Potassium (POC) 4.3 06/09/2014 03:00 AM    Chloride 101 11/03/2020 03:50 AM    Chloride (POC) 102 06/09/2014 03:00 AM    CO2 26 11/03/2020 03:50 AM    Magnesium 2.1 11/03/2020 03:50 AM    Phosphorus 3.4 08/13/2019 01:46 AM     No results found for: BINDU, Dasha Handler, [id number], [id number], PSALT  Lab Results   Component Value Date/Time    TSH 1.22 10/15/2020 11:22 AM    T3 Uptake 24 10/08/2019 12:00 AM    Triiodothyronine (T3), free 3.6 01/06/2020 01:21 PM    T4, Free 0.9 02/22/2019 03:48 AM    T4, Total 6.4 01/06/2020 01:21 PM      Lab Results   Component Value Date/Time    Glucose 261 (H) 11/03/2020 03:50 AM    Glucose (POC) 211 (H) 11/03/2020 08:16 AM      Lab Results   Component Value Date/Time    CK 40 08/13/2019 01:46 AM    CK - MB <1.0 08/13/2019 01:46 AM    CK-MB Index Cannot be calculated 08/13/2019 01:46 AM    Troponin-I, Qt. 1.20 (H) 11/03/2020 03:50 AM    BNP 9 12/03/2010 10:40 AM      Lab Results   Component Value Date/Time    BNP 9 12/03/2010 10:40 AM    NT pro- (H) 11/02/2020 06:02 PM    NT pro- (H) 10/15/2020 11:22 AM    NT pro-BNP 89 08/12/2019 12:05 PM    NT pro-BNP 45 02/22/2019 03:48 AM    NT pro-BNP 32 08/19/2010 11:35 AM    PROBNP 147 01/06/2020 01:21 PM    PROBNP 67 10/08/2019 12:00 AM    PROBNP 99 08/12/2019 08:24 AM    PROBNP 51 04/15/2019 08:12 AM      Lab Results   Component Value Date/Time    Sodium 137 11/03/2020 03:50 AM    Potassium 4.5 11/03/2020 03:50 AM    Chloride 101 11/03/2020 03:50 AM    CO2 26 11/03/2020 03:50 AM    Anion gap 10 11/03/2020 03:50 AM    Glucose 261 (H) 11/03/2020 03:50 AM    BUN 19 11/03/2020 03:50 AM    Creatinine 1.07 (H) 11/03/2020 03:50 AM    BUN/Creatinine ratio 18 11/03/2020 03:50 AM    GFR est AA >60 11/03/2020 03:50 AM    GFR est non-AA 50 (L) 11/03/2020 03:50 AM    Calcium 9.4 11/03/2020 03:50 AM      Lab Results   Component Value Date/Time    Sodium 137 11/03/2020 03:50 AM    Potassium 4.5 11/03/2020 03:50 AM    Chloride 101 11/03/2020 03:50 AM    CO2 26 11/03/2020 03:50 AM    Anion gap 10 11/03/2020 03:50 AM    Glucose 261 (H) 11/03/2020 03:50 AM    BUN 19 11/03/2020 03:50 AM    Creatinine 1.07 (H) 11/03/2020 03:50 AM    BUN/Creatinine ratio 18 11/03/2020 03:50 AM    GFR est AA >60 11/03/2020 03:50 AM    GFR est non-AA 50 (L) 11/03/2020 03:50 AM    Calcium 9.4 11/03/2020 03:50 AM    Bilirubin, total 0.2 11/02/2020 06:02 PM    ALT (SGPT) 12 11/02/2020 06:02 PM    Alk.  phosphatase 102 11/02/2020 06:02 PM    Protein, total 6.5 11/02/2020 06:02 PM    Albumin 3.3 (L) 11/02/2020 06:02 PM    Globulin 3.2 11/02/2020 06:02 PM    A-G Ratio 1.0 (L) 11/02/2020 06:02 PM      Lab Results   Component Value Date/Time    Hemoglobin A1c 7.2 (H) 10/15/2020 11:22 AM         Recent Labs     11/03/20  0350   TROIQ 1.20*           Problem List:     Problem List  Date Reviewed: 11/2/2020          Codes Class Noted    * (Principal) Hypoxia ICD-10-CM: R09.02  ICD-9-CM: 799.02  13/2/0901        Diastolic heart failure (HCC) ICD-10-CM: I50.30  ICD-9-CM: 428.30  Unknown        Coronary artery disease with stable angina pectoris ([facility] ICD-10-CM: I25.118  ICD-9-CM: 414.00, 413.9  9/4/2019        Rheumatoid arthritis ([facility] ICD-10-CM: M06.9  ICD-9-CM: 714.0  8/12/2019        Leukocytosis ICD-10-CM: M03.433  ICD-9-CM: 288.60  8/12/2019        Diabetes mellitus type 2, controlled ([facility] ICD-10-CM: E11.9  ICD-9-CM: 250.00  8/12/2019        HTN (hypertension), benign ICD-10-CM: I10  ICD-9-CM: 401.1  3/2/2017        Bony exostosis ICD-10-CM: M89.8X9  ICD-9-CM: 726.91  1/10/2013        Synovitis of ankle ICD-10-CM: M65.9  ICD-9-CM: 727.06  1/10/2013        Pes cavus ICD-10-CM: Q66.70  ICD-9-CM: 736.73  1/10/2013 Hypothyroidism ICD-10-CM: E03.9  ICD-9-CM: 244.9  3/23/2012        Iron deficiency anemia, unspecified ICD-10-CM: D50.9  ICD-9-CM: 280.9  10/13/2011        Gluten intolerance ICD-10-CM: K90.41  ICD-9-CM: 579.0  Unknown        Esophageal reflux ICD-10-CM: K21.9  ICD-9-CM: 530.81  1/8/2011        Pernicious anemia ICD-10-CM: D51.0  ICD-9-CM: 281.0  12/5/2009        Asthma ICD-10-CM: I54.577  ICD-9-CM: 493.90  6/29/2009        PAF (paroxysmal atrial fibrillation) (HCC) ICD-10-CM: I48.0  ICD-9-CM: 427.31  6/29/2009        CAD (coronary artery disease) ICD-10-CM: I25.10  ICD-9-CM: 414.00  6/29/2009                Sanjeev Cuevas MD, [facility]

## 2020-11-04 LAB
ANION GAP SERPL CALC-SCNC: 7 MMOL/L (ref 5–15)
ATRIAL RATE: 250 BPM
BUN SERPL-MCNC: 34 MG/DL (ref 6–20)
BUN/CREAT SERPL: 35 (ref 12–20)
CALCIUM SERPL-MCNC: 9.5 MG/DL (ref 8.5–10.1)
CALCULATED P AXIS, ECG09: -35 DEGREES
CALCULATED T AXIS, ECG11: 176 DEGREES
CHLORIDE SERPL-SCNC: 99 MMOL/L (ref 97–108)
CO2 SERPL-SCNC: 32 MMOL/L (ref 21–32)
COMMENT, HOLDF: NORMAL
CREAT SERPL-MCNC: 0.96 MG/DL (ref 0.55–1.02)
DIAGNOSIS, 93000: NORMAL
ERYTHROCYTE [DISTWIDTH] IN BLOOD BY AUTOMATED COUNT: 13 % (ref 11.5–14.5)
GLUCOSE BLD STRIP.AUTO-MCNC: 222 MG/DL (ref 65–100)
GLUCOSE BLD STRIP.AUTO-MCNC: 305 MG/DL (ref 65–100)
GLUCOSE BLD STRIP.AUTO-MCNC: 307 MG/DL (ref 65–100)
GLUCOSE BLD STRIP.AUTO-MCNC: 341 MG/DL (ref 65–100)
GLUCOSE SERPL-MCNC: 200 MG/DL (ref 65–100)
HCT VFR BLD AUTO: 46.9 % (ref 35–47)
HGB BLD-MCNC: 15.1 G/DL (ref 11.5–16)
MAGNESIUM SERPL-MCNC: 2.3 MG/DL (ref 1.6–2.4)
MCH RBC QN AUTO: 30.9 PG (ref 26–34)
MCHC RBC AUTO-ENTMCNC: 32.2 G/DL (ref 30–36.5)
MCV RBC AUTO: 96.1 FL (ref 80–99)
NRBC # BLD: 0 K/UL (ref 0–0.01)
NRBC BLD-RTO: 0 PER 100 WBC
PLATELET # BLD AUTO: 373 K/UL (ref 150–400)
PMV BLD AUTO: 10.4 FL (ref 8.9–12.9)
POTASSIUM SERPL-SCNC: 4.7 MMOL/L (ref 3.5–5.1)
Q-T INTERVAL, ECG07: 300 MS
QRS DURATION, ECG06: 80 MS
QTC CALCULATION (BEZET), ECG08: 456 MS
RBC # BLD AUTO: 4.88 M/UL (ref 3.8–5.2)
SAMPLES BEING HELD,HOLD: NORMAL
SERVICE CMNT-IMP: ABNORMAL
SODIUM SERPL-SCNC: 138 MMOL/L (ref 136–145)
VENTRICULAR RATE, ECG03: 139 BPM
WBC # BLD AUTO: 26.2 K/UL (ref 3.6–11)

## 2020-11-04 PROCEDURE — 74011000258 HC RX REV CODE- 258: Performed by: INTERNAL MEDICINE

## 2020-11-04 PROCEDURE — 94664 DEMO&/EVAL PT USE INHALER: CPT

## 2020-11-04 PROCEDURE — 74011250636 HC RX REV CODE- 250/636: Performed by: INTERNAL MEDICINE

## 2020-11-04 PROCEDURE — 74011250636 HC RX REV CODE- 250/636: Performed by: NURSE PRACTITIONER

## 2020-11-04 PROCEDURE — 74011636637 HC RX REV CODE- 636/637: Performed by: INTERNAL MEDICINE

## 2020-11-04 PROCEDURE — 94760 N-INVAS EAR/PLS OXIMETRY 1: CPT

## 2020-11-04 PROCEDURE — 94640 AIRWAY INHALATION TREATMENT: CPT

## 2020-11-04 PROCEDURE — 74011000258 HC RX REV CODE- 258: Performed by: NURSE PRACTITIONER

## 2020-11-04 PROCEDURE — 74011000250 HC RX REV CODE- 250: Performed by: INTERNAL MEDICINE

## 2020-11-04 PROCEDURE — 85027 COMPLETE CBC AUTOMATED: CPT

## 2020-11-04 PROCEDURE — 77010033678 HC OXYGEN DAILY

## 2020-11-04 PROCEDURE — 93005 ELECTROCARDIOGRAM TRACING: CPT

## 2020-11-04 PROCEDURE — 74011250637 HC RX REV CODE- 250/637: Performed by: INTERNAL MEDICINE

## 2020-11-04 PROCEDURE — 99233 SBSQ HOSP IP/OBS HIGH 50: CPT | Performed by: INTERNAL MEDICINE

## 2020-11-04 PROCEDURE — 65660000000 HC RM CCU STEPDOWN

## 2020-11-04 PROCEDURE — 80048 BASIC METABOLIC PNL TOTAL CA: CPT

## 2020-11-04 PROCEDURE — 82962 GLUCOSE BLOOD TEST: CPT

## 2020-11-04 PROCEDURE — 83735 ASSAY OF MAGNESIUM: CPT

## 2020-11-04 RX ORDER — LEVALBUTEROL INHALATION SOLUTION 1.25 MG/3ML
1.25 SOLUTION RESPIRATORY (INHALATION)
Status: DISCONTINUED | OUTPATIENT
Start: 2020-11-05 | End: 2020-11-06 | Stop reason: HOSPADM

## 2020-11-04 RX ORDER — AMIODARONE HYDROCHLORIDE 200 MG/1
400 TABLET ORAL 3 TIMES DAILY
Status: DISCONTINUED | OUTPATIENT
Start: 2020-11-05 | End: 2020-11-06 | Stop reason: HOSPADM

## 2020-11-04 RX ORDER — AMIODARONE HYDROCHLORIDE 200 MG/1
400 TABLET ORAL DAILY
Status: DISCONTINUED | OUTPATIENT
Start: 2020-11-08 | End: 2020-11-06 | Stop reason: HOSPADM

## 2020-11-04 RX ORDER — DILTIAZEM HYDROCHLORIDE 5 MG/ML
10 INJECTION INTRAVENOUS ONCE
Status: COMPLETED | OUTPATIENT
Start: 2020-11-04 | End: 2020-11-04

## 2020-11-04 RX ORDER — LIDOCAINE 4 G/100G
1 PATCH TOPICAL EVERY 24 HOURS
Status: DISCONTINUED | OUTPATIENT
Start: 2020-11-04 | End: 2020-11-06 | Stop reason: HOSPADM

## 2020-11-04 RX ADMIN — FENOFIBRATE 48 MG: 48 TABLET ORAL at 22:05

## 2020-11-04 RX ADMIN — METOPROLOL SUCCINATE 25 MG: 25 TABLET, EXTENDED RELEASE ORAL at 12:31

## 2020-11-04 RX ADMIN — PANTOPRAZOLE SODIUM 40 MG: 40 TABLET, DELAYED RELEASE ORAL at 06:31

## 2020-11-04 RX ADMIN — AMIODARONE HYDROCHLORIDE 1 MG/MIN: 50 INJECTION, SOLUTION INTRAVENOUS at 09:56

## 2020-11-04 RX ADMIN — DILTIAZEM HYDROCHLORIDE 10 MG: 5 INJECTION INTRAVENOUS at 21:44

## 2020-11-04 RX ADMIN — INSULIN LISPRO 7 UNITS: 100 INJECTION, SOLUTION INTRAVENOUS; SUBCUTANEOUS at 12:31

## 2020-11-04 RX ADMIN — SODIUM CHLORIDE 5 MG/HR: 900 INJECTION, SOLUTION INTRAVENOUS at 22:06

## 2020-11-04 RX ADMIN — DOXYCYCLINE HYCLATE 100 MG: 100 TABLET, COATED ORAL at 09:08

## 2020-11-04 RX ADMIN — DEXTROSE MONOHYDRATE 150 MG: 5 INJECTION, SOLUTION INTRAVENOUS at 09:56

## 2020-11-04 RX ADMIN — AMIODARONE HYDROCHLORIDE 0.5 MG/MIN: 50 INJECTION, SOLUTION INTRAVENOUS at 18:44

## 2020-11-04 RX ADMIN — Medication 2 TABLET: at 09:07

## 2020-11-04 RX ADMIN — GABAPENTIN 100 MG: 100 CAPSULE ORAL at 21:50

## 2020-11-04 RX ADMIN — ARFORMOTEROL TARTRATE 15 MCG: 15 SOLUTION RESPIRATORY (INHALATION) at 07:14

## 2020-11-04 RX ADMIN — OXYCODONE HYDROCHLORIDE AND ACETAMINOPHEN 500 MG: 500 TABLET ORAL at 09:08

## 2020-11-04 RX ADMIN — BUMETANIDE 1 MG: 1 TABLET ORAL at 09:08

## 2020-11-04 RX ADMIN — IPRATROPIUM BROMIDE AND ALBUTEROL SULFATE 3 ML: .5; 3 SOLUTION RESPIRATORY (INHALATION) at 14:38

## 2020-11-04 RX ADMIN — LEVOTHYROXINE SODIUM 150 MCG: 0.15 TABLET ORAL at 06:31

## 2020-11-04 RX ADMIN — FERROUS SULFATE TAB 325 MG (65 MG ELEMENTAL FE) 325 MG: 325 (65 FE) TAB at 12:31

## 2020-11-04 RX ADMIN — INSULIN LISPRO 4 UNITS: 100 INJECTION, SOLUTION INTRAVENOUS; SUBCUTANEOUS at 21:50

## 2020-11-04 RX ADMIN — DOXYCYCLINE HYCLATE 100 MG: 100 TABLET, COATED ORAL at 21:50

## 2020-11-04 RX ADMIN — EPLERENONE 25 MG: 25 TABLET, FILM COATED ORAL at 12:34

## 2020-11-04 RX ADMIN — ACETAMINOPHEN 650 MG: 325 TABLET ORAL at 06:29

## 2020-11-04 RX ADMIN — IPRATROPIUM BROMIDE AND ALBUTEROL SULFATE 3 ML: .5; 3 SOLUTION RESPIRATORY (INHALATION) at 07:05

## 2020-11-04 RX ADMIN — BUDESONIDE 500 MCG: 0.5 INHALANT RESPIRATORY (INHALATION) at 07:14

## 2020-11-04 RX ADMIN — ARFORMOTEROL TARTRATE 15 MCG: 15 SOLUTION RESPIRATORY (INHALATION) at 20:06

## 2020-11-04 RX ADMIN — METHYLPREDNISOLONE SODIUM SUCCINATE 40 MG: 40 INJECTION, POWDER, FOR SOLUTION INTRAMUSCULAR; INTRAVENOUS at 21:51

## 2020-11-04 RX ADMIN — IPRATROPIUM BROMIDE AND ALBUTEROL SULFATE 3 ML: .5; 3 SOLUTION RESPIRATORY (INHALATION) at 19:59

## 2020-11-04 RX ADMIN — INSULIN LISPRO 7 UNITS: 100 INJECTION, SOLUTION INTRAVENOUS; SUBCUTANEOUS at 16:39

## 2020-11-04 RX ADMIN — INSULIN GLARGINE 8 UNITS: 100 INJECTION, SOLUTION SUBCUTANEOUS at 09:08

## 2020-11-04 RX ADMIN — Medication 10 ML: at 12:39

## 2020-11-04 RX ADMIN — OMEGA-3 FATTY ACIDS CAP 1000 MG 1 CAPSULE: 1000 CAP at 16:41

## 2020-11-04 RX ADMIN — ENOXAPARIN SODIUM 40 MG: 40 INJECTION SUBCUTANEOUS at 21:50

## 2020-11-04 RX ADMIN — METHYLPREDNISOLONE SODIUM SUCCINATE 40 MG: 40 INJECTION, POWDER, FOR SOLUTION INTRAMUSCULAR; INTRAVENOUS at 12:36

## 2020-11-04 RX ADMIN — INSULIN LISPRO 3 UNITS: 100 INJECTION, SOLUTION INTRAVENOUS; SUBCUTANEOUS at 09:08

## 2020-11-04 RX ADMIN — BUDESONIDE 500 MCG: 0.5 INHALANT RESPIRATORY (INHALATION) at 20:06

## 2020-11-04 RX ADMIN — METHYLPREDNISOLONE SODIUM SUCCINATE 40 MG: 40 INJECTION, POWDER, FOR SOLUTION INTRAMUSCULAR; INTRAVENOUS at 06:24

## 2020-11-04 RX ADMIN — Medication 10 ML: at 06:29

## 2020-11-04 RX ADMIN — BUMETANIDE 0.5 MG: 1 TABLET ORAL at 16:42

## 2020-11-04 NOTE — PROGRESS NOTES
1419  TRANSFER - IN REPORT:    Verbal report received from [employer] (name) on Levorn Nations  being received from ED (unit) for routine progression of care    Report consisted of patients Situation, Background, Assessment and   Recommendations(SBAR). Information from the following report(s) SBAR, Kardex, Procedure Summary, Intake/Output, MAR, Accordion, Recent Results and Cardiac Rhythm NSR with PAC was reviewed with the receiving nurse. Opportunity for questions and clarification was provided. Assessment completed upon patients arrival to unit and care assumed. Initial assessment done. No complaints of chest pain. No shortness of breath. Top of the right foot numb but she said it's chronic. Patient aware to call before getting out of bed. Bed alarm on.     1930  Bedside and Verbal shift change report given to Sue RN (oncoming nurse) by Major Currie RN (offgoing nurse). Report included the following information SBAR, Kardex, Procedure Summary, Intake/Output, MAR, Accordion and Recent Results.

## 2020-11-04 NOTE — PROGRESS NOTES
Physical Therapy Note:    PT treatment deferred. Pt cleared for PT by RN and cardiology. Pt received up ad moses in room with HR up to 127 during bed mobility after brief transfer from chair to bed. Pt requiring initiation of IV amio today, experienced tachycardia and chest pain with PT yesterday, and is pending nuclear stress test tomorrow. PT treatment deferred as pt unable to tolerate progression of mobility toward PT goals. She is able to mobilize with staff assistance for necessary tasks such as transfers and toileting; encouraged she contact staff for mobility assistance to optimize safety and she verbalizes understanding. Pt left NAD with call bell in reach and bed alarm activated. HR 89 at time of PT exit. Will continue to follow per POC.     Igor Justice, PT, DPT, [facility]

## 2020-11-04 NOTE — PROGRESS NOTES
Jerardo Vyas [facility] 79  [address] [facility], [address] [place] [address]  [phone]      Medical Progress Note      NAME: Fatoumata Almeida   :  1950  MRM:  [id number]    Date of service: 2020            Subjective:     Chief Complaint:  sob    No acute events. Chest pain has resolved. Still having side pain and is requesting toradol for this. Breathing has improved          Objective:       Vitals:          Last 24hrs VS reviewed since prior progress note.  Most recent are:    Visit Vitals  BP (!) 142/71 (BP 1 Location: Right arm, BP Patient Position: At rest)   Pulse (!) 58   Temp 97.7 °F (36.5 °C)   Resp 17   Ht 5' 3\" (1.6 m)   Wt 76.2 kg (168 lb)   SpO2 96%   BMI 29.76 kg/m²     SpO2 Readings from Last 6 Encounters:   20 96%   10/29/20 96%   10/22/20 91%   10/15/20 97%   10/15/20 96%   10/05/20 95%    O2 Flow Rate (L/min): 2 l/min       Intake/Output Summary (Last 24 hours) at 2020 0758  Last data filed at 2020 7537  Gross per 24 hour   Intake 1040 ml   Output 2350 ml   Net -1310 ml          Exam:     Physical Exam:    Gen:  Well-developed, well-nourished, in no acute distress  HEENT:  Pink conjunctivae, PERRL, hearing intact to voice, moist mucous membranes  Neck:  Supple, without masses, thyroid non-tender  Resp:  No accessory muscle use, mild wheezing mostly on the RUL  Card:  No murmurs, normal S1, S2 without thrills, bruits or peripheral edema  Abd:  Soft, non-tender, non-distended, normoactive bowel sounds are present  Musc:  No cyanosis or clubbing  Skin:  No rashes or ulcers, skin turgor is good  Neuro:  No focal deficits, follows commands appropriately  Psych:  Good insight, oriented to person, place and time, alert       Telemetry reviewed:   NSR    Medications Reviewed: (see below)    Lab Data Reviewed: (see below)    ______________________________________________________________________    Medications:     Current Facility-Administered Medications   Medication Dose Route Frequency    artificial tears (dextran 70-hypromellose) (GENTEAL TEARS MILD) 0.1-0.3 % ophthalmic solution 1 Drop  1 Drop Both Eyes DAILY PRN    ascorbic acid (vitamin C) (VITAMIN C) tablet 500 mg  500 mg Oral DAILY    bumetanide (BUMEX) tablet 1 mg  1 mg Oral DAILY    bumetanide (BUMEX) tablet 0.5 mg  0.5 mg Oral QPM    cholecalciferol (VITAMIN D3) (1000 Units /25 mcg) tablet 2 Tab  2,000 Units Oral DAILY    eplerenone (INSPRA) tablet 25 mg  25 mg Oral ACL    fenofibrate nanocrystallized (TRICOR) tablet 48 mg  48 mg Oral QHS    ferrous sulfate tablet 325 mg  325 mg Oral DAILY WITH LUNCH    gabapentin (NEURONTIN) capsule 100 mg  100 mg Oral QHS    insulin glargine (LANTUS) injection 8 Units  8 Units SubCUTAneous DAILY    pantoprazole (PROTONIX) tablet 40 mg  40 mg Oral ACB    levothyroxine (SYNTHROID) tablet 150 mcg  150 mcg Oral ACB    lidocaine 4 % patch 1 Patch  1 Patch TransDERmal Q24H    omega 3-DHA-EPA-fish oil 1,000 mg (120 mg-180 mg) capsule 1 Cap  1 Cap Oral QPM    metoprolol succinate (TOPROL-XL) XL tablet 25 mg  25 mg Oral PCL    methylPREDNISolone (PF) (Solu-MEDROL) injection 40 mg  40 mg IntraVENous Q8H    sodium chloride (NS) flush 5-40 mL  5-40 mL IntraVENous Q8H    sodium chloride (NS) flush 5-40 mL  5-40 mL IntraVENous PRN    acetaminophen (TYLENOL) tablet 650 mg  650 mg Oral Q4H PRN    naloxone (NARCAN) injection 0.4 mg  0.4 mg IntraVENous PRN    ondansetron (ZOFRAN) injection 4 mg  4 mg IntraVENous Q4H PRN    enoxaparin (LOVENOX) injection 40 mg  40 mg SubCUTAneous Q24H    glucose chewable tablet 16 g  4 Tab Oral PRN    dextrose (D50W) injection syrg 12.5-25 g  25-50 mL IntraVENous PRN    glucagon (GLUCAGEN) injection 1 mg  1 mg IntraMUSCular PRN    insulin lispro (HUMALOG) injection   SubCUTAneous AC&HS    HYDROmorphone (DILAUDID) tablet 2 mg  2 mg Oral Q4H PRN    albuterol-ipratropium (DUO-NEB) 2.5 MG-0.5 MG/3 ML  3 mL Nebulization Q6H RT    budesonide (PULMICORT) 500 mcg/2 ml nebulizer suspension  500 mcg Nebulization BID RT    arformoteroL (BROVANA) neb solution 15 mcg  15 mcg Nebulization BID RT    albuterol (ACCUNEB) nebulizer solution 1.25 mg  1.25 mg Nebulization Q2H PRN    doxycycline (VIBRA-TABS) tablet 100 mg  100 mg Oral Q12H            Lab Review:     Recent Labs     11/04/20  0330 11/03/20  0350 11/02/20  1802   WBC 26.2* 16.7* 21.7*   HGB 15.1 14.5 14.1   HCT 46.9 45.0 44.5    336 343     Recent Labs     11/04/20  0330 11/03/20  0350 11/02/20  1802    137 140   K 4.7 4.5 5.0   CL 99 101 105   CO2 32 26 26   * 261* 201*   BUN 34* 19 19   CREA 0.96 1.07* 0.90   CA 9.5 9.4 9.2   MG 2.3 2.1  --    ALB  --   --  3.3*   ALT  --   --  12     No components found for: [place]         Assessment / Plan:     Chest Pain / elevated troponin / CAD: may represent NSTEMI. Likely driven by elevated HR. No asa due to allergy/intolerance. Continue metoprolol. Trend CE. Cardiology to review    PAF: now in [address]. Sp atrial appendage clip. No anticoagulation due to h/o bleeding. Continue po metoprolol. Plan to ambulate today, monitor HR she may need titration of metoprolol. Cardiology to review    Hypoxia: improved with diuresis now on po bumex. Monitor daily weight and I/O       Asthma (6/29/2009) - with exacerbation. Mild wheezing improved. Continue duobnebs. Continue steroids at current dose       Hypothyroidism (3/23/2012)   -continue levothyroxine        HTN (hypertension): pt reports labile bp, requesting judicious use of bp meds       Leukocytosis (8/12/2019) - chronic. Likely 2/2 steroids. Improved today.  Monitor off abx       Diabetes mellitus type 2, controlled ([address] (8/12/2019)  -ISS   -BG checks AC TID and qHS   -continue Lantus        Diastolic heart failure (HCC) ()  -s/p IV diuresis; transition to PO in the AM              Total time spent with patient: 084 Kofi Brooks discussed with: Patient, Family and Nursing Staff    Discussed:  Care Plan    Prophylaxis:  Lovenox    Disposition:  Home w/Family           ___________________________________________________    Attending Physician: Presley Robin MD

## 2020-11-04 NOTE — PROGRESS NOTES
Shift Change:  Bedside and Verbal shift change report given to Jameel Perales RN (oncoming nurse) by Cb Mccracken RN (offgoing nurse). Report included the following information SBAR, Kardex and Quality Measures. Shift Summary:  0830: Recvd call from tele pt HR in 150-160s in aflutter, spoke w/ Francoise Dunn NP orderef Amnio bolus and drip. 1230: Pt reports chronic left side midsection pain requested lidocaine patch. Spoke w/ dr Colten Landry. Ok to order patch at this time. 1850: Sent Dr. Ron Payan a message to get an order for a picc/midline, because pt is a difficult stick and I attempted multiple times to get another  IV site just in case her current site fails as she has amiodarone running through a 22g IV in the shoulder currently and a stress test in the AM. Awaiting new orders. End of Shift Report:  Bedside and Verbal shift change report given to RN (oncoming nurse) by Jameel Perales RN (offgoing nurse). Report included the following information SBAR, Kardex and Quality Measures.

## 2020-11-04 NOTE — PROGRESS NOTES
Problem: Diabetes Self-Management  Goal: *Disease process and treatment process  Description: Define diabetes and identify own type of diabetes; list 3 options for treating diabetes. Outcome: Progressing Towards Goal  Goal: *Incorporating nutritional management into lifestyle  Description: Describe effect of type, amount and timing of food on blood glucose; list 3 methods for planning meals. Outcome: Progressing Towards Goal  Goal: *Incorporating physical activity into lifestyle  Description: State effect of exercise on blood glucose levels. Outcome: Progressing Towards Goal  Goal: *Developing strategies to promote health/change behavior  Description: Define the ABC's of diabetes; identify appropriate screenings, schedule and personal plan for screenings. Outcome: Progressing Towards Goal  Goal: *Using medications safely  Description: State effect of diabetes medications on diabetes; name diabetes medication taking, action and side effects. Outcome: Progressing Towards Goal  Goal: *Monitoring blood glucose, interpreting and using results  Description: Identify recommended blood glucose targets  and personal targets. Outcome: Progressing Towards Goal  Goal: *Prevention, detection, treatment of acute complications  Description: List symptoms of hyper- and hypoglycemia; describe how to treat low blood sugar and actions for lowering  high blood glucose level. Outcome: Progressing Towards Goal  Goal: *Prevention, detection and treatment of chronic complications  Description: Define the natural course of diabetes and describe the relationship of blood glucose levels to long term complications of diabetes.   Outcome: Progressing Towards Goal  Goal: *Developing strategies to address psychosocial issues  Description: Describe feelings about living with diabetes; identify support needed and support network  Outcome: Progressing Towards Goal     Problem: Patient Education: Go to Patient Education Activity  Goal: Patient/Family Education  Outcome: Progressing Towards Goal     Problem: Falls - Risk of  Goal: *Absence of Falls  Description: Document Mario Going Fall Risk and appropriate interventions in the flowsheet.   Outcome: Progressing Towards Goal  Note: Fall Risk Interventions:  Mobility Interventions: Bed/chair exit alarm, Communicate number of staff needed for ambulation/transfer, Patient to call before getting OOB, Utilize gait belt for transfers/ambulation         Medication Interventions: Bed/chair exit alarm, Patient to call before getting OOB, Teach patient to arise slowly         History of Falls Interventions: Bed/chair exit alarm, Door open when patient unattended, Investigate reason for fall, Utilize gait belt for transfer/ambulation, Assess for delayed presentation/identification of injury for 48 hrs (comment for end date), Vital signs minimum Q4HRs X 24 hrs (comment for end date)         Problem: Patient Education: Go to Patient Education Activity  Goal: Patient/Family Education  Outcome: Progressing Towards Goal

## 2020-11-04 NOTE — PROGRESS NOTES
Chart reviewed. Cleared by RN and cardiology. Pt received transferring from chair to bed. Pt's  after transfer. Pt to have nuclear test tomorrow. OT deferred d/t increase HR and will await results after test.  Will con't to follow. Thanks!

## 2020-11-04 NOTE — PROGRESS NOTES
Shift Change:      Bedside and Verbal shift change report given to [address] Brian (oncoming nurse) by Trey Freire RN (offgoing nurse). Report included the following information SBAR, Kardex and Recent Results. Shift Summary:    0900: received call from stress lab & new order for stress test today, informed that patient had half of a cup of coffee at 0630 today, stress lab to consult with cardiology to see if patient will still have stress test    1030: pt to be NPO at midnight for stress test tomorrow morning per Patsy Sexton NP     Shift Change:      Bedside and Verbal shift change report given to [address] [facility] [address] (oncoming nurse) by [address] Brian (offgoing nurse). Report included the following information SBAR, Kardex and Recent Results.

## 2020-11-04 NOTE — PROGRESS NOTES
Cardiology Progress Note                              [address], [place], [address] [address]                                 Phone [phone]; Fax [phone]        2020 9:00 AM     Admit Date:           2020  Admit Diagnosis:  Hypoxia [R09.02]  :          1950   MRN:          [id number]       Impression Plan/Recommendation   1. Chest pain/CHACON  2. S/p post LAAL via LVAT's 10/20/20 Dr. Ross Later  3. CAD s/p stent to LAD and RCA  4. PAF/flutter  5. PVCs  6. Hypertension   7. COPD  8. Chronic HFpEF  9. Leukocytosis (on steroids)                 · Back in NSR - continue BB  · No further CP overnight, troponin peak at 1.34. Cradiac cath last year showing non obstructive CAD. cMRI showing LV inferior infarct. · Will proceed with nuclear stress test tomorrow AM- had coffee this AM.   · Continue PO bumex and inspra. Compensated on exam.          Addendum- patient in AF RVR rate 150-160's   Start amiodarone bolus and gtt        No intake/output data recorded.     Last 3 Recorded Weights in this Encounter    20 1712 20 1152 20 1427   Weight: 170 lb (77.1 kg) 170 lb (77.1 kg) 168 lb (76.2 kg)          1901 -  0700  In: 9341 [P.O.:1040]  Out: 9853 [Urine:2350]    SUBJECTIVE               B[employer] no further CP  Chest is sore w inspiration   Denies SOB  Occasionally feels like her heart is fluttering           Current Facility-Administered Medications   Medication Dose Route Frequency    artificial tears (dextran 70-hypromellose) (GENTEAL TEARS MILD) 0.1-0.3 % ophthalmic solution 1 Drop  1 Drop Both Eyes DAILY PRN    ascorbic acid (vitamin C) (VITAMIN C) tablet 500 mg  500 mg Oral DAILY    bumetanide (BUMEX) tablet 1 mg  1 mg Oral DAILY    bumetanide (BUMEX) tablet 0.5 mg  0.5 mg Oral QPM    cholecalciferol (VITAMIN D3) (1000 Units /25 mcg) tablet 2 Tab  2,000 Units Oral DAILY    eplerenone (INSPRA) tablet 25 mg  25 mg Oral ACL    fenofibrate nanocrystallized (TRICOR) tablet 48 mg  48 mg Oral QHS    ferrous sulfate tablet 325 mg  325 mg Oral DAILY WITH LUNCH    gabapentin (NEURONTIN) capsule 100 mg  100 mg Oral QHS    insulin glargine (LANTUS) injection 8 Units  8 Units SubCUTAneous DAILY    pantoprazole (PROTONIX) tablet 40 mg  40 mg Oral ACB    levothyroxine (SYNTHROID) tablet 150 mcg  150 mcg Oral ACB    lidocaine 4 % patch 1 Patch  1 Patch TransDERmal Q24H    omega 3-DHA-EPA-fish oil 1,000 mg (120 mg-180 mg) capsule 1 Cap  1 Cap Oral QPM    metoprolol succinate (TOPROL-XL) XL tablet 25 mg  25 mg Oral PCL    methylPREDNISolone (PF) (Solu-MEDROL) injection 40 mg  40 mg IntraVENous Q8H    sodium chloride (NS) flush 5-40 mL  5-40 mL IntraVENous Q8H    sodium chloride (NS) flush 5-40 mL  5-40 mL IntraVENous PRN    acetaminophen (TYLENOL) tablet 650 mg  650 mg Oral Q4H PRN    naloxone (NARCAN) injection 0.4 mg  0.4 mg IntraVENous PRN    ondansetron (ZOFRAN) injection 4 mg  4 mg IntraVENous Q4H PRN    enoxaparin (LOVENOX) injection 40 mg  40 mg SubCUTAneous Q24H    glucose chewable tablet 16 g  4 Tab Oral PRN    dextrose (D50W) injection syrg 12.5-25 g  25-50 mL IntraVENous PRN    glucagon (GLUCAGEN) injection 1 mg  1 mg IntraMUSCular PRN    insulin lispro (HUMALOG) injection   SubCUTAneous AC&HS    HYDROmorphone (DILAUDID) tablet 2 mg  2 mg Oral Q4H PRN    albuterol-ipratropium (DUO-NEB) 2.5 MG-0.5 MG/3 ML  3 mL Nebulization Q6H RT    budesonide (PULMICORT) 500 mcg/2 ml nebulizer suspension  500 mcg Nebulization BID RT    arformoteroL (BROVANA) neb solution 15 mcg  15 mcg Nebulization BID RT    albuterol (ACCUNEB) nebulizer solution 1.25 mg  1.25 mg Nebulization Q2H PRN    doxycycline (VIBRA-TABS) tablet 100 mg  100 mg Oral Q12H      OBJECTIVE               Intake/Output Summary (Last 24 hours) at 11/4/2020 0900  Last data filed at 11/4/2020 3786  Gross per 24 hour   Intake 1040 ml   Output 2350 ml   Net -1310 ml       Review of Systems - History obtained from the patient AS PER  HPI    Telemetry NSr w PVCs    PHYSICAL EXAM        Visit Vitals  BP (!) 101/55 (BP 1 Location: Left arm, BP Patient Position: Sitting)   Pulse 75   Temp 97.3 °F (36.3 °C)   Resp 17   Ht 5' 3\" (1.6 m)   Wt 168 lb (76.2 kg)   LMP 09/29/1980 (LMP Unknown)   SpO2 96%   BMI 29.76 kg/m²       Gen: Well-developed, elderly, in no acute distress  alert and oriented x 3  HEENT:  Pink conjunctivae, Hearing grossly normal.No scleral icterus or conjunctival, moist mucous membranes  Neck: Supple,No JVD  Resp: No accessory muscle use, few crackles LLL  Card: Regular Rate,Rythm,2/6 murmur at apex, no rubs or gallop. No thrills.    GI:          soft, non-tender   MSK: No cyanosis or clubbing  Skin: No rashes or ulcers  Neuro:  Cranial nerves are grossly intact, moving all four extremities, no focal deficit, follows commands appropriately  Psych:  Good insight, oriented to person, place and time, alert, Nml Affect  LE: No edema       DATA REVIEW            Laboratory and Imaging have been reviewed by me and are notable for  Recent Labs     11/03/20  1834 11/03/20  1029 11/03/20  0350   TROIQ 1.29* 1.34* 1.20*     Recent Labs     11/04/20  0330 11/03/20  0350 11/02/20  1802    137 140   K 4.7 4.5 5.0   CO2 32 26 26   BUN 34* 19 19   CREA 0.96 1.07* 0.90   * 261* 201*   MG 2.3 2.1  --    WBC 26.2* 16.7* 21.7*   HGB 15.1 14.5 14.1   HCT 46.9 45.0 44.5    336 343             Florencia Montague NP

## 2020-11-04 NOTE — PROGRESS NOTES
Bedside and Verbal shift change report given to MINAL Rogers (oncoming nurse) by Feroz Ennis RN (offgoing nurse). Report included the following information SBAR, Kardex, Intake/Output, MAR, Recent Results and Cardiac Rhythm NSR. Introduced self as primary RN. Initial assessment completed. VSS. Pt denies additional complaints at this time. Plan for the evening discussed with pt and she verbalized understanding. Bed locked and in low position with call bell within reach. Instructed pt to press call mathews when needed. White board updated with this RN's name. 0730 Bedside and Verbal shift change report given to Gila/Minal Card (oncoming nurse) by Luis Diaz RN (offgoing nurse). Report included the following information SBAR, Kardex, Intake/Output, MAR, Recent Results and Cardiac Rhythm NSR.

## 2020-11-04 NOTE — PROGRESS NOTES
1:09 PM  11/04/20     Transition of Care Plan:            1. Hospital admission for medical management  Heart rate increasing with activity for testing in AM   2. Cardiology consult  3. PT/OT evaluations  deferred until after nuclear testing in AM  4. CM to follow through for treatment/response  5. DC when stable to home w/ [facility] and family assistance, Referral sent to Eliud Chung via [employer]  for resumption they are willing to accept   6. Outpatient f/u PCP, cardiology  7.   [facility] [place]  is supportive will transport      [address] [facility]

## 2020-11-05 ENCOUNTER — APPOINTMENT (OUTPATIENT)
Dept: NON INVASIVE DIAGNOSTICS | Age: 70
DRG: 202 | End: 2020-11-05
Attending: NURSE PRACTITIONER
Payer: MEDICARE

## 2020-11-05 ENCOUNTER — APPOINTMENT (OUTPATIENT)
Dept: NUCLEAR MEDICINE | Age: 70
DRG: 202 | End: 2020-11-05
Attending: NURSE PRACTITIONER
Payer: MEDICARE

## 2020-11-05 LAB
ANION GAP BLD CALC-SCNC: 16 MMOL/L (ref 10–20)
ANION GAP SERPL CALC-SCNC: 8 MMOL/L (ref 5–15)
ANION GAP SERPL CALC-SCNC: 9 MMOL/L (ref 5–15)
BASOPHILS # BLD: 0 K/UL (ref 0–0.1)
BASOPHILS NFR BLD: 0 % (ref 0–1)
BUN BLD-MCNC: 38 MG/DL (ref 9–20)
BUN SERPL-MCNC: 37 MG/DL (ref 6–20)
BUN SERPL-MCNC: 41 MG/DL (ref 6–20)
BUN/CREAT SERPL: 33 (ref 12–20)
BUN/CREAT SERPL: 36 (ref 12–20)
CA-I BLD-MCNC: 1.12 MMOL/L (ref 1.12–1.32)
CALCIUM SERPL-MCNC: 9.2 MG/DL (ref 8.5–10.1)
CALCIUM SERPL-MCNC: 9.6 MG/DL (ref 8.5–10.1)
CHLORIDE BLD-SCNC: 95 MMOL/L (ref 98–107)
CHLORIDE SERPL-SCNC: 97 MMOL/L (ref 97–108)
CHLORIDE SERPL-SCNC: 97 MMOL/L (ref 97–108)
CO2 BLD-SCNC: 32 MMOL/L (ref 21–32)
CO2 SERPL-SCNC: 29 MMOL/L (ref 21–32)
CO2 SERPL-SCNC: 32 MMOL/L (ref 21–32)
CREAT BLD-MCNC: 0.9 MG/DL (ref 0.6–1.3)
CREAT SERPL-MCNC: 1.13 MG/DL (ref 0.55–1.02)
CREAT SERPL-MCNC: 1.13 MG/DL (ref 0.55–1.02)
DIFFERENTIAL METHOD BLD: ABNORMAL
EOSINOPHIL # BLD: 0 K/UL (ref 0–0.4)
EOSINOPHIL NFR BLD: 0 % (ref 0–7)
ERYTHROCYTE [DISTWIDTH] IN BLOOD BY AUTOMATED COUNT: 13.2 % (ref 11.5–14.5)
ERYTHROCYTE [DISTWIDTH] IN BLOOD BY AUTOMATED COUNT: 13.5 % (ref 11.5–14.5)
GLUCOSE BLD STRIP.AUTO-MCNC: 140 MG/DL (ref 65–100)
GLUCOSE BLD STRIP.AUTO-MCNC: 282 MG/DL (ref 65–100)
GLUCOSE BLD STRIP.AUTO-MCNC: 370 MG/DL (ref 65–100)
GLUCOSE BLD-MCNC: 139 MG/DL (ref 65–100)
GLUCOSE SERPL-MCNC: 136 MG/DL (ref 65–100)
GLUCOSE SERPL-MCNC: 238 MG/DL (ref 65–100)
HCT VFR BLD AUTO: 43.4 % (ref 35–47)
HCT VFR BLD AUTO: 47.7 % (ref 35–47)
HCT VFR BLD CALC: 49 % (ref 35–47)
HGB BLD-MCNC: 14.2 G/DL (ref 11.5–16)
HGB BLD-MCNC: 15.5 G/DL (ref 11.5–16)
IMM GRANULOCYTES # BLD AUTO: 0.9 K/UL (ref 0–0.04)
IMM GRANULOCYTES NFR BLD AUTO: 3 % (ref 0–0.5)
INR PPP: 1 (ref 0.9–1.1)
LYMPHOCYTES # BLD: 1.1 K/UL (ref 0.8–3.5)
LYMPHOCYTES NFR BLD: 4 % (ref 12–49)
MAGNESIUM SERPL-MCNC: 2.4 MG/DL (ref 1.6–2.4)
MCH RBC QN AUTO: 30.9 PG (ref 26–34)
MCH RBC QN AUTO: 31.4 PG (ref 26–34)
MCHC RBC AUTO-ENTMCNC: 32.5 G/DL (ref 30–36.5)
MCHC RBC AUTO-ENTMCNC: 32.7 G/DL (ref 30–36.5)
MCV RBC AUTO: 95.2 FL (ref 80–99)
MCV RBC AUTO: 96 FL (ref 80–99)
MONOCYTES # BLD: 2.8 K/UL (ref 0–1)
MONOCYTES NFR BLD: 10 % (ref 5–13)
NEUTS SEG # BLD: 23.6 K/UL (ref 1.8–8)
NEUTS SEG NFR BLD: 83 % (ref 32–75)
NRBC # BLD: 0 K/UL (ref 0–0.01)
NRBC # BLD: 0 K/UL (ref 0–0.01)
NRBC BLD-RTO: 0 PER 100 WBC
NRBC BLD-RTO: 0 PER 100 WBC
PHOSPHATE SERPL-MCNC: 2.5 MG/DL (ref 2.6–4.7)
PLATELET # BLD AUTO: 359 K/UL (ref 150–400)
PLATELET # BLD AUTO: 441 K/UL (ref 150–400)
PMV BLD AUTO: 10.1 FL (ref 8.9–12.9)
PMV BLD AUTO: 10.6 FL (ref 8.9–12.9)
POTASSIUM BLD-SCNC: 4.3 MMOL/L (ref 3.5–5.1)
POTASSIUM SERPL-SCNC: 4.1 MMOL/L (ref 3.5–5.1)
POTASSIUM SERPL-SCNC: 4.1 MMOL/L (ref 3.5–5.1)
PROTHROMBIN TIME: 10.8 SEC (ref 9–11.1)
RBC # BLD AUTO: 4.52 M/UL (ref 3.8–5.2)
RBC # BLD AUTO: 5.01 M/UL (ref 3.8–5.2)
RBC MORPH BLD: ABNORMAL
RBC MORPH BLD: ABNORMAL
SERVICE CMNT-IMP: ABNORMAL
SODIUM BLD-SCNC: 138 MMOL/L (ref 136–145)
SODIUM SERPL-SCNC: 135 MMOL/L (ref 136–145)
SODIUM SERPL-SCNC: 137 MMOL/L (ref 136–145)
STRESS BASELINE DIAS BP: 58 MMHG
STRESS BASELINE HR: 100 BPM
STRESS BASELINE SYS BP: 118 MMHG
STRESS ESTIMATED WORKLOAD: 1 METS
STRESS EXERCISE DUR MIN: NORMAL
STRESS PEAK DIAS BP: 54 MMHG
STRESS PEAK SYS BP: 134 MMHG
STRESS PERCENT HR ACHIEVED: 84 %
STRESS POST PEAK HR: 125 BPM
STRESS RATE PRESSURE PRODUCT: NORMAL BPM*MMHG
STRESS TARGET HR: 148 BPM
TROPONIN I BLD-MCNC: 0.34 NG/ML (ref 0–0.08)
TROPONIN I SERPL-MCNC: 0.54 NG/ML
WBC # BLD AUTO: 24 K/UL (ref 3.6–11)
WBC # BLD AUTO: 28.4 K/UL (ref 3.6–11)
WBC MORPH BLD: ABNORMAL

## 2020-11-05 PROCEDURE — 65660000000 HC RM CCU STEPDOWN

## 2020-11-05 PROCEDURE — 93016 CV STRESS TEST SUPVJ ONLY: CPT | Performed by: INTERNAL MEDICINE

## 2020-11-05 PROCEDURE — 84100 ASSAY OF PHOSPHORUS: CPT

## 2020-11-05 PROCEDURE — 74011250636 HC RX REV CODE- 250/636: Performed by: HOSPITALIST

## 2020-11-05 PROCEDURE — 74011250637 HC RX REV CODE- 250/637: Performed by: INTERNAL MEDICINE

## 2020-11-05 PROCEDURE — 77010033678 HC OXYGEN DAILY

## 2020-11-05 PROCEDURE — 83735 ASSAY OF MAGNESIUM: CPT

## 2020-11-05 PROCEDURE — 74011000250 HC RX REV CODE- 250: Performed by: INTERNAL MEDICINE

## 2020-11-05 PROCEDURE — 82962 GLUCOSE BLOOD TEST: CPT

## 2020-11-05 PROCEDURE — 36415 COLL VENOUS BLD VENIPUNCTURE: CPT

## 2020-11-05 PROCEDURE — A9500 TC99M SESTAMIBI: HCPCS

## 2020-11-05 PROCEDURE — 84484 ASSAY OF TROPONIN QUANT: CPT

## 2020-11-05 PROCEDURE — 99233 SBSQ HOSP IP/OBS HIGH 50: CPT | Performed by: INTERNAL MEDICINE

## 2020-11-05 PROCEDURE — 94760 N-INVAS EAR/PLS OXIMETRY 1: CPT

## 2020-11-05 PROCEDURE — 93005 ELECTROCARDIOGRAM TRACING: CPT

## 2020-11-05 PROCEDURE — 74011636637 HC RX REV CODE- 636/637: Performed by: INTERNAL MEDICINE

## 2020-11-05 PROCEDURE — 78452 HT MUSCLE IMAGE SPECT MULT: CPT | Performed by: INTERNAL MEDICINE

## 2020-11-05 PROCEDURE — 74011250637 HC RX REV CODE- 250/637: Performed by: NURSE PRACTITIONER

## 2020-11-05 PROCEDURE — 80047 BASIC METABLC PNL IONIZED CA: CPT

## 2020-11-05 PROCEDURE — 94640 AIRWAY INHALATION TREATMENT: CPT

## 2020-11-05 PROCEDURE — 80048 BASIC METABOLIC PNL TOTAL CA: CPT

## 2020-11-05 PROCEDURE — 74011250636 HC RX REV CODE- 250/636: Performed by: INTERNAL MEDICINE

## 2020-11-05 PROCEDURE — 85027 COMPLETE CBC AUTOMATED: CPT

## 2020-11-05 PROCEDURE — 85025 COMPLETE CBC W/AUTO DIFF WBC: CPT

## 2020-11-05 PROCEDURE — 85610 PROTHROMBIN TIME: CPT

## 2020-11-05 PROCEDURE — 93018 CV STRESS TEST I&R ONLY: CPT | Performed by: INTERNAL MEDICINE

## 2020-11-05 RX ORDER — DIPHENHYDRAMINE HYDROCHLORIDE 50 MG/ML
INJECTION, SOLUTION INTRAMUSCULAR; INTRAVENOUS
Status: DISPENSED
Start: 2020-11-05 | End: 2020-11-06

## 2020-11-05 RX ORDER — DILTIAZEM HYDROCHLORIDE 5 MG/ML
10 INJECTION INTRAVENOUS
Status: COMPLETED | OUTPATIENT
Start: 2020-11-05 | End: 2020-11-05

## 2020-11-05 RX ORDER — INSULIN GLARGINE 100 [IU]/ML
15 INJECTION, SOLUTION SUBCUTANEOUS DAILY
Status: DISCONTINUED | OUTPATIENT
Start: 2020-11-06 | End: 2020-11-06 | Stop reason: HOSPADM

## 2020-11-05 RX ORDER — DIPHENHYDRAMINE HYDROCHLORIDE 50 MG/ML
25 INJECTION, SOLUTION INTRAMUSCULAR; INTRAVENOUS ONCE
Status: COMPLETED | OUTPATIENT
Start: 2020-11-05 | End: 2020-11-05

## 2020-11-05 RX ADMIN — EPLERENONE 25 MG: 25 TABLET, FILM COATED ORAL at 11:52

## 2020-11-05 RX ADMIN — Medication 10 ML: at 06:35

## 2020-11-05 RX ADMIN — LEVALBUTEROL HYDROCHLORIDE 1.25 MG: 1.25 SOLUTION RESPIRATORY (INHALATION) at 13:05

## 2020-11-05 RX ADMIN — ARFORMOTEROL TARTRATE 15 MCG: 15 SOLUTION RESPIRATORY (INHALATION) at 20:22

## 2020-11-05 RX ADMIN — GABAPENTIN 100 MG: 100 CAPSULE ORAL at 22:35

## 2020-11-05 RX ADMIN — BUDESONIDE 500 MCG: 0.5 INHALANT RESPIRATORY (INHALATION) at 07:41

## 2020-11-05 RX ADMIN — INSULIN GLARGINE 8 UNITS: 100 INJECTION, SOLUTION SUBCUTANEOUS at 11:45

## 2020-11-05 RX ADMIN — ACETAMINOPHEN 650 MG: 325 TABLET ORAL at 06:35

## 2020-11-05 RX ADMIN — INSULIN LISPRO 5 UNITS: 100 INJECTION, SOLUTION INTRAVENOUS; SUBCUTANEOUS at 11:46

## 2020-11-05 RX ADMIN — FERROUS SULFATE TAB 325 MG (65 MG ELEMENTAL FE) 325 MG: 325 (65 FE) TAB at 11:45

## 2020-11-05 RX ADMIN — OXYCODONE HYDROCHLORIDE AND ACETAMINOPHEN 500 MG: 500 TABLET ORAL at 11:45

## 2020-11-05 RX ADMIN — Medication 10 ML: at 22:39

## 2020-11-05 RX ADMIN — ARFORMOTEROL TARTRATE 15 MCG: 15 SOLUTION RESPIRATORY (INHALATION) at 07:41

## 2020-11-05 RX ADMIN — PANTOPRAZOLE SODIUM 40 MG: 40 TABLET, DELAYED RELEASE ORAL at 11:52

## 2020-11-05 RX ADMIN — FENOFIBRATE 48 MG: 48 TABLET ORAL at 22:35

## 2020-11-05 RX ADMIN — METHYLPREDNISOLONE SODIUM SUCCINATE 20 MG: 40 INJECTION, POWDER, FOR SOLUTION INTRAMUSCULAR; INTRAVENOUS at 14:00

## 2020-11-05 RX ADMIN — Medication 2 TABLET: at 11:44

## 2020-11-05 RX ADMIN — METHYLPREDNISOLONE SODIUM SUCCINATE 40 MG: 40 INJECTION, POWDER, FOR SOLUTION INTRAMUSCULAR; INTRAVENOUS at 06:23

## 2020-11-05 RX ADMIN — LEVOTHYROXINE SODIUM 150 MCG: 0.15 TABLET ORAL at 06:35

## 2020-11-05 RX ADMIN — ACETAMINOPHEN 650 MG: 325 TABLET ORAL at 22:35

## 2020-11-05 RX ADMIN — DIPHENHYDRAMINE HYDROCHLORIDE 25 MG: 50 INJECTION, SOLUTION INTRAMUSCULAR; INTRAVENOUS at 20:52

## 2020-11-05 RX ADMIN — BUDESONIDE 500 MCG: 0.5 INHALANT RESPIRATORY (INHALATION) at 20:21

## 2020-11-05 RX ADMIN — INSULIN LISPRO 12 UNITS: 100 INJECTION, SOLUTION INTRAVENOUS; SUBCUTANEOUS at 16:53

## 2020-11-05 RX ADMIN — DOXYCYCLINE HYCLATE 100 MG: 100 TABLET, COATED ORAL at 22:35

## 2020-11-05 RX ADMIN — OMEGA-3 FATTY ACIDS CAP 1000 MG 1 CAPSULE: 1000 CAP at 16:55

## 2020-11-05 RX ADMIN — BUMETANIDE 0.5 MG: 1 TABLET ORAL at 16:55

## 2020-11-05 RX ADMIN — REGADENOSON 0.4 MG: 0.08 INJECTION, SOLUTION INTRAVENOUS at 09:30

## 2020-11-05 RX ADMIN — BUMETANIDE 1 MG: 1 TABLET ORAL at 11:45

## 2020-11-05 RX ADMIN — ENOXAPARIN SODIUM 40 MG: 40 INJECTION SUBCUTANEOUS at 22:35

## 2020-11-05 RX ADMIN — AMIODARONE HYDROCHLORIDE 400 MG: 200 TABLET ORAL at 21:05

## 2020-11-05 RX ADMIN — METHYLPREDNISOLONE SODIUM SUCCINATE 20 MG: 40 INJECTION, POWDER, FOR SOLUTION INTRAMUSCULAR; INTRAVENOUS at 21:04

## 2020-11-05 RX ADMIN — DILTIAZEM HYDROCHLORIDE 10 MG: 5 INJECTION INTRAVENOUS at 10:40

## 2020-11-05 RX ADMIN — AMIODARONE HYDROCHLORIDE 400 MG: 200 TABLET ORAL at 11:45

## 2020-11-05 RX ADMIN — LEVALBUTEROL HYDROCHLORIDE 1.25 MG: 1.25 SOLUTION RESPIRATORY (INHALATION) at 07:25

## 2020-11-05 RX ADMIN — DOXYCYCLINE HYCLATE 100 MG: 100 TABLET, COATED ORAL at 11:45

## 2020-11-05 RX ADMIN — AMIODARONE HYDROCHLORIDE 400 MG: 200 TABLET ORAL at 16:53

## 2020-11-05 RX ADMIN — LEVALBUTEROL HYDROCHLORIDE 1.25 MG: 1.25 SOLUTION RESPIRATORY (INHALATION) at 20:15

## 2020-11-05 RX ADMIN — LEVALBUTEROL HYDROCHLORIDE 1.25 MG: 1.25 SOLUTION RESPIRATORY (INHALATION) at 01:18

## 2020-11-05 RX ADMIN — DILTIAZEM HYDROCHLORIDE 10 MG: 5 INJECTION INTRAVENOUS at 10:24

## 2020-11-05 RX ADMIN — ACETAMINOPHEN 650 MG: 325 TABLET ORAL at 00:36

## 2020-11-05 RX ADMIN — METOPROLOL SUCCINATE 25 MG: 25 TABLET, EXTENDED RELEASE ORAL at 11:49

## 2020-11-05 NOTE — PROGRESS NOTES
Transition of Care Plan:    RUR-29%        1. Hospital admission for medical management    2. Cardiology following--stress test today  3. CM to follow through for treatment/response  4. DC when stable to home w/ [facility] and family assistance  5.  [facility] [place]  is supportive will transport  6 Order for resumption of care sent in [place] to [facility]  7. Outpatient f/u PCP, cardiology  URSULA Tolbert RN

## 2020-11-05 NOTE — PROGRESS NOTES
2115   called RRT RN due to patient heart rate sustained in the 140's, patient slightly symptomatic feeling SOB and like her heart was racing.      RRT RN on the floor to assess patient and contacted DR Mendoza; orders received    1439   Patient given bolus of cardizem and heart rate dropped to 120's, continuous infusion of cardizem started

## 2020-11-05 NOTE — PROGRESS NOTES
Pt arrived for [facility] Nuclear Stress with no complaints. Monitor shows SR with frequent PAC's. Dr. Katherine Guerra reviewed stress EKG's. Pt to [address] for 2nd set of images. Pt c/o \"palpitations\". Monitor shows A Flutter 140's. Pt c/o 7/10 CP. O2 connected at 3L, 12 lead EKG obtained. Dr. Jacky Cuevas notified and at bedside. Cardiazem 10 mg IV given per VO from Dr. Jacky Cuevas. 10:24:  /76   10:28: /71,   10:30   10:33: /82,  . Pt reports decrease in CP 3/10.    10:40: , /61. Cardiazem 10 mg IV given per VO from Dr. Jacky Cuevas. Pt reports chest discomfort improving. Dr. Jacky Cuevas aware. OK to finish Nuclear Images. [facility] nurse, [facility], notified of events.

## 2020-11-05 NOTE — PROGRESS NOTES
Occupational Therapy  Completed chart review and attempted OT session. Patient off the floor for procedure. Will continue to follow as medically appropriate.        Thank you,    Carissa Snider, OT

## 2020-11-05 NOTE — PROGRESS NOTES
Shift Change:  Bedside and Verbal shift change report given to Jameel Perales RN (oncoming nurse) by RN (offgoing nurse). Report included the following information SBAR, Kardex and Quality Measures. Shift Summary:  0800: Pt off floor for stress test.  1200: Return to [facility] from stress lab    End of Shift Report:  Bedside and Verbal shift change report given to RN (oncoming nurse) by Jameel Perales RN (offgoing nurse). Report included the following information SBAR, Kardex and Quality Measures.

## 2020-11-05 NOTE — PROGRESS NOTES
2130 RRT nurse spoke Dr Vineet King , concerning patient heart rate in 130-150's since 2100 after breathing treatment, received orders for Cardizem bolus and Cardizem gtt not titratable rate at 5mg. Patient has history of A Fib/A flutter and is on amio gtt at this time. Vitals are wnl expect heart rate and rhythm. Nurse established new IV for primary nurse, Nicola Rush, gave bolus of Cardizem, patient stable at this time, no need for escalation of care at this time. 2200 Per patient request nurse spoke Dr Jennifer Heath and changed albuterol treatment to xopenex , primary nurse Nicola Rush is aware.   36 Nurse spoke with tele monitor tech, patient is now sinus, rate [address], CCRN

## 2020-11-05 NOTE — PROGRESS NOTES
VAT    Order noted for midline. Events noted from yesterday and spoke with Gisela Blankenship RN about order. Pt currently has working access, thinks pt is leaving hospital tomorrow and does not need a midline at this time. Will follow along, [phone] for questions.

## 2020-11-05 NOTE — PROGRESS NOTES
Shift Change:      Bedside and Verbal shift change report given to [address] Dr[address] Olivier Brian (oncoming nurse) by [facility] RN (offgoing nurse). Report included the following information SBAR, Kardex and Recent Results. Shift Summary:    0800: pt off floor for stress test    0950: received call from tele that patient had a burst of SVT while in stress lab, rate 140, will continue to monitor    1100: received report from Monse RN in stress lab, pt went into aflutter during test, 20mg  IV cardizem was given, HR in the 130s, will continue to monitor    1200: patient returned from stress lab, vitals taken and medications administered       Shift Change:      Bedside and Verbal shift change report given to  (oncoming nurse) by [address] Dr[address] Olivier Brian (offgoing nurse). Report included the following information SBAR, Kardex and Recent Results.

## 2020-11-05 NOTE — PROGRESS NOTES
Jerardo Nerielsen [facility] [place] [facility]  [address]  [phone]      Medical Progress Note      NAME: Meron Lawson   :  1950  MRM:  [id number]    Date of service: 2020            Subjective:     Chief Complaint:  sob    No acute events. Chest pain has resolved. Still having side pain and is requesting toradol for this. Breathing has improved          Objective:       Vitals:          Last 24hrs VS reviewed since prior progress note.  Most recent are:    Visit Vitals  /65 (BP 1 Location: Right arm, BP Patient Position: At rest)   Pulse 80   Temp 97.5 °F (36.4 °C)   Resp 20   Ht 5' 3\" (1.6 m)   Wt 75.8 kg (167 lb)   SpO2 93%   BMI 29.58 kg/m²     SpO2 Readings from Last 6 Encounters:   20 93%   10/29/20 96%   10/22/20 91%   10/15/20 97%   10/15/20 96%   10/05/20 95%    O2 Flow Rate (L/min): 2 l/min       Intake/Output Summary (Last 24 hours) at 2020 1646  Last data filed at 2020 1620  Gross per 24 hour   Intake 600 ml   Output 5050 ml   Net -4450 ml          Exam:     Physical Exam:    Gen:  Well-developed, well-nourished, in no acute distress  HEENT:  Pink conjunctivae, PERRL, hearing intact to voice, moist mucous membranes  Neck:  Supple, without masses, thyroid non-tender  Resp:  No accessory muscle use, mild wheezing mostly on the RUL  Card:  No murmurs, normal S1, S2 without thrills, bruits or peripheral edema  Abd:  Soft, non-tender, non-distended, normoactive bowel sounds are present  Musc:  No cyanosis or clubbing  Skin:  No rashes or ulcers, skin turgor is good  Neuro:  No focal deficits, follows commands appropriately  Psych:  Good insight, oriented to person, place and time, alert       Telemetry reviewed:   NSR    Medications Reviewed: (see below)    Lab Data Reviewed: (see below)    ______________________________________________________________________    Medications:     Current Facility-Administered Medications   Medication Dose Route Frequency    methylPREDNISolone (PF) (SOLU-MEDROL) injection 20 mg  20 mg IntraVENous Q8H    [START ON 11/6/2020] insulin glargine (LANTUS) injection 15 Units  15 Units SubCUTAneous DAILY    amiodarone (CORDARONE) tablet 400 mg  400 mg Oral TID    [START ON 11/8/2020] amiodarone (CORDARONE) tablet 400 mg  400 mg Oral DAILY    lidocaine 4 % patch 1 Patch  1 Patch TransDERmal Q24H    dilTIAZem (CARDIZEM) 100 mg in 0.9% sodium chloride (MBP/ADV) 100 mL infusion  5 mg/hr IntraVENous CONTINUOUS    levalbuterol (XOPENEX) nebulizer soln 1.25 mg/3 mL  1.25 mg Nebulization Q6H RT    artificial tears (dextran 70-hypromellose) (GENTEAL TEARS MILD) 0.1-0.3 % ophthalmic solution 1 Drop  1 Drop Both Eyes DAILY PRN    ascorbic acid (vitamin C) (VITAMIN C) tablet 500 mg  500 mg Oral DAILY    bumetanide (BUMEX) tablet 1 mg  1 mg Oral DAILY    bumetanide (BUMEX) tablet 0.5 mg  0.5 mg Oral QPM    cholecalciferol (VITAMIN D3) (1000 Units /25 mcg) tablet 2 Tab  2,000 Units Oral DAILY    eplerenone (INSPRA) tablet 25 mg  25 mg Oral ACL    fenofibrate nanocrystallized (TRICOR) tablet 48 mg  48 mg Oral QHS    ferrous sulfate tablet 325 mg  325 mg Oral DAILY WITH LUNCH    gabapentin (NEURONTIN) capsule 100 mg  100 mg Oral QHS    pantoprazole (PROTONIX) tablet 40 mg  40 mg Oral ACB    levothyroxine (SYNTHROID) tablet 150 mcg  150 mcg Oral ACB    lidocaine 4 % patch 1 Patch  1 Patch TransDERmal Q24H    omega 3-DHA-EPA-fish oil 1,000 mg (120 mg-180 mg) capsule 1 Cap  1 Cap Oral QPM    metoprolol succinate (TOPROL-XL) XL tablet 25 mg  25 mg Oral PCL    sodium chloride (NS) flush 5-40 mL  5-40 mL IntraVENous Q8H    sodium chloride (NS) flush 5-40 mL  5-40 mL IntraVENous PRN    acetaminophen (TYLENOL) tablet 650 mg  650 mg Oral Q4H PRN    naloxone (NARCAN) injection 0.4 mg  0.4 mg IntraVENous PRN    ondansetron (ZOFRAN) injection 4 mg  4 mg IntraVENous Q4H PRN    enoxaparin (LOVENOX) injection 40 mg  40 mg SubCUTAneous Q24H    glucose chewable tablet 16 g  4 Tab Oral PRN    dextrose (D50W) injection syrg 12.5-25 g  25-50 mL IntraVENous PRN    glucagon (GLUCAGEN) injection 1 mg  1 mg IntraMUSCular PRN    insulin lispro (HUMALOG) injection   SubCUTAneous AC&HS    HYDROmorphone (DILAUDID) tablet 2 mg  2 mg Oral Q4H PRN    budesonide (PULMICORT) 500 mcg/2 ml nebulizer suspension  500 mcg Nebulization BID RT    arformoteroL (BROVANA) neb solution 15 mcg  15 mcg Nebulization BID RT    albuterol (ACCUNEB) nebulizer solution 1.25 mg  1.25 mg Nebulization Q2H PRN    doxycycline (VIBRA-TABS) tablet 100 mg  100 mg Oral Q12H            Lab Review:     Recent Labs     11/05/20  0329 11/04/20  0330 11/03/20  0350   WBC 24.0* 26.2* 16.7*   HGB 14.2 15.1 14.5   HCT 43.4 46.9 45.0    373 336     Recent Labs     11/05/20 0329 11/04/20  0330 11/03/20  0350 11/02/20  1802   * 138 137 140   K 4.1 4.7 4.5 5.0   CL 97 99 101 105   CO2 29 32 26 26   * 200* 261* 201*   BUN 41* 34* 19 19   CREA 1.13* 0.96 1.07* 0.90   CA 9.2 9.5 9.4 9.2   MG  --  2.3 2.1  --    ALB  --   --   --  3.3*   ALT  --   --   --  12     No components found for: [place]         Assessment / Plan:     Chest Pain / elevated troponin / CAD: may represent NSTEMI. Likely driven by elevated HR. No asa due to allergy/intolerance. Continue metoprolol. Trend CE. Cardiology following, stress test today    PAF: now in [address]. Sp atrial appendage clip. No anticoagulation due to h/o bleeding. Continue po metoprolol. Hypoxia: improved with diuresis now on po bumex. Monitor daily weight and I/O       Asthma (6/29/2009) - with exacerbation. Mild wheezing improved. Continue duobnebs. Wean solumedrol.       Hypothyroidism (3/23/2012)   -continue levothyroxine        HTN (hypertension): pt reports labile bp, requesting judicious use of bp meds       Leukocytosis (8/12/2019) - chronic. Likely 2/2 steroids. Improved today.  Monitor off abx       Diabetes mellitus type 2: BG elevated due to steroids.  Increase lantus and continue SSI       Diastolic heart failure (HCC) ()  -s/p IV diuresis; transition to PO in the AM              Total time spent with patient: 30 [address] [facility] discussed with: Patient, Family and Nursing Staff    Discussed:  Care Plan    Prophylaxis:  Lovenox    Disposition:  Home w/Family           ___________________________________________________    Attending Physician: Kenny Pérez MD

## 2020-11-05 NOTE — PROGRESS NOTES
Bedside and Verbal shift change report given to Virginia Fofana (oncoming nurse) by Belgica Boland (offgoing nurse). Report included the following information SBAR, Kardex, Intake/Output, MAR and Recent Results.

## 2020-11-05 NOTE — PROGRESS NOTES
Cardiology Progress Note                              [address], [address] [facility]                                 Phone [phone]; Fax [phone]        2020 9:00 AM     Admit Date:           2020  Admit Diagnosis:  Hypoxia [R09.02]  :          1950   MRN:          [id number]       Impression Plan/Recommendation   1. Chest pain/CHACON  2. S/p post LAAL via LVAT's 10/20/20 Dr. Ross Later  3. CAD s/p stent to LAD and RCA  4. PAF/flutter: RVR   5. PVCs  6. Hypertension   7. COPD  8. Chronic HFpEF  9. Leukocytosis (on steroids)                 · Back in NSR - continue BB  · No further CP, troponin peak at 1.34. Cradiac cath last year showing non obstructive CAD. cMRI showing LV inferior infarct. · Will proceed with nuclear stress test this AM: d/w pt. · Continue PO bumex and inspra. Compensated on exam.   · Back in SR this am cont po amio, BB              No intake/output data recorded.     Last 3 Recorded Weights in this Encounter    20 1152 20 1427 20 0431   Weight: 77.1 kg (170 lb) 76.2 kg (168 lb) 76 kg (167 lb 8.8 oz)          1901 -  0700  In: 1340 [P.O.:990; I.V.:350]  Out: 5050 [Urine:5050]    SUBJECTIVE               [employer] no further CP  No c/o this am   NPO   Awaiting stress test           Current Facility-Administered Medications   Medication Dose Route Frequency    amiodarone (CORDARONE) 375 mg in dextrose 5% 250 mL infusion  0.5-1 mg/min IntraVENous TITRATE    amiodarone (CORDARONE) tablet 400 mg  400 mg Oral TID    [START ON 2020] amiodarone (CORDARONE) tablet 400 mg  400 mg Oral DAILY    lidocaine 4 % patch 1 Patch  1 Patch TransDERmal Q24H    dilTIAZem (CARDIZEM) 100 mg in 0.9% sodium chloride (MBP/ADV) 100 mL infusion  5 mg/hr IntraVENous CONTINUOUS    levalbuterol (XOPENEX) nebulizer soln 1.25 mg/3 mL  1.25 mg Nebulization Q6H RT    artificial tears (dextran 70-hypromellose) (GENTEAL TEARS MILD) 0.1-0.3 % ophthalmic solution 1 Drop  1 Drop Both Eyes DAILY PRN    ascorbic acid (vitamin C) (VITAMIN C) tablet 500 mg  500 mg Oral DAILY    bumetanide (BUMEX) tablet 1 mg  1 mg Oral DAILY    bumetanide (BUMEX) tablet 0.5 mg  0.5 mg Oral QPM    cholecalciferol (VITAMIN D3) (1000 Units /25 mcg) tablet 2 Tab  2,000 Units Oral DAILY    eplerenone (INSPRA) tablet 25 mg  25 mg Oral ACL    fenofibrate nanocrystallized (TRICOR) tablet 48 mg  48 mg Oral QHS    ferrous sulfate tablet 325 mg  325 mg Oral DAILY WITH LUNCH    gabapentin (NEURONTIN) capsule 100 mg  100 mg Oral QHS    insulin glargine (LANTUS) injection 8 Units  8 Units SubCUTAneous DAILY    pantoprazole (PROTONIX) tablet 40 mg  40 mg Oral ACB    levothyroxine (SYNTHROID) tablet 150 mcg  150 mcg Oral ACB    lidocaine 4 % patch 1 Patch  1 Patch TransDERmal Q24H    omega 3-DHA-EPA-fish oil 1,000 mg (120 mg-180 mg) capsule 1 Cap  1 Cap Oral QPM    metoprolol succinate (TOPROL-XL) XL tablet 25 mg  25 mg Oral PCL    methylPREDNISolone (PF) (Solu-MEDROL) injection 40 mg  40 mg IntraVENous Q8H    sodium chloride (NS) flush 5-40 mL  5-40 mL IntraVENous Q8H    sodium chloride (NS) flush 5-40 mL  5-40 mL IntraVENous PRN    acetaminophen (TYLENOL) tablet 650 mg  650 mg Oral Q4H PRN    naloxone (NARCAN) injection 0.4 mg  0.4 mg IntraVENous PRN    ondansetron (ZOFRAN) injection 4 mg  4 mg IntraVENous Q4H PRN    enoxaparin (LOVENOX) injection 40 mg  40 mg SubCUTAneous Q24H    glucose chewable tablet 16 g  4 Tab Oral PRN    dextrose (D50W) injection syrg 12.5-25 g  25-50 mL IntraVENous PRN    glucagon (GLUCAGEN) injection 1 mg  1 mg IntraMUSCular PRN    insulin lispro (HUMALOG) injection   SubCUTAneous AC&HS    HYDROmorphone (DILAUDID) tablet 2 mg  2 mg Oral Q4H PRN    budesonide (PULMICORT) 500 mcg/2 ml nebulizer suspension  500 mcg Nebulization BID RT    arformoteroL (BROVANA) neb solution 15 mcg  15 mcg Nebulization BID RT    albuterol (ACCUNEB) nebulizer solution 1.25 mg  1.25 mg Nebulization Q2H PRN    doxycycline (VIBRA-TABS) tablet 100 mg  100 mg Oral Q12H      OBJECTIVE               Intake/Output Summary (Last 24 hours) at 11/5/2020 0735  Last data filed at 11/5/2020 0418  Gross per 24 hour   Intake 840 ml   Output 4100 ml   Net -3260 ml       Review of Systems - History obtained from the patient AS PER  HPI    Telemetry NSr w PVCs    PHYSICAL EXAM        Visit Vitals  /63 (BP 1 Location: Left arm, BP Patient Position: At rest)   Pulse 69   Temp 97.9 °F (36.6 °C)   Resp 18   Ht 5' 3\" (1.6 m)   Wt 76 kg (167 lb 8.8 oz)   LMP 09/29/1980 (LMP Unknown)   SpO2 95%   BMI 29.68 kg/m²       Gen: Well-developed, in no acute distress  alert and oriented x 3  HEENT:  Pink conjunctivae, Hearing grossly normal.No scleral icterus or conjunctival, moist mucous membranes  Neck: Supple, No JVD  Resp: Clear   Card: Regular Rate, Rhythm,2/6 murmur at apex, no rub or gallop.   GI:          soft, non-tender   MSK: No cyanosis or clubbing  Skin: No rashes or ulcers  Neuro:  Cranial nerves are grossly intact, moving all four extremities, no focal deficit, follows commands appropriately  Psych:  Good insight, oriented to person, place and time, alert, Nml Affect  LE: No edema       DATA REVIEW            Laboratory and Imaging have been reviewed by me and are notable for  Recent Labs     11/03/20  1834 11/03/20  1029 11/03/20  0350   TROIQ 1.29* 1.34* 1.20*     Recent Labs     11/05/20  0329 11/04/20  0330 11/03/20  0350   * 138 137   K 4.1 4.7 4.5   CO2 29 32 26   BUN 41* 34* 19   CREA 1.13* 0.96 1.07*   * 200* 261*   MG  --  2.3 2.1   WBC 24.0* 26.2* 16.7*   HGB 14.2 15.1 14.5   HCT 43.4 46.9 45.0    373 336             Sissy Alexis, NP

## 2020-11-06 VITALS
HEART RATE: 60 BPM | BODY MASS INDEX: 29.59 KG/M2 | TEMPERATURE: 97.9 F | WEIGHT: 167 LBS | OXYGEN SATURATION: 95 % | HEIGHT: 63 IN | SYSTOLIC BLOOD PRESSURE: 120 MMHG | RESPIRATION RATE: 16 BRPM | DIASTOLIC BLOOD PRESSURE: 72 MMHG

## 2020-11-06 LAB
ANION GAP SERPL CALC-SCNC: 7 MMOL/L (ref 5–15)
BUN SERPL-MCNC: 36 MG/DL (ref 6–20)
BUN/CREAT SERPL: 31 (ref 12–20)
CALCIUM SERPL-MCNC: 8.8 MG/DL (ref 8.5–10.1)
CHLORIDE SERPL-SCNC: 98 MMOL/L (ref 97–108)
CO2 SERPL-SCNC: 33 MMOL/L (ref 21–32)
CREAT SERPL-MCNC: 1.15 MG/DL (ref 0.55–1.02)
ERYTHROCYTE [DISTWIDTH] IN BLOOD BY AUTOMATED COUNT: 13.6 % (ref 11.5–14.5)
GLUCOSE BLD STRIP.AUTO-MCNC: 192 MG/DL (ref 65–100)
GLUCOSE BLD STRIP.AUTO-MCNC: 267 MG/DL (ref 65–100)
GLUCOSE SERPL-MCNC: 226 MG/DL (ref 65–100)
HCT VFR BLD AUTO: 45.7 % (ref 35–47)
HGB BLD-MCNC: 14.3 G/DL (ref 11.5–16)
MAGNESIUM SERPL-MCNC: 2.3 MG/DL (ref 1.6–2.4)
MCH RBC QN AUTO: 30.5 PG (ref 26–34)
MCHC RBC AUTO-ENTMCNC: 31.3 G/DL (ref 30–36.5)
MCV RBC AUTO: 97.4 FL (ref 80–99)
NRBC # BLD: 0 K/UL (ref 0–0.01)
NRBC BLD-RTO: 0 PER 100 WBC
PLATELET # BLD AUTO: 317 K/UL (ref 150–400)
PMV BLD AUTO: 10.5 FL (ref 8.9–12.9)
POTASSIUM SERPL-SCNC: 4.1 MMOL/L (ref 3.5–5.1)
RBC # BLD AUTO: 4.69 M/UL (ref 3.8–5.2)
SERVICE CMNT-IMP: ABNORMAL
SERVICE CMNT-IMP: ABNORMAL
SODIUM SERPL-SCNC: 138 MMOL/L (ref 136–145)
WBC # BLD AUTO: 18.3 K/UL (ref 3.6–11)

## 2020-11-06 PROCEDURE — 83735 ASSAY OF MAGNESIUM: CPT

## 2020-11-06 PROCEDURE — 74011250637 HC RX REV CODE- 250/637: Performed by: NURSE PRACTITIONER

## 2020-11-06 PROCEDURE — 74011000250 HC RX REV CODE- 250: Performed by: INTERNAL MEDICINE

## 2020-11-06 PROCEDURE — 97530 THERAPEUTIC ACTIVITIES: CPT

## 2020-11-06 PROCEDURE — 36415 COLL VENOUS BLD VENIPUNCTURE: CPT

## 2020-11-06 PROCEDURE — 74011250637 HC RX REV CODE- 250/637: Performed by: INTERNAL MEDICINE

## 2020-11-06 PROCEDURE — 74011636637 HC RX REV CODE- 636/637: Performed by: INTERNAL MEDICINE

## 2020-11-06 PROCEDURE — 80048 BASIC METABOLIC PNL TOTAL CA: CPT

## 2020-11-06 PROCEDURE — 82962 GLUCOSE BLOOD TEST: CPT

## 2020-11-06 PROCEDURE — 74011250636 HC RX REV CODE- 250/636: Performed by: INTERNAL MEDICINE

## 2020-11-06 PROCEDURE — 99222 1ST HOSP IP/OBS MODERATE 55: CPT | Performed by: INTERNAL MEDICINE

## 2020-11-06 PROCEDURE — 85027 COMPLETE CBC AUTOMATED: CPT

## 2020-11-06 PROCEDURE — 94760 N-INVAS EAR/PLS OXIMETRY 1: CPT

## 2020-11-06 PROCEDURE — 94640 AIRWAY INHALATION TREATMENT: CPT

## 2020-11-06 PROCEDURE — 97535 SELF CARE MNGMENT TRAINING: CPT

## 2020-11-06 RX ORDER — AMIODARONE HYDROCHLORIDE 200 MG/1
200 TABLET ORAL DAILY
Qty: 30 TAB | Refills: 0 | Status: SHIPPED | OUTPATIENT
Start: 2020-11-06 | End: 2020-11-17

## 2020-11-06 RX ORDER — PREDNISONE 2.5 MG/1
2.5 TABLET ORAL
Qty: 30 TAB | Refills: 0 | Status: ON HOLD
Start: 2020-11-06 | End: 2021-05-30

## 2020-11-06 RX ORDER — PREDNISONE 20 MG/1
TABLET ORAL
Qty: 8 TAB | Refills: 0 | Status: SHIPPED | OUTPATIENT
Start: 2020-11-06 | End: 2020-11-13

## 2020-11-06 RX ADMIN — INSULIN GLARGINE 15 UNITS: 100 INJECTION, SOLUTION SUBCUTANEOUS at 08:27

## 2020-11-06 RX ADMIN — LEVALBUTEROL HYDROCHLORIDE 1.25 MG: 1.25 SOLUTION RESPIRATORY (INHALATION) at 08:58

## 2020-11-06 RX ADMIN — INSULIN LISPRO 2 UNITS: 100 INJECTION, SOLUTION INTRAVENOUS; SUBCUTANEOUS at 08:27

## 2020-11-06 RX ADMIN — Medication 10 ML: at 06:53

## 2020-11-06 RX ADMIN — DOXYCYCLINE HYCLATE 100 MG: 100 TABLET, COATED ORAL at 08:28

## 2020-11-06 RX ADMIN — BUMETANIDE 1 MG: 1 TABLET ORAL at 08:28

## 2020-11-06 RX ADMIN — METHYLPREDNISOLONE SODIUM SUCCINATE 20 MG: 40 INJECTION, POWDER, FOR SOLUTION INTRAMUSCULAR; INTRAVENOUS at 06:53

## 2020-11-06 RX ADMIN — METOPROLOL SUCCINATE 25 MG: 25 TABLET, EXTENDED RELEASE ORAL at 11:44

## 2020-11-06 RX ADMIN — Medication 2 TABLET: at 08:28

## 2020-11-06 RX ADMIN — FERROUS SULFATE TAB 325 MG (65 MG ELEMENTAL FE) 325 MG: 325 (65 FE) TAB at 11:44

## 2020-11-06 RX ADMIN — OXYCODONE HYDROCHLORIDE AND ACETAMINOPHEN 500 MG: 500 TABLET ORAL at 08:28

## 2020-11-06 RX ADMIN — INSULIN LISPRO 5 UNITS: 100 INJECTION, SOLUTION INTRAVENOUS; SUBCUTANEOUS at 11:44

## 2020-11-06 RX ADMIN — PANTOPRAZOLE SODIUM 40 MG: 40 TABLET, DELAYED RELEASE ORAL at 06:53

## 2020-11-06 RX ADMIN — LEVOTHYROXINE SODIUM 150 MCG: 0.15 TABLET ORAL at 06:53

## 2020-11-06 RX ADMIN — EPLERENONE 25 MG: 25 TABLET, FILM COATED ORAL at 11:48

## 2020-11-06 RX ADMIN — AMIODARONE HYDROCHLORIDE 400 MG: 200 TABLET ORAL at 08:28

## 2020-11-06 NOTE — PROGRESS NOTES
Physician Progress Note      Jack Osorio  CSN #:                  [id number]  :                       1950  ADMIT DATE:       2020 5:03 PM  [address] [place] DATE:        2020 1:51 PM  RESPONDING  PROVIDER #:        Terrie Montero MD          QUERY TEXT:    Dear Hospitalist,  Pt admitted with hypoxia and dyspnea and has Diastolic CHF documented. If possible, please document in progress notes and discharge summary further specificity regarding the acuity of CHF:      The medical record reflects the following:  Risk Factors: 65 yr. old female admitted with hypoxia and dyspnea. Has diastolic CHF and asthma exacerbation. Clinical Indicators: proBNP 436, Diastolic CHF noted in H&P, EF 55-60%  Treatment: IV Lasix 20mg on ., Lab work  Options provided:  -- Acute on Chronic Diastolic CHF/HFpEF  -- Acute Diastolic CHF/HFpEF  -- Chronic Diastolic CHF/HFpEF  -- Other - I will add my own diagnosis  -- Disagree - Not applicable / Not valid  -- Disagree - Clinically unable to determine / Unknown  -- Refer to Clinical Documentation Reviewer    PROVIDER RESPONSE TEXT:    This patient has chronic diastolic CHF/HFpEF.     Query created by: Parul Emery on 11/3/2020 6:24 PM      Electronically signed by:  Terrie Montero MD 2020 2:46 PM

## 2020-11-06 NOTE — PROGRESS NOTES
Cardiology Progress Note    STEMI activation due to computer reported ECG read of STEMI. I discussed the clinical condition of the patient on phone with Carmen Moore and reviewed the ECG. ECG not consistent with STEMI. Patient mostly having dyspnea with minimal chest tightness which in fact ad resolved at the time of eval by Dr. Mario Comer. Nuc stress earlier shows no ischemia. Overall picture not consistent with STEMI. Code cancelled.

## 2020-11-06 NOTE — DISCHARGE INSTRUCTIONS
[facility] will mail you a heart monitor after discharge. Instructions will be included. HOSPITALIST DISCHARGE INSTRUCTIONS  NAME: Mechelle Warner   :  1950   MRN:  [id number]     Date/Time:  2020 10:25 AM    ADMIT DATE: 2020     DISCHARGE DATE: 2020     ADMITTING DIAGNOSIS:  Afib  COPD    DISCHARGE DIAGNOSIS:  As above    MEDICATIONS:    · It is important that you take the medication exactly as they are prescribed. · Keep your medication in the bottles provided by the pharmacist and keep a list of the medication names, dosages, and times to be taken in your wallet. · Do not take other medications without consulting your doctor. Pain Management: per above medications       Recommended diet:  Resume previous diet    Recommended activity: Activity as tolerated    If you experience any of the following symptoms then please call your primary care physician or return to the emergency room if you cannot get hold of your doctor:  Fever, chills, nausea, vomiting, diarrhea, change in mentation, falling, bleeding, shortness of breath    Follow Up:  Dr. Bipin Jones MD  you are to call and set up an appointment to see them in 1 week. Information obtained by :  I understand that if any problems occur once I am at home I am to contact my physician. I understand and acknowledge receipt of the instructions indicated above.                                                                                                                                            Physician's or R.N.'s Signature                                                                  Date/Time                                                                                                                                              Patient or Representative Signature                                                          Date/Time

## 2020-11-06 NOTE — PROGRESS NOTES
2058: spoke with dr Laila Morse. Give ordered solumedrol and give stat neb treatment.    Pt allergic albuterol

## 2020-11-06 NOTE — ROUTINE PROCESS
Attempted to schedule PCP JULES appt with Dr. Krishan Alvarenga , unable to reach practice at this time. Left voicemail and currently awaiting return call.     *Addendum: office returned me call, appt with Dr. Krishan Alvarenga has been scheduled for Nov 13 @ 10AM.

## 2020-11-06 NOTE — PROGRESS NOTES
Shift Change:    Bedside and Verbal shift change report given to Gisell Butcher (oncoming nurse) by Sarah Becker (offgoing nurse). Report included the following information SBAR, Kardex, and Recent Results. Summary:  2048: RT called stating pt was having an allergic reaction to her arformoterol/budesonide breathing treatment. Pt was red in face, wheezing, and hoarse when talking. 2050: Called Dr. Young Beth to place stat order for 25mg IV benedryl. 2052: IV benedryl administered. Pt still c/o difficulty breathing and chest pressure. RRT called. 2100: ICU nurse at bedside. Orders to complete an EKG and draw BNP. Pt states breathing has improved but still feels slight chest pressure. 2110: EKG completed showing slight ST elevation and possible acute STEMI. Notified charge nurse, ICU nurse, and Dr Young Beth was called to come see the patient. 2112: MD stated he would like a picture sent of the pts EKG. 2119: No clear picture for possible STEMI or not, code STEMI called. 2120: pt still stating slight pressure but it has improved. 2125: Dr. Young Beth at bedside. Labs drawn. 2135: Troponin drawn. Level of 0.34.     2245: Pt resting in bed quietly. No c/o pain or SOB. Shift Report:    Bedside and Verbal shift change report given to Kojo (oncoming nurse) by Gisell Butcher (offgoing nurse). Report included the following information SBAR, Kardex, and Recent Results.

## 2020-11-06 NOTE — PROGRESS NOTES
Jerardo Vyas [facility]  [address] [facility], [address] [place] [address]  [phone]      Medical Progress Note      NAME: Royal Lorenz   :  1950  MRM:  [id number]    Date of service: 2020            Subjective:     Chief Complaint:  sob    No acute events. Breathing is improved. Chest pain resolved. Feels ready to go home          Objective:       Vitals:          Last 24hrs VS reviewed since prior progress note.  Most recent are:    Visit Vitals  /72 (BP 1 Location: Right arm, BP Patient Position: At rest)   Pulse 60   Temp 97.9 °F (36.6 °C)   Resp 16   Ht 5' 3\" (1.6 m)   Wt 75.8 kg (167 lb)   SpO2 95%   BMI 29.58 kg/m²     SpO2 Readings from Last 6 Encounters:   20 95%   10/29/20 96%   10/22/20 91%   10/15/20 97%   10/15/20 96%   10/05/20 95%    O2 Flow Rate (L/min): 2 l/min       Intake/Output Summary (Last 24 hours) at 2020 1028  Last data filed at 2020 0605  Gross per 24 hour   Intake 540 ml   Output 1250 ml   Net -710 ml          Exam:     Physical Exam:    Gen:  Well-developed, well-nourished, in no acute distress  HEENT:  Pink conjunctivae, PERRL, hearing intact to voice, moist mucous membranes  Neck:  Supple, without masses, thyroid non-tender  Resp:  No accessory muscle use, mild wheezing mostly on the RUL  Card:  No murmurs, normal S1, S2 without thrills, bruits or peripheral edema  Abd:  Soft, non-tender, non-distended, normoactive bowel sounds are present  Musc:  No cyanosis or clubbing  Skin:  No rashes or ulcers, skin turgor is good  Neuro:  No focal deficits, follows commands appropriately  Psych:  Good insight, oriented to person, place and time, alert       Telemetry reviewed:   NSR    Medications Reviewed: (see below)    Lab Data Reviewed: (see below)    ______________________________________________________________________    Medications:     Current Facility-Administered Medications   Medication Dose Route Frequency    methylPREDNISolone (PF) (SOLU-MEDROL) injection 20 mg  20 mg IntraVENous Q8H    insulin glargine (LANTUS) injection 15 Units  15 Units SubCUTAneous DAILY    amiodarone (CORDARONE) tablet 400 mg  400 mg Oral TID    [START ON 11/8/2020] amiodarone (CORDARONE) tablet 400 mg  400 mg Oral DAILY    lidocaine 4 % patch 1 Patch  1 Patch TransDERmal Q24H    dilTIAZem (CARDIZEM) 100 mg in 0.9% sodium chloride (MBP/ADV) 100 mL infusion  5 mg/hr IntraVENous CONTINUOUS    levalbuterol (XOPENEX) nebulizer soln 1.25 mg/3 mL  1.25 mg Nebulization Q6H RT    artificial tears (dextran 70-hypromellose) (GENTEAL TEARS MILD) 0.1-0.3 % ophthalmic solution 1 Drop  1 Drop Both Eyes DAILY PRN    ascorbic acid (vitamin C) (VITAMIN C) tablet 500 mg  500 mg Oral DAILY    bumetanide (BUMEX) tablet 1 mg  1 mg Oral DAILY    bumetanide (BUMEX) tablet 0.5 mg  0.5 mg Oral QPM    cholecalciferol (VITAMIN D3) (1000 Units /25 mcg) tablet 2 Tab  2,000 Units Oral DAILY    eplerenone (INSPRA) tablet 25 mg  25 mg Oral ACL    fenofibrate nanocrystallized (TRICOR) tablet 48 mg  48 mg Oral QHS    ferrous sulfate tablet 325 mg  325 mg Oral DAILY WITH LUNCH    gabapentin (NEURONTIN) capsule 100 mg  100 mg Oral QHS    pantoprazole (PROTONIX) tablet 40 mg  40 mg Oral ACB    levothyroxine (SYNTHROID) tablet 150 mcg  150 mcg Oral ACB    lidocaine 4 % patch 1 Patch  1 Patch TransDERmal Q24H    omega 3-DHA-EPA-fish oil 1,000 mg (120 mg-180 mg) capsule 1 Cap  1 Cap Oral QPM    metoprolol succinate (TOPROL-XL) XL tablet 25 mg  25 mg Oral PCL    sodium chloride (NS) flush 5-40 mL  5-40 mL IntraVENous Q8H    sodium chloride (NS) flush 5-40 mL  5-40 mL IntraVENous PRN    acetaminophen (TYLENOL) tablet 650 mg  650 mg Oral Q4H PRN    naloxone (NARCAN) injection 0.4 mg  0.4 mg IntraVENous PRN    ondansetron (ZOFRAN) injection 4 mg  4 mg IntraVENous Q4H PRN    enoxaparin (LOVENOX) injection 40 mg  40 mg SubCUTAneous Q24H    glucose chewable tablet 16 g  4 Tab Oral PRN    dextrose (D50W) injection syrg 12.5-25 g  25-50 mL IntraVENous PRN    glucagon (GLUCAGEN) injection 1 mg  1 mg IntraMUSCular PRN    insulin lispro (HUMALOG) injection   SubCUTAneous AC&HS    HYDROmorphone (DILAUDID) tablet 2 mg  2 mg Oral Q4H PRN    doxycycline (VIBRA-TABS) tablet 100 mg  100 mg Oral Q12H            Lab Review:     Recent Labs     11/06/20 0316 11/05/20 2132 11/05/20 0329   WBC 18.3* 28.4* 24.0*   HGB 14.3 15.5 14.2   HCT 45.7 47.7* 43.4    441* 359     Recent Labs     11/06/20 0316 11/05/20 2132 11/05/20 2130 11/05/20 0329 11/04/20  0330    137  --  135* 138   K 4.1 4.1  --  4.1 4.7   CL 98 97  --  97 99   CO2 33* 32  --  29 32   * 136*  --  238* 200*   BUN 36* 37*  --  41* 34*   CREA 1.15* 1.13*  --  1.13* 0.96   CA 8.8 9.6  --  9.2 9.5   MG 2.3 2.4  --   --  2.3   PHOS  --  2.5*  --   --   --    INR  --   --  1.0  --   --      No components found for: [place]         Assessment / Plan:     Chest Pain / elevated troponin / CAD: may represent NSTEMI. Likely driven by elevated HR. No asa due to allergy/intolerance. Continue metoprolol. Trend CE. Cardiology following, stress normal    PAF: now [address]. Sp atrial appendage clip. No anticoagulation due to h/o bleeding. Continue po metoprolol. Discharge on amiodarone and event monitor    Hypoxia: improved with diuresis now on po bumex. Monitor daily weight and I/O       Asthma (6/29/2009) - with exacerbation. Mild wheezing improved. Continue duobnebs. Wean solumedrol. And discharge on po prednisone       Hypothyroidism (3/23/2012)   -continue levothyroxine        HTN (hypertension): pt reports labile bp, requesting judicious use of bp meds       Leukocytosis (8/12/2019) - chronic. Likely 2/2 steroids. Improved today. Monitor off abx       Diabetes mellitus type 2: BG elevated due to steroids.  Increase lantus and continue SSI       Diastolic heart failure (HCC) ()  -s/p IV diuresis; transition to PO in the AM            Total time spent with patient: [address] discussed with: Patient, Family and Nursing Staff    Discussed:  Care Plan    Prophylaxis:  Lovenox    Disposition:  Home w/Family           ___________________________________________________    Attending Physician: Kayla Lynn MD

## 2020-11-06 NOTE — PROGRESS NOTES
Problem: Diabetes Self-Management  Goal: *Disease process and treatment process  Description: Define diabetes and identify own type of diabetes; list 3 options for treating diabetes. Outcome: Resolved/Met  Goal: *Incorporating nutritional management into lifestyle  Description: Describe effect of type, amount and timing of food on blood glucose; list 3 methods for planning meals. Outcome: Resolved/Met  Goal: *Incorporating physical activity into lifestyle  Description: State effect of exercise on blood glucose levels. Outcome: Resolved/Met  Goal: *Developing strategies to promote health/change behavior  Description: Define the ABC's of diabetes; identify appropriate screenings, schedule and personal plan for screenings. Outcome: Resolved/Met  Goal: *Using medications safely  Description: State effect of diabetes medications on diabetes; name diabetes medication taking, action and side effects. Outcome: Resolved/Met  Goal: *Monitoring blood glucose, interpreting and using results  Description: Identify recommended blood glucose targets  and personal targets. Outcome: Resolved/Met  Goal: *Prevention, detection, treatment of acute complications  Description: List symptoms of hyper- and hypoglycemia; describe how to treat low blood sugar and actions for lowering  high blood glucose level. Outcome: Resolved/Met  Goal: *Prevention, detection and treatment of chronic complications  Description: Define the natural course of diabetes and describe the relationship of blood glucose levels to long term complications of diabetes.   Outcome: Resolved/Met  Goal: *Developing strategies to address psychosocial issues  Description: Describe feelings about living with diabetes; identify support needed and support network  Outcome: Resolved/Met  Goal: *Insulin pump training  Outcome: Resolved/Met  Goal: *Sick day guidelines  Outcome: Resolved/Met  Goal: *Patient Specific Goal (EDIT GOAL, INSERT TEXT)  Outcome: Resolved/Met Problem: Patient Education: Go to Patient Education Activity  Goal: Patient/Family Education  Outcome: Resolved/Met     Problem: Falls - Risk of  Goal: *Absence of Falls  Description: Document Aydee Funes Fall Risk and appropriate interventions in the flowsheet.   Outcome: Resolved/Met  Note: Fall Risk Interventions:  Mobility Interventions: Bed/chair exit alarm, Patient to call before getting OOB, Strengthening exercises (ROM-active/passive), Utilize walker, cane, or other assistive device, Utilize gait belt for transfers/ambulation         Medication Interventions: Assess postural VS orthostatic hypotension, Bed/chair exit alarm, Evaluate medications/consider consulting pharmacy, Patient to call before getting OOB         History of Falls Interventions: Bed/chair exit alarm, Consult care management for discharge planning, Door open when patient unattended, Evaluate medications/consider consulting pharmacy         Problem: Patient Education: Go to Patient Education Activity  Goal: Patient/Family Education  Outcome: Resolved/Met     Problem: Patient Education: Go to Patient Education Activity  Goal: Patient/Family Education  Outcome: Resolved/Met     Problem: Patient Education: Go to Patient Education Activity  Goal: Patient/Family Education  Outcome: Resolved/Met

## 2020-11-06 NOTE — DISCHARGE SUMMARY
Physician Discharge Summary     Patient ID:  Meron Lawson  [id number]  26 y.o.  1950    Admit date: 11/2/2020    Discharge date and time: 11/6/2020    Admission Diagnoses: Hypoxia [R09.02]    Discharge Diagnoses:    Principal Problem:    Hypoxia (11/2/2020)    Active Problems:    Asthma (6/29/2009)      PAF (paroxysmal atrial fibrillation) ([facility] [address] (6/29/2009)      CAD (coronary artery disease) (6/29/2009)      Hypothyroidism (3/23/2012)      HTN (hypertension), benign (3/2/2017)      Leukocytosis (8/12/2019)      Diabetes mellitus type 2, controlled ([facility] [address] (0/34/3123)      Diastolic heart failure ([facility] [address] ()           Hospital Course:   Chest Pain / elevated troponin / CAD: may represent NSTEMI. Likely driven by elevated HR. No asa due to allergy/intolerance. Continue metoprolol. Trend CE. Cardiology following, stress normal     PAF: now in [facility]. Sp atrial appendage clip. No anticoagulation due to h/o bleeding. Continue po metoprolol. Discharge on amiodarone and event monitor     Hypoxia: improved with diuresis now on po bumex. Monitor daily weight and I/O       Asthma (6/29/2009) - with exacerbation. Mild wheezing improved. Continue duobnebs. Wean solumedrol. And discharge on po prednisone       Hypothyroidism (3/23/2012)   -continue levothyroxine        HTN (hypertension): pt reports labile bp, requesting judicious use of bp meds       Leukocytosis (8/12/2019) - chronic. Likely 2/2 steroids. Improved today. Monitor off abx       Diabetes mellitus type 2: BG elevated due to steroids. Increase lantus and continue SSI       Diastolic heart failure ([facility] [address] ()  -s/p IV diuresis; transition to PO in the AM        PCP: Juan Carvajal MD     Consults: Cardiology    Condition of patient at discharge: improved    Discharge Exam:  Please refer to progress note from today.     Disposition: home    Patient Instructions:   Current Discharge Medication List      START taking these medications    Details   amiodarone (CORDARONE) 200 mg tablet Take 1 Tab by mouth daily. Qty: 30 Tab, Refills: 0      !! predniSONE (DELTASONE) 20 mg tablet Take 40mg for three days then 20mg for two days then resume your usual dose of prednisone  Qty: 8 Tab, Refills: 0       !! - Potential duplicate medications found. Please discuss with provider. CONTINUE these medications which have CHANGED    Details   ! ! predniSONE (DELTASONE) 2.5 mg tablet Take 1 Tab by mouth daily (after lunch). 5 mg in morning and 2.5 mg afternoon  Resume your usual dose of prednisone after you complete the prednisone taper  Qty: 30 Tab, Refills: 0       !! - Potential duplicate medications found. Please discuss with provider. CONTINUE these medications which have NOT CHANGED    Details   acetaminophen (TYLENOL) 650 mg TbER Take 1,300 mg by mouth two (2) times a day. !! bumetanide (BUMEX) 1 mg tablet Take 0.5 mg by mouth every evening. 1/2 tablet = 0.5 mg      coenzyme Q-10 (Co Q-10) 200 mg capsule Take 200 mg by mouth daily. artificial tears, dextran 70-hypromellose, (GenTeal Tears Mild) 0.1-0.3 % ophthalmic solution Administer 1 Drop to both eyes daily as needed. carboxymethylcell/hypromellose (GENTEAL GEL OP) Apply  to eye nightly. omega 3-DHA-EPA-fish oil 1,000 mg (120 mg-180 mg) capsule Take 1 Cap by mouth every evening. levalbuterol (XOPENEX) 0.63 mg/3 mL nebu 0.63 mg by Nebulization route four (4) times daily. metoprolol succinate (TOPROL-XL) 25 mg XL tablet Take 1 Tab by mouth daily (after lunch). Qty: 30 Tab, Refills: 2      aclidinium bromide (Tudorza Pressair) 400 mcg/actuation inhaler Take 1 Puff by inhalation. calcium citrate-vitamin d3 (CITRACAL+D) 315 mg-5 mcg (200 unit) tab Take 1 Tab by mouth daily (with breakfast). gabapentin (NEURONTIN) 100 mg capsule Take 100 mg by mouth nightly. mometasone furoate (ASMANEX HFA IN) Take 100 mcg by inhalation two (2) times a day.       potassium 99 mg tablet Take 99 mg by mouth daily. insulin glargine (LANTUS,BASAGLAR) 100 unit/mL (3 mL) inpn 8 Units by SubCUTAneous route Daily (before breakfast). 30 minutes before breakfast  Indications: \"I set the pen on 8\"      denosumab (PROLIA SC) by SubCUTAneous route every 6 months. !! bumetanide (BUMEX) 1 mg tablet Take 1 mg by mouth daily. eplerenone (INSPRA) 25 mg tablet Take 25 mg by mouth Daily (before lunch). fenofibrate nanocrystallized (Tricor) 48 mg tablet Take 48 mg by mouth nightly. nitroglycerin (NITRODUR) 0.1 mg/hr 1 Patch by TransDERmal route daily as needed. Indications: Oral form \"causes severe BP drop  BAD\" Drs got scared,      levothyroxine (SYNTHROID) 150 mcg tablet Take 150 mcg by mouth Daily (before breakfast). !! predniSONE (DELTASONE) 5 mg tablet Take 5 mg by mouth daily. 5 mg in morning and 2.5 mg afternoon      cholecalciferol, vitamin D3, (VITAMIN D3) 2,000 unit tab Take 2,000 Units by mouth daily. turmeric (CURCUMIN) Take 1 g by mouth every evening. SITagliptin (JANUVIA) 100 mg tablet Take 100 mg by mouth daily (with dinner). vit C/vit E ac/lut/copper/zinc (PRESERVISION LUTEIN PO) Take 1 Tab by mouth daily (with dinner). Once per week      lidocaine (LIDODERM) 5 % 1 Patch by TransDERmal route every twenty-four (24) hours. Apply patch to the affected area for 12 hours a day and remove for 12 hours a day. ascorbic acid (VITAMIN C) 500 mg tablet Take 500 mg by mouth daily. ferrous sulfate 325 mg (65 mg iron) tablet Take 325 mg by mouth daily (with lunch). cyanocobalamin (VITAMIN B12) 1,000 mcg/mL injection INJECT 1 ML INTO THE MUSCLE EVERY 30 DAYS  Qty: 10 mL, Refills: 0      lansoprazole (PREVACID) 30 mg capsule Take 30 mg by mouth daily. Associated Diagnoses: Esophageal reflux       !! - Potential duplicate medications found. Please discuss with provider.       STOP taking these medications       ibuprofen (MOTRIN) 200 mg tablet Comments:   Reason for Stopping:         levoFLOXacin (LEVAQUIN) 750 mg tablet Comments:   Reason for Stopping:         acetaminophen (TYLENOL) 325 mg tablet Comments:   Reason for Stopping:         hypromellose (GENTEAL MILD OP) Comments:   Reason for Stopping:         OTHER Comments:   Reason for Stopping:         ondansetron (ZOFRAN ODT) 4 mg disintegrating tablet Comments:   Reason for Stopping:         COQ10, UBIQUINOL, PO Comments:   Reason for Stopping:         flaxseed oil (OMEGA 3 PO) Comments:   Reason for Stopping:             Activity: Activity as tolerated  Diet: Resume previous diet  Wound Care: None needed    Follow-up with Rebekah Lindquist MD in 1 week.   Follow-up tests/labs none    Approximate time spent in patient care on day of discharge: 33 minutes    Signed:  Chriss Muñoz MD  11/6/2020  10:27 AM

## 2020-11-06 NOTE — PROGRESS NOTES
OCCUPATIONAL THERAPY TREATMENT  Patient: Chris Montero (1 y.o. female)  Date: 11/6/2020  Diagnosis: Hypoxia [R09.02]   Hypoxia       Precautions:  fall  Chart, occupational therapy assessment, plan of care, and goals were reviewed. ASSESSMENT  Patient continues with skilled OT services and is progressing towards goals. Ms. Fabienne Irby was agreeable to activity and eager for discharge today. She was able to perform transfers with supervision including into the bathroom for ADL retraining. Patient educated on safe transfer techniques and adaptive ADL techniques. Patient required supervision for toileting and standing grooming tasks. Patient educated on energy conservation techniques and demonstrated good understanding. Current Level of Function Impacting Discharge (ADLs): Patient required supervision for all transfers, toileting, and standing grooming tasks. Other factors to consider for discharge: **       PLAN :  Patient continues to benefit from skilled intervention to address the above impairments. Continue treatment per established plan of care. to address goals. Recommend with staff:   Recommend patient be OOB to chair as frequently as tolerated; Goal of 3x/day for all meals for 60 minutes at a time. For toileting needs, recommend transfers to/from bathroom. Encourage patient involvement in personal care as able. Recommend next OT session: Continue towards set goals. Recommendation for discharge: (in order for the patient to meet his/her long term goals)  Occupational therapy at least 2 days/week in the home     This discharge recommendation:  Has been made in collaboration with the attending provider and/or case management    IF patient discharges home will need the following DME: none       SUBJECTIVE:   Patient was agreeable to OT tx.       OBJECTIVE DATA SUMMARY:   Cognitive/Behavioral Status:  Neurologic State: Alert  Orientation Level: Oriented X4  Cognition: Follows commands  Perception: Appears intact  Perseveration: No perseveration noted  Safety/Judgement: Awareness of environment    Functional Mobility and Transfers for ADLs:  Bed Mobility:  Rolling: Supervision  Supine to Sit: Supervision  Sit to Supine: Supervision  Scooting: Supervision    Transfers:  Sit to Stand: Supervision  Functional Transfers  Toilet Transfer : Supervision  Bed to Chair: Supervision    Balance:  Sitting: Intact  Standing: Intact    ADL Intervention:  Toileting  Toileting Assistance: Supervision  Bladder Hygiene: Supervision  Bowel Hygiene: Supervision  Clothing Management: Supervision  Cues: Verbal cues provided    Cognitive Retraining  Safety/Judgement: Awareness of environment      Activity Tolerance:   Good    After treatment patient left in no apparent distress:   Sitting in chair, Call bell within reach, and Bed / chair alarm activated    COMMUNICATION/COLLABORATION:   The patients plan of care was discussed with: Physical therapist, Registered nurse, and patient .      Godwin Borrego OTR/L  Time Calculation: 25 mins

## 2020-11-06 NOTE — PROGRESS NOTES
Overnight Cross-Coverage Note: Patient seen and examined on 11/5/2020. S:  Called by the nurse to inform me that Ms. Shey Pineda has was having an allergic reaction to the Spiriva for which RRT was called. Initially patient was given Benadryl IV and pushed up her dose of Solu-Medrol an hour. Patient developed some chest tightness which triggered an EKG. EKG interpretation by machine called for STEMI. STEMI was called and I went to see the patient. At the time of my visit the patient had very mild chest tightness which she thought was related to her breathing and not chest pain per se. The EKG was evaluated by me and then sent to the cardiologist we both felt that this was not a STEMI. Repeat EKG was normal.  And troponin had actually improved. Cardiology is already on board. O: /72 (BP 1 Location: Right arm, BP Patient Position: At rest)   Pulse 60   Temp 97.9 °F (36.6 °C)   Resp 16   Ht 5' 3\" (1.6 m)   Wt 75.8 kg (167 lb)   LMP 09/29/1980 (LMP Unknown)   SpO2 93%   BMI 29.58 kg/m²    General appearance: alert, cooperative, NAD  Lungs: Very faint bilateral wheezing. Heart: S1-S2, RRR, No MGR  Abdomen: SNTBS(+), No HSM  Extremities: extremities normal, atraumatic, no cyanosis, no edema   Neurologic: Alert and oriented X 3, normal strength and tone. Normal symmetric reflexes. Normal coordination and gait  Psychiatric: Not anxious, cooperative, normal affect       A/P:  Chest pain likely related to mild allergic reaction to Spiriva: Likely more respiratory reaction. Continue with Solu-Medrol at current dosing. Bronchodilators. And is continue Spiriva. Cardiology already on consult and as mentioned I discussed case with cardiology who did not feel that any intervention is needed this time.

## 2020-11-06 NOTE — PROGRESS NOTES
Shift Change:      Bedside and Verbal shift change report given to [address] (oncoming nurse) by Damari Alvarez (offgoing nurse). Report included the following information SBAR, Kardex and Recent Results. Shift Summary:        Shift Change:      Bedside and Verbal shift change report given to  (oncoming nurse) by [address] (offgoing nurse). Report included the following information SBAR, Kardex and Recent Results.

## 2020-11-06 NOTE — CONSULTS
HISTORY OF PRESENTING ILLNESS      Margarita Aguilar is a 65 y.o. female with hx of paroxysmal arial fibrillation (s/p ablation x10 years ago with Dr. Luc Lewis), previous history of thrombocytopenia, hypertension, COPD, heart failure preserved EF, CAD. She underwent a left atrial appendage ligation on 10/20/2020 by Dr. Trace Castorena. She experienced chest pain/palpitations post procedure and presented to the ER. Previous cardiac catheterization and stress test in 2019 were negative for ischemia and no significant coronary disease and stents were open. She had normal stress testing 11/5/2020 due to peaked troponin level. Echocardiogram demonstrated preserved LV function of 55-60%. During this admission, she has had episodes of AF with RVR, including during stress testing. IV Amiodarone and IV cardizem use have been limited due to poor IV access. She is now on high dose of oral Amiodarone 400mg TID, and metoprolol 25mg daily. She has maintained sinus rhythm with rate control for ~24 hours. Overnight she experienced a reaction of palpitations to her breathing treatment, but reports improved breathing and heart rates this morning. Chest soreness is improving and she remains in SR. She is not a candidate for anticoagulation due to prior history of anemia and thrombocytopenia.        PAST MEDICAL HISTORY     Past Medical History:   Diagnosis Date    Adverse effect of anesthesia     slow to wake up    Arthritis     Asthma 6/29/2009    CAUSED BY MOLD    Atrial fibrillation ([address] 6/29/2009    CAD (coronary artery disease) 06/29/2009    Celiac disease 2010    Chronic obstructive pulmonary disease ([address] 2004-5    right leg    Chronic pain of right ankle     Diabetes ([address]     Drug induced prednisone, DM2    Diastolic heart failure (HCC)     DVT (deep venous thrombosis) ([address] 2004    Right leg post surgery    GERD (gastroesophageal reflux disease)     Gluten intolerance     Heart failure ([address]     Hypertension     Hypothyroidism 6/29/2009    Iron deficiency anemia     Lyme disease     Personal history of MI (myocardial infarction)     Rheumatoid arthritis [facility]     Slow to wake up after anesthesia     Syncope     Post testing- resolved    TIA (transient ischemic attack) 2004 & 2006    Vitamin B12 deficiency 6/29/2009           PAST SURGICAL HISTORY     Past Surgical History:   Procedure Laterality Date    HX ADENOIDECTOMY      HX AFIB ABLATION      HX APPENDECTOMY      HX CHOLECYSTECTOMY  6/16/11    HX COLONOSCOPY      HX CORONARY STENT PLACEMENT      x 2    HX HEART CATHETERIZATION  2010    2 STENTS    HX HEART CATHETERIZATION  03/2020    HX HYSTERECTOMY      HX LUMBAR LAMINECTOMY  2000    L4-L5    HX ORTHOPAEDIC  1993-6/2012    RT.  FOOT SURGERY X 6/calcaneal osteotomy    HX ORTHOPAEDIC Right 09/19/2020    right hand [facility] joint replacement    HX TONSILLECTOMY  1964          ALLERGIES     Allergies   Allergen Reactions    Butter Flavor Anaphylaxis     Butter AND CREME    Keflex [Cephalexin] Anaphylaxis    Pcn [Penicillins] Anaphylaxis    Aspirin Hives and Other (comments)     \"it makes my stomach bleed\"      Cimzia [Certolizumab Pegol] Other (comments)     GI symptoms, blisters in the mouth and esophagus    Clopidogrel Other (comments)     GI bleed    Demerol [Meperidine] Other (comments)     HYPOTENSION    Fentanyl Other (comments)     HYPOTENSION    Ibuprofen Diarrhea     Patient reports that ibuprofen caused diarrhea    Morphine Other (comments)     HYPOTENSION    Nitroglycerin Other (comments)     IN PILL FORM  - HYPOTENSION  CAN TOLERATE PATCH FOR SHORT TIME    Sulfa (Sulfonamide Antibiotics) Angioedema     Lips    Tapazole [Methimazole] Unknown (comments)     Patient stated \"it made me feel like I had the flu\"    Tramadol Other (comments)     Patient reports thrush with tramadol use    Adhesive Tape-Silicones Other (comments)     BAD BLISTERS AND TENDS TO GET INFECTED  Albuterol Palpitations    Gluten Diarrhea     bloating    Oxycodone Other (comments)     Hallicination and hypotension          FAMILY HISTORY     Family History   Problem Relation Age of Onset    Delayed Awakening Mother     Heart Disease Mother     Coronary Artery Disease Mother     Hypertension Mother    [facility] Stroke Mother    [facility] Delayed Awakening Sister     Hypertension Sister     Lung Disease Father     Cancer Father         colon    Hypertension Father     Hypertension Brother     Breast Cancer Maternal Aunt     Breast Cancer Maternal Aunt     Breast Cancer Cousin         maternal 1st cousin    Breast Cancer Maternal Aunt     Breast Cancer Cousin         maternal 1st cousin    Breast Cancer Cousin         maternal 1st cousin    Deep Vein Thrombosis Neg Hx     Anesth Problems Neg Hx     negative for cardiac disease       SOCIAL HISTORY     Social History     Socioeconomic History    Marital status:      Spouse name: Not on file    Number of children: Not on file    Years of education: Not on file    Highest education level: Not on file   Tobacco Use    Smoking status: Former Smoker     Packs/day: 1.00     Years: 30.00     Pack years: 30.00     Types: Cigarettes     Last attempt to quit: 2002     Years since quittin.4    Smokeless tobacco: Never Used   Substance and Sexual Activity    Alcohol use: No    Drug use: Never    Sexual activity: Yes     Partners: Male         MEDICATIONS     Current Facility-Administered Medications   Medication Dose Route Frequency    methylPREDNISolone (PF) (SOLU-MEDROL) injection 20 mg  20 mg IntraVENous Q8H    insulin glargine (LANTUS) injection 15 Units  15 Units SubCUTAneous DAILY    diphenhydrAMINE (BENADRYL) 50 mg/mL injection        amiodarone (CORDARONE) tablet 400 mg  400 mg Oral TID    [START ON 2020] amiodarone (CORDARONE) tablet 400 mg  400 mg Oral DAILY    lidocaine 4 % patch 1 Patch  1 Patch TransDERmal Q24H    dilTIAZem (CARDIZEM) 100 mg in 0.9% sodium chloride (MBP/ADV) 100 mL infusion  5 mg/hr IntraVENous CONTINUOUS    levalbuterol (XOPENEX) nebulizer soln 1.25 mg/3 mL  1.25 mg Nebulization Q6H RT    artificial tears (dextran 70-hypromellose) (GENTEAL TEARS MILD) 0.1-0.3 % ophthalmic solution 1 Drop  1 Drop Both Eyes DAILY PRN    ascorbic acid (vitamin C) (VITAMIN C) tablet 500 mg  500 mg Oral DAILY    bumetanide (BUMEX) tablet 1 mg  1 mg Oral DAILY    bumetanide (BUMEX) tablet 0.5 mg  0.5 mg Oral QPM    cholecalciferol (VITAMIN D3) (1000 Units /25 mcg) tablet 2 Tab  2,000 Units Oral DAILY    eplerenone (INSPRA) tablet 25 mg  25 mg Oral ACL    fenofibrate nanocrystallized (TRICOR) tablet 48 mg  48 mg Oral QHS    ferrous sulfate tablet 325 mg  325 mg Oral DAILY WITH LUNCH    gabapentin (NEURONTIN) capsule 100 mg  100 mg Oral QHS    pantoprazole (PROTONIX) tablet 40 mg  40 mg Oral ACB    levothyroxine (SYNTHROID) tablet 150 mcg  150 mcg Oral ACB    lidocaine 4 % patch 1 Patch  1 Patch TransDERmal Q24H    omega 3-DHA-EPA-fish oil 1,000 mg (120 mg-180 mg) capsule 1 Cap  1 Cap Oral QPM    metoprolol succinate (TOPROL-XL) XL tablet 25 mg  25 mg Oral PCL    sodium chloride (NS) flush 5-40 mL  5-40 mL IntraVENous Q8H    sodium chloride (NS) flush 5-40 mL  5-40 mL IntraVENous PRN    acetaminophen (TYLENOL) tablet 650 mg  650 mg Oral Q4H PRN    naloxone (NARCAN) injection 0.4 mg  0.4 mg IntraVENous PRN    ondansetron (ZOFRAN) injection 4 mg  4 mg IntraVENous Q4H PRN    enoxaparin (LOVENOX) injection 40 mg  40 mg SubCUTAneous Q24H    glucose chewable tablet 16 g  4 Tab Oral PRN    dextrose (D50W) injection syrg 12.5-25 g  25-50 mL IntraVENous PRN    glucagon (GLUCAGEN) injection 1 mg  1 mg IntraMUSCular PRN    insulin lispro (HUMALOG) injection   SubCUTAneous AC&HS    HYDROmorphone (DILAUDID) tablet 2 mg  2 mg Oral Q4H PRN    doxycycline (VIBRA-TABS) tablet 100 mg  100 mg Oral Q12H       I have reviewed the nurses notes, vitals, problem list, allergy list, medical history, family, social history and medications. REVIEW OF SYMPTOMS      General: Pt denies excessive weight gain or loss. Pt is able to conduct ADL's  HEENT: Denies blurred vision, headaches, hearing loss, epistaxis and difficulty swallowing. Respiratory: Denies cough, congestion, shortness of breath, CHACON, wheezing or stridor. Cardiovascular: Denies precordial pain, palpitations, edema or PND  Gastrointestinal: Denies poor appetite, indigestion, abdominal pain or blood in stool  Genitourinary: Denies hematuria, dysuria, increased urinary frequency  Musculoskeletal: Denies joint pain or swelling from muscles or joints  Neurologic: Denies tremor, paresthesias, headache, or sensory motor disturbance  Psychiatric: Denies confusion, insomnia, depression  Integumentray: Denies rash, itching or ulcers. Hematologic: Denies easy bruising, bleeding       PHYSICAL EXAMINATION      Vitals: see vitals section  General: Well developed, in no acute distress. HEENT: No jaundice, oral mucosa moist, no oral ulcers  Neck: Supple, no stiffness, no lymphadenopathy, supple  Heart:  Normal S1/S2 negative S3 or S4. Regular, no murmur, gallop or rub, no jugular venous distention  Respiratory: Clear bilaterally x 4, no wheezing or rales  Abdomen:   Soft, non-tender, bowel sounds are active. Extremities:  No edema, normal cap refill, no cyanosis. Musculoskeletal: No clubbing, no deformities  Neuro: A&Ox3, speech clear, gait stable, cooperative, no focal neurologic deficits  Skin: Skin color is normal. No rashes or lesions.  Non diaphoretic, moist.  Vascular: 2+ pulses symmetric in all extremities       DIAGNOSTIC DATA      EKG:        LABORATORY DATA      Lab Results   Component Value Date/Time    WBC 18.3 (H) 11/06/2020 03:16 AM    Hemoglobin (POC) 15.6 06/09/2014 03:00 AM    HGB 14.3 11/06/2020 03:16 AM    Hematocrit (POC) 49 (H) 11/05/2020 09:33 PM    HCT 45.7 11/06/2020 03:16 AM    PLATELET 793 09/78/1405 03:16 AM    MCV 97.4 11/06/2020 03:16 AM      Lab Results   Component Value Date/Time    Sodium 138 11/06/2020 03:16 AM    Potassium 4.1 11/06/2020 03:16 AM    Chloride 98 11/06/2020 03:16 AM    CO2 33 (H) 11/06/2020 03:16 AM    Anion gap 7 11/06/2020 03:16 AM    Glucose 226 (H) 11/06/2020 03:16 AM    BUN 36 (H) 11/06/2020 03:16 AM    Creatinine 1.15 (H) 11/06/2020 03:16 AM    BUN/Creatinine ratio 31 (H) 11/06/2020 03:16 AM    GFR est AA 56 (L) 11/06/2020 03:16 AM    GFR est non-AA 46 (L) 11/06/2020 03:16 AM    Calcium 8.8 11/06/2020 03:16 AM    Bilirubin, total 0.2 11/02/2020 06:02 PM    Alk. phosphatase 102 11/02/2020 06:02 PM    Protein, total 6.5 11/02/2020 06:02 PM    Albumin 3.3 (L) 11/02/2020 06:02 PM    Globulin 3.2 11/02/2020 06:02 PM    A-G Ratio 1.0 (L) 11/02/2020 06:02 PM    ALT (SGPT) 12 11/02/2020 06:02 PM           ASSESSMENT      1. Atrial fibrillation with RVR   Paroxysmal     2. S/p SUHAS Clip   3. CAD    S/p PCI to LAD and RCA  4. PVCs  5. HTN  6. COPD/Asthma       PLAN     Would prefer to avoid long term use of Amiodarone, as well as such high doses of oral amiodarone. Unable to consider ablative options d/t inability to tolerate anticoagulation for 3 months post procedure. Monserrat consider decreasing oral amiodarone dose with discharge versus Tikosyn load while here (Cr 1.15, fear that Sotalol may interfere with her COPD) versus AV jules ablation and PPM placement- which patient would prefer to avoid at this time. Should she be discharged with AAD therapy, will order 30 day OP monitor to evaluate rate control and need for AV jules ablation/PPM in the future. She has experienced these symptoms over the past 3 years however episodes progressed after [employer]. Suspect inflammation as the . Will monitor for continued elevated frequency of these episodes in which case will proceed with dual PPM/AV node ablation.  Acceptable for DC from EP perspective.        Lynnette Whiteside MD    Cardiac Electrophysiology / Cardiology    [address]  [address] [facility], [facility], [address] [place] [address]  [phone] / [phone] Fax   [phone] / [phone] Fax

## 2020-11-07 ENCOUNTER — PATIENT OUTREACH (OUTPATIENT)
Dept: CASE MANAGEMENT | Age: 70
End: 2020-11-07

## 2020-11-07 NOTE — PROGRESS NOTES
ACM attempted to reach patient and/or emergency contact for ER follow-up. Unable to reach patient and left VM for return call with contact information provided. Patient contacted regarding recent discharge and COVID-19 risk. Discussed COVID-19 related testing which was not done at this time. Test results were not done. Patient informed of results, if available? no    Care Transition Nurse/ Ambulatory Care Manager/ LPN Care Coordinator contacted the patient by telephone to perform post discharge assessment. Verified name and  with patient as identifiers. Patient has following risk factors of: heart failure and diabetes. CTN/ACM/LPN reviewed discharge instructions, medical action plan and red flags related to discharge diagnosis. Reviewed and educated them on any new and changed medications related to discharge diagnosis. Advised obtaining a 90-day supply of all daily and as-needed medications. Advance Care Planning:   Does patient have an Advance Directive: yes; reviewed and current     Education provided regarding infection prevention, and signs and symptoms of COVID-19 and when to seek medical attention with patient who verbalized understanding. Discussed exposure protocols and quarantine from [address] you at higher risk for severe illness  and given an opportunity for questions and concerns. The patient agrees to contact the COVID-19 hotline [phone] or PCP office for questions related to their healthcare. CTN/ACM/LPN provided contact information for future reference. From CDC: Are you at higher risk for severe illness?  Wash your hands often.  Avoid close contact (6 feet, which is about two arm lengths) with people who are sick.  Put distance between yourself and other people if COVID-19 is spreading in your community.  Clean and disinfect frequently touched surfaces.  Avoid all cruise travel and non-essential air travel.    Call your healthcare professional if you have concerns about COVID-19 and your underlying condition or if you are sick. For more information on steps you can take to protect yourself, see CDC's How to Protect Yourself      Patient/family/caregiver given information for GetWell Loop and agrees to enroll yes  Patient's preferred e-mail:  [email or web address]  Patient's preferred phone number: [phone]  Based on Loop alert triggers, patient will be contacted by nurse care manager for worsening symptoms. Pt will be further monitored by COVID Loop Team based on severity of symptoms and risk factors. Verified med changes and follow up appts scheduled. No question    11/9/20 1:15 pm  Patient returned call to [facility] . Reports feeling weak with some SOB but none worse than baseline. Reports  is coming out today for visit. Reviewed upcoming PCP appt scheduled. No further questions.

## 2020-11-08 LAB
ATRIAL RATE: 82 BPM
ATRIAL RATE: 86 BPM
ATRIAL RATE: 92 BPM
CALCULATED P AXIS, ECG09: 37 DEGREES
CALCULATED P AXIS, ECG09: 49 DEGREES
CALCULATED P AXIS, ECG09: 52 DEGREES
CALCULATED R AXIS, ECG10: 0 DEGREES
CALCULATED R AXIS, ECG10: 11 DEGREES
CALCULATED R AXIS, ECG10: 15 DEGREES
CALCULATED T AXIS, ECG11: 104 DEGREES
CALCULATED T AXIS, ECG11: 121 DEGREES
CALCULATED T AXIS, ECG11: 122 DEGREES
DIAGNOSIS, 93000: NORMAL
P-R INTERVAL, ECG05: 138 MS
P-R INTERVAL, ECG05: 138 MS
P-R INTERVAL, ECG05: 140 MS
Q-T INTERVAL, ECG07: 320 MS
Q-T INTERVAL, ECG07: 370 MS
Q-T INTERVAL, ECG07: 398 MS
QRS DURATION, ECG06: 74 MS
QRS DURATION, ECG06: 78 MS
QRS DURATION, ECG06: 78 MS
QTC CALCULATION (BEZET), ECG08: 442 MS
QTC CALCULATION (BEZET), ECG08: 446 MS
QTC CALCULATION (BEZET), ECG08: 464 MS
VENTRICULAR RATE, ECG03: 117 BPM
VENTRICULAR RATE, ECG03: 82 BPM
VENTRICULAR RATE, ECG03: 86 BPM

## 2020-11-13 ENCOUNTER — HOSPITAL ENCOUNTER (INPATIENT)
Age: 70
LOS: 4 days | Discharge: HOME HEALTH | DRG: 312 | End: 2020-11-17
Attending: EMERGENCY MEDICINE | Admitting: INTERNAL MEDICINE
Payer: MEDICARE

## 2020-11-13 ENCOUNTER — APPOINTMENT (OUTPATIENT)
Dept: CT IMAGING | Age: 70
DRG: 312 | End: 2020-11-13
Attending: EMERGENCY MEDICINE
Payer: MEDICARE

## 2020-11-13 DIAGNOSIS — Z98.890 S/P LEFT ATRIAL APPENDAGE LIGATION: ICD-10-CM

## 2020-11-13 DIAGNOSIS — M06.9 RHEUMATOID ARTHRITIS OF OTHER SITE, UNSPECIFIED WHETHER RHEUMATOID FACTOR PRESENT (HCC): ICD-10-CM

## 2020-11-13 DIAGNOSIS — R55 SYNCOPE AND COLLAPSE: Primary | ICD-10-CM

## 2020-11-13 DIAGNOSIS — R56.9 SEIZURE-LIKE ACTIVITY (HCC): ICD-10-CM

## 2020-11-13 DIAGNOSIS — R68.89 SPELLS OF DECREASED ATTENTIVENESS: ICD-10-CM

## 2020-11-13 PROBLEM — R09.02 HYPOXIA: Status: RESOLVED | Noted: 2020-11-02 | Resolved: 2020-11-13

## 2020-11-13 PROBLEM — R77.8 ELEVATED TROPONIN: Status: ACTIVE | Noted: 2020-11-13

## 2020-11-13 LAB
ALBUMIN SERPL-MCNC: 3.4 G/DL (ref 3.5–5)
ALBUMIN SERPL-MCNC: 3.4 G/DL (ref 3.5–5)
ALBUMIN/GLOB SERPL: 1.1 {RATIO} (ref 1.1–2.2)
ALBUMIN/GLOB SERPL: 1.1 {RATIO} (ref 1.1–2.2)
ALP SERPL-CCNC: 81 U/L (ref 45–117)
ALP SERPL-CCNC: 92 U/L (ref 45–117)
ALT SERPL-CCNC: 24 U/L (ref 12–78)
ALT SERPL-CCNC: 25 U/L (ref 12–78)
ANION GAP SERPL CALC-SCNC: 5 MMOL/L (ref 5–15)
ANION GAP SERPL CALC-SCNC: 6 MMOL/L (ref 5–15)
APPEARANCE UR: CLEAR
AST SERPL-CCNC: 18 U/L (ref 15–37)
AST SERPL-CCNC: 24 U/L (ref 15–37)
ATRIAL RATE: 71 BPM
BACTERIA URNS QL MICRO: NEGATIVE /HPF
BASOPHILS # BLD: 0 K/UL (ref 0–0.1)
BASOPHILS NFR BLD: 0 % (ref 0–1)
BILIRUB SERPL-MCNC: 0.5 MG/DL (ref 0.2–1)
BILIRUB SERPL-MCNC: 0.7 MG/DL (ref 0.2–1)
BILIRUB UR QL: NEGATIVE
BNP SERPL-MCNC: 610 PG/ML
BUN SERPL-MCNC: 24 MG/DL (ref 6–20)
BUN SERPL-MCNC: 27 MG/DL (ref 6–20)
BUN/CREAT SERPL: 30 (ref 12–20)
BUN/CREAT SERPL: 32 (ref 12–20)
CALCIUM SERPL-MCNC: 8.7 MG/DL (ref 8.5–10.1)
CALCIUM SERPL-MCNC: 9 MG/DL (ref 8.5–10.1)
CALCULATED P AXIS, ECG09: 47 DEGREES
CALCULATED R AXIS, ECG10: 3 DEGREES
CALCULATED T AXIS, ECG11: 81 DEGREES
CHLORIDE SERPL-SCNC: 101 MMOL/L (ref 97–108)
CHLORIDE SERPL-SCNC: 99 MMOL/L (ref 97–108)
CO2 SERPL-SCNC: 30 MMOL/L (ref 21–32)
CO2 SERPL-SCNC: 34 MMOL/L (ref 21–32)
COLOR UR: NORMAL
COMMENT, HOLDF: NORMAL
CREAT SERPL-MCNC: 0.81 MG/DL (ref 0.55–1.02)
CREAT SERPL-MCNC: 0.85 MG/DL (ref 0.55–1.02)
CRP SERPL-MCNC: 1.24 MG/DL (ref 0–0.6)
D DIMER PPP FEU-MCNC: 0.79 MG/L FEU (ref 0–0.65)
DIAGNOSIS, 93000: NORMAL
DIFFERENTIAL METHOD BLD: ABNORMAL
EOSINOPHIL # BLD: 0 K/UL (ref 0–0.4)
EOSINOPHIL NFR BLD: 0 % (ref 0–7)
EPITH CASTS URNS QL MICRO: NORMAL /LPF
ERYTHROCYTE [DISTWIDTH] IN BLOOD BY AUTOMATED COUNT: 12.9 % (ref 11.5–14.5)
GLOBULIN SER CALC-MCNC: 3 G/DL (ref 2–4)
GLOBULIN SER CALC-MCNC: 3.2 G/DL (ref 2–4)
GLUCOSE BLD STRIP.AUTO-MCNC: 115 MG/DL (ref 65–100)
GLUCOSE SERPL-MCNC: 116 MG/DL (ref 65–100)
GLUCOSE SERPL-MCNC: 146 MG/DL (ref 65–100)
GLUCOSE UR STRIP.AUTO-MCNC: NEGATIVE MG/DL
HCT VFR BLD AUTO: 47.2 % (ref 35–47)
HGB BLD-MCNC: 15.5 G/DL (ref 11.5–16)
HGB UR QL STRIP: NEGATIVE
HYALINE CASTS URNS QL MICRO: NORMAL /LPF (ref 0–5)
IMM GRANULOCYTES # BLD AUTO: 0 K/UL
IMM GRANULOCYTES NFR BLD AUTO: 0 %
KETONES UR QL STRIP.AUTO: NEGATIVE MG/DL
LACTATE BLD-SCNC: 3.21 MMOL/L (ref 0.4–2)
LACTATE BLD-SCNC: 3.26 MMOL/L (ref 0.4–2)
LACTATE SERPL-SCNC: 2 MMOL/L (ref 0.4–2)
LEUKOCYTE ESTERASE UR QL STRIP.AUTO: NEGATIVE
LYMPHOCYTES # BLD: 2.9 K/UL (ref 0.8–3.5)
LYMPHOCYTES NFR BLD: 13 % (ref 12–49)
MAGNESIUM SERPL-MCNC: 2 MG/DL (ref 1.6–2.4)
MCH RBC QN AUTO: 30.9 PG (ref 26–34)
MCHC RBC AUTO-ENTMCNC: 32.8 G/DL (ref 30–36.5)
MCV RBC AUTO: 94.2 FL (ref 80–99)
MONOCYTES # BLD: 2 K/UL (ref 0–1)
MONOCYTES NFR BLD: 9 % (ref 5–13)
NEUTS BAND NFR BLD MANUAL: 3 % (ref 0–6)
NEUTS SEG # BLD: 17.7 K/UL (ref 1.8–8)
NEUTS SEG NFR BLD: 75 % (ref 32–75)
NITRITE UR QL STRIP.AUTO: NEGATIVE
NRBC # BLD: 0 K/UL (ref 0–0.01)
NRBC BLD-RTO: 0 PER 100 WBC
P-R INTERVAL, ECG05: 142 MS
PH UR STRIP: 7 [PH] (ref 5–8)
PLATELET # BLD AUTO: 222 K/UL (ref 150–400)
PMV BLD AUTO: 10.5 FL (ref 8.9–12.9)
POTASSIUM SERPL-SCNC: 3 MMOL/L (ref 3.5–5.1)
POTASSIUM SERPL-SCNC: 4.5 MMOL/L (ref 3.5–5.1)
PROCALCITONIN SERPL-MCNC: <0.05 NG/ML
PROT SERPL-MCNC: 6.4 G/DL (ref 6.4–8.2)
PROT SERPL-MCNC: 6.6 G/DL (ref 6.4–8.2)
PROT UR STRIP-MCNC: NEGATIVE MG/DL
Q-T INTERVAL, ECG07: 398 MS
QRS DURATION, ECG06: 74 MS
QTC CALCULATION (BEZET), ECG08: 432 MS
RBC # BLD AUTO: 5.01 M/UL (ref 3.8–5.2)
RBC #/AREA URNS HPF: NORMAL /HPF (ref 0–5)
RBC MORPH BLD: ABNORMAL
SAMPLES BEING HELD,HOLD: NORMAL
SERVICE CMNT-IMP: ABNORMAL
SODIUM SERPL-SCNC: 137 MMOL/L (ref 136–145)
SODIUM SERPL-SCNC: 138 MMOL/L (ref 136–145)
SP GR UR REFRACTOMETRY: 1.01 (ref 1–1.03)
TROPONIN I SERPL-MCNC: 0.19 NG/ML
TROPONIN I SERPL-MCNC: 0.23 NG/ML
TSH SERPL DL<=0.05 MIU/L-ACNC: 2.49 UIU/ML (ref 0.36–3.74)
UROBILINOGEN UR QL STRIP.AUTO: 0.2 EU/DL (ref 0.2–1)
VENTRICULAR RATE, ECG03: 71 BPM
WBC # BLD AUTO: 22.6 K/UL (ref 3.6–11)
WBC URNS QL MICRO: NORMAL /HPF (ref 0–4)

## 2020-11-13 PROCEDURE — 77030029684 HC NEB SM VOL KT MONA -A

## 2020-11-13 PROCEDURE — 80053 COMPREHEN METABOLIC PANEL: CPT

## 2020-11-13 PROCEDURE — 74011250637 HC RX REV CODE- 250/637: Performed by: INTERNAL MEDICINE

## 2020-11-13 PROCEDURE — 74011250636 HC RX REV CODE- 250/636: Performed by: INTERNAL MEDICINE

## 2020-11-13 PROCEDURE — 93005 ELECTROCARDIOGRAM TRACING: CPT

## 2020-11-13 PROCEDURE — 83735 ASSAY OF MAGNESIUM: CPT

## 2020-11-13 PROCEDURE — 99223 1ST HOSP IP/OBS HIGH 75: CPT | Performed by: SPECIALIST

## 2020-11-13 PROCEDURE — 87449 NOS EACH ORGANISM AG IA: CPT

## 2020-11-13 PROCEDURE — 82728 ASSAY OF FERRITIN: CPT

## 2020-11-13 PROCEDURE — 82533 TOTAL CORTISOL: CPT

## 2020-11-13 PROCEDURE — 84484 ASSAY OF TROPONIN QUANT: CPT

## 2020-11-13 PROCEDURE — 65660000000 HC RM CCU STEPDOWN

## 2020-11-13 PROCEDURE — 82962 GLUCOSE BLOOD TEST: CPT

## 2020-11-13 PROCEDURE — 83605 ASSAY OF LACTIC ACID: CPT

## 2020-11-13 PROCEDURE — 74011000636 HC RX REV CODE- 636: Performed by: RADIOLOGY

## 2020-11-13 PROCEDURE — 84443 ASSAY THYROID STIM HORMONE: CPT

## 2020-11-13 PROCEDURE — 85379 FIBRIN DEGRADATION QUANT: CPT

## 2020-11-13 PROCEDURE — 84145 PROCALCITONIN (PCT): CPT

## 2020-11-13 PROCEDURE — 87899 AGENT NOS ASSAY W/OPTIC: CPT

## 2020-11-13 PROCEDURE — 85025 COMPLETE CBC W/AUTO DIFF WBC: CPT

## 2020-11-13 PROCEDURE — 87086 URINE CULTURE/COLONY COUNT: CPT

## 2020-11-13 PROCEDURE — 36415 COLL VENOUS BLD VENIPUNCTURE: CPT

## 2020-11-13 PROCEDURE — 83880 ASSAY OF NATRIURETIC PEPTIDE: CPT

## 2020-11-13 PROCEDURE — 70450 CT HEAD/BRAIN W/O DYE: CPT

## 2020-11-13 PROCEDURE — 86140 C-REACTIVE PROTEIN: CPT

## 2020-11-13 PROCEDURE — 87040 BLOOD CULTURE FOR BACTERIA: CPT

## 2020-11-13 PROCEDURE — 99285 EMERGENCY DEPT VISIT HI MDM: CPT

## 2020-11-13 PROCEDURE — 81001 URINALYSIS AUTO W/SCOPE: CPT

## 2020-11-13 PROCEDURE — 71275 CT ANGIOGRAPHY CHEST: CPT

## 2020-11-13 RX ORDER — ONDANSETRON 2 MG/ML
4 INJECTION INTRAMUSCULAR; INTRAVENOUS
Status: DISCONTINUED | OUTPATIENT
Start: 2020-11-13 | End: 2020-11-17 | Stop reason: HOSPADM

## 2020-11-13 RX ORDER — FAMOTIDINE 20 MG/1
20 TABLET, FILM COATED ORAL 2 TIMES DAILY
Status: DISCONTINUED | OUTPATIENT
Start: 2020-11-13 | End: 2020-11-17 | Stop reason: HOSPADM

## 2020-11-13 RX ORDER — MORPHINE SULFATE 2 MG/ML
INJECTION, SOLUTION INTRAMUSCULAR; INTRAVENOUS
Status: DISCONTINUED
Start: 2020-11-13 | End: 2020-11-13 | Stop reason: WASHOUT

## 2020-11-13 RX ORDER — SODIUM CHLORIDE 900 MG/100ML
INJECTION INTRAVENOUS
Status: DISCONTINUED
Start: 2020-11-13 | End: 2020-11-13 | Stop reason: WASHOUT

## 2020-11-13 RX ORDER — LEVOTHYROXINE SODIUM 75 UG/1
150 TABLET ORAL
Status: DISCONTINUED | OUTPATIENT
Start: 2020-11-14 | End: 2020-11-17 | Stop reason: HOSPADM

## 2020-11-13 RX ORDER — LOPERAMIDE HYDROCHLORIDE 2 MG/1
2 CAPSULE ORAL
Status: DISCONTINUED | OUTPATIENT
Start: 2020-11-13 | End: 2020-11-17 | Stop reason: HOSPADM

## 2020-11-13 RX ORDER — LEVALBUTEROL INHALATION SOLUTION 0.63 MG/3ML
0.63 SOLUTION RESPIRATORY (INHALATION)
Status: DISCONTINUED | OUTPATIENT
Start: 2020-11-13 | End: 2020-11-17 | Stop reason: HOSPADM

## 2020-11-13 RX ORDER — DEXTROSE 50 % IN WATER (D50W) INTRAVENOUS SYRINGE
25-50 AS NEEDED
Status: DISCONTINUED | OUTPATIENT
Start: 2020-11-13 | End: 2020-11-17 | Stop reason: HOSPADM

## 2020-11-13 RX ORDER — POTASSIUM CHLORIDE 750 MG/1
10 TABLET, FILM COATED, EXTENDED RELEASE ORAL 2 TIMES DAILY
Status: DISCONTINUED | OUTPATIENT
Start: 2020-11-13 | End: 2020-11-17 | Stop reason: HOSPADM

## 2020-11-13 RX ORDER — INSULIN LISPRO 100 [IU]/ML
INJECTION, SOLUTION INTRAVENOUS; SUBCUTANEOUS
Status: DISCONTINUED | OUTPATIENT
Start: 2020-11-13 | End: 2020-11-17 | Stop reason: HOSPADM

## 2020-11-13 RX ORDER — ENOXAPARIN SODIUM 100 MG/ML
40 INJECTION SUBCUTANEOUS DAILY
Status: DISCONTINUED | OUTPATIENT
Start: 2020-11-14 | End: 2020-11-14

## 2020-11-13 RX ORDER — INSULIN GLARGINE 100 [IU]/ML
8 INJECTION, SOLUTION SUBCUTANEOUS DAILY
Status: DISCONTINUED | OUTPATIENT
Start: 2020-11-14 | End: 2020-11-17 | Stop reason: HOSPADM

## 2020-11-13 RX ORDER — ACETAMINOPHEN 650 MG/1
650 SUPPOSITORY RECTAL
Status: DISCONTINUED | OUTPATIENT
Start: 2020-11-13 | End: 2020-11-17 | Stop reason: HOSPADM

## 2020-11-13 RX ORDER — BUDESONIDE 0.5 MG/2ML
500 INHALANT ORAL
Status: DISCONTINUED | OUTPATIENT
Start: 2020-11-13 | End: 2020-11-17 | Stop reason: HOSPADM

## 2020-11-13 RX ORDER — PANTOPRAZOLE SODIUM 40 MG/1
40 TABLET, DELAYED RELEASE ORAL
Status: DISCONTINUED | OUTPATIENT
Start: 2020-11-14 | End: 2020-11-17 | Stop reason: HOSPADM

## 2020-11-13 RX ORDER — POTASSIUM CHLORIDE 750 MG/1
40 TABLET, FILM COATED, EXTENDED RELEASE ORAL EVERY 4 HOURS
Status: DISPENSED | OUTPATIENT
Start: 2020-11-13 | End: 2020-11-14

## 2020-11-13 RX ORDER — ONDANSETRON 2 MG/ML
INJECTION INTRAMUSCULAR; INTRAVENOUS
Status: DISCONTINUED
Start: 2020-11-13 | End: 2020-11-13 | Stop reason: WASHOUT

## 2020-11-13 RX ORDER — SODIUM CHLORIDE 0.9 % (FLUSH) 0.9 %
5-40 SYRINGE (ML) INJECTION AS NEEDED
Status: DISCONTINUED | OUTPATIENT
Start: 2020-11-13 | End: 2020-11-17 | Stop reason: HOSPADM

## 2020-11-13 RX ORDER — PIPERACILLIN SODIUM, TAZOBACTAM SODIUM 3; .375 G/15ML; G/15ML
INJECTION, POWDER, LYOPHILIZED, FOR SOLUTION INTRAVENOUS
Status: DISCONTINUED
Start: 2020-11-13 | End: 2020-11-13 | Stop reason: WASHOUT

## 2020-11-13 RX ORDER — ACETAMINOPHEN 325 MG/1
650 TABLET ORAL
Status: DISCONTINUED | OUTPATIENT
Start: 2020-11-13 | End: 2020-11-17 | Stop reason: HOSPADM

## 2020-11-13 RX ORDER — ONDANSETRON 4 MG/1
4 TABLET, ORALLY DISINTEGRATING ORAL
Status: DISCONTINUED | OUTPATIENT
Start: 2020-11-13 | End: 2020-11-17 | Stop reason: HOSPADM

## 2020-11-13 RX ORDER — METOPROLOL SUCCINATE 25 MG/1
25 TABLET, EXTENDED RELEASE ORAL
Status: DISCONTINUED | OUTPATIENT
Start: 2020-11-14 | End: 2020-11-17 | Stop reason: HOSPADM

## 2020-11-13 RX ORDER — PREDNISONE 5 MG/1
5 TABLET ORAL
Status: DISCONTINUED | OUTPATIENT
Start: 2020-11-14 | End: 2020-11-17 | Stop reason: HOSPADM

## 2020-11-13 RX ORDER — POLYETHYLENE GLYCOL 3350 17 G/17G
17 POWDER, FOR SOLUTION ORAL DAILY PRN
Status: DISCONTINUED | OUTPATIENT
Start: 2020-11-13 | End: 2020-11-17 | Stop reason: HOSPADM

## 2020-11-13 RX ORDER — AMIODARONE HYDROCHLORIDE 200 MG/1
200 TABLET ORAL DAILY
Status: DISCONTINUED | OUTPATIENT
Start: 2020-11-14 | End: 2020-11-17

## 2020-11-13 RX ORDER — ARFORMOTEROL TARTRATE 15 UG/2ML
15 SOLUTION RESPIRATORY (INHALATION)
Status: DISCONTINUED | OUTPATIENT
Start: 2020-11-13 | End: 2020-11-17 | Stop reason: HOSPADM

## 2020-11-13 RX ORDER — POTASSIUM CHLORIDE AND SODIUM CHLORIDE 900; 300 MG/100ML; MG/100ML
INJECTION, SOLUTION INTRAVENOUS CONTINUOUS
Status: DISCONTINUED | OUTPATIENT
Start: 2020-11-13 | End: 2020-11-14

## 2020-11-13 RX ORDER — MAGNESIUM SULFATE 100 %
4 CRYSTALS MISCELLANEOUS AS NEEDED
Status: DISCONTINUED | OUTPATIENT
Start: 2020-11-13 | End: 2020-11-17 | Stop reason: HOSPADM

## 2020-11-13 RX ORDER — PREDNISONE 5 MG/1
2.5 TABLET ORAL
Status: DISCONTINUED | OUTPATIENT
Start: 2020-11-14 | End: 2020-11-17 | Stop reason: HOSPADM

## 2020-11-13 RX ORDER — SODIUM CHLORIDE 0.9 % (FLUSH) 0.9 %
5-40 SYRINGE (ML) INJECTION EVERY 8 HOURS
Status: DISCONTINUED | OUTPATIENT
Start: 2020-11-13 | End: 2020-11-17 | Stop reason: HOSPADM

## 2020-11-13 RX ADMIN — POTASSIUM CHLORIDE AND SODIUM CHLORIDE: 900; 300 INJECTION, SOLUTION INTRAVENOUS at 18:42

## 2020-11-13 RX ADMIN — SODIUM CHLORIDE 500 ML: 900 INJECTION, SOLUTION INTRAVENOUS at 18:44

## 2020-11-13 RX ADMIN — Medication 10 ML: at 18:46

## 2020-11-13 RX ADMIN — IOPAMIDOL 100 ML: 755 INJECTION, SOLUTION INTRAVENOUS at 13:18

## 2020-11-13 RX ADMIN — POTASSIUM CHLORIDE 40 MEQ: 750 TABLET, FILM COATED, EXTENDED RELEASE ORAL at 15:57

## 2020-11-13 RX ADMIN — Medication 10 ML: at 23:37

## 2020-11-13 RX ADMIN — LOPERAMIDE HYDROCHLORIDE 2 MG: 2 CAPSULE ORAL at 23:37

## 2020-11-13 RX ADMIN — POTASSIUM CHLORIDE 10 MEQ: 750 TABLET, FILM COATED, EXTENDED RELEASE ORAL at 18:42

## 2020-11-13 RX ADMIN — FAMOTIDINE 20 MG: 20 TABLET, FILM COATED ORAL at 18:42

## 2020-11-13 NOTE — ED NOTES
Bedside and Verbal shift change report given to Marley Randall (oncoming nurse) by Charlie Mcleod (offgoing nurse). Report included the following information SBAR, ED Summary, MAR and Recent Results.

## 2020-11-13 NOTE — CONSULTS
Rupesh Marie MD [facility] [address]  Office [phone]  Mobile [phone]    Date of  Admission: 11/13/2020 11:27 AM  PCP- Lola Menendez MD    Jenni Shrestha is a 65 y.o. female admitted for Syncope [R55]. Consult requested by Katya Shukla MD    Assessment  · Recurrent syncope  · Transient speech slurring/left arm numbness  · PAF on amiodarone. Currently sinus  · S/p SUHAS ligation 10/21  · Recurrent chest pains  · CAD with previous PCI  · Elevated WBC  · Chronic HFpEF        Discussion/Recommendations:  Complex case with multiple comorbid conditions. Worry about conduction disease as mechanism of syncope  Monitor on tele overnight. Already getting loop recorder at home for monitoring of AF per Dr Allison Nesbitt  If chris >= proceed with pacer, AVN ablation    Doubt chest pains ischemic etiology - recent stress nuclear study normal    Will follow    Subjective:  Patient well known to our practice followed by Dulce Maria Pearce and Allison Nesbitt, cardiology wise. Admitted from ER after two episodes of syncope in PCP today. Also noted slurring of speech prior lasting an hour with left arm numbness. ED evaluation - negative head CT, chest CTA without PE, labs notable for WBC 22K, declining troponin from last week. WBC has been elevated past 1-2 weeks. Has chronic recurrent chest pains.  Chronic CHACON    Complex recent medical hx  - PAF on amiodarone with hospitalization last with atrial flutter and fib.   - s/p SUHAS ligation by Dr Luigi Burnett 10/21  - stress nuclear study Nov 2020 nl, echo with EF 55%, mild MR      Past Medical History:   Diagnosis Date    Adverse effect of anesthesia     slow to wake up    Arthritis     Asthma 6/29/2009    CAUSED BY MOLD    Atrial fibrillation ([address] 6/29/2009    CAD (coronary artery disease) 06/29/2009    Celiac disease 2010    Chronic obstructive pulmonary disease ([address] 2004-5    right leg    Chronic pain of right ankle  Diabetes ([facility]     Drug induced prednisone, DM2    Diastolic heart failure ([facility] [address]     DVT (deep venous thrombosis) ([facility] [address] 2004    Right leg post surgery    GERD (gastroesophageal reflux disease)     Gluten intolerance     Heart failure ([facility] [address]     Hypertension     Hypothyroidism 6/29/2009    Iron deficiency anemia     Lyme disease     Personal history of MI (myocardial infarction)     Rheumatoid arthritis ([facility] [address]     Slow to wake up after anesthesia     Syncope     Post testing- resolved    TIA (transient ischemic attack) 2004 & 2006    Vitamin B12 deficiency 6/29/2009      Past Surgical History:   Procedure Laterality Date    HX ADENOIDECTOMY      HX AFIB ABLATION      HX APPENDECTOMY      HX CHOLECYSTECTOMY  6/16/11    HX COLONOSCOPY      HX CORONARY STENT PLACEMENT      x 2    HX HEART CATHETERIZATION  2010    2 STENTS    HX HEART CATHETERIZATION  03/2020    HX HYSTERECTOMY      HX LUMBAR LAMINECTOMY  2000    L4-L5    HX ORTHOPAEDIC  1993-6/2012    RT. FOOT SURGERY X 6/calcaneal osteotomy    HX ORTHOPAEDIC Right 09/19/2020    right hand [facility] joint replacement    HX TONSILLECTOMY  1964     No current facility-administered medications on file prior to encounter. Current Outpatient Medications on File Prior to Encounter   Medication Sig Dispense Refill    amiodarone (CORDARONE) 200 mg tablet Take 1 Tab by mouth daily. 30 Tab 0    predniSONE (DELTASONE) 2.5 mg tablet Take 1 Tab by mouth daily (after lunch). 5 mg in morning and 2.5 mg afternoon  Resume your usual dose of prednisone after you complete the prednisone taper 30 Tab 0    acetaminophen (TYLENOL) 650 mg TbER Take 1,300 mg by mouth two (2) times a day.  bumetanide (BUMEX) 1 mg tablet Take 0.5 mg by mouth every evening. 1/2 tablet = 0.5 mg      coenzyme Q-10 (Co Q-10) 200 mg capsule Take 200 mg by mouth daily.       artificial tears, dextran 70-hypromellose, (GenTeal Tears Mild) 0.1-0.3 % ophthalmic solution Administer 1 Drop to both eyes nightly.  carboxymethylcell/hypromellose (GENTEAL GEL OP) Administer 1 Drop to both eyes as needed (dry eye).  omega 3-DHA-EPA-fish oil 1,000 mg (120 mg-180 mg) capsule Take 1 Cap by mouth every evening.  levalbuterol (XOPENEX) 0.63 mg/3 mL nebu 0.63 mg by Nebulization route four (4) times daily.  metoprolol succinate (TOPROL-XL) 25 mg XL tablet Take 1 Tab by mouth daily (after lunch). 30 Tab 2    aclidinium bromide (Tudorza Pressair) 400 mcg/actuation inhaler Take 1 Puff by inhalation daily.  calcium citrate-vitamin d3 (CITRACAL+D) 315 mg-5 mcg (200 unit) tab Take 1 Tab by mouth daily (with breakfast).  gabapentin (NEURONTIN) 100 mg capsule Take 100 mg by mouth nightly.  mometasone furoate (ASMANEX HFA IN) Take 100 mcg by inhalation two (2) times a day.  potassium 99 mg tablet Take 99 mg by mouth daily.  insulin glargine (LANTUS,BASAGLAR) 100 unit/mL (3 mL) inpn 8 Units by SubCUTAneous route Daily (before breakfast). 30 minutes before breakfast      denosumab (PROLIA SC) by SubCUTAneous route every 6 months.  bumetanide (BUMEX) 1 mg tablet Take 1 mg by mouth daily.  eplerenone (INSPRA) 25 mg tablet Take 25 mg by mouth Daily (before lunch).  fenofibrate nanocrystallized (Tricor) 48 mg tablet Take 48 mg by mouth nightly.  nitroglycerin (NITRODUR) 0.1 mg/hr 1 Patch by TransDERmal route daily as needed (chest pain).  levothyroxine (SYNTHROID) 150 mcg tablet Take 150 mcg by mouth Daily (before breakfast).  predniSONE (DELTASONE) 5 mg tablet Take 5 mg by mouth daily. 5 mg in morning and 2.5 mg afternoon      cholecalciferol, vitamin D3, (VITAMIN D3) 2,000 unit tab Take 2,000 Units by mouth daily.  turmeric (CURCUMIN) Take 1 g by mouth every evening.  SITagliptin (JANUVIA) 100 mg tablet Take 100 mg by mouth daily (with dinner).       vit C/vit E ac/lut/copper/zinc (PRESERVISION LUTEIN PO) Take 1 Tab by mouth every Monday. Takes with dinner      lidocaine (LIDODERM) 5 % 1 Patch by TransDERmal route every twenty-four (24) hours. Apply patch to the affected area for 12 hours a day and remove for 12 hours a day.  ascorbic acid (VITAMIN C) 500 mg tablet Take 500 mg by mouth daily.  ferrous sulfate 325 mg (65 mg iron) tablet Take 325 mg by mouth daily (with lunch).  cyanocobalamin (VITAMIN B12) 1,000 mcg/mL injection INJECT 1 ML INTO THE MUSCLE EVERY 30 DAYS 10 mL 0    lansoprazole (PREVACID) 30 mg capsule Take 30 mg by mouth daily.        Allergies   Allergen Reactions    Butter Flavor Anaphylaxis     Butter AND CREME    Keflex [Cephalexin] Anaphylaxis    Milk Containing Products Anaphylaxis     Butter and cream    Pcn [Penicillins] Anaphylaxis    Aspirin Hives and Other (comments)     \"it makes my stomach bleed\"      Cimzia [Certolizumab Pegol] Other (comments)     GI symptoms, blisters in the mouth and esophagus    Clopidogrel Other (comments)     GI bleed    Demerol [Meperidine] Other (comments)     HYPOTENSION    Fentanyl Other (comments)     HYPOTENSION    Ibuprofen Diarrhea     Patient reports that ibuprofen caused diarrhea    Morphine Other (comments)     HYPOTENSION    Nitroglycerin Other (comments)     IN PILL FORM  - HYPOTENSION  CAN TOLERATE PATCH FOR SHORT TIME    Sulfa (Sulfonamide Antibiotics) Angioedema     Lips    Tapazole [Methimazole] Unknown (comments)     Patient stated \"it made me feel like I had the flu\"    Tramadol Other (comments)     Patient reports thrush with tramadol use    Adhesive Tape-Silicones Other (comments)     BAD BLISTERS AND TENDS TO GET INFECTED    Albuterol Palpitations    Gluten Diarrhea     bloating    Oxycodone Other (comments)     Hallicination and hypotension             Review of Symptoms:  Constitutional: negative for fevers, chills, anorexia and weight loss  Respiratory: negative for sputum or hemoptysis  Gastrointestinal: negative for dysphagia, odynophagia, melena and abdominal pain  Musculoskeletal:negative for back pain  Neurological: negative for vertigo, seizures and memory problems  Other systems reviewed and negative except as above. Physical Exam    Visit Vitals  /79   Pulse 73   Temp 97.7 °F (36.5 °C)   Resp 22   Ht 5' 2\" (1.575 m)   Wt 165 lb (74.8 kg)   LMP 09/29/1980 (LMP Unknown)   SpO2 92%   BMI 30.18 kg/m²     Visit Vitals  /79   Pulse 73   Temp 97.7 °F (36.5 °C)   Resp 22   Ht 5' 2\" (1.575 m)   Wt 165 lb (74.8 kg)   LMP 09/29/1980 (LMP Unknown)   SpO2 92%   BMI 30.18 kg/m²     General Appearance:  Well developed, well nourished,alert and oriented x 3, and individual in no acute distress. Ears/Nose/Mouth/Throat:   Hearing grossly normal.         Neck: Supple. JVP nl. Carotids without bruits   Chest:   Lungs clear to auscultation bilaterally. Cardiovascular:  Regular rate and rhythm, S1, S2 normal, no murmur. Abdomen:   Soft, non-tender, bowel sounds are active. Extremities: No edema bilaterally. Skin: Warm and dry. Cardiographics    Telemetry: normal sinus rhythm  ECG: normal EKG, normal sinus rhythm    11/02/20   ECHO ADULT FOLLOW-UP OR LIMITED 11/03/2020 11/3/2020    Narrative · LV: Estimated LVEF is 55 - 60%. Normal cavity size, wall thickness and   systolic function (ejection fraction normal).         Signed by: Harman Wall MD         Recent Results (from the past 12 hour(s))   CBC WITH AUTOMATED DIFF    Collection Time: 11/13/20 11:50 AM   Result Value Ref Range    WBC 22.6 (H) 3.6 - 11.0 K/uL    RBC 5.01 3.80 - 5.20 M/uL    HGB 15.5 11.5 - 16.0 g/dL    HCT 47.2 (H) 35.0 - 47.0 %    MCV 94.2 80.0 - 99.0 FL    MCH 30.9 26.0 - 34.0 PG    MCHC 32.8 30.0 - 36.5 g/dL    RDW 12.9 11.5 - 14.5 %    PLATELET 808 371 - 376 K/uL    MPV 10.5 8.9 - 12.9 FL    NRBC 0.0 0  WBC    ABSOLUTE NRBC 0.00 0.00 - 0.01 K/uL    NEUTROPHILS 75 32 - 75 %    BAND NEUTROPHILS 3 0 - 6 %    LYMPHOCYTES 13 12 - 49 %    MONOCYTES 9 5 - 13 %    EOSINOPHILS 0 0 - 7 %    BASOPHILS 0 0 - 1 %    IMMATURE GRANULOCYTES 0 %    ABS. NEUTROPHILS 17.7 (H) 1.8 - 8.0 K/UL    ABS. LYMPHOCYTES 2.9 0.8 - 3.5 K/UL    ABS. MONOCYTES 2.0 (H) 0.0 - 1.0 K/UL    ABS. EOSINOPHILS 0.0 0.0 - 0.4 K/UL    ABS. BASOPHILS 0.0 0.0 - 0.1 K/UL    ABS. IMM. GRANS. 0.0 K/UL    DF MANUAL      RBC COMMENTS NORMOCYTIC, NORMOCHROMIC     SAMPLES BEING HELD    Collection Time: 11/13/20 11:50 AM   Result Value Ref Range    SAMPLES BEING HELD 1BL,1SST,1RD     COMMENT        Add-on orders for these samples will be processed based on acceptable specimen integrity and analyte stability, which may vary by analyte. METABOLIC PANEL, COMPREHENSIVE    Collection Time: 11/13/20 11:50 AM   Result Value Ref Range    Sodium 138 136 - 145 mmol/L    Potassium 3.0 (L) 3.5 - 5.1 mmol/L    Chloride 99 97 - 108 mmol/L    CO2 34 (H) 21 - 32 mmol/L    Anion gap 5 5 - 15 mmol/L    Glucose 146 (H) 65 - 100 mg/dL    BUN 27 (H) 6 - 20 MG/DL    Creatinine 0.85 0.55 - 1.02 MG/DL    BUN/Creatinine ratio 32 (H) 12 - 20      GFR est AA >60 >60 ml/min/1.73m2    GFR est non-AA >60 >60 ml/min/1.73m2    Calcium 9.0 8.5 - 10.1 MG/DL    Bilirubin, total 0.5 0.2 - 1.0 MG/DL    ALT (SGPT) 24 12 - 78 U/L    AST (SGOT) 18 15 - 37 U/L    Alk.  phosphatase 92 45 - 117 U/L    Protein, total 6.4 6.4 - 8.2 g/dL    Albumin 3.4 (L) 3.5 - 5.0 g/dL    Globulin 3.0 2.0 - 4.0 g/dL    A-G Ratio 1.1 1.1 - 2.2     TROPONIN I    Collection Time: 11/13/20 11:50 AM   Result Value Ref Range    Troponin-I, Qt. 0.19 (H) <0.05 ng/mL   MAGNESIUM    Collection Time: 11/13/20 11:50 AM   Result Value Ref Range    Magnesium 2.0 1.6 - 2.4 mg/dL   NT-PRO BNP    Collection Time: 11/13/20 11:50 AM   Result Value Ref Range    NT pro- (H) <125 PG/ML   TSH 3RD GENERATION    Collection Time: 11/13/20 11:50 AM   Result Value Ref Range    TSH 2.49 0.36 - 3.74 uIU/mL   C REACTIVE PROTEIN, QT    Collection Time: 11/13/20 11:50 AM Result Value Ref Range    C-Reactive protein 1.24 (H) 0.00 - 0.60 mg/dL   D DIMER    Collection Time: 11/13/20 11:50 AM   Result Value Ref Range    D-dimer 0.79 (H) 0.00 - 0.65 mg/L FEU   PROCALCITONIN    Collection Time: 11/13/20 11:50 AM   Result Value Ref Range    Procalcitonin <0.05 ng/mL   URINALYSIS W/MICROSCOPIC    Collection Time: 11/13/20  2:55 PM   Result Value Ref Range    Color YELLOW/STRAW      Appearance CLEAR CLEAR      Specific gravity 1.011 1.003 - 1.030      pH (UA) 7.0 5.0 - 8.0      Protein Negative NEG mg/dL    Glucose Negative NEG mg/dL    Ketone Negative NEG mg/dL    Bilirubin Negative NEG      Blood Negative NEG      Urobilinogen 0.2 0.2 - 1.0 EU/dL    Nitrites Negative NEG      Leukocyte Esterase Negative NEG      WBC 0-4 0 - 4 /hpf    RBC 0-5 0 - 5 /hpf    Epithelial cells FEW FEW /lpf    Bacteria Negative NEG /hpf    Hyaline cast 0-2 0 - 5 /lpf   POC LACTIC ACID    Collection Time: 11/13/20  3:22 PM   Result Value Ref Range    Lactic Acid (POC) 3.26 (HH) 0.40 - 2.00 mmol/L   POC LACTIC ACID    Collection Time: 11/13/20  3:33 PM   Result Value Ref Range    Lactic Acid (POC) 3.21 (HH) 0.40 - 2.00 mmol/L

## 2020-11-13 NOTE — ED TRIAGE NOTES
Patient arrives via EMS from PCP for syncopal episode. History of  Left arterial appendage surgery on 10/21. Plan to get pacemaker and ablation soon. History of A fib and A flutter. . Denies hitting head or blood thinner use.

## 2020-11-13 NOTE — ED PROVIDER NOTES
49-year-old female with a history of atrial fibrillation, prior DVT, CAD, RA, with recent atrial appendage removal surgery on 10/21, presents to the emergency department noting intermittent history of palpitations since her procedure. She states that today she was in a routine follow-up appointment with her primary care provider and had a witnessed syncopal episode that was preceded by sudden onset of palpitations and shortness of breath. She reportedly had to back-to-back episodes of this and then quickly regained consciousness. She states that she was on the exam table when these episodes occurred and she denies falling or hitting her head. She is not on any blood thinners as she is unable to tolerate these medications. Blood glucose was 197 in the field. She presented to the ED by EMS called from her PCPs office for further evaluation. She states that she had a plan to get a pacemaker and ablation soon but had not had any episodes of syncope like this previously. On arrival she denies any palpitation symptoms or chest pain or shortness of breath. She does note tenderness to her left lateral chest wall where her chest tube was inserted during the procedure but denies any other chest pain. She denies any fever, chills, nausea, vomiting, diarrhea, URI symptoms, dysuria, or any other medical concerns. Syncope    Associated symptoms include chest pain and palpitations. Pertinent negatives include no fever, no abdominal pain, no nausea, no vomiting, no congestion, no headaches, no back pain and no weakness. Her past medical history is significant for syncope.         Past Medical History:   Diagnosis Date    Adverse effect of anesthesia     slow to wake up    Arthritis     Asthma 6/29/2009    CAUSED BY MOLD    Atrial fibrillation ([facility] [address] 6/29/2009    CAD (coronary artery disease) 06/29/2009    Celiac disease 2010    Chronic obstructive pulmonary disease ([facility] [address] 2004-5    right leg    Chronic pain of right ankle     Diabetes ([facility]     Drug induced prednisone, DM2    Diastolic heart failure ([facility]     DVT (deep venous thrombosis) ([facility] 2004    Right leg post surgery    GERD (gastroesophageal reflux disease)     Gluten intolerance     Heart failure ([facility] [facility]     Hypertension     Hypothyroidism 6/29/2009    Iron deficiency anemia     Lyme disease     Personal history of MI (myocardial infarction)     Rheumatoid arthritis ([facility]     Slow to wake up after anesthesia     Syncope     Post testing- resolved    TIA (transient ischemic attack) 2004 & 2006    Vitamin B12 deficiency 6/29/2009       Past Surgical History:   Procedure Laterality Date    HX ADENOIDECTOMY      HX AFIB ABLATION      HX APPENDECTOMY      HX CHOLECYSTECTOMY  6/16/11    HX COLONOSCOPY      HX CORONARY STENT PLACEMENT      x 2    HX HEART CATHETERIZATION  2010    2 STENTS    HX HEART CATHETERIZATION  03/2020    HX HYSTERECTOMY      HX LUMBAR LAMINECTOMY  2000    L4-L5    HX ORTHOPAEDIC  1993-6/2012    RT.  FOOT SURGERY X 6/calcaneal osteotomy    HX ORTHOPAEDIC Right 09/19/2020    right hand [facility] joint replacement    HX TONSILLECTOMY  1964         Family History:   Problem Relation Age of Onset    Delayed Awakening Mother     Heart Disease Mother     Coronary Artery Disease Mother     Hypertension Mother     Stroke Mother     Delayed Awakening Sister     Hypertension Sister     Lung Disease Father     Cancer Father         colon    Hypertension Father     Hypertension Brother     Breast Cancer Maternal Aunt     Breast Cancer Maternal Aunt     Breast Cancer Cousin         maternal 1st cousin    Breast Cancer Maternal Aunt     Breast Cancer Cousin         maternal 1st cousin    [facility] Breast Cancer Cousin         maternal 1st cousin    Deep Vein Thrombosis Neg Hx     Anesth Problems Neg Hx        Social History     Socioeconomic History    Marital status:      Spouse name: Not on file    Number of children: Not on file    Years of education: Not on file    Highest education level: Not on file   Occupational History    Not on file   Social Needs    Financial resource strain: Not on file    Food insecurity     Worry: Not on file     Inability: Not on file    Transportation needs     Medical: Not on file     Non-medical: Not on file   Tobacco Use    Smoking status: Former Smoker     Packs/day: 1.00     Years: 30.00     Pack years: 30.00     Types: Cigarettes     Last attempt to quit: 2002     Years since quittin.4    Smokeless tobacco: Never Used   Substance and Sexual Activity    Alcohol use: No    Drug use: Never    Sexual activity: Yes     Partners: Male   Lifestyle    Physical activity     Days per week: Not on file     Minutes per session: Not on file    Stress: Not on file   Relationships    Social connections     Talks on phone: Not on file     Gets together: Not on file     Attends [employer] service: Not on file     Active member of club or organization: Not on file     Attends meetings of clubs or organizations: Not on file     Relationship status: Not on file    Intimate partner violence     Fear of current or ex partner: Not on file     Emotionally abused: Not on file     Physically abused: Not on file     Forced sexual activity: Not on file   Other Topics Concern    Not on file   Social History Narrative    Not on file         ALLERGIES: Butter flavor; Keflex [cephalexin]; Pcn [penicillins]; Aspirin; Cimzia [certolizumab pegol]; Clopidogrel; Demerol [meperidine]; Fentanyl; Ibuprofen; Morphine; Nitroglycerin; Sulfa (sulfonamide antibiotics); Tapazole [methimazole]; Tramadol; Adhesive tape-silicones; Albuterol; Gluten; and Oxycodone    Review of Systems   Constitutional: Positive for activity change. Negative for appetite change, chills and fever. HENT: Negative for congestion, rhinorrhea, sinus pressure, sneezing and sore throat. Eyes: Negative for photophobia and visual disturbance. Respiratory: Positive for shortness of breath. Negative for cough. Cardiovascular: Positive for chest pain, palpitations and syncope. Gastrointestinal: Negative for abdominal pain, blood in stool, constipation, diarrhea, nausea and vomiting. Genitourinary: Negative for difficulty urinating, dysuria, flank pain, frequency, hematuria, menstrual problem, urgency, vaginal bleeding and vaginal discharge. Musculoskeletal: Negative for arthralgias, back pain, myalgias and neck pain. Skin: Negative for rash and wound. Neurological: Positive for syncope. Negative for weakness, numbness and headaches. Psychiatric/Behavioral: Negative for self-injury and suicidal ideas. All other systems reviewed and are negative. Vitals:    11/13/20 1132 11/13/20 1145 11/13/20 1215 11/13/20 1315   BP: (!) 142/7 132/81 116/60 95/63   Pulse: 71 68 68 65   Resp: 18 15 19 16   Temp: 97.7 °F (36.5 °C)      SpO2: 95% 95% 95% 94%   Weight: 74.8 kg (165 lb)      Height: 5' 2\" (1.575 m)               Physical Exam  Vitals signs and nursing note reviewed. Constitutional:       General: She is not in acute distress. Appearance: Normal appearance. She is well-developed. She is not diaphoretic. HENT:      Head: Normocephalic and atraumatic. Nose: Nose normal.   Eyes:      Extraocular Movements: Extraocular movements intact. Conjunctiva/sclera: Conjunctivae normal.      Pupils: Pupils are equal, round, and reactive to light. Neck:      Musculoskeletal: Neck supple. Cardiovascular:      Rate and Rhythm: Normal rate and regular rhythm. Heart sounds: Normal heart sounds. Pulmonary:      Effort: Pulmonary effort is normal.      Breath sounds: Normal breath sounds. Chest:      Chest wall: Tenderness present. Abdominal:      General: There is no distension. Palpations: Abdomen is soft. Tenderness: There is no abdominal tenderness. Musculoskeletal:         General: No tenderness.    Skin: General: Skin is warm and dry. Neurological:      General: No focal deficit present. Mental Status: She is alert and oriented to person, place, and time. Cranial Nerves: No cranial nerve deficit. Sensory: No sensory deficit. Motor: No weakness. Coordination: Coordination normal.      Comments: Intact sensation, 5/5 strength in all 4 extremities, intact finger to nose, neg pronator drift, fluent speech, CN intact. MDM   77-year-old female with history of recent atrial appendage surgery presents with history suggestive of cardiogenic syncope. On arrival patient is afebrile with vital signs stable in no acute distress. Labs returned showing WBC significant elevated at 20.6, HCT 47.2, but normal PLT, K3.0,   UA neg   CThead shows no acute intracranial abnormalities. CTA chest shows no evidence of acute PE, left atrial appendage ligation clip in place with stable incidental findings no other acute abnormalities. Cardiology was consulted and agreed to see the patient at the bedside. Her case was discussed with Dr. Paige Kussmaul, hospitalist in the ED and he agreed to admit her to his service for further care and assessment. Procedures  7106 EKG shows normal sinus rhythm with a rate of 71 bpm prior inferior infarct and nonspecific ST depression but no ST elevation. Anterolateral T wave depression.

## 2020-11-13 NOTE — ED NOTES
Verbal shift change report given to RUPINDER (oncoming nurse) by Katya Conroy (offgoing nurse). Report included the following information SBAR, ED Summary and MAR.

## 2020-11-13 NOTE — PROGRESS NOTES
Admission Medication Reconciliation:     Information obtained from:    Patient via interview in ER bed 1  RxQuery data available¹:  YES    Comments/Recommendations:   Patient able to confirm name and   The patient was discharged from [facility] and denies changes to preferred pharmacy or allergies. Updated PTA medication list  Reports compliance to prescribed regimen        ¹RxQuery pharmacy benefit data reflects medications filled and processed through the patient's insurance, however   this data does NOT capture whether the medication was picked up or is currently being taken by the patient. Prior to Admission Medications   Prescriptions Last Dose Informant Taking? SITagliptin (JANUVIA) 100 mg tablet 2020 at Unknown time Self Yes   Sig: Take 100 mg by mouth daily (with dinner). acetaminophen (TYLENOL) 650 mg TbER 2020 at am Self Yes   Sig: Take 1,300 mg by mouth two (2) times a day. aclidinium bromide (Tudorza Pressair) 400 mcg/actuation inhaler 2020 at Unknown time Self Yes   Sig: Take 1 Puff by inhalation daily. amiodarone (CORDARONE) 200 mg tablet 2020 at Unknown time Self Yes   Sig: Take 1 Tab by mouth daily. artificial tears, dextran 70-hypromellose, (GenTeal Tears Mild) 0.1-0.3 % ophthalmic solution 2020 at Unknown time Self Yes   Sig: Administer 1 Drop to both eyes nightly. ascorbic acid (VITAMIN C) 500 mg tablet 2020 at am Self Yes   Sig: Take 500 mg by mouth daily. bumetanide (BUMEX) 1 mg tablet 2020 at am Self Yes   Sig: Take 1 mg by mouth daily. bumetanide (BUMEX) 1 mg tablet 2020 at Unknown time Self Yes   Sig: Take 0.5 mg by mouth every evening. 1/2 tablet = 0.5 mg   calcium citrate-vitamin d3 (CITRACAL+D) 315 mg-5 mcg (200 unit) tab 2020 at am Self Yes   Sig: Take 1 Tab by mouth daily (with breakfast). carboxymethylcell/hypromellose (GENTEAL GEL OP)  Self Yes   Sig: Administer 1 Drop to both eyes as needed (dry eye). cholecalciferol, vitamin D3, (VITAMIN D3) 2,000 unit tab 2020 at am Self Yes   Sig: Take 2,000 Units by mouth daily. coenzyme Q-10 (Co Q-10) 200 mg capsule 2020 at am Self Yes   Sig: Take 200 mg by mouth daily. cyanocobalamin (VITAMIN B12) 1,000 mcg/mL injection 2020 Self Yes   Sig: INJECT 1 ML INTO THE MUSCLE EVERY 30 DAYS   denosumab (PROLIA SC) 2020 Self Yes   Sig: by SubCUTAneous route every 6 months. eplerenone (INSPRA) 25 mg tablet 2020 at Unknown time Self Yes   Sig: Take 25 mg by mouth Daily (before lunch). fenofibrate nanocrystallized (Tricor) 48 mg tablet 2020 at Unknown time Self Yes   Sig: Take 48 mg by mouth nightly. ferrous sulfate 325 mg (65 mg iron) tablet 2020 at Unknown time Self Yes   Sig: Take 325 mg by mouth daily (with lunch). gabapentin (NEURONTIN) 100 mg capsule 2020 at Unknown time Self Yes   Sig: Take 100 mg by mouth nightly. insulin glargine (LANTUS,BASAGLAR) 100 unit/mL (3 mL) inpn 2020 at Unknown time Self Yes   Si Units by SubCUTAneous route Daily (before breakfast). 30 minutes before breakfast   lansoprazole (PREVACID) 30 mg capsule 2020 at Unknown time Self Yes   Sig: Take 30 mg by mouth daily. levalbuterol (XOPENEX) 0.63 mg/3 mL nebu 2020 at Unknown time Self Yes   Si.63 mg by Nebulization route four (4) times daily. levothyroxine (SYNTHROID) 150 mcg tablet 2020 at Unknown time Self Yes   Sig: Take 150 mcg by mouth Daily (before breakfast). lidocaine (LIDODERM) 5 % 2020 at Unknown time Self Yes   Si Patch by TransDERmal route every twenty-four (24) hours. Apply patch to the affected area for 12 hours a day and remove for 12 hours a day. metoprolol succinate (TOPROL-XL) 25 mg XL tablet 2020 at Unknown time Self Yes   Sig: Take 1 Tab by mouth daily (after lunch).    mometasone furoate (ASMANEX HFA IN) 2020 at am Self Yes   Sig: Take 100 mcg by inhalation two (2) times a day.   nitroglycerin (NITRODUR) 0.1 mg/hr 2020 Self Yes   Si Patch by TransDERmal route daily as needed (chest pain). omega 3-DHA-EPA-fish oil 1,000 mg (120 mg-180 mg) capsule 2020 at Unknown time Self Yes   Sig: Take 1 Cap by mouth every evening. potassium 99 mg tablet 2020 at Unknown time Self Yes   Sig: Take 99 mg by mouth daily. predniSONE (DELTASONE) 2.5 mg tablet 2020 at Unknown time Self Yes   Sig: Take 1 Tab by mouth daily (after lunch). 5 mg in morning and 2.5 mg afternoon  Resume your usual dose of prednisone after you complete the prednisone taper   predniSONE (DELTASONE) 5 mg tablet 2020 at Unknown time Self Yes   Sig: Take 5 mg by mouth daily. 5 mg in morning and 2.5 mg afternoon   turmeric (CURCUMIN) 2020 at Unknown time Self Yes   Sig: Take 1 g by mouth every evening. vit C/vit E ac/lut/copper/zinc (PRESERVISION LUTEIN PO) 2020 Self Yes   Sig: Take 1 Tab by mouth every Monday. Takes with dinner      Facility-Administered Medications: None         Please contact the main inpatient pharmacy with any questions or concerns at [phone] and we will direct you to the clinical pharmacist covering this patient's care while in-house.    Gerard iWn, PharmD, BCPS

## 2020-11-13 NOTE — PROGRESS NOTES
Reason for Readmission:   Syncope episode           RUR Score/Risk Level:  21%         PCP: First and Last name:  Dr Helen Robins   Name of Practice:    Are you a current patient: Yes/No:    Approximate date of last visit:    Can you participate in a virtual visit with your PCP:     Is a Care Conference indicated: no      Did you attend your follow up appointment (s): If not, why not:  No time before the episode occurred. Resources/supports as identified by patient/family:          Top Challenges facing patient (as identified by patient/family and CM): Finances/Medication cost?    no   Transportation     no   Support system or lack thereof?   no    Living arrangements?   no        Self-care/ADLs/Cognition? no       Current Advanced Directive/Advance Care Plan:  Full code, AD scanned in 2013 no arline ges           Plan for utilizing home health:   [facility] follows             Transition of Care Plan:    Based on readmission, the patient's previous Plan of Care   has been evaluated and/or modified. The current Transition of Care Plan is:        1- follow for progress and needs in acute care  2- coordinate plan/ re [address]       1:59 PM  Noted patient in Ed, recently was here for cardiac issues, stress test, seen by cardiology, discharged home with [facility]. This agency was arranged by [facility] after she was there 10/20-10/22  Lives in a one story home with two steps to enter with spouse.       Care Management Interventions  Transition of Care Consult (CM Consult): Home Health  Current Support Network: Lives with Spouse  Confirm Follow Up Transport: Family  Discharge Location  Discharge Placement: Home with home health

## 2020-11-13 NOTE — ED NOTES
Bedside and Verbal shift change report given to RUPINDER (oncoming nurse) by Beryl Babinski (offgoing nurse). Report included the following information SBAR, ED Summary, MAR and Recent Results.

## 2020-11-13 NOTE — H&P
[facility]    Hospitalist Admission Note      NAME:  Alda Cabrera   :   1950   MRN:  [id number]     PCP:  Jerrod Perez MD     Date/Time of service:  2020 3:07 PM          Subjective:     CHIEF COMPLAINT: Syncope     HISTORY OF PRESENT ILLNESS:     Ms. Cesar Sanchez is a 65 y.o.  female who presented to the Emergency Department complaining of syncope. It occurred at her PCP today, right after movements causing pain. She was discharged recently, after admission for Afib and dyspnea. Since discharge she reports unchanged CHACON and continued to have palpitations. She completed a prednisonce taper 2 days ago and returned to her usual low dose prednisone. She felt chest tightness 2 days ago treated at home with NTG patch. She recently had LA appendage removal.  There had been a plan for ablation and pacer, if she could recover. ER finds hypotension and leukocytosis. We will admit her for management.       Past Medical History:   Diagnosis Date    Adverse effect of anesthesia     slow to wake up    Arthritis     Asthma 2009    CAUSED BY MOLD    Atrial fibrillation ([address] 2009    CAD (coronary artery disease) 2009    Celiac disease 2010    Chronic obstructive pulmonary disease ([address] 2004-    right leg    Chronic pain of right ankle     Diabetes ([address]     Drug induced prednisone, DM2    Diastolic heart failure ([address]     DVT (deep venous thrombosis) ([address]     Right leg post surgery    GERD (gastroesophageal reflux disease)     Gluten intolerance     Heart failure ([address]     Hypertension     Hypothyroidism 2009    Iron deficiency anemia     Lyme disease     Personal history of MI (myocardial infarction)     Rheumatoid arthritis ([address]     Slow to wake up after anesthesia     Syncope     Post testing- resolved    TIA (transient ischemic attack)  &     Vitamin B12 deficiency 2009        Past Surgical History:   Procedure Laterality Date    HX ADENOIDECTOMY      HX AFIB ABLATION      HX APPENDECTOMY      HX CHOLECYSTECTOMY  11    HX COLONOSCOPY      HX CORONARY STENT PLACEMENT      x 2    HX HEART CATHETERIZATION      2 STENTS    HX HEART CATHETERIZATION  2020    HX HYSTERECTOMY      HX LUMBAR LAMINECTOMY  2000    L4-L5    HX ORTHOPAEDIC  -2012    RT.  FOOT SURGERY X 6/calcaneal osteotomy    HX ORTHOPAEDIC Right 2020    right hand [facility] joint replacement    HX TONSILLECTOMY  1964       Social History     Tobacco Use    Smoking status: Former Smoker     Packs/day: 1.00     Years: 30.00     Pack years: 30.00     Types: Cigarettes     Last attempt to quit: 2002     Years since quittin.4    Smokeless tobacco: Never Used   Substance Use Topics    Alcohol use: No        Family History   Problem Relation Age of Onset    Delayed Awakening Mother     Heart Disease Mother     Coronary Artery Disease Mother     Hypertension Mother     Stroke Mother     Delayed Awakening Sister     Hypertension Sister     Lung Disease Father     Cancer Father         colon    Hypertension Father     Hypertension Brother     Breast Cancer Maternal Aunt     Breast Cancer Maternal Aunt     Breast Cancer Cousin         maternal 1st cousin    Breast Cancer Maternal Aunt     Breast Cancer Cousin         maternal 1st cousin    Breast Cancer Cousin         maternal 1st cousin    Deep Vein Thrombosis Neg Hx     Anesth Problems Neg Hx      Allergies   Allergen Reactions    Butter Flavor Anaphylaxis     Butter AND CREME    Keflex [Cephalexin] Anaphylaxis    Pcn [Penicillins] Anaphylaxis    Aspirin Hives and Other (comments)     \"it makes my stomach bleed\"      Cimzia [Certolizumab Pegol] Other (comments)     GI symptoms, blisters in the mouth and esophagus    Clopidogrel Other (comments)     GI bleed    Demerol [Meperidine] Other (comments)     HYPOTENSION    Fentanyl Other (comments)     HYPOTENSION  Ibuprofen Diarrhea     Patient reports that ibuprofen caused diarrhea    Morphine Other (comments)     HYPOTENSION    Nitroglycerin Other (comments)     IN PILL FORM  - HYPOTENSION  CAN TOLERATE PATCH FOR SHORT TIME    Sulfa (Sulfonamide Antibiotics) Angioedema     Lips    Tapazole [Methimazole] Unknown (comments)     Patient stated \"it made me feel like I had the flu\"    Tramadol Other (comments)     Patient reports thrush with tramadol use    Adhesive Tape-Silicones Other (comments)     BAD BLISTERS AND TENDS TO GET INFECTED    Albuterol Palpitations    Gluten Diarrhea     bloating    Oxycodone Other (comments)     Hallicination and hypotension        Prior to Admission medications    Medication Sig Start Date End Date Taking? Authorizing Provider   amiodarone (CORDARONE) 200 mg tablet Take 1 Tab by mouth daily. 11/6/20   Sana Mendieta MD   predniSONE (DELTASONE) 20 mg tablet Take 40mg for three days then 20mg for two days then resume your usual dose of prednisone 11/6/20   Connie Bains MD   predniSONE (DELTASONE) 2.5 mg tablet Take 1 Tab by mouth daily (after lunch). 5 mg in morning and 2.5 mg afternoon  Resume your usual dose of prednisone after you complete the prednisone taper 11/6/20   Sana Mendieta MD   acetaminophen (TYLENOL) 650 mg TbER Take 1,300 mg by mouth two (2) times a day. Provider, Historical   bumetanide (BUMEX) 1 mg tablet Take 0.5 mg by mouth every evening. 1/2 tablet = 0.5 mg    Provider, Historical   coenzyme Q-10 (Co Q-10) 200 mg capsule Take 200 mg by mouth daily. Provider, Historical   artificial tears, dextran 70-hypromellose, (GenTeal Tears Mild) 0.1-0.3 % ophthalmic solution Administer 1 Drop to both eyes daily as needed. Provider, Historical   carboxymethylcell/hypromellose (GENTEAL GEL OP) Apply  to eye nightly. Provider, Historical   omega 3-DHA-EPA-fish oil 1,000 mg (120 mg-180 mg) capsule Take 1 Cap by mouth every evening.     Provider, Historical   levalbuterol (XOPENEX) 0.63 mg/3 mL nebu 0.63 mg by Nebulization route four (4) times daily. Provider, Historical   metoprolol succinate (TOPROL-XL) 25 mg XL tablet Take 1 Tab by mouth daily (after lunch). 10/22/20   Pati Tenorio Ace, PA   aclidinium bromide Korin Safer Pressair) 400 mcg/actuation inhaler Take 1 Puff by inhalation. Provider, Historical   calcium citrate-vitamin d3 (CITRACAL+D) 315 mg-5 mcg (200 unit) tab Take 1 Tab by mouth daily (with breakfast). Provider, Historical   gabapentin (NEURONTIN) 100 mg capsule Take 100 mg by mouth nightly. Provider, Historical   mometasone furoate (ASMANEX HFA IN) Take 100 mcg by inhalation two (2) times a day. Provider, Historical   potassium 99 mg tablet Take 99 mg by mouth daily. Provider, Historical   insulin glargine (LANTUS,BASAGLAR) 100 unit/mL (3 mL) inpn 8 Units by SubCUTAneous route Daily (before breakfast). 30 minutes before breakfast  Indications: \"I set the pen on 8\"    Provider, Historical   denosumab (PROLIA SC) by SubCUTAneous route every 6 months. Provider, Historical   bumetanide (BUMEX) 1 mg tablet Take 1 mg by mouth daily. Provider, Historical   eplerenone (INSPRA) 25 mg tablet Take 25 mg by mouth Daily (before lunch). Provider, Historical   fenofibrate nanocrystallized (Tricor) 48 mg tablet Take 48 mg by mouth nightly. Provider, Historical   nitroglycerin (NITRODUR) 0.1 mg/hr 1 Patch by TransDERmal route daily as needed. Indications: Oral form \"causes severe BP drop  BAD\" Drs got scared,    Provider, Historical   levothyroxine (SYNTHROID) 150 mcg tablet Take 150 mcg by mouth Daily (before breakfast). 2/4/20   Provider, Historical   predniSONE (DELTASONE) 5 mg tablet Take 5 mg by mouth daily. 5 mg in morning and 2.5 mg afternoon    Provider, Historical   cholecalciferol, vitamin D3, (VITAMIN D3) 2,000 unit tab Take 2,000 Units by mouth daily.     Provider, Historical   turmeric (CURCUMIN) Take 1 g by mouth every evening. Provider, Historical   SITagliptin (JANUVIA) 100 mg tablet Take 100 mg by mouth daily (with dinner). Provider, Historical   vit C/vit E ac/lut/copper/zinc (PRESERVISION LUTEIN PO) Take 1 Tab by mouth daily (with dinner). Once per week    Provider, Historical   lidocaine (LIDODERM) 5 % 1 Patch by TransDERmal route every twenty-four (24) hours. Apply patch to the affected area for 12 hours a day and remove for 12 hours a day. Other, MD Yolanda   ascorbic acid (VITAMIN C) 500 mg tablet Take 500 mg by mouth daily. Yolanda Lujan MD   ferrous sulfate 325 mg (65 mg iron) tablet Take 325 mg by mouth daily (with lunch). Yolanda Lujan MD   cyanocobalamin (VITAMIN B12) 1,000 mcg/mL injection INJECT 1 ML INTO THE MUSCLE EVERY 30 DAYS 3/24/14   Faheem Mcdaniel MD   lansoprazole (PREVACID) 30 mg capsule Take 30 mg by mouth daily. Provider, Historical       Review of Systems:  (bold if positive, if negative)    Gen:  fatigueEyes:  ENT:  CVS:  Palpitations, chest pain, dizziness, syncope,Pulm:  dyspneaGI:  :  MS:  Pain, weakness, arthritisSkin:  Psych:   Insomnia, depression, anxiety, Endo:  Hem:  Renal:  Neuro:  weakness      Objective:      VITALS:    Vital signs reviewed; most recent are:    Visit Vitals  BP 95/63   Pulse 65   Temp 97.7 °F (36.5 °C)   Resp 16   Ht 5' 2\" (1.575 m)   Wt 74.8 kg (165 lb)   SpO2 94%   BMI 30.18 kg/m²     SpO2 Readings from Last 6 Encounters:   11/13/20 94%   11/06/20 95%   10/29/20 96%   10/22/20 91%   10/15/20 97%   10/15/20 96%        No intake or output data in the 24 hours ending 11/13/20 1507     Exam:     Physical Exam:    Gen:  Obese, in no acute distress  HEENT:  Pink conjunctivae, PERRL, hearing intact to voice, moist mucous membranes  Neck:  Supple, without masses, thyroid non-tender  Resp:  No accessory muscle use, clear breath sounds without wheezes rales or rhonchi  Card:  No murmurs, normal S1, S2 without thrills, bruits or peripheral edema  Abd:  Soft, non-tender, non-distended, normoactive bowel sounds are present, no mass  Lymph:  No cervical or inguinal adenopathy  Musc:  No cyanosis or clubbing  Skin:  No rashes or ulcers, skin turgor is good  Neuro:  Cranial nerves are grossly intact, general motor weakness, follows commands   Psych: Moderate insight, oriented to person, place and time, alert     Labs:    Recent Labs     11/13/20  1150   WBC 22.6*   HGB 15.5   HCT 47.2*        Recent Labs     11/13/20  1150      K 3.0*   CL 99   CO2 34*   *   BUN 27*   CREA 0.85   CA 9.0   MG 2.0   ALB 3.4*   TBILI 0.5   ALT 24     Lab Results   Component Value Date/Time    Glucose (POC) 267 (H) 11/06/2020 11:30 AM    Glucose (POC) 192 (H) 11/06/2020 07:59 AM     No results for input(s): PH, PCO2, PO2, HCO3, FIO2 in the last 72 hours. No results for input(s): INR, INREXT in the last 72 hours. All Micro Results     Procedure Component Value Units Date/Time    CULTURE, URINE [[id number]] Collected:  11/13/20 1455    Order Status:  Completed Specimen:  Cath Urine Updated:  11/13/20 1505    CULTURE, BLOOD, PAIRED [[id number]]     Order Status:  Sent Specimen:  Blood     LEGIONELLA PNEUMOPHILA AG, URINE [[id number]]     Order Status:  Sent Specimen:  Urine     GRACE Guy, UR/CSF [[id number]]     Order Status:  Sent           I have reviewed previous records       Assessment and Plan:      Syncope - POA, unclear etiology. DDx arrhythmia, hypovolemia, vagal, steroid deficiency, infection, polypharmacy, other. Monitor tele and consult cardiology. Lactic acid elevated indicating dehydration. Stop bumex and eplerenoen for now, but continue metoprolol. Hydrate. Consider dropping gabapentin or lidoderm    PAF (paroxysmal atrial fibrillation) - Monitor on tele. Cardiology consulted. Awaiting future ablation and pacer. For now continue amiodarone.      Coronary artery disease with stable angina pectoris / Elevated troponin / Diastolic heart failure / HTN (hypertension), benign - continue metoprolol. Hold diuretics. Leukocytosis - She has chronic leukocytosis, but today worse then last week, although her steroid dose is lower now. Have to suspect infection. Check UA and blood cx. CT chest shows no lung issues. Since she is lacking fever or focal signs so far, will hold off on Abx. Diabetes mellitus type 2, controlled - Diabetic diet and counseling. SSI per protocol. Continue home Lantus and hold [place] and [place]. A1c done recently    Rheumatoid arthritis / Chronic steroid use - continue low dose prednisone, lidoderm and gabapentin. Hold Prolia    Asthma - Appears stable. Brovana, pulmicort, and Prn xopenex    Esophageal reflux - Stable. continue pepcid    Iron deficiency anemia, unspecified - Usually on B12 and iron. Check serologies. Hypothyroidism - Continue synthroid and check TSH      Telemetry reviewed:   normal sinus rhythm    Risk of deterioration: high      Total time spent with patient: 48 Minutes I personally reviewed chart, notes, data and current medications in the medical record. I have personally examined and treated the patient at bedside during this period.                  Care Plan discussed with: Patient, Family, Nursing Staff, Consultant/Specialist and >50% of time spent in counseling and coordination of care    Discussed:  Care Plan       ___________________________________________________    Attending Physician: Tyrone Gabriel MD

## 2020-11-14 ENCOUNTER — APPOINTMENT (OUTPATIENT)
Dept: CT IMAGING | Age: 70
DRG: 312 | End: 2020-11-14
Attending: INTERNAL MEDICINE
Payer: MEDICARE

## 2020-11-14 ENCOUNTER — APPOINTMENT (OUTPATIENT)
Dept: VASCULAR SURGERY | Age: 70
DRG: 312 | End: 2020-11-14
Attending: FAMILY MEDICINE
Payer: MEDICARE

## 2020-11-14 LAB
ALBUMIN SERPL-MCNC: 2.8 G/DL (ref 3.5–5)
ALBUMIN/GLOB SERPL: 1.2 {RATIO} (ref 1.1–2.2)
ALP SERPL-CCNC: 61 U/L (ref 45–117)
ALT SERPL-CCNC: 20 U/L (ref 12–78)
ANION GAP SERPL CALC-SCNC: 4 MMOL/L (ref 5–15)
AST SERPL-CCNC: 16 U/L (ref 15–37)
BACTERIA SPEC CULT: NORMAL
BASOPHILS # BLD: 0 K/UL (ref 0–0.1)
BASOPHILS NFR BLD: 0 % (ref 0–1)
BILIRUB SERPL-MCNC: 0.5 MG/DL (ref 0.2–1)
BUN SERPL-MCNC: 22 MG/DL (ref 6–20)
BUN/CREAT SERPL: 31 (ref 12–20)
CALCIUM SERPL-MCNC: 7.7 MG/DL (ref 8.5–10.1)
CHLORIDE SERPL-SCNC: 109 MMOL/L (ref 97–108)
CO2 SERPL-SCNC: 28 MMOL/L (ref 21–32)
CORTIS SERPL-MCNC: 16.8 UG/DL
CREAT SERPL-MCNC: 0.72 MG/DL (ref 0.55–1.02)
DIFFERENTIAL METHOD BLD: ABNORMAL
EOSINOPHIL # BLD: 0 K/UL (ref 0–0.4)
EOSINOPHIL NFR BLD: 0 % (ref 0–7)
ERYTHROCYTE [DISTWIDTH] IN BLOOD BY AUTOMATED COUNT: 13.1 % (ref 11.5–14.5)
FERRITIN SERPL-MCNC: 2089 NG/ML (ref 8–252)
GLOBULIN SER CALC-MCNC: 2.3 G/DL (ref 2–4)
GLUCOSE BLD STRIP.AUTO-MCNC: 160 MG/DL (ref 65–100)
GLUCOSE BLD STRIP.AUTO-MCNC: 165 MG/DL (ref 65–100)
GLUCOSE BLD STRIP.AUTO-MCNC: 177 MG/DL (ref 65–100)
GLUCOSE BLD STRIP.AUTO-MCNC: 199 MG/DL (ref 65–100)
GLUCOSE SERPL-MCNC: 121 MG/DL (ref 65–100)
HCT VFR BLD AUTO: 40.5 % (ref 35–47)
HGB BLD-MCNC: 13.1 G/DL (ref 11.5–16)
IMM GRANULOCYTES # BLD AUTO: 0 K/UL
IMM GRANULOCYTES NFR BLD AUTO: 0 %
LYMPHOCYTES # BLD: 2.5 K/UL (ref 0.8–3.5)
LYMPHOCYTES NFR BLD: 14 % (ref 12–49)
MAGNESIUM SERPL-MCNC: 2.2 MG/DL (ref 1.6–2.4)
MCH RBC QN AUTO: 31 PG (ref 26–34)
MCHC RBC AUTO-ENTMCNC: 32.3 G/DL (ref 30–36.5)
MCV RBC AUTO: 96 FL (ref 80–99)
MONOCYTES # BLD: 0.9 K/UL (ref 0–1)
MONOCYTES NFR BLD: 5 % (ref 5–13)
NEUTS BAND NFR BLD MANUAL: 1 % (ref 0–6)
NEUTS SEG # BLD: 14.3 K/UL (ref 1.8–8)
NEUTS SEG NFR BLD: 80 % (ref 32–75)
NRBC # BLD: 0 K/UL (ref 0–0.01)
NRBC BLD-RTO: 0 PER 100 WBC
PLATELET # BLD AUTO: 179 K/UL (ref 150–400)
PMV BLD AUTO: 10.3 FL (ref 8.9–12.9)
POTASSIUM SERPL-SCNC: 4.8 MMOL/L (ref 3.5–5.1)
PROT SERPL-MCNC: 5.1 G/DL (ref 6.4–8.2)
RBC # BLD AUTO: 4.22 M/UL (ref 3.8–5.2)
RBC MORPH BLD: ABNORMAL
SERVICE CMNT-IMP: ABNORMAL
SERVICE CMNT-IMP: NORMAL
SODIUM SERPL-SCNC: 141 MMOL/L (ref 136–145)
WBC # BLD AUTO: 17.7 K/UL (ref 3.6–11)

## 2020-11-14 PROCEDURE — 70450 CT HEAD/BRAIN W/O DYE: CPT

## 2020-11-14 PROCEDURE — 74011000250 HC RX REV CODE- 250: Performed by: HOSPITALIST

## 2020-11-14 PROCEDURE — 82962 GLUCOSE BLOOD TEST: CPT

## 2020-11-14 PROCEDURE — 0042T CT PERF W CBF: CPT

## 2020-11-14 PROCEDURE — 74011636637 HC RX REV CODE- 636/637: Performed by: INTERNAL MEDICINE

## 2020-11-14 PROCEDURE — 36415 COLL VENOUS BLD VENIPUNCTURE: CPT

## 2020-11-14 PROCEDURE — 74011250637 HC RX REV CODE- 250/637: Performed by: INTERNAL MEDICINE

## 2020-11-14 PROCEDURE — 93970 EXTREMITY STUDY: CPT

## 2020-11-14 PROCEDURE — 74011250636 HC RX REV CODE- 250/636: Performed by: INTERNAL MEDICINE

## 2020-11-14 PROCEDURE — 97163 PT EVAL HIGH COMPLEX 45 MIN: CPT

## 2020-11-14 PROCEDURE — 70496 CT ANGIOGRAPHY HEAD: CPT

## 2020-11-14 PROCEDURE — 83735 ASSAY OF MAGNESIUM: CPT

## 2020-11-14 PROCEDURE — 94640 AIRWAY INHALATION TREATMENT: CPT

## 2020-11-14 PROCEDURE — 65660000000 HC RM CCU STEPDOWN

## 2020-11-14 PROCEDURE — 99232 SBSQ HOSP IP/OBS MODERATE 35: CPT | Performed by: SPECIALIST

## 2020-11-14 PROCEDURE — 93005 ELECTROCARDIOGRAM TRACING: CPT

## 2020-11-14 PROCEDURE — 85025 COMPLETE CBC W/AUTO DIFF WBC: CPT

## 2020-11-14 PROCEDURE — 74011000636 HC RX REV CODE- 636: Performed by: INTERNAL MEDICINE

## 2020-11-14 PROCEDURE — 4A03X5D MEASUREMENT OF ARTERIAL FLOW, INTRACRANIAL, EXTERNAL APPROACH: ICD-10-PCS | Performed by: RADIOLOGY

## 2020-11-14 PROCEDURE — 80053 COMPREHEN METABOLIC PANEL: CPT

## 2020-11-14 PROCEDURE — 74011000250 HC RX REV CODE- 250: Performed by: INTERNAL MEDICINE

## 2020-11-14 PROCEDURE — 97530 THERAPEUTIC ACTIVITIES: CPT

## 2020-11-14 PROCEDURE — 95714 VEEG EA 12-26 HR UNMNTR: CPT | Performed by: PSYCHIATRY & NEUROLOGY

## 2020-11-14 PROCEDURE — 94664 DEMO&/EVAL PT USE INHALER: CPT

## 2020-11-14 RX ORDER — ENOXAPARIN SODIUM 100 MG/ML
40 INJECTION SUBCUTANEOUS EVERY 24 HOURS
Status: DISCONTINUED | OUTPATIENT
Start: 2020-11-15 | End: 2020-11-17 | Stop reason: HOSPADM

## 2020-11-14 RX ORDER — LIDOCAINE 4 G/100G
1 PATCH TOPICAL EVERY 24 HOURS
Status: DISCONTINUED | OUTPATIENT
Start: 2020-11-15 | End: 2020-11-17 | Stop reason: HOSPADM

## 2020-11-14 RX ORDER — SODIUM CHLORIDE 9 MG/ML
100 INJECTION, SOLUTION INTRAVENOUS CONTINUOUS
Status: DISCONTINUED | OUTPATIENT
Start: 2020-11-14 | End: 2020-11-17

## 2020-11-14 RX ORDER — ENOXAPARIN SODIUM 100 MG/ML
40 INJECTION SUBCUTANEOUS DAILY
Status: CANCELLED | OUTPATIENT
Start: 2020-11-15

## 2020-11-14 RX ORDER — GABAPENTIN 100 MG/1
100 CAPSULE ORAL DAILY
Status: DISCONTINUED | OUTPATIENT
Start: 2020-11-14 | End: 2020-11-16

## 2020-11-14 RX ADMIN — BUDESONIDE 500 MCG: 0.5 INHALANT RESPIRATORY (INHALATION) at 20:21

## 2020-11-14 RX ADMIN — Medication 10 ML: at 14:52

## 2020-11-14 RX ADMIN — PANTOPRAZOLE SODIUM 40 MG: 40 TABLET, DELAYED RELEASE ORAL at 06:24

## 2020-11-14 RX ADMIN — FAMOTIDINE 20 MG: 20 TABLET, FILM COATED ORAL at 17:39

## 2020-11-14 RX ADMIN — ACETAMINOPHEN 650 MG: 325 TABLET ORAL at 15:36

## 2020-11-14 RX ADMIN — ARFORMOTEROL TARTRATE 15 MCG: 15 SOLUTION RESPIRATORY (INHALATION) at 00:06

## 2020-11-14 RX ADMIN — POTASSIUM CHLORIDE 10 MEQ: 750 TABLET, FILM COATED, EXTENDED RELEASE ORAL at 15:37

## 2020-11-14 RX ADMIN — ARFORMOTEROL TARTRATE 15 MCG: 15 SOLUTION RESPIRATORY (INHALATION) at 10:44

## 2020-11-14 RX ADMIN — AMIODARONE HYDROCHLORIDE 200 MG: 200 TABLET ORAL at 10:24

## 2020-11-14 RX ADMIN — GABAPENTIN 100 MG: 100 CAPSULE ORAL at 17:39

## 2020-11-14 RX ADMIN — PREDNISONE 2.5 MG: 5 TABLET ORAL at 15:38

## 2020-11-14 RX ADMIN — IOPAMIDOL 150 ML: 755 INJECTION, SOLUTION INTRAVENOUS at 13:00

## 2020-11-14 RX ADMIN — ACETAMINOPHEN 650 MG: 325 TABLET ORAL at 03:23

## 2020-11-14 RX ADMIN — SODIUM CHLORIDE 100 ML/HR: 900 INJECTION, SOLUTION INTRAVENOUS at 14:50

## 2020-11-14 RX ADMIN — ARFORMOTEROL TARTRATE 15 MCG: 15 SOLUTION RESPIRATORY (INHALATION) at 20:21

## 2020-11-14 RX ADMIN — METOPROLOL SUCCINATE 25 MG: 25 TABLET, EXTENDED RELEASE ORAL at 15:36

## 2020-11-14 RX ADMIN — SODIUM CHLORIDE 100 ML/HR: 900 INJECTION, SOLUTION INTRAVENOUS at 20:12

## 2020-11-14 RX ADMIN — FAMOTIDINE 20 MG: 20 TABLET, FILM COATED ORAL at 09:16

## 2020-11-14 RX ADMIN — Medication 10 ML: at 06:24

## 2020-11-14 RX ADMIN — LEVOTHYROXINE SODIUM 150 MCG: 0.07 TABLET ORAL at 06:24

## 2020-11-14 RX ADMIN — ENOXAPARIN SODIUM 40 MG: 40 INJECTION SUBCUTANEOUS at 09:16

## 2020-11-14 RX ADMIN — INSULIN GLARGINE 8 UNITS: 100 INJECTION, SOLUTION SUBCUTANEOUS at 09:16

## 2020-11-14 RX ADMIN — PREDNISONE 5 MG: 5 TABLET ORAL at 09:17

## 2020-11-14 RX ADMIN — POTASSIUM CHLORIDE 10 MEQ: 750 TABLET, FILM COATED, EXTENDED RELEASE ORAL at 09:17

## 2020-11-14 RX ADMIN — ACETAMINOPHEN 650 MG: 325 TABLET ORAL at 23:38

## 2020-11-14 RX ADMIN — POTASSIUM CHLORIDE AND SODIUM CHLORIDE: 900; 300 INJECTION, SOLUTION INTRAVENOUS at 03:23

## 2020-11-14 RX ADMIN — BUDESONIDE 500 MCG: 0.5 INHALANT RESPIRATORY (INHALATION) at 00:06

## 2020-11-14 RX ADMIN — BUDESONIDE 500 MCG: 0.5 INHALANT RESPIRATORY (INHALATION) at 10:44

## 2020-11-14 NOTE — PROGRESS NOTES
Bedside and Verbal shift change report given to JONATHAN RN (oncoming nurse) by RAFAEL NORTON RN  (offgoing nurse). Report included the following information SBAR, Kardex and Recent Results. .. Faxed to MRI  Paperwork  To MRI dept and left a message to MRI dept. .    1930-  Bedside and Verbal shift change report given to [facility] [address]  (oncoming nurse) by Madison Guerrero RN  (offgoing nurse). Report included the following information SBAR, Kardex and Recent Results.

## 2020-11-14 NOTE — ROUTINE PROCESS
Bedside shift change report given to Toby Carr (oncoming nurse) by Anita Banegas (offgoing nurse). Report included the following information SBAR, Kardex, ED Summary, Procedure Summary, Intake/Output, MAR, Accordion, Recent Results, Med Rec Status and Cardiac Rhythm NSR.

## 2020-11-14 NOTE — PROGRESS NOTES
Spiritual Care Assessment/Progress Note  [address]      NAME: Augusto Sow      MRN: [id number]  AGE: 65 y.o. SEX: female  Orthodoxy Affiliation: Faith   Language: English     11/14/2020     Total Time (in minutes): 8     Spiritual Assessment begun in [facility] EMERGENCY DEPT through conversation with:         [x]Patient        [] Family    [] Friend(s)        Reason for Consult: Crisis     Spiritual beliefs: (Please include comment if needed)     [] Identifies with a kendell tradition:         [] Supported by a kendell community:            [] Claims no spiritual orientation:           [] Seeking spiritual identity:                [] Adheres to an individual form of spirituality:           [x] Not able to assess:                           Identified resources for coping:      [] Prayer                               [] Music                  [] Guided Imagery     [] Family/friends                 [] Pet visits     [] Devotional reading                         [x] Unknown     [] Other:                                               Interventions offered during this visit: (See comments for more details)    Patient Interventions: Crisis           Plan of Care:     [] Support spiritual and/or cultural needs    [] Support AMD and/or advance care planning process      [] Support grieving process   [] Coordinate Rites and/or Rituals    [] Coordination with community clergy   [] No spiritual needs identified at this time   [] Detailed Plan of Care below (See Comments)  [] Make referral to Music Therapy  [] Make referral to Pet Therapy     [] Make referral to Addiction services  [] Make referral to [facility]  [] Make referral to Spiritual Care Partner  [] No future visits requested        [x] Follow up visits as needed     Comments:   responded to Code Stroke in the ER. Staff working with pt at the time of visit. Pt's  was in the room, but  was unable to interact with him.  Spiritual care will follow up as able or as needed. Visited by: Hugo Hughes.   To page : [phone])

## 2020-11-14 NOTE — ED NOTES
Pt reports that leg pain gets worse with her chest pain. Pt reports that her legs feel like they are swelling and cut off from blood. Cap refill 3 bilaterally. Feet warm and pink. Unable to appreciate pulse in right foot. + on doppler. Left foot present and strong.  Bilateral BP also found to be normal.

## 2020-11-14 NOTE — PROGRESS NOTES
11/14/2020  Case Management Note    3:38 PM  Noted consult for discharge planning--PT and OT attempted to see today but patient became unresponsive, required RRT. Their evaluations will be necessary for discharge planning, will follow up with this patient tomorrow when more stable.      SELINA Nettles

## 2020-11-14 NOTE — PROGRESS NOTES
Report given by nightshift RN. Patient resting in bed at this time. 1140 Physical therapy at bedside getting patient up to walk within a few mins, patient became unresponsive and PT return patient to bed. Call for help--patient unable to talk or open her eyes at this time. RRR called  Dr. Frida Willis arrived at bedside--asking patient questions/assessing the current situation--patient now starting with slurred speech--no words coming out, right side become weak and right leg very pain, then patient started having chest pain--STAT CTA and CT of the head completed. Patient return to room Dr. Sina Nieves on video --assessed with neuro screening/EKG. Patient can say so words now but still complaining of right leg pain, pulses checked can only hear pulse to right foot with dopplers and pulse present to left foot. New orders placed for EEG/ MRI etc. Patient transferred to [facility] and report given. No TPA given per protocol.  Swallowing test performed at bedside--patient passed

## 2020-11-14 NOTE — PROGRESS NOTES
Problem: Mobility Impaired (Adult and Pediatric)  Goal: *Acute Goals and Plan of Care (Insert Text)  Description: FUNCTIONAL STATUS PRIOR TO ADMISSION: Patient was modified independent using a rolling walker and single point cane for functional mobility. HOME SUPPORT PRIOR TO ADMISSION: The patient lived with  but did not require assist.    Physical Therapy Goals  Initiated 11/14/2020  1. Patient will move from supine to sit and sit to supine  in bed with modified independence within 7 day(s). 2.  Patient will transfer from bed to chair and chair to bed with modified independence using the least restrictive device within 7 day(s). 3.  Patient will perform sit to stand with modified independence within 7 day(s). 4.  Patient will ambulate with supervision/set-up for 150 feet with the least restrictive device within 7 day(s). 5.  Patient will ascend/descend 4 stairs with 2 handrail(s) with supervision/set-up within 7 day(s). Outcome: Progressing Towards Goal  Note:   PHYSICAL THERAPY EVALUATION  Patient: Augusto Sow (86 y.o. female)  Date: 11/14/2020  Primary Diagnosis: Syncope [R55]        Precautions:   Fall      ASSESSMENT  Based on the objective data described below, the patient presents with recurrent syncope episodes. Patient has been having increased syncopal episodes. Today attempted orthostatic's, patient able to transition to sit at edge of bed with CGA. MIN A for standing due to symptomatic, returned to sitting and began to pass out. Returned to supine, patient was unresponsive. Nursing notified and RRT called. Patient leave with MD and nurse at bedside, following commands sand talking. Current Level of Function Impacting Discharge (mobility/balance): CGA=MIN A, but required total a for return to supine due to syncope    Functional Outcome Measure:   The patient scored Total: 65/100 on the Barthel Index which is indicative of 35% impaired ability to care for basic self needs/dependency on others. Other factors to consider for discharge:      Patient will benefit from skilled therapy intervention to address the above noted impairments. PLAN :  Recommendations and Planned Interventions: bed mobility training, transfer training, gait training, therapeutic exercises, and therapeutic activities      Frequency/Duration: Patient will be followed by physical therapy:  5 times a week to address goals. Recommendation for discharge: (in order for the patient to meet his/her long term goals)  To be determined:       This discharge recommendation:  A follow-up discussion with the attending provider and/or case management is planned    IF patient discharges home will need the following DME: to be determined (TBD)         SUBJECTIVE:   Patient stated .    OBJECTIVE DATA SUMMARY:   HISTORY:    Past Medical History:   Diagnosis Date    Adverse effect of anesthesia     slow to wake up    Arthritis     Asthma 6/29/2009    CAUSED BY MOLD    Atrial fibrillation ([address] 6/29/2009    CAD (coronary artery disease) 06/29/2009    Celiac disease 2010    Chronic obstructive pulmonary disease ([address] 2004-5    right leg    Chronic pain of right ankle     Diabetes ([address]     Drug induced prednisone, DM2    Diastolic heart failure ([address]     DVT (deep venous thrombosis) ([address] 2004    Right leg post surgery    GERD (gastroesophageal reflux disease)     Gluten intolerance     Heart failure ([address]     Hypertension     Hypothyroidism 6/29/2009    Iron deficiency anemia     Lyme disease     Personal history of MI (myocardial infarction)     Rheumatoid arthritis ([address]     Slow to wake up after anesthesia     Syncope     Post testing- resolved    TIA (transient ischemic attack) 2004 & 2006    Vitamin B12 deficiency 6/29/2009     Past Surgical History:   Procedure Laterality Date    HX ADENOIDECTOMY      HX AFIB ABLATION      HX APPENDECTOMY      HX CHOLECYSTECTOMY  6/16/11    HX COLONOSCOPY      HX CORONARY STENT PLACEMENT      x 2    HX HEART CATHETERIZATION  2010    2 STENTS    HX HEART CATHETERIZATION  2020    HX HYSTERECTOMY      HX LUMBAR LAMINECTOMY  2000    L4-L5    HX ORTHOPAEDIC  -2012    RT. FOOT SURGERY X 6/calcaneal osteotomy    HX ORTHOPAEDIC Right 2020    right hand [facility] joint replacement    HX TONSILLECTOMY  1964       Personal factors and/or comorbidities impacting plan of care: see above    Home Situation  Home Environment: Private residence  # Steps to Enter: 5  Rails to Enter: Yes  One/Two Story Residence: One story  Living Alone: No  Support Systems: Spouse/Significant Other/Partner  Patient Expects to be Discharged to[de-identified] Private residence  Current DME Used/Available at Home: [facility], [place], [facility]    EXAMINATION/PRESENTATION/DECISION MAKING:   Vitals:    20 1044 20 1207 20 1319 20 1322   BP:  129/62 (!) 157/81 (!) 155/78   BP 1 Location:  Left arm     BP Patient Position:  At rest     Pulse:  85 96 93   Resp:   25 21   Temp:       SpO2: 95% 97%     Weight:       Height:           Critical Behavior:              Hearing: Auditory  Auditory Impairment: None    Range Of Motion:  AROM: Within functional limits           PROM: Within functional limits           Strength:    Strength: Within functional limits                    Tone & Sensation:                                  Coordination:     Vision:      Functional Mobility:  Bed Mobility:  Rolling: Contact guard assistance  Supine to Sit: Contact guard assistance  Sit to Supine:  Total assistance     Transfers:  Sit to Stand: Contact guard assistance  Stand to Sit: Minimum assistance                       Balance:      Ambulation/Gait Training:                             Therapeutic Exercises:       Functional Measure:  Barthel Index:    Bathin  Bladder: 10  Bowels: 10  Groomin  Dressing: 10  Feedin  Mobility: 5  Stairs: 0  Toilet Use: 5  Transfer (Bed to Chair and Back): 10  Total: 65/100       The Barthel ADL Index: Guidelines  1. The index should be used as a record of what a patient does, not as a record of what a patient could do. 2. The main aim is to establish degree of independence from any help, physical or verbal, however minor and for whatever reason. 3. The need for supervision renders the patient not independent. 4. A patient's performance should be established using the best available evidence. Asking the patient, friends/relatives and nurses are the usual sources, but direct observation and common sense are also important. However direct testing is not needed. 5. Usually the patient's performance over the preceding 24-48 hours is important, but occasionally longer periods will be relevant. 6. Middle categories imply that the patient supplies over 50 per cent of the effort. 7. Use of aids to be independent is allowed. Indio Garcia., Barthel, D.W. (3168). Functional evaluation: the Barthel Index. [facility] (14)2. Abby Kelley rachel BRET Watkins, Pascual Webster., Jil Hurtado., [place], [address] [place] [address] (1999). Measuring the change indisability after inpatient rehabilitation; comparison of the responsiveness of the Barthel Index and Functional Beltrami Measure. Journal of Neurology, Neurosurgery, and Psychiatry, 66(4), 027-772. Jesus Ramirez, N.J.A, KARIE Otoole, & Iliana Trevino, M.A. (2004.) Assessment of post-stroke quality of life in cost-effectiveness studies: The usefulness of the Barthel Index and the EuroQoL-5D.  Quality of Life Research, 15, 496-68        Physical Therapy Evaluation Charge Determination   History Examination Presentation Decision-Making   HIGH Complexity :3+ comorbidities / personal factors will impact the outcome/ POC  MEDIUM Complexity : 3 Standardized tests and measures addressing body structure, function, activity limitation and / or participation in recreation  HIGH Complexity : Unstable and unpredictable characteristics  Other outcome measures barthel index  MEDIUM Based on the above components, the patient evaluation is determined to be of the following complexity level: MEDIUM    Pain Rating:  None reported    Activity Tolerance:   Poor    After treatment patient left in no apparent distress:   Supine in bed, Call bell within reach, Bed / chair alarm activated, and Caregiver / family present    COMMUNICATION/EDUCATION:   The patients plan of care was discussed with: Occupational therapist, Registered nurse, and Physician. Fall prevention education was provided and the patient/caregiver indicated understanding., Patient/family have participated as able in goal setting and plan of care. , and Patient/family agree to work toward stated goals and plan of care.     Thank you for this referral.  Gm Celaya, PT, DPT   Time Calculation: 13 mins

## 2020-11-14 NOTE — PROGRESS NOTES
Occupational Therapy:  11/14/20    Orders received and appreciated, chart reviewed, spoke to PT who reports pt with syncopal episode during PT eval, became unresponsive and required RRT. Will defer OT evaluation and follow up.      Thank you,  Nayeli Mai OTR/L

## 2020-11-14 NOTE — PROGRESS NOTES
Evelin Hairston  [address] [place] [address] [phone]            NAME:  Maurice Overall   :   1950   MRN:   [id number]     Assessment/Plan:   · Recurrent syncope. No recurrence. Rhythm normal.   · Transient speech slurring/left arm numbness. noncontrast head CT nl. No recurrence  · PAF on amiodarone. Currently sinus  · S/p SUHAS ligation 10/21  · Recurrent chest pains. Doubt myocardial ischemia  · CAD with previous PCI  · Elevated WBC, improved  · Chronic HFpEF, stable  · Left ankle pain. No swelling. Doubt DVT       Cardiac wise, she is stable for discharge home  Should be getting loop recorder any day  Has appt with Dr Arianna Mccrary on Tues    Will see again in hospital as needed  Subjective:   Cardiac ROS: feels better today. Patient denies any exertional chest pain, dyspnea, palpitations, syncope, orthopnea, edema or paroxysmal nocturnal dyspnea. Has left ankle pain. Review of Systems: No nausea, indigestion, vomiting, , cough, sputum. No bleeding. Taking po. Appetite fair      Objective:     Visit Vitals  BP (!) 100/52   Pulse 61   Temp 97.7 °F (36.5 °C)   Resp 17   Ht 5' 2\" (1.575 m)   Wt 165 lb (74.8 kg)   LMP 1980 (LMP Unknown)   SpO2 93%   BMI 30.18 kg/m²      O2 Device: Room air    Temp (24hrs), Av.7 °F (36.5 °C), Min:97.7 °F (36.5 °C), Max:97.7 °F (36.5 °C)      No intake/output data recorded. No intake/output data recorded. TELE: sinus    General: AAOx3 cooperative, no acute distress. HEENT: Atraumatic. Pink and moist.  Anicteric sclerae. Neck : Supple, no thyromegaly. Lungs: CTA bilaterally. No wheezing/rhonchi/rales. Heart: Regular rhythm, no murmur, no rubs, no gallops. No JVD. No carotid bruits. Abdomen: Soft, non-distended, non-tender. + Bowel sounds. No bruits. Extremities: No edema, no clubbing, no cyanosis. No calf tenderness  Neurologic: Grossly intact. Alert and oriented X 3.   No acute neurological distress. Psych: Good insight. Not anxious nor agitated. Additional comments: None    Care Plan discussed with:    Comments   Patient x    Family  x    RN     Care Manager                    Consultant:          Data Review:     EKG    Echo    No results for input(s): TNIPOC in the last 72 hours. No lab exists for component: ITNL   Recent Labs     11/13/20  1811 11/13/20  1150   TROIQ 0.23* 0.19*     Recent Labs     11/14/20  0304 11/13/20  1811 11/13/20  1150    137 138   K 4.8 4.5 3.0*   * 101 99   CO2 28 30 34*   BUN 22* 24* 27*   CREA 0.72 0.81 0.85   * 116* 146*   MG 2.2  --  2.0   ALB 2.8* 3.4* 3.4*   WBC 17.7*  --  22.6*   HGB 13.1  --  15.5   HCT 40.5  --  47.2*     --  222     No results for input(s): INR, PTP, APTT, INREXT in the last 72 hours.     Medications reviewed  Current Facility-Administered Medications   Medication Dose Route Frequency    arformoteroL (BROVANA) neb solution 15 mcg  15 mcg Nebulization BID RT    budesonide (PULMICORT) 500 mcg/2 ml nebulizer suspension  500 mcg Nebulization BID RT    levalbuterol (XOPENEX) nebulizer soln 0.63 mg/3 mL  0.63 mg Nebulization Q6H PRN    amiodarone (CORDARONE) tablet 200 mg  200 mg Oral DAILY    insulin glargine (LANTUS) injection 8 Units  8 Units SubCUTAneous DAILY    pantoprazole (PROTONIX) tablet 40 mg  40 mg Oral ACB    levothyroxine (SYNTHROID) tablet 150 mcg  150 mcg Oral ACB    metoprolol succinate (TOPROL-XL) XL tablet 25 mg  25 mg Oral PCL    potassium chloride SR (KLOR-CON 10) tablet 10 mEq  10 mEq Oral BID    predniSONE (DELTASONE) tablet 2.5 mg  2.5 mg Oral PCL    glucose chewable tablet 16 g  4 Tab Oral PRN    dextrose (D50W) injection syrg 12.5-25 g  25-50 mL IntraVENous PRN    glucagon (GLUCAGEN) injection 1 mg  1 mg IntraMUSCular PRN    sodium chloride (NS) flush 5-40 mL  5-40 mL IntraVENous Q8H    sodium chloride (NS) flush 5-40 mL  5-40 mL IntraVENous PRN    acetaminophen (TYLENOL) tablet 650 mg  650 mg Oral Q6H PRN    Or    acetaminophen (TYLENOL) suppository 650 mg  650 mg Rectal Q6H PRN    polyethylene glycol (MIRALAX) packet 17 g  17 g Oral DAILY PRN    ondansetron (ZOFRAN ODT) tablet 4 mg  4 mg Oral Q8H PRN    Or    ondansetron (ZOFRAN) injection 4 mg  4 mg IntraVENous Q6H PRN    famotidine (PEPCID) tablet 20 mg  20 mg Oral BID    enoxaparin (LOVENOX) injection 40 mg  40 mg SubCUTAneous DAILY    insulin lispro (HUMALOG) injection   SubCUTAneous AC&HS    0.9% sodium chloride with KCl 40 mEq/L infusion   IntraVENous CONTINUOUS    artificial tears (dextran-hypromellose-glycerin) (GENTEAL) ophthalmic solution 1 Drop  1 Drop Both Eyes QID PRN    predniSONE (DELTASONE) tablet 5 mg  5 mg Oral DAILY WITH BREAKFAST    loperamide (IMODIUM) capsule 2 mg  2 mg Oral DAILY PRN     Current Outpatient Medications   Medication Sig    amiodarone (CORDARONE) 200 mg tablet Take 1 Tab by mouth daily.  predniSONE (DELTASONE) 2.5 mg tablet Take 1 Tab by mouth daily (after lunch). 5 mg in morning and 2.5 mg afternoon  Resume your usual dose of prednisone after you complete the prednisone taper    acetaminophen (TYLENOL) 650 mg TbER Take 1,300 mg by mouth two (2) times a day.  bumetanide (BUMEX) 1 mg tablet Take 0.5 mg by mouth every evening. 1/2 tablet = 0.5 mg    coenzyme Q-10 (Co Q-10) 200 mg capsule Take 200 mg by mouth daily.  artificial tears, dextran 70-hypromellose, (GenTeal Tears Mild) 0.1-0.3 % ophthalmic solution Administer 1 Drop to both eyes nightly.  carboxymethylcell/hypromellose (GENTEAL GEL OP) Administer 1 Drop to both eyes as needed (dry eye).  omega 3-DHA-EPA-fish oil 1,000 mg (120 mg-180 mg) capsule Take 1 Cap by mouth every evening.  levalbuterol (XOPENEX) 0.63 mg/3 mL nebu 0.63 mg by Nebulization route four (4) times daily.  metoprolol succinate (TOPROL-XL) 25 mg XL tablet Take 1 Tab by mouth daily (after lunch).     aclidinium bromide (Tudorza Pressair) 400 mcg/actuation inhaler Take 1 Puff by inhalation daily.  calcium citrate-vitamin d3 (CITRACAL+D) 315 mg-5 mcg (200 unit) tab Take 1 Tab by mouth daily (with breakfast).  gabapentin (NEURONTIN) 100 mg capsule Take 100 mg by mouth nightly.  mometasone furoate (ASMANEX HFA IN) Take 100 mcg by inhalation two (2) times a day.  potassium 99 mg tablet Take 99 mg by mouth daily.  insulin glargine (LANTUS,BASAGLAR) 100 unit/mL (3 mL) inpn 8 Units by SubCUTAneous route Daily (before breakfast). 30 minutes before breakfast    denosumab (PROLIA SC) by SubCUTAneous route every 6 months.  bumetanide (BUMEX) 1 mg tablet Take 1 mg by mouth daily.  eplerenone (INSPRA) 25 mg tablet Take 25 mg by mouth Daily (before lunch).  fenofibrate nanocrystallized (Tricor) 48 mg tablet Take 48 mg by mouth nightly.  nitroglycerin (NITRODUR) 0.1 mg/hr 1 Patch by TransDERmal route daily as needed (chest pain).  levothyroxine (SYNTHROID) 150 mcg tablet Take 150 mcg by mouth Daily (before breakfast).  predniSONE (DELTASONE) 5 mg tablet Take 5 mg by mouth daily. 5 mg in morning and 2.5 mg afternoon    cholecalciferol, vitamin D3, (VITAMIN D3) 2,000 unit tab Take 2,000 Units by mouth daily.  turmeric (CURCUMIN) Take 1 g by mouth every evening.  SITagliptin (JANUVIA) 100 mg tablet Take 100 mg by mouth daily (with dinner).  vit C/vit E ac/lut/copper/zinc (PRESERVISION LUTEIN PO) Take 1 Tab by mouth every Monday. Takes with dinner    lidocaine (LIDODERM) 5 % 1 Patch by TransDERmal route every twenty-four (24) hours. Apply patch to the affected area for 12 hours a day and remove for 12 hours a day.  ascorbic acid (VITAMIN C) 500 mg tablet Take 500 mg by mouth daily.  ferrous sulfate 325 mg (65 mg iron) tablet Take 325 mg by mouth daily (with lunch).     cyanocobalamin (VITAMIN B12) 1,000 mcg/mL injection INJECT 1 ML INTO THE MUSCLE EVERY 30 DAYS    lansoprazole (PREVACID) 30 mg capsule Take 30 mg by mouth daily.         Kit Castañeda MD

## 2020-11-14 NOTE — PROGRESS NOTES
Hospitalist Progress Note    NAME: Jenni Shrestha   :  1950   MRN:  [id number]       3602  Patient seen. She is feeling better and is back to baseline. She states her RLE pain is worse than usual.  She takes neurontin 100mg nightly for this. I will resume. To have LE arterial/venous dopplers. Assessment / Plan:  Recurrent syncope  -- syncope today a/w sitting up followed by AMS/twitching mouth/UE. Code stroke called and patient sent to CT. I spoke with Dr. Magdalena Ku and he asked for CTA which was ordered. Dr. Nichols Ran to evaluate once back in room. I have also consulted inhouse neuro. DDx includes orthostatic syncope, stroke, seizure. Will need close monitoring with neuro checks. CT Chest yesterday w/o PE, on Lovenox. Will obtain MRI brain and EEG. PAF/flutter s/p SUHAS  Ligation -- cardiology following. On amio, metoprolol    CAD/Diastolic heart failure/HTN/recurrent CP -- continue metoprolol. Cardiology saw prior to second episode of syncope today and felt she was stable for DC. I did notify them of syncope/ams. Recurrent CP not felt to represent ischemia. Leukocytosis -- on steroids, no evidence of infection (UA/UC, BC negative so far). Improved today. Left ankle pain -- BLE Doppler ordered. Will obtain arterial and venous studies. Diabetes mellitus type 2, controlled - Diabetic diet and counseling. SSI per protocol. Continue home Lantus and hold [place] and [place]. A1c done recently     Rheumatoid arthritis / Chronic steroid use - continue low dose prednisone, lidoderm and gabapentin. Hold Prolia     Asthma - Appears stable. Brovana, pulmicort, and Prn xopenex     Esophageal reflux - Stable. continue pepcid    Anemia -- mild drop. Recheck in AM.    Hypothyroidism - Continue synthroid, TSH normal           Body mass index is 30.18 kg/m². Code status:  Full  Prophylaxis: Lovenox  Recommended Disposition: home?      Subjective:     Chief Complaint / Reason for Physician Visit  My left foot hurts.  Its better now. Patient seen this morning and was doing well. She said earlier her left foot/ankle hurt but is has resolved. She told me she always has pain in RLE. She had a little slurred speech earlier but that resolved, too. I was then called to see patient as Rapid Response. She had syncopal episode when was sitting up for PT. She then stopped speaking. On my exam, she will open eyes and follow simple commands. She then noted to have twitching of UEs and mouth and has hesitancy when speaking. RN tells me no rhythm change except when they were arousing her by sternal stimulation. I spoke with Dr. Maycol Lynch and CT/CTA head and neck ordered. Those are prelim negative. He then examined her and called me back. He does not feel tPA is indicated as this is not clearly stroke. He asked that we order MRI brain and EEG. RN then called and patient is c/o RLE pain. Will check arterial doppler. Review of Systems:  Negative except as in HPI  Symptom Y/N Comments  Symptom Y/N Comments   Fever/Chills    Chest Pain     Poor Appetite    Edema     Cough    Abdominal Pain     Sputum    Joint Pain     SOB/CHACON    Pruritis/Rash     Nausea/vomit    Tolerating PT/OT     Diarrhea    Tolerating Diet     Constipation    Other       Could NOT obtain due to:      Objective:     VITALS:   Last 24hrs VS reviewed since prior progress note.  Most recent are:  Patient Vitals for the past 24 hrs:   Pulse Resp BP SpO2   11/14/20 1207 85 -- 129/62 97 %   11/14/20 1044 -- -- -- 95 %   11/14/20 0600 61 17 (!) 100/52 93 %   11/14/20 0500 66 18 104/81 93 %   11/14/20 0439 74 18 104/62 95 %   11/14/20 0007 73 16 -- 100 %   11/13/20 2200 73 18 (!) 121/56 94 %   11/13/20 2100 71 20 121/64 94 %   11/13/20 1900 82 19 (!) 100/57 95 %   11/13/20 1811 74 16 (!) 101/57 94 %   11/13/20 1615 73 22 125/79 92 %   11/13/20 1600 73 17 (!) 143/85 93 %   11/13/20 1430 71 19 116/68 95 %   11/13/20 1415 72 14 (!) 107/58 93 %   11/13/20 1400 67 17 117/67 95 %   11/13/20 1315 65 16 95/63 94 %     No intake or output data in the 24 hours ending 11/14/20 1302     PHYSICAL EXAM:  General: WD, WN. Alert, cooperative, no acute distress    EENT:  EOMI. Anicteric sclerae. MMM  Resp:  CTA bilaterally, no wheezing or rales. No accessory muscle use  CV:  Regular  rhythm,  No edema  GI:  Soft, Non distended, Non tender.  +Bowel sounds  Neurologic:  Alert and oriented X 3, normal speech,   Psych:   Good insight. Not anxious nor agitated  Skin:  No rashes. No jaundice    Second exam:  Patient lethargic but will open eyes. Moves all extremities but difficult to assess strength. Twitching on UEs and has stuttering/hesistancy of speech. CT/CTA head/neck prelim:      Evaluation of the perfusion data demonstrates no evidence of large vessel  occlusion.     Evaluation of the CTA data demonstrates mild atherosclerosis of the carotid  arteries in the neck, without flow-limiting stenosis. The vessels are ectatic. The vertebral arteries are codominant. Within the head, there is no evidence of  large vessel occlusion. There are no acute or marginal abnormalities.     There are severe degenerative changes within the cervical spine. Reviewed most current lab test results and cultures  YES  Reviewed most current radiology test results   YES  Review and summation of old records today    NO  Reviewed patient's current orders and MAR    YES  PMH/SH reviewed - no change compared to H&P  ________________________________________________________________________  Care Plan discussed with:      Comments   Patient X    Family  X    RN X    Care Manager     Consultant  X Tele neuro                     Multidiciplinary team rounds were held today with , nursing, pharmacist and clinical coordinator. Patient's plan of care was discussed; medications were reviewed and discharge planning was addressed. ________________________________________________________________________  Total NON critical care TIME:  50   Minutes    Total CRITICAL CARE TIME Spent:   Minutes non procedure based      Comments   >50% of visit spent in counseling and coordination of care     ________________________________________________________________________  Catie Ahuja MD     Procedures: see electronic medical records for all procedures/Xrays and details which were not copied into this note but were reviewed prior to creation of Plan. LABS:  I reviewed today's most current labs and imaging studies.   Pertinent labs include:  Recent Labs     11/14/20  0304 11/13/20  1150   WBC 17.7* 22.6*   HGB 13.1 15.5   HCT 40.5 47.2*    222     Recent Labs     11/14/20  0304 11/13/20  1811 11/13/20  1150    137 138   K 4.8 4.5 3.0*   * 101 99   CO2 28 30 34*   * 116* 146*   BUN 22* 24* 27*   CREA 0.72 0.81 0.85   CA 7.7* 8.7 9.0   MG 2.2  --  2.0   ALB 2.8* 3.4* 3.4*   TBILI 0.5 0.7 0.5   ALT 20 25 24       Signed: Catie Ahuja MD

## 2020-11-15 ENCOUNTER — HOSPITAL ENCOUNTER (INPATIENT)
Dept: MRI IMAGING | Age: 70
Discharge: HOME | DRG: 312 | End: 2020-11-15
Attending: INTERNAL MEDICINE
Payer: MEDICARE

## 2020-11-15 LAB
ANION GAP SERPL CALC-SCNC: 5 MMOL/L (ref 5–15)
BASOPHILS # BLD: 0.1 K/UL (ref 0–0.1)
BASOPHILS NFR BLD: 1 % (ref 0–1)
BUN SERPL-MCNC: 12 MG/DL (ref 6–20)
BUN/CREAT SERPL: 18 (ref 12–20)
CALCIUM SERPL-MCNC: 7.9 MG/DL (ref 8.5–10.1)
CHLORIDE SERPL-SCNC: 113 MMOL/L (ref 97–108)
CHOLEST SERPL-MCNC: 205 MG/DL
CO2 SERPL-SCNC: 24 MMOL/L (ref 21–32)
CREAT SERPL-MCNC: 0.65 MG/DL (ref 0.55–1.02)
DIFFERENTIAL METHOD BLD: ABNORMAL
EOSINOPHIL # BLD: 0.4 K/UL (ref 0–0.4)
EOSINOPHIL NFR BLD: 3 % (ref 0–7)
ERYTHROCYTE [DISTWIDTH] IN BLOOD BY AUTOMATED COUNT: 13.1 % (ref 11.5–14.5)
EST. AVERAGE GLUCOSE BLD GHB EST-MCNC: 163 MG/DL
GLUCOSE BLD STRIP.AUTO-MCNC: 103 MG/DL (ref 65–100)
GLUCOSE BLD STRIP.AUTO-MCNC: 146 MG/DL (ref 65–100)
GLUCOSE BLD STRIP.AUTO-MCNC: 158 MG/DL (ref 65–100)
GLUCOSE BLD STRIP.AUTO-MCNC: 189 MG/DL (ref 65–100)
GLUCOSE SERPL-MCNC: 108 MG/DL (ref 65–100)
HBA1C MFR BLD: 7.3 % (ref 4–5.6)
HCT VFR BLD AUTO: 40.7 % (ref 35–47)
HDLC SERPL-MCNC: 46 MG/DL
HDLC SERPL: 4.5 {RATIO} (ref 0–5)
HGB BLD-MCNC: 12.9 G/DL (ref 11.5–16)
IMM GRANULOCYTES # BLD AUTO: 0.4 K/UL (ref 0–0.04)
IMM GRANULOCYTES NFR BLD AUTO: 3 % (ref 0–0.5)
LDLC SERPL CALC-MCNC: ABNORMAL MG/DL (ref 0–100)
LDLC SERPL DIRECT ASSAY-MCNC: 88 MG/DL (ref 0–100)
LIPID PROFILE,FLP: ABNORMAL
LYMPHOCYTES # BLD: 1.9 K/UL (ref 0.8–3.5)
LYMPHOCYTES NFR BLD: 15 % (ref 12–49)
MCH RBC QN AUTO: 30.8 PG (ref 26–34)
MCHC RBC AUTO-ENTMCNC: 31.7 G/DL (ref 30–36.5)
MCV RBC AUTO: 97.1 FL (ref 80–99)
MONOCYTES # BLD: 1.3 K/UL (ref 0–1)
MONOCYTES NFR BLD: 10 % (ref 5–13)
NEUTS SEG # BLD: 8.7 K/UL (ref 1.8–8)
NEUTS SEG NFR BLD: 68 % (ref 32–75)
NRBC # BLD: 0 K/UL (ref 0–0.01)
NRBC BLD-RTO: 0 PER 100 WBC
PLATELET # BLD AUTO: 162 K/UL (ref 150–400)
PMV BLD AUTO: 10.3 FL (ref 8.9–12.9)
POTASSIUM SERPL-SCNC: 4.1 MMOL/L (ref 3.5–5.1)
RBC # BLD AUTO: 4.19 M/UL (ref 3.8–5.2)
RBC MORPH BLD: ABNORMAL
SERVICE CMNT-IMP: ABNORMAL
SODIUM SERPL-SCNC: 142 MMOL/L (ref 136–145)
TRIGL SERPL-MCNC: 514 MG/DL (ref ?–150)
VLDLC SERPL CALC-MCNC: ABNORMAL MG/DL
WBC # BLD AUTO: 12.8 K/UL (ref 3.6–11)

## 2020-11-15 PROCEDURE — 65660000000 HC RM CCU STEPDOWN

## 2020-11-15 PROCEDURE — 70551 MRI BRAIN STEM W/O DYE: CPT

## 2020-11-15 PROCEDURE — 74011250637 HC RX REV CODE- 250/637: Performed by: INTERNAL MEDICINE

## 2020-11-15 PROCEDURE — 80061 LIPID PANEL: CPT

## 2020-11-15 PROCEDURE — 99223 1ST HOSP IP/OBS HIGH 75: CPT | Performed by: PSYCHIATRY & NEUROLOGY

## 2020-11-15 PROCEDURE — 80048 BASIC METABOLIC PNL TOTAL CA: CPT

## 2020-11-15 PROCEDURE — 74011636637 HC RX REV CODE- 636/637: Performed by: INTERNAL MEDICINE

## 2020-11-15 PROCEDURE — 83721 ASSAY OF BLOOD LIPOPROTEIN: CPT

## 2020-11-15 PROCEDURE — 74011250637 HC RX REV CODE- 250/637: Performed by: HOSPITALIST

## 2020-11-15 PROCEDURE — 74011000250 HC RX REV CODE- 250: Performed by: INTERNAL MEDICINE

## 2020-11-15 PROCEDURE — 97530 THERAPEUTIC ACTIVITIES: CPT

## 2020-11-15 PROCEDURE — 74011250636 HC RX REV CODE- 250/636: Performed by: INTERNAL MEDICINE

## 2020-11-15 PROCEDURE — 94640 AIRWAY INHALATION TREATMENT: CPT

## 2020-11-15 PROCEDURE — 82962 GLUCOSE BLOOD TEST: CPT

## 2020-11-15 PROCEDURE — 97165 OT EVAL LOW COMPLEX 30 MIN: CPT

## 2020-11-15 PROCEDURE — 83036 HEMOGLOBIN GLYCOSYLATED A1C: CPT

## 2020-11-15 PROCEDURE — 85025 COMPLETE CBC W/AUTO DIFF WBC: CPT

## 2020-11-15 PROCEDURE — 36415 COLL VENOUS BLD VENIPUNCTURE: CPT

## 2020-11-15 RX ORDER — GABAPENTIN 100 MG/1
100 CAPSULE ORAL
Status: DISCONTINUED | OUTPATIENT
Start: 2020-11-15 | End: 2020-11-16

## 2020-11-15 RX ORDER — LABETALOL HCL 20 MG/4 ML
10 SYRINGE (ML) INTRAVENOUS
Status: DISCONTINUED | OUTPATIENT
Start: 2020-11-15 | End: 2020-11-17 | Stop reason: HOSPADM

## 2020-11-15 RX ADMIN — SODIUM CHLORIDE 100 ML/HR: 900 INJECTION, SOLUTION INTRAVENOUS at 07:53

## 2020-11-15 RX ADMIN — PREDNISONE 2.5 MG: 5 TABLET ORAL at 12:30

## 2020-11-15 RX ADMIN — Medication 10 ML: at 22:16

## 2020-11-15 RX ADMIN — BUDESONIDE 500 MCG: 0.5 INHALANT RESPIRATORY (INHALATION) at 09:07

## 2020-11-15 RX ADMIN — ARFORMOTEROL TARTRATE 15 MCG: 15 SOLUTION RESPIRATORY (INHALATION) at 09:07

## 2020-11-15 RX ADMIN — GABAPENTIN 100 MG: 100 CAPSULE ORAL at 07:52

## 2020-11-15 RX ADMIN — ACETAMINOPHEN 650 MG: 325 TABLET ORAL at 22:16

## 2020-11-15 RX ADMIN — PREDNISONE 5 MG: 5 TABLET ORAL at 07:52

## 2020-11-15 RX ADMIN — INSULIN GLARGINE 8 UNITS: 100 INJECTION, SOLUTION SUBCUTANEOUS at 08:04

## 2020-11-15 RX ADMIN — GABAPENTIN 100 MG: 100 CAPSULE ORAL at 22:16

## 2020-11-15 RX ADMIN — ENOXAPARIN SODIUM 40 MG: 40 INJECTION SUBCUTANEOUS at 08:52

## 2020-11-15 RX ADMIN — FAMOTIDINE 20 MG: 20 TABLET, FILM COATED ORAL at 17:26

## 2020-11-15 RX ADMIN — PANTOPRAZOLE SODIUM 40 MG: 40 TABLET, DELAYED RELEASE ORAL at 06:57

## 2020-11-15 RX ADMIN — METOPROLOL SUCCINATE 25 MG: 25 TABLET, EXTENDED RELEASE ORAL at 12:30

## 2020-11-15 RX ADMIN — BUDESONIDE 500 MCG: 0.5 INHALANT RESPIRATORY (INHALATION) at 20:00

## 2020-11-15 RX ADMIN — ACETAMINOPHEN 650 MG: 325 TABLET ORAL at 12:29

## 2020-11-15 RX ADMIN — Medication 10 ML: at 08:52

## 2020-11-15 RX ADMIN — ARFORMOTEROL TARTRATE 15 MCG: 15 SOLUTION RESPIRATORY (INHALATION) at 20:00

## 2020-11-15 RX ADMIN — POTASSIUM CHLORIDE 10 MEQ: 750 TABLET, FILM COATED, EXTENDED RELEASE ORAL at 17:26

## 2020-11-15 RX ADMIN — LEVOTHYROXINE SODIUM 150 MCG: 0.07 TABLET ORAL at 06:57

## 2020-11-15 RX ADMIN — SODIUM CHLORIDE 100 ML/HR: 900 INJECTION, SOLUTION INTRAVENOUS at 18:29

## 2020-11-15 RX ADMIN — FAMOTIDINE 20 MG: 20 TABLET, FILM COATED ORAL at 07:52

## 2020-11-15 RX ADMIN — POTASSIUM CHLORIDE 10 MEQ: 750 TABLET, FILM COATED, EXTENDED RELEASE ORAL at 07:52

## 2020-11-15 RX ADMIN — AMIODARONE HYDROCHLORIDE 200 MG: 200 TABLET ORAL at 07:52

## 2020-11-15 RX ADMIN — ACETAMINOPHEN 650 MG: 325 TABLET ORAL at 06:57

## 2020-11-15 NOTE — PROGRESS NOTES
Jerardo Vyas [facility] [address]  [address] [facility], [place], [address] [place] [address]  [phone]      Medical Progress Note      NAME: Naida Plasencia   :  1950  MRM:  [id number]    Date/Time: 11/15/2020        Assessment / Plan:     64 yo F w/ hx of PAF s/p recent SUHAS ligation, HFpEF, CAD, HTN, DM, RA, recent admission for CP with normal stress test presenting with recurrent syncope    Recurrent syncope: code S yesterday, AMS/twitching mouth/UE. New stuttering of speech. No acute processes on CT/CTA. MRI brain and EEG pending. Neurology evaluation pending     PAF/flutter s/p SUHAS ligation: Evaluated by cardiology, cont amiodarone, metoprolol. Cardiology was notified by Dr. Obdulia Shah of episode yesterday with syncope/AMS.    CAD: recently admitted for recurrent chest pain. Had normal nuclear stress test and recurrent CP not felt to represent ischemia. Not on antiplatelet or statin; defer to cardiology    Diastolic heart failure: euvolemic. Continue BB    HTN: BP high. Cont metoprolol. Repeat blood pressure now to determine need to add antihypertensive. IV labetalol PRN    Leukocytosis: chronic, on steroids, no evidence of infection. Improved; monitor WBC     Left ankle pain: does have hx of Ra. Negative venous duplex. Better today. Monitor      Diabetes mellitus type 2, controlled: A1c 7.2%. Cont Lantus, SSI     Rheumatoid arthritis / Chronic steroid use: continue low dose prednisone, lidoderm and gabapentin. On Prolia at home    GERD: cont PPI     Asthma: no wheezing. Cont nebs, ICS/LABA     Hypothyroidism: TSH WNR. Continue LT4       Total time spent: 40 minutes  Time spent in the care of this patient including reviewing records, discussing with nursing and/or other providers on the treatment team, obtaining history and examining the patient, and discussing treatment plans.                   Care Plan discussed with: Patient, Nursing Staff and >50% of time spent in counseling and coordination of care    Discussed:  Care Plan and D/C Planning    Prophylaxis:  Lovenox    Disposition:  Home w/Family         Subjective:     Chief Complaint:  Follow up syncope    Chart/notes/labs/studies reviewed, patient examined. RLE weakness and numbness improved. Stuttering speech. No CP or HA          Objective:       Vitals:        Last 24hrs VS reviewed since prior progress note. Most recent are:    Visit Vitals  BP (!) 190/89 (BP 1 Location: Left arm, BP Patient Position: Sitting;Post activity)   Pulse 82   Temp 97.5 °F (36.4 °C)   Resp 18   Ht 5' 2\" (1.575 m)   Wt 77 kg (169 lb 12.1 oz)   SpO2 98%   BMI 31.05 kg/m²     SpO2 Readings from Last 6 Encounters:   11/15/20 98%   11/06/20 95%   10/29/20 96%   10/22/20 91%   10/15/20 97%   10/15/20 96%            Intake/Output Summary (Last 24 hours) at 11/15/2020 1025  Last data filed at 11/15/2020 0240  Gross per 24 hour   Intake 2172 ml   Output 3450 ml   Net -1278 ml          Exam:     Physical Exam:    Gen:  Well-developed, well-nourished, in no acute distress  HEENT:  Atraumatic, normocephalic. Sclerae nonicteric, hearing intact to voice  Neck:  Supple, without apparent masses. Resp:  No accessory muscle use, CTAB without wheezes, rales, or rhonchi  Card: RRR, without m/r/g. No LE edema. Abd:  +bowel sounds, soft, NTTP, nondistended. No HSM. Neuro: CN3-12 intact. Speech stuttering. No focal weakness or numbness. Follows commands appropriately. Psych:  Alert, oriented x 3.  Good insight     Medications Reviewed: (see below)    Lab Data Reviewed: (see below)    ______________________________________________________________________    Medications:     Current Facility-Administered Medications   Medication Dose Route Frequency    enoxaparin (LOVENOX) injection 40 mg  40 mg SubCUTAneous Q24H    0.9% sodium chloride infusion  100 mL/hr IntraVENous CONTINUOUS    gabapentin (NEURONTIN) capsule 100 mg  100 mg Oral DAILY    lidocaine 4 % patch 1 Patch  1 Patch TransDERmal Q24H    arformoteroL (BROVANA) neb solution 15 mcg  15 mcg Nebulization BID RT    budesonide (PULMICORT) 500 mcg/2 ml nebulizer suspension  500 mcg Nebulization BID RT    levalbuterol (XOPENEX) nebulizer soln 0.63 mg/3 mL  0.63 mg Nebulization Q6H PRN    amiodarone (CORDARONE) tablet 200 mg  200 mg Oral DAILY    insulin glargine (LANTUS) injection 8 Units  8 Units SubCUTAneous DAILY    pantoprazole (PROTONIX) tablet 40 mg  40 mg Oral ACB    levothyroxine (SYNTHROID) tablet 150 mcg  150 mcg Oral ACB    metoprolol succinate (TOPROL-XL) XL tablet 25 mg  25 mg Oral PCL    potassium chloride SR (KLOR-CON 10) tablet 10 mEq  10 mEq Oral BID    predniSONE (DELTASONE) tablet 2.5 mg  2.5 mg Oral PCL    glucose chewable tablet 16 g  4 Tab Oral PRN    dextrose (D50W) injection syrg 12.5-25 g  25-50 mL IntraVENous PRN    glucagon (GLUCAGEN) injection 1 mg  1 mg IntraMUSCular PRN    sodium chloride (NS) flush 5-40 mL  5-40 mL IntraVENous Q8H    sodium chloride (NS) flush 5-40 mL  5-40 mL IntraVENous PRN    acetaminophen (TYLENOL) tablet 650 mg  650 mg Oral Q6H PRN    Or    acetaminophen (TYLENOL) suppository 650 mg  650 mg Rectal Q6H PRN    polyethylene glycol (MIRALAX) packet 17 g  17 g Oral DAILY PRN    ondansetron (ZOFRAN ODT) tablet 4 mg  4 mg Oral Q8H PRN    Or    ondansetron (ZOFRAN) injection 4 mg  4 mg IntraVENous Q6H PRN    famotidine (PEPCID) tablet 20 mg  20 mg Oral BID    insulin lispro (HUMALOG) injection   SubCUTAneous AC&HS    artificial tears (dextran-hypromellose-glycerin) (GENTEAL) ophthalmic solution 1 Drop  1 Drop Both Eyes QID PRN    predniSONE (DELTASONE) tablet 5 mg  5 mg Oral DAILY WITH BREAKFAST    loperamide (IMODIUM) capsule 2 mg  2 mg Oral DAILY PRN            Lab Review:     Recent Labs     11/15/20  0650 11/14/20  0304 11/13/20  1150   WBC 12.8* 17.7* 22.6*   HGB 12.9 13.1 15.5   HCT 40.7 40.5 47.2*    179 222     Recent Labs     11/15/20  0650 11/14/20  0304 11/13/20  1811 11/13/20  1150    141 137 138   K 4.1 4.8 4.5 3.0*   * 109* 101 99   CO2 24 28 30 34*   * 121* 116* 146*   BUN 12 22* 24* 27*   CREA 0.65 0.72 0.81 0.85   CA 7.9* 7.7* 8.7 9.0   MG  --  2.2  --  2.0   ALB  --  2.8* 3.4* 3.4*   ALT  --  20 25 24     No components found for: GLPOC  No results for input(s): PH, PCO2, PO2, HCO3, FIO2 in the last 72 hours. No results for input(s): INR, INREXT in the last 72 hours. No results found for: SDES  Lab Results   Component Value Date/Time    Culture result: NO GROWTH 2 DAYS 11/13/2020 03:30 PM    Culture result: No growth (<1,000 CFU/ML) 11/13/2020 02:55 PM    Culture result: MRSA NOT PRESENT 10/15/2020 11:22 AM    Culture result:  10/15/2020 11:22 AM         Screening of patient nares for MRSA is for surveillance purposes and, if positive, to facilitate isolation considerations in high risk settings.  It is not intended for automatic decolonization interventions per se as regimens are not sufficiently effective to warrant routine use.              ___________________________________________________    Attending Physician: Araceli Duckworth MD

## 2020-11-15 NOTE — PROGRESS NOTES
Bedside and Verbal shift change report given to Bruna (oncoming nurse) by Adrienne Roberts (offgoing nurse). Report included the following information SBAR and Kardex.

## 2020-11-15 NOTE — PROGRESS NOTES
Problem: Self Care Deficits Care Plan (Adult)  Goal: *Acute Goals and Plan of Care (Insert Text)  Description: FUNCTIONAL STATUS PRIOR TO ADMISSION: Patient was independent and active without use of DME. Patient with multiple hospital admission with recurrent syncope follow L arterial appendage surgery 10/21/20. HOME SUPPORT: The patient lived with  but did not require assist.    Occupational Therapy Goals  Initiated 11/15/2020  1. Patient will perform grooming in standing with supervision/set-up within 7 day(s). 2.  Patient will perform lower body dressing with supervision/set-up within 7 day(s). 3.  Patient will perform toilet transfers with supervision/set-up within 7 day(s). 4.  Patient will perform all aspects of toileting with supervision/set-up within 7 day(s). 5.  Patient will participate in upper extremity therapeutic exercise/activities with supervision/set-up for 8 minutes within 7 day(s). 6.  Patient will utilize energy conservation techniques during functional activities with verbal cues within 7 day(s). Outcome: Progressing Towards Goal   OCCUPATIONAL THERAPY EVALUATION  Patient: Aneesh Echeverria (41 y.o. female)  Date: 11/15/2020  Primary Diagnosis: Syncope [R55]        Precautions:   Fall    ASSESSMENT  Based on the objective data described below, the patient presents with impaired activity tolerance necessary in ADL tasks with recurrent synocpal events. Patient with episode of unresponsiveness with PT yesterday which she reports occurs when wearing gait belt or bra. Residual stuttering with speech which patient reports occurred following a previous episode of syncope and resolved with time. Head imaging negative for acute events. She attributes the pressure to causing her to pass out since her cardiac surgery. She and nursing reports she has been up to bathroom without difficulty yesterday afternoon and this morning.   Patient demonstrated ability to complete supine > sit and self care transfer to chair with CGA x2 for safety and RW for additional support. No LOB however with initial sitting in chair she reports impaired sensation in LUE from shoulder to fingers, \"feeling off in the head, \" and chest pain on L side(duration less than one second). Patient was responsive through out and verbalized resolution of lightheadedness with 2-3 mins of sitting in recliner with LE elevated. Bp elevated through out- see below, unable to obtain standing BP. Recommend to patient and nursing to have direct supervision with all mobility OOB/chair secondary to episode of unresponsiveness in PT session 11/14/20. Patient agreeable to attempt gait belt around waist level in future session with education on safety with mobility tasks with hx of syncope. Vitals:    11/15/20 0907 11/15/20 0925 11/15/20 0935 11/15/20 0940   BP:  (!) 157/74 (!) 186/90 (!) 190/89   BP 1 Location:  Left arm Left arm Left arm   BP Patient Position:  Pre-activity;Supine Sitting  Comment: EOB Sitting;Post activity  Comment: in chair   Pulse:   86 82   Resp:       Temp:       SpO2: 98%      Weight:       Height:         Patient with hx of R thumb surgery, has a thumb spica splint which is in her purse. She reports good recovery with ROM and strength hand therapy. Current Level of Function Impacting Discharge (ADLs/self-care): Prior to multiple hospital admission indep with ADL tasks without AD    Functional Outcome Measure: The patient scored 40/100 on the Barthel INdex outcome measure which is indicative of moderate impairment with ADL tasks and functional mobility. Other factors to consider for discharge: Hx of recurrent syncopal events     Patient will benefit from skilled therapy intervention to address the above noted impairments.        PLAN :  Recommendations and Planned Interventions: self care training, functional mobility training, therapeutic exercise, balance training, therapeutic activities, endurance activities, patient education, home safety training and family training/education    Frequency/Duration: Patient will be followed by occupational therapy 5 times a week to address goals. Recommendation for discharge: (in order for the patient to meet his/her long term goals)  To be determined: progression with acute therapy and medical work up    This discharge recommendation:  Has not yet been discussed the attending provider and/or case management    IF patient discharges home will need the following DME: TBD       SUBJECTIVE:   Patient stated This happens ever since my surgery.  I can't wear a bra or those belts    OBJECTIVE DATA SUMMARY:   HISTORY:   Past Medical History:   Diagnosis Date    Adverse effect of anesthesia     slow to wake up    Arthritis     Asthma 6/29/2009    CAUSED BY MOLD    Atrial fibrillation ([address] 6/29/2009    CAD (coronary artery disease) 06/29/2009    Celiac disease 2010    Chronic obstructive pulmonary disease ([address] 2004-5    right leg    Chronic pain of right ankle     Diabetes ([address]     Drug induced prednisone, DM2    Diastolic heart failure ([address]     DVT (deep venous thrombosis) ([address] 2004    Right leg post surgery    GERD (gastroesophageal reflux disease)     Gluten intolerance     Heart failure ([address]     Hypertension     Hypothyroidism 6/29/2009    Iron deficiency anemia     Lyme disease     Personal history of MI (myocardial infarction)     Rheumatoid arthritis ([address]     Slow to wake up after anesthesia     Syncope     Post testing- resolved    TIA (transient ischemic attack) 2004 & 2006    Vitamin B12 deficiency 6/29/2009     Past Surgical History:   Procedure Laterality Date    HX ADENOIDECTOMY      HX AFIB ABLATION      HX APPENDECTOMY      HX CHOLECYSTECTOMY  6/16/11    HX COLONOSCOPY      HX CORONARY STENT PLACEMENT      x 2    HX HEART CATHETERIZATION  2010    2 STENTS    HX HEART CATHETERIZATION  03/2020    HX HYSTERECTOMY      HX LUMBAR LAMINECTOMY  2000    L4-L5    HX ORTHOPAEDIC  [id number]    RT. FOOT SURGERY X 6/calcaneal osteotomy    HX ORTHOPAEDIC Right 09/19/2020    right hand [facility] joint replacement    HX TONSILLECTOMY  1964       Expanded or extensive additional review of patient history:     Home Situation  Home Environment: Private residence  # Steps to Enter: 5  Rails to Enter: Yes  One/Two Story Residence: One story  Living Alone: No  Support Systems: Spouse/Significant Other/Partner  Patient Expects to be Discharged to[de-identified] Private residence  Current DME Used/Available at Home: (thumb splint)    Hand dominance: Right    EXAMINATION OF PERFORMANCE DEFICITS:  Cognitive/Behavioral Status:  Neurologic State: Alert; Appropriate for age  Orientation Level: Oriented X4             Hearing: Auditory  Auditory Impairment: None    Vision/Perceptual:    WDL                    Range of Motion:  BUE WDL                      Strength:  BUE mild impairment, functional       Coordination:     Fine Motor Skills-Upper: Left Intact; Right Intact    Gross Motor Skills-Upper: Left Intact; Right Intact    Tone & Sensation:     Sensation: Impaired(LUE- s/p transfer \"feels asleep\")                      Balance:  Sitting: Intact  Standing: Impaired  Standing - Static: Constant support; Fair  Standing - Dynamic : Constant support; Fair    Functional Mobility and Transfers for ADLs:  Bed Mobility:  Rolling: Contact guard assistance  Supine to Sit: Contact guard assistance;Bed Modified(heavy reliance on bed rails)    Transfers:  Sit to Stand: Contact guard assistance;Assist x2(for safety)  Stand to Sit: Contact guard assistance;Assist x2(for safety )  Bed to Chair: Contact guard assistance;Assist x2(RW  x2 assist for safety)    ADL Assessment:  Feeding: Independent    Oral Facial Hygiene/Grooming: Supervision;Setup(seated)    Bathing: Minimum assistance    Upper Body Dressing: Setup    Lower Body Dressing: Minimum assistance    Toileting: (DNT) ADL Intervention and task modifications:  Patient instructed and indicated understanding the benefits of maintaining activity tolerance, functional mobility, and independence with self care tasks during acute stay  to ensure safe return home and to baseline. Encouraged patient to increase frequency and duration OOB, be out of bed for all meals, perform daily ADLs (as approved by RN/MD regarding bathing etc), and performing functional mobility to/from bathroom. Lower Body Dressing Assistance  Socks: Total assistance (dependent)(bed level)              unctional Measure:  Barthel Index:    Bathin  Bladder: 10  Bowels: 10  Groomin  Dressin  Feedin  Mobility: 0  Stairs: 0  Toilet Use: 0  Transfer (Bed to Chair and Back): 10  Total: 40/100        The Barthel ADL Index: Guidelines  1. The index should be used as a record of what a patient does, not as a record of what a patient could do. 2. The main aim is to establish degree of independence from any help, physical or verbal, however minor and for whatever reason. 3. The need for supervision renders the patient not independent. 4. A patient's performance should be established using the best available evidence. Asking the patient, friends/relatives and nurses are the usual sources, but direct observation and common sense are also important. However direct testing is not needed. 5. Usually the patient's performance over the preceding 24-48 hours is important, but occasionally longer periods will be relevant. 6. Middle categories imply that the patient supplies over 50 per cent of the effort. 7. Use of aids to be independent is allowed. Yogi Mata., Barthel, D.W. (1603). Functional evaluation: the Barthel Index. [address] [facility] (14)2. Nakul Obrien rachel BRET Watkins, Jorgito Luther., Ho Ortiz., [place], [address] ().  Measuring the change indisability after inpatient rehabilitation; comparison of the responsiveness of the Barthel Index and Functional [place] Measure. Journal of Neurology, Neurosurgery, and Psychiatry, 66(4), 179-726. NENA Mcguire.JAISON, KARIE Otoole, & Rupali Sifuentes M.A. (2004.) Assessment of post-stroke quality of life in cost-effectiveness studies: The usefulness of the Barthel Index and the EuroQoL-5D. Quality of Life Research, 15, 592-07         Occupational Therapy Evaluation Charge Determination   History Examination Decision-Making   LOW Complexity : Brief history review  LOW Complexity : 1-3 performance deficits relating to physical, cognitive , or psychosocial skils that result in activity limitations and / or participation restrictions  LOW Complexity : No comorbidities that affect functional and no verbal or physical assistance needed to complete eval tasks       Based on the above components, the patient evaluation is determined to be of the following complexity level: LOW   Pain Rating:  Denies pain at beginning of session reports brief L sided CP >1 second following transfer to chair, BLE pain at baseline--discussed with nursing. Activity Tolerance:   Fair and Poor    After treatment patient left in no apparent distress:    Sitting in chair, Call bell within reach and BLE elevated    COMMUNICATION/EDUCATION:   The patients plan of care was discussed with: Registered nurse. Home safety education was provided and the patient/caregiver indicated understanding. and Patient/family agree to work toward stated goals and plan of care. This patients plan of care is appropriate for delegation to [facility].     Thank you for this referral.  Genevieve Navarro OT  Time Calculation: 26 mins

## 2020-11-15 NOTE — PROGRESS NOTES
Karlo Villanueva MD [address] [place] [address] [phone]            NAME:  Uriel Grullon   :   1950   MRN:   [id number]     Assessment/Plan:   · Recurrent syncope. Rhythm normal.   · Transient speech slurring/left arm numbness. noncontrast head CT nl. Recurrence yesterday. Persistent speech abnormality - stammering  · PAF on amiodarone. Currently sinus  · S/p SUHAS ligation 10/21  · Recurrent chest pains. Doubt myocardial ischemia  · CAD with previous PCI  · Elevated WBC, improved  · Chronic HFpEF, stable  · Left ankle pain. No swelling. Doubt DVT       No obvious cardiac issues. HR low 60s  Event recorder arranged as outpt. Will see again in hospital as needed   Subjective:   Cardiac ROS: \"syncope\" vs AMS yesterday leading to code S. No obvious stroke but has abnormal speech pattern this morning. MRI brain pending. HR 50-60, sinus. Patient denies any exertional chest pain, dyspnea, palpitations, syncope, orthopnea, edema or paroxysmal nocturnal dyspnea. Has left ankle pain. Review of Systems: No nausea, indigestion, vomiting, , cough, sputum. No bleeding. Taking po. Appetite fair      Objective:     Visit Vitals  BP (!) 140/67 (BP 1 Location: Left arm, BP Patient Position: At rest)   Pulse (!) 58   Temp 98.6 °F (37 °C)   Resp 12   Ht 5' 2\" (1.575 m)   Wt 169 lb 12.1 oz (77 kg)   LMP 1980 (LMP Unknown)   SpO2 94%   BMI 31.05 kg/m²      O2 Device: Room air    Temp (24hrs), Av.2 °F (36.8 °C), Min:97.5 °F (36.4 °C), Max:98.6 °F (37 °C)      11/15 0701 - 11/15 1900  In: 900 [P.O.:900]  Out: 600 [Urine:600]    1901 - 11/15 0700  In: 2172 [P. O.:700; I.V.:1472]  Out: 3450 [Urine:3450]    TELE: sinus    General: AAOx3 cooperative, no acute distress. HEENT: Atraumatic. Pink and moist.  Anicteric sclerae. Neck : Supple, no thyromegaly. Lungs: CTA bilaterally. No wheezing/rhonchi/rales.   Heart: Regular rhythm, no murmur, no rubs, no gallops. No JVD. No carotid bruits. Abdomen: Soft, non-distended, non-tender. + Bowel sounds. No bruits. Extremities: No edema, no clubbing, no cyanosis. No calf tenderness  Neurologic: Grossly intact. Alert and oriented X 3. No acute neurological distress. Psych: Good insight. Not anxious nor agitated. Additional comments: None    Care Plan discussed with:    Comments   Patient x    Family  x    RN     Care Manager                    Consultant:          Data Review:     EKG    Echo    No results for input(s): TNIPOC in the last 72 hours. No lab exists for component: ITNL   Recent Labs     11/13/20  1811 11/13/20  1150   TROIQ 0.23* 0.19*     Recent Labs     11/15/20  0650 11/14/20  0304 11/13/20  1811 11/13/20  1150    141 137 138   K 4.1 4.8 4.5 3.0*   * 109* 101 99   CO2 24 28 30 34*   BUN 12 22* 24* 27*   CREA 0.65 0.72 0.81 0.85   * 121* 116* 146*   MG  --  2.2  --  2.0   ALB  --  2.8* 3.4* 3.4*   WBC 12.8* 17.7*  --  22.6*   HGB 12.9 13.1  --  15.5   HCT 40.7 40.5  --  47.2*    179  --  222     No results for input(s): INR, PTP, APTT, INREXT, INREXT in the last 72 hours.     Medications reviewed  Current Facility-Administered Medications   Medication Dose Route Frequency    labetaloL (NORMODYNE;TRANDATE) 20 mg/4 mL (5 mg/mL) injection 10 mg  10 mg IntraVENous Q4H PRN    enoxaparin (LOVENOX) injection 40 mg  40 mg SubCUTAneous Q24H    0.9% sodium chloride infusion  100 mL/hr IntraVENous CONTINUOUS    gabapentin (NEURONTIN) capsule 100 mg  100 mg Oral DAILY    lidocaine 4 % patch 1 Patch  1 Patch TransDERmal Q24H    arformoteroL (BROVANA) neb solution 15 mcg  15 mcg Nebulization BID RT    budesonide (PULMICORT) 500 mcg/2 ml nebulizer suspension  500 mcg Nebulization BID RT    levalbuterol (XOPENEX) nebulizer soln 0.63 mg/3 mL  0.63 mg Nebulization Q6H PRN    amiodarone (CORDARONE) tablet 200 mg  200 mg Oral DAILY    insulin glargine (LANTUS) injection 8 Units  8 Units SubCUTAneous DAILY    pantoprazole (PROTONIX) tablet 40 mg  40 mg Oral ACB    levothyroxine (SYNTHROID) tablet 150 mcg  150 mcg Oral ACB    metoprolol succinate (TOPROL-XL) XL tablet 25 mg  25 mg Oral PCL    potassium chloride SR (KLOR-CON 10) tablet 10 mEq  10 mEq Oral BID    predniSONE (DELTASONE) tablet 2.5 mg  2.5 mg Oral PCL    glucose chewable tablet 16 g  4 Tab Oral PRN    dextrose (D50W) injection syrg 12.5-25 g  25-50 mL IntraVENous PRN    glucagon (GLUCAGEN) injection 1 mg  1 mg IntraMUSCular PRN    sodium chloride (NS) flush 5-40 mL  5-40 mL IntraVENous Q8H    sodium chloride (NS) flush 5-40 mL  5-40 mL IntraVENous PRN    acetaminophen (TYLENOL) tablet 650 mg  650 mg Oral Q6H PRN    Or    acetaminophen (TYLENOL) suppository 650 mg  650 mg Rectal Q6H PRN    polyethylene glycol (MIRALAX) packet 17 g  17 g Oral DAILY PRN    ondansetron (ZOFRAN ODT) tablet 4 mg  4 mg Oral Q8H PRN    Or    ondansetron (ZOFRAN) injection 4 mg  4 mg IntraVENous Q6H PRN    famotidine (PEPCID) tablet 20 mg  20 mg Oral BID    insulin lispro (HUMALOG) injection   SubCUTAneous AC&HS    artificial tears (dextran-hypromellose-glycerin) (GENTEAL) ophthalmic solution 1 Drop  1 Drop Both Eyes QID PRN    predniSONE (DELTASONE) tablet 5 mg  5 mg Oral DAILY WITH BREAKFAST    loperamide (IMODIUM) capsule 2 mg  2 mg Oral DAILY PRN         Rudene Kawasaki, MD

## 2020-11-15 NOTE — PROGRESS NOTES
[facility] [address] [address]   [place] [address]   [address]    [facility]   [phone] Fax         Dictated  Recurrent spells with standing  Also non-physiologic speech pattern but OTW exam non-focal  Await MRI  CVEEG to capture events  Will follow up    Christie Aguero MD

## 2020-11-15 NOTE — CONSULTS
[address]     Name:  Yani Jorgensen  MR#:  [id number]  :  1950  ACCOUNT #:  [de-identified]  DATE OF SERVICE:  11/15/2020    NEUROLOGY CONSULTATION    REQUESTING:  Singh Frank MD    HISTORY OF PRESENT ILLNESS:  Thanks for asking us to see the patient in Neurology Consultation regarding stereotypical repeated alterations in awareness. She provides history. Her  is at the bedside as well. I have reviewed her chart. In any regard, this 75-year-old lady had occlusion of her left atrial appendage for chronic atrial fibrillation with intolerance to anticoagulation and antiplatelet agents back in 10/2020 or a month or so ago. She says since then, anytime she puts anything tight around her thorax, she will have these episodes where she can just feel the energy draining out of her head. She says that if she goes from a seated to a standing position, she gets this sensation that everything is draining from her, and she loses all strength, and she relates it to driving a car down the highway and the car starts to lose power and you can tell whether it is losing power. That is how she describes to what happens to her. She notes that she had one of these in Dr. Jayna Ayers office. Her  says it actually depends on how much of an incline she is at that can bring it on. Interestingly, she says since then she started to have stuttering. About 4 years ago, she was seen by Dr. Laura Gomez for what was felt to be TIA, and she had stuttering after that but she says after a few days, it went away. She describes standing and as soon as she stands, she gets a sensation and then she is said to lose consciousness. There is said to be shaking of the extremities. She does not bite her tongue. She does not lose her water.   She actually describes the Teleneurology evaluation yesterday during one of these events, and she clearly describes that during that she was unable to move her arms because they were shaking bilaterally, and she was unable to raise her right leg when commanded to do so, but again she was having these bilateral symptoms while talking. No other changes. She has had CT and CTA of the head as well as CT perfusion x2 since her admission here secondary to these spells. She has undergone MRI of the brain. I reviewed that this morning as well as her previous studies, and they are unremarkable. She has age-related small vessel change. I have ordered a continuous EEG with simultaneous video monitoring and that is yet to be done. Her laboratory analyses are largely unremarkable. PAST MEDICAL HISTORY:  Significant for that as noted above. 1.  DVT. 2.  Coronary artery disease. 3.  Celiac disease. 4.  COPD. 5.  Diabetes. 6.  Gastroesophageal reflux. 7.  Hypertension. 8.  Hypothyroidism. 9.  Iron deficiency. 10.  Arthritis. 11.  B12 deficiency. PAST SURGICAL HISTORY:   Status post appendectomy, cholecystectomy, coronary artery stent placement, atrial fibrillation ablation, as well as the left atrial appendage occlusive device. MEDICATIONS:  Her present medicines are amiodarone, Brovana, Pulmicort, Lovenox, Pepcid, Neurontin 100 mg q. day, insulin, Synthroid, lidocaine patch, Toprol, Protonix, potassium, prednisone. ALLERGIES:  ALLERGIES ARE NINETEEN IN NUMBER AND I AM NOT GOING TO LIST THEM HERE, BUT THEY HAVE BEEN REVIEWED IN THE CHART. SOCIAL HISTORY:  She is . She does not smoke, does not drink. FAMILY HISTORY:  Significant for hypertension, stroke, coronary artery disease, colon cancer, and breast cancer. REVIEW OF SYSTEMS:  As above. PHYSICAL EXAMINATION:  GENERAL:  On exam, she is sitting up in the chair, upright, eating her lunch. VITAL SIGNS:  She has been afebrile. Blood pressure labile running from the 093 systolic up to the 777 systolic, and she has pulse ranging from the 60s to the 80s.   Oxygen saturation in the mid to high 90s on room air. HEENT:  Anicteric. Oropharynx clear and moist.  HEART:  Irregular. No edema. NEUROLOGIC:  She is awake, alert, oriented, and conversant. It is interesting in that while she is discussing her medical history and what has been happening with these events, she has no speech disturbance. However, after she describes her previous so called TIA event, she then starts to have intermittent nonphysiologic stuttering. She follows all commands. She has intact cranial nerves II-XII. No nystagmus. Her strength is full in the upper and lower extremities in all muscle groups to direct testing. No pronation or drift. Reflexes are symmetrical.  No ataxia. Studies as stated. IMPRESSION:  A 77-year-old lady with these recurrent spells of altered awareness with a nonphysiologic speech pattern, and her ability actually to describe what is going on during them or at least during that one yesterday, and I think this is most likely supratentorial in etiology. Her exam is reassuring. Her MRI is reassuring. We will have her undergo continuous EEG with simultaneous video monitoring in attempts to catch one of these, and she says that certainly we would be able to catch one if we just have her stand while wearing the EEG, so certainly we will try to provoke one. We will follow up on that EEG. Thanks for your request of consultation.         Jose J Allred MD      [id number]  D:  11/15/2020 12:29  T:  11/15/2020 16:17  JOB #:  [id number]

## 2020-11-15 NOTE — PROGRESS NOTES
Bedside and Verbal shift change report given to JONATHAN RN  (oncoming nurse) by Leigha Elkins RN  (offgoing nurse). Report included the following information SBAR, Kardex and Recent Results. .      1930-  Bedside and Verbal shift change report given to CICI MORROW RN  (oncoming nurse) by Papi Kent RN  (offgoing nurse). Report included the following information SBAR, Kardex and Recent Results.

## 2020-11-16 LAB
ALBUMIN SERPL-MCNC: 3 G/DL (ref 3.5–5)
ANION GAP SERPL CALC-SCNC: 6 MMOL/L (ref 5–15)
ATRIAL RATE: 98 BPM
BUN SERPL-MCNC: 13 MG/DL (ref 6–20)
BUN/CREAT SERPL: 19 (ref 12–20)
CALCIUM SERPL-MCNC: 8.2 MG/DL (ref 8.5–10.1)
CALCULATED P AXIS, ECG09: 61 DEGREES
CALCULATED R AXIS, ECG10: 21 DEGREES
CALCULATED T AXIS, ECG11: 75 DEGREES
CHLORIDE SERPL-SCNC: 112 MMOL/L (ref 97–108)
CO2 SERPL-SCNC: 26 MMOL/L (ref 21–32)
COMMENT, HOLDF: NORMAL
CREAT SERPL-MCNC: 0.67 MG/DL (ref 0.55–1.02)
DIAGNOSIS, 93000: NORMAL
ERYTHROCYTE [DISTWIDTH] IN BLOOD BY AUTOMATED COUNT: 13.2 % (ref 11.5–14.5)
GLUCOSE BLD STRIP.AUTO-MCNC: 107 MG/DL (ref 65–100)
GLUCOSE BLD STRIP.AUTO-MCNC: 142 MG/DL (ref 65–100)
GLUCOSE BLD STRIP.AUTO-MCNC: 181 MG/DL (ref 65–100)
GLUCOSE BLD STRIP.AUTO-MCNC: 228 MG/DL (ref 65–100)
GLUCOSE SERPL-MCNC: 135 MG/DL (ref 65–100)
HCT VFR BLD AUTO: 41.9 % (ref 35–47)
HGB BLD-MCNC: 13.4 G/DL (ref 11.5–16)
MAGNESIUM SERPL-MCNC: 2.3 MG/DL (ref 1.6–2.4)
MCH RBC QN AUTO: 30.9 PG (ref 26–34)
MCHC RBC AUTO-ENTMCNC: 32 G/DL (ref 30–36.5)
MCV RBC AUTO: 96.5 FL (ref 80–99)
NRBC # BLD: 0 K/UL (ref 0–0.01)
NRBC BLD-RTO: 0 PER 100 WBC
P-R INTERVAL, ECG05: 148 MS
PHOSPHATE SERPL-MCNC: 2.3 MG/DL (ref 2.6–4.7)
PLATELET # BLD AUTO: 170 K/UL (ref 150–400)
PMV BLD AUTO: 10.2 FL (ref 8.9–12.9)
POTASSIUM SERPL-SCNC: 3.7 MMOL/L (ref 3.5–5.1)
Q-T INTERVAL, ECG07: 438 MS
QRS DURATION, ECG06: 74 MS
QTC CALCULATION (BEZET), ECG08: 559 MS
RBC # BLD AUTO: 4.34 M/UL (ref 3.8–5.2)
SAMPLES BEING HELD,HOLD: NORMAL
SERVICE CMNT-IMP: ABNORMAL
SODIUM SERPL-SCNC: 144 MMOL/L (ref 136–145)
VENTRICULAR RATE, ECG03: 98 BPM
WBC # BLD AUTO: 13.7 K/UL (ref 3.6–11)

## 2020-11-16 PROCEDURE — 74011250636 HC RX REV CODE- 250/636: Performed by: INTERNAL MEDICINE

## 2020-11-16 PROCEDURE — 82962 GLUCOSE BLOOD TEST: CPT

## 2020-11-16 PROCEDURE — 97530 THERAPEUTIC ACTIVITIES: CPT

## 2020-11-16 PROCEDURE — 74011250637 HC RX REV CODE- 250/637: Performed by: INTERNAL MEDICINE

## 2020-11-16 PROCEDURE — 97116 GAIT TRAINING THERAPY: CPT

## 2020-11-16 PROCEDURE — 94760 N-INVAS EAR/PLS OXIMETRY 1: CPT

## 2020-11-16 PROCEDURE — 92522 EVALUATE SPEECH PRODUCTION: CPT

## 2020-11-16 PROCEDURE — 77030029684 HC NEB SM VOL KT MONA -A

## 2020-11-16 PROCEDURE — 94640 AIRWAY INHALATION TREATMENT: CPT

## 2020-11-16 PROCEDURE — 74011636637 HC RX REV CODE- 636/637: Performed by: INTERNAL MEDICINE

## 2020-11-16 PROCEDURE — 65660000000 HC RM CCU STEPDOWN

## 2020-11-16 PROCEDURE — 97535 SELF CARE MNGMENT TRAINING: CPT

## 2020-11-16 PROCEDURE — 85027 COMPLETE CBC AUTOMATED: CPT

## 2020-11-16 PROCEDURE — 74011000250 HC RX REV CODE- 250: Performed by: INTERNAL MEDICINE

## 2020-11-16 PROCEDURE — 80069 RENAL FUNCTION PANEL: CPT

## 2020-11-16 PROCEDURE — 99231 SBSQ HOSP IP/OBS SF/LOW 25: CPT | Performed by: PSYCHIATRY & NEUROLOGY

## 2020-11-16 PROCEDURE — 94664 DEMO&/EVAL PT USE INHALER: CPT

## 2020-11-16 PROCEDURE — 95720 EEG PHY/QHP EA INCR W/VEEG: CPT | Performed by: PSYCHIATRY & NEUROLOGY

## 2020-11-16 PROCEDURE — 74011000250 HC RX REV CODE- 250: Performed by: HOSPITALIST

## 2020-11-16 PROCEDURE — 83735 ASSAY OF MAGNESIUM: CPT

## 2020-11-16 RX ORDER — GABAPENTIN 100 MG/1
100 CAPSULE ORAL 2 TIMES DAILY
Status: DISCONTINUED | OUTPATIENT
Start: 2020-11-16 | End: 2020-11-17 | Stop reason: HOSPADM

## 2020-11-16 RX ADMIN — METOPROLOL SUCCINATE 25 MG: 25 TABLET, EXTENDED RELEASE ORAL at 12:22

## 2020-11-16 RX ADMIN — Medication 10 ML: at 13:57

## 2020-11-16 RX ADMIN — POTASSIUM CHLORIDE 10 MEQ: 750 TABLET, FILM COATED, EXTENDED RELEASE ORAL at 17:18

## 2020-11-16 RX ADMIN — INSULIN GLARGINE 8 UNITS: 100 INJECTION, SOLUTION SUBCUTANEOUS at 08:25

## 2020-11-16 RX ADMIN — FAMOTIDINE 20 MG: 20 TABLET, FILM COATED ORAL at 08:25

## 2020-11-16 RX ADMIN — ENOXAPARIN SODIUM 40 MG: 40 INJECTION SUBCUTANEOUS at 08:25

## 2020-11-16 RX ADMIN — BUDESONIDE 500 MCG: 0.5 INHALANT RESPIRATORY (INHALATION) at 07:23

## 2020-11-16 RX ADMIN — ACETAMINOPHEN 650 MG: 325 TABLET ORAL at 23:02

## 2020-11-16 RX ADMIN — BUDESONIDE 500 MCG: 0.5 INHALANT RESPIRATORY (INHALATION) at 19:29

## 2020-11-16 RX ADMIN — ARFORMOTEROL TARTRATE 15 MCG: 15 SOLUTION RESPIRATORY (INHALATION) at 19:28

## 2020-11-16 RX ADMIN — INSULIN LISPRO 2 UNITS: 100 INJECTION, SOLUTION INTRAVENOUS; SUBCUTANEOUS at 11:37

## 2020-11-16 RX ADMIN — Medication 10 ML: at 22:58

## 2020-11-16 RX ADMIN — Medication 10 ML: at 06:47

## 2020-11-16 RX ADMIN — GABAPENTIN 100 MG: 100 CAPSULE ORAL at 09:40

## 2020-11-16 RX ADMIN — AMIODARONE HYDROCHLORIDE 200 MG: 200 TABLET ORAL at 08:25

## 2020-11-16 RX ADMIN — ARFORMOTEROL TARTRATE 15 MCG: 15 SOLUTION RESPIRATORY (INHALATION) at 07:23

## 2020-11-16 RX ADMIN — PREDNISONE 5 MG: 5 TABLET ORAL at 08:25

## 2020-11-16 RX ADMIN — ACETAMINOPHEN 650 MG: 325 TABLET ORAL at 06:58

## 2020-11-16 RX ADMIN — PANTOPRAZOLE SODIUM 40 MG: 40 TABLET, DELAYED RELEASE ORAL at 06:58

## 2020-11-16 RX ADMIN — PREDNISONE 2.5 MG: 5 TABLET ORAL at 12:22

## 2020-11-16 RX ADMIN — POTASSIUM CHLORIDE 10 MEQ: 750 TABLET, FILM COATED, EXTENDED RELEASE ORAL at 08:25

## 2020-11-16 RX ADMIN — LEVOTHYROXINE SODIUM 150 MCG: 0.07 TABLET ORAL at 06:58

## 2020-11-16 RX ADMIN — GABAPENTIN 100 MG: 100 CAPSULE ORAL at 17:18

## 2020-11-16 RX ADMIN — FAMOTIDINE 20 MG: 20 TABLET, FILM COATED ORAL at 17:18

## 2020-11-16 NOTE — PROGRESS NOTES
Occupational Therapy    Patient seen for therapy session with PT services for patient safety with hx of syncopal events. Patient with limited activity tolerance with reports of increased HR (90s-105 seen on monitor), decreased conversation following brief ambulation with increased alertness and conversation with additional time in erect sitting. She reports chest tightness on R side that she relates to gastric reflux approx 1-2 mins(\"Did not go up my neck\") following ambulation while up in chair. She related unresponsive event with PT on 20 to using gait belt and that she has nausea that leads to lightheaded and passing out from to pressure (bra, gait belt, hand pressing) around where she had chest tube. Left in bed with nursing present, up dated on session. BPs during session stable, elevating with activity. Supine: 137/77, HR 85  Sitting EOB: 154/83  Standin/82  Sitting in chair post transfer: 157/72  Supine with HOB elevated approx 45 degrees with additional pillow support: 161/80      Full note to follow. Recommend physical assistance with all transfers secondary to episode of unresponsiveness with activity.      Morgan Osorio, OT

## 2020-11-16 NOTE — PROGRESS NOTES
Jerardo Vyas [facility] 79  [address] [facility], [address] [place] [address]  [phone]      Medical Progress Note      NAME: Jaylene Headley   :  1950  MRM:  [id number]    Date of service: 2020        Assessment / Plan:       Recurrent syncope: unclear etiology. No recurrence. No events on tele thus far. ?vasovagal? Pt reports occurrence when PT band is around her stomach. Continue to work with PT    Shari Mendoza / Vineet Kelsey / Acute encephalopathy: twitching and confusion now resolved. Possibly stress reaction. No acute processes on CT/CTA. MRI brain. cEEG pending. Neuro following     PAF/flutter s/p SUHAS ligation: Evaluated by cardiology, cont amiodarone, metoprolol. Plan for event monitor outpatient and possible ablation in the future     CAD: recently admitted for recurrent chest pain. Had normal nuclear stress test and recurrent CP not felt to represent ischemia. Not on antiplatelet or statin; defer to cardiology    Diastolic heart failure: euvolemic. Continue BB    HTN: BP high. Cont metoprolol. Repeat blood pressure now to determine need to add antihypertensive. IV labetalol PRN    Leukocytosis: chronic, on steroids, no evidence of infection. Improved; monitor WBC     Left ankle pain: does have hx of Ra. Negative venous duplex. Better today. Monitor      Diabetes mellitus type 2, controlled: A1c 7.2%. Cont Lantus, SSI     Rheumatoid arthritis / Chronic steroid use: continue low dose prednisone, lidoderm and gabapentin. On Prolia at home    GERD: cont PPI     Asthma: no wheezing. Cont nebs, ICS/LABA     Hypothyroidism: TSH WNR. Continue LT4       Total time spent: 30 minutes  Time spent in the care of this patient including reviewing records, discussing with nursing and/or other providers on the treatment team, obtaining history and examining the patient, and discussing treatment plans.                   Care Plan discussed with: Patient, Nursing Staff and >50% of time spent in counseling and coordination of care    Discussed:  Care Plan and D/C Planning    Prophylaxis:  Lovenox    Disposition:  Home w/Family         Subjective:     Chief Complaint:  Follow up syncope    No acute events. Stuttering continues but confusion and twitching resolved. Having right foot pain, improving with gabpentin. States she is not worried about stuttering as it happened when she had a [place] several years ago. At that time it resolved spontaneously. Objective:       Vitals:        Last 24hrs VS reviewed since prior progress note. Most recent are:    Visit Vitals  /70 (BP 1 Location: Left arm, BP Patient Position: At rest)   Pulse 66   Temp 97.4 °F (36.3 °C)   Resp 18   Ht 5' 2\" (1.575 m)   Wt 76.9 kg (169 lb 8 oz)   SpO2 97%   BMI 31.00 kg/m²     SpO2 Readings from Last 6 Encounters:   11/16/20 97%   11/06/20 95%   10/29/20 96%   10/22/20 91%   10/15/20 97%   10/15/20 96%            Intake/Output Summary (Last 24 hours) at 11/16/2020 1053  Last data filed at 11/16/2020 0825  Gross per 24 hour   Intake 1440 ml   Output 2150 ml   Net -710 ml          Exam:     Physical Exam:    Gen:  Well-developed, well-nourished, in no acute distress  HEENT:  Atraumatic, normocephalic. Sclerae nonicteric, hearing intact to voice  Neck:  Supple, without apparent masses. Resp:  No accessory muscle use, CTAB without wheezes, rales, or rhonchi  Card: RRR, without m/r/g. No LE edema. Abd:  +bowel sounds, soft, NTTP, nondistended. No HSM. Neuro: CN3-12 intact. Speech stuttering. No focal weakness or numbness. Follows commands appropriately. Psych:  Alert, oriented x 3.  Good insight     Medications Reviewed: (see below)    Lab Data Reviewed: (see below)    ______________________________________________________________________    Medications:     Current Facility-Administered Medications   Medication Dose Route Frequency    gabapentin (NEURONTIN) capsule 100 mg  100 mg Oral BID    labetaloL (NORMODYNE;TRANDATE) 20 mg/4 mL (5 mg/mL) injection 10 mg  10 mg IntraVENous Q4H PRN    enoxaparin (LOVENOX) injection 40 mg  40 mg SubCUTAneous Q24H    0.9% sodium chloride infusion  100 mL/hr IntraVENous CONTINUOUS    lidocaine 4 % patch 1 Patch  1 Patch TransDERmal Q24H    arformoteroL (BROVANA) neb solution 15 mcg  15 mcg Nebulization BID RT    budesonide (PULMICORT) 500 mcg/2 ml nebulizer suspension  500 mcg Nebulization BID RT    levalbuterol (XOPENEX) nebulizer soln 0.63 mg/3 mL  0.63 mg Nebulization Q6H PRN    amiodarone (CORDARONE) tablet 200 mg  200 mg Oral DAILY    insulin glargine (LANTUS) injection 8 Units  8 Units SubCUTAneous DAILY    pantoprazole (PROTONIX) tablet 40 mg  40 mg Oral ACB    levothyroxine (SYNTHROID) tablet 150 mcg  150 mcg Oral ACB    metoprolol succinate (TOPROL-XL) XL tablet 25 mg  25 mg Oral PCL    potassium chloride SR (KLOR-CON 10) tablet 10 mEq  10 mEq Oral BID    predniSONE (DELTASONE) tablet 2.5 mg  2.5 mg Oral PCL    glucose chewable tablet 16 g  4 Tab Oral PRN    dextrose (D50W) injection syrg 12.5-25 g  25-50 mL IntraVENous PRN    glucagon (GLUCAGEN) injection 1 mg  1 mg IntraMUSCular PRN    sodium chloride (NS) flush 5-40 mL  5-40 mL IntraVENous Q8H    sodium chloride (NS) flush 5-40 mL  5-40 mL IntraVENous PRN    acetaminophen (TYLENOL) tablet 650 mg  650 mg Oral Q6H PRN    Or    acetaminophen (TYLENOL) suppository 650 mg  650 mg Rectal Q6H PRN    polyethylene glycol (MIRALAX) packet 17 g  17 g Oral DAILY PRN    ondansetron (ZOFRAN ODT) tablet 4 mg  4 mg Oral Q8H PRN    Or    ondansetron (ZOFRAN) injection 4 mg  4 mg IntraVENous Q6H PRN    famotidine (PEPCID) tablet 20 mg  20 mg Oral BID    insulin lispro (HUMALOG) injection   SubCUTAneous AC&HS    artificial tears (dextran-hypromellose-glycerin) (GENTEAL) ophthalmic solution 1 Drop  1 Drop Both Eyes QID PRN    predniSONE (DELTASONE) tablet 5 mg  5 mg Oral DAILY WITH BREAKFAST    loperamide (IMODIUM) capsule 2 mg  2 mg Oral DAILY PRN            Lab Review:     Recent Labs     11/16/20  0440 11/15/20  0650 11/14/20  0304   WBC 13.7* 12.8* 17.7*   HGB 13.4 12.9 13.1   HCT 41.9 40.7 40.5    162 179     Recent Labs     11/16/20  0440 11/15/20  0650 11/14/20  0304 11/13/20  1811 11/13/20  1150    142 141 137 138   K 3.7 4.1 4.8 4.5 3.0*   * 113* 109* 101 99   CO2 26 24 28 30 34*   * 108* 121* 116* 146*   BUN 13 12 22* 24* 27*   CREA 0.67 0.65 0.72 0.81 0.85   CA 8.2* 7.9* 7.7* 8.7 9.0   MG 2.3  --  2.2  --  2.0   PHOS 2.3*  --   --   --   --    ALB 3.0*  --  2.8* 3.4* 3.4*   ALT  --   --  20 25 24     No components found for: GLPOC  No results for input(s): PH, PCO2, PO2, HCO3, FIO2 in the last 72 hours. No results for input(s): INR, INREXT, INREXT in the last 72 hours. No results found for: SDES  Lab Results   Component Value Date/Time    Culture result: NO GROWTH 3 DAYS 11/13/2020 03:30 PM    Culture result: No growth (<1,000 CFU/ML) 11/13/2020 02:55 PM    Culture result: MRSA NOT PRESENT 10/15/2020 11:22 AM    Culture result:  10/15/2020 11:22 AM         Screening of patient nares for MRSA is for surveillance purposes and, if positive, to facilitate isolation considerations in high risk settings.  It is not intended for automatic decolonization interventions per se as regimens are not sufficiently effective to warrant routine use.              ___________________________________________________    Attending Physician: Abner Aj MD

## 2020-11-16 NOTE — PROGRESS NOTES
SHIFT CHANGE:  1930 Bedside and Verbal shift change report given to Monse FONTAINE (oncoming nurse) by Renate Arrington (offgoing nurse). Report included the following information SBAR, Kardex, MAR and Recent Results. SHIFT SUMMARY:  2017  Upon assessment, patient A&O x4, equal , some slight stuttering noted. Patient requesting tylenol and gabapentin prior to sleep for neuropathy in feet. Will ask attending on call. 2122  Dr. Harshil Solorzano to add gabapentin to evening medicines, and cancel am dose. 0130  Patient resting, on phone talking with brother. No issues to report. 0630  Right arm IV infiltrated. New IV started. END OF SHIFT REPORT:  0730  Bedside and Verbal shift change report given to [address] [place] [address] (oncoming nurse) by Jefferson Willams (offgoing nurse). Report included the following information SBAR, Kardex, MAR and Recent Results.

## 2020-11-16 NOTE — PROGRESS NOTES
Problem: Mobility Impaired (Adult and Pediatric)  Goal: *Acute Goals and Plan of Care (Insert Text)  Description: FUNCTIONAL STATUS PRIOR TO ADMISSION: Patient was modified independent using a rolling walker and single point cane for functional mobility. HOME SUPPORT PRIOR TO ADMISSION: The patient lived with  but did not require assist.    Physical Therapy Goals  Initiated 11/14/2020  1. Patient will move from supine to sit and sit to supine  in bed with modified independence within 7 day(s). 2.  Patient will transfer from bed to chair and chair to bed with modified independence using the least restrictive device within 7 day(s). 3.  Patient will perform sit to stand with modified independence within 7 day(s). 4.  Patient will ambulate with supervision/set-up for 150 feet with the least restrictive device within 7 day(s). 5.  Patient will ascend/descend 4 stairs with 2 handrail(s) with supervision/set-up within 7 day(s). Outcome: Progressing Towards Goal  Note:   PHYSICAL THERAPY TREATMENT  Patient: Heidy Malin (89 y.o. female)  Date: 11/16/2020  Diagnosis: Syncope [R55]   Syncope       Precautions: Fall  Chart, physical therapy assessment, plan of care and goals were reviewed. ASSESSMENT  Patient continues with skilled PT services and progressing towards goals. Pt reports any pressure along R thoracic area, at or near site of chest tube causes syncope, including a bra or gait belt, pt reports she is usually able to predict onset. CGA for mobility with A x2 for safety. short distance gait training,using R for steadying with close chair follow for safety. Pt reports increased \"flutter\" with gait training requiring prolonged seated rest. Pt unable to find comfortable position in chair with increase pain and \"head feeling funny\"  Assisted BTB. RN at bedside, BP stable. Pt needs assistance for all gait and transfers due to rapid onset of syncopal episodes.      Current Level of Function Impacting Discharge (mobility/balance): CGA    Other factors to consider for discharge:          PLAN :  Patient continues to benefit from skilled intervention to address the above impairments. Continue treatment per established plan of care. to address goals. Recommendation for discharge: (in order for the patient to meet his/her long term goals)  To be determined: This discharge recommendation:  Has been made in collaboration with the attending provider and/or case management    IF patient discharges home will need the following DME: to be determined (TBD)       SUBJECTIVE:   Patient stated Cloteal Mai go very slowly and usually ok.     OBJECTIVE DATA SUMMARY:   Critical Behavior:  Neurologic State: Alert  Orientation Level: Oriented X4  Cognition: Follows commands     Functional Mobility Training:  Bed Mobility:                    Transfers:  Sit to Stand: Contact guard assistance;Assist x2; Additional time  Stand to Sit: Contact guard assistance;Assist x2; Additional time        Bed to Chair: Contact guard assistance;Assist x2; Additional time                    Balance:     Ambulation/Gait Training:  Distance (ft): 12 Feet (ft)  Assistive Device: Walker, rolling(pt refuses gait belt)  Ambulation - Level of Assistance: Contact guard assistance;Assist x2(chair follow for safety)                 Base of Support: Narrowed                             Stairs: Therapeutic Exercises:     Pain Rating:  Intermittent R thoracic pain    Activity Tolerance:   Fair    After treatment patient left in no apparent distress:   Supine in bed, Call bell within reach, Bed / chair alarm activated, and Caregiver / family present    COMMUNICATION/COLLABORATION:   The patients plan of care was discussed with: Occupational therapist and Registered nurse.      Diamond Carmona   Time Calculation: 42 mins

## 2020-11-16 NOTE — PROGRESS NOTES
Problem: Communication Impaired (Adult)  Goal: *Acute Goals and Plan of Care (Insert Text)  Description: Communication goals initiated 11-16-20:   1)  stutter less than once per minute by 11-19-20  Outcome: Progressing Towards Goal   SPEECH LANGUAGE PATHOLOGY EVALUATION  Patient: Maurice Mcclain (4 y.o. female)  Date: 11/16/2020  Primary Diagnosis: Syncope [R55]        Precautions:   Fall    ASSESSMENT :  Based on the objective data described below, the patient presents with  Bursts of mild dysfluency, consistent throughout the evaluation. She reports this is new today. H/o dysfluency with TIAs. No dysphagia. Admitted 11-13-20 with syncope, transient speech issues, and L arm numb. . Had syncope again on PT eval 11-14-20. PMH:  L arterial appendage surgery 10-21-20, a-fib,  DVT, CAD, RA, asthma,  Celiac dz/gluten intolerance, DM, CHF, GERD, HTN,  Hypothyroid, lyme dz, MI, TIA 2004 and 2006 . Patient will benefit from skilled intervention to address the above impairments. Patients rehabilitation potential is considered to be Fair     PLAN :  Recommendations and Planned Interventions:  Recommended she seek OP SLP if dysfluency continues. She hopes stuttering will resolve quickly like it did before    Educated that speech and swallowing issues can be the first sign of a CVA. Frequency/Duration: Patient will be followed by speech-language pathology 1 time a week to address goals. Discharge Recommendations: To Be Determined     SUBJECTIVE:   Patient stated I'm having some ssssssssssssstuttering.     OBJECTIVE:     Past Medical History:   Diagnosis Date    Adverse effect of anesthesia     slow to wake up    Arthritis     Asthma 6/29/2009    CAUSED BY MOLD    Atrial fibrillation ([address] 6/29/2009    CAD (coronary artery disease) 06/29/2009    Celiac disease 2010    Chronic obstructive pulmonary disease ([address] 2004-5    right leg    Chronic pain of right ankle     Diabetes ([address]     Drug induced prednisone, DM2    Diastolic heart failure (HCC)     DVT (deep venous thrombosis) ([facility] 2004    Right leg post surgery    GERD (gastroesophageal reflux disease)     Gluten intolerance     Heart failure ([facility]     Hypertension     Hypothyroidism 6/29/2009    Iron deficiency anemia     Lyme disease     Personal history of MI (myocardial infarction)     Rheumatoid arthritis ([facility]     Slow to wake up after anesthesia     Syncope     Post testing- resolved    TIA (transient ischemic attack) 2004 & 2006    Vitamin B12 deficiency 6/29/2009     Past Surgical History:   Procedure Laterality Date    HX ADENOIDECTOMY      HX AFIB ABLATION      HX APPENDECTOMY      HX CHOLECYSTECTOMY  6/16/11    HX COLONOSCOPY      HX CORONARY STENT PLACEMENT      x 2    HX HEART CATHETERIZATION  2010    2 STENTS    HX HEART CATHETERIZATION  03/2020    HX HYSTERECTOMY      HX LUMBAR LAMINECTOMY  2000    L4-L5    HX ORTHOPAEDIC  1993-6/2012    RT. FOOT SURGERY X 6/calcaneal osteotomy    HX ORTHOPAEDIC Right 09/19/2020    right hand [facility] joint replacement    HX TONSILLECTOMY  1964     Prior Level of Function/Home Situation:   Home Situation  Home Environment: Private residence  # Steps to Enter: 5  Rails to Enter: Yes  One/Two Story Residence: One story  Living Alone: No  Support Systems: Spouse/Significant Other/Partner  Patient Expects to be Discharged to[de-identified] Private residence  Current DME Used/Available at Home: (thumb splint)  Mental Status:  Neurologic State: Alert  Orientation Level: Oriented X4  Cognition: Follows commands  Perception: Appears intact        Motor Speech:   patient c/o new onset of stuttering today. . She reports that she had temporary stuttering with TIAs in 2004 and 2006. Noted intermittent initial sound repetitions, especially for bilabials. Noted intermittent prolongations at beginning or middle of phrases. She had some clear bursts of speech.    She reports that she slows down and then can talk better. She hopes for fast recovery of dysfluency like she had before. PT reports no stuttering on evaluation 11-14-20. Language Comprehension and Expression:  Auditory Comprehension  Auditory Impairment: No   Verbal Expression  Primary Mode of Expression: Verbal      no aphasia                                                  Pragmatics:        Voice:               SNL                                    NOMS:     The NOMS functional outcome measure was used to quantify this patient's level of fluency impairment. Based on the NOMS, the patient was determined to be at level 6 for fluency. NOMS Fluency:  Level 1 (CN): Fluency disrupted and speech not functional.  Level 2 (CM): Labored in ADL with disruptions that render individual difficult to understand. Level 3 (CL): Dysfluencies evident in all situations, gina problem situations. Listener discomfort evident. Level 4 (CK): Extreme variation w/disruptions being distracting in problem situations. Listeners aware of difficulty. Level 5 (Narayan Mobley): Speech functional for communication. Fluency maintained in some situations. Self monitoring inconsistent. Level 6 (CI): Fluency maintained most of the time. Self monitoring consistent. Level 7 (CH): Disruptions in speech do not call attention to speaker. LOLLY. (2003). National Outcomes Measurement System (NOMS): Adult Speech-Language Pathology User's Guide. Pain:  Pain Scale 1: Numeric (0 - 10)  Pain Intensity 1: 5  Pain Location 1: Foot    After treatment:   Patient left in no apparent distress in bed, Call bell within reach and Nursing notified    COMMUNICATION/EDUCATION:   Patient was educated regarding her deficit(s) of dysfluency as this relates to her diagnosis of syncope. She demonstrated Good understanding as evidenced by discussion. .    The patient's plan of care including recommendations, planned interventions, and recommended diet changes were discussed with: Physical therapist, Occupational therapist and Registered nurse. Patient/family have participated as able in goal setting and plan of care. Patient/family agree to work toward stated goals and plan of care.     Thank you for this referral.  SONIDO Macias  Time Calculation: 15 mins            ,

## 2020-11-16 NOTE — PROGRESS NOTES
[address] [facility] [address]   [place] [address]   [facility]    [facility]   [phone] Fax         No further events  Janet Whitt MD

## 2020-11-16 NOTE — PROGRESS NOTES
1411-Patient reported increased pain to left chest/flank where patient's chest tube was removed recently. Patient also reported increasing fatigue after PT/OT session. RN notified Abida Sena MD. Per Abida Sena MD, pain is expected post chest tube removal. Patient remains fully alert and oriented despite increase in lethargy. Patient is following commands. No new orders at this time. Will continue to monitor.

## 2020-11-16 NOTE — PROGRESS NOTES
Problem: Self Care Deficits Care Plan (Adult)  Goal: *Acute Goals and Plan of Care (Insert Text)  Description: FUNCTIONAL STATUS PRIOR TO ADMISSION: Patient was independent and active without use of DME. Patient with multiple hospital admission with recurrent syncope follow L arterial appendage surgery 10/21/20. HOME SUPPORT: The patient lived with  but did not require assist.    Occupational Therapy Goals  Initiated 11/15/2020  1. Patient will perform grooming in standing with supervision/set-up within 7 day(s). 2.  Patient will perform lower body dressing with supervision/set-up within 7 day(s). 3.  Patient will perform toilet transfers with supervision/set-up within 7 day(s). 4.  Patient will perform all aspects of toileting with supervision/set-up within 7 day(s). 5.  Patient will participate in upper extremity therapeutic exercise/activities with supervision/set-up for 8 minutes within 7 day(s). 6.  Patient will utilize energy conservation techniques during functional activities with verbal cues within 7 day(s). Outcome: Progressing Towards Goal   OCCUPATIONAL THERAPY TREATMENT  Session [id number]  Patient: Kay Teran (84 y.o. female)  Date: 11/16/2020  Diagnosis: Syncope [R55]   Syncope       Precautions: Fall  Chart, occupational therapy assessment, plan of care, and goals were reviewed. ASSESSMENT  Patient continues with skilled OT services and is progressing towards goals. Patient continues to be motivated for therapy. She reports syncopal events at home and in hospital s/p cardiac surgery when she has pressure around chest tube incision. When this occurs she must release the pressure immediately and \"stretch the area\" or it causes nausea and light headed causing syncopal event. She completed LB dressing for pants with CGA x2 for safety and ambulated in room with CGA x2 at  level for additional support with chair follow.   With brief ambulation she reports heart \"flutter. \"  With additional time in chair at end of session she was concerned with ability to find good position in sitting in chair and was assisted back to bed. Slow pace with all mobility. Patient with no stutter at beginning of session. Stutter notable increased with beginning of onset of symptoms- chest pressure, light headed, decreased conversation. BP stable through out, elevated with exercise. See previous note for BP. Neuro plans for continuous EEG. Discussed with patient,  and nursing need for staff assistance with all OOB/chair mobility secondary to patient's fall risk with syncopal events. Current Level of Function Impacting Discharge (ADLs): pants CGA x2 for standing aspect, self care transfer with RW with CGA x2    Other factors to consider for discharge: Syncopal events at home and in hospital          PLAN :  Patient continues to benefit from skilled intervention to address the above impairments. Continue treatment per established plan of care. to address goals. Recommend with staff: physical assistance with all OOB activity, caution with gait belt use however need to ensure ability to safely support patient- eg chair follow, BSC next to bed instead of bathroom for toileting--high fall risk    Recommend next OT session: standing grooming in front of chair/bed    Recommendation for discharge: (in order for the patient to meet his/her long term goals)  To be determined: medical work up and medical team    This discharge recommendation:  Has been made in collaboration with the attending provider and/or case management    IF patient discharges home will need the following DME: TBD- RW? SUBJECTIVE:   Patient stated I know when it is going to happen.     OBJECTIVE DATA SUMMARY:   Cognitive/Behavioral Status:  Neurologic State: Alert  Orientation Level: Oriented X4  Cognition: Follows commands  Perception: Appears intact          Functional Mobility and Transfers for ADLs:  Bed Mobility:       Transfers:  Sit to Stand: Contact guard assistance;Assist x2; Additional time     Bed to Chair: Contact guard assistance;Assist x2; Additional time    Balance:  Sitting: Intact  Standing: Impaired  Standing - Static: Constant support; Fair    ADL Intervention:     Provided education with patient on fall prevention for hospital and at home. This includes not getting OOB/chair/toilet without staff assistance, good lighting, good footwear, and recommended AD use. Patient with fair understanding.  present. Provided education through multi-modal cues for RW safety including proper positioning, hand placement,  and safety. Patient able to perform sit <> stand with CGA x2 for safety. Fair-Poor understanding of RW use and safety. Lower Body Dressing Assistance  Pants With Elastic Waist: Contact guard assistance         Pain:  Reports of brief chest pressure in R chest following activity while at rest.  Discussed with nurse. Activity Tolerance:   Poor    After treatment patient left in no apparent distress:   Supine in bed, Call bell within reach, and Bed / chair alarm activated    COMMUNICATION/COLLABORATION:   The patients plan of care was discussed with: Physical therapist, Registered nurse, and neurology .      Irina Mills OT  Time Calculation: 45 mins

## 2020-11-16 NOTE — PROGRESS NOTES
Care Management follow up    Patient admitted for left sided pain, syncope. Recent admission for afib and dyspnea. Hx afib, dyspnea, asthma, COPD, diabetes, CHF, HTN, MI, TIA. Rapid response 11/15/20    RUR score 33%/ high risk    Current status  Patient continues to require medical management. Evaluation of syncope and L sided weakness. Neuro following. Met with patient and her  at bedside, face mask and goggles on. Discussed PT/OT evaluations and possible home with home care. Transition of Care Plan  1. Monitor patient status and response to treatment. 2. Continues to require medical management. 3. Patient open to [place] [id number] for skilled nursing and PT. Will need resumption of care order if patient returns home with home care. 4. Await further PT/OT evaluations. 5. CM to follow.     Mady Camejo, RN, MSN/Care manager

## 2020-11-16 NOTE — PROGRESS NOTES
I have reviewed discharge instructions with the patient. The patient verbalized understanding. Discharge medications reviewed with patient and appropriate educational materials and side effects teaching were provided. AVS signed by patient and placed in chart.

## 2020-11-17 ENCOUNTER — APPOINTMENT (OUTPATIENT)
Dept: GENERAL RADIOLOGY | Age: 70
DRG: 312 | End: 2020-11-17
Attending: INTERNAL MEDICINE
Payer: MEDICARE

## 2020-11-17 VITALS
RESPIRATION RATE: 17 BRPM | WEIGHT: 167.2 LBS | SYSTOLIC BLOOD PRESSURE: 115 MMHG | TEMPERATURE: 97.6 F | HEIGHT: 62 IN | OXYGEN SATURATION: 95 % | DIASTOLIC BLOOD PRESSURE: 75 MMHG | HEART RATE: 105 BPM | BODY MASS INDEX: 30.77 KG/M2

## 2020-11-17 LAB
ANION GAP SERPL CALC-SCNC: 7 MMOL/L (ref 5–15)
BUN SERPL-MCNC: 10 MG/DL (ref 6–20)
BUN/CREAT SERPL: 14 (ref 12–20)
CALCIUM SERPL-MCNC: 8.4 MG/DL (ref 8.5–10.1)
CHLORIDE SERPL-SCNC: 111 MMOL/L (ref 97–108)
CO2 SERPL-SCNC: 25 MMOL/L (ref 21–32)
CREAT SERPL-MCNC: 0.71 MG/DL (ref 0.55–1.02)
FLUID CULTURE, SPNG2: NORMAL
GLUCOSE BLD STRIP.AUTO-MCNC: 111 MG/DL (ref 65–100)
GLUCOSE BLD STRIP.AUTO-MCNC: 157 MG/DL (ref 65–100)
GLUCOSE SERPL-MCNC: 149 MG/DL (ref 65–100)
L PNEUMO1 AG UR QL IA: NEGATIVE
MAGNESIUM SERPL-MCNC: 2.4 MG/DL (ref 1.6–2.4)
ORGANISM ID, SPNG3: NORMAL
PLEASE NOTE, SPNG4: NORMAL
POTASSIUM SERPL-SCNC: 3.9 MMOL/L (ref 3.5–5.1)
S PNEUM AG SPEC QL LA: NEGATIVE
SERVICE CMNT-IMP: ABNORMAL
SERVICE CMNT-IMP: ABNORMAL
SODIUM SERPL-SCNC: 143 MMOL/L (ref 136–145)
SPECIMEN SOURCE: NORMAL
SPECIMEN SOURCE: NORMAL
SPECIMEN, SPNG1: NORMAL

## 2020-11-17 PROCEDURE — 36415 COLL VENOUS BLD VENIPUNCTURE: CPT

## 2020-11-17 PROCEDURE — 99232 SBSQ HOSP IP/OBS MODERATE 35: CPT | Performed by: PSYCHIATRY & NEUROLOGY

## 2020-11-17 PROCEDURE — 74011000250 HC RX REV CODE- 250: Performed by: INTERNAL MEDICINE

## 2020-11-17 PROCEDURE — 97116 GAIT TRAINING THERAPY: CPT

## 2020-11-17 PROCEDURE — 74011636637 HC RX REV CODE- 636/637: Performed by: INTERNAL MEDICINE

## 2020-11-17 PROCEDURE — 71045 X-RAY EXAM CHEST 1 VIEW: CPT

## 2020-11-17 PROCEDURE — 80048 BASIC METABOLIC PNL TOTAL CA: CPT

## 2020-11-17 PROCEDURE — 74011250636 HC RX REV CODE- 250/636: Performed by: INTERNAL MEDICINE

## 2020-11-17 PROCEDURE — 82962 GLUCOSE BLOOD TEST: CPT

## 2020-11-17 PROCEDURE — 74011250637 HC RX REV CODE- 250/637: Performed by: INTERNAL MEDICINE

## 2020-11-17 PROCEDURE — 94760 N-INVAS EAR/PLS OXIMETRY 1: CPT

## 2020-11-17 PROCEDURE — 97530 THERAPEUTIC ACTIVITIES: CPT

## 2020-11-17 PROCEDURE — 94640 AIRWAY INHALATION TREATMENT: CPT

## 2020-11-17 PROCEDURE — 83735 ASSAY OF MAGNESIUM: CPT

## 2020-11-17 RX ORDER — KETOROLAC TROMETHAMINE 30 MG/ML
15 INJECTION, SOLUTION INTRAMUSCULAR; INTRAVENOUS
Status: DISCONTINUED | OUTPATIENT
Start: 2020-11-17 | End: 2020-11-17 | Stop reason: HOSPADM

## 2020-11-17 RX ORDER — LIDOCAINE 50 MG/G
1 PATCH TOPICAL EVERY 24 HOURS
Qty: 1 EACH | Refills: 0 | Status: SHIPPED | OUTPATIENT
Start: 2020-11-17

## 2020-11-17 RX ORDER — GABAPENTIN 100 MG/1
100 CAPSULE ORAL 2 TIMES DAILY
Qty: 60 CAP | Refills: 0 | Status: ON HOLD | OUTPATIENT
Start: 2020-11-17 | End: 2021-05-30

## 2020-11-17 RX ORDER — AMIODARONE HYDROCHLORIDE 100 MG/1
100 TABLET ORAL EVERY OTHER DAY
Qty: 15 TAB | Refills: 0 | Status: SHIPPED | OUTPATIENT
Start: 2020-11-18 | End: 2020-12-07

## 2020-11-17 RX ORDER — GABAPENTIN 100 MG/1
100 CAPSULE ORAL 2 TIMES DAILY
Qty: 60 CAP | Refills: 0 | Status: SHIPPED | OUTPATIENT
Start: 2020-11-17 | End: 2020-11-17

## 2020-11-17 RX ORDER — AMIODARONE HYDROCHLORIDE 200 MG/1
100 TABLET ORAL EVERY OTHER DAY
Status: DISCONTINUED | OUTPATIENT
Start: 2020-11-18 | End: 2020-11-17 | Stop reason: HOSPADM

## 2020-11-17 RX ORDER — IBUPROFEN 200 MG
600 TABLET ORAL
Qty: 30 TAB | Refills: 0 | Status: SHIPPED | OUTPATIENT
Start: 2020-11-17 | End: 2020-12-03

## 2020-11-17 RX ORDER — LIDOCAINE 4 G/100G
1 PATCH TOPICAL EVERY 24 HOURS
Status: DISCONTINUED | OUTPATIENT
Start: 2020-11-17 | End: 2020-11-17 | Stop reason: HOSPADM

## 2020-11-17 RX ADMIN — PANTOPRAZOLE SODIUM 40 MG: 40 TABLET, DELAYED RELEASE ORAL at 06:32

## 2020-11-17 RX ADMIN — Medication 10 ML: at 13:21

## 2020-11-17 RX ADMIN — BUDESONIDE 500 MCG: 0.5 INHALANT RESPIRATORY (INHALATION) at 07:52

## 2020-11-17 RX ADMIN — ARFORMOTEROL TARTRATE 15 MCG: 15 SOLUTION RESPIRATORY (INHALATION) at 07:52

## 2020-11-17 RX ADMIN — PREDNISONE 5 MG: 5 TABLET ORAL at 09:06

## 2020-11-17 RX ADMIN — ENOXAPARIN SODIUM 40 MG: 40 INJECTION SUBCUTANEOUS at 09:07

## 2020-11-17 RX ADMIN — METOPROLOL SUCCINATE 25 MG: 25 TABLET, EXTENDED RELEASE ORAL at 13:20

## 2020-11-17 RX ADMIN — LEVOTHYROXINE SODIUM 150 MCG: 0.07 TABLET ORAL at 06:32

## 2020-11-17 RX ADMIN — Medication 10 ML: at 06:32

## 2020-11-17 RX ADMIN — POTASSIUM CHLORIDE 10 MEQ: 750 TABLET, FILM COATED, EXTENDED RELEASE ORAL at 09:05

## 2020-11-17 RX ADMIN — INSULIN GLARGINE 8 UNITS: 100 INJECTION, SOLUTION SUBCUTANEOUS at 09:06

## 2020-11-17 RX ADMIN — FAMOTIDINE 20 MG: 20 TABLET, FILM COATED ORAL at 09:04

## 2020-11-17 RX ADMIN — PREDNISONE 2.5 MG: 5 TABLET ORAL at 13:18

## 2020-11-17 RX ADMIN — GABAPENTIN 100 MG: 100 CAPSULE ORAL at 09:05

## 2020-11-17 NOTE — PROGRESS NOTES
CM follow up:    Met with patient and her  at bedside, face mask and goggles on. Patient accepted for resumption of care by [facility] for nursing and PT. Patient given [facility] information. Possible discharge today.  at bedside. Order received for rolling walker. Referral placed to [facility] via [employer]. Patient ACCEPTED. Rolling walker obtained from inventory closet and delivered to bedside.   Delivery ticket completed and attached to [employer] referral.    Care Management Interventions  Transition of Care Consult (CM Consult): [address]: No  Reason Outside [place]: Patient already serviced by other home care/hospice agency  Current Support Network: Lives with Spouse  Confirm Follow Up Transport: Family  Discharge Location  Discharge Placement: Home with home health    Philomena Mccormack, RN, MSN/Care manager  [phone]

## 2020-11-17 NOTE — PROGRESS NOTES
Bedside shift change report given to Charissa Mckeon (oncoming nurse) by Gisela Persaud (offgoing nurse). Report included the following information SBAR, Kardex, ED Summary, Intake/Output, MAR, Recent Results and Cardiac Rhythm NSR.

## 2020-11-17 NOTE — PROGRESS NOTES
Bedside and Verbal shift change report given to MINAL Shea (oncoming nurse) by Christiane Valdez RN (offgoing nurse). Report included the following information SBAR, Kardex, MAR, Accordion and Recent Results.

## 2020-11-17 NOTE — DISCHARGE SUMMARY
Physician Discharge Summary     Patient ID:  Aneesh Echeverria  [id number]  89 y.o.  1950    Admit date: 11/13/2020    Discharge date and time: 11/17/2020    Admission Diagnoses: Syncope [R55]    Discharge Diagnoses / Hospital course:    Recurrent syncope: unclear etiology. No recurrence. No events on tele thus far. ?vasovagal? Pt reports occurrence when PT band is around her stomach. Continue to work with PT     Twitching / Claudette Cooter / Acute encephalopathy: twitching and confusion now resolved. Possibly stress reaction. No acute processes on CT/CTA. MRI brain. cEEG normal. Neuro following     PAF/flutter s/p SUHAS ligation: Evaluated by cardiology, cont amiodarone, metoprolol. Plan for event monitor outpatient and possible ablation in the future     CAD: recently admitted for recurrent chest pain. Had normal nuclear stress test and recurrent CP not felt to represent ischemia. Not on antiplatelet or statin; defer to cardiology     Diastolic heart failure: euvolemic. Continue BB     HTN: BP high. Cont metoprolol. Repeat blood pressure now to determine need to add antihypertensive. IV labetalol PRN     Leukocytosis: chronic, on steroids, no evidence of infection. Improved; monitor WBC     Left ankle pain: does have hx of Ra. Negative venous duplex. Better today. Monitor      Diabetes mellitus type 2, controlled: A1c 7.2%. Cont Lantus, SSI     Rheumatoid arthritis / Chronic steroid use: continue low dose prednisone, lidoderm and gabapentin. On Prolia at home     GERD: cont PPI      Asthma: no wheezing. Cont nebs, ICS/LABA     Hypothyroidism: TSH WNR. Continue LT4         PCP: Thai Arias MD     Consults: Cardiology    Condition of patient at discharge: improved    Discharge Exam:  Please refer to progress note from today.     Disposition: home    Patient Instructions:   Current Discharge Medication List      START taking these medications    Details   ibuprofen (AdviL) 200 mg tablet Take 3 Tabs by mouth every eight (8) hours as needed for Pain for up to 16 days. Qty: 30 Tab, Refills: 0         CONTINUE these medications which have CHANGED    Details   amiodarone (PACERONE) 100 mg tablet Take 1 Tab by mouth every other day. Qty: 15 Tab, Refills: 0      !! gabapentin (NEURONTIN) 100 mg capsule Take 1 Cap by mouth two (2) times a day. Max Daily Amount: 200 mg. Qty: 60 Cap, Refills: 0    Associated Diagnoses: Rheumatoid arthritis of other site, unspecified whether rheumatoid factor present (HCC)      lidocaine (Lidoderm) 5 % 1 Patch by TransDERmal route every twenty-four (24) hours. Apply patch to the affected area for 12 hours a day and remove for 12 hours a day. Qty: 1 Each, Refills: 0       !! - Potential duplicate medications found. Please discuss with provider. CONTINUE these medications which have NOT CHANGED    Details   ! ! predniSONE (DELTASONE) 2.5 mg tablet Take 1 Tab by mouth daily (after lunch). 5 mg in morning and 2.5 mg afternoon  Resume your usual dose of prednisone after you complete the prednisone taper  Qty: 30 Tab, Refills: 0      acetaminophen (TYLENOL) 650 mg TbER Take 1,300 mg by mouth two (2) times a day. !! bumetanide (BUMEX) 1 mg tablet Take 0.5 mg by mouth every evening. 1/2 tablet = 0.5 mg      coenzyme Q-10 (Co Q-10) 200 mg capsule Take 200 mg by mouth daily. artificial tears, dextran 70-hypromellose, (GenTeal Tears Mild) 0.1-0.3 % ophthalmic solution Administer 1 Drop to both eyes nightly. carboxymethylcell/hypromellose (GENTEAL GEL OP) Administer 1 Drop to both eyes as needed (dry eye). omega 3-DHA-EPA-fish oil 1,000 mg (120 mg-180 mg) capsule Take 1 Cap by mouth every evening. levalbuterol (XOPENEX) 0.63 mg/3 mL nebu 0.63 mg by Nebulization route four (4) times daily. metoprolol succinate (TOPROL-XL) 25 mg XL tablet Take 1 Tab by mouth daily (after lunch).   Qty: 30 Tab, Refills: 2      aclidinium bromide (Tudorza Pressair) 400 mcg/actuation inhaler Take 1 Puff by inhalation daily. calcium citrate-vitamin d3 (CITRACAL+D) 315 mg-5 mcg (200 unit) tab Take 1 Tab by mouth daily (with breakfast). !! gabapentin (NEURONTIN) 100 mg capsule Take 100 mg by mouth nightly. mometasone furoate (ASMANEX HFA IN) Take 100 mcg by inhalation two (2) times a day. potassium 99 mg tablet Take 99 mg by mouth daily. insulin glargine (LANTUS,BASAGLAR) 100 unit/mL (3 mL) inpn 8 Units by SubCUTAneous route Daily (before breakfast). 30 minutes before breakfast      denosumab (PROLIA SC) by SubCUTAneous route every 6 months. !! bumetanide (BUMEX) 1 mg tablet Take 1 mg by mouth daily. eplerenone (INSPRA) 25 mg tablet Take 25 mg by mouth Daily (before lunch). fenofibrate nanocrystallized (Tricor) 48 mg tablet Take 48 mg by mouth nightly. nitroglycerin (NITRODUR) 0.1 mg/hr 1 Patch by TransDERmal route daily as needed (chest pain). levothyroxine (SYNTHROID) 150 mcg tablet Take 150 mcg by mouth Daily (before breakfast). !! predniSONE (DELTASONE) 5 mg tablet Take 5 mg by mouth daily. 5 mg in morning and 2.5 mg afternoon      cholecalciferol, vitamin D3, (VITAMIN D3) 2,000 unit tab Take 2,000 Units by mouth daily. turmeric (CURCUMIN) Take 1 g by mouth every evening. SITagliptin (JANUVIA) 100 mg tablet Take 100 mg by mouth daily (with dinner). vit C/vit E ac/lut/copper/zinc (PRESERVISION LUTEIN PO) Take 1 Tab by mouth every Monday. Takes with dinner      ascorbic acid (VITAMIN C) 500 mg tablet Take 500 mg by mouth daily. ferrous sulfate 325 mg (65 mg iron) tablet Take 325 mg by mouth daily (with lunch). cyanocobalamin (VITAMIN B12) 1,000 mcg/mL injection INJECT 1 ML INTO THE MUSCLE EVERY 30 DAYS  Qty: 10 mL, Refills: 0      lansoprazole (PREVACID) 30 mg capsule Take 30 mg by mouth daily. Associated Diagnoses: Esophageal reflux       !! - Potential duplicate medications found. Please discuss with provider.         Activity: Activity as tolerated  Diet: Resume previous diet  Wound Care: None needed    Follow-up with Lola Menendez MD in 1 week.   Follow-up tests/labs none    Approximate time spent in patient care on day of discharge: 33 minutes    Signed:  Cecille Zuniga MD  11/17/2020  1:36 PM

## 2020-11-17 NOTE — PROGRESS NOTES
Problem: Self Care Deficits Care Plan (Adult)  Goal: *Acute Goals and Plan of Care (Insert Text)  Description: FUNCTIONAL STATUS PRIOR TO ADMISSION: Patient was independent and active without use of DME. Patient with multiple hospital admission with recurrent syncope follow L arterial appendage surgery 10/21/20. HOME SUPPORT: The patient lived with  but did not require assist.    Occupational Therapy Goals  Initiated 11/15/2020  1. Patient will perform grooming in standing with supervision/set-up within 7 day(s). 2.  Patient will perform lower body dressing with supervision/set-up within 7 day(s). 3.  Patient will perform toilet transfers with supervision/set-up within 7 day(s). 4.  Patient will perform all aspects of toileting with supervision/set-up within 7 day(s). 5.  Patient will participate in upper extremity therapeutic exercise/activities with supervision/set-up for 8 minutes within 7 day(s). 6.  Patient will utilize energy conservation techniques during functional activities with verbal cues within 7 day(s). Outcome: Progressing Towards Goal   OCCUPATIONAL THERAPY TREATMENT  Patient: Kay Teran (58 y.o. female)  Date: 11/17/2020  Diagnosis: Syncope [R55]   Syncope       Precautions: Fall  Chart, occupational therapy assessment, plan of care, and goals were reviewed. ASSESSMENT  Patient continues with skilled OT services and is progressing towards goals. Ms. Trev Gibbs was received in bed agreeable to activity. Patient with improved pain today only reports intermittent, mild pain with some movement. Patient was able to come to sitting EOB and don socks with setup. Patient was educated on adaptive ADL techniques and safe transfer techniques. Patient was able to stand, ambulate within the room, and perform ADL transfers with SBA. Patient having x2 instances of reported \"heart racing\" where she maintained static standing until episode resolved.   All vitals stable throughout including stable HR. Patient would benefit from continued skilled OT to progress towards goals and improve overall independence. Current Level of Function Impacting Discharge (ADLs): Patient required SBA for functional mobility. She required setup for LB dressing. She requires increased time for all tasks. PLAN :  Patient continues to benefit from skilled intervention to address the above impairments. Continue treatment per established plan of care. to address goals. Recommend with staff:   Recommend patient be OOB to chair as frequently as tolerated; Goal of 3x/day for all meals for 60 minutes at a time. For toileting needs, recommend transfers to/from bathroom. Encourage patient involvement in personal care as able. Recommend next OT session: Continue towards set OT goals. Recommendation for discharge: (in order for the patient to meet his/her long term goals)  Occupational therapy at least 2 days/week in the home     This discharge recommendation:  Has been made in collaboration with the attending provider and/or case management    IF patient discharges home will need the following DME: none       SUBJECTIVE:   Patient stated I am much better today.     OBJECTIVE DATA SUMMARY:   Cognitive/Behavioral Status:  Neurologic State: Alert  Orientation Level: Oriented X4  Cognition: Follows commands  Perception: Appears intact  Perseveration: No perseveration noted  Safety/Judgement: Awareness of environment    Functional Mobility and Transfers for ADLs:  Bed Mobility:  Rolling: Stand-by assistance  Supine to Sit: Stand-by assistance    Transfers:  Sit to Stand: Stand-by assistance  Functional Transfers  Toilet Transfer : Stand-by assistance     Balance:  Sitting: Intact  Standing: With support  Standing - Static: Constant support;Good  Standing - Dynamic : Fair    ADL Intervention:  Lower Body Dressing Assistance  Dressing Assistance: Set-up  Socks: Set-up  Leg Crossed Method Used: Yes  Position Performed: Seated edge of bed  Cues: Verbal cues provided     Cognitive Retraining  Safety/Judgement: Awareness of environment    Activity Tolerance:   Good    After treatment patient left in no apparent distress:   Sitting in chair, Call bell within reach, and Bed / chair alarm activated    COMMUNICATION/COLLABORATION:   The patients plan of care was discussed with: Physical therapist, Registered nurse, and patient .      SCAR Dow/L  Time Calculation: 25 mins

## 2020-11-17 NOTE — PROGRESS NOTES
HISTORY OF PRESENTING ILLNESS      Edna Elena is a 65 y.o. female with hx of paroxysmal arial fibrillation (s/p ablation x10 years ago with Dr. Gerardo Elliott), previous history of thrombocytopenia, hypertension, COPD, heart failure preserved EF, CAD. She underwent a left atrial appendage ligation on 10/20/2020 by Dr. Cari Guerra. She experienced chest pain/palpitations post procedure and presented to the ER. Previous cardiac catheterization and stress test in 2019 were negative for ischemia and no significant coronary disease and stents were open. She had normal stress testing 11/5/2020 due to peaked troponin level. Echocardiogram demonstrated preserved LV function of 55-60%. She had recent admission for AF with RVR following her cardiac surgery. Suspected inflammation post op to be the  of AF/symptoms. She was discharged with daily oral amiodarone with plan to order 30 day OP monitor to evaluate rate control and need for AV jules ablation/PPM in the future. She was readmitted for syncope and discharged from the hospital 11/17/2020 and presents to discuss alternate AAD versus aV nod ablation/PPM as she believes that Amiodarone is causing side effects.       PAST MEDICAL HISTORY     Past Medical History:   Diagnosis Date    Adverse effect of anesthesia     slow to wake up    Arthritis     Asthma 6/29/2009    CAUSED BY MOLD    Atrial fibrillation ([address] 6/29/2009    CAD (coronary artery disease) 06/29/2009    Celiac disease 2010    Chronic obstructive pulmonary disease ([address] 2004-5    right leg    Chronic pain of right ankle     Diabetes ([address]     Drug induced prednisone, DM2    Diastolic heart failure (HCC)     DVT (deep venous thrombosis) ([address] 2004    Right leg post surgery    GERD (gastroesophageal reflux disease)     Gluten intolerance     Heart failure ([address]     Hypertension     Hypothyroidism 6/29/2009    Iron deficiency anemia     Lyme disease     Personal history of MI (myocardial infarction)     Rheumatoid arthritis [facility]     Slow to wake up after anesthesia     Syncope     Post testing- resolved    TIA (transient ischemic attack) 2004 & 2006    Vitamin B12 deficiency 6/29/2009           PAST SURGICAL HISTORY     Past Surgical History:   Procedure Laterality Date    HX ADENOIDECTOMY      HX AFIB ABLATION      HX APPENDECTOMY      HX CHOLECYSTECTOMY  6/16/11    HX COLONOSCOPY      HX CORONARY STENT PLACEMENT      x 2    HX HEART CATHETERIZATION  2010    2 STENTS    HX HEART CATHETERIZATION  03/2020    HX HYSTERECTOMY      HX LUMBAR LAMINECTOMY  2000    L4-L5    HX ORTHOPAEDIC  1993-6/2012    RT.  FOOT SURGERY X 6/calcaneal osteotomy    HX ORTHOPAEDIC Right 09/19/2020    right hand [facility] joint replacement    HX TONSILLECTOMY  1964          ALLERGIES     Allergies   Allergen Reactions    Butter Flavor Anaphylaxis     Butter AND CREME    Keflex [Cephalexin] Anaphylaxis    Milk Containing Products Anaphylaxis     Butter and cream    Pcn [Penicillins] Anaphylaxis    Aspirin Hives and Other (comments)     \"it makes my stomach bleed\"      Cimzia [Certolizumab Pegol] Other (comments)     GI symptoms, blisters in the mouth and esophagus    Clopidogrel Other (comments)     GI bleed    Demerol [Meperidine] Other (comments)     HYPOTENSION    Fentanyl Other (comments)     HYPOTENSION    Ibuprofen Diarrhea     Patient reports that ibuprofen caused diarrhea    Morphine Other (comments)     HYPOTENSION    Nitroglycerin Other (comments)     IN PILL FORM  - HYPOTENSION  CAN TOLERATE PATCH FOR SHORT TIME    Sulfa (Sulfonamide Antibiotics) Angioedema     Lips    Tapazole [Methimazole] Unknown (comments)     Patient stated \"it made me feel like I had the flu\"    Tramadol Other (comments)     Patient reports thrush with tramadol use    Adhesive Tape-Silicones Other (comments)     BAD BLISTERS AND TENDS TO GET INFECTED    Albuterol Palpitations    Gluten Diarrhea bloating    Oxycodone Other (comments)     Hallicination and hypotension          FAMILY HISTORY     Family History   Problem Relation Age of Onset    Delayed Awakening Mother     Heart Disease Mother     Coronary Artery Disease Mother     Hypertension Mother    Silva Stroke Mother    Silva Delayed Awakening Sister     Hypertension Sister     Lung Disease Father     Cancer Father         colon    Hypertension Father     Hypertension Brother     Breast Cancer Maternal Aunt     Breast Cancer Maternal Aunt     Breast Cancer Cousin         maternal 1st cousin    Breast Cancer Maternal Aunt     Breast Cancer Cousin         maternal 1st cousin   Silva Breast Cancer Cousin         maternal 1st cousin    Deep Vein Thrombosis Neg Hx     Anesth Problems Neg Hx     negative for cardiac disease       SOCIAL HISTORY     Social History     Socioeconomic History    Marital status:      Spouse name: Not on file    Number of children: Not on file    Years of education: Not on file    Highest education level: Not on file   Tobacco Use    Smoking status: Former Smoker     Packs/day: 1.00     Years: 30.00     Pack years: 30.00     Types: Cigarettes     Last attempt to quit: 2002     Years since quittin.4    Smokeless tobacco: Never Used   Substance and Sexual Activity    Alcohol use: No    Drug use: Never    Sexual activity: Yes     Partners: Male         MEDICATIONS     Current Outpatient Medications   Medication Sig    amiodarone (PACERONE) 100 mg tablet Take 1 Tab by mouth every other day.  lidocaine (Lidoderm) 5 % 1 Patch by TransDERmal route every twenty-four (24) hours. Apply patch to the affected area for 12 hours a day and remove for 12 hours a day.  ibuprofen (AdviL) 200 mg tablet Take 3 Tabs by mouth every eight (8) hours as needed for Pain for up to 16 days.  gabapentin (NEURONTIN) 100 mg capsule Take 1 Cap by mouth two (2) times a day. Max Daily Amount: 200 mg.     predniSONE (DELTASONE) 2.5 mg tablet Take 1 Tab by mouth daily (after lunch). 5 mg in morning and 2.5 mg afternoon  Resume your usual dose of prednisone after you complete the prednisone taper    acetaminophen (TYLENOL) 650 mg TbER Take 1,300 mg by mouth two (2) times a day.  bumetanide (BUMEX) 1 mg tablet Take 0.5 mg by mouth every evening. 1/2 tablet = 0.5 mg    coenzyme Q-10 (Co Q-10) 200 mg capsule Take 200 mg by mouth daily.  artificial tears, dextran 70-hypromellose, (GenTeal Tears Mild) 0.1-0.3 % ophthalmic solution Administer 1 Drop to both eyes nightly.  carboxymethylcell/hypromellose (GENTEAL GEL OP) Administer 1 Drop to both eyes as needed (dry eye).  omega 3-DHA-EPA-fish oil 1,000 mg (120 mg-180 mg) capsule Take 1 Cap by mouth every evening.  levalbuterol (XOPENEX) 0.63 mg/3 mL nebu 0.63 mg by Nebulization route four (4) times daily.  metoprolol succinate (TOPROL-XL) 25 mg XL tablet Take 1 Tab by mouth daily (after lunch).  aclidinium bromide (Tudorza Pressair) 400 mcg/actuation inhaler Take 1 Puff by inhalation daily.  calcium citrate-vitamin d3 (CITRACAL+D) 315 mg-5 mcg (200 unit) tab Take 1 Tab by mouth daily (with breakfast).  mometasone furoate (ASMANEX HFA IN) Take 100 mcg by inhalation two (2) times a day.  insulin glargine (LANTUS,BASAGLAR) 100 unit/mL (3 mL) inpn 8 Units by SubCUTAneous route Daily (before breakfast). 30 minutes before breakfast    denosumab (PROLIA SC) by SubCUTAneous route every 6 months.  bumetanide (BUMEX) 1 mg tablet Take 1 mg by mouth daily.  eplerenone (INSPRA) 25 mg tablet Take 25 mg by mouth Daily (before lunch).  fenofibrate nanocrystallized (Tricor) 48 mg tablet Take 48 mg by mouth nightly.  nitroglycerin (NITRODUR) 0.1 mg/hr 1 Patch by TransDERmal route daily as needed (chest pain).  levothyroxine (SYNTHROID) 150 mcg tablet Take 150 mcg by mouth Daily (before breakfast).     predniSONE (DELTASONE) 5 mg tablet Take 5 mg by mouth daily. 5 mg in morning and 2.5 mg afternoon    cholecalciferol, vitamin D3, (VITAMIN D3) 2,000 unit tab Take 2,000 Units by mouth daily.  turmeric (CURCUMIN) Take 1 g by mouth every evening.  SITagliptin (JANUVIA) 100 mg tablet Take 100 mg by mouth daily (with dinner).  vit C/vit E ac/lut/copper/zinc (PRESERVISION LUTEIN PO) Take 1 Tab by mouth every Monday. Takes with dinner    ascorbic acid (VITAMIN C) 500 mg tablet Take 500 mg by mouth daily.  ferrous sulfate 325 mg (65 mg iron) tablet Take 325 mg by mouth daily (with lunch).  cyanocobalamin (VITAMIN B12) 1,000 mcg/mL injection INJECT 1 ML INTO THE MUSCLE EVERY 30 DAYS    lansoprazole (PREVACID) 30 mg capsule Take 30 mg by mouth daily.  potassium 99 mg tablet Take 99 mg by mouth daily. No current facility-administered medications for this visit. I have reviewed the nurses notes, vitals, problem list, allergy list, medical history, family, social history and medications. REVIEW OF SYMPTOMS      General: Pt denies excessive weight gain or loss. Pt is able to conduct ADL's  HEENT: Denies blurred vision, headaches, hearing loss, epistaxis and difficulty swallowing. Respiratory: Denies cough, congestion, shortness of breath, CHACON, wheezing or stridor. Cardiovascular: Denies precordial pain, palpitations, edema or PND  Gastrointestinal: Denies poor appetite, indigestion, abdominal pain or blood in stool  Genitourinary: Denies hematuria, dysuria, increased urinary frequency  Musculoskeletal: Denies joint pain or swelling from muscles or joints  Neurologic: Denies tremor, paresthesias, headache, or sensory motor disturbance  Psychiatric: Denies confusion, insomnia, depression  Integumentray: Denies rash, itching or ulcers. Hematologic: Denies easy bruising, bleeding       PHYSICAL EXAMINATION      Vitals: see vitals section  General: Well developed, in no acute distress.   HEENT: No jaundice, oral mucosa moist, no oral ulcers  Neck: Supple, no stiffness, no lymphadenopathy, supple  Heart:  Normal S1/S2 negative S3 or S4. Regular, no murmur, gallop or rub, no jugular venous distention  Respiratory: Clear bilaterally x 4, no wheezing or rales  Abdomen:   Soft, non-tender, bowel sounds are active. Extremities:  No edema, normal cap refill, no cyanosis. Musculoskeletal: No clubbing, no deformities  Neuro: A&Ox3, speech clear, gait stable, cooperative, no focal neurologic deficits  Skin: Skin color is normal. No rashes or lesions. Non diaphoretic, moist.  Vascular: 2+ pulses symmetric in all extremities       DIAGNOSTIC DATA      EKG:        LABORATORY DATA      Lab Results   Component Value Date/Time    WBC 13.7 (H) 11/16/2020 04:40 AM    Hemoglobin (POC) 15.6 06/09/2014 03:00 AM    HGB 13.4 11/16/2020 04:40 AM    Hematocrit (POC) 49 (H) 11/05/2020 09:33 PM    HCT 41.9 11/16/2020 04:40 AM    PLATELET 987 42/31/0147 04:40 AM    MCV 96.5 11/16/2020 04:40 AM      Lab Results   Component Value Date/Time    Sodium 143 11/17/2020 03:26 AM    Potassium 3.9 11/17/2020 03:26 AM    Chloride 111 (H) 11/17/2020 03:26 AM    CO2 25 11/17/2020 03:26 AM    Anion gap 7 11/17/2020 03:26 AM    Glucose 149 (H) 11/17/2020 03:26 AM    BUN 10 11/17/2020 03:26 AM    Creatinine 0.71 11/17/2020 03:26 AM    BUN/Creatinine ratio 14 11/17/2020 03:26 AM    GFR est AA >60 11/17/2020 03:26 AM    GFR est non-AA >60 11/17/2020 03:26 AM    Calcium 8.4 (L) 11/17/2020 03:26 AM    Bilirubin, total 0.5 11/14/2020 03:04 AM    Alk. phosphatase 61 11/14/2020 03:04 AM    Protein, total 5.1 (L) 11/14/2020 03:04 AM    Albumin 3.0 (L) 11/16/2020 04:40 AM    Globulin 2.3 11/14/2020 03:04 AM    A-G Ratio 1.2 11/14/2020 03:04 AM    ALT (SGPT) 20 11/14/2020 03:04 AM           ASSESSMENT         1. Atrial fibrillation with RVR              Paroxysmal             S/p SUHAS Clip   2. CAD               S/p PCI to LAD and RCA  3. PVCs  4.  HTN  5. COPD/Asthma       PLAN     Given intolerance to amiodarone, plan for dual chamber PPM with [employer] using conscious sedation to be followed by AV node ablation. ICD-10-CM ICD-9-CM    1. Atrial fibrillation, unspecified type ([address]  I48.91 427.31    2. HTN (hypertension), benign  I10 401.1    3. Coronary artery disease involving native coronary artery of native heart without angina pectoris  I25.10 414.01    4. Dyslipidemia  E78.5 272.4    5. Shortness of breath  R06.02 786.05      No orders of the defined types were placed in this encounter. FOLLOW-UP     Post procedure      Thank you, Leila Gay MD for allowing me to participate in the care of this extraordinarily pleasant female. Please do not hesitate to contact me for further questions/concerns.          Hamlet De MD  Cardiac Electrophysiology / Cardiology    [address]  [address] [facility] [address] [place] [address]  [phone] / [phone] Fax   [phone] / [phone] Fax

## 2020-11-17 NOTE — PROGRESS NOTES
[address] [place] [address] [address]   [facility] [address]   [place], [address]   [phone] [facility]   [phone] Fax         Recurrent episodes of syncope    Continuous video EEG has been running overnight. She denies any further episodes of syncope while EEG has been in place. No new neurologic symptoms today. Exam  Visit Vitals  /65 (BP 1 Location: Left arm, BP Patient Position: At rest)   Pulse 79   Temp 98.7 °F (37.1 °C)   Resp 18   Ht 5' 2\" (1.575 m)   Wt 75.8 kg (167 lb 3.2 oz)   LMP 09/29/1980 (LMP Unknown)   SpO2 96%   BMI 30.58 kg/m²     Sitting in bed comfortable appearing. Awake alert oriented and conversant. She has no stuttering today. Normal speech and language. Normal cognition. Moves all extremities.     Impression/plan  Episodes of recurrent syncope unknown etiology  We will review the continuous video EEG and if unremarkable no further testing warranted from my standpoint    Tobi Moon MD

## 2020-11-17 NOTE — PROCEDURES
[facility] [address] [place] [address]  EEG    Name:  Yani Jorgensen  MR#:  [id number]  :  1950  ACCOUNT #:  [de-identified]  DATE OF SERVICE:  2020    CONTINUOUS EEG WITH SIMULTANEOUS VIDEO MONITORING    REQUESTING PROVIDER:  Waylon Baltazar MD    CLINICAL HISTORY:  A continuous EEG with simultaneous video monitoring is requested in this 70-year-old woman with recurrent episodes of loss of consciousness to evaluate for epileptiform abnormalities. Her medications are not listed. This tracing has a recording start time of 1614 hours, 2020 and recording end time 10:29 a.m., 2020 for a total recording time 18 hours 14 minutes. No clinical events are reported. During wakefulness the background consists of intermittent runs of posteriorly dominant and symmetrical low-to-medium amplitude 10 cycle per second activities which attenuate with eye opening. Lower voltage faster frequency activities are seen symmetrically over the anterior head regions. Normal sleep architecture is seen. No clinical events. Review of computerized spike and seizure detection software reveals no spikes and no seizure.     INTERPRETATION:  Continuous EEG with simultaneous video monitoring is normal.      Carmen Jara MD      [id number]  D:  2020 11:37  T:  2020 11:59  JOB #:  [id number]

## 2020-11-17 NOTE — PROGRESS NOTES
Jerardo Vyas [facility]  [address] [facility], [place], [address] [place] [address]  [phone]      Medical Progress Note      NAME: Heidy Malin   :  1950  MRM:  [id number]    Date of service: 2020        Assessment / Plan:       Recurrent syncope: unclear etiology. No recurrence. No events on tele thus far. ?vasovagal? Pt reports occurrence when PT band is around her stomach. Continue to work with PT    Valente Gutierrez / Tiago Kamara / Acute encephalopathy: twitching and confusion now resolved. Possibly stress reaction. No acute processes on CT/CTA. MRI brain. cEEG normal. Neuro following     PAF/flutter s/p SUHAS ligation: Evaluated by cardiology, cont amiodarone, metoprolol. Plan for event monitor outpatient and possible ablation in the future     CAD: recently admitted for recurrent chest pain. Had normal nuclear stress test and recurrent CP not felt to represent ischemia. Not on antiplatelet or statin; defer to cardiology    Diastolic heart failure: euvolemic. Continue BB    HTN: BP high. Cont metoprolol. Repeat blood pressure now to determine need to add antihypertensive. IV labetalol PRN    Leukocytosis: chronic, on steroids, no evidence of infection. Improved; monitor WBC     Left ankle pain: does have hx of Ra. Negative venous duplex. Better today. Monitor      Diabetes mellitus type 2, controlled: A1c 7.2%. Cont Lantus, SSI     Rheumatoid arthritis / Chronic steroid use: continue low dose prednisone, lidoderm and gabapentin. On Prolia at home    GERD: cont PPI     Asthma: no wheezing. Cont nebs, ICS/LABA     Hypothyroidism: TSH WNR. Continue LT4       Total time spent: 30 minutes  Time spent in the care of this patient including reviewing records, discussing with nursing and/or other providers on the treatment team, obtaining history and examining the patient, and discussing treatment plans.                   Care Plan discussed with: Patient, Nursing Staff and >50% of time spent in counseling and coordination of care    Discussed:  Care Plan and D/C Planning    Prophylaxis:  Lovenox    Disposition:  Home w/Family         Subjective:     Chief Complaint:  Follow up syncope    No acute events. Feels the amio is making her feel worse. Denies central cp. Does have left side pain at the site of surgical procedure         Objective:       Vitals:        Last 24hrs VS reviewed since prior progress note. Most recent are:    Visit Vitals  /75 (BP 1 Location: Left arm, BP Patient Position: At rest)   Pulse 94   Temp 97.6 °F (36.4 °C)   Resp 17   Ht 5' 2\" (1.575 m)   Wt 75.8 kg (167 lb 3.2 oz)   SpO2 95%   BMI 30.58 kg/m²     SpO2 Readings from Last 6 Encounters:   11/17/20 95%   11/06/20 95%   10/29/20 96%   10/22/20 91%   10/15/20 97%   10/15/20 96%            Intake/Output Summary (Last 24 hours) at 11/17/2020 1241  Last data filed at 11/17/2020 0800  Gross per 24 hour   Intake 960 ml   Output 2025 ml   Net -1065 ml          Exam:     Physical Exam:    Gen:  Well-developed, well-nourished, in no acute distress  HEENT:  Atraumatic, normocephalic. Sclerae nonicteric, hearing intact to voice  Neck:  Supple, without apparent masses. Resp:  No accessory muscle use, CTAB without wheezes, rales, or rhonchi  Card: RRR, without m/r/g. No LE edema. Abd:  +bowel sounds, soft, NTTP, nondistended. No HSM. Neuro: CN3-12 intact. Speech stuttering. No focal weakness or numbness. Follows commands appropriately. Psych:  Alert, oriented x 3.  Good insight     Medications Reviewed: (see below)    Lab Data Reviewed: (see below)    ______________________________________________________________________    Medications:     Current Facility-Administered Medications   Medication Dose Route Frequency    lidocaine 4 % patch 1 Patch  1 Patch TransDERmal Q24H    [START ON 11/18/2020] amiodarone (CORDARONE) tablet 100 mg  100 mg Oral EVERY OTHER DAY    ketorolac (TORADOL) injection 15 mg  15 mg IntraVENous Q6H PRN    gabapentin (NEURONTIN) capsule 100 mg  100 mg Oral BID    labetaloL (NORMODYNE;TRANDATE) 20 mg/4 mL (5 mg/mL) injection 10 mg  10 mg IntraVENous Q4H PRN    enoxaparin (LOVENOX) injection 40 mg  40 mg SubCUTAneous Q24H    lidocaine 4 % patch 1 Patch  1 Patch TransDERmal Q24H    arformoteroL (BROVANA) neb solution 15 mcg  15 mcg Nebulization BID RT    budesonide (PULMICORT) 500 mcg/2 ml nebulizer suspension  500 mcg Nebulization BID RT    levalbuterol (XOPENEX) nebulizer soln 0.63 mg/3 mL  0.63 mg Nebulization Q6H PRN    insulin glargine (LANTUS) injection 8 Units  8 Units SubCUTAneous DAILY    pantoprazole (PROTONIX) tablet 40 mg  40 mg Oral ACB    levothyroxine (SYNTHROID) tablet 150 mcg  150 mcg Oral ACB    metoprolol succinate (TOPROL-XL) XL tablet 25 mg  25 mg Oral PCL    potassium chloride SR (KLOR-CON 10) tablet 10 mEq  10 mEq Oral BID    predniSONE (DELTASONE) tablet 2.5 mg  2.5 mg Oral PCL    glucose chewable tablet 16 g  4 Tab Oral PRN    dextrose (D50W) injection syrg 12.5-25 g  25-50 mL IntraVENous PRN    glucagon (GLUCAGEN) injection 1 mg  1 mg IntraMUSCular PRN    sodium chloride (NS) flush 5-40 mL  5-40 mL IntraVENous Q8H    sodium chloride (NS) flush 5-40 mL  5-40 mL IntraVENous PRN    acetaminophen (TYLENOL) tablet 650 mg  650 mg Oral Q6H PRN    Or    acetaminophen (TYLENOL) suppository 650 mg  650 mg Rectal Q6H PRN    polyethylene glycol (MIRALAX) packet 17 g  17 g Oral DAILY PRN    ondansetron (ZOFRAN ODT) tablet 4 mg  4 mg Oral Q8H PRN    Or    ondansetron (ZOFRAN) injection 4 mg  4 mg IntraVENous Q6H PRN    famotidine (PEPCID) tablet 20 mg  20 mg Oral BID    insulin lispro (HUMALOG) injection   SubCUTAneous AC&HS    artificial tears (dextran-hypromellose-glycerin) (GENTEAL) ophthalmic solution 1 Drop  1 Drop Both Eyes QID PRN    predniSONE (DELTASONE) tablet 5 mg  5 mg Oral DAILY WITH BREAKFAST    loperamide (IMODIUM) capsule 2 mg  2 mg Oral DAILY PRN            Lab Review: Recent Labs     11/16/20  0440 11/15/20  0650   WBC 13.7* 12.8*   HGB 13.4 12.9   HCT 41.9 40.7    162     Recent Labs     11/17/20  0326 11/16/20  0440 11/15/20  0650    144 142   K 3.9 3.7 4.1   * 112* 113*   CO2 25 26 24   * 135* 108*   BUN 10 13 12   CREA 0.71 0.67 0.65   CA 8.4* 8.2* 7.9*   MG 2.4 2.3  --    PHOS  --  2.3*  --    ALB  --  3.0*  --      No components found for: GLPOC  No results for input(s): PH, PCO2, PO2, HCO3, FIO2 in the last 72 hours. No results for input(s): INR, INREXT, INREXT in the last 72 hours. No results found for: SDES  Lab Results   Component Value Date/Time    Culture result: NO GROWTH 4 DAYS 11/13/2020 03:30 PM    Culture result: No growth (<1,000 CFU/ML) 11/13/2020 02:55 PM    Culture result: MRSA NOT PRESENT 10/15/2020 11:22 AM    Culture result:  10/15/2020 11:22 AM         Screening of patient nares for MRSA is for surveillance purposes and, if positive, to facilitate isolation considerations in high risk settings.  It is not intended for automatic decolonization interventions per se as regimens are not sufficiently effective to warrant routine use.              ___________________________________________________    Attending Physician: Daren Funes MD

## 2020-11-17 NOTE — DISCHARGE INSTRUCTIONS
[facility]/ED Visit Follow-Up Instructions/Information    You may have an in home follow up visit set up with [facility] or may wish to contact [facility] to set-up a visit:    What are we? [facility] is an in-home urgent care service staffed with emergency trained medical teams. We come to your home in a vehicle stocked with medical supplies and technology. An ER physician is always available if needed. When? As a part of your hospital follow-up, an appointment has been/ or can be set up for us to come see you. Usually, this will be 24-72 hours after you leave the hospital or as needed. [facility] is open 7am-9pm, 7 days a week, 365 days a year, including holidays. Why? We know that you cannot always get to your doctor after being in the hospital and that your doctor is not always available when you need them. Once your workup is complete, we'll call in your prescriptions, update your family doctor, and handle billing with your insurance so you can focus on feeling better, faster without leaving home. How much? We accept most major health insurance plans, including Medicaid, Medicare, and Medicare Advantage [facility], [facility], [employer], and [employer]. We also accept: credit, debit, health savings account (HSA), health reimbursement account (HRA) and flexible spending account (FSA) payments. [facility]'s prices compare to conventional urgent care facilities, but we bring the care to you. How to reach us? Getting care is easy- use our mobile shimon ([employer]), website [email or web address]) or call us [phone].         HOSPITALIST DISCHARGE INSTRUCTIONS  NAME: Brayan Wilkes   :  1950   MRN:  [id number]     Date/Time:  2020 1:34 PM    ADMIT DATE: 2020     DISCHARGE DATE: 2020     ADMITTING DIAGNOSIS:  Chest pain  Syncope (passing out)    DISCHARGE DIAGNOSIS:  As above    MEDICATIONS:    · It is important that you take the medication exactly as they are prescribed. · Keep your medication in the bottles provided by the pharmacist and keep a list of the medication names, dosages, and times to be taken in your wallet. · Do not take other medications without consulting your doctor. Pain Management: per above medications    What to do at Home:  1. If the ibuprofen and lidocaine patch do not work for your pain talk to your primary care about trying a short course of diclofenac  2. The dose of your amiodarone has been decreased. 3.  Be sure to follow up with Dr. Imelda Briggs tomorrow    Recommended diet:  Resume previous diet    Recommended activity: Activity as tolerated    If you experience any of the following symptoms then please call your primary care physician or return to the emergency room if you cannot get hold of your doctor:  Fever, chills, nausea, vomiting, diarrhea, change in mentation, falling, bleeding, shortness of breath    Follow Up:  Dr. Benny Muhammad MD  you are to call and set up an appointment to see them in 1 week. Information obtained by :  I understand that if any problems occur once I am at home I am to contact my physician. I understand and acknowledge receipt of the instructions indicated above.                                                                                                                                            Physician's or R.N.'s Signature                                                                  Date/Time                                                                                                                                              Patient or Representative Signature                                                          Date/Time

## 2020-11-17 NOTE — ROUTINE PROCESS
Hospital follow-up PCP transitional care appointment has been scheduled with Dr. Mikey Ragland for Nov 24 @ 815AM. Pending patient discharge.   Isa Lopez, Care Management Specialist.

## 2020-11-17 NOTE — DIABETES MGMT
[facility]  CLINICAL NURSE SPECIALIST CONSULT  PROGRAM FOR DIABETES HEALTH    INITIAL NOTE    Presentation   Gibson Aguayo is a 65 y.o. female admitted with syncope. Past Medical History:   Diagnosis Date    Adverse effect of anesthesia     slow to wake up    Arthritis     Asthma 6/29/2009    CAUSED BY MOLD    Atrial fibrillation ([address] 6/29/2009    CAD (coronary artery disease) 06/29/2009    Celiac disease 2010    Chronic obstructive pulmonary disease ([address] 2004-5    right leg    Chronic pain of right ankle     Diabetes ([address]     Drug induced prednisone, DM2    Diastolic heart failure ([address]     DVT (deep venous thrombosis) ([address] 2004    Right leg post surgery    GERD (gastroesophageal reflux disease)     Gluten intolerance     Heart failure ([address]     Hypertension     Hypothyroidism 6/29/2009    Iron deficiency anemia     Lyme disease     Personal history of MI (myocardial infarction)     Rheumatoid arthritis ([address]     Slow to wake up after anesthesia     Syncope     Post testing- resolved    TIA (transient ischemic attack) 2004 & 2006    Vitamin B12 deficiency 6/29/2009        Current clinical course has been uncomplicated. Diabetes: Patient has known Type 2 diabetes, diagnosed approximately 1 1/2 years ago after being on prednisone. Home diabetes medications regimen is Januvia 100 mg with dinner, Lantus 8 units if  or less, 10 units if BG > 150. Patient takes Lantus in the mornings.   A1c 11/15/20: 7.3%     Consulted by Provider for advanced diabetes nursing assessment and care, specifically related to     [x] Home management assessment      Diabetes-related medical history  Acute complications  denies  Neurological complications  Peripheral neuropathy  Microvascular disease  denies  Macrovascular disease  CAD, Myocardial infarction and Cerebral vascular accident/TIA  Other associated conditions     Vitamin B12 deficiency    Diabetes medication history  Drug class Currently in use Discontinued Never used   Biguanide      DPP-4 inhibitor  Januvia 100 mg daily     Sulfonylurea      Thiazolidinedione      GLP-1 RA      SGLT-2 inhibitors      Basal insulin Lantus 8 units for BG < 150; 10 units for BG > 150     Fixed Dose  Combinations      Bolus insulin        Subjective   Patient sitting up on side of bed, alert and oriented. Patient preparing to eat lunch.  at bedside. Patient without complaints at this time. Patient reports the following home diabetes self-care practices:  Eating pattern  [x] Breakfast Tamez and eggs or oatmeal or grits  [x] Lunch  [place] or fast food if out  [x] Dinner  Hot meal: meat/vegetable/starch  [x] Snacks belvita cookies, cheetos, patient reports she eats the 100 calorie snack packs. [x] Beverages Water, tea, coffee, diet ginger ale. Physical activity pattern  Patient reports that she does not have a specific exercise routine. Monitoring pattern  Patient checks BG every morning. Patient reports has been around 150 as long as she is on her regular steroid dose, if her dose is increased her BG's are 300-400 fasting. Taking medications pattern  [x] Consistent administration  [x] Affordable    Social determinants of health impacting diabetes self-management practices   Patient denies any issues or concerns at this time. Objective   Physical exam  General Alert, oriented and in no acute distress. Conversant and cooperative. Vital Signs   Visit Vitals  /75 (BP 1 Location: Left arm, BP Patient Position: At rest)   Pulse 94   Temp 97.6 °F (36.4 °C)   Resp 17   Ht 5' 2\" (1.575 m)   Wt 75.8 kg (167 lb 3.2 oz)   SpO2 95%   BMI 30.58 kg/m²     Skin  Warm and dry. No Acanthosis noted along neckline. No lipohypertrophy or lipoatrophy noted at injection sites   Heart   Regular rate and rhythm.  No murmurs, rubs or gallops  Lungs  Clear to auscultation without rales or rhonchi  Extremities No foot wounds      Laboratory  Lab Results Component Value Date/Time    Hemoglobin A1c 7.3 (H) 11/15/2020 06:50 AM     Lab Results   Component Value Date/Time    LDL,Direct 88 11/15/2020 06:50 AM    LDL, calculated Not calculated due to elevated triglyceride level 11/15/2020 06:50 AM     Lab Results   Component Value Date/Time    Creatinine (POC) 0.9 11/05/2020 09:33 PM    Creatinine 0.71 11/17/2020 03:26 AM     Lab Results   Component Value Date/Time    Sodium 143 11/17/2020 03:26 AM    Potassium 3.9 11/17/2020 03:26 AM    Chloride 111 (H) 11/17/2020 03:26 AM    CO2 25 11/17/2020 03:26 AM    Anion gap 7 11/17/2020 03:26 AM    Glucose 149 (H) 11/17/2020 03:26 AM    BUN 10 11/17/2020 03:26 AM    Creatinine 0.71 11/17/2020 03:26 AM    BUN/Creatinine ratio 14 11/17/2020 03:26 AM    GFR est AA >60 11/17/2020 03:26 AM    GFR est non-AA >60 11/17/2020 03:26 AM    Calcium 8.4 (L) 11/17/2020 03:26 AM    Bilirubin, total 0.5 11/14/2020 03:04 AM    Alk. phosphatase 61 11/14/2020 03:04 AM    Protein, total 5.1 (L) 11/14/2020 03:04 AM    Albumin 3.0 (L) 11/16/2020 04:40 AM    Globulin 2.3 11/14/2020 03:04 AM    A-G Ratio 1.2 11/14/2020 03:04 AM    ALT (SGPT) 20 11/14/2020 03:04 AM     Lab Results   Component Value Date/Time    ALT (SGPT) 20 11/14/2020 03:04 AM       Factors affecting BG pattern  Factor Dose Comments   Nutrition:  Carb-controlled meals   60 grams/meal    Drugs:  Steroids  HIV   Other: n/a   Prednisone 5 mg in AM and 2.5 mg in PM  n/a   Normal home steroid dose for rheumatoid arthritis. Pain 0/10    Infection n/a    Other: n/a n/a      Blood glucose pattern          Evaluation   This 65year old female, with Type 2 diabetes, has achieved inpatient blood glucose target of 100-180mg/dl. BG's have ranged 111-181 over the past 24 hours. Basal insulin is in use. Lantus 8 units daily. Bolus insulin isn't in use. Patient is eating meals. Corrective insulin is in use. Patient has not required any corrective insulin over the past 24 hours. Inpatient blood glucose management has been impacted by    [x] Glucocorticoid use      Impression: It is likely that BG's will remain at target with the current basal and corrective orders. Patient can resume home diabetes medication regimen on discharge. Assessment and Plan   Nursing Diagnosis Risk for unstable blood glucose pattern   Nursing Intervention Domain 6142 Decision-making Support   Nursing Interventions Examined current inpatient diabetes control   Explored factors facilitating and impeding inpatient management  Identified self-management practices impeding diabetes control  Explored corrective strategies with patient and responsible inpatient provider   Informed patient of rational for insulin strategy while hospitalized       Recommendations   Use Subcutaneous Insulin Order set (4912):  Basal insulin   Continue Lanuts 8 units daily. Corrective insulin  Correctional Scale for Normal Sensitivity    200-249- 2units Humalog  [id number] Humalog  [id number] Humalog  [id number] Humalog  Over 400- 10units Humalog    Do NOT hold for NPO; give in addition to meal time insulin dose. If patient does not eat, -give correction dose only. Time Spent     Total time spent with patient: 28 Minutes   I personally reviewed chart, notes, data and current medications in the medical record. I have personally examined and treated the patient at bedside during this period. Thank you for allowing us to participate in the care of this patient. We will be happy to follow along in his/her progress with you.     Ashley Gonzales, CNS  Program for Diabetes Health  Access via [email or web address]

## 2020-11-17 NOTE — PROGRESS NOTES
Reviewed other medication options w the patient- tikosyn & sotalol  She would need a \"was out\" of amiodarone before we could initiate this   She thinks the amiodarone is making her feel bad and causing her symptoms   She is willing to reduce the amiodarone to 100 mg every other and she wants to discuss the option of AV node ablation and ppm with Dr Shanice Man, she reports they previously discussed this.   Amiodarone dose changed  appt w Dr Shanice Man set up for tomorrow  Future Appointments   Date Time Provider Inge Reaves   11/18/2020  9:20 AM Ramya Luque MD [facility] AMB     D/w patient and - they are agreeable

## 2020-11-17 NOTE — PROGRESS NOTES
Problem: Mobility Impaired (Adult and Pediatric)  Goal: *Acute Goals and Plan of Care (Insert Text)  Description: FUNCTIONAL STATUS PRIOR TO ADMISSION: Patient was modified independent using a rolling walker and single point cane for functional mobility. HOME SUPPORT PRIOR TO ADMISSION: The patient lived with  but did not require assist.    Physical Therapy Goals  Initiated 11/14/2020  1. Patient will move from supine to sit and sit to supine  in bed with modified independence within 7 day(s). 2.  Patient will transfer from bed to chair and chair to bed with modified independence using the least restrictive device within 7 day(s). 3.  Patient will perform sit to stand with modified independence within 7 day(s). 4.  Patient will ambulate with supervision/set-up for 150 feet with the least restrictive device within 7 day(s). 5.  Patient will ascend/descend 4 stairs with 2 handrail(s) with supervision/set-up within 7 day(s). Outcome: Progressing Towards Goal  Note:   PHYSICAL THERAPY TREATMENT  Patient: Carlos Riddle (16 y.o. female)  Date: 11/17/2020  Diagnosis: Syncope [R55]   Syncope       Precautions: Fall  Chart, physical therapy assessment, plan of care and goals were reviewed. ASSESSMENT  Patient continues with skilled PT services and progressing towards goals. SBA for mobility, pt refuses use of gait belt and reports she does not tolerate \"anything tight around me\" demonstrates slow but steadying gait and is self limiting with distance for fear of \"it coming on\" Pt was able to tolerate sitting in chair post activity with spouse at bedside. Will order RW for steadying as she continues to regain activity toleracne    Current Level of Function Impacting Discharge (mobility/balance): SBA    Other factors to consider for discharge:          PLAN :  Patient continues to benefit from skilled intervention to address the above impairments.   Continue treatment per established plan of care.  to address goals. Recommendation for discharge: (in order for the patient to meet his/her long term goals)  Physical therapy at least 2 days/week in the home AND ensure assist and/or supervision for safety with mobility due to sudden onset of syncopal episodes    This discharge recommendation:  Has been made in collaboration with the attending provider and/or case management    IF patient discharges home will need the following DME: rolling walker       SUBJECTIVE:   Patient stated Chen Luz feel better today, I think it is that medicine that starts with \"A\".     OBJECTIVE DATA SUMMARY:   Critical Behavior:  Neurologic State: Alert, Appropriate for age, Eyes open spontaneously  Orientation Level: Oriented X4  Cognition: Appropriate decision making, Appropriate for age attention/concentration, Appropriate safety awareness, Follows commands     Functional Mobility Training:  Bed Mobility:  Rolling: Stand-by assistance  Supine to Sit: Stand-by assistance              Transfers:  Sit to Stand: Stand-by assistance  Stand to Sit: Stand-by assistance                             Balance:  Sitting: Intact  Standing: With support  Standing - Static: Constant support;Good  Standing - Dynamic : Fair  Ambulation/Gait Training:  Distance (ft): 15 Feet (ft)  Assistive Device: Walker, rolling(refuses gait belt)  Ambulation - Level of Assistance: Stand-by assistance                 Base of Support: Narrowed                             Stairs: Therapeutic Exercises:     Pain Rating:  Denies pain    Activity Tolerance:   Good and requires rest breaks    After treatment patient left in no apparent distress:   Call bell within reach and Caregiver / family present    COMMUNICATION/COLLABORATION:   The patients plan of care was discussed with: Occupational therapist and Registered nurse.      Aydee Mcgee   Time Calculation: 23 mins

## 2020-11-17 NOTE — PROGRESS NOTES
SHIFT CHANGE:  1930 Bedside and Verbal shift change report given to Monse FONTAINE (oncoming nurse) by Angeles Lowe (offgoing nurse). Report included the following information SBAR, Kardex, MAR and Recent Results. SHIFT SUMMARY:  Overnight EEG in place. END OF SHIFT REPORT:  0730  Bedside and Verbal shift change report given to [facility] (oncoming nurse) by Rosa Arriola (offgoing nurse). Report included the following information SBAR, Kardex, MAR and Recent Results.

## 2020-11-18 ENCOUNTER — TELEPHONE (OUTPATIENT)
Dept: CARDIOLOGY CLINIC | Age: 70
End: 2020-11-18

## 2020-11-18 ENCOUNTER — OFFICE VISIT (OUTPATIENT)
Dept: CARDIOLOGY CLINIC | Age: 70
End: 2020-11-18
Payer: MEDICARE

## 2020-11-18 VITALS
SYSTOLIC BLOOD PRESSURE: 122 MMHG | DIASTOLIC BLOOD PRESSURE: 82 MMHG | HEART RATE: 84 BPM | OXYGEN SATURATION: 97 % | WEIGHT: 171 LBS | HEIGHT: 62 IN | BODY MASS INDEX: 31.47 KG/M2

## 2020-11-18 DIAGNOSIS — I25.10 CORONARY ARTERY DISEASE INVOLVING NATIVE CORONARY ARTERY OF NATIVE HEART WITHOUT ANGINA PECTORIS: ICD-10-CM

## 2020-11-18 DIAGNOSIS — I48.91 ATRIAL FIBRILLATION, UNSPECIFIED TYPE (HCC): Primary | ICD-10-CM

## 2020-11-18 DIAGNOSIS — E78.5 DYSLIPIDEMIA: ICD-10-CM

## 2020-11-18 DIAGNOSIS — R06.02 SHORTNESS OF BREATH: ICD-10-CM

## 2020-11-18 DIAGNOSIS — I10 HTN (HYPERTENSION), BENIGN: ICD-10-CM

## 2020-11-18 LAB
BACTERIA SPEC CULT: NORMAL
SERVICE CMNT-IMP: NORMAL

## 2020-11-18 PROCEDURE — 99215 OFFICE O/P EST HI 40 MIN: CPT | Performed by: INTERNAL MEDICINE

## 2020-11-18 PROCEDURE — G0463 HOSPITAL OUTPT CLINIC VISIT: HCPCS | Performed by: INTERNAL MEDICINE

## 2020-11-18 PROCEDURE — 1101F PT FALLS ASSESS-DOCD LE1/YR: CPT | Performed by: INTERNAL MEDICINE

## 2020-11-18 PROCEDURE — G8536 NO DOC ELDER MAL SCRN: HCPCS | Performed by: INTERNAL MEDICINE

## 2020-11-18 PROCEDURE — G8417 CALC BMI ABV UP PARAM F/U: HCPCS | Performed by: INTERNAL MEDICINE

## 2020-11-18 PROCEDURE — G8510 SCR DEP NEG, NO PLAN REQD: HCPCS | Performed by: INTERNAL MEDICINE

## 2020-11-18 PROCEDURE — 3017F COLORECTAL CA SCREEN DOC REV: CPT | Performed by: INTERNAL MEDICINE

## 2020-11-18 PROCEDURE — G9899 SCRN MAM PERF RSLTS DOC: HCPCS | Performed by: INTERNAL MEDICINE

## 2020-11-18 PROCEDURE — G8752 SYS BP LESS 140: HCPCS | Performed by: INTERNAL MEDICINE

## 2020-11-18 PROCEDURE — G8754 DIAS BP LESS 90: HCPCS | Performed by: INTERNAL MEDICINE

## 2020-11-18 PROCEDURE — G8427 DOCREV CUR MEDS BY ELIG CLIN: HCPCS | Performed by: INTERNAL MEDICINE

## 2020-11-18 PROCEDURE — G8399 PT W/DXA RESULTS DOCUMENT: HCPCS | Performed by: INTERNAL MEDICINE

## 2020-11-18 PROCEDURE — 1111F DSCHRG MED/CURRENT MED MERGE: CPT | Performed by: INTERNAL MEDICINE

## 2020-11-18 PROCEDURE — 1090F PRES/ABSN URINE INCON ASSESS: CPT | Performed by: INTERNAL MEDICINE

## 2020-11-18 RX ORDER — SODIUM CHLORIDE 0.9 % (FLUSH) 0.9 %
5-40 SYRINGE (ML) INJECTION AS NEEDED
Status: CANCELLED | OUTPATIENT
Start: 2020-11-18

## 2020-11-18 RX ORDER — SODIUM CHLORIDE 0.9 % (FLUSH) 0.9 %
5-40 SYRINGE (ML) INJECTION EVERY 8 HOURS
Status: CANCELLED | OUTPATIENT
Start: 2020-11-18

## 2020-11-18 NOTE — TELEPHONE ENCOUNTER
Called patient, ID verified using two patient identifiers. Patient scheduled for pacemaker implant and AVN ablation with Dr. Jitendra Apodaca @ [facility] on tomorrow 11/19/2020 with an arrival time of 11:30 am. Pre procedure instructions to be sent via My Chart to patient.

## 2020-11-18 NOTE — PROGRESS NOTES
Room 3     Visit Vitals  /82 (BP 1 Location: Left arm, BP Patient Position: Sitting)   Pulse 84   Ht 5' 2\" (1.575 m)   Wt 171 lb (77.6 kg)   LMP 09/29/1980 (LMP Unknown)   SpO2 97%   BMI 31.28 kg/m²       Winded  Gained 5lbs since yesterday  Didn't take fluid pill since she had to travel here today    Amio-makes head numb    Chest pain: no  Shortness of breath: no  Edema: no  Palpitations, Skipped beats, Rapid heartbeat: no  Dizziness: no    New diagnosis/Surgeries: no    ER/Hospitalizations: 11-13 to 11-17, 11-2 to 11-6    Refills:

## 2020-11-19 ENCOUNTER — ANESTHESIA (OUTPATIENT)
Dept: CARDIAC CATH/INVASIVE PROCEDURES | Age: 70
End: 2020-11-19
Payer: MEDICARE

## 2020-11-19 ENCOUNTER — HOSPITAL ENCOUNTER (OUTPATIENT)
Age: 70
Setting detail: OBSERVATION
Discharge: HOME | End: 2020-11-20
Attending: INTERNAL MEDICINE | Admitting: INTERNAL MEDICINE
Payer: MEDICARE

## 2020-11-19 ENCOUNTER — APPOINTMENT (OUTPATIENT)
Dept: GENERAL RADIOLOGY | Age: 70
End: 2020-11-19
Attending: INTERNAL MEDICINE
Payer: MEDICARE

## 2020-11-19 ENCOUNTER — ANESTHESIA EVENT (OUTPATIENT)
Dept: CARDIAC CATH/INVASIVE PROCEDURES | Age: 70
End: 2020-11-19
Payer: MEDICARE

## 2020-11-19 DIAGNOSIS — Z95.0 PACEMAKER: ICD-10-CM

## 2020-11-19 DIAGNOSIS — I48.91 ATRIAL FIBRILLATION, UNSPECIFIED TYPE (HCC): ICD-10-CM

## 2020-11-19 DIAGNOSIS — Z98.890 S/P AV NODAL ABLATION: ICD-10-CM

## 2020-11-19 LAB
GLUCOSE BLD STRIP.AUTO-MCNC: 158 MG/DL (ref 65–100)
GLUCOSE BLD STRIP.AUTO-MCNC: 296 MG/DL (ref 65–100)
SERVICE CMNT-IMP: ABNORMAL
SERVICE CMNT-IMP: ABNORMAL

## 2020-11-19 PROCEDURE — 82962 GLUCOSE BLOOD TEST: CPT

## 2020-11-19 PROCEDURE — 76060000034 HC ANESTHESIA 1.5 TO 2 HR: Performed by: INTERNAL MEDICINE

## 2020-11-19 PROCEDURE — C1898 LEAD, PMKR, OTHER THAN TRANS: HCPCS | Performed by: INTERNAL MEDICINE

## 2020-11-19 PROCEDURE — 77030029065 HC DRSG HEMO QCLOT ZMED -B: Performed by: INTERNAL MEDICINE

## 2020-11-19 PROCEDURE — 77030037713 HC CLOSR DEV INCIS ZIP STRY -B: Performed by: INTERNAL MEDICINE

## 2020-11-19 PROCEDURE — 74011250637 HC RX REV CODE- 250/637: Performed by: INTERNAL MEDICINE

## 2020-11-19 PROCEDURE — C1893 INTRO/SHEATH, FIXED,NON-PEEL: HCPCS | Performed by: INTERNAL MEDICINE

## 2020-11-19 PROCEDURE — C1894 INTRO/SHEATH, NON-LASER: HCPCS | Performed by: INTERNAL MEDICINE

## 2020-11-19 PROCEDURE — C1785 PMKR, DUAL, RATE-RESP: HCPCS | Performed by: INTERNAL MEDICINE

## 2020-11-19 PROCEDURE — 93620 COMP EP EVL R AT VEN PAC&REC: CPT

## 2020-11-19 PROCEDURE — 77030022704 HC SUT VLOC COVD -B: Performed by: INTERNAL MEDICINE

## 2020-11-19 PROCEDURE — 74011250636 HC RX REV CODE- 250/636: Performed by: NURSE ANESTHETIST, CERTIFIED REGISTERED

## 2020-11-19 PROCEDURE — 74011000250 HC RX REV CODE- 250: Performed by: NURSE ANESTHETIST, CERTIFIED REGISTERED

## 2020-11-19 PROCEDURE — 99218 HC RM OBSERVATION: CPT

## 2020-11-19 PROCEDURE — 74011250636 HC RX REV CODE- 250/636: Performed by: INTERNAL MEDICINE

## 2020-11-19 PROCEDURE — 93650 ICAR CATH ABLTJ AV NODE FUNC: CPT | Performed by: INTERNAL MEDICINE

## 2020-11-19 PROCEDURE — 74011000250 HC RX REV CODE- 250: Performed by: INTERNAL MEDICINE

## 2020-11-19 PROCEDURE — 77030039046 HC PAD DEFIB RADIOTRNSPNT CNMD -B: Performed by: INTERNAL MEDICINE

## 2020-11-19 PROCEDURE — 33208 INSRT HEART PM ATRIAL & VENT: CPT | Performed by: INTERNAL MEDICINE

## 2020-11-19 PROCEDURE — 77030041279 HC DRSG PRMSL AG MDII -B: Performed by: INTERNAL MEDICINE

## 2020-11-19 PROCEDURE — 71045 X-RAY EXAM CHEST 1 VIEW: CPT

## 2020-11-19 PROCEDURE — 74011000272 HC RX REV CODE- 272: Performed by: INTERNAL MEDICINE

## 2020-11-19 PROCEDURE — C1732 CATH, EP, DIAG/ABL, 3D/VECT: HCPCS | Performed by: INTERNAL MEDICINE

## 2020-11-19 PROCEDURE — C1760 CLOSURE DEV, VASC: HCPCS | Performed by: INTERNAL MEDICINE

## 2020-11-19 PROCEDURE — 74011000258 HC RX REV CODE- 258: Performed by: NURSE ANESTHETIST, CERTIFIED REGISTERED

## 2020-11-19 PROCEDURE — 93613 INTRACARDIAC EPHYS 3D MAPG: CPT | Performed by: INTERNAL MEDICINE

## 2020-11-19 PROCEDURE — 77030018547 HC SUT ETHBND1 J&J -B: Performed by: INTERNAL MEDICINE

## 2020-11-19 DEVICE — LEAD 5076-58 MRI US RCMCRD
Type: IMPLANTABLE DEVICE | Status: FUNCTIONAL
Brand: CAPSUREFIX NOVUS MRI™ SURESCAN®

## 2020-11-19 DEVICE — IPG W1DR01 AZURE XT DR MRI WL USA
Type: IMPLANTABLE DEVICE | Status: FUNCTIONAL
Brand: AZURE™ XT DR MRI SURESCAN™

## 2020-11-19 DEVICE — LEAD 5076-52 MRI US RCMCRD
Type: IMPLANTABLE DEVICE | Status: FUNCTIONAL
Brand: CAPSUREFIX NOVUS MRI™ SURESCAN®

## 2020-11-19 RX ORDER — SODIUM CHLORIDE 0.9 % (FLUSH) 0.9 %
5-40 SYRINGE (ML) INJECTION AS NEEDED
Status: DISCONTINUED | OUTPATIENT
Start: 2020-11-19 | End: 2020-11-20 | Stop reason: HOSPADM

## 2020-11-19 RX ORDER — GENTAMICIN SULFATE 80 MG/100ML
80 INJECTION, SOLUTION INTRAVENOUS ONCE
Status: DISCONTINUED | OUTPATIENT
Start: 2020-11-19 | End: 2020-11-19 | Stop reason: HOSPADM

## 2020-11-19 RX ORDER — ACETAMINOPHEN 325 MG/1
650 TABLET ORAL
Status: DISCONTINUED | OUTPATIENT
Start: 2020-11-19 | End: 2020-11-20 | Stop reason: HOSPADM

## 2020-11-19 RX ORDER — PROPOFOL 10 MG/ML
INJECTION, EMULSION INTRAVENOUS AS NEEDED
Status: DISCONTINUED | OUTPATIENT
Start: 2020-11-19 | End: 2020-11-19 | Stop reason: HOSPADM

## 2020-11-19 RX ORDER — SODIUM CHLORIDE 0.9 % (FLUSH) 0.9 %
5-40 SYRINGE (ML) INJECTION EVERY 8 HOURS
Status: DISCONTINUED | OUTPATIENT
Start: 2020-11-19 | End: 2020-11-20 | Stop reason: HOSPADM

## 2020-11-19 RX ORDER — HYDROCORTISONE SODIUM SUCCINATE 100 MG/2ML
INJECTION, POWDER, FOR SOLUTION INTRAMUSCULAR; INTRAVENOUS AS NEEDED
Status: DISCONTINUED | OUTPATIENT
Start: 2020-11-19 | End: 2020-11-19 | Stop reason: HOSPADM

## 2020-11-19 RX ORDER — BUPIVACAINE HYDROCHLORIDE 5 MG/ML
30 INJECTION, SOLUTION EPIDURAL; INTRACAUDAL ONCE
Status: COMPLETED | OUTPATIENT
Start: 2020-11-19 | End: 2020-11-19

## 2020-11-19 RX ORDER — ONDANSETRON 2 MG/ML
4 INJECTION INTRAMUSCULAR; INTRAVENOUS
Status: DISCONTINUED | OUTPATIENT
Start: 2020-11-19 | End: 2020-11-20 | Stop reason: HOSPADM

## 2020-11-19 RX ORDER — ONDANSETRON 2 MG/ML
INJECTION INTRAMUSCULAR; INTRAVENOUS AS NEEDED
Status: DISCONTINUED | OUTPATIENT
Start: 2020-11-19 | End: 2020-11-19 | Stop reason: HOSPADM

## 2020-11-19 RX ORDER — LIDOCAINE HYDROCHLORIDE AND EPINEPHRINE 10; 10 MG/ML; UG/ML
INJECTION, SOLUTION INFILTRATION; PERINEURAL AS NEEDED
Status: DISCONTINUED | OUTPATIENT
Start: 2020-11-19 | End: 2020-11-19 | Stop reason: HOSPADM

## 2020-11-19 RX ORDER — HYDROCODONE BITARTRATE AND ACETAMINOPHEN 5; 325 MG/1; MG/1
1 TABLET ORAL
Status: DISCONTINUED | OUTPATIENT
Start: 2020-11-19 | End: 2020-11-20 | Stop reason: HOSPADM

## 2020-11-19 RX ORDER — SODIUM CHLORIDE 0.9 % (FLUSH) 0.9 %
5-40 SYRINGE (ML) INJECTION AS NEEDED
Status: DISCONTINUED | OUTPATIENT
Start: 2020-11-19 | End: 2020-11-19 | Stop reason: HOSPADM

## 2020-11-19 RX ORDER — PROPOFOL 10 MG/ML
INJECTION, EMULSION INTRAVENOUS
Status: DISCONTINUED | OUTPATIENT
Start: 2020-11-19 | End: 2020-11-19 | Stop reason: HOSPADM

## 2020-11-19 RX ORDER — SODIUM CHLORIDE 0.9 % (FLUSH) 0.9 %
5-40 SYRINGE (ML) INJECTION EVERY 8 HOURS
Status: DISCONTINUED | OUTPATIENT
Start: 2020-11-19 | End: 2020-11-19 | Stop reason: HOSPADM

## 2020-11-19 RX ORDER — DEXMEDETOMIDINE HYDROCHLORIDE 100 UG/ML
INJECTION, SOLUTION INTRAVENOUS AS NEEDED
Status: DISCONTINUED | OUTPATIENT
Start: 2020-11-19 | End: 2020-11-19 | Stop reason: HOSPADM

## 2020-11-19 RX ORDER — SODIUM CHLORIDE, SODIUM LACTATE, POTASSIUM CHLORIDE, CALCIUM CHLORIDE 600; 310; 30; 20 MG/100ML; MG/100ML; MG/100ML; MG/100ML
INJECTION, SOLUTION INTRAVENOUS
Status: DISCONTINUED | OUTPATIENT
Start: 2020-11-19 | End: 2020-11-19 | Stop reason: HOSPADM

## 2020-11-19 RX ADMIN — SODIUM CHLORIDE, SODIUM LACTATE, POTASSIUM CHLORIDE, AND CALCIUM CHLORIDE: 600; 310; 30; 20 INJECTION, SOLUTION INTRAVENOUS at 15:27

## 2020-11-19 RX ADMIN — Medication 10 ML: at 21:28

## 2020-11-19 RX ADMIN — PROPOFOL 20 MG: 10 INJECTION, EMULSION INTRAVENOUS at 15:47

## 2020-11-19 RX ADMIN — PROPOFOL 20 MG: 10 INJECTION, EMULSION INTRAVENOUS at 15:50

## 2020-11-19 RX ADMIN — DEXMEDETOMIDINE HYDROCHLORIDE 4 MCG: 100 INJECTION, SOLUTION, CONCENTRATE INTRAVENOUS at 16:08

## 2020-11-19 RX ADMIN — PROPOFOL 25 MCG/KG/MIN: 10 INJECTION, EMULSION INTRAVENOUS at 15:47

## 2020-11-19 RX ADMIN — PHENYLEPHRINE HYDROCHLORIDE 10 MCG/MIN: 10 INJECTION INTRAVENOUS at 15:57

## 2020-11-19 RX ADMIN — Medication 10 ML: at 21:27

## 2020-11-19 RX ADMIN — VANCOMYCIN HYDROCHLORIDE 1 G: 1 INJECTION, POWDER, LYOPHILIZED, FOR SOLUTION INTRAVENOUS at 15:45

## 2020-11-19 RX ADMIN — DEXMEDETOMIDINE HYDROCHLORIDE 4 MCG: 100 INJECTION, SOLUTION, CONCENTRATE INTRAVENOUS at 16:13

## 2020-11-19 RX ADMIN — HYDROCORTISONE SODIUM SUCCINATE 100 MG: 100 INJECTION, POWDER, FOR SOLUTION INTRAMUSCULAR; INTRAVENOUS at 17:07

## 2020-11-19 RX ADMIN — DEXMEDETOMIDINE HYDROCHLORIDE 2 MCG: 100 INJECTION, SOLUTION, CONCENTRATE INTRAVENOUS at 16:22

## 2020-11-19 RX ADMIN — HYDROCODONE BITARTRATE AND ACETAMINOPHEN 1 TABLET: 5; 325 TABLET ORAL at 23:10

## 2020-11-19 RX ADMIN — ONDANSETRON HYDROCHLORIDE 4 MG: 2 INJECTION, SOLUTION INTRAMUSCULAR; INTRAVENOUS at 17:01

## 2020-11-19 NOTE — H&P
HISTORY OF PRESENTING ILLNESS      Lord Perez is a 65 y.o. female with hx of paroxysmal arial fibrillation (s/p ablation x10 years ago with Dr. Orlin Barber), previous history of thrombocytopenia, hypertension, COPD, heart failure preserved EF, CAD. She underwent a left atrial appendage ligation on 10/20/2020 by Dr. Heydi Montenegro. Scott Yen experienced chest pain/palpitations post procedure and presented to the ER. Previous cardiac catheterization and stress test in 2019 were negative for ischemia and no significant coronary disease and stents were open.  She had normal stress testing 11/5/2020 due to peaked troponin level. Echocardiogram demonstrated preserved LV function of 55-60%.        She had recent admission for AF with RVR following her cardiac surgery. Suspected inflammation post op to be the  of AF/symptoms.   She was discharged with daily oral amiodarone with plan to order 30 day OP monitor to evaluate rate control and need for AV jules ablation/PPM in the future.      She was readmitted for syncope and discharged from the hospital 11/17/2020 and presents to discuss alternate AAD versus aV nod ablation/PPM as she believes that Amiodarone is causing side effects.       PAST MEDICAL HISTORY           Past Medical History:   Diagnosis Date    Adverse effect of anesthesia       slow to wake up    Arthritis      Asthma 6/29/2009     CAUSED BY MOLD    Atrial fibrillation ([address] 6/29/2009    CAD (coronary artery disease) 06/29/2009    Celiac disease 2010    Chronic obstructive pulmonary disease ([address] 2004-5     right leg    Chronic pain of right ankle      Diabetes (HCC)       Drug induced prednisone, DM2    Diastolic heart failure ([address]      DVT (deep venous thrombosis) ([address] 2004     Right leg post surgery    GERD (gastroesophageal reflux disease)      Gluten intolerance      Heart failure (HCC)      Hypertension      Hypothyroidism 6/29/2009    Iron deficiency anemia      Lyme disease      Personal history of MI (myocardial infarction)      Rheumatoid arthritis ([facility]      Slow to wake up after anesthesia      Syncope       Post testing- resolved    TIA (transient ischemic attack) 2004 & 2006    Vitamin B12 deficiency 6/29/2009             PAST SURGICAL HISTORY            Past Surgical History:   Procedure Laterality Date    HX ADENOIDECTOMY        HX AFIB ABLATION        HX APPENDECTOMY        HX CHOLECYSTECTOMY   6/16/11    HX COLONOSCOPY        HX CORONARY STENT PLACEMENT         x 2    HX HEART CATHETERIZATION   2010     2 STENTS    HX HEART CATHETERIZATION   03/2020    HX HYSTERECTOMY        HX LUMBAR LAMINECTOMY   2000     L4-L5    HX ORTHOPAEDIC   1993-6/2012     RT.  FOOT SURGERY X 6/calcaneal osteotomy    HX ORTHOPAEDIC Right 09/19/2020     right hand [facility] joint replacement    HX TONSILLECTOMY   1964             ALLERGIES            Allergies   Allergen Reactions    Butter Flavor Anaphylaxis       Butter AND CREME    Keflex [Cephalexin] Anaphylaxis    Milk Containing Products Anaphylaxis       Butter and cream    Pcn [Penicillins] Anaphylaxis    Aspirin Hives and Other (comments)       \"it makes my stomach bleed\"       Cimzia [Certolizumab Pegol] Other (comments)       GI symptoms, blisters in the mouth and esophagus    Clopidogrel Other (comments)       GI bleed    Demerol [Meperidine] Other (comments)       HYPOTENSION    Fentanyl Other (comments)       HYPOTENSION    Ibuprofen Diarrhea       Patient reports that ibuprofen caused diarrhea    Morphine Other (comments)       HYPOTENSION    Nitroglycerin Other (comments)       IN PILL FORM  - HYPOTENSION  CAN TOLERATE PATCH FOR SHORT TIME    Sulfa (Sulfonamide Antibiotics) Angioedema       Lips    Tapazole [Methimazole] Unknown (comments)       Patient stated \"it made me feel like I had the flu\"    Tramadol Other (comments)       Patient reports thrush with tramadol use    Adhesive Tape-Silicones Other (comments)       BAD BLISTERS AND TENDS TO GET INFECTED    Albuterol Palpitations    Gluten Diarrhea       bloating    Oxycodone Other (comments)       Hallicination and hypotension            FAMILY HISTORY            Family History   Problem Relation Age of Onset    Delayed Awakening Mother      Heart Disease Mother      Coronary Artery Disease Mother      Hypertension Mother      Stroke Mother      Delayed Awakening Sister      Hypertension Sister      Lung Disease Father      Cancer Father           colon    Hypertension Father      Hypertension Brother      Breast Cancer Maternal Aunt      Breast Cancer Maternal Aunt      Breast Cancer Cousin           maternal 1st cousin   Karen Arch Breast Cancer Maternal Aunt      Breast Cancer Cousin           maternal 1st cousin    Breast Cancer Cousin           maternal 1st cousin    Deep Vein Thrombosis Neg Hx      Anesth Problems Neg Hx      negative for cardiac disease         SOCIAL HISTORY      Social History               Socioeconomic History    Marital status:        Spouse name: Not on file    Number of children: Not on file    Years of education: Not on file    Highest education level: Not on file   Tobacco Use    Smoking status: Former Smoker       Packs/day: 1.00       Years: 30.00       Pack years: 30.00       Types: Cigarettes       Last attempt to quit: 2002       Years since quittin.4    Smokeless tobacco: Never Used   Substance and Sexual Activity    Alcohol use: No    Drug use: Never    Sexual activity: Yes       Partners: Male              MEDICATIONS           Current Outpatient Medications   Medication Sig    amiodarone (PACERONE) 100 mg tablet Take 1 Tab by mouth every other day.  lidocaine (Lidoderm) 5 % 1 Patch by TransDERmal route every twenty-four (24) hours. Apply patch to the affected area for 12 hours a day and remove for 12 hours a day.     ibuprofen (AdviL) 200 mg tablet Take 3 Tabs by mouth every eight (8) hours as needed for Pain for up to 16 days.  gabapentin (NEURONTIN) 100 mg capsule Take 1 Cap by mouth two (2) times a day. Max Daily Amount: 200 mg.  predniSONE (DELTASONE) 2.5 mg tablet Take 1 Tab by mouth daily (after lunch). 5 mg in morning and 2.5 mg afternoon  Resume your usual dose of prednisone after you complete the prednisone taper    acetaminophen (TYLENOL) 650 mg TbER Take 1,300 mg by mouth two (2) times a day.  bumetanide (BUMEX) 1 mg tablet Take 0.5 mg by mouth every evening. 1/2 tablet = 0.5 mg    coenzyme Q-10 (Co Q-10) 200 mg capsule Take 200 mg by mouth daily.  artificial tears, dextran 70-hypromellose, (GenTeal Tears Mild) 0.1-0.3 % ophthalmic solution Administer 1 Drop to both eyes nightly.  carboxymethylcell/hypromellose (GENTEAL GEL OP) Administer 1 Drop to both eyes as needed (dry eye).  omega 3-DHA-EPA-fish oil 1,000 mg (120 mg-180 mg) capsule Take 1 Cap by mouth every evening.  levalbuterol (XOPENEX) 0.63 mg/3 mL nebu 0.63 mg by Nebulization route four (4) times daily.  metoprolol succinate (TOPROL-XL) 25 mg XL tablet Take 1 Tab by mouth daily (after lunch).  aclidinium bromide (Tudorza Pressair) 400 mcg/actuation inhaler Take 1 Puff by inhalation daily.  calcium citrate-vitamin d3 (CITRACAL+D) 315 mg-5 mcg (200 unit) tab Take 1 Tab by mouth daily (with breakfast).  mometasone furoate (ASMANEX HFA IN) Take 100 mcg by inhalation two (2) times a day.  insulin glargine (LANTUS,BASAGLAR) 100 unit/mL (3 mL) inpn 8 Units by SubCUTAneous route Daily (before breakfast). 30 minutes before breakfast    denosumab (PROLIA SC) by SubCUTAneous route every 6 months.  bumetanide (BUMEX) 1 mg tablet Take 1 mg by mouth daily.  eplerenone (INSPRA) 25 mg tablet Take 25 mg by mouth Daily (before lunch).  fenofibrate nanocrystallized (Tricor) 48 mg tablet Take 48 mg by mouth nightly.     nitroglycerin (NITRODUR) 0.1 mg/hr 1 Patch by TransDERmal route daily as needed (chest pain).  levothyroxine (SYNTHROID) 150 mcg tablet Take 150 mcg by mouth Daily (before breakfast).  predniSONE (DELTASONE) 5 mg tablet Take 5 mg by mouth daily. 5 mg in morning and 2.5 mg afternoon    cholecalciferol, vitamin D3, (VITAMIN D3) 2,000 unit tab Take 2,000 Units by mouth daily.  turmeric (CURCUMIN) Take 1 g by mouth every evening.  SITagliptin (JANUVIA) 100 mg tablet Take 100 mg by mouth daily (with dinner).  vit C/vit E ac/lut/copper/zinc (PRESERVISION LUTEIN PO) Take 1 Tab by mouth every Monday. Takes with dinner    ascorbic acid (VITAMIN C) 500 mg tablet Take 500 mg by mouth daily.  ferrous sulfate 325 mg (65 mg iron) tablet Take 325 mg by mouth daily (with lunch).  cyanocobalamin (VITAMIN B12) 1,000 mcg/mL injection INJECT 1 ML INTO THE MUSCLE EVERY 30 DAYS    lansoprazole (PREVACID) 30 mg capsule Take 30 mg by mouth daily.  potassium 99 mg tablet Take 99 mg by mouth daily.      No current facility-administered medications for this visit.          I have reviewed the nurses notes, vitals, problem list, allergy list, medical history, family, social history and medications.         REVIEW OF SYMPTOMS      General: Pt denies excessive weight gain or loss. Pt is able to conduct ADL's  HEENT: Denies blurred vision, headaches, hearing loss, epistaxis and difficulty swallowing. Respiratory: Denies cough, congestion, shortness of breath, CHACON, wheezing or stridor. Cardiovascular: Denies precordial pain, palpitations, edema or PND  Gastrointestinal: Denies poor appetite, indigestion, abdominal pain or blood in stool  Genitourinary: Denies hematuria, dysuria, increased urinary frequency  Musculoskeletal: Denies joint pain or swelling from muscles or joints  Neurologic: Denies tremor, paresthesias, headache, or sensory motor disturbance  Psychiatric: Denies confusion, insomnia, depression  Integumentray: Denies rash, itching or ulcers.   Hematologic: Denies easy bruising, bleeding         PHYSICAL EXAMINATION      Vitals: see vitals section  General: Well developed, in no acute distress. HEENT: No jaundice, oral mucosa moist, no oral ulcers  Neck: Supple, no stiffness, no lymphadenopathy, supple  Heart:  Normal S1/S2 negative S3 or S4. Regular, no murmur, gallop or rub, no jugular venous distention  Respiratory: Clear bilaterally x 4, no wheezing or rales  Abdomen:   Soft, non-tender, bowel sounds are active.   Extremities:  No edema, normal cap refill, no cyanosis. Musculoskeletal: No clubbing, no deformities  Neuro: A&Ox3, speech clear, gait stable, cooperative, no focal neurologic deficits  Skin: Skin color is normal. No rashes or lesions. Non diaphoretic, moist.  Vascular: 2+ pulses symmetric in all extremities         DIAGNOSTIC DATA      EKG:          LABORATORY DATA            Lab Results   Component Value Date/Time     WBC 13.7 (H) 11/16/2020 04:40 AM     Hemoglobin (POC) 15.6 06/09/2014 03:00 AM     HGB 13.4 11/16/2020 04:40 AM     Hematocrit (POC) 49 (H) 11/05/2020 09:33 PM     HCT 41.9 11/16/2020 04:40 AM     PLATELET 357 40/77/1847 04:40 AM     MCV 96.5 11/16/2020 04:40 AM            Lab Results   Component Value Date/Time     Sodium 143 11/17/2020 03:26 AM     Potassium 3.9 11/17/2020 03:26 AM     Chloride 111 (H) 11/17/2020 03:26 AM     CO2 25 11/17/2020 03:26 AM     Anion gap 7 11/17/2020 03:26 AM     Glucose 149 (H) 11/17/2020 03:26 AM     BUN 10 11/17/2020 03:26 AM     Creatinine 0.71 11/17/2020 03:26 AM     BUN/Creatinine ratio 14 11/17/2020 03:26 AM     GFR est AA >60 11/17/2020 03:26 AM     GFR est non-AA >60 11/17/2020 03:26 AM     Calcium 8.4 (L) 11/17/2020 03:26 AM     Bilirubin, total 0.5 11/14/2020 03:04 AM     Alk.  phosphatase 61 11/14/2020 03:04 AM     Protein, total 5.1 (L) 11/14/2020 03:04 AM     Albumin 3.0 (L) 11/16/2020 04:40 AM     Globulin 2.3 11/14/2020 03:04 AM     A-G Ratio 1.2 11/14/2020 03:04 AM     ALT (SGPT) 20 11/14/2020 03:04 AM        ASSESSMENT         1. Atrial fibrillation with RVR              Paroxysmal                       S/p SUHAS Clip   2. CAD               S/p PCI to LAD and RCA  3. PVCs  4.  HTN  5. COPD/Asthma         PLAN      Dual chamber PPM with Medtronic using conscious sedation to be followed by AV node ablation.           Rigoberto Strong MD  Cardiac Electrophysiology / Cardiology     [address] [address] [facility], [address] [facility], [address]  [place] [address]  [phone] / [phone] Fax                            [phone] / [phone] Fax

## 2020-11-19 NOTE — PROGRESS NOTES
Verbal report given to Edinson(name) on NAME  being transferred to CVSU(unit) for routine progression of care       Report consisted of patients Situation, Background, Assessment and   Recommendations(SBAR). Information from the following report(s) Procedure Summary, MAR and Recent Results was reviewed with the receiving nurse. Lines:       Opportunity for questions and clarification was provided.       Patient transported with:   Monitor  Registered Nurse

## 2020-11-19 NOTE — ANESTHESIA POSTPROCEDURE EVALUATION
Post-Anesthesia Evaluation and Assessment    Patient: Danyel Watson MRN: [id number]  SSN: [id number]    YOB: 1950  Age: 65 y.o. Sex: female      I have evaluated the patient and they are stable and ready for discharge from the PACU. Cardiovascular Function/Vital Signs  Visit Vitals  /80   Pulse 70   Temp 36.7 °C (98 °F)   Resp 15   Ht 5' 2\" (1.575 m)   Wt 76.7 kg (169 lb)   SpO2 100%   Breastfeeding No   BMI 30.91 kg/m²       Patient is status post Con-Sed anesthesia for Procedure(s):  INSERT PPM DUAL  ABLATION AV NODE  Ep 3d Mapping. Nausea/Vomiting: None    Postoperative hydration reviewed and adequate. Pain:  Pain Scale 1: Numeric (0 - 10) (11/19/20 1715)  Pain Intensity 1: 0 (11/19/20 1715)   Managed    Neurological Status: At baseline    Mental Status, Level of Consciousness: Alert and  oriented to person, place, and time    Pulmonary Status:   O2 Device: Room air (11/19/20 1725)   Adequate oxygenation and airway patent    Complications related to anesthesia: None    Post-anesthesia assessment completed. No concerns    Signed By: Ashwin Jordan MD     November 19, 2020              Procedure(s):  INSERT PPM DUAL  ABLATION AV NODE  Ep 3d Mapping. MAC    <BSHSIANPOST>    INITIAL Post-op Vital signs:   Vitals Value Taken Time   /72 11/19/2020  5:30 PM   Temp 36.7 °C (98 °F) 11/19/2020  5:25 PM   Pulse 90 11/19/2020  5:39 PM   Resp 16 11/19/2020  5:39 PM   SpO2 91 % 11/19/2020  5:39 PM   Vitals shown include unvalidated device data.

## 2020-11-19 NOTE — ANESTHESIA PREPROCEDURE EVALUATION
Relevant Problems   No relevant active problems       Anesthetic History   No history of anesthetic complications            Review of Systems / Medical History  Patient summary reviewed, nursing notes reviewed and pertinent labs reviewed    Pulmonary    COPD: mild               Neuro/Psych       CVA       Cardiovascular    Hypertension: well controlled        Dysrhythmias : atrial fibrillation  CAD    Exercise tolerance: >4 METS     GI/Hepatic/Renal     GERD: well controlled           Endo/Other    Diabetes: well controlled, type 2, using insulin  Hypothyroidism: well controlled  Arthritis     Other Findings              Physical Exam    Airway  Mallampati: II  TM Distance: > 6 cm  Neck ROM: normal range of motion   Mouth opening: Normal     Cardiovascular  Regular rate and rhythm,  S1 and S2 normal,  no murmur, click, rub, or gallop             Dental  No notable dental hx       Pulmonary  Breath sounds clear to auscultation               Abdominal  GI exam deferred       Other Findings            Anesthetic Plan    ASA: 3  Anesthesia type: MAC          Induction: Intravenous  Anesthetic plan and risks discussed with: Patient

## 2020-11-19 NOTE — PROGRESS NOTES
TRANSFER - IN REPORT:    Verbal report received from [place] on Danae Davison  being received from procedural area for routine progression of care. Report consisted of patients Situation, Background, Assessment and Recommendations(SBAR). Information from the following report(s) Procedure Summary, MAR and Recent Results was reviewed with the receiving clinician. Opportunity for questions and clarification was provided. Assessment completed upon patients arrival to [address] [place] [address] and care assumed. Cardiac Cath Lab Recovery Arrival Note:    Danae Davison arrived to [facility] recovery area. Patient procedure= PPI, AV node ablation. Patient on cardiac monitor, non-invasive blood pressure, SPO2 monitor. On  or O2 @ 2 lpm via NC.  IV  of NS on pump at 25 ml/hr. Patient status doing well without problems. Patient is A&Ox 3. Patient reports no c/o's. PROCEDURE SITE CHECK:    Procedure site:without any bleeding and hematoma, no pain/discomfort reported at procedure site. No change in patient status. Continue to monitor patient and status.

## 2020-11-20 ENCOUNTER — TELEPHONE (OUTPATIENT)
Dept: CARDIOLOGY CLINIC | Age: 70
End: 2020-11-20

## 2020-11-20 VITALS
RESPIRATION RATE: 16 BRPM | WEIGHT: 169 LBS | DIASTOLIC BLOOD PRESSURE: 55 MMHG | TEMPERATURE: 98.1 F | SYSTOLIC BLOOD PRESSURE: 108 MMHG | BODY MASS INDEX: 31.1 KG/M2 | OXYGEN SATURATION: 91 % | HEIGHT: 62 IN | HEART RATE: 101 BPM

## 2020-11-20 LAB
GLUCOSE BLD STRIP.AUTO-MCNC: 130 MG/DL (ref 65–100)
INR PPP: 1 (ref 0.9–1.1)
PROTHROMBIN TIME: 10.3 SEC (ref 9–11.1)
SERVICE CMNT-IMP: ABNORMAL

## 2020-11-20 PROCEDURE — 99218 HC RM OBSERVATION: CPT

## 2020-11-20 PROCEDURE — 96374 THER/PROPH/DIAG INJ IV PUSH: CPT

## 2020-11-20 PROCEDURE — 77030027138 HC INCENT SPIROMETER -A

## 2020-11-20 PROCEDURE — 74011250637 HC RX REV CODE- 250/637: Performed by: INTERNAL MEDICINE

## 2020-11-20 PROCEDURE — 74011250636 HC RX REV CODE- 250/636: Performed by: INTERNAL MEDICINE

## 2020-11-20 PROCEDURE — 85610 PROTHROMBIN TIME: CPT

## 2020-11-20 PROCEDURE — 82962 GLUCOSE BLOOD TEST: CPT

## 2020-11-20 PROCEDURE — 36415 COLL VENOUS BLD VENIPUNCTURE: CPT

## 2020-11-20 RX ADMIN — VANCOMYCIN HYDROCHLORIDE 1000 MG: 1 INJECTION, POWDER, LYOPHILIZED, FOR SOLUTION INTRAVENOUS at 03:32

## 2020-11-20 RX ADMIN — ACETAMINOPHEN 650 MG: 325 TABLET ORAL at 08:18

## 2020-11-20 NOTE — PROGRESS NOTES
Reason for Admission: Patient s/p AVN ablation and PPM insertion                      RUR Score: OBS status                     Plan for utilizing home health:  No home health needs identified at this time         PCP: First and Last name:  Dr. Brigitte Bautista   Name of Practice: [facility]   Are you a current patient: Yes/No: Yes   Approximate date of last visit: N/A   Can you participate in a virtual visit with your PCP: Yes                    Current Advanced Directive/Advance Care Plan: Not on file, legal NOK is the patient's spouse                          Transition of Care Plan:  Home                     Observation notice provided in writing to patient and/or caregiver as well as verbal explanation of the policy. Patients who are in outpatient status also receive the Observation notice. The CM met with the patient at bedside in order to introduce the role of CM and assess for patient needs. The patient lives at home with her - verified demographics and insurance information. The patient is independent with ADLs and mobility, no DME utilized in the home. The patient denied any concerns for transition home today, family will transport. The CM will follow for transitions of care needs. HIGINIO Dao     Care Management Interventions  PCP Verified by CM: Yes(Patient verified PCP as Dr. Brigitte Bautista)  Mode of Transport at Discharge:  Other (see comment)(Family to transport )  Transition of Care Consult (CM Consult): Discharge Planning  MyChart Signup: Yes  Physical Therapy Consult: No  Occupational Therapy Consult: No  Current Support Network: Lives with Spouse, Own Home  Confirm Follow Up Transport: Family  Discharge Location  Discharge Placement: Home

## 2020-11-20 NOTE — TELEPHONE ENCOUNTER
Returned call to Vida patient's daughter. Patient ID verified using two patient identifiers. Informed her that URSULA Corey NP had touched base with Carlos Merlos NP in the hospital and he stated that patient was alert and oriented when he saw her this am. Patient has been discharged and Vida states she is fine now, no confusion. They will contact the office with any further questions or concerns.

## 2020-11-20 NOTE — DISCHARGE SUMMARY
Cardiology Discharge Summary     Patient ID:  Didier Astorga  [id number]  14 y.o.  1950    Admit Date: 11/19/2020    Discharge Date: 11/20/20    Admitting Physician: Anyi Olson MD     Discharge Physician: same    Admission Diagnoses:   Atrial fibrillation, unspecified type ([facility] [I48.91]  [facility]) [I48.91]    Discharge Diagnoses: Active Problems:    [facility]) (11/19/2020)        Discharge Condition: Good    Cardiology Procedures this Admission:  AVN ablation and PPM insertion    Consults: None    Hospital Course:   Admitted to telemetry following successful AVN ablation and PPM insertion. Mild TTP and swelling at PPM insertion site. One recorded temp at 102.7 - atelectasis noted on exam.  Normalized temp following OOB to chair and ICS use. Visit Vitals  BP (!) 108/55   Pulse (!) 101   Temp 98.1 °F (36.7 °C)   Resp 16   Ht 5' 2\" (1.575 m)   Wt 169 lb (76.7 kg)   SpO2 91%   Breastfeeding No   BMI 30.91 kg/m²       Physical Exam  Abdomen: soft, non-tender. Bowel sounds normal.   Extremities: no LE edema, + PP bilaterally   Heart: regular rate and rhythm, S1, S2 normal, no murmurs, clicks, rubs or gallops  Lungs: fine rales in bases to auscultation bilaterally  Neck: supple, symmetrical, trachea midline,   Neurologic: Grossly normal  Pulses: 2+ and symmetrical    Labs: No results for input(s): WBC, HGB, HCT, PLT, HGBEXT, HCTEXT, PLTEXT, HGBEXT, HCTEXT, PLTEXT in the last 72 hours. Recent Labs     11/20/20  0349   INR 1.0       No results for input(s): TROIQ, CPK, CKMB in the last 72 hours. Disposition: home    Patient Instructions:   Current Discharge Medication List      CONTINUE these medications which have NOT CHANGED    Details   lidocaine (Lidoderm) 5 % 1 Patch by TransDERmal route every twenty-four (24) hours. Apply patch to the affected area for 12 hours a day and remove for 12 hours a day.   Qty: 1 Each, Refills: 0      gabapentin (NEURONTIN) 100 mg capsule Take 1 Cap by mouth two (2) times a day. Max Daily Amount: 200 mg. Qty: 60 Cap, Refills: 0    Associated Diagnoses: Rheumatoid arthritis of other site, unspecified whether rheumatoid factor present ([facility]      ! ! predniSONE (DELTASONE) 2.5 mg tablet Take 1 Tab by mouth daily (after lunch). 5 mg in morning and 2.5 mg afternoon  Resume your usual dose of prednisone after you complete the prednisone taper  Qty: 30 Tab, Refills: 0      acetaminophen (TYLENOL) 650 mg TbER Take 1,300 mg by mouth two (2) times a day. coenzyme Q-10 (Co Q-10) 200 mg capsule Take 200 mg by mouth daily. artificial tears, dextran 70-hypromellose, (GenTeal Tears Mild) 0.1-0.3 % ophthalmic solution Administer 1 Drop to both eyes nightly. carboxymethylcell/hypromellose (GENTEAL GEL OP) Administer 1 Drop to both eyes as needed (dry eye). omega 3-DHA-EPA-fish oil 1,000 mg (120 mg-180 mg) capsule Take 1 Cap by mouth every evening. levalbuterol (XOPENEX) 0.63 mg/3 mL nebu 0.63 mg by Nebulization route four (4) times daily. metoprolol succinate (TOPROL-XL) 25 mg XL tablet Take 1 Tab by mouth daily (after lunch). Qty: 30 Tab, Refills: 2      aclidinium bromide (Tudorza Pressair) 400 mcg/actuation inhaler Take 1 Puff by inhalation daily. calcium citrate-vitamin d3 (CITRACAL+D) 315 mg-5 mcg (200 unit) tab Take 1 Tab by mouth daily (with breakfast). mometasone furoate (ASMANEX HFA IN) Take 100 mcg by inhalation two (2) times a day. potassium 99 mg tablet Take 99 mg by mouth daily. insulin glargine (LANTUS,BASAGLAR) 100 unit/mL (3 mL) inpn 8 Units by SubCUTAneous route Daily (before breakfast). 30 minutes before breakfast      bumetanide (BUMEX) 1 mg tablet Take 1 mg by mouth daily. eplerenone (INSPRA) 25 mg tablet Take 25 mg by mouth Daily (before lunch). fenofibrate nanocrystallized (Tricor) 48 mg tablet Take 48 mg by mouth nightly.       levothyroxine (SYNTHROID) 150 mcg tablet Take 150 mcg by mouth Daily (before breakfast). !! predniSONE (DELTASONE) 5 mg tablet Take 5 mg by mouth daily. 5 mg in morning and 2.5 mg afternoon      cholecalciferol, vitamin D3, (VITAMIN D3) 2,000 unit tab Take 2,000 Units by mouth daily. turmeric (CURCUMIN) Take 1 g by mouth every evening. SITagliptin (JANUVIA) 100 mg tablet Take 100 mg by mouth daily (with dinner). vit C/vit E ac/lut/copper/zinc (PRESERVISION LUTEIN PO) Take 1 Tab by mouth every Monday. Takes with dinner      ascorbic acid (VITAMIN C) 500 mg tablet Take 500 mg by mouth daily. ferrous sulfate 325 mg (65 mg iron) tablet Take 325 mg by mouth daily (with lunch). cyanocobalamin (VITAMIN B12) 1,000 mcg/mL injection INJECT 1 ML INTO THE MUSCLE EVERY 30 DAYS  Qty: 10 mL, Refills: 0      lansoprazole (PREVACID) 30 mg capsule Take 30 mg by mouth daily. Associated Diagnoses: Esophageal reflux      amiodarone (PACERONE) 100 mg tablet Take 1 Tab by mouth every other day. Qty: 15 Tab, Refills: 0      ibuprofen (AdviL) 200 mg tablet Take 3 Tabs by mouth every eight (8) hours as needed for Pain for up to 16 days. Qty: 30 Tab, Refills: 0      denosumab (PROLIA SC) by SubCUTAneous route every 6 months. nitroglycerin (NITRODUR) 0.1 mg/hr 1 Patch by TransDERmal route daily as needed (chest pain). !! - Potential duplicate medications found. Please discuss with provider. Reference discharge instructions provided by nursing for diet and activity. Follow-up with   No future appointments.     Signed:  Raymond Summers PA-C

## 2020-11-20 NOTE — TELEPHONE ENCOUNTER
Patients daughter is calling as the patient had a procedure with Dr. Milton Hummel yesterday and she is still in the hospital. Augustin Kaiser states that the patient was on the phone with the her   who stated she had to go to the bathroom at that time the phone went silent and he waited for 10 minutes before he hung up and proceeded to call the nurses station. At that time the patient was advised he would need to call back after 8. The daughter called back at 6 and was put through to the patient who states that she was sleeping and was woken up by a nurse and she was confused. But she was not sleeping as prior to that she was speaking with her . Patients daughter is concerned and would like to discuss this further with a nurse. Please advise.     Phone: [phone]

## 2020-11-20 NOTE — PROGRESS NOTES
1840: TRANSFER - IN REPORT:    Verbal report received from Karolyn(name) on Edna Elena  being received from cath lab(unit) for routine post - op      Report consisted of patients Situation, Background, Assessment and   Recommendations(SBAR). Information from the following report(s) SBAR, Kardex, [facility], Recent Results and Cardiac Rhythm Paced was reviewed with the receiving nurse. Opportunity for questions and clarification was provided. Assessment completed upon patients arrival to unit and care assumed. 1930: Bedside shift change report given to Seymour Mosquera (oncoming nurse) by Magda Becerra (offgoing nurse). Report included the following information SBAR, Kardex, [facility], Recent Results and Cardiac Rhythm Paced.

## 2020-11-20 NOTE — PROGRESS NOTES
Patient discharged. She and  educated on discharge instructions. Iv and tele removed. All questions answered. Family packed room. Problem: Diabetes Self-Management  Goal: *Disease process and treatment process  Description: Define diabetes and identify own type of diabetes; list 3 options for treating diabetes. Outcome: Resolved/Not Met  Goal: *Incorporating nutritional management into lifestyle  Description: Describe effect of type, amount and timing of food on blood glucose; list 3 methods for planning meals. Outcome: Resolved/Not Met  Goal: *Incorporating physical activity into lifestyle  Description: State effect of exercise on blood glucose levels. Outcome: Resolved/Not Met  Goal: *Developing strategies to promote health/change behavior  Description: Define the ABC's of diabetes; identify appropriate screenings, schedule and personal plan for screenings. Outcome: Resolved/Not Met  Goal: *Using medications safely  Description: State effect of diabetes medications on diabetes; name diabetes medication taking, action and side effects. Outcome: Resolved/Not Met  Goal: *Monitoring blood glucose, interpreting and using results  Description: Identify recommended blood glucose targets  and personal targets. Outcome: Resolved/Not Met  Goal: *Prevention, detection, treatment of acute complications  Description: List symptoms of hyper- and hypoglycemia; describe how to treat low blood sugar and actions for lowering  high blood glucose level. Outcome: Resolved/Not Met  Goal: *Prevention, detection and treatment of chronic complications  Description: Define the natural course of diabetes and describe the relationship of blood glucose levels to long term complications of diabetes.   Outcome: Resolved/Not Met  Goal: *Developing strategies to address psychosocial issues  Description: Describe feelings about living with diabetes; identify support needed and support network  Outcome: Resolved/Not Met  Goal: *Insulin pump training  Outcome: Resolved/Not Met  Goal: *Sick day guidelines  Outcome: Resolved/Not Met  Goal: *Patient Specific Goal (EDIT GOAL, INSERT TEXT)  Outcome: Resolved/Not Met     Problem: Patient Education: Go to Patient Education Activity  Goal: Patient/Family Education  Outcome: Resolved/Not Met     Problem: Falls - Risk of  Goal: *Absence of Falls  Description: Document Pamela Fall Risk and appropriate interventions in the flowsheet.   Outcome: Resolved/Not Met     Problem: Patient Education: Go to Patient Education Activity  Goal: Patient/Family Education  Outcome: Resolved/Not Met

## 2020-11-20 NOTE — PROGRESS NOTES
7034: New swelling noted above L chest pacer site. Ice pack applied. 3844: On call physician paged. Awaiting response. 4848: Bedside and Verbal shift change report given to Rex Orneals (oncoming nurse) by Jerris Boas (offgoing nurse). Report included the following information SBAR, Kardex and Procedure Summary.

## 2020-12-03 ENCOUNTER — OFFICE VISIT (OUTPATIENT)
Dept: CARDIOLOGY CLINIC | Age: 70
End: 2020-12-03
Payer: MEDICARE

## 2020-12-03 ENCOUNTER — CLINICAL SUPPORT (OUTPATIENT)
Dept: CARDIOLOGY CLINIC | Age: 70
End: 2020-12-03
Payer: MEDICARE

## 2020-12-03 DIAGNOSIS — Z95.0 CARDIAC PACEMAKER IN SITU: Primary | ICD-10-CM

## 2020-12-03 DIAGNOSIS — Z98.890 S/P AV NODAL ABLATION: ICD-10-CM

## 2020-12-03 DIAGNOSIS — I48.91 ATRIAL FIBRILLATION, UNSPECIFIED TYPE (HCC): ICD-10-CM

## 2020-12-03 DIAGNOSIS — I10 HTN (HYPERTENSION), BENIGN: ICD-10-CM

## 2020-12-03 PROCEDURE — G8417 CALC BMI ABV UP PARAM F/U: HCPCS | Performed by: NURSE PRACTITIONER

## 2020-12-03 PROCEDURE — G8399 PT W/DXA RESULTS DOCUMENT: HCPCS | Performed by: NURSE PRACTITIONER

## 2020-12-03 PROCEDURE — 93288 INTERROG EVL PM/LDLS PM IP: CPT | Performed by: INTERNAL MEDICINE

## 2020-12-03 PROCEDURE — 93280 PM DEVICE PROGR EVAL DUAL: CPT | Performed by: INTERNAL MEDICINE

## 2020-12-03 PROCEDURE — 1090F PRES/ABSN URINE INCON ASSESS: CPT | Performed by: NURSE PRACTITIONER

## 2020-12-03 PROCEDURE — G8432 DEP SCR NOT DOC, RNG: HCPCS | Performed by: NURSE PRACTITIONER

## 2020-12-03 PROCEDURE — 1101F PT FALLS ASSESS-DOCD LE1/YR: CPT | Performed by: NURSE PRACTITIONER

## 2020-12-03 PROCEDURE — G8536 NO DOC ELDER MAL SCRN: HCPCS | Performed by: NURSE PRACTITIONER

## 2020-12-03 PROCEDURE — G9899 SCRN MAM PERF RSLTS DOC: HCPCS | Performed by: NURSE PRACTITIONER

## 2020-12-03 PROCEDURE — 99215 OFFICE O/P EST HI 40 MIN: CPT | Performed by: NURSE PRACTITIONER

## 2020-12-03 PROCEDURE — G8427 DOCREV CUR MEDS BY ELIG CLIN: HCPCS | Performed by: NURSE PRACTITIONER

## 2020-12-03 PROCEDURE — G8756 NO BP MEASURE DOC: HCPCS | Performed by: NURSE PRACTITIONER

## 2020-12-03 PROCEDURE — 1111F DSCHRG MED/CURRENT MED MERGE: CPT | Performed by: NURSE PRACTITIONER

## 2020-12-03 PROCEDURE — 93288 INTERROG EVL PM/LDLS PM IP: CPT

## 2020-12-03 PROCEDURE — 93280 PM DEVICE PROGR EVAL DUAL: CPT

## 2020-12-03 PROCEDURE — 3017F COLORECTAL CA SCREEN DOC REV: CPT | Performed by: NURSE PRACTITIONER

## 2020-12-03 PROCEDURE — G0463 HOSPITAL OUTPT CLINIC VISIT: HCPCS | Performed by: NURSE PRACTITIONER

## 2020-12-03 NOTE — PROGRESS NOTES
HISTORY OF PRESENTING ILLNESS      Samuel Granados is a 65 y.o. female with hx of paroxysmal arial fibrillation (s/p ablation x10 years ago with Dr. Gigi Arrington), previous history of thrombocytopenia, hypertension, COPD, heart failure preserved EF, CAD. She underwent a left atrial appendage ligation on 10/20/2020 by Dr. Markos Duncan. She experienced chest pain/palpitations post procedure and presented to the ER. Previous cardiac catheterization and stress test in 2019 were negative for ischemia and no significant coronary disease and stents were open. She had normal stress testing 11/5/2020 due to peaked troponin level. Echocardiogram demonstrated preserved LV function of 55-60%. She had recent admission for AF with RVR following her cardiac surgery. Suspected inflammation post op to be the  of AF/symptoms. She was discharged with daily oral amiodarone with plan to order 30 day OP monitor to evaluate rate control however was readmitted for syncope and discharged from the hospital 11/17/2020. Suspected amiodarone side effects to be a factor and she therefore underwent dual chamber PPM and AV jules ablation and now presents for follow up. Device interrogation shows normal functioning with dependency post AV jules ablation. The site appeared to be well-healing without ecchymosis/tenderness/erythema. Denies pain, fevers, discharge.       PAST MEDICAL HISTORY     Past Medical History:   Diagnosis Date    Adverse effect of anesthesia     slow to wake up    Arthritis     Asthma 6/29/2009    CAUSED BY MOLD    Atrial fibrillation ([address] 6/29/2009    CAD (coronary artery disease) 06/29/2009    Celiac disease 2010    Chronic obstructive pulmonary disease ([address] 2004-5    right leg    Chronic pain of right ankle     Diabetes ([address]     Drug induced prednisone, DM2    Diastolic heart failure ([address]     DVT (deep venous thrombosis) ([address] 2004    Right leg post surgery    GERD (gastroesophageal reflux disease)     Gluten intolerance     [facility] failure [facility]     Hypertension     Hypothyroidism 6/29/2009    Iron deficiency anemia     Lyme disease     Personal history of MI (myocardial infarction)     Rheumatoid arthritis ([facility]     Slow to wake up after anesthesia     Syncope     Post testing- resolved    TIA (transient ischemic attack) 2004 & 2006    Vitamin B12 deficiency 6/29/2009           PAST SURGICAL HISTORY     Past Surgical History:   Procedure Laterality Date    HX ADENOIDECTOMY      HX AFIB ABLATION      HX APPENDECTOMY      HX CHOLECYSTECTOMY  6/16/11    HX COLONOSCOPY      HX CORONARY STENT PLACEMENT      x 2    HX HEART CATHETERIZATION  2010    2 STENTS    HX HEART CATHETERIZATION  03/2020    HX HYSTERECTOMY      HX LUMBAR LAMINECTOMY  2000    L4-L5    HX ORTHOPAEDIC  1993-6/2012    RT.  FOOT SURGERY X 6/calcaneal osteotomy    HX ORTHOPAEDIC Right 09/19/2020    right hand [facility] joint replacement    HX TONSILLECTOMY  1964    MA ICAR CATHETER ABLATION ATRIOVENTR NODE FUNCTION N/A 11/19/2020    [facility] [address] [facility]    MA INS NEW/RPLCMT PRM PM W/TRANSV ELTRD ATRIAL&VENT N/A 11/19/2020    INSERT [facility]PM DUAL [facility] [address] [facility]    MA INTRACARDIAC ELECTROPHYSIOLOGIC 3D MAPPING N/A 11/19/2020    Ep 3d Mapping performed by Patricia Lowe MD at [address] [place] [id number] CATH LAB          ALLERGIES     Allergies   Allergen Reactions    Butter Flavor Anaphylaxis     Butter AND CREME    Keflex [Cephalexin] Anaphylaxis    Milk Containing Products Anaphylaxis     Butter and cream    Pcn [Penicillins] Anaphylaxis    Aspirin Hives and Other (comments)     \"it makes my stomach bleed\"      Cimzia [Certolizumab Pegol] Other (comments)     GI symptoms, blisters in the mouth and esophagus    Clopidogrel Other (comments)     GI bleed    Demerol [Meperidine] Other (comments)     HYPOTENSION    Fentanyl Other (comments) HYPOTENSION    Ibuprofen Diarrhea     Patient reports that ibuprofen caused diarrhea    Morphine Other (comments)     HYPOTENSION    Nitroglycerin Other (comments)     IN PILL FORM  - HYPOTENSION  CAN TOLERATE PATCH FOR SHORT TIME    Sulfa (Sulfonamide Antibiotics) Angioedema     Lips    Tapazole [Methimazole] Unknown (comments)     Patient stated \"it made me feel like I had the flu\"    Tramadol Other (comments)     Patient reports thrush with tramadol use    Adhesive Tape-Silicones Other (comments)     BAD BLISTERS AND TENDS TO GET INFECTED    Albuterol Palpitations    Gluten Diarrhea     bloating    Oxycodone Other (comments)     Hallicination and hypotension          FAMILY HISTORY     Family History   Problem Relation Age of Onset    Delayed Awakening Mother     Heart Disease Mother     Coronary Artery Disease Mother     Hypertension Mother     Stroke Mother     Delayed Awakening Sister     Hypertension Sister     Lung Disease Father     Cancer Father         colon    Hypertension Father     Hypertension Brother     Breast Cancer Maternal Aunt     Breast Cancer Maternal Aunt     Breast Cancer Cousin         maternal 1st cousin    Breast Cancer Maternal Aunt     Breast Cancer Cousin         maternal 1st cousin   [facility] Breast Cancer Cousin         maternal 1st cousin    Deep Vein Thrombosis Neg Hx     Anesth Problems Neg Hx     negative for cardiac disease       SOCIAL HISTORY     Social History     Socioeconomic History    Marital status:      Spouse name: Not on file    Number of children: Not on file    Years of education: Not on file    Highest education level: Not on file   Tobacco Use    Smoking status: Former Smoker     Packs/day: 1.00     Years: 30.00     Pack years: 30.00     Types: Cigarettes     Last attempt to quit: 2002     Years since quittin.4    Smokeless tobacco: Never Used   Substance and Sexual Activity    Alcohol use: No    Drug use: Never  Sexual activity: Yes     Partners: Male         MEDICATIONS     Current Outpatient Medications   Medication Sig    amiodarone (PACERONE) 100 mg tablet Take 1 Tab by mouth every other day.  lidocaine (Lidoderm) 5 % 1 Patch by TransDERmal route every twenty-four (24) hours. Apply patch to the affected area for 12 hours a day and remove for 12 hours a day.  ibuprofen (AdviL) 200 mg tablet Take 3 Tabs by mouth every eight (8) hours as needed for Pain for up to 16 days.  gabapentin (NEURONTIN) 100 mg capsule Take 1 Cap by mouth two (2) times a day. Max Daily Amount: 200 mg.  predniSONE (DELTASONE) 2.5 mg tablet Take 1 Tab by mouth daily (after lunch). 5 mg in morning and 2.5 mg afternoon  Resume your usual dose of prednisone after you complete the prednisone taper    acetaminophen (TYLENOL) 650 mg TbER Take 1,300 mg by mouth two (2) times a day.  coenzyme Q-10 (Co Q-10) 200 mg capsule Take 200 mg by mouth daily.  artificial tears, dextran 70-hypromellose, (GenTeal Tears Mild) 0.1-0.3 % ophthalmic solution Administer 1 Drop to both eyes nightly.  carboxymethylcell/hypromellose (GENTEAL GEL OP) Administer 1 Drop to both eyes as needed (dry eye).  omega 3-DHA-EPA-fish oil 1,000 mg (120 mg-180 mg) capsule Take 1 Cap by mouth every evening.  levalbuterol (XOPENEX) 0.63 mg/3 mL nebu 0.63 mg by Nebulization route four (4) times daily.  metoprolol succinate (TOPROL-XL) 25 mg XL tablet Take 1 Tab by mouth daily (after lunch).  aclidinium bromide (Tudorza Pressair) 400 mcg/actuation inhaler Take 1 Puff by inhalation daily.  calcium citrate-vitamin d3 (CITRACAL+D) 315 mg-5 mcg (200 unit) tab Take 1 Tab by mouth daily (with breakfast).  mometasone furoate (ASMANEX HFA IN) Take 100 mcg by inhalation two (2) times a day.  potassium 99 mg tablet Take 99 mg by mouth daily.  insulin glargine (LANTUS,BASAGLAR) 100 unit/mL (3 mL) inpn 8 Units by SubCUTAneous route Daily (before breakfast). 30 minutes before breakfast    denosumab (PROLIA SC) by SubCUTAneous route every 6 months.  bumetanide (BUMEX) 1 mg tablet Take 1 mg by mouth daily.  eplerenone (INSPRA) 25 mg tablet Take 25 mg by mouth Daily (before lunch).  fenofibrate nanocrystallized (Tricor) 48 mg tablet Take 48 mg by mouth nightly.  nitroglycerin (NITRODUR) 0.1 mg/hr 1 Patch by TransDERmal route daily as needed (chest pain).  levothyroxine (SYNTHROID) 150 mcg tablet Take 150 mcg by mouth Daily (before breakfast).  predniSONE (DELTASONE) 5 mg tablet Take 5 mg by mouth daily. 5 mg in morning and 2.5 mg afternoon    cholecalciferol, vitamin D3, (VITAMIN D3) 2,000 unit tab Take 2,000 Units by mouth daily.  turmeric (CURCUMIN) Take 1 g by mouth every evening.  SITagliptin (JANUVIA) 100 mg tablet Take 100 mg by mouth daily (with dinner).  vit C/vit E ac/lut/copper/zinc (PRESERVISION LUTEIN PO) Take 1 Tab by mouth every Monday. Takes with dinner    ascorbic acid (VITAMIN C) 500 mg tablet Take 500 mg by mouth daily.  ferrous sulfate 325 mg (65 mg iron) tablet Take 325 mg by mouth daily (with lunch).  cyanocobalamin (VITAMIN B12) 1,000 mcg/mL injection INJECT 1 ML INTO THE MUSCLE EVERY 30 DAYS    lansoprazole (PREVACID) 30 mg capsule Take 30 mg by mouth daily. No current facility-administered medications for this visit. I have reviewed the nurses notes, vitals, problem list, allergy list, medical history, family, social history and medications. REVIEW OF SYMPTOMS      General: Pt denies excessive weight gain or loss. Pt is able to conduct ADL's  HEENT: Denies blurred vision, headaches, hearing loss, epistaxis and difficulty swallowing. Respiratory: Denies cough, congestion, shortness of breath, CHACON, wheezing or stridor.   Cardiovascular: Denies precordial pain, palpitations, edema or PND  Gastrointestinal: Denies poor appetite, indigestion, abdominal pain or blood in stool  Genitourinary: Denies hematuria, dysuria, increased urinary frequency  Musculoskeletal: Denies joint pain or swelling from muscles or joints  Neurologic: Denies tremor, paresthesias, headache, or sensory motor disturbance  Psychiatric: Denies confusion, insomnia, depression  Integumentray: Denies rash, itching or ulcers. Hematologic: Denies easy bruising, bleeding       PHYSICAL EXAMINATION      Vitals: see vitals section  General: Well developed, in no acute distress. HEENT: No jaundice, oral mucosa moist, no oral ulcers  Neck: Supple, no stiffness, no lymphadenopathy, supple  Heart:  Normal S1/S2 negative S3 or S4. Regular, no murmur, gallop or rub, no jugular venous distention  Respiratory: Clear bilaterally x 4, no wheezing or rales  Abdomen:   Soft, non-tender, bowel sounds are active. Extremities:  No edema, normal cap refill, no cyanosis. Musculoskeletal: No clubbing, no deformities  Neuro: A&Ox3, speech clear, gait stable, cooperative, no focal neurologic deficits  Skin: Skin color is normal. No rashes or lesions.  Non diaphoretic, moist.  Vascular: 2+ pulses symmetric in all extremities       DIAGNOSTIC DATA      EKG:        LABORATORY DATA      Lab Results   Component Value Date/Time    WBC 13.7 (H) 11/16/2020 04:40 AM    Hemoglobin (POC) 15.6 06/09/2014 03:00 AM    HGB 13.4 11/16/2020 04:40 AM    Hematocrit (POC) 49 (H) 11/05/2020 09:33 PM    HCT 41.9 11/16/2020 04:40 AM    PLATELET 802 04/58/0907 04:40 AM    MCV 96.5 11/16/2020 04:40 AM      Lab Results   Component Value Date/Time    Sodium 143 11/17/2020 03:26 AM    Potassium 3.9 11/17/2020 03:26 AM    Chloride 111 (H) 11/17/2020 03:26 AM    CO2 25 11/17/2020 03:26 AM    Anion gap 7 11/17/2020 03:26 AM    Glucose 149 (H) 11/17/2020 03:26 AM    BUN 10 11/17/2020 03:26 AM    Creatinine 0.71 11/17/2020 03:26 AM    BUN/Creatinine ratio 14 11/17/2020 03:26 AM    GFR est AA >60 11/17/2020 03:26 AM    GFR est non-AA >60 11/17/2020 03:26 AM    Calcium 8.4 (L) 11/17/2020 03:26 AM Bilirubin, total 0.5 11/14/2020 03:04 AM    Alk. phosphatase 61 11/14/2020 03:04 AM    Protein, total 5.1 (L) 11/14/2020 03:04 AM    Albumin 3.0 (L) 11/16/2020 04:40 AM    Globulin 2.3 11/14/2020 03:04 AM    A-G Ratio 1.2 11/14/2020 03:04 AM    ALT (SGPT) 20 11/14/2020 03:04 AM           ASSESSMENT      1. Atrial fibrillation with RVR              Paroxysmal                       S/p SUHAS Clip   2. CAD               S/p PCI to LAD and RCA  3. PVCs  4. HTN  5. COPD/Asthma       PLAN     Continue current medical therapy- follow up as planned 3 months. ICD-10-CM ICD-9-CM    1. Cardiac pacemaker in situ  Z95.0 V45.01    2. Atrial fibrillation, unspecified type ([facility])  I48.91 427.31    3. HTN (hypertension), benign  I10 401.1    4. S/P AV jules ablation  Z98.890 V45.89      No orders of the defined types were placed in this encounter. FOLLOW-UP   3 months    Thank you, Nestora Seip, MD for allowing me to participate in the care of this extraordinarily pleasant female. Please do not hesitate to contact me for further questions/concerns.      LESLIE Garg [facility].  [address] [facility], [facility], [address]  [phone] / [phone] Fax   [phone] / [phone] Fax

## 2020-12-03 NOTE — PROGRESS NOTES
c/2 wk. MDT dc pacer ck/thresholds; lowered rate from 90 to 80 ppm/ s/p AVN ablation. Pt. States she feels much better since implant & ablation. See scanned document for details.

## 2020-12-07 ENCOUNTER — TELEPHONE (OUTPATIENT)
Dept: CARDIOLOGY CLINIC | Age: 70
End: 2020-12-07

## 2020-12-10 RX ORDER — METOPROLOL SUCCINATE 25 MG/1
25 TABLET, EXTENDED RELEASE ORAL DAILY
Qty: 90 TAB | Refills: 0 | Status: SHIPPED | OUTPATIENT
Start: 2020-12-10 | End: 2021-01-21 | Stop reason: SDUPTHER

## 2020-12-10 NOTE — TELEPHONE ENCOUNTER
Refill per VO of Dr. Damaso Gonzalez:  Last appt: 2/21/2020  Future Appointments   Date Time Provider Inge Reaves   12/17/2020  9:15 AM PACEMAKER, TAPAN ANTUNEZ BS AMB   1/21/2021 10:00 AM Deirdre Shahid MD [facility] BS AMB   2/24/2021 10:20 [facility]   2/24/2021 10:40 AM Rebel, Camdenr Canavan, MD [facility] AMB   5/27/2021 12:15 PM [place] [id number], [facility] BS AMB       Requested Prescriptions     Pending Prescriptions Disp Refills    metoprolol succinate (TOPROL-XL) 50 mg XL tablet [Pharmacy Med Name: METOPROLO ER TAB SUC 50MG] 90 Tab 3     Sig: TAKE 1 TABLET DAILY

## 2020-12-17 ENCOUNTER — CLINICAL SUPPORT (OUTPATIENT)
Dept: CARDIOLOGY CLINIC | Age: 70
End: 2020-12-17

## 2020-12-17 DIAGNOSIS — Z95.0 CARDIAC PACEMAKER IN SITU: Primary | ICD-10-CM

## 2020-12-17 NOTE — PROGRESS NOTES
nc/MDT dc pacer ck/threshold/rate lowered to 70 from 80 (post AVN abl). Normal device check. Lower rate limit decreased from 80 to 70 ppm.  Pt. Having difficulty with dizziness & nausea occasionally since implant- feels it's her thyroid. EGMs show AS- in the 80's after rate drop. See scanned document for details.

## 2020-12-22 ENCOUNTER — TELEPHONE (OUTPATIENT)
Dept: CARDIOLOGY CLINIC | Age: 70
End: 2020-12-22

## 2020-12-22 NOTE — TELEPHONE ENCOUNTER
Pt. Called c/o that since having her heart rate lowered from 80 to 70 ppm she has been experiencing jaw pain that has minimally subsided. Denies chest pain/arm pain but does admit to SOB. Instructed pt. To notify her cardiologist - Dr. Khushboo Conroy as this type of symptom is NOT related to lowering of heart rate.

## 2020-12-28 ENCOUNTER — TRANSCRIBE ORDER (OUTPATIENT)
Dept: SCHEDULING | Age: 70
End: 2020-12-28

## 2020-12-28 DIAGNOSIS — Z12.31 VISIT FOR SCREENING MAMMOGRAM: Primary | ICD-10-CM

## 2021-01-21 ENCOUNTER — OFFICE VISIT (OUTPATIENT)
Dept: CARDIOLOGY CLINIC | Age: 71
End: 2021-01-21
Payer: MEDICARE

## 2021-01-21 VITALS
DIASTOLIC BLOOD PRESSURE: 82 MMHG | HEART RATE: 87 BPM | OXYGEN SATURATION: 98 % | BODY MASS INDEX: 31.1 KG/M2 | WEIGHT: 169 LBS | SYSTOLIC BLOOD PRESSURE: 118 MMHG | HEIGHT: 62 IN

## 2021-01-21 DIAGNOSIS — I50.32 CHRONIC DIASTOLIC HEART FAILURE (HCC): Primary | ICD-10-CM

## 2021-01-21 DIAGNOSIS — I48.0 PAROXYSMAL ATRIAL FIBRILLATION (HCC): ICD-10-CM

## 2021-01-21 DIAGNOSIS — I25.118 CORONARY ARTERY DISEASE OF NATIVE ARTERY OF NATIVE HEART WITH STABLE ANGINA PECTORIS (HCC): ICD-10-CM

## 2021-01-21 PROCEDURE — G9899 SCRN MAM PERF RSLTS DOC: HCPCS | Performed by: SPECIALIST

## 2021-01-21 PROCEDURE — G8536 NO DOC ELDER MAL SCRN: HCPCS | Performed by: SPECIALIST

## 2021-01-21 PROCEDURE — 99215 OFFICE O/P EST HI 40 MIN: CPT | Performed by: SPECIALIST

## 2021-01-21 PROCEDURE — G8417 CALC BMI ABV UP PARAM F/U: HCPCS | Performed by: SPECIALIST

## 2021-01-21 PROCEDURE — G8399 PT W/DXA RESULTS DOCUMENT: HCPCS | Performed by: SPECIALIST

## 2021-01-21 PROCEDURE — 3017F COLORECTAL CA SCREEN DOC REV: CPT | Performed by: SPECIALIST

## 2021-01-21 PROCEDURE — G8427 DOCREV CUR MEDS BY ELIG CLIN: HCPCS | Performed by: SPECIALIST

## 2021-01-21 PROCEDURE — G0463 HOSPITAL OUTPT CLINIC VISIT: HCPCS | Performed by: SPECIALIST

## 2021-01-21 PROCEDURE — G8752 SYS BP LESS 140: HCPCS | Performed by: SPECIALIST

## 2021-01-21 PROCEDURE — G8432 DEP SCR NOT DOC, RNG: HCPCS | Performed by: SPECIALIST

## 2021-01-21 PROCEDURE — 1090F PRES/ABSN URINE INCON ASSESS: CPT | Performed by: SPECIALIST

## 2021-01-21 PROCEDURE — G8754 DIAS BP LESS 90: HCPCS | Performed by: SPECIALIST

## 2021-01-21 PROCEDURE — 1101F PT FALLS ASSESS-DOCD LE1/YR: CPT | Performed by: SPECIALIST

## 2021-01-21 RX ORDER — NITROGLYCERIN 20 MG/1
1 PATCH TRANSDERMAL DAILY
Qty: 90 PATCH | Refills: 3 | Status: SHIPPED | OUTPATIENT
Start: 2021-01-21 | End: 2021-08-03 | Stop reason: SDUPTHER

## 2021-01-21 RX ORDER — ETANERCEPT 50 MG/ML
50 SOLUTION SUBCUTANEOUS
Status: ON HOLD | COMMUNITY
End: 2021-05-30

## 2021-01-21 RX ORDER — METOPROLOL SUCCINATE 50 MG/1
50 TABLET, EXTENDED RELEASE ORAL DAILY
Qty: 90 TAB | Refills: 3 | Status: SHIPPED | OUTPATIENT
Start: 2021-01-21 | End: 2021-02-18 | Stop reason: SDUPTHER

## 2021-01-21 NOTE — PROGRESS NOTES
Vinay Avalos MD. [facility]              Patient: Alba Holter  : 1950      Today's Date: 2021          HISTORY OF PRESENT ILLNESS:     History of Present Illness:  She had PPM/AV node ablation in . She had some jaw pain after pacer rate turned down from 80 to 70 bpm -- although rate responsive so HR does go to 114 bpm.  She does have CHACON. She seemed to do better but then a little worse. A couple of night a week can't work. Overall she feels much stronger after her pacer. Has chronic chest pain walking - goes away quickly.           PAST MEDICAL HISTORY:     Past Medical History:   Diagnosis Date    Adverse effect of anesthesia     slow to wake up    Arthritis     Asthma 2009    CAUSED BY MOLD    Atrial fibrillation ([address] 2009    Atrial flutter ([address]     CAD (coronary artery disease) 2009    Celiac disease 2010    Chronic chest pain     Chronic obstructive pulmonary disease ([address] -    right leg    Chronic pain of right ankle     Diabetes ([address]     Drug induced prednisone, DM2    Diastolic heart failure ([address]     DVT (deep venous thrombosis) ([address]     Right leg post surgery    GERD (gastroesophageal reflux disease)     Gluten intolerance     Heart failure ([address]     Hypertension     Hypothyroidism 2009    Iron deficiency anemia     Lyme disease     Pacemaker         Personal history of MI (myocardial infarction)     Rheumatoid arthritis ([address]     S/P AV jules ablation          S/P left atrial appendage ligation     SUHAS clip 10/20     Slow to wake up after anesthesia     Syncope     Post testing- resolved    TIA (transient ischemic attack)  &     Vitamin B12 deficiency 2009       Past Surgical History:   Procedure Laterality Date    HX ADENOIDECTOMY      HX AFIB ABLATION      HX APPENDECTOMY      HX CHOLECYSTECTOMY  11    HX COLONOSCOPY      HX CORONARY STENT PLACEMENT      x 2    HX HEART CATHETERIZATION  2010    2 STENTS    HX HEART CATHETERIZATION  03/2020    HX HYSTERECTOMY      HX LUMBAR LAMINECTOMY  2000    L4-L5    HX ORTHOPAEDIC  1993-6/2012    RT. FOOT SURGERY X 6/calcaneal osteotomy    HX ORTHOPAEDIC Right 09/19/2020    right hand [facility] joint replacement    HX TONSILLECTOMY  1964    NH ICAR CATHETER ABLATION ATRIOVENTR NODE FUNCTION N/A 11/19/2020    ABLATION AV NODE performed by Eric Majano MD at [address] CATH LAB    NH INS NEW/RPLCMT PRM PM W/TRANSV ELTRD ATRIAL&VENT N/A 11/19/2020    INSERT PPM DUAL performed by Eric Majano MD at [address] CATH LAB    NH INTRACARDIAC ELECTROPHYSIOLOGIC 3D MAPPING N/A 11/19/2020    Ep 3d Mapping performed by Eric Majano MD at [address] [facility] CATH LAB         MEDICATIONS:     Current Outpatient Medications   Medication Sig Dispense Refill    etanercept (EnbreL SureClick) 50 mg/mL (1 mL) injection 50 mg by SubCUTAneous route every seven (7) days.  metoprolol succinate (TOPROL-XL) 50 mg XL tablet Take 1 Tab by mouth daily. 90 Tab 3    nitroglycerin (NITRODUR) 0.1 mg/hr 1 Patch by TransDERmal route daily. 90 Patch 3    lidocaine (Lidoderm) 5 % 1 Patch by TransDERmal route every twenty-four (24) hours. Apply patch to the affected area for 12 hours a day and remove for 12 hours a day. 1 Each 0    gabapentin (NEURONTIN) 100 mg capsule Take 1 Cap by mouth two (2) times a day. Max Daily Amount: 200 mg. 60 Cap 0    predniSONE (DELTASONE) 2.5 mg tablet Take 1 Tab by mouth daily (after lunch). 5 mg in morning and 2.5 mg afternoon  Resume your usual dose of prednisone after you complete the prednisone taper 30 Tab 0    acetaminophen (TYLENOL) 650 mg TbER Take 1,300 mg by mouth two (2) times a day.  coenzyme Q-10 (Co Q-10) 200 mg capsule Take 200 mg by mouth daily.  artificial tears, dextran 70-hypromellose, (GenTeal Tears Mild) 0.1-0.3 % ophthalmic solution Administer 1 Drop to both eyes nightly.       carboxymethylcell/hypromellose (GENTEAL GEL OP) Administer 1 Drop to both eyes as needed (dry eye).  omega 3-DHA-EPA-fish oil 1,000 mg (120 mg-180 mg) capsule Take 1 Cap by mouth every evening.  levalbuterol (XOPENEX) 0.63 mg/3 mL nebu 0.63 mg by Nebulization route four (4) times daily.  aclidinium bromide (Tudorza Pressair) 400 mcg/actuation inhaler Take 1 Puff by inhalation daily.  calcium citrate-vitamin d3 (CITRACAL+D) 315 mg-5 mcg (200 unit) tab Take 1 Tab by mouth daily (with breakfast).  mometasone furoate (ASMANEX HFA IN) Take 100 mcg by inhalation two (2) times a day.  potassium 99 mg tablet Take 99 mg by mouth daily.  insulin glargine (LANTUS,BASAGLAR) 100 unit/mL (3 mL) inpn 8 Units by SubCUTAneous route Daily (before breakfast). 30 minutes before breakfast      denosumab (PROLIA SC) by SubCUTAneous route every 6 months.  bumetanide (BUMEX) 1 mg tablet Take 1 mg by mouth daily.  eplerenone (INSPRA) 25 mg tablet Take 25 mg by mouth Daily (before lunch).  fenofibrate nanocrystallized (Tricor) 48 mg tablet Take 48 mg by mouth nightly.  levothyroxine (SYNTHROID) 150 mcg tablet Take 150 mcg by mouth Daily (before breakfast).  predniSONE (DELTASONE) 5 mg tablet Take 5 mg by mouth daily. 5 mg in morning and 2.5 mg afternoon      cholecalciferol, vitamin D3, (VITAMIN D3) 2,000 unit tab Take 2,000 Units by mouth daily.  turmeric (CURCUMIN) Take 1 g by mouth every evening.  SITagliptin (JANUVIA) 100 mg tablet Take 100 mg by mouth daily (with dinner).  vit C/vit E ac/lut/copper/zinc (PRESERVISION LUTEIN PO) Take 1 Tab by mouth every Monday. Takes with dinner      ascorbic acid (VITAMIN C) 500 mg tablet Take 500 mg by mouth daily.  ferrous sulfate 325 mg (65 mg iron) tablet Take 325 mg by mouth daily (with lunch).       cyanocobalamin (VITAMIN B12) 1,000 mcg/mL injection INJECT 1 ML INTO THE MUSCLE EVERY 30 DAYS 10 mL 0    lansoprazole (PREVACID) 30 mg capsule Take 30 mg by mouth daily.          Allergies   Allergen Reactions    Butter Flavor Anaphylaxis     Butter AND CREME    Keflex [Cephalexin] Anaphylaxis    Milk Containing Products Anaphylaxis     Butter and cream    Pcn [Penicillins] Anaphylaxis    Aspirin Hives and Other (comments)     \"it makes my stomach bleed\"      Cimzia [Certolizumab Pegol] Other (comments)     GI symptoms, blisters in the mouth and esophagus    Clopidogrel Other (comments)     GI bleed    Demerol [Meperidine] Other (comments)     HYPOTENSION    Fentanyl Other (comments)     HYPOTENSION    Ibuprofen Diarrhea     Patient reports that ibuprofen caused diarrhea    Morphine Other (comments)     HYPOTENSION    Nitroglycerin Other (comments)     IN PILL FORM  - HYPOTENSION  CAN TOLERATE PATCH FOR SHORT TIME    Sulfa (Sulfonamide Antibiotics) Angioedema     Lips    Tapazole [Methimazole] Unknown (comments)     Patient stated \"it made me feel like I had the flu\"    Tramadol Other (comments)     Patient reports thrush with tramadol use    Adhesive Tape-Silicones Other (comments)     BAD BLISTERS AND TENDS TO GET INFECTED    Albuterol Palpitations    Gluten Diarrhea     bloating    Oxycodone Other (comments)     Hallicination and hypotension           SOCIAL HISTORY:     Social History     Tobacco Use    Smoking status: Former Smoker     Packs/day: 1.00     Years: 30.00     Pack years: 30.00     Types: Cigarettes     Quit date: 2002     Years since quittin.6    Smokeless tobacco: Never Used   Substance Use Topics    Alcohol use: No    Drug use: Never         FAMILY HISTORY:     Family History   Problem Relation Age of Onset    Delayed Awakening Mother     Heart Disease Mother     Coronary Artery Disease Mother     Hypertension Mother     Stroke Mother     Delayed Awakening Sister     Hypertension Sister     Lung Disease Father     Cancer Father         colon    Hypertension Father    • Hypertension Brother    • Breast Cancer Maternal Aunt    • Breast Cancer Maternal Aunt    • Breast Cancer Cousin         maternal 1st cousin   • Breast Cancer Maternal Aunt    • Breast Cancer Cousin         maternal 1st cousin   • Breast Cancer Cousin         maternal 1st cousin   • Deep Vein Thrombosis Neg Hx    • Anesth Problems Neg Hx             REVIEW OF SYMPTOMS:      Review of Symptoms:  Constitutional: Negative for fever, chills  HEENT: Negative for nosebleeds, tinnitus  Respiratory: + CHACON   Cardiovascular: Negative for syncope;  + CHACON   Gastrointestinal: Negative for abdominal pain, diarrhea, melena.   Genitourinary: Negative for dysuria  Musculoskeletal: + joint pain, RA  Skin: Negative for rash  Heme: No acute bleeding   Neurological: Negative for speech change and focal weakness.                PHYSICAL EXAM:     Physical Exam:  Visit Vitals  /82 (BP 1 Location: Left arm, BP Patient Position: Sitting)   Pulse 87   Ht 5' 2\" (1.575 m)   Wt 169 lb (76.7 kg)   LMP 09/29/1980 (LMP Unknown)   SpO2 98%   BMI 30.91 kg/m²     Patient appears generally well, mood and affect are appropriate and pleasant.  HEENT:  Hearing intact, non-icteric, normocephalic, atraumatic.   Neck Exam: Supple, No JVD or carotid bruits.   Lung Exam: Clear to auscultation, even breath sounds.   Cardiac Exam: Regular rate and rhythm with no murmur or rub  Abdomen: Soft, non-tender, normal bowel sounds. Obese .  Extremities: Moves all ext well. No lower extremity edema.  MSKTL: Overall good ROM ext  Skin: No significant rashes  Psych: Appropriate affect  Neuro - Grossly intact                LABS / OTHER STUDIES:      Lab Results   Component Value Date/Time    Sodium 143 11/17/2020 03:26 AM    Potassium 3.9 11/17/2020 03:26 AM    Chloride 111 (H) 11/17/2020 03:26 AM    CO2 25 11/17/2020 03:26 AM    Anion gap 7 11/17/2020 03:26 AM    Glucose 149 (H) 11/17/2020 03:26 AM    BUN 10 11/17/2020 03:26 AM    Creatinine 0.71  11/17/2020 03:26 AM    BUN/Creatinine ratio 14 11/17/2020 03:26 AM    GFR est AA >60 11/17/2020 03:26 AM    GFR est non-AA >60 11/17/2020 03:26 AM    Calcium 8.4 (L) 11/17/2020 03:26 AM    Bilirubin, total 0.5 11/14/2020 03:04 AM    Alk. phosphatase 61 11/14/2020 03:04 AM    Protein, total 5.1 (L) 11/14/2020 03:04 AM    Albumin 3.0 (L) 11/16/2020 04:40 AM    Globulin 2.3 11/14/2020 03:04 AM    A-G Ratio 1.2 11/14/2020 03:04 AM    ALT (SGPT) 20 11/14/2020 03:04 AM    AST (SGOT) 16 11/14/2020 03:04 AM     Lab Results   Component Value Date/Time    WBC 13.7 (H) 11/16/2020 04:40 AM    Hemoglobin (POC) 15.6 06/09/2014 03:00 AM    HGB 13.4 11/16/2020 04:40 AM    Hematocrit (POC) 49 (H) 11/05/2020 09:33 PM    HCT 41.9 11/16/2020 04:40 AM    PLATELET 631 89/66/9876 04:40 AM    MCV 96.5 11/16/2020 04:40 AM     Lab Results   Component Value Date/Time    Cholesterol, total 205 (H) 11/15/2020 06:50 AM    HDL Cholesterol 46 11/15/2020 06:50 AM    LDL,Direct 88 11/15/2020 06:50 AM    LDL, calculated Not calculated due to elevated triglyceride level 11/15/2020 06:50 AM    VLDL, calculated  11/15/2020 06:50 AM     Calculation not valid with this patient's other Lipid values. Triglyceride 514 (H) 11/15/2020 06:50 AM    CHOL/HDL Ratio 4.5 11/15/2020 06:50 AM          CARDIAC DIAGNOSTICS:      Cardiac Evaluation Includes:     Cardiac Cath 6/09 - severe mLCX disease --> stenting     Cath 8/19/10 - RCA and LCX stents patent; MLI in LAD, LVEF 60%     Event Monitor 3/11 - PVC's  Holter 3/16 - PVC's  Echo 3/17 - LVEF 60%  Lexiscan Cardiolite 3/17 - normal MPI, LVEF 58%     [facility] records reviewed.  Went there for chest pain on 1/3/19.    Labs 1/3/19 - Trop < 0.02, BNP 18, Cr 0.61, Hgb 13  Cath 1/4/19 - LAD stent patent, LCX without sig disease, RCA stent patent.       Holter 1/23/19 - NSR, normal study      Stress Echo 2/11/19 - walked 3:21 (2.4 METS), baseline /90, Max /90, normal stress EKG and stress echo      CXR 2/22/19 - normal      Echo 3/21/19 - LVEF 61%; grade 1 diastology (normal for age), AV sclerosis.  RVSP 26 mmHg.       PARDEEP's with exercise 6/7/19 - normal       Event Monitor 5/15/19-6/6/19 - normal study; symptoms correlated with sinus      Right Heart Cath 6/28/19 -[facility],[address] findings:   RAP=   Mean 17    mmHg  RVSP= 40/20    mmHg  PAP=     42/30/34  mmHg  PCWP=  27 mmHg  CO=        Thermal 4.1  L/min,             Faraz pending  CI=           Thermal 3.08  L/min/m2,     Faraz pending      Findings:  1.   Elevated right and left sided filling pressure  2.   Pulmonary hypertension, WHO group 2  3.   Perserved cardiac index by Thermal     Echo 8/13/19 - LVEF 50-55%, \"The following segments are hypokinetic: basal inferior, basal inferolateral and mid inferolateral\"     Cardiac Cath 8/13/19 - Non-obstructive CAD with patent stents in LCX and RCA.  LVEDP 16 mmHg.       Cardiac MRI 9/30/19 - 1. Normal left ventricular cavity size with preserved left ventricular systolic function. There is no significant regional wall motion abnormalities. LVEF 60%. 2. Normal right ventricular size and systolic motion. RVEF 60%. 3. Trace to mild mitral regurgitation. 4. On EGE and LGE study, there is a small thin subendocardial infarct involving 25% to 50% thickness of the subendocardial wall of the basal inferior wall. This infarct is in RCA territory. There is reasonable viability surrounding this infarct. The mid to distal inferior wall and inferoseptal wall does not demonstrate any infarct. There are no other infarct. There is no features of infiltrative sarcoidosis or cardiac amyloidosis. There is no features of inflammatory myocarditis. All other myocardial walls are otherwise completely viable.  There is no features of hemochromatosis.  5. Normal pericardium without significant pericardial effusion.      Venous Doppler 9/9/19 - No DVT Bilat      Lexiscan Cardiolite 12/9/19 - Normal MPI, LVEF 66%     PFT's 1/14/20 - Spirometry reveals severe airflow obstruction with moderate reduction in forced vital capacity which may be due to a restrictive ventilatory defect or air trapping     COY 10/15/20 - normal, LVEF 55%    Surgical SUHAS clip 10/20/20 Video-assisted thoracoscopic placement of left atrial appendage clip    Echo 10/29/20 - LVEF 55-60%, PASP 41 mmHg     Echo 11/3/20 - LVEF 55-60%    Lexiscan Cardiolite 11/5/20 - Normal MPI, LVEF 64%    Dual PPM / AV node ablation 11/19/20         EKG 2/22/19 - NSR, normal   EKG 8/12/19 - NSR, NSST changes   EKG 11/4/20 - Atrial flutter with RVR           ASSESSMENT AND PLAN:      Assessment and Plan:  1) Chronic Chest pain and SOB   - She had CAD with prior stenting in RCA and LCX (2009 ? )   - She notes CP/SOB symptoms since Jan 2019   -10 [facility] cath 1/19 showed patent vessels. - She saw Pulmonary ([facility]) and she says workup was OK.    - Cardiac cath repeated 8/13/19 showed non-obs CAD with LVEDP 16 mmHg  - Cardiac MRI 9/30/19 - There is no features of infiltrative sarcoidosis or cardiac amyloidosis.   - CardioMems was considered but due to problem with blood thinners she declined proceeding with one   - PFTs with severe airflow obstruction with no improvement following bronchodilators  - DLCO corrected to normal when considering alveolar volume  - No evidence of mold toxicity by Dr. Balwinder Tubbs  - Completed therapy for Lyme Disease  - Follow up with Dr. Justina Stahl  - Her symptoms could be due to small vessel disease and/or diastolic dysfunction (see below)     2) HFpEF - NYHA Class III  - RHC with PCWP 27 mmHg on 6/19   - SPEP - no M spike  - Iron profile - normal  - ATTR negative  - On bumex, eplerenone, metoprolol  - She takes Bumex 1 in am and 0.5 in PM   - volume status seems OK     3) CAD s/p PCI to LAD , RCA  - [place] RETREAT in 8/19 - no intervention  - Lexiscan Cardiolite 11/5/20 - Normal MPI, LVEF 64%  - Intolerant to statins d/t myalgias  - ASA intolerant d/t GI bleeding  - Developed plavix induced thrombocytopenia - advised by hematology to avoid antiplatelets  - has chronic chest pain (maybe from small vessel disease)   - On 1/21/21 she returns with chronic chest pain ---> Increase Toprol XL to 50 mg daily and have her try to use NTG patch daily     4) History of Iron Deficiency Anemia   - She takes no blood thinners (not even aspirin) due to anemia in past   - Dr. Mohamud Orts record 12/16 reviewed - He was seeing her for iron deficiency anemia thought to be from GI Bleed from aspirin and plavix      5) Lipids -   - Does not take a statin due to having muscle aches (from RA at baseline)  - Tried to get her on PSCK9 Inhibitor in past bur she is not taking one   - She is now only on Tricor     6) Atrial flutter with RVR 11/20   - Dual PPM / AV node ablation 11/19/20   - not on any blood thinners due to problems with bleeding   - Surgical SUHAS clip 10/20/20      7) HTN  - see above      8) RA  - she is on prednisone     9) History of Tick Bite and RMSF - [place]  - Follow up with Dr. Mariana Nunez     10) See me in 2 months.   Patient expressed understanding of the plan - questions were answered. On a side note, I see her brother.             Cecille Cool MD, [address], [address]      [id number] [address], [address] [place] [address]  Ph: [phone]                               [phone]       ADDENDUM   1/28/2021  Labs 1/19/21 - A1c 7.2, CMP OK, TSH 0.29, T4 14 (H),

## 2021-01-21 NOTE — PROGRESS NOTES
Dashawn Rai is a 66 y.o. female    Visit Vitals  /82 (BP 1 Location: Left arm, BP Patient Position: Sitting)   Pulse 87   Ht 5' 2\" (1.575 m)   Wt 169 lb (76.7 kg)   LMP 09/29/1980 (LMP Unknown)   SpO2 98%   BMI 30.91 kg/m²       Chief Complaint   Patient presents with    Other     PM    Coronary Artery Disease    Other     DLD    Irregular Heart Beat     PAF    Hypertension    CHF       Chest pain YES  SOB YES  Dizziness NO  Swelling YES  Recent hospital visit NO  Refills NO

## 2021-02-15 ENCOUNTER — TELEPHONE (OUTPATIENT)
Dept: CARDIOTHORACIC SURGERY | Age: 71
End: 2021-02-15

## 2021-02-16 ENCOUNTER — TRANSCRIBE ORDER (OUTPATIENT)
Dept: SCHEDULING | Age: 71
End: 2021-02-16

## 2021-02-16 DIAGNOSIS — E05.00 GRAVES DISEASE: ICD-10-CM

## 2021-02-16 DIAGNOSIS — H53.2 DIPLOPIA: Primary | ICD-10-CM

## 2021-02-18 RX ORDER — METOPROLOL SUCCINATE 50 MG/1
50 TABLET, EXTENDED RELEASE ORAL DAILY
Qty: 90 TAB | Refills: 3 | Status: SHIPPED | OUTPATIENT
Start: 2021-02-18 | End: 2022-01-14

## 2021-02-22 NOTE — PROGRESS NOTES
HISTORY OF PRESENTING ILLNESS      Verna Brown is a 66 y.o. female with hx of paroxysmal arial fibrillation (s/p ablation x10 years ago with Dr. Magdy Mederos), previous history of thrombocytopenia, hypertension, COPD, heart failure preserved EF, CAD, LA ligation, apcemaker s/p AV node ablation. She experienced chest pain/palpitations post procedure and presented to the ER. Previous cardiac catheterization and stress test in 2019 were negative for ischemia and no significant coronary disease and stents were open. She had normal stress testing 11/5/2020 due to peaked troponin level. Echocardiogram demonstrated preserved LV function of 55-60%. Device interrogation reveals normal device functioning; patient remains in complete heart block.   She reports shortness of breath and fatigue that she noticed had improved following her procedure however her symptoms recurred after her lower rate was changed from 80 down to 70 bpm.       PAST MEDICAL HISTORY     Past Medical History:   Diagnosis Date    Adverse effect of anesthesia     slow to wake up    Arthritis     Asthma 6/29/2009    CAUSED BY MOLD    Atrial fibrillation ([address] 6/29/2009    Atrial flutter ([address]     CAD (coronary artery disease) 06/29/2009    Celiac disease 2010    Chronic chest pain     Chronic obstructive pulmonary disease ([address] 2004-5    right leg    Chronic pain of right ankle     Diabetes ([address]     Drug induced prednisone, DM2    Diastolic heart failure ([address]     DVT (deep venous thrombosis) ([address] 2004    Right leg post surgery    GERD (gastroesophageal reflux disease)     Gluten intolerance     Heart failure ([address]     Hypertension     Hypothyroidism 6/29/2009    Iron deficiency anemia     Lyme disease     Pacemaker     11/20    Personal history of MI (myocardial infarction)     Rheumatoid arthritis ([address]     S/P AV jules ablation     11/20     S/P left atrial appendage ligation     SUHAS clip 10/20     Slow to wake up after anesthesia     Syncope     Post testing- resolved    TIA (transient ischemic attack) 2004 & 2006    Vitamin B12 deficiency 6/29/2009           PAST SURGICAL HISTORY     Past Surgical History:   Procedure Laterality Date    HX ADENOIDECTOMY      HX AFIB ABLATION      HX APPENDECTOMY      HX CHOLECYSTECTOMY  6/16/11    HX COLONOSCOPY      HX CORONARY STENT PLACEMENT      x 2    HX HEART CATHETERIZATION  2010    2 STENTS    HX HEART CATHETERIZATION  03/2020    HX HYSTERECTOMY      HX LUMBAR LAMINECTOMY  2000    L4-L5    HX ORTHOPAEDIC  1993-6/2012    RT.  FOOT SURGERY X 6/calcaneal osteotomy    HX ORTHOPAEDIC Right 09/19/2020    right hand [facility] joint replacement    HX TONSILLECTOMY  1964    MD ICAR CATHETER ABLATION ATRIOVENTR NODE FUNCTION N/A 11/19/2020    ABLATION AV NODE performed by Sheryle Grade, MD at [address], [facility] CATH LAB    MD INS NEW/RPLCMT PRM PM W/TRANSV ELTRD ATRIAL&VENT N/A 11/19/2020    INSERT PPM DUAL performed by Sheryle Grade, MD at [address], [facility] CATH LAB    MD INTRACARDIAC ELECTROPHYSIOLOGIC 3D MAPPING N/A 11/19/2020    Ep 3d Mapping performed by Sheryle Grade, MD at [address] [facility] [address] CATH LAB          ALLERGIES     Allergies   Allergen Reactions    Butter Flavor Anaphylaxis     Butter AND CREME    Keflex [Cephalexin] Anaphylaxis    Milk Containing Products Anaphylaxis     Butter and cream    Pcn [Penicillins] Anaphylaxis    Aspirin Hives and Other (comments)     \"it makes my stomach bleed\"      Cimzia [Certolizumab Pegol] Other (comments)     GI symptoms, blisters in the mouth and esophagus    Clopidogrel Other (comments)     GI bleed    Demerol [Meperidine] Other (comments)     HYPOTENSION    Fentanyl Other (comments)     HYPOTENSION    Ibuprofen Diarrhea     Patient reports that ibuprofen caused diarrhea    Morphine Other (comments)     HYPOTENSION    Nitroglycerin Other (comments)     IN PILL FORM  - HYPOTENSION  CAN TOLERATE PATCH FOR SHORT TIME    Sulfa (Sulfonamide Antibiotics) Angioedema     Lips    Tapazole [Methimazole] Unknown (comments)     Patient stated \"it made me feel like I had the flu\"    Tramadol Other (comments)     Patient reports thrush with tramadol use    Adhesive Tape-Silicones Other (comments)     BAD BLISTERS AND TENDS TO GET INFECTED    Albuterol Palpitations    Gluten Diarrhea     bloating    Oxycodone Other (comments)     Hallicination and hypotension          FAMILY HISTORY     Family History   Problem Relation Age of Onset    Delayed Awakening Mother     Heart Disease Mother     Coronary Artery Disease Mother     Hypertension Mother     Stroke Mother     Delayed Awakening Sister     Hypertension Sister     Lung Disease Father     Cancer Father         colon    Hypertension Father     Hypertension Brother     Breast Cancer Maternal Aunt     Breast Cancer Maternal Aunt     Breast Cancer Cousin         maternal 1st cousin   [place] Breast Cancer Maternal Aunt     Breast Cancer Cousin         maternal 1st cousin   [place] Breast Cancer Cousin         maternal 1st cousin    Deep Vein Thrombosis Neg Hx     Anesth Problems Neg Hx     negative for cardiac disease       SOCIAL HISTORY     Social History     Socioeconomic History    Marital status:      Spouse name: Not on file    Number of children: Not on file    Years of education: Not on file    Highest education level: Not on file   Tobacco Use    Smoking status: Former Smoker     Packs/day: 1.00     Years: 30.00     Pack years: 30.00     Types: Cigarettes     Quit date: 2002     Years since quittin.7    Smokeless tobacco: Never Used   Substance and Sexual Activity    Alcohol use: No    Drug use: Never    Sexual activity: Yes     Partners: Male         MEDICATIONS     Current Outpatient Medications   Medication Sig    metoprolol succinate (TOPROL-XL) 50 mg XL tablet Take 1 Tab by mouth daily.     etanercept (EnbreL SureClick) 50 mg/mL (1 mL) injection 50 mg by SubCUTAneous route every seven (7) days.  nitroglycerin (NITRODUR) 0.1 mg/hr 1 Patch by TransDERmal route daily.  lidocaine (Lidoderm) 5 % 1 Patch by TransDERmal route every twenty-four (24) hours. Apply patch to the affected area for 12 hours a day and remove for 12 hours a day.  gabapentin (NEURONTIN) 100 mg capsule Take 1 Cap by mouth two (2) times a day. Max Daily Amount: 200 mg.  predniSONE (DELTASONE) 2.5 mg tablet Take 1 Tab by mouth daily (after lunch). 5 mg in morning and 2.5 mg afternoon  Resume your usual dose of prednisone after you complete the prednisone taper    acetaminophen (TYLENOL) 650 mg TbER Take 1,300 mg by mouth two (2) times a day.  coenzyme Q-10 (Co Q-10) 200 mg capsule Take 200 mg by mouth daily.  artificial tears, dextran 70-hypromellose, (GenTeal Tears Mild) 0.1-0.3 % ophthalmic solution Administer 1 Drop to both eyes nightly.  carboxymethylcell/hypromellose (GENTEAL GEL OP) Administer 1 Drop to both eyes as needed (dry eye).  omega 3-DHA-EPA-fish oil 1,000 mg (120 mg-180 mg) capsule Take 1 Cap by mouth every evening.  levalbuterol (XOPENEX) 0.63 mg/3 mL nebu 0.63 mg by Nebulization route four (4) times daily.  aclidinium bromide (Tudorza Pressair) 400 mcg/actuation inhaler Take 1 Puff by inhalation daily.  calcium citrate-vitamin d3 (CITRACAL+D) 315 mg-5 mcg (200 unit) tab Take 1 Tab by mouth daily (with breakfast).  mometasone furoate (ASMANEX HFA IN) Take 100 mcg by inhalation two (2) times a day.  potassium 99 mg tablet Take 99 mg by mouth daily.  insulin glargine (LANTUS,BASAGLAR) 100 unit/mL (3 mL) inpn 8 Units by SubCUTAneous route Daily (before breakfast). 30 minutes before breakfast    denosumab (PROLIA SC) by SubCUTAneous route every 6 months.  bumetanide (BUMEX) 1 mg tablet Take 1 mg by mouth daily.  eplerenone (INSPRA) 25 mg tablet Take 25 mg by mouth Daily (before lunch).     fenofibrate nanocrystallized (Tricor) 48 mg tablet Take 48 mg by mouth nightly.  levothyroxine (SYNTHROID) 150 mcg tablet Take 150 mcg by mouth Daily (before breakfast).  predniSONE (DELTASONE) 5 mg tablet Take 5 mg by mouth daily. 5 mg in morning and 2.5 mg afternoon    cholecalciferol, vitamin D3, (VITAMIN D3) 2,000 unit tab Take 2,000 Units by mouth daily.  turmeric (CURCUMIN) Take 1 g by mouth every evening.  SITagliptin (JANUVIA) 100 mg tablet Take 100 mg by mouth daily (with dinner).  vit C/vit E ac/lut/copper/zinc (PRESERVISION LUTEIN PO) Take 1 Tab by mouth every Monday. Takes with dinner    ascorbic acid (VITAMIN C) 500 mg tablet Take 500 mg by mouth daily.  ferrous sulfate 325 mg (65 mg iron) tablet Take 325 mg by mouth daily (with lunch).  cyanocobalamin (VITAMIN B12) 1,000 mcg/mL injection INJECT 1 ML INTO THE MUSCLE EVERY 30 DAYS    lansoprazole (PREVACID) 30 mg capsule Take 30 mg by mouth daily. No current facility-administered medications for this visit. I have reviewed the nurses notes, vitals, problem list, allergy list, medical history, family, social history and medications. REVIEW OF SYMPTOMS      General: Pt denies excessive weight gain or loss. Pt is able to conduct ADL's  HEENT: Denies blurred vision, headaches, hearing loss, epistaxis and difficulty swallowing. Respiratory: Denies cough, congestion, shortness of breath, CHACON, wheezing or stridor. Cardiovascular: Denies precordial pain, palpitations, edema or PND  Gastrointestinal: Denies poor appetite, indigestion, abdominal pain or blood in stool  Genitourinary: Denies hematuria, dysuria, increased urinary frequency  Musculoskeletal: Denies joint pain or swelling from muscles or joints  Neurologic: Denies tremor, paresthesias, headache, or sensory motor disturbance  Psychiatric: Denies confusion, insomnia, depression  Integumentray: Denies rash, itching or ulcers.   Hematologic: Denies easy bruising, bleeding       PHYSICAL EXAMINATION Vitals: see vitals section  General: Well developed, in no acute distress. HEENT: No jaundice, oral mucosa moist, no oral ulcers  Neck: Supple, no stiffness, no lymphadenopathy, supple  Heart:  Normal S1/S2 negative S3 or S4. Regular, no murmur, gallop or rub, no jugular venous distention  Respiratory: Clear bilaterally x 4, no wheezing or rales  Abdomen:   Soft, non-tender, bowel sounds are active. Extremities:  No edema, normal cap refill, no cyanosis. Musculoskeletal: No clubbing, no deformities  Neuro: A&Ox3, speech clear, gait stable, cooperative, no focal neurologic deficits  Skin: Skin color is normal. No rashes or lesions. Non diaphoretic, moist.  Vascular: 2+ pulses symmetric in all extremities       DIAGNOSTIC DATA      EKG:        LABORATORY DATA      Lab Results   Component Value Date/Time    WBC 13.7 (H) 11/16/2020 04:40 AM    Hemoglobin (POC) 15.6 06/09/2014 03:00 AM    HGB 13.4 11/16/2020 04:40 AM    Hematocrit (POC) 49 (H) 11/05/2020 09:33 PM    HCT 41.9 11/16/2020 04:40 AM    PLATELET 185 20/87/7944 04:40 AM    MCV 96.5 11/16/2020 04:40 AM      Lab Results   Component Value Date/Time    Sodium 143 11/17/2020 03:26 AM    Potassium 3.9 11/17/2020 03:26 AM    Chloride 111 (H) 11/17/2020 03:26 AM    CO2 25 11/17/2020 03:26 AM    Anion gap 7 11/17/2020 03:26 AM    Glucose 149 (H) 11/17/2020 03:26 AM    BUN 10 11/17/2020 03:26 AM    Creatinine 0.71 11/17/2020 03:26 AM    BUN/Creatinine ratio 14 11/17/2020 03:26 AM    GFR est AA >60 11/17/2020 03:26 AM    GFR est non-AA >60 11/17/2020 03:26 AM    Calcium 8.4 (L) 11/17/2020 03:26 AM    Bilirubin, total 0.5 11/14/2020 03:04 AM    Alk. phosphatase 61 11/14/2020 03:04 AM    Protein, total 5.1 (L) 11/14/2020 03:04 AM    Albumin 3.0 (L) 11/16/2020 04:40 AM    Globulin 2.3 11/14/2020 03:04 AM    A-G Ratio 1.2 11/14/2020 03:04 AM    ALT (SGPT) 20 11/14/2020 03:04 AM           ASSESSMENT      1.  Atrial fibrillation with RVR              Paroxysmal S/p SUHAS Clip    AV node ablation  2. CAD               S/p PCI to LAD and RCA  3. PVCs  4. Hypertension  5. COPD/Asthma  6. Pacemaker          PLAN     Plan for echocardiogram to evaluate for LV dysfunction due to pacing. Will raise rate back up to 80 bpm to monitor for resolution of her symptoms. ICD-10-CM ICD-9-CM    1. Cardiac pacemaker in situ  Z95.0 V45.01    2. Coronary artery disease of native artery of native heart with stable angina pectoris ([facility] [address]  I25.118 414.01      413.9    3. Atrial fibrillation, unspecified type ([facility] [address]  I48.91 427.31    4. HTN (hypertension), benign  I10 401.1    5. S/P AV jules ablation  Z98.890 V45.89      No orders of the defined types were placed in this encounter. FOLLOW-UP     1 month      Thank you, Jeffery Marquis MD for allowing me to participate in the care of this extraordinarily pleasant female. Please do not hesitate to contact me for further questions/concerns.          Johnnie Potter MD  Cardiac Electrophysiology / Cardiology    [address]  [address] [place] [address] WhitneyTanya RiveraAl[facility] J. [facility]  [phone] / [phone] Fax   [phone] / [phone] Fax

## 2021-02-23 ENCOUNTER — HOSPITAL ENCOUNTER (OUTPATIENT)
Dept: CT IMAGING | Age: 71
Discharge: HOME | End: 2021-02-23
Attending: OPHTHALMOLOGY
Payer: MEDICARE

## 2021-02-23 DIAGNOSIS — H53.2 DIPLOPIA: ICD-10-CM

## 2021-02-23 DIAGNOSIS — E05.00 GRAVES DISEASE: ICD-10-CM

## 2021-02-23 PROCEDURE — 70450 CT HEAD/BRAIN W/O DYE: CPT

## 2021-02-24 ENCOUNTER — CLINICAL SUPPORT (OUTPATIENT)
Dept: CARDIOLOGY CLINIC | Age: 71
End: 2021-02-24
Payer: MEDICARE

## 2021-02-24 ENCOUNTER — TRANSCRIBE ORDER (OUTPATIENT)
Dept: SCHEDULING | Age: 71
End: 2021-02-24

## 2021-02-24 ENCOUNTER — OFFICE VISIT (OUTPATIENT)
Dept: CARDIOLOGY CLINIC | Age: 71
End: 2021-02-24
Payer: MEDICARE

## 2021-02-24 VITALS
HEART RATE: 74 BPM | HEIGHT: 62 IN | RESPIRATION RATE: 20 BRPM | SYSTOLIC BLOOD PRESSURE: 116 MMHG | OXYGEN SATURATION: 96 % | DIASTOLIC BLOOD PRESSURE: 62 MMHG | WEIGHT: 175 LBS | BODY MASS INDEX: 32.2 KG/M2

## 2021-02-24 DIAGNOSIS — I48.91 ATRIAL FIBRILLATION, UNSPECIFIED TYPE (HCC): ICD-10-CM

## 2021-02-24 DIAGNOSIS — E05.00 GRAVES DISEASE: ICD-10-CM

## 2021-02-24 DIAGNOSIS — Z98.890 S/P AV NODAL ABLATION: ICD-10-CM

## 2021-02-24 DIAGNOSIS — I25.118 CORONARY ARTERY DISEASE OF NATIVE ARTERY OF NATIVE HEART WITH STABLE ANGINA PECTORIS (HCC): ICD-10-CM

## 2021-02-24 DIAGNOSIS — Z95.0 CARDIAC PACEMAKER IN SITU: Primary | ICD-10-CM

## 2021-02-24 DIAGNOSIS — I10 HTN (HYPERTENSION), BENIGN: ICD-10-CM

## 2021-02-24 DIAGNOSIS — H53.2 DIPLOPIA: Primary | ICD-10-CM

## 2021-02-24 PROCEDURE — 93280 PM DEVICE PROGR EVAL DUAL: CPT

## 2021-02-24 PROCEDURE — G8399 PT W/DXA RESULTS DOCUMENT: HCPCS | Performed by: INTERNAL MEDICINE

## 2021-02-24 PROCEDURE — G0463 HOSPITAL OUTPT CLINIC VISIT: HCPCS | Performed by: INTERNAL MEDICINE

## 2021-02-24 PROCEDURE — G8754 DIAS BP LESS 90: HCPCS | Performed by: INTERNAL MEDICINE

## 2021-02-24 PROCEDURE — 3017F COLORECTAL CA SCREEN DOC REV: CPT | Performed by: INTERNAL MEDICINE

## 2021-02-24 PROCEDURE — G8536 NO DOC ELDER MAL SCRN: HCPCS | Performed by: INTERNAL MEDICINE

## 2021-02-24 PROCEDURE — G8417 CALC BMI ABV UP PARAM F/U: HCPCS | Performed by: INTERNAL MEDICINE

## 2021-02-24 PROCEDURE — G8752 SYS BP LESS 140: HCPCS | Performed by: INTERNAL MEDICINE

## 2021-02-24 PROCEDURE — G9899 SCRN MAM PERF RSLTS DOC: HCPCS | Performed by: INTERNAL MEDICINE

## 2021-02-24 PROCEDURE — 99215 OFFICE O/P EST HI 40 MIN: CPT | Performed by: INTERNAL MEDICINE

## 2021-02-24 PROCEDURE — G8432 DEP SCR NOT DOC, RNG: HCPCS | Performed by: INTERNAL MEDICINE

## 2021-02-24 PROCEDURE — 1101F PT FALLS ASSESS-DOCD LE1/YR: CPT | Performed by: INTERNAL MEDICINE

## 2021-02-24 PROCEDURE — 1090F PRES/ABSN URINE INCON ASSESS: CPT | Performed by: INTERNAL MEDICINE

## 2021-02-24 PROCEDURE — G8427 DOCREV CUR MEDS BY ELIG CLIN: HCPCS | Performed by: INTERNAL MEDICINE

## 2021-02-24 NOTE — PROGRESS NOTES
Room # 3    Chest pain: no  Shortness of breath: yes with exertion  Edema: no  Palpitations, Skipped beats, Rapid heartbeat: yes very brief  Dizziness: no  Fatigue: yes    New diagnosis/Surgeries: no    ER/Hospitalizations: no    Refills:no     Visit Vitals  /62 (BP 1 Location: Left upper arm, BP Patient Position: Sitting)   Pulse 74   Resp 20   Ht 5' 2\" (1.575 m)   Wt 175 lb (79.4 kg)   LMP 09/29/1980 (LMP Unknown)   SpO2 96%   BMI 32.01 kg/m² pending UDS results

## 2021-02-25 ENCOUNTER — HOSPITAL ENCOUNTER (OUTPATIENT)
Dept: CT IMAGING | Age: 71
Discharge: HOME | End: 2021-02-25
Attending: OPHTHALMOLOGY
Payer: MEDICARE

## 2021-02-25 ENCOUNTER — TELEPHONE (OUTPATIENT)
Dept: CARDIOLOGY CLINIC | Age: 71
End: 2021-02-25

## 2021-02-25 ENCOUNTER — HOSPITAL ENCOUNTER (OUTPATIENT)
Dept: CT IMAGING | Age: 71
End: 2021-02-25
Attending: OPHTHALMOLOGY
Payer: MEDICARE

## 2021-02-25 DIAGNOSIS — H53.2 DIPLOPIA: ICD-10-CM

## 2021-02-25 DIAGNOSIS — E05.00 GRAVES DISEASE: ICD-10-CM

## 2021-02-25 LAB — CREAT BLD-MCNC: 0.7 MG/DL (ref 0.6–1.3)

## 2021-02-25 PROCEDURE — 70481 CT ORBIT/EAR/FOSSA W/DYE: CPT

## 2021-02-25 PROCEDURE — 70470 CT HEAD/BRAIN W/O & W/DYE: CPT

## 2021-02-25 PROCEDURE — 74011000636 HC RX REV CODE- 636: Performed by: RADIOLOGY

## 2021-02-25 PROCEDURE — 82565 ASSAY OF CREATININE: CPT

## 2021-02-25 RX ADMIN — IOPAMIDOL 100 ML: 612 INJECTION, SOLUTION INTRAVENOUS at 16:33

## 2021-02-25 NOTE — TELEPHONE ENCOUNTER
Patient is requesting to speak with the nurse regarding her visit yesterday and some confusion about her follow up appointments. Please advise.     Phone: [phone]

## 2021-02-25 NOTE — TELEPHONE ENCOUNTER
Returned patient call, ID verified using two patient identifiers. Patient confused over when she needs to follow up with Dr. Ned Mcdaniel. Per his recommendations he would like to see her in one month and she is to have an echocardiogram beforehand. Appointment scheduled. Patient verbalized understanding and will call with any other questions.         Future Appointments   Date Time Provider Ineg Reaves   2/25/2021  4:00 PM [facility]   3/23/2021 10:00 AM CHRISTOPHER CHAPPELL BS AMB   3/25/2021 10:20 AM MD TULIO Rodriguez BS AMB   3/26/2021  2:00 PM MD TULIO Varner BS AMB   5/27/2021 12:15 PM [address] [facility] BS AMB   10/4/2021 10:15 AM [facility] 1 [facility]

## 2021-03-15 ENCOUNTER — IMMUNIZATION (OUTPATIENT)
Dept: INTERNAL MEDICINE CLINIC | Age: 71
End: 2021-03-15
Payer: MEDICARE

## 2021-03-15 DIAGNOSIS — Z23 ENCOUNTER FOR IMMUNIZATION: Primary | ICD-10-CM

## 2021-03-15 PROCEDURE — 91300 COVID-19, MRNA, LNP-S, PF, 30MCG/0.3ML DOSE(PFIZER): CPT | Performed by: FAMILY MEDICINE

## 2021-03-15 PROCEDURE — 0001A COVID-19, MRNA, LNP-S, PF, 30MCG/0.3ML DOSE(PFIZER): CPT | Performed by: FAMILY MEDICINE

## 2021-03-23 ENCOUNTER — ANCILLARY PROCEDURE (OUTPATIENT)
Dept: CARDIOLOGY CLINIC | Age: 71
End: 2021-03-23
Payer: MEDICARE

## 2021-03-23 VITALS
SYSTOLIC BLOOD PRESSURE: 120 MMHG | DIASTOLIC BLOOD PRESSURE: 62 MMHG | HEIGHT: 62 IN | BODY MASS INDEX: 32.2 KG/M2 | WEIGHT: 175 LBS

## 2021-03-23 DIAGNOSIS — I48.0 PAROXYSMAL ATRIAL FIBRILLATION (HCC): ICD-10-CM

## 2021-03-23 DIAGNOSIS — I25.10 CORONARY ARTERY DISEASE INVOLVING NATIVE CORONARY ARTERY OF NATIVE HEART WITHOUT ANGINA PECTORIS: ICD-10-CM

## 2021-03-23 LAB
ECHO AO ASC DIAM: 2.99 CM
ECHO AO ROOT DIAM: 3.48 CM
ECHO AV MEAN GRADIENT: 2.95 MMHG
ECHO AV PEAK GRADIENT: 5.36 MMHG
ECHO AV PEAK VELOCITY: 115.77 CM/S
ECHO AV VTI: 20.8 CM
ECHO EST RA PRESSURE: 3 MMHG
ECHO LA AREA 4C: 16.08 CM2
ECHO LA MAJOR AXIS: 3.81 CM
ECHO LA MINOR AXIS: 2.11 CM
ECHO LA VOL 2C: 31.38 ML (ref 22–52)
ECHO LA VOL 4C: 44.16 ML (ref 22–52)
ECHO LA VOL BP: 42.66 ML (ref 22–52)
ECHO LA VOL/BSA BIPLANE: 23.62 ML/M2 (ref 16–28)
ECHO LA VOLUME INDEX A2C: 17.37 ML/M2 (ref 16–28)
ECHO LA VOLUME INDEX A4C: 24.45 ML/M2 (ref 16–28)
ECHO LV E' LATERAL VELOCITY: 7.42 CM/S
ECHO LV E' SEPTAL VELOCITY: 5.8 CM/S
ECHO LV EDV A2C: 62.52 ML
ECHO LV EDV A4C: 62.09 ML
ECHO LV EDV BP: 64.85 ML (ref 56–104)
ECHO LV EDV INDEX A4C: 34.4 ML/M2
ECHO LV EDV INDEX BP: 35.9 ML/M2
ECHO LV EDV NDEX A2C: 34.6 ML/M2
ECHO LV ESV A2C: 37.08 ML
ECHO LV ESV A4C: 35.15 ML
ECHO LV ESV BP: 37.47 ML (ref 19–49)
ECHO LV ESV INDEX A2C: 20.5 ML/M2
ECHO LV ESV INDEX A4C: 19.5 ML/M2
ECHO LV ESV INDEX BP: 20.7 ML/M2
ECHO LV INTERNAL DIMENSION DIASTOLIC: 4.72 CM (ref 3.9–5.3)
ECHO LV INTERNAL DIMENSION SYSTOLIC: 3.54 CM
ECHO LV IVSD: 1.04 CM (ref 0.6–0.9)
ECHO LV MASS 2D: 175.9 G (ref 67–162)
ECHO LV MASS INDEX 2D: 97.4 G/M2 (ref 43–95)
ECHO LV POSTERIOR WALL DIASTOLIC: 1.05 CM (ref 0.6–0.9)
ECHO LVOT PEAK GRADIENT: 2.1 MMHG
ECHO LVOT PEAK VELOCITY: 72.46 CM/S
ECHO LVOT VTI: 13.83 CM
ECHO MV A VELOCITY: 68.45 CM/S
ECHO MV AREA PHT: 4.3 CM2
ECHO MV E DECELERATION TIME (DT): 176.31 MS
ECHO MV E VELOCITY: 43.3 CM/S
ECHO MV E/A RATIO: 0.63
ECHO MV E/E' LATERAL: 5.84
ECHO MV E/E' RATIO (AVERAGED): 6.65
ECHO MV E/E' SEPTAL: 7.47
ECHO MV PRESSURE HALF TIME (PHT): 51.13 MS
ECHO RIGHT VENTRICULAR SYSTOLIC PRESSURE (RVSP): 32.11 MMHG
ECHO RV INTERNAL DIMENSION: 3.43 CM
ECHO TV REGURGITANT MAX VELOCITY: 269.76 CM/S
ECHO TV REGURGITANT PEAK GRADIENT: 29.11 MMHG
LA VOL DISK BP: 39.59 ML (ref 22–52)

## 2021-03-23 PROCEDURE — 93306 TTE W/DOPPLER COMPLETE: CPT | Performed by: SPECIALIST

## 2021-03-25 ENCOUNTER — OFFICE VISIT (OUTPATIENT)
Dept: CARDIOLOGY CLINIC | Age: 71
End: 2021-03-25
Payer: MEDICARE

## 2021-03-25 VITALS
DIASTOLIC BLOOD PRESSURE: 84 MMHG | SYSTOLIC BLOOD PRESSURE: 128 MMHG | RESPIRATION RATE: 16 BRPM | BODY MASS INDEX: 32.17 KG/M2 | HEIGHT: 62 IN | WEIGHT: 174.8 LBS | OXYGEN SATURATION: 95 % | HEART RATE: 86 BPM

## 2021-03-25 DIAGNOSIS — R06.02 SOB (SHORTNESS OF BREATH): Primary | ICD-10-CM

## 2021-03-25 DIAGNOSIS — I48.0 PAF (PAROXYSMAL ATRIAL FIBRILLATION) (HCC): ICD-10-CM

## 2021-03-25 DIAGNOSIS — I50.32 CHRONIC DIASTOLIC HEART FAILURE (HCC): ICD-10-CM

## 2021-03-25 DIAGNOSIS — I25.118 CORONARY ARTERY DISEASE OF NATIVE ARTERY OF NATIVE HEART WITH STABLE ANGINA PECTORIS (HCC): ICD-10-CM

## 2021-03-25 PROCEDURE — 3017F COLORECTAL CA SCREEN DOC REV: CPT | Performed by: SPECIALIST

## 2021-03-25 PROCEDURE — G8752 SYS BP LESS 140: HCPCS | Performed by: SPECIALIST

## 2021-03-25 PROCEDURE — G8417 CALC BMI ABV UP PARAM F/U: HCPCS | Performed by: SPECIALIST

## 2021-03-25 PROCEDURE — G9899 SCRN MAM PERF RSLTS DOC: HCPCS | Performed by: SPECIALIST

## 2021-03-25 PROCEDURE — G8754 DIAS BP LESS 90: HCPCS | Performed by: SPECIALIST

## 2021-03-25 PROCEDURE — G8399 PT W/DXA RESULTS DOCUMENT: HCPCS | Performed by: SPECIALIST

## 2021-03-25 PROCEDURE — G8427 DOCREV CUR MEDS BY ELIG CLIN: HCPCS | Performed by: SPECIALIST

## 2021-03-25 PROCEDURE — G8510 SCR DEP NEG, NO PLAN REQD: HCPCS | Performed by: SPECIALIST

## 2021-03-25 PROCEDURE — G0463 HOSPITAL OUTPT CLINIC VISIT: HCPCS | Performed by: SPECIALIST

## 2021-03-25 PROCEDURE — G8536 NO DOC ELDER MAL SCRN: HCPCS | Performed by: SPECIALIST

## 2021-03-25 PROCEDURE — 1090F PRES/ABSN URINE INCON ASSESS: CPT | Performed by: SPECIALIST

## 2021-03-25 PROCEDURE — 1101F PT FALLS ASSESS-DOCD LE1/YR: CPT | Performed by: SPECIALIST

## 2021-03-25 PROCEDURE — 99214 OFFICE O/P EST MOD 30 MIN: CPT | Performed by: SPECIALIST

## 2021-03-25 RX ORDER — BUMETANIDE 1 MG/1
TABLET ORAL
Qty: 60 TAB | Refills: 0 | Status: ON HOLD
Start: 2021-03-25 | End: 2021-05-30

## 2021-03-25 NOTE — PROGRESS NOTES
Duc Roth is a 66 y.o. female    Chief Complaint   Patient presents with    Follow-up     2 months    Irregular Heart Beat    Coronary Artery Disease    Hypertension    Cholesterol Problem    Syncope    CHF            Visit Vitals  /84   Pulse 86   Resp 16   Ht 5' 2\" (1.575 m)   Wt 174 lb 12.8 oz (79.3 kg)   LMP 09/29/1980 (LMP Unknown)   SpO2 95%   BMI 31.97 kg/m²       1. Have you been to the ER, urgent care clinic since your last visit? Hospitalized since your last visit? NO    2. Have you seen or consulted any other health care providers outside of the [address] [place] [address] since your last visit? Include any pap smears or colon screening.  NO

## 2021-03-25 NOTE — PROGRESS NOTES
Lanny Trujillo MD. [facility]              Patient: Sussy Sierra  : 1950      Today's Date: 3/25/2021          HISTORY OF PRESENT ILLNESS:     History of Present Illness:  Doing OK overall. Still has some SOB. Her NTG patch is keeping CP under control. Has chronic class 3 CHACON. Does work she can. BP looks good at home. Sleeps with HOB elevated 30 degrees. PAST MEDICAL HISTORY:     Past Medical History:   Diagnosis Date    Adverse effect of anesthesia     slow to wake up    Arthritis     Asthma 2009    CAUSED BY MOLD    Atrial fibrillation ([address] 2009    Atrial flutter ([address]     CAD (coronary artery disease) 2009    Celiac disease     Chronic chest pain     Chronic obstructive pulmonary disease ([address] -    right leg    Chronic pain of right ankle     Diabetes ([address]     Drug induced prednisone, DM2    Diastolic heart failure ([address]     DVT (deep venous thrombosis) ([address]     Right leg post surgery    GERD (gastroesophageal reflux disease)     Gluten intolerance     Heart failure ([address]     Hypertension     Hypothyroidism 2009    Iron deficiency anemia     Lyme disease     Pacemaker         Personal history of MI (myocardial infarction)     Rheumatoid arthritis ([address]     S/P AV jules ablation          S/P left atrial appendage ligation     SUHAS clip 10/20     Slow to wake up after anesthesia     Syncope     Post testing- resolved    TIA (transient ischemic attack)  &     Vitamin B12 deficiency 2009       Past Surgical History:   Procedure Laterality Date    HX ADENOIDECTOMY      HX AFIB ABLATION      HX APPENDECTOMY      HX CHOLECYSTECTOMY  11    HX COLONOSCOPY      HX CORONARY STENT PLACEMENT      x 2    HX HEART CATHETERIZATION  2010    2 STENTS    HX HEART CATHETERIZATION  2020    HX HYSTERECTOMY      HX LUMBAR LAMINECTOMY  2000    L4-L5    HX ORTHOPAEDIC  -2012    RT.  FOOT SURGERY X 6/calcaneal osteotomy    HX ORTHOPAEDIC Right 09/19/2020    right hand [facility] joint replacement    HX TONSILLECTOMY  1964    AR ICAR CATHETER ABLATION ATRIOVENTR NODE FUNCTION N/A 11/19/2020    ABLATION AV NODE performed by Yaakov Holloway MD at [address] [facility] [address] CATH LAB    AR INS NEW/RPLCMT PRM PM W/TRANSV ELTRD ATRIAL&VENT N/A 11/19/2020    INSERT PPM DUAL performed by Yaakov Holloway MD at [address] [facility] [address] CATH LAB    AR INTRACARDIAC ELECTROPHYSIOLOGIC 3D MAPPING N/A 11/19/2020    Ep 3d Mapping performed by Yaakov Holloway MD at [address] [facility] [address] CATH LAB           MEDICATIONS:     Current Outpatient Medications   Medication Sig Dispense Refill    bumetanide (BUMEX) 1 mg tablet Takes 1 tablet in AM and half tablet in afternoon 60 Tab 0    metoprolol succinate (TOPROL-XL) 50 mg XL tablet Take 1 Tab by mouth daily. 90 Tab 3    nitroglycerin (NITRODUR) 0.1 mg/hr 1 Patch by TransDERmal route daily. 90 Patch 3    lidocaine (Lidoderm) 5 % 1 Patch by TransDERmal route every twenty-four (24) hours. Apply patch to the affected area for 12 hours a day and remove for 12 hours a day. 1 Each 0    gabapentin (NEURONTIN) 100 mg capsule Take 1 Cap by mouth two (2) times a day. Max Daily Amount: 200 mg. 60 Cap 0    predniSONE (DELTASONE) 2.5 mg tablet Take 1 Tab by mouth daily (after lunch). 5 mg in morning and 2.5 mg afternoon  Resume your usual dose of prednisone after you complete the prednisone taper 30 Tab 0    acetaminophen (TYLENOL) 650 mg TbER Take 1,300 mg by mouth two (2) times a day.  co-enzyme Q-10 (Co Q-10) 100 mg capsule Take 200 mg by mouth daily.  artificial tears, dextran 70-hypromellose, (GenTeal Tears Mild) 0.1-0.3 % ophthalmic solution Administer 1 Drop to both eyes nightly.  carboxymethylcell/hypromellose (GENTEAL GEL OP) Administer 1 Drop to both eyes as needed (dry eye).  omega 3-DHA-EPA-fish oil 1,000 mg (120 mg-180 mg) capsule Take 1 Cap by mouth every evening.       levalbuterol (XOPENEX) 0.63 mg/3 mL nebu 0.63 mg by Nebulization route two (2) times a day.  aclidinium bromide (Tudorza Pressair) 400 mcg/actuation inhaler Take 1 Puff by inhalation daily.  calcium citrate-vitamin d3 (CITRACAL+D) 315 mg-5 mcg (200 unit) tab Take 1 Tab by mouth daily (with breakfast).  mometasone furoate (ASMANEX HFA IN) Take 100 mcg by inhalation two (2) times a day.  potassium 99 mg tablet Take 99 mg by mouth daily.  insulin glargine (LANTUS,BASAGLAR) 100 unit/mL (3 mL) inpn 8 Units by SubCUTAneous route Daily (before breakfast). 30 minutes before breakfast      denosumab (PROLIA SC) by SubCUTAneous route every 6 months.  eplerenone (INSPRA) 25 mg tablet Take 25 mg by mouth Daily (before lunch).  fenofibrate nanocrystallized (Tricor) 48 mg tablet Take 48 mg by mouth nightly.  levothyroxine (SYNTHROID) 137 mcg tablet Take 137 mcg by mouth Daily (before breakfast).  predniSONE (DELTASONE) 5 mg tablet Take 5 mg by mouth daily. 5 mg in morning and 2.5 mg afternoon      cholecalciferol, vitamin D3, (VITAMIN D3) 2,000 unit tab Take 2,000 Units by mouth daily.  turmeric (CURCUMIN) Take 1 g by mouth every evening.  SITagliptin (JANUVIA) 100 mg tablet Take 100 mg by mouth daily (with dinner).  vit C/vit E ac/lut/copper/zinc (PRESERVISION LUTEIN PO) Take 1 Tab by mouth every Monday. Takes with dinner      ascorbic acid (VITAMIN C) 500 mg tablet Take 500 mg by mouth daily.  ferrous sulfate 325 mg (65 mg iron) tablet Take 325 mg by mouth daily (with lunch).  cyanocobalamin (VITAMIN B12) 1,000 mcg/mL injection INJECT 1 ML INTO THE MUSCLE EVERY 30 DAYS 10 mL 0    lansoprazole (PREVACID) 30 mg capsule Take 30 mg by mouth daily.  etanercept (EnbreL SureClick) 50 mg/mL (1 mL) injection 50 mg by SubCUTAneous route every seven (7) days.          Allergies   Allergen Reactions    Butter Flavor Anaphylaxis     Butter AND CREME    Keflex [Cephalexin] Anaphylaxis    Milk Containing Products Anaphylaxis     Butter and cream    Pcn [Penicillins] Anaphylaxis    Aspirin Hives and Other (comments)     \"it makes my stomach bleed\"      Cimzia [Certolizumab Pegol] Other (comments)     GI symptoms, blisters in the mouth and esophagus    Clopidogrel Other (comments)     GI bleed    Demerol [Meperidine] Other (comments)     HYPOTENSION    Fentanyl Other (comments)     HYPOTENSION    Ibuprofen Diarrhea     Patient reports that ibuprofen caused diarrhea    Morphine Other (comments)     HYPOTENSION    Nitroglycerin Other (comments)     IN PILL FORM  - HYPOTENSION  CAN TOLERATE PATCH FOR SHORT TIME    Sulfa (Sulfonamide Antibiotics) Angioedema     Lips    Tapazole [Methimazole] Unknown (comments)     Patient stated \"it made me feel like I had the flu\"    Tramadol Other (comments)     Patient reports thrush with tramadol use    Adhesive Tape-Silicones Other (comments)     BAD BLISTERS AND TENDS TO GET INFECTED    Albuterol Palpitations    Gluten Diarrhea     bloating    Oxycodone Other (comments)     Hallicination and hypotension           SOCIAL HISTORY:     Social History     Tobacco Use    Smoking status: Former Smoker     Packs/day: 1.00     Years: 30.00     Pack years: 30.00     Types: Cigarettes     Quit date: 2002     Years since quittin.7    Smokeless tobacco: Never Used   Substance Use Topics    Alcohol use: No    Drug use: Never         FAMILY HISTORY:     Family History   Problem Relation Age of Onset   [place] Favorite Delayed Awakening Mother     Heart Disease Mother     Coronary Artery Disease Mother     Hypertension Mother     Stroke Mother     Delayed Awakening Sister     Hypertension Sister     Lung Disease Father     Cancer Father         colon    Hypertension Father     Hypertension Brother     Breast Cancer Maternal Aunt     Breast Cancer Maternal Aunt     Breast Cancer Cousin         maternal 1st cousin    Breast Cancer Maternal Aunt     Breast Cancer Cousin         maternal 1st cousin    Breast Cancer Cousin         maternal 1st cousin    Deep Vein Thrombosis Neg Hx     Anesth Problems Neg Hx             REVIEW OF SYMPTOMS:      Review of Symptoms:  Constitutional: Negative for fever, chills  HEENT: Negative for nosebleeds, tinnitus  Respiratory: + CHACON   Cardiovascular: Negative for syncope;  + CHACON   Gastrointestinal: Negative for abdominal pain, diarrhea, melena. Genitourinary: Negative for dysuria  Musculoskeletal: + joint pain, RA  Skin: Negative for rash  Heme: No acute bleeding   Neurological: Negative for speech change and focal weakness.                  PHYSICAL EXAM:      Physical Exam:  Visit Vitals  /84   Pulse 86   Resp 16   Ht 5' 2\" (1.575 m)   Wt 174 lb 12.8 oz (79.3 kg)   LMP 09/29/1980 (LMP Unknown)   SpO2 95%   BMI 31.97 kg/m²       Patient appears generally well, mood and affect are appropriate and pleasant. HEENT:  Hearing intact, non-icteric, normocephalic, atraumatic. Neck Exam: Supple, No JVD sitting up  Lung Exam: Clear to auscultation, even breath sounds. Cardiac Exam: Regular rate and rhythm with no murmur    Abdomen: Soft, non-tender, normal bowel sounds. Obese . Extremities: Moves all ext well. Trace lower extremity edema. MSKTL: Overall good ROM ext  Skin: No significant rashes  Psych: Appropriate affect  Neuro - Grossly intact                 LABS / OTHER STUDIES:        Labs 1/19/21 - A1c 7.2, CMP OK, TSH 0.29, T4 14 (H),         CARDIAC DIAGNOSTICS:      Cardiac Evaluation Includes:     Cardiac Cath 6/09 - severe mLCX disease --> stenting     Cath 8/19/10 - RCA and LCX stents patent; MLI in LAD, LVEF 60%     Event Monitor 3/11 - PVC's  Holter 3/16 - PVC's  Echo 3/17 - LVEF 60%  Lexiscan Cardiolite 3/17 - normal MPI, LVEF 58%     [facility] records reviewed.  Went there for chest pain on 1/3/19.    Labs 1/3/19 - Trop < 0.02, BNP 18, Cr 0.61, Hgb 13  Cath 1/4/19 - LAD stent patent, LCX without sig disease, RCA stent patent.       Holter 1/23/19 - NSR, normal study      Stress Echo 2/11/19 - walked 3:21 (2.4 METS), baseline /90, Max /90, normal stress EKG and stress echo      CXR 2/22/19 - normal      Echo 3/21/19 - LVEF 61%; grade 1 diastology (normal for age), AV sclerosis.  RVSP 26 mmHg.       PARDEEP's with exercise 6/7/19 - normal       Event Monitor 5/15/19-6/6/19 - normal study; symptoms correlated with sinus      Right Heart Cath 6/28/19 -[facility],[address] findings:   RAP=   Mean 17    mmHg  RVSP= 40/20    mmHg  PAP=     42/30/34  mmHg  PCWP=  27 mmHg  CO=        Thermal 4.1  L/min,             Faraz pending  CI=           Thermal 3.08  L/min/m2,     Faraz pending      Findings:  1.   Elevated right and left sided filling pressure  2.   Pulmonary hypertension, WHO group 2  3.   Perserved cardiac index by Thermal     Echo 8/13/19 - LVEF 50-55%, \"The following segments are hypokinetic: basal inferior, basal inferolateral and mid inferolateral\"     Cardiac Cath 8/13/19 - Non-obstructive CAD with patent stents in LCX and RCA.  LVEDP 16 mmHg.       Cardiac MRI 9/30/19 - 1. Normal left ventricular cavity size with preserved left ventricular systolic function. There is no significant regional wall motion abnormalities. LVEF 60%. 2. Normal right ventricular size and systolic motion. RVEF 60%. 3. Trace to mild mitral regurgitation. 4. On EGE and LGE study, there is a small thin subendocardial infarct involving 25% to 50% thickness of the subendocardial wall of the basal inferior wall. This infarct is in RCA territory. There is reasonable viability surrounding this infarct. The mid to distal inferior wall and inferoseptal wall does not demonstrate any infarct. There are no other infarct. There is no features of infiltrative sarcoidosis or cardiac amyloidosis. There is no features of inflammatory myocarditis. All other myocardial walls are otherwise completely viable.  There is no features of hemochromatosis.  5. Normal pericardium without significant pericardial effusion.      Venous Doppler 9/9/19 - No DVT Bilat      Lexiscan Cardiolite 12/9/19 - Normal MPI, LVEF 66%     PFT's 1/14/20 - Spirometry reveals severe airflow obstruction with moderate reduction in forced vital capacity which may be due to a restrictive ventilatory defect or air trapping     COY 10/15/20 - normal, LVEF 55%     Surgical SUHAS clip 10/20/20 Video-assisted thoracoscopic placement of left atrial appendage clip     Echo 10/29/20 - LVEF 55-60%, PASP 41 mmHg      Echo 11/3/20 - LVEF 55-60%     Lexiscan Cardiolite 11/5/20 - Normal MPI, LVEF 64%     Dual PPM / AV node ablation 11/19/20     Echo 3/23/21 - LVEF 45%, septal bounce, grade 1 diastology            EKG 2/22/19 - NSR, normal   EKG 8/12/19 - NSR, NSST changes   EKG 11/4/20 - Atrial flutter with RVR           ASSESSMENT AND PLAN:      Assessment and Plan:  1) Chronic Chest pain and SOB   - She had CAD with prior stenting in RCA and LCX (2009 ? )   - She notes CP/SOB symptoms since Jan 2019   -[address] cath 1/19 showed patent vessels. - She saw Pulmonary ([facility]) and she says workup was OK.    - Cardiac cath repeated 8/13/19 showed non-obs CAD with LVEDP 16 mmHg  - Cardiac MRI 9/30/19 - There is no features of infiltrative sarcoidosis or cardiac amyloidosis.   - Junior Dadds considered but due to problem with blood thinners she declined proceeding with one   - PFTs with severe airflow obstruction with no improvement following bronchodilators  - DLCO corrected to normal when considering alveolar volume  - No evidence of mold toxicity by Dr. Cleo Branch  - Completed therapy for Lyme Disease  - Follow up with Dr. Joe Ayers  - Her symptoms could be due to small vessel disease and/or diastolic dysfunction (see below)      2) HFpEF - NYHA Class III  - RHC with PCWP 27 mmHg on 6/19   - SPEP - no M spike  - Iron profile - normal  - ATTR negative  - Continue bumex, eplerenone, metoprolol  - She takes Bumex 1 in am and 0.5 in PM (she had \"kidney pain\" at 1 mg BID) - she cam take extra Bumex as needed   - volume status seems fair for her (will take extra Bumex when she feels more volume overloaded)      3) CAD s/p PCI to LAD , RCA  - [facility] RETREAT in 8/19 - no intervention  - Lexiscan Cardiolite 11/5/20 - Normal MPI, LVEF 64%  - Intolerant to statins d/t myalgias  - ASA intolerant d/t GI bleeding  - Developed plavix induced thrombocytopenia - advised by hematology to avoid antiplatelets  - has chronic chest pain (maybe from small vessel disease)   - On 1/21/21 she returns with chronic chest pain ---> Increase Toprol XL to 50 mg daily and have her try to use NTG patch daily   - On 3/25/21 - she says CP is controlled --- NTG patch seems to help the most      4) History of Iron Deficiency Anemia   - She takes no blood thinners (not even aspirin) due to anemia in past   - Dr. David Thorne record 12/16 reviewed - He was seeing her for iron deficiency anemia thought to be from GI Bleed from aspirin and plavix      5) Lipids -   - Does not take a statin due to having muscle aches (from RA at baseline)  - Tried to get her on PSCK9 Inhibitor in past bur she is not taking one   - She is now only on Tricor      6) Atrial flutter with RVR 11/20  S/p PPM   - Dual PPM / AV node ablation 11/19/20   - not on any blood thinners due to problems with bleeding   - Surgical SUHAS clip 10/20/20   - she feels better with pacer rate at 80 bpm   - LVEF seems lower since she has been V paced ---> will ask Dr. Vielka Garza if a BiV upgrade would be helpful ?     7) HTN  - BP looks OK - continue meds      8) RA  - she is on prednisone      9) History of Tick Bite and RMSF - [place]  - Follow up with Dr. Maida Mann     10) See me in 3 months.   Patient expressed understanding of the plan - questions were answered.   On a side note, I see her brother.             Navi Mercado MD, [address] [facility] [address]      [id number]  [address]  [facility], [address] [facility]  Ph: [phone]                                [phone]

## 2021-03-26 ENCOUNTER — OFFICE VISIT (OUTPATIENT)
Dept: CARDIOLOGY CLINIC | Age: 71
End: 2021-03-26
Payer: MEDICARE

## 2021-03-26 VITALS
HEART RATE: 84 BPM | SYSTOLIC BLOOD PRESSURE: 136 MMHG | BODY MASS INDEX: 31.97 KG/M2 | HEIGHT: 62 IN | DIASTOLIC BLOOD PRESSURE: 78 MMHG | OXYGEN SATURATION: 94 %

## 2021-03-26 DIAGNOSIS — I10 HTN (HYPERTENSION), BENIGN: ICD-10-CM

## 2021-03-26 DIAGNOSIS — I50.32 CHRONIC DIASTOLIC HEART FAILURE (HCC): ICD-10-CM

## 2021-03-26 DIAGNOSIS — Z95.0 CARDIAC PACEMAKER IN SITU: ICD-10-CM

## 2021-03-26 DIAGNOSIS — Z01.812 PRE-PROCEDURE LAB EXAM: ICD-10-CM

## 2021-03-26 DIAGNOSIS — Z98.890 S/P AV NODAL ABLATION: ICD-10-CM

## 2021-03-26 DIAGNOSIS — I25.10 CORONARY ARTERY DISEASE INVOLVING NATIVE CORONARY ARTERY OF NATIVE HEART WITHOUT ANGINA PECTORIS: ICD-10-CM

## 2021-03-26 DIAGNOSIS — I48.0 PAROXYSMAL ATRIAL FIBRILLATION (HCC): Primary | ICD-10-CM

## 2021-03-26 DIAGNOSIS — E78.5 DYSLIPIDEMIA: ICD-10-CM

## 2021-03-26 PROCEDURE — G8754 DIAS BP LESS 90: HCPCS | Performed by: INTERNAL MEDICINE

## 2021-03-26 PROCEDURE — G8427 DOCREV CUR MEDS BY ELIG CLIN: HCPCS | Performed by: INTERNAL MEDICINE

## 2021-03-26 PROCEDURE — 1101F PT FALLS ASSESS-DOCD LE1/YR: CPT | Performed by: INTERNAL MEDICINE

## 2021-03-26 PROCEDURE — G0463 HOSPITAL OUTPT CLINIC VISIT: HCPCS | Performed by: INTERNAL MEDICINE

## 2021-03-26 PROCEDURE — G8399 PT W/DXA RESULTS DOCUMENT: HCPCS | Performed by: INTERNAL MEDICINE

## 2021-03-26 PROCEDURE — G8536 NO DOC ELDER MAL SCRN: HCPCS | Performed by: INTERNAL MEDICINE

## 2021-03-26 PROCEDURE — 99215 OFFICE O/P EST HI 40 MIN: CPT | Performed by: INTERNAL MEDICINE

## 2021-03-26 PROCEDURE — G8432 DEP SCR NOT DOC, RNG: HCPCS | Performed by: INTERNAL MEDICINE

## 2021-03-26 PROCEDURE — G9899 SCRN MAM PERF RSLTS DOC: HCPCS | Performed by: INTERNAL MEDICINE

## 2021-03-26 PROCEDURE — G8417 CALC BMI ABV UP PARAM F/U: HCPCS | Performed by: INTERNAL MEDICINE

## 2021-03-26 PROCEDURE — G8752 SYS BP LESS 140: HCPCS | Performed by: INTERNAL MEDICINE

## 2021-03-26 PROCEDURE — 3017F COLORECTAL CA SCREEN DOC REV: CPT | Performed by: INTERNAL MEDICINE

## 2021-03-26 PROCEDURE — 1090F PRES/ABSN URINE INCON ASSESS: CPT | Performed by: INTERNAL MEDICINE

## 2021-03-26 NOTE — PROGRESS NOTES
Room 1    Visit Vitals  /78 (BP 1 Location: Left upper arm, BP Patient Position: Sitting)   Pulse 84   Ht 5' 2\" (1.575 m)   LMP 09/29/1980 (LMP Unknown)   SpO2 94%   BMI 31.97 kg/m²       Echo 3-23-21    Decline to review meds     Chest pain: no  Shortness of breath: no  Edema: no  Palpitations, Skipped beats, Rapid heartbeat: no  Dizziness: no    New diagnosis/Surgeries: no    ER/Hospitalizations: no    Refills: no

## 2021-03-26 NOTE — PATIENT INSTRUCTIONS
You are scheduled for the following procedure with Dr. Angelia Lyons:  Bean Castanedaa be aware that your procedure date/time is tentative and subject to change due to emergency cases. Procedure date/time:    April 26, 2021  Time: 8:00 am - please arrive by 7:00 am        o PRE-PROCEDURE LABS NEEDED: YES   o Forms for labwork may be given at appointment. If not, they will be mailed to you. Labs should be completed within 5-7 days of procedure. These can be done at any [facility] or [facility] lab (one is located in [address] [place] [address]. No appointment needed). NO A COVID swab is needed 4 days prior to your procedure. o YOU MAY NEED PRE-PROCEDURE IMAGING, CHEST CTA OR CARDIAC MRI.       []  Chest CTA     []  Cardiac MRI    ([facility] scheduling to call you)  -  [phone]        ARRIVAL time:  (You will need  to be discharged home with.)     o [facility] procedures: please arrive to check in on 2nd floor one hour prior to your procedure the day of your procedure.    o [facility] procedures: arrive to check in on 1st floor near [facility] two hours prior to your procedure. Please review the following medication instructions:     Do not stop any medications prior to procedure. If you take any of the following Diabetic medications, please use as follows:    [X]  Glucophage/Metformin  -  Hold morning dose on day of procedure. [X]  Insulin  -  Hold morning dose on day of procedure. [X]  Lantus  -  Reduce dose by ½ evening before procedure. Nothing to eat or drink after midnight before your procedure. You may take all other medications as directed the morning of your procedure with a sip of water unless otherwise indicated. Please contact the office [phone] and ask for a member of Dr. Angelia Lyons' team for procedure questions. There is a physician on call for the office after hours for immediate needs.      FOLLOW UP:   Your appointments will be made for you post procedure based off of discharge instructions. You may have driving restrictions for a short time after your procedure (usually 2-3 days). Pacemaker/ICD patients will be unable to have an MRI until 6 weeks after implant, NO dental work for 8 weeks after your implant.

## 2021-03-26 NOTE — PROGRESS NOTES
HISTORY OF PRESENTING ILLNESS      Shanita Cortez is a 66 y.o. female with hx of paroxysmal arial fibrillation (s/p ablation x10 years ago with Dr. Jil Guzman), previous history of thrombocytopenia, hypertension, COPD, heart failure preserved EF, CAD, LA ligation, apcemaker s/p AV node ablation.  She experienced chest pain/palpitations post procedure and presented to the ER. Previous cardiac catheterization and stress test in 2019 were negative for ischemia and no significant coronary disease and stents were open.  She had normal stress testing 11/5/2020 due to peaked troponin level. Echocardiogram demonstrated preserved LV function of 55-60%.       Device interrogation reveals normal device functioning; patient remains in complete heart block. She reports shortness of breath and fatigue that she noticed had improved following her procedure however her symptoms recurred after her lower rate was changed from 80 down to 70 bpm.  Last visit her rate was increased to 80 bpm and an echocardiogram was obtained which demonstrated an LV ejection fraction of 45% compared to an LV function of 55 to 60% on 11/2020. Bumex adjusted yesterday; SOB progressing. Seen by Dr. Moises Cheema yesterday.         PAST MEDICAL HISTORY     Past Medical History:   Diagnosis Date    Adverse effect of anesthesia     slow to wake up    Arthritis     Asthma 6/29/2009    CAUSED BY MOLD    Atrial fibrillation ([address] 6/29/2009    Atrial flutter ([address]     CAD (coronary artery disease) 06/29/2009    Celiac disease 2010    Chronic chest pain     Chronic obstructive pulmonary disease ([address] 2004-5    right leg    Chronic pain of right ankle     Diabetes ([address]     Drug induced prednisone, DM2    Diastolic heart failure ([address]     DVT (deep venous thrombosis) ([address] 2004    Right leg post surgery    GERD (gastroesophageal reflux disease)     Gluten intolerance     Heart failure ([address]     Hypertension     Hypothyroidism 6/29/2009    Iron deficiency anemia     Lyme disease     Pacemaker     11/20    Personal history of MI (myocardial infarction)     Rheumatoid arthritis [facility]     S/P AV jules ablation     11/20     S/P left atrial appendage ligation     SUHAS clip 10/20     Slow to wake up after anesthesia     Syncope     Post testing- resolved    TIA (transient ischemic attack) 2004 & 2006    Vitamin B12 deficiency 6/29/2009           PAST SURGICAL HISTORY     Past Surgical History:   Procedure Laterality Date    HX ADENOIDECTOMY      HX AFIB ABLATION      HX APPENDECTOMY      HX CHOLECYSTECTOMY  6/16/11    HX COLONOSCOPY      HX CORONARY STENT PLACEMENT      x 2    HX HEART CATHETERIZATION  2010    2 STENTS    HX HEART CATHETERIZATION  03/2020    HX HYSTERECTOMY      HX LUMBAR LAMINECTOMY  2000    L4-L5    HX ORTHOPAEDIC  1993-6/2012    RT.  FOOT SURGERY X 6/calcaneal osteotomy    HX ORTHOPAEDIC Right 09/19/2020    right hand [facility] joint replacement    HX TONSILLECTOMY  1964    DC ICAR CATHETER ABLATION ATRIOVENTR NODE FUNCTION N/A 11/19/2020    ABLATION AV NODE performed by Uzma Vergara MD at [address] CATH LAB    DC INS NEW/RPLCMT PRM PM W/TRANSV ELTRD ATRIAL&VENT N/A 11/19/2020    INSERT PPM DUAL performed by Uzma Vergara MD at [address] CATH LAB    DC INTRACARDIAC ELECTROPHYSIOLOGIC 3D MAPPING N/A 11/19/2020    Ep 3d Mapping performed by Uzma Vergara MD at [address] [place] [address] CATH LAB          ALLERGIES     Allergies   Allergen Reactions    Butter Flavor Anaphylaxis     Butter AND CREME    Keflex [Cephalexin] Anaphylaxis    Milk Containing Products Anaphylaxis     Butter and cream    Pcn [Penicillins] Anaphylaxis    Aspirin Hives and Other (comments)     \"it makes my stomach bleed\"      Cimzia [Certolizumab Pegol] Other (comments)     GI symptoms, blisters in the mouth and esophagus    Clopidogrel Other (comments)     GI bleed    Demerol [Meperidine] Other (comments)     HYPOTENSION    Fentanyl Other (comments) HYPOTENSION    Ibuprofen Diarrhea     Patient reports that ibuprofen caused diarrhea    Morphine Other (comments)     HYPOTENSION    Nitroglycerin Other (comments)     IN PILL FORM  - HYPOTENSION  CAN TOLERATE PATCH FOR SHORT TIME    Sulfa (Sulfonamide Antibiotics) Angioedema     Lips    Tapazole [Methimazole] Unknown (comments)     Patient stated \"it made me feel like I had the flu\"    Tramadol Other (comments)     Patient reports thrush with tramadol use    Adhesive Tape-Silicones Other (comments)     BAD BLISTERS AND TENDS TO GET INFECTED    Albuterol Palpitations    Gluten Diarrhea     bloating    Oxycodone Other (comments)     Hallicination and hypotension          FAMILY HISTORY     Family History   Problem Relation Age of Onset    Delayed Awakening Mother     Heart Disease Mother     Coronary Artery Disease Mother     Hypertension Mother     Stroke Mother     Delayed Awakening Sister     Hypertension Sister     Lung Disease Father     Cancer Father         colon    Hypertension Father     Hypertension Brother     Breast Cancer Maternal Aunt     Breast Cancer Maternal Aunt     Breast Cancer Cousin         maternal 1st cousin    Breast Cancer Maternal Aunt     Breast Cancer Cousin         maternal 1st cousin   [facility] Breast Cancer Cousin         maternal 1st cousin    Deep Vein Thrombosis Neg Hx     Anesth Problems Neg Hx     negative for cardiac disease       SOCIAL HISTORY     Social History     Socioeconomic History    Marital status:      Spouse name: Not on file    Number of children: Not on file    Years of education: Not on file    Highest education level: Not on file   Tobacco Use    Smoking status: Former Smoker     Packs/day: 1.00     Years: 30.00     Pack years: 30.00     Types: Cigarettes     Quit date: 2002     Years since quittin.7    Smokeless tobacco: Never Used   Substance and Sexual Activity    Alcohol use: No    Drug use: Never    Sexual activity: Yes     Partners: Male         MEDICATIONS     Current Outpatient Medications   Medication Sig    bumetanide (BUMEX) 1 mg tablet Takes 1 tablet in AM and half tablet in afternoon    metoprolol succinate (TOPROL-XL) 50 mg XL tablet Take 1 Tab by mouth daily.  etanercept (EnbreL SureClick) 50 mg/mL (1 mL) injection 50 mg by SubCUTAneous route every seven (7) days.  nitroglycerin (NITRODUR) 0.1 mg/hr 1 Patch by TransDERmal route daily.  lidocaine (Lidoderm) 5 % 1 Patch by TransDERmal route every twenty-four (24) hours. Apply patch to the affected area for 12 hours a day and remove for 12 hours a day.  gabapentin (NEURONTIN) 100 mg capsule Take 1 Cap by mouth two (2) times a day. Max Daily Amount: 200 mg.  predniSONE (DELTASONE) 2.5 mg tablet Take 1 Tab by mouth daily (after lunch). 5 mg in morning and 2.5 mg afternoon  Resume your usual dose of prednisone after you complete the prednisone taper    acetaminophen (TYLENOL) 650 mg TbER Take 1,300 mg by mouth two (2) times a day.  co-enzyme Q-10 (Co Q-10) 100 mg capsule Take 200 mg by mouth daily.  artificial tears, dextran 70-hypromellose, (GenTeal Tears Mild) 0.1-0.3 % ophthalmic solution Administer 1 Drop to both eyes nightly.  carboxymethylcell/hypromellose (GENTEAL GEL OP) Administer 1 Drop to both eyes as needed (dry eye).  omega 3-DHA-EPA-fish oil 1,000 mg (120 mg-180 mg) capsule Take 1 Cap by mouth every evening.  levalbuterol (XOPENEX) 0.63 mg/3 mL nebu 0.63 mg by Nebulization route two (2) times a day.  aclidinium bromide (Tudorza Pressair) 400 mcg/actuation inhaler Take 1 Puff by inhalation daily.  calcium citrate-vitamin d3 (CITRACAL+D) 315 mg-5 mcg (200 unit) tab Take 1 Tab by mouth daily (with breakfast).  mometasone furoate (ASMANEX HFA IN) Take 100 mcg by inhalation two (2) times a day.  potassium 99 mg tablet Take 99 mg by mouth daily.     insulin glargine (LANTUS,BASAGLAR) 100 unit/mL (3 mL) inpn 8 Units by SubCUTAneous route Daily (before breakfast). 30 minutes before breakfast    denosumab (PROLIA SC) by SubCUTAneous route every 6 months.  eplerenone (INSPRA) 25 mg tablet Take 25 mg by mouth Daily (before lunch).  fenofibrate nanocrystallized (Tricor) 48 mg tablet Take 48 mg by mouth nightly.  levothyroxine (SYNTHROID) 137 mcg tablet Take 137 mcg by mouth Daily (before breakfast).  predniSONE (DELTASONE) 5 mg tablet Take 5 mg by mouth daily. 5 mg in morning and 2.5 mg afternoon    cholecalciferol, vitamin D3, (VITAMIN D3) 2,000 unit tab Take 2,000 Units by mouth daily.  turmeric (CURCUMIN) Take 1 g by mouth every evening.  SITagliptin (JANUVIA) 100 mg tablet Take 100 mg by mouth daily (with dinner).  vit C/vit E ac/lut/copper/zinc (PRESERVISION LUTEIN PO) Take 1 Tab by mouth every Monday. Takes with dinner    ascorbic acid (VITAMIN C) 500 mg tablet Take 500 mg by mouth daily.  ferrous sulfate 325 mg (65 mg iron) tablet Take 325 mg by mouth daily (with lunch).  cyanocobalamin (VITAMIN B12) 1,000 mcg/mL injection INJECT 1 ML INTO THE MUSCLE EVERY 30 DAYS    lansoprazole (PREVACID) 30 mg capsule Take 30 mg by mouth daily. No current facility-administered medications for this visit. I have reviewed the nurses notes, vitals, problem list, allergy list, medical history, family, social history and medications. REVIEW OF SYMPTOMS      General: Pt denies excessive weight gain or loss. Pt is able to conduct ADL's  HEENT: Denies blurred vision, headaches, hearing loss, epistaxis and difficulty swallowing. Respiratory: Denies cough, congestion, shortness of breath, CHACON, wheezing or stridor.   Cardiovascular: Denies precordial pain, palpitations, edema or PND  Gastrointestinal: Denies poor appetite, indigestion, abdominal pain or blood in stool  Genitourinary: Denies hematuria, dysuria, increased urinary frequency  Musculoskeletal: Denies joint pain or swelling from muscles or joints  Neurologic: Denies tremor, paresthesias, headache, or sensory motor disturbance  Psychiatric: Denies confusion, insomnia, depression  Integumentray: Denies rash, itching or ulcers. Hematologic: Denies easy bruising, bleeding       PHYSICAL EXAMINATION      Vitals: see vitals section  General: Well developed, in no acute distress. HEENT: No jaundice, oral mucosa moist, no oral ulcers  Neck: Supple, no stiffness, no lymphadenopathy, supple  Heart:  Normal S1/S2 negative S3 or S4. Regular, no murmur, gallop or rub, no jugular venous distention  Respiratory: Clear bilaterally x 4, no wheezing or rales  Abdomen:   Soft, non-tender, bowel sounds are active. Extremities:  No edema, normal cap refill, no cyanosis. Musculoskeletal: No clubbing, no deformities  Neuro: A&Ox3, speech clear, gait stable, cooperative, no focal neurologic deficits  Skin: Skin color is normal. No rashes or lesions. Non diaphoretic, moist.  Vascular: 2+ pulses symmetric in all extremities       DIAGNOSTIC DATA      EKG:        LABORATORY DATA      Lab Results   Component Value Date/Time    WBC 13.7 (H) 11/16/2020 04:40 AM    Hemoglobin (POC) 15.6 06/09/2014 03:00 AM    HGB 13.4 11/16/2020 04:40 AM    Hematocrit (POC) 49 (H) 11/05/2020 09:33 PM    HCT 41.9 11/16/2020 04:40 AM    PLATELET 984 75/81/0308 04:40 AM    MCV 96.5 11/16/2020 04:40 AM      Lab Results   Component Value Date/Time    Sodium 143 11/17/2020 03:26 AM    Potassium 3.9 11/17/2020 03:26 AM    Chloride 111 (H) 11/17/2020 03:26 AM    CO2 25 11/17/2020 03:26 AM    Anion gap 7 11/17/2020 03:26 AM    Glucose 149 (H) 11/17/2020 03:26 AM    BUN 10 11/17/2020 03:26 AM    Creatinine 0.71 11/17/2020 03:26 AM    BUN/Creatinine ratio 14 11/17/2020 03:26 AM    GFR est AA >60 11/17/2020 03:26 AM    GFR est non-AA >60 11/17/2020 03:26 AM    Calcium 8.4 (L) 11/17/2020 03:26 AM    Bilirubin, total 0.5 11/14/2020 03:04 AM    Alk.  phosphatase 61 11/14/2020 03:04 AM    Protein, total 5.1 (L) 11/14/2020 03:04 AM    Albumin 3.0 (L) 11/16/2020 04:40 AM    Globulin 2.3 11/14/2020 03:04 AM    A-G Ratio 1.2 11/14/2020 03:04 AM    ALT (SGPT) 20 11/14/2020 03:04 AM           ASSESSMENT      1. Atrial fibrillation with RVR              Paroxysmal                       S/p SUHAS Clip              AV node ablation  2.  CAD               S/p PCI to LAD and RCA  3. PVCs  4. Hypertension  5. COPD/Asthma  6. Pacemaker       PLAN     Plan for upgrade to CRTP with [employer] using conscious sedation. Continue all meds prior. ICD-10-CM ICD-9-CM    1. Paroxysmal atrial fibrillation (HCC)  I48.0 427.31    2. Coronary artery disease involving native coronary artery of native heart without angina pectoris  I25.10 414.01    3. Cardiac pacemaker in situ  Z95.0 V45.01    4. S/P AV jules ablation  Z98.890 V45.89    5. HTN (hypertension), benign  I10 401.1    6. Dyslipidemia  E78.5 272.4    7. Chronic diastolic heart failure (HCC)  I50.32 428.32      No orders of the defined types were placed in this encounter. FOLLOW-UP       Thank you, Evita Dickson MD for allowing me to participate in the care of this extraordinarily pleasant female. Please do not hesitate to contact me for further questions/concerns.          Baylee Mcintyre MD  Cardiac Electrophysiology / Cardiology    [address]  [address] [facility], [facility], [place] [address]  Justo Cruz [address] [place] [address] [facility]  [phone] / [phone] Fax   [phone] / [phone] Fax

## 2021-03-30 ENCOUNTER — PREP FOR PROCEDURE (OUTPATIENT)
Dept: CARDIOLOGY CLINIC | Age: 71
End: 2021-03-30

## 2021-03-30 RX ORDER — EPLERENONE 25 MG/1
25 TABLET, FILM COATED ORAL
Qty: 90 TAB | Refills: 1 | Status: SHIPPED | OUTPATIENT
Start: 2021-03-30

## 2021-03-30 RX ORDER — SODIUM CHLORIDE 0.9 % (FLUSH) 0.9 %
5-40 SYRINGE (ML) INJECTION EVERY 8 HOURS
Status: CANCELLED | OUTPATIENT
Start: 2021-03-30

## 2021-03-30 RX ORDER — SODIUM CHLORIDE 0.9 % (FLUSH) 0.9 %
5-40 SYRINGE (ML) INJECTION AS NEEDED
Status: CANCELLED | OUTPATIENT
Start: 2021-03-30

## 2021-03-30 NOTE — TELEPHONE ENCOUNTER
Refill per VO of Dr. Charlene Neri:  Last appt: 3/26/2021  Future Appointments   Date Time Provider Inge Reaves   4/5/2021  1:40 PM [facility]AC COVID VACCINE 21 DAY IMACWTC BS AMB   5/27/2021 12:15 PM [place] [address], [facility] CAVSF  AMB   6/29/2021 10:20 AM [facility]   10/4/2021 10:15 AM [facility]       Requested Prescriptions     Signed Prescriptions Disp Refills    eplerenone (INSPRA) 25 mg tablet 90 Tab 1     Sig: Take 1 Tab by mouth Daily (before lunch).      Authorizing Provider: Ave Cramer     Ordering User: Paul Ca

## 2021-04-05 ENCOUNTER — IMMUNIZATION (OUTPATIENT)
Dept: INTERNAL MEDICINE CLINIC | Age: 71
End: 2021-04-05
Payer: MEDICARE

## 2021-04-05 DIAGNOSIS — Z23 ENCOUNTER FOR IMMUNIZATION: Primary | ICD-10-CM

## 2021-04-05 PROCEDURE — 0002A COVID-19, MRNA, LNP-S, PF, 30MCG/0.3ML DOSE(PFIZER): CPT | Performed by: FAMILY MEDICINE

## 2021-04-05 PROCEDURE — 91300 COVID-19, MRNA, LNP-S, PF, 30MCG/0.3ML DOSE(PFIZER): CPT | Performed by: FAMILY MEDICINE

## 2021-04-16 ENCOUNTER — TELEPHONE (OUTPATIENT)
Dept: CARDIOLOGY CLINIC | Age: 71
End: 2021-04-16

## 2021-04-16 NOTE — TELEPHONE ENCOUNTER
Returned patient call, ID verified using two patient identifiers. Patient misplaced her pre-procedure instructions for her upcoming surgery on 4/26/21 and wants to review. Explained her instructions including ordered labs and medications to hold prior to the surgery. Patient verbalized understanding and will call with any other questions.

## 2021-04-16 NOTE — TELEPHONE ENCOUNTER
Patient is scheduled to have a procedure on 4/26 and would like to speak with a nurse to discuss instructions. Please advise.      Phone: [phone]

## 2021-04-21 LAB
APPEARANCE UR: ABNORMAL
BACTERIA #/AREA URNS HPF: ABNORMAL /[HPF]
BILIRUB UR QL STRIP: NEGATIVE
BUN SERPL-MCNC: 20 MG/DL (ref 8–27)
BUN/CREAT SERPL: 24 (ref 12–28)
CALCIUM SERPL-MCNC: 9.4 MG/DL (ref 8.7–10.3)
CASTS URNS QL MICRO: ABNORMAL /LPF
CHLORIDE SERPL-SCNC: 105 MMOL/L (ref 96–106)
CO2 SERPL-SCNC: 16 MMOL/L (ref 20–29)
COLOR UR: YELLOW
CREAT SERPL-MCNC: 0.85 MG/DL (ref 0.57–1)
EPI CELLS #/AREA URNS HPF: >10 /HPF (ref 0–10)
ERYTHROCYTE [DISTWIDTH] IN BLOOD BY AUTOMATED COUNT: 13.3 % (ref 11.7–15.4)
GLUCOSE SERPL-MCNC: 150 MG/DL (ref 65–99)
GLUCOSE UR QL: NEGATIVE
HCT VFR BLD AUTO: 51.1 % (ref 34–46.6)
HGB BLD-MCNC: 17.3 G/DL (ref 11.1–15.9)
HGB UR QL STRIP: NEGATIVE
INR PPP: 1 (ref 0.9–1.2)
KETONES UR QL STRIP: NEGATIVE
LEUKOCYTE ESTERASE UR QL STRIP: ABNORMAL
MCH RBC QN AUTO: 31.6 PG (ref 26.6–33)
MCHC RBC AUTO-ENTMCNC: 33.9 G/DL (ref 31.5–35.7)
MCV RBC AUTO: 93 FL (ref 79–97)
MICRO URNS: ABNORMAL
NITRITE UR QL STRIP: NEGATIVE
PH UR STRIP: 6 [PH] (ref 5–7.5)
PLATELET # BLD AUTO: 263 X10E3/UL (ref 150–450)
POTASSIUM SERPL-SCNC: 4.6 MMOL/L (ref 3.5–5.2)
PROT UR QL STRIP: ABNORMAL
PROTHROMBIN TIME: 10.6 SEC (ref 9.1–12)
RBC # BLD AUTO: 5.48 X10E6/UL (ref 3.77–5.28)
RBC #/AREA URNS HPF: ABNORMAL /HPF (ref 0–2)
SODIUM SERPL-SCNC: 145 MMOL/L (ref 134–144)
SP GR UR: 1.02 (ref 1–1.03)
UROBILINOGEN UR STRIP-MCNC: 0.2 MG/DL (ref 0.2–1)
WBC # BLD AUTO: 16.2 X10E3/UL (ref 3.4–10.8)
WBC #/AREA URNS HPF: ABNORMAL /HPF (ref 0–5)

## 2021-04-22 ENCOUNTER — TELEPHONE (OUTPATIENT)
Dept: CARDIOLOGY CLINIC | Age: 71
End: 2021-04-22

## 2021-04-23 NOTE — TELEPHONE ENCOUNTER
Elle Burnett MD  You 52 minutes ago (9:08 AM)     Reschedule for after ua cleared on abix      Returned patient call, ID verified using two patient identifiers. Advised patient that per Dr. Alatorre Post her procedure for Monday will have to be rescheduled. She should see her PCP or urologist if she chooses to be treated for her active UTI. Once she has completed her antibiotics and her infection is cleared we can reschedule her Pacemaker upgrade. Instructed patient to call the office once her infection has been treated. Patient verbalized understanding and will call with any other questions.

## 2021-05-05 ENCOUNTER — TELEPHONE (OUTPATIENT)
Dept: CARDIOLOGY CLINIC | Age: 71
End: 2021-05-05

## 2021-05-05 DIAGNOSIS — Z01.812 PRE-PROCEDURE LAB EXAM: ICD-10-CM

## 2021-05-05 DIAGNOSIS — Z95.0 CARDIAC PACEMAKER IN SITU: ICD-10-CM

## 2021-05-05 DIAGNOSIS — I50.32 CHRONIC DIASTOLIC HEART FAILURE (HCC): ICD-10-CM

## 2021-05-05 DIAGNOSIS — I25.10 CORONARY ARTERY DISEASE INVOLVING NATIVE CORONARY ARTERY OF NATIVE HEART WITHOUT ANGINA PECTORIS: ICD-10-CM

## 2021-05-05 DIAGNOSIS — I48.0 PAROXYSMAL ATRIAL FIBRILLATION (HCC): Primary | ICD-10-CM

## 2021-05-05 DIAGNOSIS — Z98.890 S/P AV NODAL ABLATION: ICD-10-CM

## 2021-05-05 NOTE — LETTER
5/10/2021 3:08 PM 
 
Ms. Kvng Turk 
[address] [place] [address] [phone] You are scheduled for the following procedure with Dr. Alatorre Post:  Pacemaker Upgrade at [facility], [address] [facility], [place], [address] [facility] PLEASE be aware that your procedure date/time is tentative and subject to change due to emergency cases. Procedure date/time:    May 28, 2021  Time: 12:00 am - please arrive by 11:00 am 
 
 
ARRIVAL time:  (You will need  to be discharged home with.)  
 
o [facility] procedures: please arrive to check in on 2nd floor one hour prior to your procedure the day of your procedure. 
 
o [facility] procedures: arrive to check in on 1st floor near [facility] entrance two hours prior to your procedure. Pre-procedure Labs/Imaging: 
 
o PRE-PROCEDURE LABS NEEDED: YES  
o Forms for labwork may be given at appointment. If not, they will be mailed to you. Labs should be completed within 5-7 days of procedure. These can be done at any [facility] or [facility] lab (one is located in [address] [place] [address]. No appointment needed). NO A COVID swab may be needed 4 days prior to your procedure. o YOU MAY NEED PRE-PROCEDURE IMAGING, CHEST CTA OR CARDIAC MRI.    
[]  Chest CTA []  Cardiac MRI    ([facility] scheduling to call you)  -  [phone] Medication Instructions: Do not stop any medications prior to procedure. If you take any of the following Diabetic medications, please use as follows: 
 
[X]  Glucophage/Metformin  -  Hold morning dose on day of procedure. [X]  Insulin  -  Hold morning dose on day of procedure. [X]  Lantus  -  Reduce dose by ½ evening before procedure. Nothing to eat or drink after midnight before your procedure. You may take all other medications as directed the morning of your procedure with a sip of water unless otherwise indicated.  
 
 
 
Please contact the office [phone] and ask for a member of  [facility]' team for procedure questions. There is a physician on call for the office after hours for immediate needs. FOLLOW UP: 
 Your appointments will be made for you post procedure based off of discharge instructions. You may have driving restrictions for a short time after your procedure (usually 2-3 days). Pacemaker/ICD patients will be unable to have an MRI until 6 weeks after implant, NO dental work for 8 weeks after your implant. Sincerely, Kenya Wu MD

## 2021-05-05 NOTE — TELEPHONE ENCOUNTER
Patient requesting to reschedule procedure once urinalysis comes back. States she would like know if there is anything else she should do anything.      Phone: [phone]

## 2021-05-10 ENCOUNTER — TELEPHONE (OUTPATIENT)
Dept: CARDIOLOGY CLINIC | Age: 71
End: 2021-05-10

## 2021-05-10 NOTE — TELEPHONE ENCOUNTER
Returned patient call, ID verified using two patient identifiers. Patient states she had a recent urinalysis and that her UTI has cleared up. Patient rescheduled for her pacemaker upgrade on Friday, 5/28/21 at 12:00 pm.  Pre-procedure instructions reviewed and will be mailed to patient's address confirmed on file. Patient verbalized understanding and will call with any other questions.

## 2021-05-22 LAB
APPEARANCE UR: CLEAR
BILIRUB UR QL STRIP: NEGATIVE
BUN SERPL-MCNC: 14 MG/DL (ref 8–27)
BUN/CREAT SERPL: 24 (ref 12–28)
CALCIUM SERPL-MCNC: 9.2 MG/DL (ref 8.7–10.3)
CHLORIDE SERPL-SCNC: 105 MMOL/L (ref 96–106)
CO2 SERPL-SCNC: 25 MMOL/L (ref 20–29)
COLOR UR: YELLOW
CREAT SERPL-MCNC: 0.58 MG/DL (ref 0.57–1)
ERYTHROCYTE [DISTWIDTH] IN BLOOD BY AUTOMATED COUNT: 12.9 % (ref 11.7–15.4)
GLUCOSE SERPL-MCNC: 95 MG/DL (ref 65–99)
GLUCOSE UR QL: NEGATIVE
HCT VFR BLD AUTO: 46 % (ref 34–46.6)
HGB BLD-MCNC: 15.5 G/DL (ref 11.1–15.9)
HGB UR QL STRIP: NEGATIVE
INR PPP: 1 (ref 0.9–1.2)
KETONES UR QL STRIP: NEGATIVE
LEUKOCYTE ESTERASE UR QL STRIP: NEGATIVE
MCH RBC QN AUTO: 31.3 PG (ref 26.6–33)
MCHC RBC AUTO-ENTMCNC: 33.7 G/DL (ref 31.5–35.7)
MCV RBC AUTO: 93 FL (ref 79–97)
MICRO URNS: NORMAL
NITRITE UR QL STRIP: NEGATIVE
PH UR STRIP: 6.5 [PH] (ref 5–7.5)
PLATELET # BLD AUTO: 250 X10E3/UL (ref 150–450)
POTASSIUM SERPL-SCNC: 4 MMOL/L (ref 3.5–5.2)
PROT UR QL STRIP: NEGATIVE
PROTHROMBIN TIME: 10.3 SEC (ref 9.1–12)
RBC # BLD AUTO: 4.96 X10E6/UL (ref 3.77–5.28)
SODIUM SERPL-SCNC: 141 MMOL/L (ref 134–144)
SP GR UR: 1.01 (ref 1–1.03)
UROBILINOGEN UR STRIP-MCNC: 0.2 MG/DL (ref 0.2–1)
WBC # BLD AUTO: 16.9 X10E3/UL (ref 3.4–10.8)

## 2021-05-27 ENCOUNTER — OFFICE VISIT (OUTPATIENT)
Dept: CARDIOLOGY CLINIC | Age: 71
End: 2021-05-27
Payer: MEDICARE

## 2021-05-27 ENCOUNTER — TELEPHONE (OUTPATIENT)
Dept: CARDIOLOGY CLINIC | Age: 71
End: 2021-05-27

## 2021-05-27 DIAGNOSIS — Z95.0 CARDIAC PACEMAKER IN SITU: Primary | ICD-10-CM

## 2021-05-27 PROCEDURE — 93294 REM INTERROG EVL PM/LDLS PM: CPT | Performed by: INTERNAL MEDICINE

## 2021-05-27 NOTE — LETTER
5/28/2021 11:49 AM 
 
Ms. America Kee 
[address] [place] [id number] Dear Ms. America Kee, We have received your recent remote monitor check of your implanted device scheduled on  5/27/2021. Your remaining estimated battery life is  11.1 years and your device is working normally & appropriately. Your next remote monitor check is scheduled for 8/31/2021. You do not need to report to the office as this occurs during the night from your home. If you have any questions, please call the Pacemaker/ICD clinic at the [facility] location at [phone]. We appreciate you staying remotely connected. Sincerely, 
 
Alyse MARKSN, RN Cardiac Device Clinic Coordinator [facility] [address] [place] [address] 
[phone]

## 2021-05-27 NOTE — TELEPHONE ENCOUNTER
Returned patient call, ID verified using two patient identifiers. Patient calling to see if she needs to bring her bedside monitor with her tomorrow when she comes for her pacemaker upgrade. Advised her that she does not need to bring the box with her. She may receive a new box with her new device and the device rep will let her know what to do with the old box. Reviewed patient's lab results with her and reminded patient to arrive at 11:00 am tomorrow. Patient verbalized understanding and will call with any other questions.

## 2021-05-27 NOTE — TELEPHONE ENCOUNTER
Patient inquiring she should bring the monitor with her to her appt 05/28/2021      UDQWL:[phone]

## 2021-05-28 ENCOUNTER — HOSPITAL ENCOUNTER (INPATIENT)
Age: 71
LOS: 1 days | Discharge: HOME | DRG: 292 | End: 2021-05-30
Attending: INTERNAL MEDICINE | Admitting: INTERNAL MEDICINE
Payer: MEDICARE

## 2021-05-28 DIAGNOSIS — I50.33 ACUTE ON CHRONIC HEART FAILURE WITH PRESERVED EJECTION FRACTION (HCC): ICD-10-CM

## 2021-05-28 DIAGNOSIS — I50.30 DIASTOLIC HEART FAILURE, UNSPECIFIED HF CHRONICITY (HCC): ICD-10-CM

## 2021-05-28 DIAGNOSIS — I48.0 PAROXYSMAL ATRIAL FIBRILLATION (HCC): ICD-10-CM

## 2021-05-28 PROBLEM — Z95.0 PACEMAKER: Status: ACTIVE | Noted: 2021-05-28

## 2021-05-28 LAB
GLUCOSE BLD STRIP.AUTO-MCNC: 156 MG/DL (ref 65–117)
GLUCOSE BLD STRIP.AUTO-MCNC: 163 MG/DL (ref 65–117)
GLUCOSE BLD STRIP.AUTO-MCNC: 171 MG/DL (ref 65–117)
SERVICE CMNT-IMP: ABNORMAL

## 2021-05-28 PROCEDURE — 74011250637 HC RX REV CODE- 250/637: Performed by: INTERNAL MEDICINE

## 2021-05-28 PROCEDURE — 74011250636 HC RX REV CODE- 250/636: Performed by: INTERNAL MEDICINE

## 2021-05-28 PROCEDURE — 94640 AIRWAY INHALATION TREATMENT: CPT

## 2021-05-28 PROCEDURE — 82962 GLUCOSE BLOOD TEST: CPT

## 2021-05-28 PROCEDURE — 77010033678 HC OXYGEN DAILY

## 2021-05-28 PROCEDURE — 99223 1ST HOSP IP/OBS HIGH 75: CPT | Performed by: INTERNAL MEDICINE

## 2021-05-28 PROCEDURE — 99218 HC RM OBSERVATION: CPT

## 2021-05-28 PROCEDURE — 74011000250 HC RX REV CODE- 250: Performed by: INTERNAL MEDICINE

## 2021-05-28 PROCEDURE — 96374 THER/PROPH/DIAG INJ IV PUSH: CPT

## 2021-05-28 PROCEDURE — 77030038269 HC DRN EXT URIN PURWCK BARD -A

## 2021-05-28 PROCEDURE — 96375 TX/PRO/DX INJ NEW DRUG ADDON: CPT

## 2021-05-28 PROCEDURE — A9270 NON-COVERED ITEM OR SERVICE: HCPCS | Performed by: INTERNAL MEDICINE

## 2021-05-28 PROCEDURE — 94760 N-INVAS EAR/PLS OXIMETRY 1: CPT

## 2021-05-28 PROCEDURE — 74011636637 HC RX REV CODE- 636/637: Performed by: INTERNAL MEDICINE

## 2021-05-28 RX ORDER — EPLERENONE 25 MG/1
25 TABLET, FILM COATED ORAL
Status: DISCONTINUED | OUTPATIENT
Start: 2021-05-28 | End: 2021-05-30 | Stop reason: HOSPADM

## 2021-05-28 RX ORDER — BUDESONIDE 0.5 MG/2ML
250 INHALANT ORAL
Status: DISCONTINUED | OUTPATIENT
Start: 2021-05-28 | End: 2021-05-30 | Stop reason: HOSPADM

## 2021-05-28 RX ORDER — BUMETANIDE 0.25 MG/ML
1 INJECTION INTRAMUSCULAR; INTRAVENOUS 2 TIMES DAILY
Status: DISCONTINUED | OUTPATIENT
Start: 2021-05-28 | End: 2021-05-28

## 2021-05-28 RX ORDER — IPRATROPIUM BROMIDE 0.5 MG/2.5ML
0.5 SOLUTION RESPIRATORY (INHALATION)
Status: DISCONTINUED | OUTPATIENT
Start: 2021-05-29 | End: 2021-05-30 | Stop reason: HOSPADM

## 2021-05-28 RX ORDER — PREDNISONE 5 MG/1
5 TABLET ORAL DAILY
Status: DISCONTINUED | OUTPATIENT
Start: 2021-05-29 | End: 2021-05-30 | Stop reason: HOSPADM

## 2021-05-28 RX ORDER — PANTOPRAZOLE SODIUM 40 MG/1
40 TABLET, DELAYED RELEASE ORAL
Status: DISCONTINUED | OUTPATIENT
Start: 2021-05-29 | End: 2021-05-30 | Stop reason: HOSPADM

## 2021-05-28 RX ORDER — INSULIN GLARGINE 100 [IU]/ML
8 INJECTION, SOLUTION SUBCUTANEOUS
Status: DISCONTINUED | OUTPATIENT
Start: 2021-05-28 | End: 2021-05-30

## 2021-05-28 RX ORDER — METOPROLOL SUCCINATE 50 MG/1
50 TABLET, EXTENDED RELEASE ORAL DAILY
Status: DISCONTINUED | OUTPATIENT
Start: 2021-05-29 | End: 2021-05-30 | Stop reason: HOSPADM

## 2021-05-28 RX ORDER — OMEGA-3-ACID ETHYL ESTERS 1 G/1
1 CAPSULE, LIQUID FILLED ORAL EVERY EVENING
Status: DISCONTINUED | OUTPATIENT
Start: 2021-05-28 | End: 2021-05-30 | Stop reason: HOSPADM

## 2021-05-28 RX ORDER — BUMETANIDE 0.25 MG/ML
1 INJECTION INTRAMUSCULAR; INTRAVENOUS 2 TIMES DAILY
Status: DISCONTINUED | OUTPATIENT
Start: 2021-05-28 | End: 2021-05-29

## 2021-05-28 RX ORDER — FENOFIBRATE 48 MG/1
48 TABLET, COATED ORAL
Status: DISCONTINUED | OUTPATIENT
Start: 2021-05-28 | End: 2021-05-30 | Stop reason: HOSPADM

## 2021-05-28 RX ORDER — GENTAMICIN SULFATE 80 MG/100ML
80 INJECTION, SOLUTION INTRAVENOUS ONCE
Status: DISCONTINUED | OUTPATIENT
Start: 2021-05-28 | End: 2021-05-28

## 2021-05-28 RX ORDER — PREDNISONE 5 MG/1
2.5 TABLET ORAL
Status: DISCONTINUED | OUTPATIENT
Start: 2021-05-28 | End: 2021-05-30 | Stop reason: HOSPADM

## 2021-05-28 RX ORDER — NITROGLYCERIN 20 MG/1
1 PATCH TRANSDERMAL DAILY
Status: DISCONTINUED | OUTPATIENT
Start: 2021-05-29 | End: 2021-05-29

## 2021-05-28 RX ORDER — ACETAMINOPHEN 325 MG/1
650 TABLET ORAL
Status: DISCONTINUED | OUTPATIENT
Start: 2021-05-28 | End: 2021-05-30 | Stop reason: HOSPADM

## 2021-05-28 RX ADMIN — OMEGA-3-ACID ETHYL ESTERS 1 CAPSULE: 1 CAPSULE, LIQUID FILLED ORAL at 17:13

## 2021-05-28 RX ADMIN — VANCOMYCIN HYDROCHLORIDE 1000 MG: 1 INJECTION, POWDER, LYOPHILIZED, FOR SOLUTION INTRAVENOUS at 13:16

## 2021-05-28 RX ADMIN — BUMETANIDE 1 MG: 0.25 INJECTION INTRAMUSCULAR; INTRAVENOUS at 15:18

## 2021-05-28 RX ADMIN — BUDESONIDE 250 MCG: 0.5 SUSPENSION RESPIRATORY (INHALATION) at 19:51

## 2021-05-28 RX ADMIN — INSULIN GLARGINE 8 UNITS: 100 INJECTION, SOLUTION SUBCUTANEOUS at 17:08

## 2021-05-28 RX ADMIN — DEXTRAN 70, GLYCERIN, HYPROMELLOSE 1 DROP: 1; 2; 3 SOLUTION/ DROPS OPHTHALMIC at 21:53

## 2021-05-28 RX ADMIN — EPLERENONE 25 MG: 25 TABLET, FILM COATED ORAL at 17:13

## 2021-05-28 RX ADMIN — PREDNISONE 2.5 MG: 5 TABLET ORAL at 17:09

## 2021-05-28 RX ADMIN — ACETAMINOPHEN 650 MG: 325 TABLET ORAL at 18:17

## 2021-05-28 RX ADMIN — FENOFIBRATE 48 MG: 48 TABLET ORAL at 21:36

## 2021-05-28 NOTE — PROGRESS NOTES
11:39 AM  Patient arrived. ID and allergies verified verbally with patient. Pt voices understanding of procedure to be performed. Consent obtained. Pt prepped for procedure. 11:45 AM  Dr. David Chua at bedside. Patient experienced shortness of breath and labored breathing when lying below 20 degrees. Patient's oxygen level remained at 95%. Given patient's inability to tolerate lying flat, plan is to admit patient for diuresis and observation before proceeding with the procedure. 3:15 PM  30 minute pre-call to 5th floor      3:50 PM  TRANSFER - OUT REPORT:    Verbal report given to Mando Edmonds RN on Steven Lundy  being transferred to 5th floor for routine progression of care       Report consisted of patients Situation, Background, Assessment and   Recommendations(SBAR). Information from the following report(s) SBAR was reviewed with the receiving nurse. Lines:   Peripheral IV 05/28/21 Anterior;Right Forearm (Active)        Opportunity for questions and clarification was provided.       Patient transported with:   Registered Nurse

## 2021-05-28 NOTE — PROGRESS NOTES
chargeable pacer remote    See scanned document for details. Normal pacer function. Pt. Is dependent & feels better with lower rate of 80 ppm.  Since 2/24/21:  No tachy episodes recorded. 11.1 years remaining longevity. RV pacing 98.9%.

## 2021-05-28 NOTE — H&P
HISTORY OF PRESENTING ILLNESS      Cornelius Tam is a 66 y.o. female with hx of paroxysmal arial fibrillation (s/p ablation x10 years ago with Dr. Yesica Zamora), previous history of thrombocytopenia, hypertension, COPD, heart failure preserved EF, CAD, LA ligation, apcemaker s/p AV node ablation.  She experienced chest pain/palpitations post procedure and presented to the ER. Previous cardiac catheterization and stress test in 2019 were negative for ischemia and no significant coronary disease and stents were open.  She had normal stress testing 11/5/2020 due to peaked troponin level. Echocardiogram demonstrated preserved LV function of 55-60%.       Device interrogation reveals normal device functioning; patient remains in complete heart block.  She reports shortness of breath and fatigue that she noticed had improved following her procedure however her symptoms recurred after her lower rate was changed from 80 down to 70 bpm.  Last visit her rate was increased to 80 bpm and an echocardiogram was obtained which demonstrated an LV ejection fraction of 45% compared to an LV function of 55 to 60% on 11/2020.  She presented for upgrade to CRTP however is significantly orthopneic despite recent adjustments in oral bumex regimen.          PAST MEDICAL HISTORY           Past Medical History:   Diagnosis Date    Adverse effect of anesthesia       slow to wake up    Arthritis      Asthma 6/29/2009     CAUSED BY MOLD    Atrial fibrillation (HCC) 6/29/2009    Atrial flutter ([address]      CAD (coronary artery disease) 06/29/2009    Celiac disease 2010    Chronic chest pain      Chronic obstructive pulmonary disease ([address] 2004-5     right leg    Chronic pain of right ankle      Diabetes (HCC)       Drug induced prednisone, DM2    Diastolic heart failure (HCC)      DVT (deep venous thrombosis) ([address] 2004     Right leg post surgery    GERD (gastroesophageal reflux disease)      Gluten intolerance      [facility]      Hypertension      Hypothyroidism 6/29/2009    Iron deficiency anemia      Lyme disease      Pacemaker       11/20    Personal history of MI (myocardial infarction)      Rheumatoid arthritis [facility]      S/P AV jules ablation       11/20     S/P left atrial appendage ligation       SUHAS clip 10/20     Slow to wake up after anesthesia      Syncope       Post testing- resolved    TIA (transient ischemic attack) 2004 & 2006    Vitamin B12 deficiency 6/29/2009             PAST SURGICAL HISTORY            Past Surgical History:   Procedure Laterality Date    HX ADENOIDECTOMY        HX AFIB ABLATION        HX APPENDECTOMY        HX CHOLECYSTECTOMY   6/16/11    HX COLONOSCOPY        HX CORONARY STENT PLACEMENT         x 2    HX HEART CATHETERIZATION   2010     2 STENTS    HX HEART CATHETERIZATION   03/2020    HX HYSTERECTOMY        HX LUMBAR LAMINECTOMY   2000     L4-L5    HX ORTHOPAEDIC   1993-6/2012     RT.  FOOT SURGERY X 6/calcaneal osteotomy    HX ORTHOPAEDIC Right 09/19/2020     right hand [facility] joint replacement    HX TONSILLECTOMY   1964    ID ICAR CATHETER ABLATION ATRIOVENTR NODE FUNCTION N/A 11/19/2020     ABLATION AV NODE performed by David Vela MD at [address] CATH LAB    ID INS NEW/RPLCMT PRM PM W/TRANSV ELTRD ATRIAL&VENT N/A 11/19/2020     INSERT PPM DUAL performed by David Vela MD at [address] CATH LAB    ID INTRACARDIAC ELECTROPHYSIOLOGIC 3D MAPPING N/A 11/19/2020     Ep 3d Mapping performed by David Vela MD at [address] CATH LAB             ALLERGIES            Allergies   Allergen Reactions    Butter Flavor Anaphylaxis       Butter AND CREME    Keflex [Cephalexin] Anaphylaxis    Milk Containing Products Anaphylaxis       Butter and cream    Pcn [Penicillins] Anaphylaxis    Aspirin Hives and Other (comments)       \"it makes my stomach bleed\"       Cimzia [Certolizumab Pegol] Other (comments)       GI symptoms, blisters in the mouth and esophagus    Clopidogrel Other (comments)       GI bleed    Demerol [Meperidine] Other (comments)       HYPOTENSION    Fentanyl Other (comments)       HYPOTENSION    Ibuprofen Diarrhea       Patient reports that ibuprofen caused diarrhea    Morphine Other (comments)       HYPOTENSION    Nitroglycerin Other (comments)       IN PILL FORM  - HYPOTENSION  CAN TOLERATE PATCH FOR SHORT TIME    Sulfa (Sulfonamide Antibiotics) Angioedema       Lips    Tapazole [Methimazole] Unknown (comments)       Patient stated \"it made me feel like I had the flu\"    Tramadol Other (comments)       Patient reports thrush with tramadol use    Adhesive Tape-Silicones Other (comments)       BAD BLISTERS AND TENDS TO GET INFECTED    Albuterol Palpitations    Gluten Diarrhea       bloating    Oxycodone Other (comments)       Hallicination and hypotension            FAMILY HISTORY            Family History   Problem Relation Age of Onset    Delayed Awakening Mother      Heart Disease Mother      Coronary Artery Disease Mother      Hypertension Mother      Stroke Mother      Delayed Awakening Sister      Hypertension Sister      Lung Disease Father      Cancer Father           colon    Hypertension Father      Hypertension Brother      Breast Cancer Maternal Aunt      Breast Cancer Maternal Aunt      Breast Cancer Cousin           maternal 1st cousin    Breast Cancer Maternal Aunt      Breast Cancer Cousin           maternal 1st cousin    Breast Cancer Cousin           maternal 1st cousin    Deep Vein Thrombosis Neg Hx      Anesth Problems Neg Hx      negative for cardiac disease         SOCIAL HISTORY      Social History               Socioeconomic History    Marital status:        Spouse name: Not on file    Number of children: Not on file    Years of education: Not on file    Highest education level: Not on file   Tobacco Use    Smoking status: Former Smoker       Packs/day: 1.00       Years: 30.00     Pack years: 30.00       Types: Cigarettes       Quit date: 2002       Years since quittin.7    Smokeless tobacco: Never Used   Substance and Sexual Activity    Alcohol use: No    Drug use: Never    Sexual activity: Yes       Partners: Male               MEDICATIONS           Current Outpatient Medications   Medication Sig    bumetanide (BUMEX) 1 mg tablet Takes 1 tablet in AM and half tablet in afternoon    metoprolol succinate (TOPROL-XL) 50 mg XL tablet Take 1 Tab by mouth daily.  etanercept (EnbreL SureClick) 50 mg/mL (1 mL) injection 50 mg by SubCUTAneous route every seven (7) days.  nitroglycerin (NITRODUR) 0.1 mg/hr 1 Patch by TransDERmal route daily.  lidocaine (Lidoderm) 5 % 1 Patch by TransDERmal route every twenty-four (24) hours. Apply patch to the affected area for 12 hours a day and remove for 12 hours a day.  gabapentin (NEURONTIN) 100 mg capsule Take 1 Cap by mouth two (2) times a day. Max Daily Amount: 200 mg.  predniSONE (DELTASONE) 2.5 mg tablet Take 1 Tab by mouth daily (after lunch). 5 mg in morning and 2.5 mg afternoon  Resume your usual dose of prednisone after you complete the prednisone taper    acetaminophen (TYLENOL) 650 mg TbER Take 1,300 mg by mouth two (2) times a day.  co-enzyme Q-10 (Co Q-10) 100 mg capsule Take 200 mg by mouth daily.  artificial tears, dextran 70-hypromellose, (GenTeal Tears Mild) 0.1-0.3 % ophthalmic solution Administer 1 Drop to both eyes nightly.  carboxymethylcell/hypromellose (GENTEAL GEL OP) Administer 1 Drop to both eyes as needed (dry eye).  omega 3-DHA-EPA-fish oil 1,000 mg (120 mg-180 mg) capsule Take 1 Cap by mouth every evening.  levalbuterol (XOPENEX) 0.63 mg/3 mL nebu 0.63 mg by Nebulization route two (2) times a day.  aclidinium bromide (Tudorza Pressair) 400 mcg/actuation inhaler Take 1 Puff by inhalation daily.     calcium citrate-vitamin d3 (CITRACAL+D) 315 mg-5 mcg (200 unit) tab Take 1 Tab by mouth daily (with breakfast).  mometasone furoate (ASMANEX HFA IN) Take 100 mcg by inhalation two (2) times a day.  potassium 99 mg tablet Take 99 mg by mouth daily.  insulin glargine (LANTUS,BASAGLAR) 100 unit/mL (3 mL) inpn 8 Units by SubCUTAneous route Daily (before breakfast). 30 minutes before breakfast    denosumab (PROLIA SC) by SubCUTAneous route every 6 months.  eplerenone (INSPRA) 25 mg tablet Take 25 mg by mouth Daily (before lunch).  fenofibrate nanocrystallized (Tricor) 48 mg tablet Take 48 mg by mouth nightly.  levothyroxine (SYNTHROID) 137 mcg tablet Take 137 mcg by mouth Daily (before breakfast).  predniSONE (DELTASONE) 5 mg tablet Take 5 mg by mouth daily. 5 mg in morning and 2.5 mg afternoon    cholecalciferol, vitamin D3, (VITAMIN D3) 2,000 unit tab Take 2,000 Units by mouth daily.  turmeric (CURCUMIN) Take 1 g by mouth every evening.  SITagliptin (JANUVIA) 100 mg tablet Take 100 mg by mouth daily (with dinner).  vit C/vit E ac/lut/copper/zinc (PRESERVISION LUTEIN PO) Take 1 Tab by mouth every Monday. Takes with dinner    ascorbic acid (VITAMIN C) 500 mg tablet Take 500 mg by mouth daily.  ferrous sulfate 325 mg (65 mg iron) tablet Take 325 mg by mouth daily (with lunch).  cyanocobalamin (VITAMIN B12) 1,000 mcg/mL injection INJECT 1 ML INTO THE MUSCLE EVERY 30 DAYS    lansoprazole (PREVACID) 30 mg capsule Take 30 mg by mouth daily.      No current facility-administered medications for this visit.          I have reviewed the nurses notes, vitals, problem list, allergy list, medical history, family, social history and medications.         REVIEW OF SYMPTOMS      General: Pt denies excessive weight gain or loss. Pt is able to conduct ADL's  HEENT: Denies blurred vision, headaches, hearing loss, epistaxis and difficulty swallowing. Respiratory: Denies cough, congestion, shortness of breath, CHACON, wheezing or stridor.   Cardiovascular: Denies precordial pain, palpitations, edema or PND  Gastrointestinal: Denies poor appetite, indigestion, abdominal pain or blood in stool  Genitourinary: Denies hematuria, dysuria, increased urinary frequency  Musculoskeletal: Denies joint pain or swelling from muscles or joints  Neurologic: Denies tremor, paresthesias, headache, or sensory motor disturbance  Psychiatric: Denies confusion, insomnia, depression  Integumentray: Denies rash, itching or ulcers. Hematologic: Denies easy bruising, bleeding         PHYSICAL EXAMINATION      Vitals: see vitals section  General: Well developed, in no acute distress. HEENT: No jaundice, oral mucosa moist, no oral ulcers  Neck: Supple, no stiffness, no lymphadenopathy, supple  Heart:  Normal S1/S2 negative S3 or S4. Regular, no murmur, gallop or rub, no jugular venous distention  Respiratory: Clear bilaterally x 4, no wheezing or rales  Abdomen:   Soft, non-tender, bowel sounds are active.   Extremities:  No edema, normal cap refill, no cyanosis. Musculoskeletal: No clubbing, no deformities  Neuro: A&Ox3, speech clear, gait stable, cooperative, no focal neurologic deficits  Skin: Skin color is normal. No rashes or lesions.  Non diaphoretic, moist.  Vascular: 2+ pulses symmetric in all extremities         DIAGNOSTIC DATA      EKG:          LABORATORY DATA            Lab Results   Component Value Date/Time     WBC 13.7 (H) 11/16/2020 04:40 AM     Hemoglobin (POC) 15.6 06/09/2014 03:00 AM     HGB 13.4 11/16/2020 04:40 AM     Hematocrit (POC) 49 (H) 11/05/2020 09:33 PM     HCT 41.9 11/16/2020 04:40 AM     PLATELET 313 54/27/0225 04:40 AM     MCV 96.5 11/16/2020 04:40 AM            Lab Results   Component Value Date/Time     Sodium 143 11/17/2020 03:26 AM     Potassium 3.9 11/17/2020 03:26 AM     Chloride 111 (H) 11/17/2020 03:26 AM     CO2 25 11/17/2020 03:26 AM     Anion gap 7 11/17/2020 03:26 AM     Glucose 149 (H) 11/17/2020 03:26 AM     BUN 10 11/17/2020 03:26 AM     Creatinine 0.71 11/17/2020 03:26 AM     BUN/Creatinine ratio 14 11/17/2020 03:26 AM     GFR est AA >60 11/17/2020 03:26 AM     GFR est non-AA >60 11/17/2020 03:26 AM     Calcium 8.4 (L) 11/17/2020 03:26 AM     Bilirubin, total 0.5 11/14/2020 03:04 AM     Alk. phosphatase 61 11/14/2020 03:04 AM     Protein, total 5.1 (L) 11/14/2020 03:04 AM     Albumin 3.0 (L) 11/16/2020 04:40 AM     Globulin 2.3 11/14/2020 03:04 AM     A-G Ratio 1.2 11/14/2020 03:04 AM     ALT (SGPT) 20 11/14/2020 03:04 AM             ASSESSMENT      1. Atrial fibrillation with RVR              Paroxysmal                       S/p SUHAS Clip              AV node ablation  2.  CAD               S/p PCI to LAD and RCA  3. PVCs  4. Hypertension  5. COPD/Asthma  6.  Pacemaker         PLAN      Admit for IV diuretics; once euvolemic plan for upgrade to CRTP with [employer] using conscious sedation as OP.      Amisha Alanis MD  Cardiac Electrophysiology / Cardiology     [address] [place] [address], [id number] [place] [address] [address]  Justo Cruz[address] [facility]  [phone] / [phone] Fax                                    [phone] / [phone] Fax               Electronically signed by Venecia Gee MD at 03/26/21 8821

## 2021-05-29 ENCOUNTER — APPOINTMENT (OUTPATIENT)
Dept: CT IMAGING | Age: 71
DRG: 292 | End: 2021-05-29
Attending: INTERNAL MEDICINE
Payer: MEDICARE

## 2021-05-29 ENCOUNTER — APPOINTMENT (OUTPATIENT)
Dept: GENERAL RADIOLOGY | Age: 71
DRG: 292 | End: 2021-05-29
Attending: INTERNAL MEDICINE
Payer: MEDICARE

## 2021-05-29 PROBLEM — I50.33 ACUTE ON CHRONIC HEART FAILURE WITH PRESERVED EJECTION FRACTION (HFPEF) (HCC): Status: ACTIVE | Noted: 2021-05-29

## 2021-05-29 PROBLEM — R79.89 ELEVATED LACTIC ACID LEVEL: Status: ACTIVE | Noted: 2021-05-29

## 2021-05-29 PROBLEM — R06.00 DYSPNEA: Status: ACTIVE | Noted: 2021-05-29

## 2021-05-29 LAB
ANION GAP SERPL CALC-SCNC: 6 MMOL/L (ref 5–15)
APPEARANCE UR: CLEAR
BACTERIA URNS QL MICRO: NEGATIVE /HPF
BASOPHILS # BLD: 0 K/UL (ref 0–0.1)
BASOPHILS NFR BLD: 0 % (ref 0–1)
BILIRUB UR QL: NEGATIVE
BNP SERPL-MCNC: 439 PG/ML
BUN SERPL-MCNC: 25 MG/DL (ref 6–20)
BUN/CREAT SERPL: 30 (ref 12–20)
CALCIUM SERPL-MCNC: 9 MG/DL (ref 8.5–10.1)
CHLORIDE SERPL-SCNC: 103 MMOL/L (ref 97–108)
CO2 SERPL-SCNC: 31 MMOL/L (ref 21–32)
COLOR UR: NORMAL
CREAT SERPL-MCNC: 0.84 MG/DL (ref 0.55–1.02)
DIFFERENTIAL METHOD BLD: ABNORMAL
EOSINOPHIL # BLD: 0.2 K/UL (ref 0–0.4)
EOSINOPHIL NFR BLD: 1 % (ref 0–7)
EPITH CASTS URNS QL MICRO: NORMAL /LPF
ERYTHROCYTE [DISTWIDTH] IN BLOOD BY AUTOMATED COUNT: 13.2 % (ref 11.5–14.5)
GLUCOSE BLD STRIP.AUTO-MCNC: 146 MG/DL (ref 65–117)
GLUCOSE BLD STRIP.AUTO-MCNC: 147 MG/DL (ref 65–117)
GLUCOSE BLD STRIP.AUTO-MCNC: 185 MG/DL (ref 65–117)
GLUCOSE BLD STRIP.AUTO-MCNC: 199 MG/DL (ref 65–117)
GLUCOSE SERPL-MCNC: 164 MG/DL (ref 65–100)
GLUCOSE UR STRIP.AUTO-MCNC: NEGATIVE MG/DL
HCT VFR BLD AUTO: 45.8 % (ref 35–47)
HGB BLD-MCNC: 14.3 G/DL (ref 11.5–16)
HGB UR QL STRIP: NEGATIVE
HYALINE CASTS URNS QL MICRO: NORMAL /LPF (ref 0–5)
IMM GRANULOCYTES # BLD AUTO: 0.2 K/UL (ref 0–0.04)
IMM GRANULOCYTES NFR BLD AUTO: 1 % (ref 0–0.5)
KETONES UR QL STRIP.AUTO: NEGATIVE MG/DL
LACTATE SERPL-SCNC: 1.4 MMOL/L (ref 0.4–2)
LACTATE SERPL-SCNC: 3.7 MMOL/L (ref 0.4–2)
LEUKOCYTE ESTERASE UR QL STRIP.AUTO: NEGATIVE
LYMPHOCYTES # BLD: 0.7 K/UL (ref 0.8–3.5)
LYMPHOCYTES NFR BLD: 3 % (ref 12–49)
MCH RBC QN AUTO: 30.6 PG (ref 26–34)
MCHC RBC AUTO-ENTMCNC: 31.2 G/DL (ref 30–36.5)
MCV RBC AUTO: 97.9 FL (ref 80–99)
MONOCYTES # BLD: 1.7 K/UL (ref 0–1)
MONOCYTES NFR BLD: 7 % (ref 5–13)
NEUTS SEG # BLD: 21.1 K/UL (ref 1.8–8)
NEUTS SEG NFR BLD: 88 % (ref 32–75)
NITRITE UR QL STRIP.AUTO: NEGATIVE
NRBC # BLD: 0 K/UL (ref 0–0.01)
NRBC BLD-RTO: 0 PER 100 WBC
PH UR STRIP: 5.5 [PH] (ref 5–8)
PLATELET # BLD AUTO: 229 K/UL (ref 150–400)
PMV BLD AUTO: 10.9 FL (ref 8.9–12.9)
POTASSIUM SERPL-SCNC: 4 MMOL/L (ref 3.5–5.1)
PROT UR STRIP-MCNC: NEGATIVE MG/DL
RBC # BLD AUTO: 4.68 M/UL (ref 3.8–5.2)
RBC #/AREA URNS HPF: NORMAL /HPF (ref 0–5)
RBC MORPH BLD: ABNORMAL
SERVICE CMNT-IMP: ABNORMAL
SODIUM SERPL-SCNC: 140 MMOL/L (ref 136–145)
SP GR UR REFRACTOMETRY: 1.01 (ref 1–1.03)
TSH SERPL DL<=0.05 MIU/L-ACNC: 0.21 UIU/ML (ref 0.36–3.74)
UA: UC IF INDICATED,UAUC: NORMAL
UROBILINOGEN UR QL STRIP.AUTO: 0.2 EU/DL (ref 0.2–1)
WBC # BLD AUTO: 23.9 K/UL (ref 3.6–11)
WBC URNS QL MICRO: NORMAL /HPF (ref 0–4)

## 2021-05-29 PROCEDURE — 71275 CT ANGIOGRAPHY CHEST: CPT

## 2021-05-29 PROCEDURE — 83605 ASSAY OF LACTIC ACID: CPT

## 2021-05-29 PROCEDURE — 71045 X-RAY EXAM CHEST 1 VIEW: CPT

## 2021-05-29 PROCEDURE — 99218 HC RM OBSERVATION: CPT

## 2021-05-29 PROCEDURE — 36415 COLL VENOUS BLD VENIPUNCTURE: CPT

## 2021-05-29 PROCEDURE — 83880 ASSAY OF NATRIURETIC PEPTIDE: CPT

## 2021-05-29 PROCEDURE — 74011000250 HC RX REV CODE- 250: Performed by: INTERNAL MEDICINE

## 2021-05-29 PROCEDURE — 84443 ASSAY THYROID STIM HORMONE: CPT

## 2021-05-29 PROCEDURE — 65660000000 HC RM CCU STEPDOWN

## 2021-05-29 PROCEDURE — 85025 COMPLETE CBC W/AUTO DIFF WBC: CPT

## 2021-05-29 PROCEDURE — 74011250637 HC RX REV CODE- 250/637: Performed by: INTERNAL MEDICINE

## 2021-05-29 PROCEDURE — 74011636637 HC RX REV CODE- 636/637: Performed by: INTERNAL MEDICINE

## 2021-05-29 PROCEDURE — A9270 NON-COVERED ITEM OR SERVICE: HCPCS | Performed by: INTERNAL MEDICINE

## 2021-05-29 PROCEDURE — 82962 GLUCOSE BLOOD TEST: CPT

## 2021-05-29 PROCEDURE — 94640 AIRWAY INHALATION TREATMENT: CPT

## 2021-05-29 PROCEDURE — 77030038269 HC DRN EXT URIN PURWCK BARD -A

## 2021-05-29 PROCEDURE — 80048 BASIC METABOLIC PNL TOTAL CA: CPT

## 2021-05-29 PROCEDURE — 96376 TX/PRO/DX INJ SAME DRUG ADON: CPT

## 2021-05-29 PROCEDURE — 99232 SBSQ HOSP IP/OBS MODERATE 35: CPT | Performed by: INTERNAL MEDICINE

## 2021-05-29 PROCEDURE — 74011000636 HC RX REV CODE- 636: Performed by: RADIOLOGY

## 2021-05-29 PROCEDURE — 87040 BLOOD CULTURE FOR BACTERIA: CPT

## 2021-05-29 PROCEDURE — 81001 URINALYSIS AUTO W/SCOPE: CPT

## 2021-05-29 RX ORDER — BUMETANIDE 1 MG/1
1 TABLET ORAL DAILY
Status: DISCONTINUED | OUTPATIENT
Start: 2021-05-30 | End: 2021-05-30 | Stop reason: HOSPADM

## 2021-05-29 RX ADMIN — OMEGA-3-ACID ETHYL ESTERS 1 CAPSULE: 1 CAPSULE, LIQUID FILLED ORAL at 17:22

## 2021-05-29 RX ADMIN — FENOFIBRATE 48 MG: 48 TABLET ORAL at 21:47

## 2021-05-29 RX ADMIN — PREDNISONE 5 MG: 5 TABLET ORAL at 09:02

## 2021-05-29 RX ADMIN — INSULIN GLARGINE 8 UNITS: 100 INJECTION, SOLUTION SUBCUTANEOUS at 09:03

## 2021-05-29 RX ADMIN — PANTOPRAZOLE SODIUM 40 MG: 40 TABLET, DELAYED RELEASE ORAL at 07:03

## 2021-05-29 RX ADMIN — IPRATROPIUM BROMIDE 0.5 MG: 0.5 SOLUTION RESPIRATORY (INHALATION) at 15:43

## 2021-05-29 RX ADMIN — BUDESONIDE 250 MCG: 0.5 SUSPENSION RESPIRATORY (INHALATION) at 18:52

## 2021-05-29 RX ADMIN — BUDESONIDE 250 MCG: 0.5 SUSPENSION RESPIRATORY (INHALATION) at 09:58

## 2021-05-29 RX ADMIN — DEXTRAN 70, GLYCERIN, HYPROMELLOSE 1 DROP: 1; 2; 3 SOLUTION/ DROPS OPHTHALMIC at 21:47

## 2021-05-29 RX ADMIN — BUMETANIDE 1 MG: 0.25 INJECTION INTRAMUSCULAR; INTRAVENOUS at 09:34

## 2021-05-29 RX ADMIN — IPRATROPIUM BROMIDE 0.5 MG: 0.5 SOLUTION RESPIRATORY (INHALATION) at 09:53

## 2021-05-29 RX ADMIN — IOPAMIDOL 92 ML: 755 INJECTION, SOLUTION INTRAVENOUS at 23:17

## 2021-05-29 RX ADMIN — EPLERENONE 25 MG: 25 TABLET, FILM COATED ORAL at 12:24

## 2021-05-29 RX ADMIN — ACETAMINOPHEN 650 MG: 325 TABLET ORAL at 09:34

## 2021-05-29 RX ADMIN — LEVOTHYROXINE SODIUM 137 MCG: 0.03 TABLET ORAL at 07:03

## 2021-05-29 RX ADMIN — PREDNISONE 2.5 MG: 5 TABLET ORAL at 14:06

## 2021-05-29 NOTE — PROGRESS NOTES
Pooja Chen MD    [address] [facility], [address] [facility]    Office [phone],WKW [phone]      2021     Admit Date: 2021        NAME: Steven Lundy   :  1950     MRN: [id number]     Assessment/Plan:    Dyspnea  Acute on chronic HFpEF - last echo 3/2021 EF 45%- grade 1 DD  PAF - s/p SUHAS clip; remote hx of ablation; s/p AV node ablation/PPM  CAD - prior PCI; Chronic CP  HTN  COPD/Asthma - on inhalers  RA - on prednisone    Plan:  Wean off O2  BP low - hold meds  On Bumex 1mg IV bid . Switch to PO Bumex Davin  Cxray/nt probnp/lactic acid. Leucocytosis prob sec to prednisone  Daily wts  I and O        D/w RN/Daughter     Please do not hesitate to contact us with questions or concerns. See note below for details.     Pooja Chen MD      Subjective:  Dyspnea improved    ROS:  (bold if positive, if negative)    Palpitations      Current Facility-Administered Medications   Medication Dose Route Frequency    ipratropium (ATROVENT) 0.02 % nebulizer solution 0.5 mg  0.5 mg Nebulization Q8H RT    eplerenone (INSPRA) tablet 25 mg  25 mg Oral ACL    fenofibrate nanocrystallized (TRICOR) tablet 48 mg  48 mg Oral QHS    insulin glargine (LANTUS) injection 8 Units  8 Units SubCUTAneous ACB    pantoprazole (PROTONIX) tablet 40 mg  40 mg Oral ACB    levothyroxine (SYNTHROID) tablet 137 mcg  137 mcg Oral ACB    metoprolol succinate (TOPROL-XL) XL tablet 50 mg  50 mg Oral DAILY    budesonide (PULMICORT) 500 mcg/2 ml nebulizer suspension  250 mcg Nebulization BID RT    nitroglycerin (NITRODUR) 0.1 mg/hr patch 1 Patch  1 Patch TransDERmal DAILY    omega-3 acid ethyl esters (LOVAZA) capsule 1 Capsule  1 Capsule Oral QPM    potassium tablet 99 mg (Patient Supplied)  99 mg Oral DAILY    predniSONE (DELTASONE) tablet 2.5 mg  2.5 mg Oral PCL    predniSONE (DELTASONE) tablet 5 mg  5 mg Oral DAILY    bumetanide (BUMEX) injection 1 mg  1 mg IntraVENous BID    artificial tears (dextran-hypromellose-glycerin) (GENTEAL) ophthalmic solution 1 Drop  1 Drop Both Eyes QHS    acetaminophen (TYLENOL) tablet 650 mg  650 mg Oral Q4H PRN       Physical Exam:  Visit Vitals  BP 98/61 (BP 1 Location: Left upper arm, BP Patient Position: At rest)   Pulse 85   Temp 98.5 °F (36.9 °C)   Resp 18   Ht 5' 2\" (1.575 m)   Wt 165 lb 6.4 oz (75 kg)   LMP 09/29/1980 (LMP Unknown)   SpO2 95%   BMI 30.25 kg/m²    O2 Flow Rate (L/min): 2 l/min O2 Device: Nasal cannula      Telemetry:     Gen: Well-developed, well-nourished, in no acute distress  Neck: Supple,No JVD, No Carotid Bruit,   Resp: No accessory muscle use, Clear breath sounds, No rales or rhonchi  Card: Regular Rate,Rythm,Normal S1, S2, 2/6 sys murmur+  Abd:  Soft, non-tender, non-distended,BS+,        No edema    EKG:        Cxray:    LABS: reviewed        @  Care Plan discussed with:   Clarita Limon MD

## 2021-05-29 NOTE — PROGRESS NOTES
CMS Note  5/29/2021    Patient received and signed both the Observation and [facility] COLINDRES letters. Patient was provided with copies for their record.   Kirby oHff CMS

## 2021-05-29 NOTE — PROGRESS NOTES
Bedside shift change report given to 3916 Pritesh Robison (oncoming nurse) by Deepak Cota (offgoing nurse). Report included the following information SBAR, Kardex, Intake/Output, MAR and Recent Results.

## 2021-05-29 NOTE — PROGRESS NOTES
Lactic acid 3.2  NTproBNP - 439    [facility]  MD, [facility]

## 2021-05-29 NOTE — PROGRESS NOTES
Bedside and Verbal shift change report given to MINAL Cordon (oncoming nurse) by Bi Mckeon RN (offgoing nurse). Report included the following information SBAR, Kardex, Procedure Summary, Intake/Output, MAR, Accordion, Recent Results and Med Rec Status.

## 2021-05-30 VITALS
HEART RATE: 87 BPM | RESPIRATION RATE: 18 BRPM | BODY MASS INDEX: 31.34 KG/M2 | DIASTOLIC BLOOD PRESSURE: 70 MMHG | HEIGHT: 62 IN | SYSTOLIC BLOOD PRESSURE: 109 MMHG | OXYGEN SATURATION: 92 % | WEIGHT: 170.3 LBS | TEMPERATURE: 98 F

## 2021-05-30 LAB
ANION GAP SERPL CALC-SCNC: 5 MMOL/L (ref 5–15)
BASOPHILS # BLD: 0 K/UL (ref 0–0.1)
BASOPHILS NFR BLD: 0 % (ref 0–1)
BUN SERPL-MCNC: 20 MG/DL (ref 6–20)
BUN/CREAT SERPL: 37 (ref 12–20)
CALCIUM SERPL-MCNC: 8.8 MG/DL (ref 8.5–10.1)
CHLORIDE SERPL-SCNC: 104 MMOL/L (ref 97–108)
CO2 SERPL-SCNC: 29 MMOL/L (ref 21–32)
CREAT SERPL-MCNC: 0.54 MG/DL (ref 0.55–1.02)
DIFFERENTIAL METHOD BLD: ABNORMAL
EOSINOPHIL # BLD: 0.4 K/UL (ref 0–0.4)
EOSINOPHIL NFR BLD: 3 % (ref 0–7)
ERYTHROCYTE [DISTWIDTH] IN BLOOD BY AUTOMATED COUNT: 13.3 % (ref 11.5–14.5)
EST. AVERAGE GLUCOSE BLD GHB EST-MCNC: 154 MG/DL
GLUCOSE BLD STRIP.AUTO-MCNC: 216 MG/DL (ref 65–117)
GLUCOSE BLD STRIP.AUTO-MCNC: 224 MG/DL (ref 65–117)
GLUCOSE SERPL-MCNC: 116 MG/DL (ref 65–100)
HBA1C MFR BLD: 7 % (ref 4–5.6)
HCT VFR BLD AUTO: 43.2 % (ref 35–47)
HGB BLD-MCNC: 13.7 G/DL (ref 11.5–16)
IMM GRANULOCYTES # BLD AUTO: 0.1 K/UL (ref 0–0.04)
IMM GRANULOCYTES NFR BLD AUTO: 1 % (ref 0–0.5)
LYMPHOCYTES # BLD: 1.4 K/UL (ref 0.8–3.5)
LYMPHOCYTES NFR BLD: 10 % (ref 12–49)
MAGNESIUM SERPL-MCNC: 2.2 MG/DL (ref 1.6–2.4)
MCH RBC QN AUTO: 30.9 PG (ref 26–34)
MCHC RBC AUTO-ENTMCNC: 31.7 G/DL (ref 30–36.5)
MCV RBC AUTO: 97.3 FL (ref 80–99)
MONOCYTES # BLD: 1.3 K/UL (ref 0–1)
MONOCYTES NFR BLD: 9 % (ref 5–13)
NEUTS SEG # BLD: 11.2 K/UL (ref 1.8–8)
NEUTS SEG NFR BLD: 77 % (ref 32–75)
NRBC # BLD: 0 K/UL (ref 0–0.01)
NRBC BLD-RTO: 0 PER 100 WBC
PLATELET # BLD AUTO: 141 K/UL (ref 150–400)
POTASSIUM SERPL-SCNC: 3.4 MMOL/L (ref 3.5–5.1)
RBC # BLD AUTO: 4.44 M/UL (ref 3.8–5.2)
RBC MORPH BLD: ABNORMAL
SERVICE CMNT-IMP: ABNORMAL
SERVICE CMNT-IMP: ABNORMAL
SODIUM SERPL-SCNC: 138 MMOL/L (ref 136–145)
WBC # BLD AUTO: 14.4 K/UL (ref 3.6–11)

## 2021-05-30 PROCEDURE — 74011250637 HC RX REV CODE- 250/637: Performed by: INTERNAL MEDICINE

## 2021-05-30 PROCEDURE — 74011000250 HC RX REV CODE- 250: Performed by: INTERNAL MEDICINE

## 2021-05-30 PROCEDURE — 36415 COLL VENOUS BLD VENIPUNCTURE: CPT

## 2021-05-30 PROCEDURE — 99233 SBSQ HOSP IP/OBS HIGH 50: CPT | Performed by: SPECIALIST

## 2021-05-30 PROCEDURE — 85025 COMPLETE CBC W/AUTO DIFF WBC: CPT

## 2021-05-30 PROCEDURE — 77030040361 HC SLV COMPR DVT MDII -B

## 2021-05-30 PROCEDURE — 94618 PULMONARY STRESS TESTING: CPT

## 2021-05-30 PROCEDURE — 83036 HEMOGLOBIN GLYCOSYLATED A1C: CPT

## 2021-05-30 PROCEDURE — 77010033678 HC OXYGEN DAILY

## 2021-05-30 PROCEDURE — 82962 GLUCOSE BLOOD TEST: CPT

## 2021-05-30 PROCEDURE — 83735 ASSAY OF MAGNESIUM: CPT

## 2021-05-30 PROCEDURE — 94760 N-INVAS EAR/PLS OXIMETRY 1: CPT

## 2021-05-30 PROCEDURE — 80048 BASIC METABOLIC PNL TOTAL CA: CPT

## 2021-05-30 PROCEDURE — 74011636637 HC RX REV CODE- 636/637: Performed by: INTERNAL MEDICINE

## 2021-05-30 PROCEDURE — 77030027138 HC INCENT SPIROMETER -A

## 2021-05-30 PROCEDURE — 94640 AIRWAY INHALATION TREATMENT: CPT

## 2021-05-30 RX ORDER — BENZONATATE 100 MG/1
100 CAPSULE ORAL
Status: DISCONTINUED | OUTPATIENT
Start: 2021-05-30 | End: 2021-05-30 | Stop reason: HOSPADM

## 2021-05-30 RX ORDER — INSULIN GLARGINE 100 [IU]/ML
12 INJECTION, SOLUTION SUBCUTANEOUS
Status: DISCONTINUED | OUTPATIENT
Start: 2021-05-31 | End: 2021-05-30 | Stop reason: HOSPADM

## 2021-05-30 RX ORDER — BUMETANIDE 1 MG/1
TABLET ORAL
COMMUNITY

## 2021-05-30 RX ORDER — INSULIN LISPRO 100 [IU]/ML
INJECTION, SOLUTION INTRAVENOUS; SUBCUTANEOUS
Status: DISCONTINUED | OUTPATIENT
Start: 2021-05-30 | End: 2021-05-30 | Stop reason: HOSPADM

## 2021-05-30 RX ORDER — FACIAL-BODY WIPES
10 EACH TOPICAL DAILY PRN
Status: DISCONTINUED | OUTPATIENT
Start: 2021-05-30 | End: 2021-05-30 | Stop reason: HOSPADM

## 2021-05-30 RX ORDER — IBUPROFEN 200 MG
200 CAPSULE ORAL DAILY
COMMUNITY

## 2021-05-30 RX ORDER — PREDNISONE 2.5 MG/1
2.5 TABLET ORAL
COMMUNITY

## 2021-05-30 RX ORDER — POTASSIUM CHLORIDE 750 MG/1
40 TABLET, FILM COATED, EXTENDED RELEASE ORAL ONCE
Status: COMPLETED | OUTPATIENT
Start: 2021-05-30 | End: 2021-05-30

## 2021-05-30 RX ORDER — MAGNESIUM SULFATE 100 %
4 CRYSTALS MISCELLANEOUS AS NEEDED
Status: DISCONTINUED | OUTPATIENT
Start: 2021-05-30 | End: 2021-05-30 | Stop reason: HOSPADM

## 2021-05-30 RX ORDER — PROCHLORPERAZINE EDISYLATE 5 MG/ML
10 INJECTION INTRAMUSCULAR; INTRAVENOUS
Status: DISCONTINUED | OUTPATIENT
Start: 2021-05-30 | End: 2021-05-30 | Stop reason: HOSPADM

## 2021-05-30 RX ORDER — BUMETANIDE 1 MG/1
0.5 TABLET ORAL EVERY EVENING
Status: ON HOLD | COMMUNITY
End: 2021-06-29

## 2021-05-30 RX ORDER — ALOGLIPTIN 25 MG/1
25 TABLET, FILM COATED ORAL
Status: DISCONTINUED | OUTPATIENT
Start: 2021-05-30 | End: 2021-05-30 | Stop reason: HOSPADM

## 2021-05-30 RX ORDER — BUDESONIDE 0.5 MG/2ML
500 INHALANT ORAL DAILY
Status: ON HOLD | COMMUNITY
End: 2021-06-29

## 2021-05-30 RX ORDER — DEXTROSE 50 % IN WATER (D50W) INTRAVENOUS SYRINGE
12.5-25 AS NEEDED
Status: DISCONTINUED | OUTPATIENT
Start: 2021-05-30 | End: 2021-05-30 | Stop reason: HOSPADM

## 2021-05-30 RX ORDER — LEVALBUTEROL INHALATION SOLUTION 0.63 MG/3ML
0.63 SOLUTION RESPIRATORY (INHALATION) EVERY 8 HOURS
Status: DISCONTINUED | OUTPATIENT
Start: 2021-05-30 | End: 2021-05-30 | Stop reason: HOSPADM

## 2021-05-30 RX ADMIN — BUDESONIDE 250 MCG: 0.5 SUSPENSION RESPIRATORY (INHALATION) at 06:15

## 2021-05-30 RX ADMIN — PREDNISONE 5 MG: 5 TABLET ORAL at 08:10

## 2021-05-30 RX ADMIN — BUMETANIDE 1 MG: 1 TABLET ORAL at 08:10

## 2021-05-30 RX ADMIN — PREDNISONE 2.5 MG: 5 TABLET ORAL at 13:22

## 2021-05-30 RX ADMIN — LEVOTHYROXINE SODIUM 137 MCG: 0.03 TABLET ORAL at 06:42

## 2021-05-30 RX ADMIN — IPRATROPIUM BROMIDE 0.5 MG: 0.5 SOLUTION RESPIRATORY (INHALATION) at 06:15

## 2021-05-30 RX ADMIN — POTASSIUM CHLORIDE 40 MEQ: 750 TABLET, EXTENDED RELEASE ORAL at 06:44

## 2021-05-30 RX ADMIN — PANTOPRAZOLE SODIUM 40 MG: 40 TABLET, DELAYED RELEASE ORAL at 06:42

## 2021-05-30 RX ADMIN — INSULIN GLARGINE 8 UNITS: 100 INJECTION, SOLUTION SUBCUTANEOUS at 08:11

## 2021-05-30 RX ADMIN — EPLERENONE 25 MG: 25 TABLET, FILM COATED ORAL at 11:36

## 2021-05-30 NOTE — PROGRESS NOTES
Discharge reviewed with patient, verbalizes understanding. Peripheral IV's removed. No changes to med regimen. Cardio to arrange follow up appt. Spouse in to transport home.

## 2021-05-30 NOTE — PROGRESS NOTES
Bedside and Verbal shift change report given to MINAL Weiss (oncoming nurse) by Alek Mayorga (offgoing nurse). Report included the following information SBAR, Kardex, Procedure Summary, Intake/Output, MAR, Accordion and Recent Results.

## 2021-05-30 NOTE — PROGRESS NOTES
Jerardo Vyas [facility] [address]  [address]  [phone]      Medical Progress Note      NAME: Maya Sandoval   :  1950  MRM:  [id number]    Date/Time of service: 2021  11:14 AM       Subjective:     Chief Complaint:  Patient was personally seen and examined by me during this time period. Chart reviewed. Still with dyspnea on exertion. On O2       Objective:       Vitals:       Last 24hrs VS reviewed since prior progress note.  Most recent are:    Visit Vitals  BP 98/63 (BP 1 Location: Right upper arm, BP Patient Position: At rest)   Pulse 91   Temp 98.2 °F (36.8 °C)   Resp 16   Ht 5' 2\" (1.575 m)   Wt 77.2 kg (170 lb 4.8 oz)   SpO2 95%   BMI 31.15 kg/m²     SpO2 Readings from Last 6 Encounters:   21 95%   21 94%   21 95%   21 96%   21 98%   20 91%    O2 Flow Rate (L/min): 1 l/min       Intake/Output Summary (Last 24 hours) at 2021 1114  Last data filed at 2021 0816  Gross per 24 hour   Intake --   Output 1800 ml   Net -1800 ml        Exam:     Physical Exam:    Gen:  Well-developed, well-nourished, in no acute distress  HEENT:  Pink conjunctivae, PERRL, hearing intact to voice, moist mucous membranes  Neck:  Supple, without masses, thyroid non-tender  Resp:  No accessory muscle use, scant rhonchi at bases  Card:  No murmurs, normal S1, S2 without thrills, bruits or peripheral edema  Abd:  Soft, non-tender, non-distended, normoactive bowel sounds are present  Musc:  No cyanosis or clubbing  Skin:  No rashes   Neuro:  Cranial nerves 3-12 are grossly intact, follows commands appropriately  Psych:  Good insight, oriented to person, place and time, alert    Medications Reviewed: (see below)    Lab Data Reviewed: (see below)    ______________________________________________________________________    Medications:     Current Facility-Administered Medications   Medication Dose Route Frequency    prochlorperazine (COMPAZINE) injection 10 mg  10 mg IntraVENous Q6H PRN    bisacodyL (DULCOLAX) suppository 10 mg  10 mg Rectal DAILY PRN    benzonatate (TESSALON) capsule 100 mg  100 mg Oral TID PRN    levalbuterol (XOPENEX) nebulizer soln 0.63 mg/3 mL  0.63 mg Nebulization Q8H    bumetanide (BUMEX) tablet 1 mg  1 mg Oral DAILY    ipratropium (ATROVENT) 0.02 % nebulizer solution 0.5 mg  0.5 mg Nebulization Q8H RT    eplerenone (INSPRA) tablet 25 mg  25 mg Oral ACL    fenofibrate nanocrystallized (TRICOR) tablet 48 mg  48 mg Oral QHS    insulin glargine (LANTUS) injection 8 Units  8 Units SubCUTAneous ACB    pantoprazole (PROTONIX) tablet 40 mg  40 mg Oral ACB    levothyroxine (SYNTHROID) tablet 137 mcg  137 mcg Oral ACB    metoprolol succinate (TOPROL-XL) XL tablet 50 mg  50 mg Oral DAILY    budesonide (PULMICORT) 500 mcg/2 ml nebulizer suspension  250 mcg Nebulization BID RT    omega-3 acid ethyl esters (LOVAZA) capsule 1 Capsule  1 Capsule Oral QPM    potassium tablet 99 mg (Patient Supplied)  99 mg Oral DAILY    predniSONE (DELTASONE) tablet 2.5 mg  2.5 mg Oral PCL    predniSONE (DELTASONE) tablet 5 mg  5 mg Oral DAILY    artificial tears (dextran-hypromellose-glycerin) (GENTEAL) ophthalmic solution 1 Drop  1 Drop Both Eyes QHS    acetaminophen (TYLENOL) tablet 650 mg  650 mg Oral Q4H PRN          Lab Review:     Recent Labs     05/30/21  0244 05/29/21  0446   WBC 14.4* 23.9*   HGB 13.7 14.3   HCT 43.2 45.8   * 229     Recent Labs     05/30/21  0244 05/29/21  0446    140   K 3.4* 4.0    103   CO2 29 31   * 164*   BUN 20 25*   CREA 0.54* 0.84   CA 8.8 9.0   MG 2.2  --      Lab Results   Component Value Date/Time    Glucose (POC) 216 (H) 05/30/2021 07:00 AM    Glucose (POC) 146 (H) 05/29/2021 09:06 PM    Glucose (POC) 185 (H) 05/29/2021 03:47 PM    Glucose (POC) 199 (H) 05/29/2021 11:35 AM    Glucose (POC) 147 (H) 05/29/2021 08:03 AM          Assessment / Plan:     65 yo hx of HTN, DM, CAD, Pafib s/p ablation/pacer/SUHAS ligation, dCHF, asthma, RA, presented w/ dyspnea, hypoxia, afib RVR, lactic acidosis. Awaiting pacer upgrade. Medicine is following as a consultant     1) Dyspnea/hypoxia: likely a combination of asthma and CHF. Chest CT unremarkable. Will cont O2, incentive spirometry, nebs, diuretics. Consult pulm    2) Lactic acidosis: unclear etiology, now improving. Likely due to afib, hypoperfusion, nebs, steroids. Not septic. Will monitor     3) Asthma: mild exacerbation. Follows by Dr. Jose Verduzco. Will cont xopenex/atrovent. On low dose prednisone for RA. Hold on IV steroids. Defer to pulm    4) Afib RVR: s/p pacer, ablation, SUHAS ligation. Cont Toprol. Awaiting pacer upgrade. Defer to Cards    5) Acute on chronic dCHF: EF 45%. Cont bumex. Defer to Cards    6) HTN: cont toprol    7) DM type 2: A1C 7.3%.   Cont Lantus, SSI    8) CAD: cont BB, tricor    9) RA: cont low dose prednisone    10) Hypothyroid: cont synthroid     Total time spent with patient: 35 min **I personally saw and examined the patient during this time period**                 Care Plan discussed with: Patient, nursing, family     Discussed:  Care Plan    Prophylaxis:  SCD's    Disposition:  defer to Cards           ___________________________________________________    Attending Physician: Jayce Riggs MD

## 2021-05-30 NOTE — CONSULTS
IM Consult History and Physical:    NAME:    Gilberto Reynoso   :     1950   MRN:  [id number]     PCP:  Nelly Beaver MD     Referring Physician: Lobo Nathan MD     Date of Service:  2021      Reason for Consultaion: Medical management    Chief Complaint: SOB, elevated lactic acid    History of presenting illness:     Ms. Nathen Gaucher is a 66 y.o. female who is admitted to the cardiology service with CHF exacerbation. Ms. Nathen Gaucher is being seen in medical consultation for medical co-management, elevated lactic acid and persistent dyspnea. She was due to an cardiology procedure but was unable to lay flat in the cath lab as per her. She was admitted due to CHF for diuresis prior to the procedure. She says she has since been SOB, worse with minimal exertion and associated with some non productive cough and a subjective fever. Now needing supplemental oxygen due to hypoxia. She denies any nausea, vomiting, abdominal pain or diarrhea. No urinary symptoms. No rash. During follow up today, labs drawn showed a lactic acid of 3.7. CXR was clear.      Allergies   Allergen Reactions    Butter Flavor Anaphylaxis     Butter AND CREME    Keflex [Cephalexin] Anaphylaxis    Milk Containing Products Anaphylaxis     Butter and cream    Pcn [Penicillins] Anaphylaxis    Aspirin Hives and Other (comments)     \"it makes my stomach bleed\"      Cimzia [Certolizumab Pegol] Other (comments)     GI symptoms, blisters in the mouth and esophagus    Clopidogrel Other (comments)     GI bleed    Demerol [Meperidine] Other (comments)     HYPOTENSION    Fentanyl Other (comments)     HYPOTENSION    Ibuprofen Diarrhea     Patient reports that ibuprofen caused diarrhea    Morphine Other (comments)     HYPOTENSION    Nitroglycerin Other (comments)     IN PILL FORM  - HYPOTENSION  CAN TOLERATE PATCH FOR SHORT TIME    Sulfa (Sulfonamide Antibiotics) Angioedema     Lips    Tapazole [Methimazole] Unknown (comments)     Patient stated \"it made me feel like I had the flu\"    Tramadol Other (comments)     Patient reports thrush with tramadol use    Adhesive Tape-Silicones Other (comments)     BAD BLISTERS AND TENDS TO GET INFECTED    Albuterol Palpitations    Gluten Diarrhea     bloating    Oxycodone Other (comments)     Hallicination and hypotension        Prior to Admission medications    Medication Sig Start Date End Date Taking? Authorizing Provider   eplerenone (INSPRA) 25 mg tablet Take 1 Tab by mouth Daily (before lunch). 3/30/21  Yes Pema Murillo MD   bumetanide Jesse Spittle) 1 mg tablet Takes 1 tablet in AM and half tablet in afternoon 3/25/21  Yes Pema Murillo MD   metoprolol succinate (TOPROL-XL) 50 mg XL tablet Take 1 Tab by mouth daily. 2/18/21  Yes Pema Murillo MD   etanercept (EnbreL SureClick) 50 mg/mL (1 mL) injection 50 mg by SubCUTAneous route every seven (7) days. Yes Provider, Historical   nitroglycerin (NITRODUR) 0.1 mg/hr 1 Patch by TransDERmal route daily. 1/21/21  Yes Pema Murillo MD   lidocaine (Lidoderm) 5 % 1 Patch by TransDERmal route every twenty-four (24) hours. Apply patch to the affected area for 12 hours a day and remove for 12 hours a day. 11/17/20  Yes Awais Castillo MD   gabapentin (NEURONTIN) 100 mg capsule Take 1 Cap by mouth two (2) times a day. Max Daily Amount: 200 mg. 11/17/20  Yes Lainey Mendieta MD   predniSONE (DELTASONE) 2.5 mg tablet Take 1 Tab by mouth daily (after lunch). 5 mg in morning and 2.5 mg afternoon  Resume your usual dose of prednisone after you complete the prednisone taper 11/6/20  Yes Awais Castillo MD   acetaminophen (TYLENOL) 650 mg TbER Take 1,300 mg by mouth two (2) times a day. Yes Provider, Historical   co-enzyme Q-10 (Co Q-10) 100 mg capsule Take 200 mg by mouth daily.    Yes Provider, Historical   artificial tears, dextran 70-hypromellose, (GenTeal Tears Mild) 0.1-0.3 % ophthalmic solution Administer 1 Drop to both eyes nightly. Yes Provider, Historical   carboxymethylcell/hypromellose (GENTEAL GEL OP) Administer 1 Drop to both eyes as needed (dry eye). Yes Provider, Historical   omega 3-DHA-EPA-fish oil 1,000 mg (120 mg-180 mg) capsule Take 1 Cap by mouth every evening. Yes Provider, Historical   levalbuterol (XOPENEX) 0.63 mg/3 mL nebu 0.63 mg by Nebulization route two (2) times a day. Yes Provider, Historical   aclidinium bromide (Tudorza Pressair) 400 mcg/actuation inhaler Take 1 Puff by inhalation daily. Yes Provider, Historical   calcium citrate-vitamin d3 (CITRACAL+D) 315 mg-5 mcg (200 unit) tab Take 1 Tab by mouth daily (with breakfast). Yes Provider, Historical   mometasone furoate (ASMANEX HFA IN) Take 100 mcg by inhalation two (2) times a day. Yes Provider, Historical   potassium 99 mg tablet Take 99 mg by mouth daily. Yes Provider, Historical   insulin glargine (LANTUS,BASAGLAR) 100 unit/mL (3 mL) inpn 8 Units by SubCUTAneous route Daily (before breakfast). 30 minutes before breakfast   Yes Provider, Historical   fenofibrate nanocrystallized (Tricor) 48 mg tablet Take 48 mg by mouth nightly. Yes Provider, Historical   levothyroxine (SYNTHROID) 137 mcg tablet Take 137 mcg by mouth Daily (before breakfast). 2/4/20  Yes Provider, Historical   predniSONE (DELTASONE) 5 mg tablet Take 5 mg by mouth daily. 5 mg in morning and 2.5 mg afternoon   Yes Provider, Historical   cholecalciferol, vitamin D3, (VITAMIN D3) 2,000 unit tab Take 2,000 Units by mouth daily. Yes Provider, Historical   turmeric (CURCUMIN) Take 1 g by mouth every evening. Yes Provider, Historical   SITagliptin (JANUVIA) 100 mg tablet Take 100 mg by mouth daily (with dinner). Yes Provider, Historical   vit C/vit E ac/lut/copper/zinc (PRESERVISION LUTEIN PO) Take 1 Tab by mouth every Monday. Takes with dinner   Yes Provider, Historical   ascorbic acid (VITAMIN C) 500 mg tablet Take 500 mg by mouth daily.    Yes Other, MD Yolanda   ferrous sulfate 325 mg (65 mg iron) tablet Take 325 mg by mouth daily (with lunch). Yes Other, MD Yolanda   cyanocobalamin (VITAMIN B12) 1,000 mcg/mL injection INJECT 1 ML INTO THE MUSCLE EVERY 30 DAYS 3/24/14  Yes Lauro Chavez MD   lansoprazole (PREVACID) 30 mg capsule Take 30 mg by mouth daily. Yes Provider, Historical   denosumab (PROLIA SC) by SubCUTAneous route every 6 months.     Provider, Historical       Past Medical History:   Diagnosis Date    Adverse effect of anesthesia     slow to wake up    Arthritis     Asthma 6/29/2009    CAUSED BY MOLD    Atrial fibrillation ([address] 6/29/2009    Atrial flutter ([address]     CAD (coronary artery disease) 06/29/2009    Celiac disease 2010    Chronic chest pain     Chronic obstructive pulmonary disease ([address] 2004-5    right leg    Chronic pain of right ankle     Diabetes ([address]     Drug induced prednisone, DM2    Diastolic heart failure ([address]     DVT (deep venous thrombosis) ([address] 2004    Right leg post surgery    GERD (gastroesophageal reflux disease)     Gluten intolerance     Heart failure ([address]     Hypertension     Hypothyroidism 6/29/2009    Iron deficiency anemia     Lyme disease     Pacemaker     11/20    Personal history of MI (myocardial infarction)     Rheumatoid arthritis ([address]     S/P AV jules ablation     11/20     S/P left atrial appendage ligation     SUHAS clip 10/20     Slow to wake up after anesthesia     Syncope     Post testing- resolved    TIA (transient ischemic attack) 2004 & 2006    Vitamin B12 deficiency 6/29/2009        Past Surgical History:   Procedure Laterality Date    HX ADENOIDECTOMY      HX AFIB ABLATION      HX APPENDECTOMY      HX CHOLECYSTECTOMY  6/16/11    HX COLONOSCOPY      HX CORONARY STENT PLACEMENT      x 2    HX HEART CATHETERIZATION  2010    2 STENTS    HX HEART CATHETERIZATION  03/2020    HX HYSTERECTOMY      HX LUMBAR LAMINECTOMY  2000    L4-L5    HX ORTHOPAEDIC  1993-6/2012 RT. FOOT SURGERY X 6/calcaneal osteotomy    HX ORTHOPAEDIC Right 2020    right hand [facility] joint replacement    HX TONSILLECTOMY  1964    [place] ICAR CATHETER ABLATION ATRIOVENTR NODE FUNCTION N/A 2020    ABLATION AV NODE performed by Jonathan Potter MD at [address] [facility] CATH LAB    [place] INS NEW/RPLCMT PRM PM W/TRANSV ELTRD ATRIAL&VENT N/A 2020    [facility] [address] CATH LAB    [place] INTRACARDIAC ELECTROPHYSIOLOGIC 3D MAPPING N/A 2020    Ep 3d Mapping [facility] [address] CATH LAB       Social History     Tobacco Use    Smoking status: Former Smoker     Packs/day: 1.00     Years: 30.00     Pack years: 30.00     Types: Cigarettes     Quit date: 2002     Years since quittin.9    Smokeless tobacco: Never Used   Substance Use Topics    Alcohol use: No        Family History   Problem Relation Age of Onset    Delayed Awakening Mother     Heart Disease Mother     Coronary Artery Disease Mother     Hypertension Mother     Stroke Mother     Delayed Awakening Sister     Hypertension Sister     Lung Disease Father     Cancer Father         colon    Hypertension Father     Hypertension Brother     Breast Cancer Maternal Aunt     Breast Cancer Maternal Aunt     Breast Cancer Cousin         maternal 1st cousin   Daija Pheasant Breast Cancer Maternal Aunt     Breast Cancer Cousin         maternal 1st cousin   Daija Pheasant Breast Cancer Cousin         maternal 1st cousin    Deep Vein Thrombosis Neg Hx     Anesth Problems Neg Hx         Review of Systems:    Constitutional ROS: subjective fever but no chills, rigors or night sweats  Respiratory ROS: + cough, - sputum, - hemoptysis,  + dyspnea but no pleuritic pain. Cardiovascular ROS: no palpitation, orthopnea, PND or syncope  Endocrine ROS: no polydispsia, polyuria, heat or cold intolerance   Gastrointestinal ROS: no dysphagia, abdominal pain, nausea, vomiting, diarrhea or bleeding. Genito-Urinary ROS: no dysuria, frequency, hematuria, retention, or flank pain  Musculoskeletal ROS: no joint pain or swelling or muscular tenderness  Neurological ROS: no headache, confusion, focal symptoms, peripheral paresthesia or tremors  Psychiatric ROS: ROS: no depression, anxiety, mood swings  Dermatological ROS: no rash, pruritis, or urticaria    On Examination:      Visit Vitals  /74 (BP 1 Location: Left arm, BP Patient Position: Sitting)   Pulse 90   Temp 98.5 °F (36.9 °C)   Resp 18   Ht 5' 2\" (1.575 m)   Wt 77.2 kg (170 lb 4.8 oz)   SpO2 91%   BMI 31.15 kg/m²     SpO2 Readings from Last 6 Encounters:   05/29/21 91%   03/26/21 94%   03/25/21 95%   02/24/21 96%   01/21/21 98%   11/20/20 91%            O2 Flow Rate (L/min): 1 l/min    Physical Examination:    Gen:  Weak looking and but not in any acute distress   Eyes: pink conjunctivae, PERRLA with no discharge. ENT:  no ottorrhea or rhinorrhea, dry mucous membranes  Neck:  Supple, no masses, thyroid non-tender with a central trachea. Pulm:  no accessory muscle use, decreased but decreased breath sounds without crackles or wheezes  Card:  no JVD or murmurs, has regular and normal S1, S2 without thrills, bruits or pedal edema  Abd:  Soft, non-tender, non-distended, normoactive bowel sounds are present, no palpable organomegaly. Lymph:  No cervical or axillary adenopathy  Musc:  No cyanosis or clubbing. No atrophy or deformities. Skin:  No rashes, bruising or ulcers, skin turgor is good  Neuro: Awake and alert, CNs 2-12 intact with a non focal exam. Follows commands appropriately  Psych: Oriented x 3, no hallucinations or delusions. Telemetry reviewed: paced rhythm strip      Other diagnostics: all radiological tests have been reviewed.     Labs:    Recent Labs     05/29/21  0446   WBC 23.9*   HGB 14.3   HCT 45.8        Recent Labs     05/29/21  0446      K 4.0      CO2 31   *   BUN 25*   CREA 0.84   CA 9.0     No components found for: GLPOC    No results for input(s): INR, INREXT, INREXT in the last 72 hours. Assessment/Recommendations:     Ms. Patrick Valdez is a 66 y.o. female being reviewed for:     Elevated lactic acid level (5/29/2021) POA: unclear as to etiology although maybe due to volume depletion vs other. Immunosuppressed. No SIRS. Leukocytosis likely due to steroids. Repeat lactic acid, obtain blood cultures. Further recommendations pending these results     Dyspnea (5/29/2021) / Hypoxia (11/2/2020) / hx Asthma (6/29/2009) POA: usually not on home oxygen. Likely from her CHF, remote asthma. CXR clear. Get CTA chest. May need a pulmonology consult. Continue Pulmicort. Atrovent. Adrenergic beta agonists give her palpitations     Acute on chronic heart failure with preserved ejection fraction (HFpEF) ([facility] [address] (5/29/2021) POA: last Echo showed EF 45%. Continue Bumex, daily weights. Cardiology following    PAF (paroxysmal atrial fibrillation) ([facility] (6/29/2009) / Pacemaker (5/28/2021) POA: Continue Toprol. Cardiology for upgrade of pacemaker    CAD (coronary artery disease) (6/29/2009) POA: denies any chest pain. Prior unremarkable stress test and cardiac cath. Continue Toprol. Diabetes mellitus type 2, controlled ([facility] [address] (8/12/2019) POA: last A1c 7.3. Blood glucose stable. Continue Lantus, DM diet    Hypothyroidism (3/23/2012) POA: check a TSH. Continue Levothyroxine    HTN (hypertension), benign (3/2/2017) POA: BP stable. Continue Toprol    Rheumatoid arthritis ([facility] [address] (8/12/2019) POA: continue prednisone    GERD (gastroesophageal reflux disease) (1/8/2011) POA: Continue PPi     Thank you for the privilege to participate in Ms. Goodman's care. We will follow along with you.     Total time spent for care of the patient: [address] discussed with: Patient and Nursing Staff    Discussed:  Care Plan    Prophylaxis:  H2B/PPI           ___________________________________________________    Attending Physician: Hieu Hurtado MD

## 2021-05-30 NOTE — PROGRESS NOTES
5/30/2021  Reason for Admission:  Paroxsymal atrial fibrillation                   RUR Score:       17% low              Plan for utilizing home health:      Patient has used [facility] before, unsure if she will need at this admission. PCP: First and Last name:  Mary Noguera MD   Name of Practice:    Are you a current patient: Yes/No:  Yes   Approximate date of last visit:  Within the last year   Can you participate in a virtual visit with your PCP:                     Current Advanced Directive/Advance Care Plan: Prior    Healthcare Decision Maker:   Click here to complete [id number] including selection of the Healthcare Decision Maker Relationship (ie \"Primary\")                           Transition of Care Plan:                    Patient lives with her  in a single story home with 5 steps to enter. Prior to admission she is independent with ADLs and drives. She has a cane and a walker that she uses, and she has had home health through [facility] before. I have not seen any indication that she needs HH this admission, however she would use them again if needed. Patient's  Casper Ko is her emergency contact and can be reached at [phone]. He will be taking her home when she is ready. Patient sees Dr. Mary Jo Morris as her PCP and has had a visit within the past year. She uses [facility] for prescriptions and they are typically covered by insurance. No noted CM needs at this time, will continue to follow. Transition of Care Plan   1. Continue medical management  2. Home with family when ready  3.  will transport  4. Follow up with PCP, specialties as necessary   5. CM will continue to follow    Care Management Interventions  PCP Verified by CM: Yes (Dr. Zach Wiggins)  Mode of Transport at Discharge:  Other (see comment)  Transition of Care Consult (CM Consult): Discharge Planning  Current Support Network: Lives with Spouse  Confirm Follow Up Transport: Family  Discharge Location  Discharge Placement: Home with family assistance    HIGINIO Forbes

## 2021-05-30 NOTE — CONSULTS
[facility]  Pulmonary, Critical Care, and Sleep Medicine    Initial Patient Consult    Name: Enid Gowers MRN: [id number]   : 1950 Hospital: [address] [place] [address]   Date: 2021        IMPRESSION:   · Acute hypoxemic respiratory failure  · Decompensated heart failure  · afib w/ RVR  · H/o CAD, cardiomyopathy (EF 45%), mild mitral regurg  · HTN, diastolic dysfunction  · Asthma, not in acute exacerbation  · H/o RA  · H/o hypothyroidism      RECOMMENDATIONS:   · Supplemental O2 as needed to keep sats > 90%. Currently on RA  · Continue bumex  · Continue xopenex/atrovent, pulmicort nebs  · For eventual pacer upgrade per cards  · IS    Much improved after diuresis and is now on RA. Nothing further to add. Will sign off and see again as needed. Subjective:     Asked to see Ms. Patrick Valdez by Dr. Nathanael Nelson for evaluation of hypoxemia. Ms. Patrick Valdez is a 77yo female w/ history of CAD, cardiomyopathy (EF 45%), mild mitral regurg, afib s/p ablation/LAAL, HTN, diastolic dysfunction, asthma, hypothyroidism and RA (on prednisone, to start orencia after pacer revision) who initially presented on  for pacer upgrade. However, she was unable to lay flat due to shortness of breath and hypoxemia so was admitted for treatment of CHF. Does state that she wheezes w/ exertion, is short of breath w/ exertion, has LE swelling. Has also been frequently taking nitro for chest pain w/ improvement. She has been diuresed and now feels better and is off supplemental O2. This is her 5th admission since October. October: s/p VATS/LAAL complicated by post-op hypotension  November: hypoxemic respiratory failure due to decompensated CHF/asthma exacerbation  November: syncope  November: s/p AVN ablation, PPM placement    She is followed in our office by Dr. Skyler Kahn, just seen on . On pulmicort, yupelri and albuterol nebs. PFTs () c/w severe obstruction (FEV1 0.77, 42%).          Past Medical History:   Diagnosis Date    Adverse effect of anesthesia     slow to wake up    Arthritis     Asthma 6/29/2009    CAUSED BY MOLD    Atrial fibrillation ([address] 6/29/2009    Atrial flutter ([address]     CAD (coronary artery disease) 06/29/2009    Celiac disease 2010    Chronic chest pain     Chronic obstructive pulmonary disease ([address] 2004-5    right leg    Chronic pain of right ankle     Diabetes ([address]     Drug induced prednisone, DM2    Diastolic heart failure ([address]     DVT (deep venous thrombosis) ([address] 2004    Right leg post surgery    GERD (gastroesophageal reflux disease)     Gluten intolerance     Heart failure ([address]     Hypertension     Hypothyroidism 6/29/2009    Iron deficiency anemia     Lyme disease     Pacemaker     11/20    Personal history of MI (myocardial infarction)     Rheumatoid arthritis ([address]     S/P AV jules ablation     11/20     S/P left atrial appendage ligation     SUHAS clip 10/20     Slow to wake up after anesthesia     Syncope     Post testing- resolved    TIA (transient ischemic attack) 2004 & 2006    Vitamin B12 deficiency 6/29/2009      Past Surgical History:   Procedure Laterality Date    HX ADENOIDECTOMY      HX AFIB ABLATION      HX APPENDECTOMY      HX CHOLECYSTECTOMY  6/16/11    HX COLONOSCOPY      HX CORONARY STENT PLACEMENT      x 2    HX HEART CATHETERIZATION  2010    2 STENTS    HX HEART CATHETERIZATION  03/2020    HX HYSTERECTOMY      HX LUMBAR LAMINECTOMY  2000    L4-L5    HX ORTHOPAEDIC  1993-6/2012    RT.  FOOT SURGERY X 6/calcaneal osteotomy    HX ORTHOPAEDIC Right 09/19/2020    right hand [facility] joint replacement    HX TONSILLECTOMY  1964    GA ICAR CATHETER ABLATION ATRIOVENTR NODE FUNCTION N/A 11/19/2020    ABLATION AV NODE performed by Matthieu Vences MD at [address] CATH LAB    GA INS NEW/RPLCMT PRM PM W/TRANSV ELTRD ATRIAL&VENT N/A 11/19/2020    INSERT PPM DUAL performed by Matthieu Vences MD at [address] CATH LAB    GA INTRACARDIAC ELECTROPHYSIOLOGIC 3D MAPPING N/A 11/19/2020    Ep 3d Mapping performed by Jena Rogel MD at [place] [address], [facility] [address] CATH LAB      Prior to Admission medications    Medication Sig Start Date End Date Taking? Authorizing Provider   bumetanide (BUMEX) 1 mg tablet Take 1 mg by mouth daily. Yes Provider, Historical   bumetanide (BUMEX) 1 mg tablet Take 0.5 mg by mouth every evening. Yes Provider, Historical   calcium citrate 200 mg (950 mg) tablet Take 200 mg by mouth daily. Yes Provider, Historical   predniSONE (DELTASONE) 2.5 mg tablet Take 2.5 mg by mouth every evening. Indications: RA   Yes Provider, Historical   budesonide (Pulmicort) 0.5 mg/2 mL nbsp 500 mcg by Nebulization route daily. Yes Provider, Historical   eplerenone (INSPRA) 25 mg tablet Take 1 Tab by mouth Daily (before lunch). 3/30/21  Yes Mukesh Khanna MD   metoprolol succinate (TOPROL-XL) 50 mg XL tablet Take 1 Tab by mouth daily. 2/18/21  Yes Mukesh Khanna MD   nitroglycerin (NITRODUR) 0.1 mg/hr 1 Patch by TransDERmal route daily. 1/21/21  Yes Mukesh Khanna MD   lidocaine (Lidoderm) 5 % 1 Patch by TransDERmal route every twenty-four (24) hours. Apply patch to the affected area for 12 hours a day and remove for 12 hours a day. 11/17/20  Yes Shayy Mendieta MD   acetaminophen (TYLENOL) 650 mg TbER Take 1,300 mg by mouth nightly as needed (sleep). Yes Provider, Historical   co-enzyme Q-10 (Co Q-10) 100 mg capsule Take 200 mg by mouth daily. Yes Provider, Historical   artificial tears, dextran 70-hypromellose, (GenTeal Tears Mild) 0.1-0.3 % ophthalmic solution Administer 1 Drop to both eyes daily. Yes Provider, Historical   carboxymethylcell/hypromellose (GENTEAL GEL OP) Administer 1 Drop to both eyes nightly. Yes Provider, Historical   omega 3-DHA-EPA-fish oil 1,000 mg (120 mg-180 mg) capsule Take 1 Cap by mouth every evening.    Yes Provider, Historical   levalbuterol (XOPENEX) 0.63 mg/3 mL nebu 0.63 mg by Nebulization route two (2) times a day. Yes Provider, Historical   potassium 99 mg tablet Take 99 mg by mouth daily. Yes Provider, Historical   insulin glargine (LANTUS,BASAGLAR) 100 unit/mL (3 mL) inpn 12 Units by SubCUTAneous route nightly. Yes Provider, Historical   fenofibrate nanocrystallized (Tricor) 48 mg tablet Take 48 mg by mouth nightly. Yes Provider, Historical   levothyroxine (SYNTHROID) 137 mcg tablet Take 137 mcg by mouth Daily (before breakfast). 2/4/20  Yes Provider, Historical   predniSONE (DELTASONE) 5 mg tablet Take 5 mg by mouth daily. Indications: RA   Yes Provider, Historical   cholecalciferol (Vitamin D3) (1000 Units /25 mcg) tablet Take 1,000 Units by mouth daily. Yes Provider, Historical   turmeric (CURCUMIN) Take 1 g by mouth every evening. Yes Provider, Historical   SITagliptin (JANUVIA) 100 mg tablet Take 100 mg by mouth daily (with dinner). Yes Provider, Historical   vit C/vit E ac/lut/copper/zinc (PRESERVISION LUTEIN PO) Take 1 Tablet by mouth daily (with dinner). Yes Provider, Historical   ascorbic acid (VITAMIN C) 500 mg tablet Take 500 mg by mouth daily. Yes Other, MD Yolanda   ferrous sulfate 325 mg (65 mg iron) tablet Take 325 mg by mouth daily (with lunch). Yes Other, MD Yolanda   cyanocobalamin (VITAMIN B12) 1,000 mcg/mL injection INJECT 1 ML INTO THE MUSCLE EVERY 30 DAYS 3/24/14  Yes Amira Henry MD   lansoprazole (PREVACID) 30 mg capsule Take 30 mg by mouth daily.    Yes Provider, Historical     Allergies   Allergen Reactions    Butter Flavor Anaphylaxis     Butter AND CREME    Keflex [Cephalexin] Anaphylaxis    Milk Containing Products Anaphylaxis     Butter and cream    Pcn [Penicillins] Anaphylaxis    Aspirin Hives and Other (comments)     \"it makes my stomach bleed\"      Cimzia [Certolizumab Pegol] Other (comments)     GI symptoms, blisters in the mouth and esophagus    Clopidogrel Other (comments)     GI bleed    Demerol [Meperidine] Other (comments)     HYPOTENSION  Fentanyl Other (comments)     HYPOTENSION    Ibuprofen Diarrhea     Patient reports that ibuprofen caused diarrhea    Morphine Other (comments)     HYPOTENSION    Nitroglycerin Other (comments)     IN PILL FORM  - HYPOTENSION  CAN TOLERATE PATCH FOR SHORT TIME    Sulfa (Sulfonamide Antibiotics) Angioedema     Lips    Tapazole [Methimazole] Unknown (comments)     Patient stated \"it made me feel like I had the flu\"    Tramadol Other (comments)     Patient reports thrush with tramadol use    Adhesive Tape-Silicones Other (comments)     BAD BLISTERS AND TENDS TO GET INFECTED    Albuterol Palpitations    Gluten Diarrhea     bloating    Oxycodone Other (comments)     Hallicination and hypotension      Social History     Tobacco Use    Smoking status: Former Smoker     Packs/day: 1.00     Years: 30.00     Pack years: 30.00     Types: Cigarettes     Quit date: 2002     Years since quittin.9    Smokeless tobacco: Never Used   Substance Use Topics    Alcohol use: No      Family History   Problem Relation Age of Onset    Delayed Awakening Mother     Heart Disease Mother     Coronary Artery Disease Mother     Hypertension Mother     Stroke Mother     Delayed Awakening Sister     Hypertension Sister     Lung Disease Father     Cancer Father         colon    Hypertension Father     Hypertension Brother     Breast Cancer Maternal Aunt     Breast Cancer Maternal Aunt     Breast Cancer Cousin         maternal 1st cousin    Breast Cancer Maternal Aunt     Breast Cancer Cousin         maternal 1st cousin    Breast Cancer Cousin         maternal 1st cousin    Deep Vein Thrombosis Neg Hx     Anesth Problems Neg Hx         Current Facility-Administered Medications   Medication Dose Route Frequency    levalbuterol (XOPENEX) nebulizer soln 0.63 mg/3 mL  0.63 mg Nebulization Q8H    [START ON 2021] insulin glargine (LANTUS) injection 12 Units  12 Units SubCUTAneous ACB    alogliptin (NESINA) tablet 25 mg  25 mg Oral ACD    insulin lispro (HUMALOG) injection   SubCUTAneous AC&HS    bumetanide (BUMEX) tablet 1 mg  1 mg Oral DAILY    ipratropium (ATROVENT) 0.02 % nebulizer solution 0.5 mg  0.5 mg Nebulization Q8H RT    eplerenone (INSPRA) tablet 25 mg  25 mg Oral ACL    fenofibrate nanocrystallized (TRICOR) tablet 48 mg  48 mg Oral QHS    pantoprazole (PROTONIX) tablet 40 mg  40 mg Oral ACB    levothyroxine (SYNTHROID) tablet 137 mcg  137 mcg Oral ACB    metoprolol succinate (TOPROL-XL) XL tablet 50 mg  50 mg Oral DAILY    budesonide (PULMICORT) 500 mcg/2 ml nebulizer suspension  250 mcg Nebulization BID RT    omega-3 acid ethyl esters (LOVAZA) capsule 1 Capsule  1 Capsule Oral QPM    potassium tablet 99 mg (Patient Supplied)  99 mg Oral DAILY    predniSONE (DELTASONE) tablet 2.5 mg  2.5 mg Oral PCL    predniSONE (DELTASONE) tablet 5 mg  5 mg Oral DAILY    artificial tears (dextran-hypromellose-glycerin) (GENTEAL) ophthalmic solution 1 Drop  1 Drop Both Eyes QHS       Review of Systems:  A comprehensive review of systems was negative except for that written in the HPI. Objective:   Vital Signs:    Visit Vitals  /70 (BP 1 Location: Left upper arm, BP Patient Position: At rest)   Pulse 87   Temp 98 °F (36.7 °C)   Resp 18   Ht 5' 2\" (1.575 m)   Wt 77.2 kg (170 lb 4.8 oz)   LMP 1980 (LMP Unknown)   SpO2 92%   BMI 31.15 kg/m²       O2 Device: None (Room air)   O2 Flow Rate (L/min): 1 l/min   Temp (24hrs), Av.3 °F (36.8 °C), Min:98 °F (36.7 °C), Max:98.5 °F (36.9 °C)       Intake/Output:   Last shift:       07 -  1900  In: -   Out: 1700 [Urine:1700]  Last 3 shifts:  190 -  0700  In: -   Out: 900 [Urine:900]    Intake/Output Summary (Last 24 hours) at 2021 1635  Last data filed at 2021 1324  Gross per 24 hour   Intake --   Output 1700 ml   Net -1700 ml      Physical Exam:   General:  Alert, cooperative, no distress, appears stated age. Head:  Normocephalic, without obvious abnormality, atraumatic. Eyes:  Conjunctivae/corneas clear. PERRL, EOMs intact. Nose: Nares normal. Septum midline. Mucosa normal. No drainage or sinus tenderness. Throat: Lips, mucosa, and tongue normal. Teeth and gums normal.   Neck: Supple, symmetrical, trachea midline, no adenopathy, thyroid: no enlargment/tenderness/nodules, no carotid bruit and no JVD. Back:   Symmetric, no curvature. ROM normal.   Lungs:   Clear to auscultation bilaterally. Chest wall:  No tenderness or deformity. Heart:  Regular rate and rhythm, S1, S2 normal, no murmur, click, rub or gallop. Abdomen:   Soft, non-tender. Bowel sounds normal. No masses,  No organomegaly. Extremities: Extremities normal, atraumatic, no cyanosis or edema. Pulses: 2+ and symmetric all extremities.    Skin: Skin color, texture, turgor normal. No rashes or lesions   Lymph nodes: Cervical, supraclavicular, and axillary nodes normal.   Neurologic: Grossly nonfocal     Data review:     Recent Results (from the past 24 hour(s))   GLUCOSE, POC    Collection Time: 05/29/21  9:06 PM   Result Value Ref Range    Glucose (POC) 146 (H) 65 - 117 mg/dL    Performed by Irineo Expose (PCT)    CULTURE, BLOOD, PAIRED    Collection Time: 05/29/21  9:55 PM    Specimen: Blood   Result Value Ref Range    Special Requests: NO SPECIAL REQUESTS      Culture result: NO GROWTH AFTER 9 HOURS     LACTIC ACID    Collection Time: 05/29/21 10:54 PM   Result Value Ref Range    Lactic acid 1.4 0.4 - 2.0 MMOL/L   TSH 3RD GENERATION    Collection Time: 05/29/21 10:54 PM   Result Value Ref Range    TSH 0.21 (L) 0.36 - 3.74 uIU/mL   CBC WITH AUTOMATED DIFF    Collection Time: 05/30/21  2:44 AM   Result Value Ref Range    WBC 14.4 (H) 3.6 - 11.0 K/uL    RBC 4.44 3.80 - 5.20 M/uL    HGB 13.7 11.5 - 16.0 g/dL    HCT 43.2 35.0 - 47.0 %    MCV 97.3 80.0 - 99.0 FL    MCH 30.9 26.0 - 34.0 PG    MCHC 31.7 30.0 - 36.5 g/dL    RDW 13.3 11.5 - 14.5 % PLATELET 050 (L) 020 - 400 K/uL    NRBC 0.0 0  WBC    ABSOLUTE NRBC 0.00 0.00 - 0.01 K/uL    NEUTROPHILS 77 (H) 32 - 75 %    LYMPHOCYTES 10 (L) 12 - 49 %    MONOCYTES 9 5 - 13 %    EOSINOPHILS 3 0 - 7 %    BASOPHILS 0 0 - 1 %    IMMATURE GRANULOCYTES 1 (H) 0.0 - 0.5 %    ABS. NEUTROPHILS 11.2 (H) 1.8 - 8.0 K/UL    ABS. LYMPHOCYTES 1.4 0.8 - 3.5 K/UL    ABS. MONOCYTES 1.3 (H) 0.0 - 1.0 K/UL    ABS. EOSINOPHILS 0.4 0.0 - 0.4 K/UL    ABS. BASOPHILS 0.0 0.0 - 0.1 K/UL    ABS. IMM.  GRANS. 0.1 (H) 0.00 - 0.04 K/UL    DF SMEAR SCANNED      RBC COMMENTS NORMOCYTIC, NORMOCHROMIC     METABOLIC PANEL, BASIC    Collection Time: 05/30/21  2:44 AM   Result Value Ref Range    Sodium 138 136 - 145 mmol/L    Potassium 3.4 (L) 3.5 - 5.1 mmol/L    Chloride 104 97 - 108 mmol/L    CO2 29 21 - 32 mmol/L    Anion gap 5 5 - 15 mmol/L    Glucose 116 (H) 65 - 100 mg/dL    BUN 20 6 - 20 MG/DL    Creatinine 0.54 (L) 0.55 - 1.02 MG/DL    BUN/Creatinine ratio 37 (H) 12 - 20      GFR est AA >60 >60 ml/min/1.73m2    GFR est non-AA >60 >60 ml/min/1.73m2    Calcium 8.8 8.5 - 10.1 MG/DL   MAGNESIUM    Collection Time: 05/30/21  2:44 AM   Result Value Ref Range    Magnesium 2.2 1.6 - 2.4 mg/dL   HEMOGLOBIN A1C WITH EAG    Collection Time: 05/30/21  2:44 AM   Result Value Ref Range    Hemoglobin A1c 7.0 (H) 4.0 - 5.6 %    Est. average glucose 154 mg/dL   GLUCOSE, POC    Collection Time: 05/30/21  7:00 AM   Result Value Ref Range    Glucose (POC) 216 (H) 65 - 117 mg/dL    Performed by Chica Penny (PCT)    GLUCOSE, POC    Collection Time: 05/30/21 11:22 AM   Result Value Ref Range    Glucose (POC) 224 (H) 65 - 117 mg/dL    Performed by Dyana Zelaya (PCT)        Imaging:  I have personally reviewed the patients radiographs and have reviewed the reports:          Alex Otoole MD

## 2021-05-30 NOTE — PROGRESS NOTES
Problem: General Medical Care Plan  Goal: *Vital signs within specified parameters  Outcome: Progressing Towards Goal     Problem: General Medical Care Plan  Goal: *Absence of infection signs and symptoms  Outcome: Progressing Towards Goal

## 2021-05-30 NOTE — PROGRESS NOTES
Admission Medication Reconciliation:     Information obtained from:    Patient via interview in . [place]:  YES    Comments/Recommendations:   Patient able to confirm name, , and preferred pharmacy. The patient declined to review allergies with the pharmacist. The patient reports she reviewed her allergies with multiple nurses and feels the list in the computer is up to date. Updated PTA medication list  Prednisone is taken for RA. ¹[employer] pharmacy benefit data reflects medications filled and processed through the patient's insurance, however   this data does NOT capture whether the medication was picked up or is currently being taken by the patient. Prior to Admission Medications   Prescriptions Last Dose Informant Taking? SITagliptin (JANUVIA) 100 mg tablet 2021 at Unknown time Self Yes   Sig: Take 100 mg by mouth daily (with dinner). acetaminophen (TYLENOL) 650 mg TbER  Self Yes   Sig: Take 1,300 mg by mouth nightly as needed (sleep). artificial tears, dextran 70-hypromellose, (GenTeal Tears Mild) 0.1-0.3 % ophthalmic solution 2021 at Unknown time Self Yes   Sig: Administer 1 Drop to both eyes daily. ascorbic acid (VITAMIN C) 500 mg tablet 2021 at Unknown time Self Yes   Sig: Take 500 mg by mouth daily. budesonide (Pulmicort) 0.5 mg/2 mL nbsp 2021 Self Yes   Si mcg by Nebulization route daily. bumetanide (BUMEX) 1 mg tablet 2021 Self Yes   Sig: Take 1 mg by mouth daily. bumetanide (BUMEX) 1 mg tablet 2021 Self Yes   Sig: Take 0.5 mg by mouth every evening. calcium citrate 200 mg (950 mg) tablet 2021 Self Yes   Sig: Take 200 mg by mouth daily. carboxymethylcell/hypromellose (GENTEAL GEL OP) 2021 at Unknown time Self Yes   Sig: Administer 1 Drop to both eyes nightly. cholecalciferol (Vitamin D3) (1000 Units /25 mcg) tablet 2021 at Unknown time Self Yes   Sig: Take 1,000 Units by mouth daily.    co-enzyme Q-10 (Co Q-10) 100 mg capsule 2021 at Unknown time Self Yes   Sig: Take 200 mg by mouth daily. cyanocobalamin (VITAMIN B12) 1,000 mcg/mL injection 2021 at Unknown time Self Yes   Sig: INJECT 1 ML INTO THE MUSCLE EVERY 30 DAYS   eplerenone (INSPRA) 25 mg tablet 2021 at Unknown time Self Yes   Sig: Take 1 Tab by mouth Daily (before lunch). fenofibrate nanocrystallized (Tricor) 48 mg tablet 2021 at Unknown time Self Yes   Sig: Take 48 mg by mouth nightly. ferrous sulfate 325 mg (65 mg iron) tablet 2021 at Unknown time Self Yes   Sig: Take 325 mg by mouth daily (with lunch). insulin glargine (LANTUS,BASAGLAR) 100 unit/mL (3 mL) inpn 2021 at Unknown time Self Yes   Si Units by SubCUTAneous route nightly. lansoprazole (PREVACID) 30 mg capsule 2021 at Unknown time Self Yes   Sig: Take 30 mg by mouth daily. levalbuterol (XOPENEX) 0.63 mg/3 mL nebu 2021 at Unknown time Self Yes   Si.63 mg by Nebulization route two (2) times a day. levothyroxine (SYNTHROID) 137 mcg tablet 2021 at Unknown time Self Yes   Sig: Take 137 mcg by mouth Daily (before breakfast). lidocaine (Lidoderm) 5 % 2021 at Unknown time Self Yes   Si Patch by TransDERmal route every twenty-four (24) hours. Apply patch to the affected area for 12 hours a day and remove for 12 hours a day. metoprolol succinate (TOPROL-XL) 50 mg XL tablet 2021 at Unknown time Self Yes   Sig: Take 1 Tab by mouth daily. nitroglycerin (NITRODUR) 0.1 mg/hr 2021 at Unknown time Self Yes   Si Patch by TransDERmal route daily. omega 3-DHA-EPA-fish oil 1,000 mg (120 mg-180 mg) capsule 2021 at Unknown time Self Yes   Sig: Take 1 Cap by mouth every evening. potassium 99 mg tablet 2021 at Unknown time Self Yes   Sig: Take 99 mg by mouth daily. predniSONE (DELTASONE) 2.5 mg tablet 2021 Self Yes   Sig: Take 2.5 mg by mouth every evening.  Indications: RA   predniSONE (DELTASONE) 5 mg tablet 5/28/2021 at Unknown time Self Yes   Sig: Take 5 mg by mouth daily. Indications: RA   turmeric (CURCUMIN) 5/27/2021 at Unknown time Self Yes   Sig: Take 1 g by mouth every evening. vit C/vit E ac/lut/copper/zinc (PRESERVISION LUTEIN PO) 5/27/2021 at Unknown time Self Yes   Sig: Take 1 Tablet by mouth daily (with dinner). Facility-Administered Medications: None         Please contact the main inpatient pharmacy with any questions or concerns at [phone] and we will direct you to the clinical pharmacist covering this patient's care while in-house.    Claudette Wright, PharmD, BCPS

## 2021-05-30 NOTE — DISCHARGE SUMMARY
Tawanna Cushing, MD. [facility]              Patient: Hi Perez  : 1950      Today's Date: 2021        Discharge Summary       Admit Date: 21  DC Date: 21      DC Diagnosis  1) Shortness of Breath  Hypoxia  2) Acute on chronic HFpEF      Hospital Course  - She was SOB and unable to lay flat for her BiV pacer upgrade. She was given a couple of doses of IV Bumex and feels much better 21. Her 6 min walk test on 21 showed O2 sats 96% on ambulation on RA. Have asked her to continue her outpatient eplerenone and Bumex but take an extra 0.5 mg Bumex in afternoon some days.   - See my progress note dated 21 for further details. Current Discharge Medication List      CONTINUE these medications which have NOT CHANGED    Details   !! bumetanide (BUMEX) 1 mg tablet Take 1 mg by mouth daily. !! bumetanide (BUMEX) 1 mg tablet Take 0.5 mg by mouth every evening. calcium citrate 200 mg (950 mg) tablet Take 200 mg by mouth daily. !! predniSONE (DELTASONE) 2.5 mg tablet Take 2.5 mg by mouth every evening. Indications: RA      budesonide (Pulmicort) 0.5 mg/2 mL nbsp 500 mcg by Nebulization route daily. eplerenone (INSPRA) 25 mg tablet Take 1 Tab by mouth Daily (before lunch). Qty: 90 Tab, Refills: 1      metoprolol succinate (TOPROL-XL) 50 mg XL tablet Take 1 Tab by mouth daily. Qty: 90 Tab, Refills: 3      nitroglycerin (NITRODUR) 0.1 mg/hr 1 Patch by TransDERmal route daily. Qty: 90 Patch, Refills: 3      lidocaine (Lidoderm) 5 % 1 Patch by TransDERmal route every twenty-four (24) hours. Apply patch to the affected area for 12 hours a day and remove for 12 hours a day. Qty: 1 Each, Refills: 0      acetaminophen (TYLENOL) 650 mg TbER Take 1,300 mg by mouth nightly as needed (sleep). co-enzyme Q-10 (Co Q-10) 100 mg capsule Take 200 mg by mouth daily.       artificial tears, dextran 70-hypromellose, (GenTeal Tears Mild) 0.1-0.3 % ophthalmic solution Administer 1 Drop to both eyes daily. carboxymethylcell/hypromellose (GENTEAL GEL OP) Administer 1 Drop to both eyes nightly. omega 3-DHA-EPA-fish oil 1,000 mg (120 mg-180 mg) capsule Take 1 Cap by mouth every evening. levalbuterol (XOPENEX) 0.63 mg/3 mL nebu 0.63 mg by Nebulization route two (2) times a day. potassium 99 mg tablet Take 99 mg by mouth daily. insulin glargine (LANTUS,BASAGLAR) 100 unit/mL (3 mL) inpn 12 Units by SubCUTAneous route nightly. fenofibrate nanocrystallized (Tricor) 48 mg tablet Take 48 mg by mouth nightly. levothyroxine (SYNTHROID) 137 mcg tablet Take 137 mcg by mouth Daily (before breakfast). !! predniSONE (DELTASONE) 5 mg tablet Take 5 mg by mouth daily. Indications: RA      cholecalciferol (Vitamin D3) (1000 Units /25 mcg) tablet Take 1,000 Units by mouth daily. turmeric (CURCUMIN) Take 1 g by mouth every evening. SITagliptin (JANUVIA) 100 mg tablet Take 100 mg by mouth daily (with dinner). vit C/vit E ac/lut/copper/zinc (PRESERVISION LUTEIN PO) Take 1 Tablet by mouth daily (with dinner). ascorbic acid (VITAMIN C) 500 mg tablet Take 500 mg by mouth daily. ferrous sulfate 325 mg (65 mg iron) tablet Take 325 mg by mouth daily (with lunch). cyanocobalamin (VITAMIN B12) 1,000 mcg/mL injection INJECT 1 ML INTO THE MUSCLE EVERY 30 DAYS  Qty: 10 mL, Refills: 0      lansoprazole (PREVACID) 30 mg capsule Take 30 mg by mouth daily. Associated Diagnoses: Esophageal reflux       !! - Potential duplicate medications found. Please discuss with provider. Mary Ly MD, [facility] [address] [place] [address], [place] [address].  [address], [address] [place] [address]  Ph: [phone]   Ph [phone]

## 2021-05-30 NOTE — DISCHARGE INSTRUCTIONS
Baron Mei MD. [facility]              Patient: John Cline  : 1950      Today's Date: 2021        Patient Discharge Instructions    John Cline / [id number] : 1950    Admitted 2021 Discharged: 2021       Follow-up with Dr. Artem Bronson. Office to arrange your appointment. Information obtained by :  I understand that if any problems occur once I am at home I am to contact my physician. I understand and acknowledge receipt of the instructions indicated above.                                                                                                                                            R.N.'s Signature                                                                  Date/Time                                                                                                                                              Patient or Representative Signature                                                          Date/Time      Nitin Hall MD

## 2021-05-30 NOTE — PROGRESS NOTES
Orion Yeager MD. [facility]              Patient: Selina Chen  : 1950      Today's Date: 2021        CARDIOLOGY PROGRESS NOTE  S: She is feeling better today and wants to go home. Breathing is better. O:   Physical Exam:  Visit Vitals  /70 (BP 1 Location: Left upper arm, BP Patient Position: At rest)   Pulse 87   Temp 98 °F (36.7 °C)   Resp 18   Ht 5' 2\" (1.575 m)   Wt 170 lb 4.8 oz (77.2 kg)   LMP 1980 (LMP Unknown)   SpO2 92%   BMI 31.15 kg/m²     Patient appears generally well, mood and affect are appropriate and pleasant. HEENT:  Hearing intact, non-icteric, normocephalic, atraumatic. Neck Exam: Supple, no obvious JVD at 45 degrees   Lung Exam: Clear to auscultation, even breath sounds. Cardiac Exam: Regular rate and rhythm with no murmur or rub  Abdomen: Soft, non-tender,   Extremities: MAW, No lower extremity edema. MSKTL: Overall good ROM ext  Skin: No significant rashes  Psych: Appropriate affect  Neuro - Grossly intact        Medications Prior to Admission   Medication Sig    bumetanide (BUMEX) 1 mg tablet Take 1 mg by mouth daily.  bumetanide (BUMEX) 1 mg tablet Take 0.5 mg by mouth every evening.  calcium citrate 200 mg (950 mg) tablet Take 200 mg by mouth daily.  predniSONE (DELTASONE) 2.5 mg tablet Take 2.5 mg by mouth every evening. Indications: RA    budesonide (Pulmicort) 0.5 mg/2 mL nbsp 500 mcg by Nebulization route daily.  eplerenone (INSPRA) 25 mg tablet Take 1 Tab by mouth Daily (before lunch).  metoprolol succinate (TOPROL-XL) 50 mg XL tablet Take 1 Tab by mouth daily.  nitroglycerin (NITRODUR) 0.1 mg/hr 1 Patch by TransDERmal route daily.  lidocaine (Lidoderm) 5 % 1 Patch by TransDERmal route every twenty-four (24) hours. Apply patch to the affected area for 12 hours a day and remove for 12 hours a day.  acetaminophen (TYLENOL) 650 mg TbER Take 1,300 mg by mouth nightly as needed (sleep).     co-enzyme Q-10 (Co Q-10) 100 mg capsule Take 200 mg by mouth daily.  artificial tears, dextran 70-hypromellose, (GenTeal Tears Mild) 0.1-0.3 % ophthalmic solution Administer 1 Drop to both eyes daily.  carboxymethylcell/hypromellose (GENTEAL GEL OP) Administer 1 Drop to both eyes nightly.  omega 3-DHA-EPA-fish oil 1,000 mg (120 mg-180 mg) capsule Take 1 Cap by mouth every evening.  levalbuterol (XOPENEX) 0.63 mg/3 mL nebu 0.63 mg by Nebulization route two (2) times a day.  potassium 99 mg tablet Take 99 mg by mouth daily.  insulin glargine (LANTUS,BASAGLAR) 100 unit/mL (3 mL) inpn 12 Units by SubCUTAneous route nightly.  fenofibrate nanocrystallized (Tricor) 48 mg tablet Take 48 mg by mouth nightly.  levothyroxine (SYNTHROID) 137 mcg tablet Take 137 mcg by mouth Daily (before breakfast).  predniSONE (DELTASONE) 5 mg tablet Take 5 mg by mouth daily. Indications: RA    cholecalciferol (Vitamin D3) (1000 Units /25 mcg) tablet Take 1,000 Units by mouth daily.  turmeric (CURCUMIN) Take 1 g by mouth every evening.  SITagliptin (JANUVIA) 100 mg tablet Take 100 mg by mouth daily (with dinner).  vit C/vit E ac/lut/copper/zinc (PRESERVISION LUTEIN PO) Take 1 Tablet by mouth daily (with dinner).  ascorbic acid (VITAMIN C) 500 mg tablet Take 500 mg by mouth daily.  ferrous sulfate 325 mg (65 mg iron) tablet Take 325 mg by mouth daily (with lunch).  cyanocobalamin (VITAMIN B12) 1,000 mcg/mL injection INJECT 1 ML INTO THE MUSCLE EVERY 30 DAYS    lansoprazole (PREVACID) 30 mg capsule Take 30 mg by mouth daily.            LABS / OTHER STUDIES reviewed:     Recent Results (from the past 24 hour(s))   GLUCOSE, POC    Collection Time: 05/29/21  3:47 PM   Result Value Ref Range    Glucose (POC) 185 (H) 65 - 117 mg/dL    Performed by Sahara Bernal (CON)    GLUCOSE, POC    Collection Time: 05/29/21  9:06 PM   Result Value Ref Range    Glucose (POC) 146 (H) 65 - 117 mg/dL    Performed by Jayant Meek (PCT)    CULTURE, BLOOD, PAIRED    Collection Time: 05/29/21  9:55 PM    Specimen: Blood   Result Value Ref Range    Special Requests: NO SPECIAL REQUESTS      Culture result: NO GROWTH AFTER 9 HOURS     LACTIC ACID    Collection Time: 05/29/21 10:54 PM   Result Value Ref Range    Lactic acid 1.4 0.4 - 2.0 MMOL/L   TSH 3RD GENERATION    Collection Time: 05/29/21 10:54 PM   Result Value Ref Range    TSH 0.21 (L) 0.36 - 3.74 uIU/mL   CBC WITH AUTOMATED DIFF    Collection Time: 05/30/21  2:44 AM   Result Value Ref Range    WBC 14.4 (H) 3.6 - 11.0 K/uL    RBC 4.44 3.80 - 5.20 M/uL    HGB 13.7 11.5 - 16.0 g/dL    HCT 43.2 35.0 - 47.0 %    MCV 97.3 80.0 - 99.0 FL    MCH 30.9 26.0 - 34.0 PG    MCHC 31.7 30.0 - 36.5 g/dL    RDW 13.3 11.5 - 14.5 %    PLATELET 019 (L) 978 - 400 K/uL    NRBC 0.0 0  WBC    ABSOLUTE NRBC 0.00 0.00 - 0.01 K/uL    NEUTROPHILS 77 (H) 32 - 75 %    LYMPHOCYTES 10 (L) 12 - 49 %    MONOCYTES 9 5 - 13 %    EOSINOPHILS 3 0 - 7 %    BASOPHILS 0 0 - 1 %    IMMATURE GRANULOCYTES 1 (H) 0.0 - 0.5 %    ABS. NEUTROPHILS 11.2 (H) 1.8 - 8.0 K/UL    ABS. LYMPHOCYTES 1.4 0.8 - 3.5 K/UL    ABS. MONOCYTES 1.3 (H) 0.0 - 1.0 K/UL    ABS. EOSINOPHILS 0.4 0.0 - 0.4 K/UL    ABS. BASOPHILS 0.0 0.0 - 0.1 K/UL    ABS. IMM.  GRANS. 0.1 (H) 0.00 - 0.04 K/UL    DF SMEAR SCANNED      RBC COMMENTS NORMOCYTIC, NORMOCHROMIC     METABOLIC PANEL, BASIC    Collection Time: 05/30/21  2:44 AM   Result Value Ref Range    Sodium 138 136 - 145 mmol/L    Potassium 3.4 (L) 3.5 - 5.1 mmol/L    Chloride 104 97 - 108 mmol/L    CO2 29 21 - 32 mmol/L    Anion gap 5 5 - 15 mmol/L    Glucose 116 (H) 65 - 100 mg/dL    BUN 20 6 - 20 MG/DL    Creatinine 0.54 (L) 0.55 - 1.02 MG/DL    BUN/Creatinine ratio 37 (H) 12 - 20      GFR est AA >60 >60 ml/min/1.73m2    GFR est non-AA >60 >60 ml/min/1.73m2    Calcium 8.8 8.5 - 10.1 MG/DL   MAGNESIUM    Collection Time: 05/30/21  2:44 AM   Result Value Ref Range    Magnesium 2.2 1.6 - 2.4 mg/dL   HEMOGLOBIN A1C WITH EAG Collection Time: 05/30/21  2:44 AM   Result Value Ref Range    Hemoglobin A1c 7.0 (H) 4.0 - 5.6 %    Est. average glucose 154 mg/dL   GLUCOSE, POC    Collection Time: 05/30/21  7:00 AM   Result Value Ref Range    Glucose (POC) 216 (H) 65 - 117 mg/dL    Performed by Tigits Tuttle (PCT)    GLUCOSE, POC    Collection Time: 05/30/21 11:22 AM   Result Value Ref Range    Glucose (POC) 224 (H) 65 - 117 mg/dL    Performed by Carlos Tenorio (PCT)              CARDIAC DIAGNOSTICS:     Cardiac Evaluation Includes:  I reviewed the results below. Cardiac Cath 6/09 - severe mLCX disease --> stenting     Cath 8/19/10 - RCA and LCX stents patent; MLI in LAD, LVEF 60%     Event Monitor 3/11 - PVC's  Holter 3/16 - PVC's  Echo 3/17 - LVEF 60%  Lexiscan Cardiolite 3/17 - normal MPI, LVEF 58%     [facility] records reviewed.  Went there for chest pain on 1/3/19. Labs 1/3/19 - Trop < 0.02, BNP 18, Cr 0.61, Hgb 13  Cath 1/4/19 - LAD stent patent, LCX without sig disease, RCA stent patent.       Holter 1/23/19 - NSR, normal study      Stress Echo 2/11/19 - walked 3:21 (2.4 METS), baseline /90, Max /90, normal stress EKG and stress echo      CXR 2/22/19 - normal      Echo 3/21/19 - LVEF 61%; grade 1 diastology (normal for age), AV sclerosis.   RVSP 26 mmHg.       PARDEEP's with exercise 6/7/19 - normal       Event Monitor 5/15/19-6/6/19 - normal study; symptoms correlated with sinus      Right Heart Cath 6/28/19 [facility] findings:   RAP=   Mean 17    mmHg  RVSP= 40/20    mmHg  PAP=     42/30/34  mmHg  PCWP=  27 mmHg  CO=        Thermal 4.1  L/min,             Faraz pending  CI=           Thermal 3.08  L/min/m2,     Faraz pending      Findings:  1.   Elevated right and left sided filling pressure  2.   Pulmonary hypertension, WHO group 2  3.   Perserved cardiac index by Thermal     Echo 8/13/19 - LVEF 50-55%, \"The following segments are hypokinetic: basal inferior, basal inferolateral and mid inferolateral\"     Cardiac Cath 8/13/19 - Non-obstructive CAD with patent stents in LCX and RCA.  LVEDP 16 mmHg.       Cardiac MRI 9/30/19 - 1. Normal left ventricular cavity size with preserved left ventricular systolic function. There is no significant regional wall motion abnormalities. LVEF 60%. 2. Normal right ventricular size and systolic motion. RVEF 60%. 3. Trace to mild mitral regurgitation. 4. On EGE and LGE study, there is a small thin subendocardial infarct involving 25% to 50% thickness of the subendocardial wall of the basal inferior wall. This infarct is in RCA territory. There is reasonable viability surrounding this infarct. The mid to distal inferior wall and inferoseptal wall does not demonstrate any infarct. There are no other infarct. There is no features of infiltrative sarcoidosis or cardiac amyloidosis. There is no features of inflammatory myocarditis. All other myocardial walls are otherwise completely viable. There is no features of hemochromatosis.  5. Normal pericardium without significant pericardial effusion.      Venous Doppler 9/9/19 - No DVT Bilat      Lexiscan Cardiolite 12/9/19 - Normal MPI, LVEF 66%     PFT's 1/14/20 - Spirometry reveals severe airflow obstruction with moderate reduction in forced vital capacity which may be due to a restrictive ventilatory defect or air trapping     COY 10/15/20 - normal, LVEF 55%     Surgical SUHAS clip 10/20/20 Video-assisted thoracoscopic placement of left atrial appendage clip     Echo 10/29/20 - LVEF 55-60%, PASP 41 mmHg      Echo 11/3/20 - LVEF 55-60%     Lexiscan Cardiolite 11/5/20 - Normal MPI, LVEF 64%     Dual PPM / AV node ablation 11/19/20      Echo 3/23/21 - LVEF 45%, septal bounce, grade 1 diastology        Chest CTA 5/29/21 - No pulmonary vascular other acute cardiopulmonary process. Moderate  sliding hiatal hernia. - The lungs show linear dependent atelectasis but otherwise are clear of mass,  nodule, airspace disease or edema.   The pulmonary arteries are well enhanced and no pulmonary emboli are identified. CXR 5/29/21 - No acute disease         ASSESSMENT AND PLAN:     Assessment and Plan:      1) SOB, Possible Acute on chronic CHF   - she has had chronic SOB and has had extensive cardiac and pulmonary workup. Symptoms have persisted despite our measures (at diuresis and revascularization)   - Patient was to have a BIV pacer upgrade Friday but was unable to le flat so was admitted for Acute HF management   - She has had normal LVEF but now LVEF mildly depressed s/p PPM placement   - proBNP was just 439 and CXR and Chest CTA with clear lungs   - On Bumex and Inspra prior to arrival -- she received a couple of doses of IV Bumex 1 mg this admission.   - On 5/30/21 - she feels well and wants to go home. Breathing is better. 6min walk test is shows sats > 90% on RA. Will continue her outpatient diuretic regimen but have her take some extra PM bumex on alternating days. 2) CAD s/p PCI to LAD , RCA  -[facility] in 8/19 - no intervention  - Lexiscan Cardiolite 11/5/20 - Normal MPI, LVEF 64%  - Intolerant to statins d/t myalgias  - ASA intolerant d/t GI bleeding  - Developed plavix induced thrombocytopenia - advised by hematology to avoid antiplatelets  - she has chronic chest pain which persists despite revascularization   - cont BB     3) Dyslipidemia   - Does not take a statin due to having muscle aches (from RA at baseline)  - Tried to get her on PSCK9 Inhibitor in past bur she is not taking one   - She is now only on Tricor      4) Atrial flutter with RVR 11/20  S/p PPM   - Dual PPM / AV node ablation 11/19/20   - not on any blood thinners due to problems with bleeding   - Surgical SUHAS clip 10/20/20   - she feels better with pacer rate at 80 bpm rather than lower rate        Total time (preparing to see the patient, reviewing data, seeing patient face to fine, counseling patient, documenting information, arranging discharge, etc) was 45 min.           Evie Rosenberg MD, [address] [place] [address] Drive.  [address], [facility] Waylon Zapata.  [place], [address] [place] [address]  Ph: [phone]   Ph [phone]

## 2021-06-03 ENCOUNTER — TELEPHONE (OUTPATIENT)
Dept: CARDIOLOGY CLINIC | Age: 71
End: 2021-06-03

## 2021-06-03 ENCOUNTER — TELEPHONE (OUTPATIENT)
Dept: CASE MANAGEMENT | Age: 71
End: 2021-06-03

## 2021-06-03 LAB
BACTERIA SPEC CULT: NORMAL
SERVICE CMNT-IMP: NORMAL

## 2021-06-03 NOTE — TELEPHONE ENCOUNTER
Telephone call made to patient for purpose of care transition. Introduced self and purpose of call. Performed medication review with no discrepancies found. She states she has back pain when taking the higher daily dose of bumex and she has been instructed by Dr. Jade Kanner to cut the dose in half if needed due to this. She has had no weight gain. She states she has chronic shortness of breath. She does have written HF education at home. She did see her PCP yesterday ane he noted improvement in her lab results since discharge. She states she is waiting for a call from Dr. Carrillo Wilkins office to reschedule her BiV PPM procedure. Staff message sent to Dr. Carrillo Wilkins nurse for follow up on this. Patient was given opportunity for additional questions.

## 2021-06-03 NOTE — PROGRESS NOTES
Physician Progress Note      Sathish Martines  CSN #:                  [id number]  :                       1950  ADMIT DATE:       2021 11:07 AM  DISCH DATE:        2021 4:54 PM  RESPONDING  PROVIDER #:        Stephanie Nieves DO, MD          QUERY TEXT:    Patient admitted with CHF. Noted documentation of Lactic Acidosis in 21 Hospitalist PN. In order to support the diagnosis of Lactic Acidosis please include additional clinical indicators in your documentation to support this diagnosis or clarify if the diagnosis of Lactic Acidosis has been ruled out after further study. The medical record reflects the following:  Risk Factors: ***    Clinical Indicators:   PN:  2) Lactic acidosis: unclear etiology, now improving. Lactic Acidosis: 3.7, 1.4    Treatment: Lab monitoring  Options provided:  -- Lactic Acidosis present as evidenced by, Please document evidence.   -- Lactic Acidosis was ruled out  -- Other - I will add my own diagnosis  -- Disagree - Not applicable / Not valid  -- Disagree - Clinically unable to determine / Unknown  -- Refer to Clinical Documentation Reviewer    PROVIDER RESPONSE TEXT:    Lactic Acidosis is present as evidenced by 3.7    Query created by: Raquel Vanegas on 6/3/2021 3:17 PM      Electronically signed by:  Bassam Howell MD 6/3/2021 3:19 PM

## 2021-06-03 NOTE — TELEPHONE ENCOUNTER
----- Message from Skyler Martin NP sent at 6/1/2021  7:55 AM EDT -----  Regarding: RE: BiV pacer  She was unable to get her upgrade done, okay to reschedule. Depending on date availability we may do another appt prior to, but just let me know. Called patient, ID verified using two patient identifiers. Patient rescheduled for her pacemaker upgrade on Monday, 6/28/21 at 12:00 pm (will arrive by 11:00am). Advised patient that she will not need additional labs since she recently had labs drawn in the hospital.  Reviewed pre-procedure instructions and time allowed for questions. Patient verbalized understanding and will call with any other questions.

## 2021-06-07 ENCOUNTER — PATIENT OUTREACH (OUTPATIENT)
Dept: CASE MANAGEMENT | Age: 71
End: 2021-06-07

## 2021-06-07 NOTE — PROGRESS NOTES
Care Transitions Initial Call    Call within 2 business days of discharge: No  CTN received patient assignment today. Although patient was discharged 2021. Patient: Enid Gowers Patient : 1950 MRN: [id number]    [facility]       Complaint Diagnosis Description Type Department Provider    21  Paroxysmal atrial fibrillation ([facility] [address] . .. Admission (Discharged) Kalee Nascimento MD          Was this an external facility discharge? No     Challenges to be reviewed by the provider   Additional needs identified to be addressed with provider:  Scheduled BIV pacer upgrade on   Has appt with pulm        Method of communication with provider : none    Discussed COVID-19 related testing which was not done at this time. Test results were not done. Fully vaccinated    Advance Care Planning:   Does patient have an Advance Directive: on file. Inpatient Readmission Risk score: Unplanned Readmit Risk Score: 16    Was this a readmission? no   Patient stated reason for the admission: no    Patients top risk factors for readmission: lack of knowledge about disease, level of motivation, medical condition-CHF and medication management   Interventions to address risk factors: Scheduled appointment with Specialist-will arrange fu with pulm and perhaps cards and Obtained and reviewed discharge summary and/or continuity of care documents    Care Transition Nurse (CTN) contacted the patient by telephone to perform post hospital discharge assessment. Verified name and  with patient as identifiers. Provided introduction to self, and explanation of the CTN role. Reports three pound weight gain since yesterday. Endorses edema, sob, audible wheezing, cough. Recommended DH and to contact cardiologist. Reports contacted pulmonologist, Dr. Shaggy Ruth, waiting for return call. Reviewed low salt diet, monitoring fluid intake.  Update, has appt with pulm scheduled     Heart Failure Note    Do you have a Scale:    yes   How often do you weigh:  daily    Daily Weight (document daily weights in flowsheets): Increase   Amount: Three pounds       Provider Notified:   No ; however, CTN advised patient to reach out to cards    Zone:(Pt Reported)  yellow reports sob, edema    EF: 45% on 3/23/2021   Type of HF:   HFpEF     Cardiac Device present: pacemaker      Heart Failure Medications: Betablocker, Diuretic     CTN reviewed discharge instructions, medical action plan and red flags with patient who verbalized understanding. Were discharge instructions available to patient? yes. Reviewed appropriate site of care based on symptoms and resources available to patient including: Specialist. Patient given an opportunity to ask questions and does not have any further questions or concerns at this time. The patient agrees to contact the PCP office for questions related to their healthcare. Medication reconciliation was performed with patient, who verbalizes understanding of administration of home medications. Referral to Pharm D needed: no     Home Health/Outpatient orders at discharge: none    Durable Medical Equipment ordered at discharge: None    Covid Risk Education    Educated patient about risk for severe COVID-19 due to risk factors according to CDC guidelines. CTN reviewed discharge instructions, medical action plan and red flag symptoms with the patient who verbalized understanding. Discussed COVID vaccination status: no. Education provided on COVID-19 vaccination as appropriate. Discussed exposure protocols and quarantine with CDC Guidelines. Patient was given an opportunity to verbalize any questions and concerns and agrees to contact CTN or health care provider for questions related to their healthcare. Was patient discharged with a pulse oximeter? no. Discussed and confirmed pulse oximeter discharge instructions and when to notify provider or seek emergency care.     Discussed follow-up appointments. If no appointment was previously scheduled, appointment scheduling offered: yes. Is follow up appointment scheduled within 7 days of discharge? no.   [facility] follow up appointment(s):   Future Appointments   Date Time Provider Inge Reaves   8/3/2021  2:00 PM Mukesh hKanna MD [facility] BS AMB   8/31/2021  2:30 PM [place] [id number], [facility] BS AMB   10/4/2021 10:15 AM [facility] follow up appointment(s): 6/8 pulm     Plan for follow-up call in 1-2 days based on severity of symptoms and risk factors. Plan for next call: self management-CHF/daily weights, follow up appointment-pulm/cards and medication management-bumex  CTN provided contact information for future needs. Goals Addressed                 This Visit's Progress     Maintains daily weight. 06/07/21   Reports three pound weight gain since yesterday   Current weight, 166 pounds   Will monitor weight daily and log results   Will elevate LE while sitting   Will monitor fluid intake       COMPLETED: Prevent complications post hospitalization.  Reduce risk of CHF exacerbations and complications.  Understands and adheres to diet. 06/07/21   Reviewed low sodium diet   Reports using Ms. Valdivia for seasoning

## 2021-06-08 ENCOUNTER — PATIENT OUTREACH (OUTPATIENT)
Dept: CASE MANAGEMENT | Age: 71
End: 2021-06-08

## 2021-06-08 NOTE — PROGRESS NOTES
Called patient to follow up. Currently at the pulmonology office. Requested return call later this afternoon    1518  Contacted patient    Goals      Attend follow up appointments on schedule        06/08/21  Neil Saw pulmonologist today   Recommended to double Bumex dose to 1 mg BID         Maintains daily weight. 06/07/21   Reports three pound weight gain since yesterday   Current weight, 166 pounds   Will monitor weight daily and log results   Will elevate LE while sitting   Will monitor fluid intake    06/08/21   Current weight, 165 pounds   Reports mild LE edema, sob       Reduce risk of CHF exacerbations and complications.  Understands and adheres to diet. 06/07/21   Reviewed low sodium diet   Reports using Ms. Valdivia for seasoning

## 2021-06-10 ENCOUNTER — HOSPITAL ENCOUNTER (OUTPATIENT)
Dept: GENERAL RADIOLOGY | Age: 71
Discharge: HOME | End: 2021-06-10
Attending: NURSE PRACTITIONER
Payer: MEDICARE

## 2021-06-10 ENCOUNTER — TRANSCRIBE ORDER (OUTPATIENT)
Dept: GENERAL RADIOLOGY | Age: 71
End: 2021-06-10

## 2021-06-10 DIAGNOSIS — R06.02 SHORTNESS OF BREATH: ICD-10-CM

## 2021-06-10 DIAGNOSIS — R06.02 SHORTNESS OF BREATH: Primary | ICD-10-CM

## 2021-06-10 PROCEDURE — 71046 X-RAY EXAM CHEST 2 VIEWS: CPT

## 2021-06-15 ENCOUNTER — PATIENT OUTREACH (OUTPATIENT)
Dept: CASE MANAGEMENT | Age: 71
End: 2021-06-15

## 2021-06-28 ENCOUNTER — HOSPITAL ENCOUNTER (INPATIENT)
Age: 71
LOS: 1 days | Discharge: HOME | DRG: 041 | End: 2021-06-29
Attending: STUDENT IN AN ORGANIZED HEALTH CARE EDUCATION/TRAINING PROGRAM | Admitting: INTERNAL MEDICINE
Payer: MEDICARE

## 2021-06-28 ENCOUNTER — APPOINTMENT (OUTPATIENT)
Dept: GENERAL RADIOLOGY | Age: 71
DRG: 041 | End: 2021-06-28
Attending: INTERNAL MEDICINE
Payer: MEDICARE

## 2021-06-28 ENCOUNTER — HOSPITAL ENCOUNTER (OUTPATIENT)
Age: 71
Setting detail: OUTPATIENT SURGERY
Discharge: HOME | DRG: 041 | End: 2021-06-28
Attending: INTERNAL MEDICINE | Admitting: INTERNAL MEDICINE
Payer: MEDICARE

## 2021-06-28 ENCOUNTER — APPOINTMENT (OUTPATIENT)
Dept: CT IMAGING | Age: 71
DRG: 041 | End: 2021-06-28
Attending: STUDENT IN AN ORGANIZED HEALTH CARE EDUCATION/TRAINING PROGRAM
Payer: MEDICARE

## 2021-06-28 VITALS
OXYGEN SATURATION: 91 % | DIASTOLIC BLOOD PRESSURE: 81 MMHG | HEIGHT: 62 IN | WEIGHT: 165.57 LBS | TEMPERATURE: 98 F | RESPIRATION RATE: 23 BRPM | BODY MASS INDEX: 30.47 KG/M2 | SYSTOLIC BLOOD PRESSURE: 136 MMHG | HEART RATE: 80 BPM

## 2021-06-28 DIAGNOSIS — I48.0 PAROXYSMAL ATRIAL FIBRILLATION (HCC): ICD-10-CM

## 2021-06-28 DIAGNOSIS — R41.82 ALTERED MENTAL STATUS, UNSPECIFIED ALTERED MENTAL STATUS TYPE: Primary | ICD-10-CM

## 2021-06-28 PROBLEM — R53.1 ACUTE LEFT-SIDED WEAKNESS: Status: ACTIVE | Noted: 2021-06-28

## 2021-06-28 PROBLEM — G93.40 ACUTE ENCEPHALOPATHY: Status: ACTIVE | Noted: 2021-06-28

## 2021-06-28 PROBLEM — D72.825 BANDEMIA: Status: ACTIVE | Noted: 2021-06-28

## 2021-06-28 PROBLEM — R65.10 SIRS (SYSTEMIC INFLAMMATORY RESPONSE SYNDROME) (HCC): Status: ACTIVE | Noted: 2021-06-28

## 2021-06-28 LAB
ALBUMIN SERPL-MCNC: 3.4 G/DL (ref 3.5–5)
ALBUMIN/GLOB SERPL: 1 {RATIO} (ref 1.1–2.2)
ALP SERPL-CCNC: 80 U/L (ref 45–117)
ALT SERPL-CCNC: 24 U/L (ref 12–78)
ANION GAP SERPL CALC-SCNC: 9 MMOL/L (ref 5–15)
AST SERPL-CCNC: 25 U/L (ref 15–37)
BASOPHILS # BLD: 0 K/UL (ref 0–0.1)
BASOPHILS NFR BLD: 0 % (ref 0–1)
BILIRUB SERPL-MCNC: 0.5 MG/DL (ref 0.2–1)
BUN SERPL-MCNC: 18 MG/DL (ref 6–20)
BUN/CREAT SERPL: 20 (ref 12–20)
CALCIUM SERPL-MCNC: 9 MG/DL (ref 8.5–10.1)
CHLORIDE SERPL-SCNC: 105 MMOL/L (ref 97–108)
CO2 SERPL-SCNC: 27 MMOL/L (ref 21–32)
COMMENT, HOLDF: NORMAL
CREAT SERPL-MCNC: 0.9 MG/DL (ref 0.55–1.02)
DIFFERENTIAL METHOD BLD: ABNORMAL
EOSINOPHIL # BLD: 0 K/UL (ref 0–0.4)
EOSINOPHIL NFR BLD: 0 % (ref 0–7)
ERYTHROCYTE [DISTWIDTH] IN BLOOD BY AUTOMATED COUNT: 12.8 % (ref 11.5–14.5)
GLOBULIN SER CALC-MCNC: 3.3 G/DL (ref 2–4)
GLUCOSE BLD STRIP.AUTO-MCNC: 185 MG/DL (ref 65–117)
GLUCOSE SERPL-MCNC: 198 MG/DL (ref 65–100)
HCT VFR BLD AUTO: 47.3 % (ref 35–47)
HGB BLD-MCNC: 15 G/DL (ref 11.5–16)
IMM GRANULOCYTES # BLD AUTO: 0 K/UL
IMM GRANULOCYTES NFR BLD AUTO: 0 %
INR PPP: 1 (ref 0.9–1.1)
LYMPHOCYTES # BLD: 1 K/UL (ref 0.8–3.5)
LYMPHOCYTES NFR BLD: 4 % (ref 12–49)
MCH RBC QN AUTO: 30.9 PG (ref 26–34)
MCHC RBC AUTO-ENTMCNC: 31.7 G/DL (ref 30–36.5)
MCV RBC AUTO: 97.5 FL (ref 80–99)
MONOCYTES # BLD: 2 K/UL (ref 0–1)
MONOCYTES NFR BLD: 8 % (ref 5–13)
NEUTS BAND NFR BLD MANUAL: 19 % (ref 0–6)
NEUTS SEG # BLD: 22.2 K/UL (ref 1.8–8)
NEUTS SEG NFR BLD: 69 % (ref 32–75)
NRBC # BLD: 0 K/UL (ref 0–0.01)
NRBC BLD-RTO: 0 PER 100 WBC
PLATELET # BLD AUTO: 229 K/UL (ref 150–400)
PMV BLD AUTO: 10.5 FL (ref 8.9–12.9)
POTASSIUM SERPL-SCNC: 4.1 MMOL/L (ref 3.5–5.1)
PROT SERPL-MCNC: 6.7 G/DL (ref 6.4–8.2)
PROTHROMBIN TIME: 10.8 SEC (ref 9–11.1)
RBC # BLD AUTO: 4.85 M/UL (ref 3.8–5.2)
RBC MORPH BLD: ABNORMAL
SAMPLES BEING HELD,HOLD: NORMAL
SERVICE CMNT-IMP: ABNORMAL
SODIUM SERPL-SCNC: 141 MMOL/L (ref 136–145)
WBC # BLD AUTO: 25.2 K/UL (ref 3.6–11)
WBC MORPH BLD: ABNORMAL

## 2021-06-28 PROCEDURE — 82962 GLUCOSE BLOOD TEST: CPT

## 2021-06-28 PROCEDURE — 85025 COMPLETE CBC W/AUTO DIFF WBC: CPT

## 2021-06-28 PROCEDURE — C1769 GUIDE WIRE: HCPCS | Performed by: INTERNAL MEDICINE

## 2021-06-28 PROCEDURE — 77030038613 HC SUT PDS STRATA SPIRL J&J -B: Performed by: INTERNAL MEDICINE

## 2021-06-28 PROCEDURE — 74011250636 HC RX REV CODE- 250/636: Performed by: INTERNAL MEDICINE

## 2021-06-28 PROCEDURE — 77030037713 HC CLOSR DEV INCIS ZIP STRY -B: Performed by: INTERNAL MEDICINE

## 2021-06-28 PROCEDURE — 99152 MOD SED SAME PHYS/QHP 5/>YRS: CPT | Performed by: INTERNAL MEDICINE

## 2021-06-28 PROCEDURE — 2709999900 HC NON-CHARGEABLE SUPPLY: Performed by: INTERNAL MEDICINE

## 2021-06-28 PROCEDURE — C1887 CATHETER, GUIDING: HCPCS | Performed by: INTERNAL MEDICINE

## 2021-06-28 PROCEDURE — A4565 SLINGS: HCPCS

## 2021-06-28 PROCEDURE — 0JH607Z INSERTION OF CARDIAC RESYNCHRONIZATION PACEMAKER PULSE GENERATOR INTO CHEST SUBCUTANEOUS TISSUE AND FASCIA, OPEN APPROACH: ICD-10-PCS | Performed by: INTERNAL MEDICINE

## 2021-06-28 PROCEDURE — 93005 ELECTROCARDIOGRAM TRACING: CPT

## 2021-06-28 PROCEDURE — 70450 CT HEAD/BRAIN W/O DYE: CPT

## 2021-06-28 PROCEDURE — 65660000000 HC RM CCU STEPDOWN

## 2021-06-28 PROCEDURE — 74011000250 HC RX REV CODE- 250: Performed by: HOSPITALIST

## 2021-06-28 PROCEDURE — 71045 X-RAY EXAM CHEST 1 VIEW: CPT

## 2021-06-28 PROCEDURE — 33225 L VENTRIC PACING LEAD ADD-ON: CPT | Performed by: INTERNAL MEDICINE

## 2021-06-28 PROCEDURE — 02HL3JZ INSERTION OF PACEMAKER LEAD INTO LEFT VENTRICLE, PERCUTANEOUS APPROACH: ICD-10-PCS | Performed by: INTERNAL MEDICINE

## 2021-06-28 PROCEDURE — 0042T CT PERF W CBF: CPT

## 2021-06-28 PROCEDURE — 99285 EMERGENCY DEPT VISIT HI MDM: CPT

## 2021-06-28 PROCEDURE — 74011250636 HC RX REV CODE- 250/636: Performed by: HOSPITALIST

## 2021-06-28 PROCEDURE — 77030018729 HC ELECTRD DEFIB PAD CARD -B: Performed by: INTERNAL MEDICINE

## 2021-06-28 PROCEDURE — 77030028698 HC BLD TISS PLSM MEDT -D: Performed by: INTERNAL MEDICINE

## 2021-06-28 PROCEDURE — 33229 REMV&REPLC PM GEN MULT LEADS: CPT

## 2021-06-28 PROCEDURE — 74011000636 HC RX REV CODE- 636: Performed by: RADIOLOGY

## 2021-06-28 PROCEDURE — 36415 COLL VENOUS BLD VENIPUNCTURE: CPT

## 2021-06-28 PROCEDURE — 33264 RMVL & RPLCMT DFB GEN MLT LD: CPT | Performed by: INTERNAL MEDICINE

## 2021-06-28 PROCEDURE — 77030033819 HC SELCT VEIN WORLEY MRTM -C: Performed by: INTERNAL MEDICINE

## 2021-06-28 PROCEDURE — 0JPT0PZ REMOVAL OF CARDIAC RHYTHM RELATED DEVICE FROM TRUNK SUBCUTANEOUS TISSUE AND FASCIA, OPEN APPROACH: ICD-10-PCS | Performed by: INTERNAL MEDICINE

## 2021-06-28 PROCEDURE — 77030018547 HC SUT ETHBND1 J&J -B: Performed by: INTERNAL MEDICINE

## 2021-06-28 PROCEDURE — C2621 PMKR, OTHER THAN SING/DUAL: HCPCS | Performed by: INTERNAL MEDICINE

## 2021-06-28 PROCEDURE — 83605 ASSAY OF LACTIC ACID: CPT

## 2021-06-28 PROCEDURE — 85610 PROTHROMBIN TIME: CPT

## 2021-06-28 PROCEDURE — 80053 COMPREHEN METABOLIC PANEL: CPT

## 2021-06-28 PROCEDURE — 4A03X5D MEASUREMENT OF ARTERIAL FLOW, INTRACRANIAL, EXTERNAL APPROACH: ICD-10-PCS | Performed by: RADIOLOGY

## 2021-06-28 PROCEDURE — 74011000250 HC RX REV CODE- 250: Performed by: INTERNAL MEDICINE

## 2021-06-28 PROCEDURE — 77030033138 HC SUT PGA STRATFX J&J -B: Performed by: INTERNAL MEDICINE

## 2021-06-28 PROCEDURE — C1900 LEAD, CORONARY VENOUS: HCPCS | Performed by: INTERNAL MEDICINE

## 2021-06-28 PROCEDURE — 74011250637 HC RX REV CODE- 250/637: Performed by: HOSPITALIST

## 2021-06-28 PROCEDURE — 77030029065 HC DRSG HEMO QCLOT ZMED -B: Performed by: INTERNAL MEDICINE

## 2021-06-28 PROCEDURE — 99153 MOD SED SAME PHYS/QHP EA: CPT | Performed by: INTERNAL MEDICINE

## 2021-06-28 PROCEDURE — 87040 BLOOD CULTURE FOR BACTERIA: CPT

## 2021-06-28 PROCEDURE — 70498 CT ANGIOGRAPHY NECK: CPT

## 2021-06-28 PROCEDURE — C1751 CATH, INF, PER/CENT/MIDLINE: HCPCS | Performed by: INTERNAL MEDICINE

## 2021-06-28 DEVICE — CRTP W4TR01 PERCEPTA QUAD CRTP MRI US
Type: IMPLANTABLE DEVICE | Status: FUNCTIONAL
Brand: PERCEPTA™ QUAD CRT-P MRI SURESCAN™

## 2021-06-28 DEVICE — LEAD 459888 MRI S-TIP US
Type: IMPLANTABLE DEVICE | Status: FUNCTIONAL
Brand: ATTAIN PERFORMA™ S MRI SURESCAN™

## 2021-06-28 RX ORDER — HEPARIN SODIUM 200 [USP'U]/100ML
INJECTION, SOLUTION INTRAVENOUS
Status: COMPLETED | OUTPATIENT
Start: 2021-06-28 | End: 2021-06-28

## 2021-06-28 RX ORDER — ACETAMINOPHEN 325 MG/1
650 TABLET ORAL
Status: DISCONTINUED | OUTPATIENT
Start: 2021-06-28 | End: 2021-06-29 | Stop reason: HOSPADM

## 2021-06-28 RX ORDER — DOXYCYCLINE 100 MG/1
100 TABLET ORAL 2 TIMES DAILY
Qty: 14 TABLET | Refills: 0 | Status: ON HOLD | OUTPATIENT
Start: 2021-06-28 | End: 2021-06-29

## 2021-06-28 RX ORDER — LEVOFLOXACIN 5 MG/ML
750 INJECTION, SOLUTION INTRAVENOUS EVERY 24 HOURS
Status: DISCONTINUED | OUTPATIENT
Start: 2021-06-28 | End: 2021-06-28

## 2021-06-28 RX ORDER — LABETALOL HCL 20 MG/4 ML
5 SYRINGE (ML) INTRAVENOUS
Status: DISCONTINUED | OUTPATIENT
Start: 2021-06-28 | End: 2021-06-29 | Stop reason: HOSPADM

## 2021-06-28 RX ORDER — ACETAMINOPHEN 650 MG/1
650 SUPPOSITORY RECTAL
Status: DISCONTINUED | OUTPATIENT
Start: 2021-06-28 | End: 2021-06-29

## 2021-06-28 RX ORDER — FENTANYL CITRATE 50 UG/ML
INJECTION, SOLUTION INTRAMUSCULAR; INTRAVENOUS AS NEEDED
Status: DISCONTINUED | OUTPATIENT
Start: 2021-06-28 | End: 2021-06-28 | Stop reason: HOSPADM

## 2021-06-28 RX ORDER — DIPHENHYDRAMINE HYDROCHLORIDE 50 MG/ML
INJECTION, SOLUTION INTRAMUSCULAR; INTRAVENOUS AS NEEDED
Status: DISCONTINUED | OUTPATIENT
Start: 2021-06-28 | End: 2021-06-28 | Stop reason: HOSPADM

## 2021-06-28 RX ORDER — VANCOMYCIN/0.9 % SOD CHLORIDE 1.5G/250ML
1500 PLASTIC BAG, INJECTION (ML) INTRAVENOUS ONCE
Status: DISCONTINUED | OUTPATIENT
Start: 2021-06-28 | End: 2021-06-28 | Stop reason: DRUGHIGH

## 2021-06-28 RX ORDER — ONDANSETRON 2 MG/ML
INJECTION INTRAMUSCULAR; INTRAVENOUS AS NEEDED
Status: DISCONTINUED | OUTPATIENT
Start: 2021-06-28 | End: 2021-06-28 | Stop reason: HOSPADM

## 2021-06-28 RX ORDER — MAGNESIUM SULFATE 100 %
4 CRYSTALS MISCELLANEOUS AS NEEDED
Status: DISCONTINUED | OUTPATIENT
Start: 2021-06-28 | End: 2021-06-29 | Stop reason: HOSPADM

## 2021-06-28 RX ORDER — INSULIN LISPRO 100 [IU]/ML
INJECTION, SOLUTION INTRAVENOUS; SUBCUTANEOUS EVERY 6 HOURS
Status: DISCONTINUED | OUTPATIENT
Start: 2021-06-29 | End: 2021-06-29 | Stop reason: HOSPADM

## 2021-06-28 RX ORDER — DEXTROSE 50 % IN WATER (D50W) INTRAVENOUS SYRINGE
25-50 AS NEEDED
Status: DISCONTINUED | OUTPATIENT
Start: 2021-06-28 | End: 2021-06-29 | Stop reason: HOSPADM

## 2021-06-28 RX ORDER — ACETAMINOPHEN 325 MG/1
650 TABLET ORAL
Status: DISCONTINUED | OUTPATIENT
Start: 2021-06-28 | End: 2021-06-28 | Stop reason: HOSPADM

## 2021-06-28 RX ORDER — SODIUM CHLORIDE 9 MG/ML
125 INJECTION, SOLUTION INTRAVENOUS CONTINUOUS
Status: DISCONTINUED | OUTPATIENT
Start: 2021-06-28 | End: 2021-06-29

## 2021-06-28 RX ORDER — LIDOCAINE HYDROCHLORIDE AND EPINEPHRINE 10; 10 MG/ML; UG/ML
INJECTION, SOLUTION INFILTRATION; PERINEURAL AS NEEDED
Status: DISCONTINUED | OUTPATIENT
Start: 2021-06-28 | End: 2021-06-28 | Stop reason: HOSPADM

## 2021-06-28 RX ORDER — SODIUM CHLORIDE 0.9 % (FLUSH) 0.9 %
5-40 SYRINGE (ML) INJECTION EVERY 8 HOURS
Status: DISCONTINUED | OUTPATIENT
Start: 2021-06-28 | End: 2021-06-28 | Stop reason: HOSPADM

## 2021-06-28 RX ORDER — SODIUM CHLORIDE 0.9 % (FLUSH) 0.9 %
5-40 SYRINGE (ML) INJECTION AS NEEDED
Status: DISCONTINUED | OUTPATIENT
Start: 2021-06-28 | End: 2021-06-28 | Stop reason: HOSPADM

## 2021-06-28 RX ORDER — ONDANSETRON 2 MG/ML
4 INJECTION INTRAMUSCULAR; INTRAVENOUS
Status: DISCONTINUED | OUTPATIENT
Start: 2021-06-28 | End: 2021-06-28 | Stop reason: HOSPADM

## 2021-06-28 RX ORDER — GENTAMICIN SULFATE 80 MG/100ML
80 INJECTION, SOLUTION INTRAVENOUS ONCE
Status: COMPLETED | OUTPATIENT
Start: 2021-06-28 | End: 2021-06-28

## 2021-06-28 RX ORDER — FACIAL-BODY WIPES
10 EACH TOPICAL DAILY PRN
Status: DISCONTINUED | OUTPATIENT
Start: 2021-06-28 | End: 2021-06-29 | Stop reason: HOSPADM

## 2021-06-28 RX ORDER — SODIUM CHLORIDE 0.9 % (FLUSH) 0.9 %
5-10 SYRINGE (ML) INJECTION AS NEEDED
Status: DISCONTINUED | OUTPATIENT
Start: 2021-06-28 | End: 2021-06-29 | Stop reason: HOSPADM

## 2021-06-28 RX ORDER — ONDANSETRON 2 MG/ML
4 INJECTION INTRAMUSCULAR; INTRAVENOUS
Status: DISCONTINUED | OUTPATIENT
Start: 2021-06-28 | End: 2021-06-29 | Stop reason: HOSPADM

## 2021-06-28 RX ORDER — MIDAZOLAM HYDROCHLORIDE 1 MG/ML
INJECTION, SOLUTION INTRAMUSCULAR; INTRAVENOUS AS NEEDED
Status: DISCONTINUED | OUTPATIENT
Start: 2021-06-28 | End: 2021-06-28 | Stop reason: HOSPADM

## 2021-06-28 RX ORDER — ATORVASTATIN CALCIUM 20 MG/1
80 TABLET, FILM COATED ORAL
Status: DISCONTINUED | OUTPATIENT
Start: 2021-06-28 | End: 2021-06-29 | Stop reason: HOSPADM

## 2021-06-28 RX ADMIN — VANCOMYCIN HYDROCHLORIDE 1000 MG: 1 INJECTION, POWDER, LYOPHILIZED, FOR SOLUTION INTRAVENOUS at 12:54

## 2021-06-28 RX ADMIN — SODIUM CHLORIDE 1000 ML: 9 INJECTION, SOLUTION INTRAVENOUS at 23:20

## 2021-06-28 RX ADMIN — ATORVASTATIN CALCIUM 80 MG: 20 TABLET, FILM COATED ORAL at 23:39

## 2021-06-28 RX ADMIN — VANCOMYCIN HYDROCHLORIDE 1250 MG: 1.25 INJECTION, POWDER, LYOPHILIZED, FOR SOLUTION INTRAVENOUS at 23:39

## 2021-06-28 RX ADMIN — IOPAMIDOL 140 ML: 755 INJECTION, SOLUTION INTRAVENOUS at 19:34

## 2021-06-28 RX ADMIN — AZTREONAM 2 G: 2 INJECTION, POWDER, LYOPHILIZED, FOR SOLUTION INTRAMUSCULAR; INTRAVENOUS at 23:39

## 2021-06-28 NOTE — ED NOTES
Pt called said that he want to his physical therapy appt with Dr Cristhian Canas today and was told that his legs were very weak and they add 4 kinds of home excerise Tele neurologist Dr. Praful Vu in the room talking to the patient.

## 2021-06-28 NOTE — PROCEDURES
Successful upgrade of dual chamber pacemaker to biventricular pacemaker with addition of LV lead using conscious sedation.          Jasmin Yen MD

## 2021-06-28 NOTE — DISCHARGE INSTRUCTIONS
Pacemaker  Discharge Instructions    Please make sure you have received your Temporary Pacemaker identification card with your discharge instructions      MEDICATIONS         Take only the medications prescribed to you at discharge.  You are prescribed an antibiotic to take for 5-7 days. Please do not miss doses of this prescription. ACTIVITY         Return to your normal activity, except as noted below. o Do not lift anything heavier than 10 pounds for 4 weeks with the affected arm. This is how long it takes the muscles to heal, and the leads inside your heart to stabilize their position. o Do not reach above your head with the affected arm for 4 weeks, doing so increases the risk of lead dislodgement.    o It is, however, important to move the affected arm to prevent shoulder stiffness and locking. o Avoid tight clothes or unnecessary pressure over your incision (such as bra straps or seat belts). If it is tender or sensitive to clothing, cover the incision with a soft dressing or pad.  o Avoid driving for at least 72 hours.   o You may resume all other activities after 4 weeks (including exercise, driving, sex)      SHOWERING         Leave the bandage over your incision until your clinic follow up in 10-14 days after the Pacemaker implant. You bandage will be removed in clinic during that appointment.  It is important to keep the bandaged area clean and dry. You may shower around the site until the bandage is removed in clinic. Thereafter, you may shower after the bandage is removed, washing it gently with soap and water. Do not apply any lotions, powders, or perfumes to the incision line.  Avoid submerging your incision in water (tub baths, hot tubs, or swimming) for four weeks.  Underneath the dressing.  o You will most likely have a Zipline dressing over the incision. This is made with plastic zip ties to hold the incision together and promote better healing.  You may note dried blood around this dressing which is normal. Do not attempt to pull this off.   o If you have white steri-strips over your incision (underneath the gauze dressing), they will curl up at the end and fall off, usually within 10 days. Do not pull them off.  - OR -   o You may have a different type of closure for the incision including a dermabond adhesive which will slowly peel and come off on its own once you are able to shower. DISCHARGE PRECAUTIONS         Record your temperature every day, at the same time, until your 10-14 day follow up. A temperature of 100.5 F, or higher, can be the first sign of infection. This should be reported to your Doctor immediately.  You can have an MRI after 6 weeks. You must be aware that any strong magnet or magnetic field can affect your Pacemaker. In general, be careful of metal detectors, heavy machinery, and any area where arc-welding is performed. When approaching a security checkpoint show your Pacemaker ID Card to security personnel.  Always tell your doctor or dentist that you have a Pacemaker. In some cases, antibiotics may be prescribed before certain procedures.  Your temporary identification will be given to you with these instructions. Keep your Pacemaker card in your wallet or on your person at all times. You should receive your permanent card, although this may take up to 8-12 weeks. If you do not receive your permanent card, please call the office at [phone] or the phone number provided on your temporary card for the pacemaker company. TAKING YOUR PULSE         Take your pulse the same time every day, preferably in the morning, until your follow up.  Sit down and rest for 5 minutes prior to taking your pulse.  Take your pulse for 1 full minute, use a clock or stop watch with a second hand.      To feel your pulse, use the first two fingers of one hand; place them on the thumb side of the wrist of the opposite hand.  The pulse will be steady, regular and throbbing.  Call the physician if your pulse is less than 40 beats per minute. SYMPTOMS THAT NEED TO BE REPORTED IMMEDIATELY         Temperature more than 100.4 F     Redness or warmth at the incision site, or pain for longer than the first 5 days after the implant.  Drainage from the incision site.  Swelling around the incision site.  Shortness of breath.  Rapid heart rate or palpitations.  Dizziness, lightheadedness, fainting.  Slow pulse below 40 beats per minute.  REMEMBER: If you feel something is an emergency or cannot be handled over the phone, call 911 or go to the closest emergency room.       Hernandez Sargent MD  Cardiac Electrophysiology / Cardiology    [address] [facility]  [address], [address] [facility], [address]  [place], [facility]. Waylon Zapata.         Zoie Quinteros  [phone] / [phone] Fax       [phone] / [phone] Fax

## 2021-06-28 NOTE — ED PROVIDER NOTES
Patient is a 66-year-old female present emergency department for altered mental status, left-sided weakness. Patient was seen earlier today for a dual-chamber upgrade to biventricular pacemaker this was done under conscious sedation. Procedure was completed by 3 PM patient had gone home  called EMS stating that she was altered and was having left-sided weakness. Upon arrival EMS states that patient's alertness is more improved. Patient was taken emergently to CT for Code S level 1 that was changed to a Code S level 2 as last well-known was 1 PM instead 3 PM.  On chart review patient has a history of proximal A. fib as well as DVTs but did not see where patient is on any anticoagulation or dual platelet therapy. Talking with the patient she states that she is not on anticoagulation.            Past Medical History:   Diagnosis Date    Adverse effect of anesthesia     slow to wake up    Arthritis     Asthma 6/29/2009    CAUSED BY MOLD    Atrial fibrillation ([address] 6/29/2009    Atrial flutter ([address]     CAD (coronary artery disease) 06/29/2009    Celiac disease 2010    Chronic chest pain     Chronic obstructive pulmonary disease ([address] 2004-5    right leg    Chronic pain of right ankle     Diabetes ([address]     Drug induced prednisone, DM2    Diastolic heart failure ([address]     DVT (deep venous thrombosis) ([address] 2004    Right leg post surgery    GERD (gastroesophageal reflux disease)     Gluten intolerance     Heart failure ([address]     Hypertension     Hypothyroidism 6/29/2009    Iron deficiency anemia     Lyme disease     Pacemaker     11/20    Personal history of MI (myocardial infarction)     Rheumatoid arthritis ([address]     S/P AV jules ablation     11/20     S/P left atrial appendage ligation     SUHAS clip 10/20     Slow to wake up after anesthesia     Syncope     Post testing- resolved    TIA (transient ischemic attack) 2004 & 2006    Vitamin B12 deficiency 6/29/2009       Past Surgical History:   Procedure Laterality Date    HX ADENOIDECTOMY      HX AFIB ABLATION      HX APPENDECTOMY      HX CHOLECYSTECTOMY  6/16/11    HX COLONOSCOPY      HX CORONARY STENT PLACEMENT      x 2    HX HEART CATHETERIZATION  2010    2 STENTS    HX HEART CATHETERIZATION  03/2020    HX HYSTERECTOMY      HX LUMBAR LAMINECTOMY  2000    L4-L5    HX ORTHOPAEDIC  1993-6/2012    RT.  FOOT SURGERY X 6/calcaneal osteotomy    HX ORTHOPAEDIC Right 09/19/2020    right hand [facility] joint replacement    HX TONSILLECTOMY  1964    MO ICAR CATHETER ABLATION ATRIOVENTR NODE FUNCTION N/A 11/19/2020    ABLATION AV NODE performed by Anette Flores MD at [address], [facility] CATH LAB    MO INS NEW/RPLCMT PRM PM W/TRANSV ELTRD ATRIAL&VENT N/A 11/19/2020    INSERT PPM DUAL performed by Anette Flores MD at [address] [facility], [facility] CATH LAB    MO INTRACARDIAC ELECTROPHYSIOLOGIC 3D [address] [facility] N/A 11/19/2020    Ep 3d Mapping performed by Anette Flores MD at [address] [address], [address] CATH LAB         Family History:   Problem Relation Age of Onset    Delayed Awakening Mother     Heart Disease Mother     Coronary Artery Disease Mother     Hypertension Mother     Stroke Mother     Delayed Awakening Sister     Hypertension Sister     Lung Disease Father     Cancer Father         colon    Hypertension Father     Hypertension Brother     Breast Cancer Maternal Aunt     Breast Cancer Maternal Aunt     Breast Cancer Cousin         maternal 1st cousin    Breast Cancer Maternal Aunt     Breast Cancer Cousin         maternal 1st cousin   [employer] Breast Cancer Cousin         maternal 1st cousin    Deep Vein Thrombosis Neg Hx     Anesth Problems Neg Hx        Social History     Socioeconomic History    Marital status:      Spouse name: Not on file    Number of children: Not on file    Years of education: Not on file    Highest education level: Not on file   Occupational History    Not on file   Tobacco Use    Smoking status: Former Smoker     Packs/day: 1.00 Years: 30.00     Pack years: 30.00     Types: Cigarettes     Quit date: 2002     Years since quittin.0    Smokeless tobacco: Never Used   Substance and Sexual Activity    Alcohol use: No    Drug use: Never    Sexual activity: Yes     Partners: Male   Other Topics Concern    Not on file   Social History Narrative    Not on file     Social Determinants of Health     Financial Resource Strain:     Difficulty of Paying Living Expenses:    Food Insecurity:     Worried About Running Out of Food in the Last Year:     [address] in the Last Year:    Transportation Needs:     Lack of Transportation (Medical):  Lack of Transportation (Non-Medical):    Physical Activity:     Days of Exercise per Week:     Minutes of Exercise per Session:    Stress:     Feeling of Stress :    Social Connections:     Frequency of Communication with Friends and Family:     Frequency of Social Gatherings with Friends and Family:     Attends Zoroastrian Services:     Active Member of Clubs or Organizations:     Attends Club or Organization Meetings:     Marital Status:    Intimate Partner Violence:     Fear of Current or Ex-Partner:     Emotionally Abused:     Physically Abused:     Sexually Abused: ALLERGIES: Butter flavor, Keflex [cephalexin], Milk containing products, Pcn [penicillins], Aspirin, Cimzia [certolizumab pegol], Clopidogrel, Demerol [meperidine], Fentanyl, Ibuprofen, Morphine, Nitroglycerin, Sulfa (sulfonamide antibiotics), Tapazole [methimazole], Tramadol, Adhesive tape-silicones, Albuterol, Gluten, and Oxycodone    Review of Systems   Unable to perform ROS: Mental status change       There were no vitals filed for this visit. Physical Exam  Vitals and nursing note reviewed. HENT:      Head: Normocephalic and atraumatic. Eyes:      Extraocular Movements: Extraocular movements intact. Conjunctiva/sclera:      Right eye: Right conjunctiva is injected.       Left eye: Left conjunctiva is injected. Pupils: Pupils are equal, round, and reactive to light. Cardiovascular:      Rate and Rhythm: Regular rhythm. Pulmonary:      Effort: Pulmonary effort is normal.      Breath sounds: Normal breath sounds. Chest:      Chest wall: Tenderness present. Comments: Pacemaker site with dressing overlying left upper chest clean dry and intact. Musculoskeletal:      Cervical back: Normal range of motion and neck supple. Skin:     General: Skin is warm and dry. Neurological:      Mental Status: She is lethargic and confused. GCS: GCS eye subscore is 4. GCS verbal subscore is 5. GCS motor subscore is 6. Psychiatric:         Speech: Speech is delayed. Behavior: Behavior is slowed. Cognition and Memory: She exhibits impaired recent memory and impaired remote memory. MDM  Number of Diagnoses or Management Options  Diagnosis management comments: A/P: TIA, CVA, adverse effects of anesthesia. 66-year-old female presented emergency department after being discharged after having pacemaker replacement earlier today upon patient arriving at home  noticed that patient was having altered mental status and weakness. EMS was called patient still confused on arrival here on chart review patient does have a history of adverse effects and slow to respond after anesthesia. However in the setting of new pacemaker concern for CVA on differential.  Code S level 2 initiated as the clearly documented time as patient being last well-known would have been 1 PM.  Discussed with teleneurology they also believe that this is likely adverse effects of anesthesia recommend admission and have patient does not return to baseline MRI brain would be warranted as long as new pacemaker is MRI compatible.        Amount and/or Complexity of Data Reviewed  Clinical lab tests: ordered and reviewed  Tests in the radiology section of CPT®: ordered and reviewed Procedures    Perfect Serve Consult for Admission  9:06 PM    ED Room Number: ER16/16  Patient Name and age:  Romayne Lips 66 y.o.  female  Working Diagnosis:   1. Altered mental status, unspecified altered mental status type        COVID-19 Suspicion:  no  Sepsis present:  no  Reassessment needed: yes  Code Status:  Full Code  Readmission: yes  Isolation Requirements:  no  Recommended Level of Care:  telemetry  Department:[facility] ED - [phone]  Other: Total critical care time spent exclusive of procedures:  55 min.

## 2021-06-28 NOTE — ED TRIAGE NOTES
Signs and symptoms: AMS and left sided weakness  VAN score: Positive  Last Known Well: 1300  Blood Glucose (EMS acceptable): 220   Blood Pressure (EMS acceptable): 100/70  Anticoagulants: No     Code Stroke Level 2 initiated at this time.

## 2021-06-28 NOTE — Clinical Note
TRANSFER - IN REPORT:     Verbal report received from: Henrique. Report consisted of patient's Situation, Background, Assessment and   Recommendations(SBAR). Opportunity for questions and clarification was provided. Assessment completed upon patient's arrival to unit and care assumed. Patient transported with a Registered Nurse.

## 2021-06-28 NOTE — ROUTINE PROCESS
11:47 AM  Patient arrived. ID and allergies verified verbally with patient. Pt voices understanding of procedure to be performed. Consent obtained. Pt prepped for procedure. 3:00 PM    Discharge instructions reviewed with patient and family. Voiced understanding. Patient given copy of discharge instructions to take home. 4:00 PM    Pressure bandage removed. No swelling, bleeding or hematoma noted. Patient denies any pain or discomfort at this time. Kathy Chris RN at bedside to assess site. 5:00 PM    Pt discharged via wheelchair with family. Personal belongings with patient upon discharge.

## 2021-06-28 NOTE — PROGRESS NOTES
11:41 AM    Patient arrived. ID and allergies verified verbally with patient. Pt voices understanding of procedure to be performed. Consent obtained. Pt prepped for procedure.

## 2021-06-28 NOTE — H&P
HISTORY OF PRESENTING ILLNESS      Alda Cabrera is a 66 y.o. female with hx of paroxysmal arial fibrillation (s/p ablation x10 years ago with Dr. Mahendra Crocker), previous history of thrombocytopenia, hypertension, COPD, heart failure preserved EF, CAD, LA ligation, apcemaker s/p AV node ablation.  She experienced chest pain/palpitations post procedure and presented to the ER. Previous cardiac catheterization and stress test in 2019 were negative for ischemia and no significant coronary disease and stents were open.  She had normal stress testing 11/5/2020 due to peaked troponin level. Echocardiogram demonstrated preserved LV function of 55-60%.       Device interrogation reveals normal device functioning; patient remains in complete heart block.  She reports shortness of breath and fatigue that she noticed had improved following her procedure however her symptoms recurred after her lower rate was changed from 80 down to 70 bpm.  Last visit her rate was increased to 80 bpm and an echocardiogram was obtained which demonstrated an LV ejection fraction of 45% compared to an LV function of 55 to 60% on 11/2020. Bumex adjusted yesterday; SOB progressing.  Seen by Dr. Trenton Cabrera yesterday.          PAST MEDICAL HISTORY           Past Medical History:   Diagnosis Date    Adverse effect of anesthesia       slow to wake up    Arthritis      Asthma 6/29/2009     CAUSED BY MOLD    Atrial fibrillation (HCC) 6/29/2009    Atrial flutter (HCC)      CAD (coronary artery disease) 06/29/2009    Celiac disease 2010    Chronic chest pain      Chronic obstructive pulmonary disease ([facility] [address] 2004-5     right leg    Chronic pain of right ankle      Diabetes (HCC)       Drug induced prednisone, DM2    Diastolic heart failure (HCC)      DVT (deep venous thrombosis) ([facility] [address] 2004     Right leg post surgery    GERD (gastroesophageal reflux disease)      Gluten intolerance      Heart failure (HCC)      Hypertension      Hypothyroidism 6/29/2009    Iron deficiency anemia      Lyme disease      Pacemaker       11/20    Personal history of MI (myocardial infarction)      Rheumatoid arthritis ([facility] [address]      S/P AV jules ablation       11/20     S/P left atrial appendage ligation       SUHAS clip 10/20     Slow to wake up after anesthesia      Syncope       Post testing- resolved    TIA (transient ischemic attack) 2004 & 2006    Vitamin B12 deficiency 6/29/2009             PAST SURGICAL HISTORY            Past Surgical History:   Procedure Laterality Date    HX ADENOIDECTOMY        HX AFIB ABLATION        HX APPENDECTOMY        HX CHOLECYSTECTOMY   6/16/11    HX COLONOSCOPY        HX CORONARY STENT PLACEMENT         x 2    HX HEART CATHETERIZATION   2010     2 STENTS    HX HEART CATHETERIZATION   03/2020    HX HYSTERECTOMY        HX LUMBAR LAMINECTOMY   2000     L4-L5    HX ORTHOPAEDIC   1993-6/2012     RT.  FOOT SURGERY X 6/calcaneal osteotomy    HX ORTHOPAEDIC Right 09/19/2020     right hand CMC joint replacement    HX TONSILLECTOMY   1964    CT ICAR CATHETER ABLATION ATRIOVENTR NODE FUNCTION N/A 11/19/2020     ABLATION AV NODE performed by Paul Grant MD at [address] CATH LAB    CT INS NEW/RPLCMT PRM PM W/TRANSV ELTRD ATRIAL&VENT N/A 11/19/2020     INSERT PPM DUAL performed by Paul Grant MD at [address] CATH LAB    CT INTRACARDIAC ELECTROPHYSIOLOGIC 3D MAPPING N/A 11/19/2020     Ep 3d Mapping performed by Paul Grant MD at [address] [address] CATH LAB             ALLERGIES            Allergies   Allergen Reactions    Butter Flavor Anaphylaxis       Butter AND CREME    Keflex [Cephalexin] Anaphylaxis    Milk Containing Products Anaphylaxis       Butter and cream    Pcn [Penicillins] Anaphylaxis    Aspirin Hives and Other (comments)       \"it makes my stomach bleed\"       Cimzia [Certolizumab Pegol] Other (comments)       GI symptoms, blisters in the mouth and esophagus    Clopidogrel Other (comments)     GI bleed    Demerol [Meperidine] Other (comments)       HYPOTENSION    Fentanyl Other (comments)       HYPOTENSION    Ibuprofen Diarrhea       Patient reports that ibuprofen caused diarrhea    Morphine Other (comments)       HYPOTENSION    Nitroglycerin Other (comments)       IN PILL FORM  - HYPOTENSION  CAN TOLERATE PATCH FOR SHORT TIME    Sulfa (Sulfonamide Antibiotics) Angioedema       Lips    Tapazole [Methimazole] Unknown (comments)       Patient stated \"it made me feel like I had the flu\"    Tramadol Other (comments)       Patient reports thrush with tramadol use    Adhesive Tape-Silicones Other (comments)       BAD BLISTERS AND TENDS TO GET INFECTED    Albuterol Palpitations    Gluten Diarrhea       bloating    Oxycodone Other (comments)       Hallicination and hypotension            FAMILY HISTORY            Family History   Problem Relation Age of Onset    Delayed Awakening Mother      Heart Disease Mother      Coronary Artery Disease Mother      Hypertension Mother      Stroke Mother      Delayed Awakening Sister      Hypertension Sister      Lung Disease Father      Cancer Father           colon    Hypertension Father      Hypertension Brother      Breast Cancer Maternal Aunt      Breast Cancer Maternal Aunt      Breast Cancer Cousin           maternal 1st cousin    Breast Cancer Maternal Aunt      Breast Cancer Cousin           maternal 1st cousin    Breast Cancer Cousin           maternal 1st cousin    Deep Vein Thrombosis Neg Hx      Anesth Problems Neg Hx      negative for cardiac disease         SOCIAL HISTORY      Social History               Socioeconomic History    Marital status:        Spouse name: Not on file    Number of children: Not on file    Years of education: Not on file    Highest education level: Not on file   Tobacco Use    Smoking status: Former Smoker       Packs/day: 1.00       Years: 30.00       Pack years: 30.00       Types: Cigarettes       Quit date: 2002       Years since quittin.7    Smokeless tobacco: Never Used   Substance and Sexual Activity    Alcohol use: No    Drug use: Never    Sexual activity: Yes       Partners: Male               MEDICATIONS           Current Outpatient Medications   Medication Sig    bumetanide (BUMEX) 1 mg tablet Takes 1 tablet in AM and half tablet in afternoon    metoprolol succinate (TOPROL-XL) 50 mg XL tablet Take 1 Tab by mouth daily.  etanercept (EnbreL SureClick) 50 mg/mL (1 mL) injection 50 mg by SubCUTAneous route every seven (7) days.  nitroglycerin (NITRODUR) 0.1 mg/hr 1 Patch by TransDERmal route daily.  lidocaine (Lidoderm) 5 % 1 Patch by TransDERmal route every twenty-four (24) hours. Apply patch to the affected area for 12 hours a day and remove for 12 hours a day.  gabapentin (NEURONTIN) 100 mg capsule Take 1 Cap by mouth two (2) times a day. Max Daily Amount: 200 mg.  predniSONE (DELTASONE) 2.5 mg tablet Take 1 Tab by mouth daily (after lunch). 5 mg in morning and 2.5 mg afternoon  Resume your usual dose of prednisone after you complete the prednisone taper    acetaminophen (TYLENOL) 650 mg TbER Take 1,300 mg by mouth two (2) times a day.  co-enzyme Q-10 (Co Q-10) 100 mg capsule Take 200 mg by mouth daily.  artificial tears, dextran 70-hypromellose, (GenTeal Tears Mild) 0.1-0.3 % ophthalmic solution Administer 1 Drop to both eyes nightly.  carboxymethylcell/hypromellose (GENTEAL GEL OP) Administer 1 Drop to both eyes as needed (dry eye).  omega 3-DHA-EPA-fish oil 1,000 mg (120 mg-180 mg) capsule Take 1 Cap by mouth every evening.  levalbuterol (XOPENEX) 0.63 mg/3 mL nebu 0.63 mg by Nebulization route two (2) times a day.  aclidinium bromide (Tudorza Pressair) 400 mcg/actuation inhaler Take 1 Puff by inhalation daily.  calcium citrate-vitamin d3 (CITRACAL+D) 315 mg-5 mcg (200 unit) tab Take 1 Tab by mouth daily (with breakfast).     mometasone furoate (ASMANEX HFA IN) Take 100 mcg by inhalation two (2) times a day.  potassium 99 mg tablet Take 99 mg by mouth daily.  insulin glargine (LANTUS,BASAGLAR) 100 unit/mL (3 mL) inpn 8 Units by SubCUTAneous route Daily (before breakfast). 30 minutes before breakfast    denosumab (PROLIA SC) by SubCUTAneous route every 6 months.  eplerenone (INSPRA) 25 mg tablet Take 25 mg by mouth Daily (before lunch).  fenofibrate nanocrystallized (Tricor) 48 mg tablet Take 48 mg by mouth nightly.  levothyroxine (SYNTHROID) 137 mcg tablet Take 137 mcg by mouth Daily (before breakfast).  predniSONE (DELTASONE) 5 mg tablet Take 5 mg by mouth daily. 5 mg in morning and 2.5 mg afternoon    cholecalciferol, vitamin D3, (VITAMIN D3) 2,000 unit tab Take 2,000 Units by mouth daily.  turmeric (CURCUMIN) Take 1 g by mouth every evening.  SITagliptin (JANUVIA) 100 mg tablet Take 100 mg by mouth daily (with dinner).  vit C/vit E ac/lut/copper/zinc (PRESERVISION LUTEIN PO) Take 1 Tab by mouth every Monday. Takes with dinner    ascorbic acid (VITAMIN C) 500 mg tablet Take 500 mg by mouth daily.  ferrous sulfate 325 mg (65 mg iron) tablet Take 325 mg by mouth daily (with lunch).  cyanocobalamin (VITAMIN B12) 1,000 mcg/mL injection INJECT 1 ML INTO THE MUSCLE EVERY 30 DAYS    lansoprazole (PREVACID) 30 mg capsule Take 30 mg by mouth daily.      No current facility-administered medications for this visit.          I have reviewed the nurses notes, vitals, problem list, allergy list, medical history, family, social history and medications.         REVIEW OF SYMPTOMS      General: Pt denies excessive weight gain or loss. Pt is able to conduct ADL's  HEENT: Denies blurred vision, headaches, hearing loss, epistaxis and difficulty swallowing. Respiratory: Denies cough, congestion, shortness of breath, CHACON, wheezing or stridor.   Cardiovascular: Denies precordial pain, palpitations, edema or PND  Gastrointestinal: Denies poor appetite, indigestion, abdominal pain or blood in stool  Genitourinary: Denies hematuria, dysuria, increased urinary frequency  Musculoskeletal: Denies joint pain or swelling from muscles or joints  Neurologic: Denies tremor, paresthesias, headache, or sensory motor disturbance  Psychiatric: Denies confusion, insomnia, depression  Integumentray: Denies rash, itching or ulcers. Hematologic: Denies easy bruising, bleeding         PHYSICAL EXAMINATION      Vitals: see vitals section  General: Well developed, in no acute distress. HEENT: No jaundice, oral mucosa moist, no oral ulcers  Neck: Supple, no stiffness, no lymphadenopathy, supple  Heart:  Normal S1/S2 negative S3 or S4. Regular, no murmur, gallop or rub, no jugular venous distention  Respiratory: Clear bilaterally x 4, no wheezing or rales  Abdomen:   Soft, non-tender, bowel sounds are active.   Extremities:  No edema, normal cap refill, no cyanosis. Musculoskeletal: No clubbing, no deformities  Neuro: A&Ox3, speech clear, gait stable, cooperative, no focal neurologic deficits  Skin: Skin color is normal. No rashes or lesions.  Non diaphoretic, moist.  Vascular: 2+ pulses symmetric in all extremities         DIAGNOSTIC DATA      EKG:          LABORATORY DATA            Lab Results   Component Value Date/Time     WBC 13.7 (H) 11/16/2020 04:40 AM     Hemoglobin (POC) 15.6 06/09/2014 03:00 AM     HGB 13.4 11/16/2020 04:40 AM     Hematocrit (POC) 49 (H) 11/05/2020 09:33 PM     HCT 41.9 11/16/2020 04:40 AM     PLATELET 799 40/72/8807 04:40 AM     MCV 96.5 11/16/2020 04:40 AM            Lab Results   Component Value Date/Time     Sodium 143 11/17/2020 03:26 AM     Potassium 3.9 11/17/2020 03:26 AM     Chloride 111 (H) 11/17/2020 03:26 AM     CO2 25 11/17/2020 03:26 AM     Anion gap 7 11/17/2020 03:26 AM     Glucose 149 (H) 11/17/2020 03:26 AM     BUN 10 11/17/2020 03:26 AM     Creatinine 0.71 11/17/2020 03:26 AM     BUN/Creatinine ratio 14 11/17/2020 03:26 AM     GFR est AA >60 11/17/2020 03:26 AM     GFR est non-AA >60 11/17/2020 03:26 AM     Calcium 8.4 (L) 11/17/2020 03:26 AM     Bilirubin, total 0.5 11/14/2020 03:04 AM     Alk. phosphatase 61 11/14/2020 03:04 AM     Protein, total 5.1 (L) 11/14/2020 03:04 AM     Albumin 3.0 (L) 11/16/2020 04:40 AM     Globulin 2.3 11/14/2020 03:04 AM     A-G Ratio 1.2 11/14/2020 03:04 AM     ALT (SGPT) 20 11/14/2020 03:04 AM             ASSESSMENT      1. Atrial fibrillation with RVR              Paroxysmal                       S/p SUHAS Clip              AV node ablation  2.  CAD               S/p PCI to LAD and RCA  3. PVCs  4. Hypertension  5. COPD/Asthma  6.  Pacemaker         PLAN      Plan for upgrade to CRTP

## 2021-06-29 ENCOUNTER — APPOINTMENT (OUTPATIENT)
Dept: VASCULAR SURGERY | Age: 71
DRG: 041 | End: 2021-06-29
Attending: NURSE PRACTITIONER
Payer: MEDICARE

## 2021-06-29 ENCOUNTER — APPOINTMENT (OUTPATIENT)
Dept: NON INVASIVE DIAGNOSTICS | Age: 71
DRG: 041 | End: 2021-06-29
Attending: HOSPITALIST
Payer: MEDICARE

## 2021-06-29 ENCOUNTER — APPOINTMENT (OUTPATIENT)
Dept: GENERAL RADIOLOGY | Age: 71
DRG: 041 | End: 2021-06-29
Attending: NURSE PRACTITIONER
Payer: MEDICARE

## 2021-06-29 VITALS
HEIGHT: 62 IN | BODY MASS INDEX: 31.36 KG/M2 | SYSTOLIC BLOOD PRESSURE: 105 MMHG | TEMPERATURE: 98.7 F | RESPIRATION RATE: 16 BRPM | OXYGEN SATURATION: 93 % | HEART RATE: 84 BPM | WEIGHT: 170.42 LBS | DIASTOLIC BLOOD PRESSURE: 65 MMHG

## 2021-06-29 PROBLEM — I50.33 ACUTE ON CHRONIC HEART FAILURE WITH PRESERVED EJECTION FRACTION (HFPEF) (HCC): Status: RESOLVED | Noted: 2021-05-29 | Resolved: 2021-06-29

## 2021-06-29 PROBLEM — R77.8 ELEVATED TROPONIN: Status: RESOLVED | Noted: 2020-11-13 | Resolved: 2021-06-29

## 2021-06-29 PROBLEM — T38.0X5A IMMUNOCOMPROMISED DUE TO CORTICOSTEROIDS (HCC): Status: ACTIVE | Noted: 2021-06-29

## 2021-06-29 PROBLEM — R09.02 HYPOXIA: Status: RESOLVED | Noted: 2020-11-02 | Resolved: 2021-06-29

## 2021-06-29 PROBLEM — R06.00 DYSPNEA: Status: RESOLVED | Noted: 2021-05-29 | Resolved: 2021-06-29

## 2021-06-29 PROBLEM — R55 SYNCOPE: Status: RESOLVED | Noted: 2020-11-13 | Resolved: 2021-06-29

## 2021-06-29 PROBLEM — R79.89 ELEVATED LACTIC ACID LEVEL: Status: RESOLVED | Noted: 2021-05-29 | Resolved: 2021-06-29

## 2021-06-29 PROBLEM — I48.91 A-FIB (HCC): Status: RESOLVED | Noted: 2020-11-19 | Resolved: 2021-06-29

## 2021-06-29 PROBLEM — D84.821 IMMUNOCOMPROMISED DUE TO CORTICOSTEROIDS (HCC): Status: ACTIVE | Noted: 2021-06-29

## 2021-06-29 PROBLEM — E86.0 DEHYDRATION: Status: ACTIVE | Noted: 2021-06-29

## 2021-06-29 PROBLEM — Z79.52 IMMUNOCOMPROMISED DUE TO CORTICOSTEROIDS (HCC): Status: ACTIVE | Noted: 2021-06-29

## 2021-06-29 PROBLEM — G93.41 ACUTE METABOLIC ENCEPHALOPATHY: Status: ACTIVE | Noted: 2021-06-29

## 2021-06-29 LAB
ANION GAP SERPL CALC-SCNC: 6 MMOL/L (ref 5–15)
APPEARANCE UR: CLEAR
APTT PPP: 22.2 SEC (ref 22.1–31)
ATRIAL RATE: 94 BPM
BILIRUB UR QL: NEGATIVE
BUN SERPL-MCNC: 15 MG/DL (ref 6–20)
BUN/CREAT SERPL: 24 (ref 12–20)
CALCIUM SERPL-MCNC: 7.5 MG/DL (ref 8.5–10.1)
CALCULATED P AXIS, ECG09: 65 DEGREES
CALCULATED R AXIS, ECG10: 137 DEGREES
CALCULATED T AXIS, ECG11: 90 DEGREES
CHLORIDE SERPL-SCNC: 114 MMOL/L (ref 97–108)
CHOLEST SERPL-MCNC: 136 MG/DL
CO2 SERPL-SCNC: 26 MMOL/L (ref 21–32)
COLOR UR: NORMAL
COMMENT, HOLDF: NORMAL
CREAT SERPL-MCNC: 0.63 MG/DL (ref 0.55–1.02)
CRP SERPL-MCNC: 7 MG/DL (ref 0–0.6)
DIAGNOSIS, 93000: NORMAL
ECHO AO ASC DIAM: 3.88 CM
ECHO AO ROOT DIAM: 3.63 CM
ECHO AV AREA PEAK VELOCITY: 1.98 CM2
ECHO AV AREA/BSA PEAK VELOCITY: 1.1 CM2/M2
ECHO AV PEAK GRADIENT: 9.45 MMHG
ECHO AV PEAK VELOCITY: 153.72 CM/S
ECHO LA AREA 4C: 15.05 CM2
ECHO LA MAJOR AXIS: 3.62 CM
ECHO LA MINOR AXIS: 2.02 CM
ECHO LA VOL 2C: 35.72 ML (ref 22–52)
ECHO LA VOL 4C: 36.4 ML (ref 22–52)
ECHO LA VOL BP: 43.42 ML (ref 22–52)
ECHO LA VOL/BSA BIPLANE: 24.26 ML/M2 (ref 16–28)
ECHO LA VOLUME INDEX A2C: 19.96 ML/M2 (ref 16–28)
ECHO LA VOLUME INDEX A4C: 20.34 ML/M2 (ref 16–28)
ECHO LV E' LATERAL VELOCITY: 10.07 CENTIMETER/SECOND
ECHO LV E' SEPTAL VELOCITY: 7.34 CENTIMETER/SECOND
ECHO LV EDV A2C: 52.28 ML
ECHO LV EDV A4C: 54.88 ML
ECHO LV EDV BP: 53.98 ML (ref 56–104)
ECHO LV EDV INDEX A4C: 30.7 ML/M2
ECHO LV EDV INDEX BP: 30.2 ML/M2
ECHO LV EDV NDEX A2C: 29.2 ML/M2
ECHO LV EJECTION FRACTION A2C: 20 PERCENT
ECHO LV EJECTION FRACTION A4C: 37 PERCENT
ECHO LV EJECTION FRACTION BIPLANE: 28.4 PERCENT (ref 55–100)
ECHO LV ESV A2C: 41.6 ML
ECHO LV ESV A4C: 34.49 ML
ECHO LV ESV BP: 38.68 ML (ref 19–49)
ECHO LV ESV INDEX A2C: 23.2 ML/M2
ECHO LV ESV INDEX A4C: 19.3 ML/M2
ECHO LV ESV INDEX BP: 21.6 ML/M2
ECHO LV INTERNAL DIMENSION DIASTOLIC: 5.25 CM (ref 3.9–5.3)
ECHO LV INTERNAL DIMENSION SYSTOLIC: 3.51 CM
ECHO LV IVSD: 0.8 CM (ref 0.6–0.9)
ECHO LV MASS 2D: 147.6 G (ref 67–162)
ECHO LV MASS INDEX 2D: 82.5 G/M2 (ref 43–95)
ECHO LV POSTERIOR WALL DIASTOLIC: 0.8 CM (ref 0.6–0.9)
ECHO LVOT DIAM: 2.06 CM
ECHO LVOT PEAK GRADIENT: 3.34 MMHG
ECHO LVOT PEAK VELOCITY: 91.43 CM/S
ECHO MV A VELOCITY: 69.04 CENTIMETER/SECOND
ECHO MV AREA PHT: 4.05 CM2
ECHO MV E DECELERATION TIME (DT): 187.29 MS
ECHO MV E VELOCITY: 58.38 CENTIMETER/SECOND
ECHO MV PRESSURE HALF TIME (PHT): 54.31 MS
ECHO PV MAX VELOCITY: 95.41 CM/S
ECHO PV PEAK INSTANTANEOUS GRADIENT SYSTOLIC: 3.64 MMHG
ECHO RV INTERNAL DIMENSION: 3.68 CM
ECHO RV TAPSE: 1.44 CM (ref 1.5–2)
ECHO TV REGURGITANT MAX VELOCITY: 257.08 CM/S
ECHO TV REGURGITANT PEAK GRADIENT: 26.44 MMHG
ERYTHROCYTE [DISTWIDTH] IN BLOOD BY AUTOMATED COUNT: 13.2 % (ref 11.5–14.5)
GLUCOSE BLD STRIP.AUTO-MCNC: 107 MG/DL (ref 65–117)
GLUCOSE BLD STRIP.AUTO-MCNC: 141 MG/DL (ref 65–117)
GLUCOSE SERPL-MCNC: 148 MG/DL (ref 65–100)
GLUCOSE UR STRIP.AUTO-MCNC: NEGATIVE MG/DL
HCT VFR BLD AUTO: 38.6 % (ref 35–47)
HCYS SERPL-SCNC: 7.7 UMOL/L (ref 3.7–13.9)
HDLC SERPL-MCNC: 36 MG/DL
HDLC SERPL: 3.8 {RATIO} (ref 0–5)
HGB BLD-MCNC: 12.1 G/DL (ref 11.5–16)
HGB UR QL STRIP: NEGATIVE
INR PPP: 1.1 (ref 0.9–1.1)
KETONES UR QL STRIP.AUTO: NEGATIVE MG/DL
LA VOL DISK BP: 38.67 ML (ref 22–52)
LACTATE SERPL-SCNC: 1.6 MMOL/L (ref 0.4–2)
LACTATE SERPL-SCNC: 2.5 MMOL/L (ref 0.4–2)
LDLC SERPL CALC-MCNC: 48.6 MG/DL (ref 0–100)
LEUKOCYTE ESTERASE UR QL STRIP.AUTO: NEGATIVE
MAGNESIUM SERPL-MCNC: 1.7 MG/DL (ref 1.6–2.4)
MCH RBC QN AUTO: 30.9 PG (ref 26–34)
MCHC RBC AUTO-ENTMCNC: 31.3 G/DL (ref 30–36.5)
MCV RBC AUTO: 98.7 FL (ref 80–99)
NITRITE UR QL STRIP.AUTO: NEGATIVE
NRBC # BLD: 0 K/UL (ref 0–0.01)
NRBC BLD-RTO: 0 PER 100 WBC
P-R INTERVAL, ECG05: 156 MS
PH UR STRIP: 6 [PH] (ref 5–8)
PHOSPHATE SERPL-MCNC: 3.1 MG/DL (ref 2.6–4.7)
PLATELET # BLD AUTO: 183 K/UL (ref 150–400)
PMV BLD AUTO: 10.8 FL (ref 8.9–12.9)
POTASSIUM SERPL-SCNC: 3.9 MMOL/L (ref 3.5–5.1)
PROT UR STRIP-MCNC: NEGATIVE MG/DL
PROTHROMBIN TIME: 11 SEC (ref 9–11.1)
Q-T INTERVAL, ECG07: 446 MS
QRS DURATION, ECG06: 172 MS
QTC CALCULATION (BEZET), ECG08: 557 MS
RBC # BLD AUTO: 3.91 M/UL (ref 3.8–5.2)
SAMPLES BEING HELD,HOLD: NORMAL
SERVICE CMNT-IMP: ABNORMAL
SERVICE CMNT-IMP: NORMAL
SODIUM SERPL-SCNC: 146 MMOL/L (ref 136–145)
SP GR UR REFRACTOMETRY: <1.005 (ref 1–1.03)
THERAPEUTIC RANGE,PTTT: NORMAL SECS (ref 58–77)
TRIGL SERPL-MCNC: 257 MG/DL (ref ?–150)
TSH SERPL DL<=0.05 MIU/L-ACNC: 0.1 UIU/ML (ref 0.36–3.74)
UROBILINOGEN UR QL STRIP.AUTO: 0.2 EU/DL (ref 0.2–1)
VENTRICULAR RATE, ECG03: 94 BPM
VLDLC SERPL CALC-MCNC: 51.4 MG/DL
WBC # BLD AUTO: 22 K/UL (ref 3.6–11)

## 2021-06-29 PROCEDURE — 74011250636 HC RX REV CODE- 250/636: Performed by: HOSPITALIST

## 2021-06-29 PROCEDURE — 74011636637 HC RX REV CODE- 636/637: Performed by: HOSPITALIST

## 2021-06-29 PROCEDURE — 81003 URINALYSIS AUTO W/O SCOPE: CPT

## 2021-06-29 PROCEDURE — 82962 GLUCOSE BLOOD TEST: CPT

## 2021-06-29 PROCEDURE — 77030029684 HC NEB SM VOL KT MONA -A

## 2021-06-29 PROCEDURE — 99223 1ST HOSP IP/OBS HIGH 75: CPT | Performed by: PSYCHIATRY & NEUROLOGY

## 2021-06-29 PROCEDURE — 83605 ASSAY OF LACTIC ACID: CPT

## 2021-06-29 PROCEDURE — 74011636637 HC RX REV CODE- 636/637: Performed by: INTERNAL MEDICINE

## 2021-06-29 PROCEDURE — 93306 TTE W/DOPPLER COMPLETE: CPT | Performed by: STUDENT IN AN ORGANIZED HEALTH CARE EDUCATION/TRAINING PROGRAM

## 2021-06-29 PROCEDURE — 99218 HC RM OBSERVATION: CPT

## 2021-06-29 PROCEDURE — 80061 LIPID PANEL: CPT

## 2021-06-29 PROCEDURE — 93306 TTE W/DOPPLER COMPLETE: CPT

## 2021-06-29 PROCEDURE — 74011000250 HC RX REV CODE- 250: Performed by: HOSPITALIST

## 2021-06-29 PROCEDURE — 83735 ASSAY OF MAGNESIUM: CPT

## 2021-06-29 PROCEDURE — 71046 X-RAY EXAM CHEST 2 VIEWS: CPT

## 2021-06-29 PROCEDURE — 74011250637 HC RX REV CODE- 250/637: Performed by: INTERNAL MEDICINE

## 2021-06-29 PROCEDURE — 97165 OT EVAL LOW COMPLEX 30 MIN: CPT

## 2021-06-29 PROCEDURE — 84443 ASSAY THYROID STIM HORMONE: CPT

## 2021-06-29 PROCEDURE — 95816 EEG AWAKE AND DROWSY: CPT | Performed by: PSYCHIATRY & NEUROLOGY

## 2021-06-29 PROCEDURE — 97530 THERAPEUTIC ACTIVITIES: CPT

## 2021-06-29 PROCEDURE — 85730 THROMBOPLASTIN TIME PARTIAL: CPT

## 2021-06-29 PROCEDURE — 92610 EVALUATE SWALLOWING FUNCTION: CPT

## 2021-06-29 PROCEDURE — 85610 PROTHROMBIN TIME: CPT

## 2021-06-29 PROCEDURE — 80048 BASIC METABOLIC PNL TOTAL CA: CPT

## 2021-06-29 PROCEDURE — 93970 EXTREMITY STUDY: CPT

## 2021-06-29 PROCEDURE — 83090 ASSAY OF HOMOCYSTEINE: CPT

## 2021-06-29 PROCEDURE — 84100 ASSAY OF PHOSPHORUS: CPT

## 2021-06-29 PROCEDURE — 36415 COLL VENOUS BLD VENIPUNCTURE: CPT

## 2021-06-29 PROCEDURE — 86140 C-REACTIVE PROTEIN: CPT

## 2021-06-29 PROCEDURE — 97535 SELF CARE MNGMENT TRAINING: CPT

## 2021-06-29 PROCEDURE — 97161 PT EVAL LOW COMPLEX 20 MIN: CPT

## 2021-06-29 PROCEDURE — 85027 COMPLETE CBC AUTOMATED: CPT

## 2021-06-29 PROCEDURE — 74011250637 HC RX REV CODE- 250/637: Performed by: HOSPITALIST

## 2021-06-29 RX ORDER — DOXYCYCLINE 100 MG/1
100 CAPSULE ORAL EVERY 12 HOURS
Status: DISCONTINUED | OUTPATIENT
Start: 2021-06-29 | End: 2021-06-29

## 2021-06-29 RX ORDER — ACLIDINIUM BROMIDE 400 UG/1
1 POWDER, METERED RESPIRATORY (INHALATION) 2 TIMES DAILY
COMMUNITY

## 2021-06-29 RX ORDER — PREDNISONE 5 MG/1
5 TABLET ORAL DAILY
Status: DISCONTINUED | OUTPATIENT
Start: 2021-06-29 | End: 2021-06-29 | Stop reason: HOSPADM

## 2021-06-29 RX ORDER — MOMETASONE FUROATE 200 UG/1
1 AEROSOL RESPIRATORY (INHALATION) 2 TIMES DAILY
COMMUNITY

## 2021-06-29 RX ORDER — INSULIN GLARGINE 100 [IU]/ML
12 INJECTION, SOLUTION SUBCUTANEOUS
Status: DISCONTINUED | OUTPATIENT
Start: 2021-06-29 | End: 2021-06-29 | Stop reason: HOSPADM

## 2021-06-29 RX ORDER — NITROGLYCERIN 20 MG/1
1 PATCH TRANSDERMAL DAILY
Status: DISCONTINUED | OUTPATIENT
Start: 2021-06-29 | End: 2021-06-29 | Stop reason: HOSPADM

## 2021-06-29 RX ORDER — EPLERENONE 25 MG/1
25 TABLET, FILM COATED ORAL
Status: DISCONTINUED | OUTPATIENT
Start: 2021-06-29 | End: 2021-06-29 | Stop reason: HOSPADM

## 2021-06-29 RX ORDER — PANTOPRAZOLE SODIUM 40 MG/1
40 TABLET, DELAYED RELEASE ORAL
Status: DISCONTINUED | OUTPATIENT
Start: 2021-06-29 | End: 2021-06-29 | Stop reason: HOSPADM

## 2021-06-29 RX ORDER — ASCORBIC ACID 500 MG
500 TABLET ORAL DAILY
Status: DISCONTINUED | OUTPATIENT
Start: 2021-06-29 | End: 2021-06-29 | Stop reason: HOSPADM

## 2021-06-29 RX ORDER — PREDNISONE 5 MG/1
2.5 TABLET ORAL EVERY EVENING
Status: DISCONTINUED | OUTPATIENT
Start: 2021-06-29 | End: 2021-06-29 | Stop reason: HOSPADM

## 2021-06-29 RX ORDER — METOPROLOL SUCCINATE 25 MG/1
50 TABLET, EXTENDED RELEASE ORAL DAILY
Status: DISCONTINUED | OUTPATIENT
Start: 2021-06-29 | End: 2021-06-29 | Stop reason: HOSPADM

## 2021-06-29 RX ORDER — ALOGLIPTIN 12.5 MG/1
12.5 TABLET, FILM COATED ORAL
Status: DISCONTINUED | OUTPATIENT
Start: 2021-06-29 | End: 2021-06-29 | Stop reason: HOSPADM

## 2021-06-29 RX ORDER — BUDESONIDE 0.5 MG/2ML
500 INHALANT ORAL
Status: DISCONTINUED | OUTPATIENT
Start: 2021-06-29 | End: 2021-06-29 | Stop reason: HOSPADM

## 2021-06-29 RX ADMIN — OXYCODONE HYDROCHLORIDE AND ACETAMINOPHEN 500 MG: 500 TABLET ORAL at 13:04

## 2021-06-29 RX ADMIN — INSULIN LISPRO 2 UNITS: 100 INJECTION, SOLUTION INTRAVENOUS; SUBCUTANEOUS at 08:06

## 2021-06-29 RX ADMIN — AZTREONAM 2 G: 2 INJECTION, POWDER, LYOPHILIZED, FOR SOLUTION INTRAMUSCULAR; INTRAVENOUS at 13:05

## 2021-06-29 RX ADMIN — PANTOPRAZOLE SODIUM 40 MG: 40 TABLET, DELAYED RELEASE ORAL at 13:04

## 2021-06-29 RX ADMIN — LEVOTHYROXINE SODIUM 137 MCG: 0.03 TABLET ORAL at 13:04

## 2021-06-29 RX ADMIN — VANCOMYCIN HYDROCHLORIDE 1250 MG: 1.25 INJECTION, POWDER, LYOPHILIZED, FOR SOLUTION INTRAVENOUS at 13:06

## 2021-06-29 RX ADMIN — PREDNISONE 5 MG: 5 TABLET ORAL at 13:04

## 2021-06-29 RX ADMIN — SODIUM CHLORIDE 125 ML/HR: 9 INJECTION, SOLUTION INTRAVENOUS at 00:10

## 2021-06-29 RX ADMIN — EPLERENONE 25 MG: 25 TABLET, FILM COATED ORAL at 13:05

## 2021-06-29 RX ADMIN — SODIUM CHLORIDE 319 ML: 9 INJECTION, SOLUTION INTRAVENOUS at 00:38

## 2021-06-29 RX ADMIN — ACETAMINOPHEN 650 MG: 325 TABLET ORAL at 13:15

## 2021-06-29 RX ADMIN — ACETAMINOPHEN 650 MG: 325 TABLET ORAL at 09:20

## 2021-06-29 RX ADMIN — METOPROLOL SUCCINATE 50 MG: 25 TABLET, EXTENDED RELEASE ORAL at 13:04

## 2021-06-29 NOTE — PROGRESS NOTES
Problem: Self Care Deficits Care Plan (Adult)  Goal: *Acute Goals and Plan of Care (Insert Text)  Description: FUNCTIONAL STATUS PRIOR TO ADMISSION: Patient was independent and active without use of DME.    HOME SUPPORT PRIOR TO ADMISSION: The patient lived with  and typically does not require assistance. Admitted yesterday for pacemaker replacement and d/c'd home. Occupational Therapy Goals  Initiated 6/29/2021  1. Patient will perform lower body dressing with modified independence within 7 day(s). 2.  Patient will perform grooming, in standing, with modified independence within 7 day(s). 3.  Patient will perform toilet transfers with modified independence within 7 day(s). 4.  Patient will perform all aspects of toileting with modified independence within 7 day(s). 5.  Patient will participate in upper extremity therapeutic exercise/activities with modified independence for 10 minutes within 7 day(s). 6.  Patient will utilize energy conservation techniques during functional activities with verbal cues within 7 day(s).   6/29/2021 1546 by Jaime Shepherd OT  Outcome: Progressing Towards Goal  OCCUPATIONAL THERAPY EVALUATION  Patient: Fermin Bishop (44 y.o. female)  Date: 6/29/2021  Primary Diagnosis: Acute encephalopathy [G93.40]  Acute left-sided weakness [R53.1]  Bandemia [D72.825]  SIRS (systemic inflammatory response syndrome) (HCC) [R65.10]  Leukocytosis [N33.434]  Acute metabolic encephalopathy [B59.22]        Precautions:   Fall (PPM 6/28)    ASSESSMENT  Based on the objective data described below, the patient presents with hospital admission secondary to weakness and encephalopathy. . Per charting, patient to hospital yesterday for outpatient procedure to update pacemaker. Patient discharged and with increased lethargy, and L side weakness at home. Patient spouse unable to assist patient and called EMS.   Today patient alert and oriented, reports pain to R side (ribs) and demonstrates need for assist x 1 for transfer to commode. Min assist at sink for hand hygiene. Patient education provided for sling positiong and use. Patient familiar with LUE limitations following pacer placement. Patient to wheelchair for transport off floor to testing at end of session . Current Level of Function Impacting Discharge (ADLs/self-care): min assist    Functional Outcome Measure: The patient scored 70/100 on the Barthel INdex  outcome measure. Other factors to consider for discharge: lives with spouse     Patient will benefit from skilled therapy intervention to address the above noted impairments. PLAN :  Recommendations and Planned Interventions: self care training, functional mobility training, therapeutic exercise, balance training, therapeutic activities, endurance activities, patient education, home safety training, and family training/education    Frequency/Duration: Patient will be followed by occupational therapy 5 times a week to address goals. Recommendation for discharge: (in order for the patient to meet his/her long term goals)  Occupational therapy at least 2 days/week in the home     This discharge recommendation:  Has not yet been discussed the attending provider and/or case management    IF patient discharges home will need the following DME: none       SUBJECTIVE:   Patient stated I dont remember that at all.     OBJECTIVE DATA SUMMARY:   HISTORY:   Past Medical History:   Diagnosis Date    Adverse effect of anesthesia     slow to wake up    Arthritis     Asthma 6/29/2009    CAUSED BY MOLD    Atrial fibrillation ([address] 6/29/2009    Atrial flutter (HCC)     CAD (coronary artery disease) 06/29/2009    Celiac disease 2010    Chronic chest pain     Chronic obstructive pulmonary disease ([address] 2004-5    right leg    Chronic pain of right ankle     Diabetes ([address]     Drug induced prednisone, DM2    Diastolic heart failure (HCC)     DVT (deep venous thrombosis) ([address] 2004    Right leg post surgery    GERD (gastroesophageal reflux disease)     Gluten intolerance     Heart failure ([facility] [address]     Hypertension     Hypothyroidism 6/29/2009    Iron deficiency anemia     Lyme disease     Pacemaker     11/20    Personal history of MI (myocardial infarction)     Rheumatoid arthritis ([facility] [address]     S/P AV jules ablation     11/20     S/P left atrial appendage ligation     SUHAS clip 10/20     Slow to wake up after anesthesia     Syncope     Post testing- resolved    TIA (transient ischemic attack) 2004 & 2006    Vitamin B12 deficiency 6/29/2009     Past Surgical History:   Procedure Laterality Date    HX ADENOIDECTOMY      HX AFIB ABLATION      HX APPENDECTOMY      HX CHOLECYSTECTOMY  6/16/11    HX COLONOSCOPY      HX CORONARY STENT PLACEMENT      x 2    HX HEART CATHETERIZATION  2010    2 STENTS    HX HEART CATHETERIZATION  03/2020    HX HYSTERECTOMY      HX LUMBAR LAMINECTOMY  2000    L4-L5    HX ORTHOPAEDIC  1993-6/2012    RT.  FOOT SURGERY X 6/calcaneal osteotomy    HX ORTHOPAEDIC Right 09/19/2020    right hand CMC joint replacement    HX TONSILLECTOMY  1964    [facility] ICAR CATHETER ABLATION ATRIOVENTR NODE FUNCTION N/A 11/19/2020    ABLATION AV NODE performed by Valente Patiño MD at [address], [facility] CATH LAB    [facility] INS NEW/RPLCMT PRM PM W/TRANSV ELTRD ATRIAL&VENT N/A 11/19/2020    INSERT PPM DUAL performed by Valente Patiño MD at [address] CATH LAB    NH INTRACARDIAC ELECTROPHYSIOLOGIC 3D MAPPING N/A 11/19/2020    Ep 3d Mapping performed by Valente Patiño MD at [address] [facility] [address] CATH LAB       Expanded or extensive additional review of patient history:     Home Situation  Home Environment: Private residence  # Steps to Enter: 5  Rails to Enter: Yes  One/Two Story Residence: One story  Living Alone: No  Support Systems: Spouse/Significant Other/Partner  Patient Expects to be Discharged to[de-identified] [employer]  Current DME Used/Available at Home: Svetlana Rounds, straight, Shower chair, Walker, rolling    Hand dominance: Right    EXAMINATION OF PERFORMANCE DEFICITS:  Cognitive/Behavioral Status:  Neurologic State: Alert  Orientation Level: Oriented X4  Cognition: Appropriate decision making  Perception: Appears intact  Perseveration: No perseveration noted  Safety/Judgement: Insight into deficits    Skin: intact as seen    Edema: none noted     Hearing: Auditory  Auditory Impairment: None    Vision/Perceptual:                                     Range of Motion:    AROM: Within functional limits  PROM: Within functional limits                      Strength:    Strength: Generally decreased, functional                Coordination:  Coordination: Within functional limits            Tone & Sensation:    Tone: Normal                         Balance:  Sitting: Intact  Standing: Impaired  Standing - Static: Fair  Standing - Dynamic : Fair    Functional Mobility and Transfers for ADLs:  Bed Mobility:  Rolling: Minimum assistance  Supine to Sit: Minimum assistance  Scooting: Minimum assistance    Transfers:  Sit to Stand: Minimum assistance  Stand to Sit: Minimum assistance  Bed to Chair: Minimum assistance  Bathroom Mobility: Minimum assistance  Toilet Transfer : Minimum assistance    ADL Assessment:  Feeding: Independent    Oral Facial Hygiene/Grooming: Minimum assistance (standing)    Bathing: Minimum assistance    Upper Body Dressing: Contact guard assistance    Lower Body Dressing: Minimum assistance    Toileting: Minimum assistance                ADL Intervention and task modifications:                                     Cognitive Retraining  Safety/Judgement: Insight into deficits    Therapeutic Exercise:     Functional Measure:  Barthel Index:    Bathin  Bladder: 10  Bowels: 10  Groomin  Dressin  Feeding: 10  Mobility: 10  Stairs: 5  Toilet Use: 5  Transfer (Bed to Chair and Back): 10  Total: 70/100        The Barthel ADL Index: Guidelines  1.  The index should be used as a record of what a patient does, not as a record of what a patient could do. 2. The main aim is to establish degree of independence from any help, physical or verbal, however minor and for whatever reason. 3. The need for supervision renders the patient not independent. 4. A patient's performance should be established using the best available evidence. Asking the patient, friends/relatives and nurses are the usual sources, but direct observation and common sense are also important. However direct testing is not needed. 5. Usually the patient's performance over the preceding 24-48 hours is important, but occasionally longer periods will be relevant. 6. Middle categories imply that the patient supplies over 50 per cent of the effort. 7. Use of aids to be independent is allowed. Michael Connor., Barthel, DAndreW. (7648). Functional evaluation: the Barthel Index. [address] [facility] (14)2. BRET Orozco, Kate Jane., Zia Wright., [place], [address] [place] [address] (1999). Measuring the change indisability after inpatient rehabilitation; comparison of the responsiveness of the Barthel Index and Functional [place] Measure. Journal of Neurology, Neurosurgery, and Psychiatry, 66(4), 522-524. Reji Hill, N.J.A, JOSE R Otoole.ABHIJIT, & Fili Zacarias, M.A. (2004.) Assessment of post-stroke quality of life in cost-effectiveness studies: The usefulness of the Barthel Index and the EuroQoL-5D.  Quality of Life Research, 15, 464-88         Occupational Therapy Evaluation Charge Determination   History Examination Decision-Making   LOW Complexity : Brief history review  LOW Complexity : 1-3 performance deficits relating to physical, cognitive , or psychosocial skils that result in activity limitations and / or participation restrictions  LOW Complexity : No comorbidities that affect functional and no verbal or physical assistance needed to complete eval tasks       Based on the above components, the patient evaluation is determined to be of the following complexity level: LOW Pain Rating:      Activity Tolerance:   Good    After treatment patient left in no apparent distress:    Caregiver / family present and patient in wheelchair for transport to testing     COMMUNICATION/EDUCATION:   The patients plan of care was discussed with: Physical therapist and Registered nurse. Home safety education was provided and the patient/caregiver indicated understanding., Patient/family have participated as able in goal setting and plan of care. , and Patient/family agree to work toward stated goals and plan of care. This patients plan of care is appropriate for delegation to [facility].     Thank you for this referral.  Andi Lyons OTR/L  Time Calculation: 19 mins

## 2021-06-29 NOTE — ROUTINE PROCESS
[address] EVALUATION/DISCHARGE  Patient: Emil Lugo (50 y.o. female)  Date: 6/29/2021  Primary Diagnosis: Acute encephalopathy [G93.40]  Acute left-sided weakness [R53.1]  Bandemia [D72.825]  SIRS (systemic inflammatory response syndrome) (HCC) [R65.10]  Leukocytosis [D72.829]       Precautions: aspiration       ASSESSMENT :  Based on the objective data described below, the patient presents with mildly reduced chew efficiency. Otherwise swallowing WFL. Admits to h/o reflux and takes regular meds for it. Has some serious food allergies-defer to RD. Admitted 6-28-21 with sudden onset AMS and L weakness after OP cardiac cath. CXR: negative. HEad CT: negative. MRI: No 2020:  Small vessel dz. She had intermittent dysfluency noted by SLP at that time. PMh: a-fib, ablation 10 years ago, thryobocytopenia, HTN, COPD, CHF,  CAD, pacer, MI, RA, vit B 12 deficiency, arthritia,  Asthma,  Celiac dz, COPD, DM, DVT R leg, GERd, lyme dz. TIA 2004, 2006. Skilled acute therapy provided by a speech-language pathologist is not indicated at this time. PLAN :  Recommendations:  Ok for diet as tolerated,. RD for food allergies. Discharge Recommendations: None     SUBJECTIVE:   Patient stated I'm wheezing a little. Something is bothering me.     OBJECTIVE:     Past Medical History:   Diagnosis Date    Adverse effect of anesthesia     slow to wake up    Arthritis     Asthma 6/29/2009    CAUSED BY MOLD    Atrial fibrillation ([address] 6/29/2009    Atrial flutter ([address]     CAD (coronary artery disease) 06/29/2009    Celiac disease 2010    Chronic chest pain     Chronic obstructive pulmonary disease ([address] 2004-5    right leg    Chronic pain of right ankle     Diabetes ([address]     Drug induced prednisone, DM2    Diastolic heart failure ([address]     DVT (deep venous thrombosis) ([address] 2004    Right leg post surgery    GERD (gastroesophageal reflux disease)     Gluten intolerance     Heart failure ([facility]     Hypertension     Hypothyroidism 6/29/2009    Iron deficiency anemia     Lyme disease     Pacemaker     11/20    Personal history of MI (myocardial infarction)     [facility]umat[facility] arthritis [facility]     S/P AV jules ablation     11/20     S/P left atrial appendage ligation     SUHAS clip 10/20     Slow to wake up after anesthesia     Syncope     Post testing- resolved    TIA (transient ischemic attack) 2004 & 2006    Vitamin B12 deficiency 6/29/2009     Past Surgical History:   Procedure Laterality Date    HX ADENOIDECTOMY      HX AFIB ABLATION      HX APPENDECTOMY      HX CHOLECYSTECTOMY  6/16/11    HX COLONOSCOPY      HX CORONARY STENT PLACEMENT      x 2    HX HEART CATHETERIZATION  2010    2 STENTS    HX HEART CATHETERIZATION  03/2020    HX HYSTERECTOMY      HX LUMBAR LAMINECTOMY  2000    L4-L5    HX ORTHOPAEDIC  1993-6/2012    RT.  FOOT SURGERY X 6/calcaneal osteotomy    HX ORTHOPAEDIC Right 09/19/2020    right hand [facility] joint replacement    HX TONSILLECTOMY  1964    OK ICAR CATHETER ABLATION ATRIOVENTR NODE FUNCTION N/A 11/19/2020    ABLATION [facility] NODE [facility] [address] CATH LAB    OK INS NEW/RPLCMT PRM PM W/TRANSV ELTRD ATRIAL&VENT N/A 11/19/2020    INSERT PPM DUAL performed by Valente Patiño MD at [address] [place] [address], [address] CATH LAB    OK INTRACARDIAC ELECTROPHYSIOLOGIC 3D MAPPING N/A 11/19/2020    Ep 3d Mapping performed by Valente Patiño MD at Off [place] [address], [address] CATH LAB     Prior Level of Function/Home Situation:   Home Situation  Home Environment: Private residence  One/Two Story Residence: One story  Living Alone: No  Support Systems: Spouse/Significant Other/Partner  Patient Expects to be Discharged to[de-identified] [place]  Current DME Used/Available at Home: None  Diet prior to admission: regular, thins  Current Diet:  Regular, thins with no cream and no dairy, no butter   Cognitive and Communication Status:  Neurologic State: Lethargic, Eyes open to voice  Orientation Level: Oriented X4  Cognition: Appropriate decision making  Perception: Appears intact  Perseveration: No perseveration noted  Safety/Judgement: Insight into deficits  Oral Assessment:  Oral Assessment  Labial: No impairment  Dentition: Natural  Oral Hygiene: WFL  Lingual: No impairment  Velum: No impairment  Mandible: No impairment  P.O. Trials:  Patient Position: upright in bed  Vocal quality prior to P.O.:  (intermittently hoarse)  Consistency Presented: Solid; Thin liquid  How Presented: Self-fed/presented;Straw;Successive swallows   ORAL PHASE:   Bolus Acceptance: No impairment  Bolus Formation/Control: Impaired (very slow chew and oral clearance)  Type of Impairment: Mastication  Propulsion: No impairment  Oral Residue: None   PHARYNGEAL PHASE:   Laryngeal Elevation: Functional  Aspiration Signs/Symptoms: None      reports h/o GERD ; on PPI                NOMS:   The NOMS functional outcome measure was used to quantify this patient's level of swallowing impairment. Based on the NOMS, the patient was determined to be at level 7 for swallow function     NOMS Swallowing Levels:  Level 1 (CN): NPO  Level 2 (CM): NPO but takes consistency in therapy  Level 3 (CL): Takes less than 50% of nutrition p.o. and continues with nonoral feedings; and/or safe with mod cues; and/or max diet restriction  Level 4 (CK): Safe swallow but needs mod cues; and/or mod diet restriction; and/or still requires some nonoral feeding/supplements  Level 5 (CJ): Safe swallow with min diet restriction; and/or needs min cues  Level 6 (CI): Independent with p.o.; rare cues; usually self cues; may need to avoid some foods or needs extra time  Level 7 ([address] [place] [address]): Independent for all p.o.  LOLLY. (2003). National Outcomes Measurement System (NOMS): Adult Speech-Language Pathology User's Guide.        Pain:           After treatment:   Patient left in no apparent distress in bed and Nursing notified    COMMUNICATION/EDUCATION:   Patient was educated regarding her deficit(s) of NONE as this relates to her diagnosis of POSSIBLE TIA. She demonstrated Good understanding as evidenced by DISCUSSION.  present and understood. No patient of family c/o dysphagia or speech issues. .    The patient's plan of care including recommendations, planned interventions, and recommended diet changes were discussed with: Registered nurse.      Thank you for this referral.  SONIDO Blackman  Time Calculation: 10 mins

## 2021-06-29 NOTE — DISCHARGE INSTRUCTIONS
Patient Discharge Instructions    Heidy Malin / [id number] : 1950    Admitted 2021 Discharged: 2021     Primary Diagnoses  Problem List as of 2021 Date Reviewed: 2021         Pacemaker   RESOLVED: A-fib ([facility]   Immunocompromised due to corticosteroids (HCC)   Dehydration   Bandemia   Left-sided weakness   SIRS (systemic inflammatory response syndrome) (HCC)   Acute encephalopathy   Diastolic heart failure (HCC)   Coronary artery disease with stable angina pectoris (HCC)   Rheumatoid arthritis ([facility]   Leukocytosis   Diabetes mellitus type 2, controlled ([facility]   HTN (hypertension), benign   Hypothyroidism   Iron deficiency anemia, unspecified   GERD (gastroesophageal reflux disease)   Asthma   PAF (paroxysmal atrial fibrillation) (HCC)   CAD (coronary artery disease)          Take Home Medications     · It is important that you take the medication exactly as they are prescribed. · Keep your medication in the bottles provided by the pharmacist and keep a list of the medication names, dosages, and times to be taken in your wallet. · Do not take other medications without consulting your doctor. What to do at Home    Recommended diet: Cardiac Diet, Diabetic Diet and Low fat, Low cholesterol    Recommended activity: Activity as tolerated    If you experience worse symptoms, please follow up with your PCP or cardiology. Follow-up with your PCP in a few weeks        Information obtained by :  I understand that if any problems occur once I am at home I am to contact my physician. I understand and acknowledge receipt of the instructions indicated above.                                                                                                                                            Physician's or R.N.'s Signature                                                                  Date/Time Patient or Representative Signature                                                          Date/Time

## 2021-06-29 NOTE — ROUTINE PROCESS
Ok to [employer] [facility] per cardiology, CM and Dr. Sandra Lemus. Discharge medications reviewed with patient and appropriate educational materials and side effects teaching were provided. I have reviewed discharge instructions with the patient. The patient verbalized understanding. AVS signed and given to patient. 2 PIVs removed.

## 2021-06-29 NOTE — DISCHARGE SUMMARY
Physician Discharge Summary     Patient ID:  Uriel Grullon  [id number]  66 y.o.  1950    Admit date: 6/28/2021    Discharge date of service and time: 6/29/2021    Admission Diagnoses: Acute encephalopathy [G93.40]  Acute left-sided weakness [R53.1]  Bandemia [D72.825]  SIRS (systemic inflammatory response syndrome) (HCC) [R65.10]  Leukocytosis [D72.829]    Discharge Diagnoses:    Principal Diagnosis     Acute encephalopathy                                             Hospital Course and other diagnoses  Acute encephalopathy - POA and resolving. Likely due to dehydration, post procedure anaesthesia, etc.  Neurology consulted but no further inpatient workup planned. I suspect underlying  Mild dementia, that could be worked up by outpatient neuropsych eval.     SIRS (systemic inflammatory response syndrome) / Leukocytosis / Bandemia - POA, but no focus of infection noted. She is on chronic steroids and has chronically elevated WBC. She was placed on empiric PO prophylactic ABx after pacer yesterday, which can continue. Stop vancomycin and aztreonam     Hx of completed stroke / Chronic Left-sided weakness - POA and now back at baseline. Neurology aware     Dehydration - POA, likely relate to NPO for procedure, and use of diuretics. Hydrated and resolved. Held bumex, can resume at home.     PAF (paroxysmal atrial fibrillation) / Pacemaker - Dual chamber placed yesterday. Cardiology ordered interrogation and she can go home if that is normal.     CAD (coronary artery disease) / Diastolic heart failure / HTN (hypertension) - Appears stable. Troponin negative. Continue metoprolol, IMDUR, atorvastatin. LDL low.     Diabetes mellitus type 2, controlled, with vascular complications - Diabetic diet and counseling. SSI per protocol. Continue home Lantus, alogliptan.  Check A1c.     Rheumatoid arthritis / Immunocompromised due to corticosteroids - Continue prednisone     Asthma - Continue pulmicort, inspra and Prn nebs     GERD (gastroesophageal reflux disease) - PPI     Iron deficiency anemia, unspecified - PCP to monitor, Hb normal even after hydration     Hypothyroidism - Continue synthroid    PCP: Lola Menendez MD    Consults: Cardiology and Neurology    Significant Diagnostic Studies: See Hospital Course    Discharged home in improved condition. Discharge Exam:      Patient Instructions:   Current Discharge Medication List      CONTINUE these medications which have NOT CHANGED    Details   aclidinium bromide (Tudorza Pressair) 400 mcg/actuation inhaler Take 1 Puff by inhalation two (2) times a day. mometasone (Asmanex HFA) 200 mcg/actuation HFAA inhaler Take 1 Puff by inhalation two (2) times a day. bumetanide (BUMEX) 1 mg tablet Take 1 mg by mouth two (2) times a day. calcium citrate 200 mg (950 mg) tablet Take 200 mg by mouth daily. !! predniSONE (DELTASONE) 2.5 mg tablet Take 2.5 mg by mouth every evening. Indications: RA      eplerenone (INSPRA) 25 mg tablet Take 1 Tab by mouth Daily (before lunch). Qty: 90 Tab, Refills: 1      metoprolol succinate (TOPROL-XL) 50 mg XL tablet Take 1 Tab by mouth daily. Qty: 90 Tab, Refills: 3      nitroglycerin (NITRODUR) 0.1 mg/hr 1 Patch by TransDERmal route daily. Qty: 90 Patch, Refills: 3      lidocaine (Lidoderm) 5 % 1 Patch by TransDERmal route every twenty-four (24) hours. Apply patch to the affected area for 12 hours a day and remove for 12 hours a day. Qty: 1 Each, Refills: 0      acetaminophen (TYLENOL) 650 mg TbER Take 1,300 mg by mouth nightly as needed (sleep). co-enzyme Q-10 (Co Q-10) 100 mg capsule Take 200 mg by mouth daily. artificial tears, dextran 70-hypromellose, (GenTeal Tears Mild) 0.1-0.3 % ophthalmic solution Administer 1 Drop to both eyes daily. carboxymethylcell/hypromellose (GENTEAL GEL OP) Administer 1 Drop to both eyes nightly.       omega 3-DHA-EPA-fish oil 1,000 mg (120 mg-180 mg) capsule Take 1 Cap by mouth every evening. levalbuterol (XOPENEX) 0.63 mg/3 mL nebu 0.63 mg by Nebulization route two (2) times a day. potassium 99 mg tablet Take 99 mg by mouth daily. insulin glargine (LANTUS,BASAGLAR) 100 unit/mL (3 mL) inpn 12 Units by SubCUTAneous route nightly. fenofibrate nanocrystallized (Tricor) 48 mg tablet Take 48 mg by mouth nightly. levothyroxine (SYNTHROID) 137 mcg tablet Take 137 mcg by mouth Daily (before breakfast). !! predniSONE (DELTASONE) 5 mg tablet Take 5 mg by mouth daily. Indications: RA      cholecalciferol (Vitamin D3) (1000 Units /25 mcg) tablet Take 1,000 Units by mouth daily. turmeric (CURCUMIN) Take 1 g by mouth every evening. SITagliptin (JANUVIA) 100 mg tablet Take 100 mg by mouth daily (with dinner). vit C/vit E ac/lut/copper/zinc (PRESERVISION LUTEIN PO) Take 1 Tablet by mouth daily (with dinner). ascorbic acid (VITAMIN C) 500 mg tablet Take 500 mg by mouth daily. ferrous sulfate 325 mg (65 mg iron) tablet Take 325 mg by mouth daily (with lunch). cyanocobalamin (VITAMIN B12) 1,000 mcg/mL injection INJECT 1 ML INTO THE MUSCLE EVERY 30 DAYS  Qty: 10 mL, Refills: 0      lansoprazole (PREVACID) 30 mg capsule Take 30 mg by mouth daily. Associated Diagnoses: Esophageal reflux       !! - Potential duplicate medications found. Please discuss with provider. STOP taking these medications       budesonide (Pulmicort) 0.5 mg/2 mL nbsp Comments:   Reason for Stopping:             Activity: Activity as tolerated  Diet: Cardiac Diet, Diabetic Diet and Low fat, Low cholesterol  Wound Care: Keep wound clean and dry, Reinforce dressing PRN and As directed    Follow-up with your PCP and cardiology in a few weeks.   Follow-up tests/labs - none    Signed:  Meredith Jensen MD  6/29/2021  1:37 PM

## 2021-06-29 NOTE — H&P
[facility]  [address], [facility]  [phone]     Hospitalist Admission Note      NAME: Chelsea Thorpe   :  1950   MRN:  [id number]     Date/Time:  2021 9:53 PM    Patient PCP: Jaime Flores MD    Emergency Contact:    Extended Emergency Contact Information  Primary Emergency Contact: Johann Goodman  Address: [address] [place] [address], [address] [place] [address] Phone: [phone]  Mobile Phone: [phone]  Relation: Spouse      Code: Full Code     Isolation Precautions: There are currently no Active Isolations        Subjective:     CHIEF COMPLAINT: AMS     HISTORY OF PRESENT ILLNESS:     Ms. Faye Nichole is a 66 y.o. female with history that includes RA, HTN, atrial fibrillation, DM 2, hypothyroidism, and pacemaker was brought to the ER due to altered mental status and what was thought to be left-sided weakness. However the patient has history of CVA with residual left-sided weakness. Patient has been in the hospital earlier for pacemaker upgrade for which she underwent sedation. The  reports that when the patient was discharged home she remained very drowsy but worsened at home. EMS was called and apparently was found to have left-sided weakness but as mentioned above has had a previous CVA.  was at bedside assisting with providing history especially during the time. When the patient was altered. After arrival to the ER she remained somewhat obtunded for couple hours. However by the time I saw the patient she was awake, alert, and oriented. She denied left-sided weakness above what she has had in the past.  Based on leukocytosis of 25.2 along with a bandemia of 19 a respiratory rate of 28 and a heart rate of 92 she met criteria for SIRS but no obvious source of infection. Of note she is on 7.5 mg of prednisone daily for her RA. Denies fevers and chills.   During my examination there was significant erythema in the lower extremities which may have been considered cellulitic however patient states that it is usually like that. Code neuro imaging was essentially negative except for mildly decreased perfusion on the right side of her brain. Allergies   Allergen Reactions    Butter Flavor Anaphylaxis     Butter AND CREME    Keflex [Cephalexin] Anaphylaxis    Milk Containing Products Anaphylaxis     Butter and cream    Pcn [Penicillins] Anaphylaxis    Aspirin Hives and Other (comments)     \"it makes my stomach bleed\"      Cimzia [Certolizumab Pegol] Other (comments)     GI symptoms, blisters in the mouth and esophagus    Clopidogrel Other (comments)     GI bleed    Demerol [Meperidine] Other (comments)     HYPOTENSION    Fentanyl Other (comments)     HYPOTENSION    Ibuprofen Diarrhea     Patient reports that ibuprofen caused diarrhea    Morphine Other (comments)     HYPOTENSION    Nitroglycerin Other (comments)     IN PILL FORM  - HYPOTENSION  CAN TOLERATE PATCH FOR SHORT TIME    Sulfa (Sulfonamide Antibiotics) Angioedema     Lips    Tapazole [Methimazole] Unknown (comments)     Patient stated \"it made me feel like I had the flu\"    Tramadol Other (comments)     Patient reports thrush with tramadol use    Adhesive Tape-Silicones Other (comments)     BAD BLISTERS AND TENDS TO GET INFECTED    Albuterol Palpitations    Gluten Diarrhea     bloating    Oxycodone Other (comments)     Hallicination and hypotension       Prior to Admission medications    Medication Sig Start Date End Date Taking? Authorizing Provider   doxycycline (ADOXA) 100 mg tablet Take 1 Tablet by mouth two (2) times a day for 7 days. 6/28/21 7/5/21  Milka Gonzalez MD   bumetanide (BUMEX) 1 mg tablet Take 1 mg by mouth daily. Provider, Historical   bumetanide (BUMEX) 1 mg tablet Take 0.5 mg by mouth every evening. Provider, Historical   calcium citrate 200 mg (950 mg) tablet Take 200 mg by mouth daily. Provider, Historical   predniSONE (DELTASONE) 2.5 mg tablet Take 2.5 mg by mouth every evening. Indications: RA    Provider, Historical   budesonide (Pulmicort) 0.5 mg/2 mL nbsp 500 mcg by Nebulization route daily. Provider, Historical   eplerenone (INSPRA) 25 mg tablet Take 1 Tab by mouth Daily (before lunch). 3/30/21   Alcira Seymour MD   metoprolol succinate (TOPROL-XL) 50 mg XL tablet Take 1 Tab by mouth daily. 2/18/21   Alcira Seymour MD   nitroglycerin (NITRODUR) 0.1 mg/hr 1 Patch by TransDERmal route daily. 1/21/21   Alcira Seymour MD   lidocaine (Lidoderm) 5 % 1 Patch by TransDERmal route every twenty-four (24) hours. Apply patch to the affected area for 12 hours a day and remove for 12 hours a day. 11/17/20   Jeffery Mendieta MD   acetaminophen (TYLENOL) 650 mg TbER Take 1,300 mg by mouth nightly as needed (sleep). Provider, Historical   co-enzyme Q-10 (Co Q-10) 100 mg capsule Take 200 mg by mouth daily. Provider, Historical   artificial tears, dextran 70-hypromellose, (GenTeal Tears Mild) 0.1-0.3 % ophthalmic solution Administer 1 Drop to both eyes daily. Provider, Historical   carboxymethylcell/hypromellose (GENTEAL GEL OP) Administer 1 Drop to both eyes nightly. Provider, Historical   omega 3-DHA-EPA-fish oil 1,000 mg (120 mg-180 mg) capsule Take 1 Cap by mouth every evening. Provider, Historical   levalbuterol (XOPENEX) 0.63 mg/3 mL nebu 0.63 mg by Nebulization route two (2) times a day. Provider, Historical   potassium 99 mg tablet Take 99 mg by mouth daily. Provider, Historical   insulin glargine (LANTUS,BASAGLAR) 100 unit/mL (3 mL) inpn 12 Units by SubCUTAneous route nightly. Provider, Historical   fenofibrate nanocrystallized (Tricor) 48 mg tablet Take 48 mg by mouth nightly. Provider, Historical   levothyroxine (SYNTHROID) 137 mcg tablet Take 137 mcg by mouth Daily (before breakfast).  2/4/20   Provider, Historical   predniSONE (DELTASONE) 5 mg tablet Take 5 mg by mouth daily. Indications: RA    Provider, Historical   cholecalciferol (Vitamin D3) (1000 Units /25 mcg) tablet Take 1,000 Units by mouth daily. Provider, Historical   turmeric (CURCUMIN) Take 1 g by mouth every evening. Provider, Historical   SITagliptin (JANUVIA) 100 mg tablet Take 100 mg by mouth daily (with dinner). Provider, Historical   vit C/vit E ac/lut/copper/zinc (PRESERVISION LUTEIN PO) Take 1 Tablet by mouth daily (with dinner). Provider, Historical   ascorbic acid (VITAMIN C) 500 mg tablet Take 500 mg by mouth daily. Other, MD Yolanda   ferrous sulfate 325 mg (65 mg iron) tablet Take 325 mg by mouth daily (with lunch). Other, MD Yolanda   cyanocobalamin (VITAMIN B12) 1,000 mcg/mL injection INJECT 1 ML INTO THE MUSCLE EVERY 30 DAYS 3/24/14   Dominick Joseph MD   lansoprazole (PREVACID) 30 mg capsule Take 30 mg by mouth daily.     Provider, Historical       Past Medical History:   Diagnosis Date    Adverse effect of anesthesia     slow to wake up    Arthritis     Asthma 6/29/2009    CAUSED BY MOLD    Atrial fibrillation ([address] 6/29/2009    Atrial flutter ([address]     CAD (coronary artery disease) 06/29/2009    Celiac disease 2010    Chronic chest pain     Chronic obstructive pulmonary disease ([address] 2004-5    right leg    Chronic pain of right ankle     Diabetes ([address]     Drug induced prednisone, DM2    Diastolic heart failure ([address]     DVT (deep venous thrombosis) ([address] 2004    Right leg post surgery    GERD (gastroesophageal reflux disease)     Gluten intolerance     Heart failure ([address]     Hypertension     Hypothyroidism 6/29/2009    Iron deficiency anemia     Lyme disease     Pacemaker     11/20    Personal history of MI (myocardial infarction)     Rheumatoid arthritis ([address]     S/P AV jules ablation     11/20     S/P left atrial appendage ligation     SUHAS clip 10/20     Slow to wake up after anesthesia     Syncope     Post testing- resolved    TIA (transient ischemic attack)  &     Vitamin B12 deficiency 2009        Past Surgical History:   Procedure Laterality Date    HX ADENOIDECTOMY      HX AFIB ABLATION      HX APPENDECTOMY      HX CHOLECYSTECTOMY  11    HX COLONOSCOPY      HX CORONARY STENT PLACEMENT      x 2    HX HEART CATHETERIZATION      2 STENTS    HX HEART CATHETERIZATION  2020    HX HYSTERECTOMY      HX LUMBAR LAMINECTOMY  2000    L4-L5    HX ORTHOPAEDIC  -2012    RT.  FOOT SURGERY X 6/calcaneal osteotomy    HX ORTHOPAEDIC Right 2020    right hand [facility] joint replacement    HX TONSILLECTOMY  1964    [place] ICAR CATHETER ABLATION ATRIOVENTR NODE FUNCTION N/A 2020    ABLATION AV NODE performed by Chris Jiang MD at [facility] CATH LAB    [place] INS NEW/RPLCMT PRM PM W/TRANSV ELTRD ATRIAL&VENT N/A 2020    INSERT PPM DUAL performed by Chris Jiang MD at [facility] [place] [address] CATH LAB    [place] INTRACARDIAC ELECTROPHYSIOLOGIC 3D MAPPING N/A 2020    Ep 3d Mapping performed by Chris Jiang MD at [facility] [place] [address], [facility] CATH LAB       Social History     Tobacco Use    Smoking status: Former Smoker     Packs/day: 1.00     Years: 30.00     Pack years: 30.00     Types: Cigarettes     Quit date: 2002     Years since quittin.0    Smokeless tobacco: Never Used   Substance Use Topics    Alcohol use: No        Family History   Problem Relation Age of Onset    Delayed Awakening Mother     Heart Disease Mother     Coronary Artery Disease Mother     Hypertension Mother     Stroke Mother     Delayed Awakening Sister     Hypertension Sister     Lung Disease Father     Cancer Father         colon    Hypertension Father     Hypertension Brother     Breast Cancer Maternal Aunt     Breast Cancer Maternal Aunt     Breast Cancer Cousin         maternal 1st cousin    Breast Cancer Maternal Aunt     Breast Cancer Cousin         maternal 1st cousin    Breast Cancer Cousin         maternal 1st cousin    Deep Vein Thrombosis Neg Hx     Anesth Problems Neg Hx         Review of Systems (14 point ROS):    Gen:   negative except for fatigue  Eyes:  negative  ENT:    negative  Resp:  negative  CVS:   negative  GI:       negative  :     negative  Skin:   negative  Hem:   negative  MS:     negative  Ralf:    AMS   Psy:    negative  Endo:  negative  ALL:   negative         Objective:      Visit Vitals  /62   Pulse 92   Resp 28   Ht 5' 2\" (1.575 m)   Wt 77.3 kg (170 lb 8 oz)   SpO2 97%   BMI 31.18 kg/m²       Exam:     Physical Exam:    General: alert, well appearing and no distress  Head: Normocephalic, without obvious abnormality, atraumatic  Eyes: PERRL, EOMI, anicteric sclerae, conjuntiva clear, no ptosis and sclerae injected but no drainage  ENT: Lips, mucosa, and tongue normal.   Neck: normal, supple, no tenderness and no carotid bruits  Lungs: clear to auscultation with good breath sounds and normal respiratory effort  Heart: S1, S2 normal, tachy, regular rhythm and no murmur appreciated  Abd: not distended, soft, nontender, BS present and normactive  Ext: no cyanosis and no edema  Pulses: present 1+ and symmetric  Skin: erythema of the pretibial region bilaterally and texture normal  Neuro: Alert and oriented x4. Cranial nerves II through XII grossly intact. Sensation intact in both upper and lower extremities bilaterally. Strength and motor was limited on the left due to the patient's arm in a sling after pacemaker placement this afternoon. Psych: not anxious, cooperative, appropriate affect, behavior: normal, speech: appropriate quality, quantity and organization of sentences and thought content: normal    Labs:    Recent Labs     06/28/21 1945   WBC 25.2*   HGB 15.0   HCT 47.3*        Recent Labs     06/28/21 1945      K 4.1      CO2 27   *   BUN 18   CREA 0.90   CA 9.0   ALB 3.4*   TBILI 0.5   ALT 24       Radiology and EKG reviewed:       XR CHEST PORT    Result Date: 6/28/2021  1.  No pneumothorax    CT CODE NEURO HEAD WO CONTRAST    Result Date: 6/28/2021  No acute findings. I personally reviewed and interpreted the imaging studies and agree with official reading. The chart, ER course, the above PMSFH, lab work, and radiological studies was reviewed by me on: June 28, 2021       Assessment/Plan:      Acute encephalopathy / Left-sided weakness:  Appears to be related to sequela from sedation for the pacemaker. Apparently has resolved. With regards to the left-sided weakness this appears to be from residual effects from previous stroke. However, she was already started on the stroke protocol. Stroke order set. Permissive hypertension for the first 24 hours. NPO until bedside evaluation is performed. Oxygen per protocol with pulse oximetry. Cannot use aspirin or Plavix due to adverse reactions, statin, antiemetics, and supportive care. Labs:  CMP, CBC, A1C, Lipid, TSH, consider homocysteine  Imaging studies: Consider MRI if pacemaker is compatible and echocardiogram to rule out possible cardioembolic cause. Consults: Neurology, speech eval, PT OT. [address] SIRS / Leukocytosis / Eldonna Bracken / Immunocompromised due to corticosteroids: Meets criteria for SIRS based on HR, RR, and WBC/bandemia. However, she tends to have chronic leukocytosis likely steroid-induced. Given that she had procedure today and in light of all her allergies I have started her on aztreonam and vancomycin until we can sort out if there is infection which is appearing less likely at this time and may be discontinued tomorrow depending on any additional findings. Pacemaker: Replaced today. Outpatient follow-up as scheduled. DM type II: Glucose monitoring with insulin sliding scale.      Body mass index is 31.18 kg/m².:  30.0 - 39.9 Obese      Risk of deterioration: high      Total time: 79 Minutes **I personally saw and examined the patient during this time period**    Discussed:  Code Status and Care Plan discussed with: Patient, Spouse, ED Provider and RN    Prophylaxis:  SCD's    Probable disposition:  Home    Date of service:    6/28/2021                ___________________________________________________    Admitting Physician: Kavita Dinero MD

## 2021-06-29 NOTE — PROCEDURES
[facility] [address]  EEG    Name:  Konstantin Wright  MR#:  [id number]  :  1950  ACCOUNT #:  [de-identified]  DATE OF SERVICE:  2021      REFERRING PROVIDER:  Maria Luisa Jin NP    CLINICAL HISTORY:  An EEG is requested on this 64-year-old lady to evaluate for epileptiform abnormalities. MEDICATIONS:  Not listed. EEG REPORT:  This tracing is obtained during the awake state. Technically, the tracing is degraded by pacemaker artifact. There are intermittent runs of posteriorly dominant and symmetrical low-to-medium amplitude 8.5 cycle per second activities which attenuate with eye opening. Lower voltage faster frequency activities are seen symmetrically over the anterior head regions. Hyperventilation not performed. Intermittent photic stimulation little alters the tracing. Sleep is not attained.     IMPRESSION/INTERPRETATION:  This EEG recorded during the awake state is normal.      Joe Thomas MD      [id number]  D:  2021 14:40  T:  2021 15:48  JOB #:  [id number]

## 2021-06-29 NOTE — ED NOTES
TRANSFER - OUT REPORT:    Verbal report given to Keny Camilo RN(name) on [employer]  being transferred to [facility](unit) for routine progression of care       Report consisted of patients Situation, Background, Assessment and   Recommendations(SBAR). Information from the following report(s) SBAR, Kardex, ED Summary, [facility] and Recent Results was reviewed with the receiving nurse. Lines:   Peripheral IV 06/28/21 Right Antecubital (Active)       Peripheral IV 10/38/21 Right Cephalic (Active)        Opportunity for questions and clarification was provided.       Patient transported with:   Monitor  O2 @ 2 liters  Registered Nurse

## 2021-06-29 NOTE — PROGRESS NOTES
Order received for home health nursing, PT. Home health follow up discussed with patient/family. Home health provider list given to patient. Choice is [facility], referral sent via [place]. [place] of Choice letter signed. Await response. Care Management Interventions  PCP Verified by CM: Yes (Dr. Aaron Burroughs)  Mode of Transport at Discharge:  Other (see comment) (per )  Transition of Care Consult (CM Consult): [address]: No  Reason Outside [place]:  (patient choice)  Physical Therapy Consult: Yes  Occupational Therapy Consult: Yes  Speech Therapy Consult: Yes  Current Support Network: Lives with Spouse  Confirm Follow Up Transport: Family  The Plan for Transition of Care is Related to the Following Treatment Goals : return home at [place]  The Patient and/or Patient Representative was Provided with a Choice of Provider and Agrees with the Discharge Plan?: (S) Yes  Freedom of Choice List was Provided with Basic Dialogue that Supports the Patient's Individualized Plan of Care/Goals, Treatment Preferences and Shares the Quality Data Associated with the Providers?: (S) Yes  Discharge Location  Discharge Placement: Home with home health    Roger Aschoff, RN, MSN/Care manager

## 2021-06-29 NOTE — PROGRESS NOTES
Problem: Mobility Impaired (Adult and Pediatric)  Goal: *Acute Goals and Plan of Care (Insert Text)  Description: FUNCTIONAL STATUS PRIOR TO ADMISSION: Patient was independent and active without use of DME.    HOME SUPPORT PRIOR TO ADMISSION: The patient lived with  and typically does not require assistance. Admitted yesterday for pacemaker replacement and d/c'd home. Physical Therapy Goals  Initiated 6/29/2021  1. Patient will move from supine to sit and sit to supine , scoot up and down, and roll side to side in bed with modified independence within 7 day(s). 2.  Patient will transfer from bed to chair and chair to bed with modified independence using the least restrictive device within 7 day(s). 3.  Patient will perform sit to stand with modified independence within 7 day(s). 4.  Patient will ambulate with modified independence for 200 feet with the least restrictive device within 7 day(s). 5.  Patient will ascend/descend 5 stairs with 1 handrail(s) with minimal assistance/contact guard assist within 7 day(s). Outcome: Progressing Towards Goal   PHYSICAL THERAPY EVALUATION  Patient: Ramandeep Cotton (28 y.o. female)  Date: 6/29/2021  Primary Diagnosis: Acute encephalopathy [G93.40]  Acute left-sided weakness [R53.1]  Bandemia [D72.825]  SIRS (systemic inflammatory response syndrome) (HCC) [R65.10]  Leukocytosis [E21.474]  Acute metabolic encephalopathy [E76.86]        Precautions:   Fall (PPM 6/28)    ASSESSMENT  Based on the objective data described below, the patient presents with generalized weakness, impaired balance, increased fall risk and R sided lower rib pain following admission for AMD and L sided weakness following pacemaker exchange yesterday. Pt requiring upwards of Min A x 1-2 for transfers and CGA-Min A to ambulate within the room. Pt SOB with short gait distance and requires prolonged rest break on commode prior to walking to transport wheelchair.  Unable to assess further mobility d/t pt heading off the floor for testing. Anticipate steady progress once pain better controlled. Oriented x 4 at time of evaluation. Will benefit from HHPT at discharge and assistance from . Current Level of Function Impacting Discharge (mobility/balance): Min A x 2 to stand from bed, Min A x 1 to stand from commode    Functional Outcome Measure: The patient scored 18/28 on the Tinetti outcome measure which is indicative of high fall risk. Other factors to consider for discharge: good family support,  reports poor response to anesthesia in the past     Patient will benefit from skilled therapy intervention to address the above noted impairments. PLAN :  Recommendations and Planned Interventions: bed mobility training, transfer training, gait training, therapeutic exercises, neuromuscular re-education, patient and family training/education, and therapeutic activities      Frequency/Duration: Patient will be followed by physical therapy:  5 times a week to address goals. Recommendation for discharge: (in order for the patient to meet his/her long term goals)  Physical therapy at least 2 days/week in the home     This discharge recommendation:  Has been made in collaboration with the attending provider and/or case management    IF patient discharges home will need the following DME: patient owns DME required for discharge         SUBJECTIVE:   Patient stated I don't know about this sling.     OBJECTIVE DATA SUMMARY:   HISTORY:    Past Medical History:   Diagnosis Date    Adverse effect of anesthesia     slow to wake up    Arthritis     Asthma 6/29/2009    CAUSED BY MOLD    Atrial fibrillation ([address] 6/29/2009    Atrial flutter (HCC)     CAD (coronary artery disease) 06/29/2009    Celiac disease 2010    Chronic chest pain     Chronic obstructive pulmonary disease ([address] 2004-5    right leg    Chronic pain of right ankle     Diabetes ([address]     Drug induced prednisone, DM2 Diastolic heart failure (HCC)     DVT (deep venous thrombosis) ([facility] 2004    Right leg post surgery    GERD (gastroesophageal reflux disease)     Gluten intolerance     Heart failure ([facility]     Hypertension     Hypothyroidism 6/29/2009    Iron deficiency anemia     Lyme disease     Pacemaker     11/20    Personal history of MI (myocardial infarction)     Rheumatoid arthritis ([facility]     S/P AV jules ablation     11/20     S/P left atrial appendage ligation     SUHAS clip 10/20     Slow to wake up after anesthesia     Syncope     Post testing- resolved    TIA (transient ischemic attack) 2004 & 2006    Vitamin B12 deficiency 6/29/2009     Past Surgical History:   Procedure Laterality Date    HX ADENOIDECTOMY      HX AFIB ABLATION      HX APPENDECTOMY      HX CHOLECYSTECTOMY  6/16/11    HX COLONOSCOPY      HX CORONARY STENT PLACEMENT      x 2    HX HEART CATHETERIZATION  2010    2 STENTS    HX HEART CATHETERIZATION  03/2020    HX HYSTERECTOMY      HX LUMBAR LAMINECTOMY  2000    L4-L5    HX ORTHOPAEDIC  1993-6/2012    RT.  FOOT SURGERY X 6/calcaneal osteotomy    HX ORTHOPAEDIC Right 09/19/2020    right hand [facility] joint replacement    HX TONSILLECTOMY  1964    CO ICAR CATHETER ABLATION ATRIOVENTR NODE FUNCTION N/A 11/19/2020    ABLATION AV NODE performed by Jennifer Stubbs MD at [address], [facility] CATH LAB    CO INS NEW/RPLCMT PRM PM W/TRANSV ELTRD ATRIAL&VENT N/A 11/19/2020    INSERT PPM DUAL performed by Jennifer Stubbs MD at [address] [facility] CATH LAB    CO INTRACARDIAC ELECTROPHYSIOLOGIC 3D MAPPING N/A 11/19/2020    Ep 3d Mapping performed by Jennifer Stubbs MD at [address] [address], [facility] CATH LAB       Personal factors and/or comorbidities impacting plan of care: arthrhtis, A-fib, CAD, RA, pacemaker    Home Situation  Home Environment: Private residence  # Steps to Enter: 5  Rails to Enter: Yes  One/Two Story Residence: One story  Living Alone: No  Support Systems: Spouse/Significant Other/Partner  Patient Expects to be Discharged toF Cor[de-identified]ration  Current DME Used/Available at Home: Brock Crisp, straight, Shower chair, Walker, rolling    EXAMINATION/PRESENTATION/DECISION MAKING:   Critical Behavior:  Neurologic State: Alert  Orientation Level: Oriented X4  Cognition: Appropriate decision making  Safety/Judgement: Insight into deficits  Hearing: Auditory  Auditory Impairment: None  Skin:    Edema:   Range Of Motion:  AROM: Within functional limits           PROM: Within functional limits           Strength:    Strength: Generally decreased, functional                    Tone & Sensation:   Tone: Normal                              Coordination:  Coordination: Within functional limits  Vision:      Functional Mobility:  Bed Mobility:  Rolling: Minimum assistance  Supine to Sit: Minimum assistance     Scooting: Minimum assistance  Transfers:  Sit to Stand: Minimum assistance  Stand to Sit: Minimum assistance        Bed to Chair: Minimum assistance              Balance:   Sitting: Intact  Standing: Impaired  Standing - Static: Fair  Standing - Dynamic : Fair  Ambulation/Gait Training:  Distance (ft): 45 Feet (ft)  Assistive Device: Gait belt (HHA)  Ambulation - Level of Assistance: Minimal assistance        Gait Abnormalities: Decreased step clearance;Trunk sway increased        Base of Support: Widened     Speed/Ermelinda: Pace decreased (<100 feet/min); Slow  Step Length: Right shortened;Left shortened                     Functional Measure:  Tinetti test:    Sitting Balance: 1  Arises: 0  Attempts to Rise: 0  Immediate Standing Balance: 1  Standing Balance: 1  Nudged: 1  Eyes Closed: 1  Turn 360 Degrees - Continuous/Discontinuous: 1  Turn 360 Degrees - Steady/Unsteady: 1  Sitting Down: 1  Balance Score: 8 Balance total score  Indication of Gait: 1  R Step Length/Height: 1  L Step Length/Height: 1  R Foot Clearance: 1  L Foot Clearance: 1  Step Symmetry: 1  Step Continuity: 1  Path: 1  Trunk: 1  Walking Time: 1  Gait Score: 10 Gait total score  Total Score: 18/28 Overall total score         Tinetti Tool Score Risk of Falls  <19 = High Fall Risk  19-24 = Moderate Fall Risk  25-28 = Low Fall Risk  Tinetti ME. Performance-Oriented Assessment of Mobility Problems in Elderly Patients. [address]; [id number]. (Scoring Description: PT Bulletin Feb. 10, 1993)    Older adults: Christiano Martinez et al, 2009; n = 1000 [facility] elderly evaluated with ABC, EDER, ADL, and IADL)  · Mean EDER score for males aged 69-68 years = 26.21(3.40)  · Mean EDER score for females age 69-68 years = 25.16(4.30)  · Mean EDER score for males over 80 years = 23.29(6.02)  · Mean EDER score for females over 80 years = 17.20(8.32)            Physical Therapy Evaluation Charge Determination   History Examination Presentation Decision-Making   MEDIUM  Complexity : 1-2 comorbidities / personal factors will impact the outcome/ POC  MEDIUM Complexity : 3 Standardized tests and measures addressing body structure, function, activity limitation and / or participation in recreation  LOW Complexity : Stable, uncomplicated  Other outcome measures Tinetti  LOW       Based on the above components, the patient evaluation is determined to be of the following complexity level: LOW     Pain Rating:  Pain in R side of trunk    Activity Tolerance:   Good    After treatment patient left in no apparent distress:   Sitting in chair and with transport    COMMUNICATION/EDUCATION:   The patients plan of care was discussed with: Registered nurse. Fall prevention education was provided and the patient/caregiver indicated understanding., Patient/family have participated as able in goal setting and plan of care. , and Patient/family agree to work toward stated goals and plan of care.     Thank you for this referral.  Tawnya Everett, PT   Time Calculation: 23 mins

## 2021-06-29 NOTE — PROGRESS NOTES
Reason for Readmission:     Altered mental status, left sided weakness, Code S on admission. Dual chamber pacemaker 6/28/21 am.  Hx HTN, afib, diabetes, hypothyroid, CVA, CAD, DVT. COVID19 Vaccine:  yes       type:  Manoj Arnold        Date completed: May 2021           RUR Score/Risk Level:     28%/ high risk for readmission    PCP: First and Last name:  Dr. Ortega Last   Name of Practice:    Are you a current patient: Yes/No:  yes   Approximate date of last visit:  3 weeks ago   Can you participate in a virtual visit with your PCP:  no    Is a Care Conference indicated:  Not at this time. Did you attend your follow up appointment (s): If not, why not:  yes       Resources/supports as identified by patient/family:   Good family support, son and daughter live in [place], [place] area. Top Challenges facing patient (as identified by patient/family and CM): Finances/Medication cost?     Medicare A/B, Blue Cross supplement. Transportation      Per  or other family  Support system or lack thereof? Good family support  Living arrangements? Lives with spouse  Self-care/ADLs/Cognition? Requires assist with ADLs at this time. Current Advanced Directive/Advance Care Plan:  AMD on file. Plan for utilizing home health:   Unsure at this time. Transition of Care Plan:    Based on readmission, the patient's previous Plan of Care   has been evaluated and/or modified. The current Transition of Care Plan is:       Chart reviewed, demographics verified. CM role and follow up discussed. Met with patient at bedside, face mask on. Patient lives with her . Patient lives in a one story home with 2 steps to enter home. Patient has prescription drug coverage, uses [facility] in [place]. Patient is independent, drives, and provides self care.   Patient performs ADLs currently with assist.    Current status:  Patient currently requiring medical management including post procedure assessment and management. PLAN:  1. Monitor patient response to treatment and recommendations. 2. Medical management continues. 3. Patient is likely home with family assist.  4. Patient transport home per  at discharge. 5. CM to monitor clinical progress and disposition recommendations. Readmission Assessment  Number of days since last admission?: 8-30 days  Previous disposition: Home with Family  What was the patient's/caregiver's perception as to why they think they needed to return back to the hospital?: Other (Comment) (starting having trouble again.)  Did you visit your Primary Care Physician after you left the hospital, before you returned this time?: Yes  Did you see a specialist, such as Cardiac, Pulmonary, Orthopedic Physician, etc. after you left the hospital?: Yes  Who advised the patient to return to the hospital?: Self-referral  Does the patient report anything that got in the way of taking their medications?: No  In our efforts to provide the best possible care to you and others like you, can you think of anything that we could have done to help you after you left the hospital the first time, so that you might not have needed to return so soon?: Other (Comment) (nothing)     Care Management Interventions  PCP Verified by CM: Yes (Dr. Naya Locke)  Mode of Transport at Discharge:  Other (see comment) (per )  Transition of Care Consult (CM Consult): Discharge Planning  Physical Therapy Consult: Yes  Occupational Therapy Consult: Yes  Speech Therapy Consult: Yes  Current Support Network: Lives with Spouse  Confirm Follow Up Transport: Family  The Plan for Transition of Care is Related to the Following Treatment Goals : return home at [facility] Location  Discharge Placement: Home with family assistance    Sravan Couch RN, MSN, Care manager

## 2021-06-29 NOTE — ADVANCED PRACTICE NURSE
Chest xray with no acute changes  06/28/21    ECHO ADULT COMPLETE 06/29/2021 6/29/2021    Interpretation Summary  · LV: Estimated LVEF is 45 - 50%. Normal cavity size and wall thickness. Abnormal left ventricular septal motion consistent with right ventricular pacing. Mild (grade 1) left ventricular diastolic dysfunction. · TV: Mild tricuspid valve regurgitation is present. · Pericardium: Very small pericardial effusion adjacent to right ventricle. · IAS: Agitated saline contrast study was performed. There was a mild left to right shunt on Valsalva maneuver. Signed by: Yoel Manzano DO on 6/29/2021 10:30 AM    D/W Maine Morse NP  OK for discharge.

## 2021-06-29 NOTE — PROGRESS NOTES
[facility]    Medical Progress Note      NAME: Gibson Aguayo   :  1950  MRM:  [id number]    Date/Time of service 2021  1:24 PM          Assessment and Plan:     Acute encephalopathy - POA and resolving. Likely due to dehydration, post procedure anaesthesia, etc.  Neurology consulted but no further inpatient workup planned. I suspect underlying  Mild dementia, that could be worked up by outpatient neuropsych eval.    SIRS (systemic inflammatory response syndrome) / Leukocytosis / Bandemia - POA, but no focus of infection noted. She is on chronic steroids and has chronically elevated WBC. She was placed on empiric PO prophylactic ABx after pacer yesterday, which can continue. Stop vancomycin and aztreonam    Hx of completed stroke / Chronic Left-sided weakness - POA and now back at baseline. Neurology aware     Dehydration - POA, likely relate to NPO for procedure. Hydrated and resolved. PAF (paroxysmal atrial fibrillation) / Pacemaker - Dual chamber placed yesterday. Cardiology ordered interrogation and she can go home if that is normal.    CAD (coronary artery disease) / Diastolic heart failure / HTN (hypertension) - Appears stable. Troponin negative. Continue metoprolol, IMDUR, atorvastatin. LDL low. Diabetes mellitus type 2, controlled, with vascular complications - Diabetic diet and counseling. SSI per protocol. Continue home Lantus, alogliptan. Check A1c.     Rheumatoid arthritis / Immunocompromised due to corticosteroids - Continue prednisone    Asthma - Continue pulmicort, inspra and Prn nebs    GERD (gastroesophageal reflux disease) - PPI    Iron deficiency anemia, unspecified - PCP to monitor, Hb normal even after hydration    Hypothyroidism - Continue synthroid       Subjective:     Chief Complaint:  Nearly back to baseline    ROS:  (bold if positive, if negative)    Tolerating some PT  Tolerating Diet        Objective:     Last 24hrs VS reviewed since prior progress note. Most recent are:    Visit Vitals  /66   Pulse 92   Temp 99.7 °F (37.6 °C)   Resp 22   Ht 5' 2\" (1.575 m)   Wt 77.3 kg (170 lb 6.7 oz)   SpO2 98%   BMI 31.17 kg/m²     SpO2 Readings from Last 6 Encounters:   06/29/21 98%   06/28/21 91%   05/30/21 92%   03/26/21 94%   03/25/21 95%   02/24/21 96%    O2 Flow Rate (L/min): 2 l/min       Intake/Output Summary (Last 24 hours) at 6/29/2021 1324  Last data filed at 6/29/2021 0115  Gross per 24 hour   Intake 2569 ml   Output --   Net 2569 ml        Physical Exam:    Gen:  Obese, in no acute distress  HEENT:  Pink conjunctivae, PERRL, hearing intact to voice, moist mucous membranes  Neck:  Supple, without masses, thyroid non-tender  Resp:  No accessory muscle use, clear breath sounds without wheezes rales or rhonchi  Card:  No murmurs, normal S1, S2 without thrills, bruits or peripheral edema  Abd:  Soft, non-tender, non-distended, normoactive bowel sounds are present, no mass  Lymph:  No cervical or inguinal adenopathy  Musc:  No cyanosis or clubbing  Skin:  No rashes or ulcers, skin turgor is good  Neuro:  Cranial nerves are grossly intact, L>R motor weakness, follows commands   Psych:   Moderate insight, oriented to person, place and time, alert    Telemetry reviewed:   normal sinus rhythm  __________________________________________________________________  Medications Reviewed: (see below)  Medications:     Current Facility-Administered Medications   Medication Dose Route Frequency    artificial tears (dextran-hypromellose-glycerin) (GENTEAL) ophthalmic solution 1 Drop  1 Drop Both Eyes DAILY    ascorbic acid (vitamin C) (VITAMIN C) tablet 500 mg  500 mg Oral DAILY    budesonide (PULMICORT) 500 mcg/2 ml nebulizer suspension  500 mcg Nebulization DAILY    eplerenone (INSPRA) tablet 25 mg  25 mg Oral ACL    insulin glargine (LANTUS) injection 12 Units  12 Units SubCUTAneous QHS    pantoprazole (PROTONIX) tablet 40 mg  40 mg Oral ACB    levothyroxine (SYNTHROID) tablet 137 mcg  137 mcg Oral ACB    metoprolol succinate (TOPROL-XL) XL tablet 50 mg  50 mg Oral DAILY    nitroglycerin (NITRODUR) 0.1 mg/hr patch 1 Patch  1 Patch TransDERmal DAILY    predniSONE (DELTASONE) tablet 2.5 mg  2.5 mg Oral QPM    predniSONE (DELTASONE) tablet 5 mg  5 mg Oral DAILY    alogliptin (NESINA) tablet 12.5 mg  12.5 mg Oral DAILY WITH DINNER    white petrolatum-mineral oiL (SOOTHE NIGHT TIME) 80-20 % ophthalmic ointment   Both Eyes QHS    0.9% sodium chloride infusion  125 mL/hr IntraVENous CONTINUOUS    ondansetron (ZOFRAN) injection 4 mg  4 mg IntraVENous Q6H PRN    atorvastatin (LIPITOR) tablet 80 mg  80 mg Oral QHS    labetaloL (NORMODYNE;TRANDATE) 20 mg/4 mL (5 mg/mL) injection 5 mg  5 mg IntraVENous Q10MIN PRN    bisacodyL (DULCOLAX) suppository 10 mg  10 mg Rectal DAILY PRN    acetaminophen (TYLENOL) tablet 650 mg  650 mg Oral Q4H PRN    sodium chloride (NS) flush 5-10 mL  5-10 mL IntraVENous PRN    glucose chewable tablet 16 g  4 Tablet Oral PRN    dextrose (D50W) injection syrg 12.5-25 g  25-50 mL IntraVENous PRN    glucagon (GLUCAGEN) injection 1 mg  1 mg IntraMUSCular PRN    aztreonam (AZACTAM) 2 g in sterile water (preservative free) 10 mL IV syringe  2 g IntraVENous Q8H    insulin lispro (HUMALOG) injection   SubCUTAneous Q6H    vancomycin (VANCOCIN) 1,250 mg in 0.9% sodium chloride 250 mL (VIAL-MATE)  1,250 mg IntraVENous Q12H        Lab Data Reviewed: (see below)  Lab Review:     Recent Labs     06/29/21 0233 06/28/21 1945   WBC 22.0* 25.2*   HGB 12.1 15.0   HCT 38.6 47.3*    229     Recent Labs     06/29/21 0233 06/28/21 1945   * 141   K 3.9 4.1   * 105   CO2 26 27   * 198*   BUN 15 18   CREA 0.63 0.90   CA 7.5* 9.0   MG 1.7  --    PHOS 3.1  --    ALB  --  3.4*   TBILI  --  0.5   ALT  --  24   INR 1.1 1.0     Lab Results   Component Value Date/Time    Glucose (POC) 107 06/29/2021 01:10 PM    Glucose (POC) 141 (H) 06/29/2021 06:48 AM    Glucose (POC) 185 (H) 06/28/2021 11:47 PM    Glucose (POC) 224 (H) 05/30/2021 11:22 AM    Glucose (POC) 216 (H) 05/30/2021 07:00 AM     No results for input(s): PH, PCO2, PO2, HCO3, FIO2 in the last 72 hours. Recent Labs     06/29/21  0233 06/28/21  1945   INR 1.1 1.0     All Micro Results     Procedure Component Value Units Date/Time    CULTURE, BLOOD [[id number]] Collected: 06/28/21 2317    Order Status: Completed Specimen: Blood Updated: 06/29/21 0752     Special Requests: NO SPECIAL REQUESTS        Culture result: NO GROWTH AFTER 8 HOURS       CULTURE, BLOOD [[id number]] Collected: 06/28/21 2317    Order Status: Completed Specimen: Blood Updated: 06/29/21 0752     Special Requests: NO SPECIAL REQUESTS        Culture result: NO GROWTH AFTER 8 HOURS             Other pertinent lab: none    Total time spent with patient: 30 Minutes I personally reviewed chart, notes, data and current medications in the medical record. I have personally examined and treated the patient at bedside during this period.                  Care Plan discussed with: Patient, Family, Care Manager, Nursing Staff, Consultant/Specialist and >50% of time spent in counseling and coordination of care    Discussed:  Care Plan and D/C Planning    Prophylaxis:  H2B/PPI    Disposition:  Home w/Family           ___________________________________________________    Attending Physician: Amber Sandhu MD

## 2021-06-29 NOTE — CONSULTS
[facility]: [id number] [address] Neurology  [place] [address]  [phone]      Name:   Jenni Shrestha   Medical record #: [id number]  Admission Date: 6/28/2021     Who Consulted: Dr. Darryle Anderson    Reason for Consult:  AMS    HISTORY OF PRESENT ILLNESS:     This is a 66 y.o. female who is admitted for AMS. Ms. Ahsan Carty presented to the ED with altered mental status and left-sided weakness. Patient was seen earlier on the day of admission for a dual-chamber upgrade to biventricular pacemaker this was done under conscious sedation. After the procedures she was discharged home after which her  called EMS stating that she was altered and was having left-sided weakness. Upon arrival EMS states that patient's alertness is more improved. While in the ED her symptoms continued to improve. The Neurology Service is asked to evaluate for AMS. She was last seen by our team in November of 2020 for AMS with a nonphysiologic speech pattern. Her MRI and cEEG were normal at that time. Neuro-imaging:     CT Head: No acute findings    CTA/CTP Head and Neck: No LVO, possible decreased perfusion in right temporal lobe. EKG: AV paced. Care Plan discussed with:  Patient x   Family    RN    Care Manager    Consultant/Specialist:         Thank you for allowing the Neurology Service the pleasure of participating in the care of your patient. This patient will be discussed with my collaborating care team physician,  Dr. Verna Joshi, and he may have further recommendations regarding this patient's care      JOSE CosmeP-BC  ====================      Attending Attestation:     NEUROLOGY NOTE ADDENDUM:    6/29/2021      I have reviewed the documentation provided by the nurse practitioner, discussed her findings, clinical impression, and the proposed management plans with regards to this patient's encounter.   I have personally performed a face to face diagnostic evaluation on this patient. My findings are as follows:      Heidy Malin is a 66 y.o. female who  has a past medical history of Adverse effect of anesthesia, Arthritis, Asthma (6/29/2009), Atrial fibrillation ([address] (6/29/2009), Atrial flutter ([address] CAD (coronary artery disease) (06/29/2009), Celiac disease (2010), Chronic chest pain, Chronic obstructive pulmonary disease ([address] (2004-5), Chronic pain of right ankle, Diabetes ([address] Diastolic heart failure ([address] DVT (deep venous thrombosis) ([address] (2004), GERD (gastroesophageal reflux disease), Gluten intolerance, Heart failure ([address] Hypertension, Hypothyroidism (6/29/2009), Iron deficiency anemia, Lyme disease, Pacemaker, Personal history of MI (myocardial infarction), Rheumatoid arthritis ([address] S/P AV jules ablation, S/P left atrial appendage ligation, Slow to wake up after anesthesia, Syncope, TIA (transient ischemic attack) (2004 & 2006), and Vitamin B12 deficiency (6/29/2009). who presents for evaluation of AMS. Patient had pacemaker placed with sedation, after which was drowsy during discharged with note of worsening at home. (+) weakness of the L side. Not on Blood thinner and antiplatelets (tears her stomach)    Patient feels better today. States she has baseline R UE weakness from prior strokes. Exam:  Visit Vitals  /66   Pulse 92   Temp 99.7 °F (37.6 °C)   Resp 22   Ht 5' 2\" (1.575 m)   Wt 77.3 kg (170 lb 6.7 oz)   SpO2 98%   BMI 31.17 kg/m²     Gen: Awake, alert, follows commands  Appropriate appearance, normal speech. Oriented to all spheres. No visual field defect on confrontation exam.  Full eyes movement, with no nystagmus, no diplopia, no ptosis. Normal gag and swallow.   All remaining cranial nerves were normal  Motor function: L UE not tested because on a sling  (+) R UE pronator dritf  Sensory: dec LT on the fee  DTRs + UE, absent knees and ankles(-) Babinski  Good FTN and HTS   Gait: Deferred      Assessment  AMS, possible acute encephalopathy due to sedation s/p pacemaker placement  R pronator drift noted on exam which patient states is from old stroke    Plan  1) Discussed with cardiology team, patient cannot get MRI brain for 6 weeks from time of pacemaker placement  2) Since patient is neurologically doing better, will hold off further testing  3) Will defer to cardiology regarding anti-coagulation/anti-platelet use        Thank you for the consultation. Freda Lancaster MD  Diplomate, American Board of Psychiatry and Neurology  Diplomate, Neuromuscular Medicine  Diplomate, American Board of Electrodiagnostic Medicine                         Assessment/Plan:     1. Left sided weakness r/o Stroke:    · Pt is unable to take ASA or Plavix due to GI bleeding  · Neurochecks:  Every 4 hours  · Blood Sugar Goal:  140-180  · BP Goal: Less than 180/105 for 24 hours  · Nursing to do stroke/TIA education    Stroke work up  · A1C:  7.0  · LDL:  48.6  · TTE:    · MRI:  · Carotid vascular imaging:  No stenosis on CTA    Risk factors for stroke include:  Hx TIA, afib,Obesity, DM, HTN, CAD, HLD, physical inactivity  · Discussed with patient    · Discussed signs/symptoms of stroke and when to call 911  · Smoking cessation education if appropriate    2. Mobility:   · Has beenOOB. · PT/OT to eval for rehab    3. Diet:    · Does not need SLP, passed STAND    4. VTE Prophylaxes:   · Per Primary team           Review of Systems: 10 point ROS was performed. Pertinent positives listed in HPI. Negative ROS is as follows. Pt denies: angina, palpitations, paresthesias, weakness, vision loss, slurred speech, aphasia, fever, chills, falls, headache, diplopia, back pain, neck pain, prior episodes of vertigo, hallucinations, new medications or dosage changes. Allergies:    Allergies   Allergen Reactions    Butter Flavor Anaphylaxis     Butter AND CREME    Keflex [Cephalexin] Anaphylaxis    Milk Containing Products Anaphylaxis     Butter and cream    Pcn [Penicillins] Anaphylaxis    Aspirin Hives and Other (comments)     \"it makes my stomach bleed\"      Cimzia [Certolizumab Pegol] Other (comments)     GI symptoms, blisters in the mouth and esophagus    Clopidogrel Other (comments)     GI bleed    Demerol [Meperidine] Other (comments)     HYPOTENSION    Fentanyl Other (comments)     HYPOTENSION    Ibuprofen Diarrhea     Patient reports that ibuprofen caused diarrhea    Morphine Other (comments)     HYPOTENSION    Nitroglycerin Other (comments)     IN PILL FORM  - HYPOTENSION  CAN TOLERATE PATCH FOR SHORT TIME    Sulfa (Sulfonamide Antibiotics) Angioedema     Lips    Tapazole [Methimazole] Unknown (comments)     Patient stated \"it made me feel like I had the flu\"    Tramadol Other (comments)     Patient reports thrush with tramadol use    Adhesive Tape-Silicones Other (comments)     BAD BLISTERS AND TENDS TO GET INFECTED    Albuterol Palpitations    Gluten Diarrhea     bloating    Oxycodone Other (comments)     Hallicination and hypotension       Outpatient Meds  Current Facility-Administered Medications on File Prior to Encounter   Medication Dose Route Frequency Provider Last Rate Last Admin    [COMPLETED] gentamicin in Saline (Iso-osm) (GARAMYCIN) 80 mg/100 mL IVPB premix 80 mg  80 mg IntraVENous ONCE RebelGage  mL/hr at 06/28/21 1418 80 mg at 06/28/21 1418    [COMPLETED] vancomycin (VANCOCIN) 1,000 mg in 0.9% sodium chloride 250 mL (VIAL-MATE)  1,000 mg IntraVENous ONCE RebelMadison medina  mL/hr at 06/28/21 1254 1,000 mg at 06/28/21 1254    [COMPLETED] heparinized saline 2 units/mL infusion    CONTINUOUS Anette Flores MD   1,000 Units at 06/28/21 1349     Current Outpatient Medications on File Prior to Encounter   Medication Sig Dispense Refill    doxycycline (ADOXA) 100 mg tablet Take 1 Tablet by mouth two (2) times a day for 7 days. 14 Tablet 0    bumetanide (BUMEX) 1 mg tablet Take 1 mg by mouth daily.       bumetanide (BUMEX) 1 mg tablet Take 0.5 mg by mouth every evening.  calcium citrate 200 mg (950 mg) tablet Take 200 mg by mouth daily.  predniSONE (DELTASONE) 2.5 mg tablet Take 2.5 mg by mouth every evening. Indications: RA      budesonide (Pulmicort) 0.5 mg/2 mL nbsp 500 mcg by Nebulization route daily.  eplerenone (INSPRA) 25 mg tablet Take 1 Tab by mouth Daily (before lunch). 90 Tab 1    metoprolol succinate (TOPROL-XL) 50 mg XL tablet Take 1 Tab by mouth daily. 90 Tab 3    nitroglycerin (NITRODUR) 0.1 mg/hr 1 Patch by TransDERmal route daily. 90 Patch 3    lidocaine (Lidoderm) 5 % 1 Patch by TransDERmal route every twenty-four (24) hours. Apply patch to the affected area for 12 hours a day and remove for 12 hours a day. 1 Each 0    acetaminophen (TYLENOL) 650 mg TbER Take 1,300 mg by mouth nightly as needed (sleep).  co-enzyme Q-10 (Co Q-10) 100 mg capsule Take 200 mg by mouth daily.  artificial tears, dextran 70-hypromellose, (GenTeal Tears Mild) 0.1-0.3 % ophthalmic solution Administer 1 Drop to both eyes daily.  carboxymethylcell/hypromellose (GENTEAL GEL OP) Administer 1 Drop to both eyes nightly.  omega 3-DHA-EPA-fish oil 1,000 mg (120 mg-180 mg) capsule Take 1 Cap by mouth every evening.  levalbuterol (XOPENEX) 0.63 mg/3 mL nebu 0.63 mg by Nebulization route two (2) times a day.  potassium 99 mg tablet Take 99 mg by mouth daily.  insulin glargine (LANTUS,BASAGLAR) 100 unit/mL (3 mL) inpn 12 Units by SubCUTAneous route nightly.  fenofibrate nanocrystallized (Tricor) 48 mg tablet Take 48 mg by mouth nightly.  levothyroxine (SYNTHROID) 137 mcg tablet Take 137 mcg by mouth Daily (before breakfast).  predniSONE (DELTASONE) 5 mg tablet Take 5 mg by mouth daily. Indications: RA      cholecalciferol (Vitamin D3) (1000 Units /25 mcg) tablet Take 1,000 Units by mouth daily.  turmeric (CURCUMIN) Take 1 g by mouth every evening.       SITagliptin (JANUVIA) 100 mg tablet Take 100 mg by mouth daily (with dinner).  vit C/vit E ac/lut/copper/zinc (PRESERVISION LUTEIN PO) Take 1 Tablet by mouth daily (with dinner).  ascorbic acid (VITAMIN C) 500 mg tablet Take 500 mg by mouth daily.  ferrous sulfate 325 mg (65 mg iron) tablet Take 325 mg by mouth daily (with lunch).  cyanocobalamin (VITAMIN B12) 1,000 mcg/mL injection INJECT 1 ML INTO THE MUSCLE EVERY 30 DAYS 10 mL 0    lansoprazole (PREVACID) 30 mg capsule Take 30 mg by mouth daily.          Inpatient Meds    Current Facility-Administered Medications   Medication Dose Route Frequency Provider Last Rate Last Admin    0.9% sodium chloride infusion  125 mL/hr IntraVENous CONTINUOUS Umm Moran  mL/hr at 06/29/21 0010 125 mL/hr at 06/29/21 0010    ondansetron (ZOFRAN) injection 4 mg  4 mg IntraVENous Q6H PRN Umm Moran MD        atorvastatin (LIPITOR) tablet 80 mg  80 mg Oral QHS Umm Moran MD   80 mg at 06/28/21 2339    labetaloL (NORMODYNE;TRANDATE) 20 mg/4 mL (5 mg/mL) injection 5 mg  5 mg IntraVENous Q10MIN PRN Umm Moran MD        bisacodyL (DULCOLAX) suppository 10 mg  10 mg Rectal DAILY PRN Umm Moran MD        acetaminophen (TYLENOL) tablet 650 mg  650 mg Oral Q4H PRN Umm Moran MD        Or    acetaminophen (TYLENOL) solution 650 mg  650 mg Per NG tube Q4H PRN Umm Moran MD        Or    acetaminophen (TYLENOL) suppository 650 mg  650 mg Rectal Q4H PRN Umm Moran MD        sodium chloride (NS) flush 5-10 mL  5-10 mL IntraVENous PRN Umm Moran MD        glucose chewable tablet 16 g  4 Tablet Oral PRN Umm Moran MD        dextrose (D50W) injection syrg 12.5-25 g  25-50 mL IntraVENous PRN Umm Moran MD        glucagon [facility]) injection 1 mg  1 mg IntraMUSCular PRN Umm Moran MD        aztreonam (AZACTAM) 2 g in sterile water (preservative free) 10 mL IV syringe  2 g IntraVENous Q8H Karly Nieves MD   2 g at 06/28/21 2339    insulin lispro (HUMALOG) injection   SubCUTAneous Q6H Karly Nieves MD        [facility]) 1,250 mg in 0.9% sodium chloride 250 mL (VIAL-MATE)  1,250 mg IntraVENous Q12H Karly Nieves MD   IV Completed at 06/29/21 0109     Current Outpatient Medications   Medication Sig Dispense Refill    doxycycline (ADOXA) 100 mg tablet Take 1 Tablet by mouth two (2) times a day for 7 days. 14 Tablet 0    bumetanide (BUMEX) 1 mg tablet Take 1 mg by mouth daily.  bumetanide (BUMEX) 1 mg tablet Take 0.5 mg by mouth every evening.  calcium citrate 200 mg (950 mg) tablet Take 200 mg by mouth daily.  predniSONE (DELTASONE) 2.5 mg tablet Take 2.5 mg by mouth every evening. Indications: RA      budesonide (Pulmicort) 0.5 mg/2 mL nbsp 500 mcg by Nebulization route daily.  eplerenone (INSPRA) 25 mg tablet Take 1 Tab by mouth Daily (before lunch). 90 Tab 1    metoprolol succinate (TOPROL-XL) 50 mg XL tablet Take 1 Tab by mouth daily. 90 Tab 3    nitroglycerin (NITRODUR) 0.1 mg/hr 1 Patch by TransDERmal route daily. 90 Patch 3    lidocaine (Lidoderm) 5 % 1 Patch by TransDERmal route every twenty-four (24) hours. Apply patch to the affected area for 12 hours a day and remove for 12 hours a day. 1 Each 0    acetaminophen (TYLENOL) 650 mg TbER Take 1,300 mg by mouth nightly as needed (sleep).  co-enzyme Q-10 (Co Q-10) 100 mg capsule Take 200 mg by mouth daily.  artificial tears, dextran 70-hypromellose, (GenTeal Tears Mild) 0.1-0.3 % ophthalmic solution Administer 1 Drop to both eyes daily.  carboxymethylcell/hypromellose (GENTEAL GEL OP) Administer 1 Drop to both eyes nightly.  omega 3-DHA-EPA-fish oil 1,000 mg (120 mg-180 mg) capsule Take 1 Cap by mouth every evening.  levalbuterol (XOPENEX) 0.63 mg/3 mL nebu 0.63 mg by Nebulization route two (2) times a day.       potassium 99 mg tablet Take 99 mg by mouth daily.  insulin glargine (LANTUS,BASAGLAR) 100 unit/mL (3 mL) inpn 12 Units by SubCUTAneous route nightly.  fenofibrate nanocrystallized (Tricor) 48 mg tablet Take 48 mg by mouth nightly.  levothyroxine (SYNTHROID) 137 mcg tablet Take 137 mcg by mouth Daily (before breakfast).  predniSONE (DELTASONE) 5 mg tablet Take 5 mg by mouth daily. Indications: RA      cholecalciferol (Vitamin D3) (1000 Units /25 mcg) tablet Take 1,000 Units by mouth daily.  turmeric (CURCUMIN) Take 1 g by mouth every evening.  SITagliptin (JANUVIA) 100 mg tablet Take 100 mg by mouth daily (with dinner).  vit C/vit E ac/lut/copper/zinc (PRESERVISION LUTEIN PO) Take 1 Tablet by mouth daily (with dinner).  ascorbic acid (VITAMIN C) 500 mg tablet Take 500 mg by mouth daily.  ferrous sulfate 325 mg (65 mg iron) tablet Take 325 mg by mouth daily (with lunch).  cyanocobalamin (VITAMIN B12) 1,000 mcg/mL injection INJECT 1 ML INTO THE MUSCLE EVERY 30 DAYS 10 mL 0    lansoprazole (PREVACID) 30 mg capsule Take 30 mg by mouth daily.              Past Medical History:   Diagnosis Date    Adverse effect of anesthesia     slow to wake up    Arthritis     Asthma 6/29/2009    CAUSED BY MOLD    Atrial fibrillation (HCC) 6/29/2009    Atrial flutter ([address]     CAD (coronary artery disease) 06/29/2009    Celiac disease 2010    Chronic chest pain     Chronic obstructive pulmonary disease ([address] 2004-5    right leg    Chronic pain of right ankle     Diabetes ([address]     Drug induced prednisone, DM2    Diastolic heart failure ([address]     DVT (deep venous thrombosis) ([address] 2004    Right leg post surgery    GERD (gastroesophageal reflux disease)     Gluten intolerance     Heart failure ([address]     Hypertension     Hypothyroidism 6/29/2009    Iron deficiency anemia     Lyme disease     Pacemaker     11/20    Personal history of MI (myocardial infarction)     Rheumatoid arthritis (HCC)     S/P AV jules ablation     11/20     S/P left atrial appendage ligation     SUHAS clip 10/20     Slow to wake up after anesthesia     Syncope     Post testing- resolved    TIA (transient ischemic attack) 2004 & 2006    Vitamin B12 deficiency 6/29/2009       Past Surgical History:   Procedure Laterality Date    HX ADENOIDECTOMY      HX AFIB ABLATION      HX APPENDECTOMY      HX CHOLECYSTECTOMY  6/16/11    HX COLONOSCOPY      HX CORONARY STENT PLACEMENT      x 2    HX HEART CATHETERIZATION  2010    2 STENTS    HX HEART CATHETERIZATION  03/2020    HX HYSTERECTOMY      HX LUMBAR LAMINECTOMY  2000    L4-L5    HX ORTHOPAEDIC  1993-6/2012    RT. FOOT SURGERY X 6/calcaneal osteotomy    HX ORTHOPAEDIC Right 09/19/2020    right hand [facility] joint replacement    HX TONSILLECTOMY  1964    TN ICAR CATHETER ABLATION ATRIOVENTR NODE FUNCTION N/A 11/19/2020    ABLATION AV NODE performed by Paul Grant MD at [place] [address], [facility] [address] CATH LAB    TN INS NEW/RPLCMT PRM PM W/TRANSV ELTRD ATRIAL&VENT N/A 11/19/2020    INSERT PPM DUAL performed by Paul Grant MD at [place] [address], [facility] [address] CATH LAB    TN INTRACARDIAC ELECTROPHYSIOLOGIC 3D MAPPING N/A 11/19/2020    Ep 3d Mapping performed by [facility] Grant MD at [facility]       family history includes Breast Cancer in her cousin, cousin, cousin, maternal aunt, maternal aunt, and maternal aunt; Cancer in her father; Coronary Artery Disease in her mother; Delayed Awakening in her mother and sister; Heart Disease in her mother; Hypertension in her brother, father, mother, and sister; Lung Disease in her father; Stroke in her mother. reports that she quit smoking about 19 years ago. Her smoking use included cigarettes. She has a 30.00 pack-year smoking history. She has never used smokeless tobacco. She reports that she does not drink alcohol and does not use drugs.            Lab Results (last 24 hrs)  Recent Results (from the past 24 hour(s))   CBC WITH AUTOMATED DIFF    Collection Time: 06/28/21  7:45 PM   Result Value Ref Range    WBC 25.2 (H) 3.6 - 11.0 K/uL    RBC 4.85 3.80 - 5.20 M/uL    HGB 15.0 11.5 - 16.0 g/dL    HCT 47.3 (H) 35.0 - 47.0 %    MCV 97.5 80.0 - 99.0 FL    MCH 30.9 26.0 - 34.0 PG    MCHC 31.7 30.0 - 36.5 g/dL    RDW 12.8 11.5 - 14.5 %    PLATELET 555 572 - 972 K/uL    MPV 10.5 8.9 - 12.9 FL    NRBC 0.0 0  WBC    ABSOLUTE NRBC 0.00 0.00 - 0.01 K/uL    NEUTROPHILS 69 32 - 75 %    BAND NEUTROPHILS 19 (H) 0 - 6 %    LYMPHOCYTES 4 (L) 12 - 49 %    MONOCYTES 8 5 - 13 %    EOSINOPHILS 0 0 - 7 %    BASOPHILS 0 0 - 1 %    IMMATURE GRANULOCYTES 0 %    ABS. NEUTROPHILS 22.2 (H) 1.8 - 8.0 K/UL    ABS. LYMPHOCYTES 1.0 0.8 - 3.5 K/UL    ABS. MONOCYTES 2.0 (H) 0.0 - 1.0 K/UL    ABS. EOSINOPHILS 0.0 0.0 - 0.4 K/UL    ABS. BASOPHILS 0.0 0.0 - 0.1 K/UL    ABS. IMM. GRANS. 0.0 K/UL    DF MANUAL      RBC COMMENTS NORMOCYTIC, NORMOCHROMIC      WBC COMMENTS TOXIC GRANULATION     METABOLIC PANEL, COMPREHENSIVE    Collection Time: 06/28/21  7:45 PM   Result Value Ref Range    Sodium 141 136 - 145 mmol/L    Potassium 4.1 3.5 - 5.1 mmol/L    Chloride 105 97 - 108 mmol/L    CO2 27 21 - 32 mmol/L    Anion gap 9 5 - 15 mmol/L    Glucose 198 (H) 65 - 100 mg/dL    BUN 18 6 - 20 MG/DL    Creatinine 0.90 0.55 - 1.02 MG/DL    BUN/Creatinine ratio 20 12 - 20      GFR est AA >60 >60 ml/min/1.73m2    GFR est non-AA >60 >60 ml/min/1.73m2    Calcium 9.0 8.5 - 10.1 MG/DL    Bilirubin, total 0.5 0.2 - 1.0 MG/DL    ALT (SGPT) 24 12 - 78 U/L    AST (SGOT) 25 15 - 37 U/L    Alk.  phosphatase 80 45 - 117 U/L    Protein, total 6.7 6.4 - 8.2 g/dL    Albumin 3.4 (L) 3.5 - 5.0 g/dL    Globulin 3.3 2.0 - 4.0 g/dL    A-G Ratio 1.0 (L) 1.1 - 2.2     PROTHROMBIN TIME + INR    Collection Time: 06/28/21  7:45 PM   Result Value Ref Range    INR 1.0 0.9 - 1.1      Prothrombin time 10.8 9.0 - 11.1 sec   SAMPLES BEING HELD    Collection Time: 06/28/21  7:45 PM   Result Value Ref Range    SAMPLES BEING HELD 1SST,1RED     COMMENT Add-on orders for these samples will be processed based on acceptable specimen integrity and analyte stability, which may vary by analyte.    EKG, 12 LEAD, INITIAL    Collection Time: 06/28/21  7:59 PM   Result Value Ref Range    Ventricular Rate 94 BPM    Atrial Rate 94 BPM    P-R Interval 156 ms    QRS Duration 172 ms    Q-T Interval 446 ms    QTC Calculation (Bezet) 557 ms    Calculated P Axis 65 degrees    Calculated R Axis 137 degrees    Calculated T Axis 90 degrees    Diagnosis        Suspect unspecified pacemaker failure  Atrial-sensed ventricular-paced rhythm  Abnormal ECG  When compared with ECG of 14-NOV-2020 13:11,  Electronic ventricular pacemaker has replaced Sinus rhythm     CULTURE, BLOOD    Collection Time: 06/28/21 11:17 PM    Specimen: Blood   Result Value Ref Range    Special Requests: NO SPECIAL REQUESTS      Culture result: NO GROWTH AFTER 4 HOURS     LACTIC ACID    Collection Time: 06/28/21 11:17 PM   Result Value Ref Range    Lactic acid 2.5 (HH) 0.4 - 2.0 MMOL/L   CULTURE, BLOOD    Collection Time: 06/28/21 11:17 PM    Specimen: Blood   Result Value Ref Range    Special Requests: NO SPECIAL REQUESTS      Culture result: NO GROWTH AFTER 4 HOURS     GLUCOSE, POC    Collection Time: 06/28/21 11:47 PM   Result Value Ref Range    Glucose (POC) 185 (H) 65 - 117 mg/dL    Performed by Phyu Ei Ei    URINALYSIS W/ RFLX MICROSCOPIC    Collection Time: 06/29/21  2:33 AM   Result Value Ref Range    Color YELLOW/STRAW      Appearance CLEAR CLEAR      Specific gravity <1.005 1.003 - 1.030    pH (UA) 6.0 5.0 - 8.0      Protein Negative NEG mg/dL    Glucose Negative NEG mg/dL    Ketone Negative NEG mg/dL    Bilirubin Negative NEG      Blood Negative NEG      Urobilinogen 0.2 0.2 - 1.0 EU/dL    Nitrites Negative NEG      Leukocyte Esterase Negative NEG     CBC W/O DIFF    Collection Time: 06/29/21  2:33 AM   Result Value Ref Range    WBC 22.0 (H) 3.6 - 11.0 K/uL    RBC 3.91 3.80 - 5.20 M/uL    HGB 12.1 11.5 - 16.0 g/dL    HCT 38.6 35.0 - 47.0 %    MCV 98.7 80.0 - 99.0 FL    MCH 30.9 26.0 - 34.0 PG    MCHC 31.3 30.0 - 36.5 g/dL    RDW 13.2 11.5 - 14.5 %    PLATELET 338 529 - 573 K/uL    MPV 10.8 8.9 - 12.9 FL    NRBC 0.0 0  WBC    ABSOLUTE NRBC 0.00 0.00 - 0.01 K/uL   PROTHROMBIN TIME + INR    Collection Time: 06/29/21  2:33 AM   Result Value Ref Range    INR 1.1 0.9 - 1.1      Prothrombin time 11.0 9.0 - 11.1 sec   PTT    Collection Time: 06/29/21  2:33 AM   Result Value Ref Range    aPTT 22.2 22.1 - 31.0 sec    aPTT, therapeutic range     58.0 - 77.0 SECS   TSH 3RD GENERATION    Collection Time: 06/29/21  2:33 AM   Result Value Ref Range    TSH 0.10 (L) 0.36 - 3.82 uIU/mL   METABOLIC PANEL, BASIC    Collection Time: 06/29/21  2:33 AM   Result Value Ref Range    Sodium 146 (H) 136 - 145 mmol/L    Potassium 3.9 3.5 - 5.1 mmol/L    Chloride 114 (H) 97 - 108 mmol/L    CO2 26 21 - 32 mmol/L    Anion gap 6 5 - 15 mmol/L    Glucose 148 (H) 65 - 100 mg/dL    BUN 15 6 - 20 MG/DL    Creatinine 0.63 0.55 - 1.02 MG/DL    BUN/Creatinine ratio 24 (H) 12 - 20      GFR est AA >60 >60 ml/min/1.73m2    GFR est non-AA >60 >60 ml/min/1.73m2    Calcium 7.5 (L) 8.5 - 10.1 MG/DL   MAGNESIUM    Collection Time: 06/29/21  2:33 AM   Result Value Ref Range    Magnesium 1.7 1.6 - 2.4 mg/dL   PHOSPHORUS    Collection Time: 06/29/21  2:33 AM   Result Value Ref Range    Phosphorus 3.1 2.6 - 4.7 MG/DL   SAMPLES BEING HELD    Collection Time: 06/29/21  2:33 AM   Result Value Ref Range    SAMPLES BEING HELD uc tube(luis miguel)     COMMENT        Add-on orders for these samples will be processed based on acceptable specimen integrity and analyte stability, which may vary by analyte.    LACTIC ACID    Collection Time: 06/29/21  2:37 AM   Result Value Ref Range    Lactic acid 1.6 0.4 - 2.0 MMOL/L   GLUCOSE, POC    Collection Time: 06/29/21  6:48 AM   Result Value Ref Range    Glucose (POC) 141 (H) 65 - 117 mg/dL    Performed by Noemy Seymour

## 2021-06-29 NOTE — PROGRESS NOTES
[facility] Pharmacy Dosing Services: Antimicrobial Stewardship Progress Note    Consult for antibiotic dosing of Vancomycin by Dr. Yudith Pacheco  Pharmacist reviewed antibiotic appropriateness for 66year old , female  for indication of sepsis  Day of Therapy 1    Plan:  Vancomycin therapy:  Start Vancomycin therapy, with loading dose of received 1 gram earlier today pre-procedure ~1300  Follow with maintenance dose of 1.25 grams every 12 hours  Dose calculated to approximate a therapeutic trough of ~15 -20 mcg/mL. Last trough level / Plan for level: Prior to 4th dose  Pharmacy to follow daily and will make changes to dose and/or frequency based on clinical status. Other Antimicrobial  (not dosed by pharmacist)   Aztreonam 2 grams every 8 hours   Cultures     pending   Serum Creatinine     Lab Results   Component Value Date/Time    Creatinine 0.90 06/28/2021 07:45 PM    Creatinine (POC) 0.7 02/25/2021 04:18 PM       Creatinine Clearance Estimated Creatinine Clearance: 53.6 mL/min (based on SCr of 0.9 mg/dL).      Procalcitonin    Lab Results   Component Value Date/Time    Procalcitonin <0.05 11/13/2020 11:50 AM        WBC   Lab Results   Component Value Date/Time    WBC 25.2 (H) 06/28/2021 07:45 PM           Pharmacist: Reid Brooks Contact information:

## 2021-06-29 NOTE — PROGRESS NOTES
Admission Medication Reconciliation:     Information obtained from:    -Spouse due to patient's AMS on presentation to ED  -Spouse provided medication history by reviewing current home medication list.  Was aware of most meds and doses. -Patient with significant allergy history; Upon review, spouse noted allergies to Quinolones, Tetracylines, and Warfarin which were not on previous allergy list.  He could not recall reaction to these meds. Updated allergy list and notified Dr. Isabella Ceron as Doxycycline has been ordered. RxQuery data available¹: Yes      Comments/Recommendations:   Spouse confirmed wife's name, , allergies, and preferred pharmacy  Updated PTA medication list     ¹RxQuery pharmacy benefit data reflects medications filled and processed through the patient's insurance, however this data does NOT capture whether the medication was picked up or is currently being taken by the patient. Facility-Administered Medications:       Prior to Admission Medications   Prescriptions Last Dose Informant Taking? SITagliptin (JANUVIA) 100 mg tablet 2021 at 0800 Significant Other Yes   Sig: Take 100 mg by mouth daily (with dinner). acetaminophen (TYLENOL) 650 mg TbER 2021 at Unknown time Significant Other Yes   Sig: Take 1,300 mg by mouth nightly as needed (sleep). aclidinium bromide (Tudorza Pressair) 400 mcg/actuation inhaler 2021 at am Significant Other Yes   Sig: Take 1 Puff by inhalation two (2) times a day. artificial tears, dextran 70-hypromellose, (GenTeal Tears Mild) 0.1-0.3 % ophthalmic solution 2021 at 0800 Significant Other Yes   Sig: Administer 1 Drop to both eyes daily. ascorbic acid (VITAMIN C) 500 mg tablet 2021 at 0800 Significant Other Yes   Sig: Take 500 mg by mouth daily. bumetanide (BUMEX) 1 mg tablet 2021 at 0800 Significant Other Yes   Sig: Take 1 mg by mouth two (2) times a day.    calcium citrate 200 mg (950 mg) tablet 2021 at 0800 Significant Other Yes   Sig: Take 200 mg by mouth daily. carboxymethylcell/hypromellose (GENTEAL GEL OP)  Significant Other Yes   Sig: Administer 1 Drop to both eyes nightly. cholecalciferol (Vitamin D3) (1000 Units /25 mcg) tablet 2021 at 0800 Significant Other Yes   Sig: Take 1,000 Units by mouth daily. co-enzyme Q-10 (Co Q-10) 100 mg capsule 2021 at 0800 Significant Other Yes   Sig: Take 200 mg by mouth daily. cyanocobalamin (VITAMIN B12) 1,000 mcg/mL injection  Significant Other Yes   Sig: INJECT 1 ML INTO THE MUSCLE EVERY 30 DAYS   eplerenone (INSPRA) 25 mg tablet 2021 at 0800 Significant Other Yes   Sig: Take 1 Tab by mouth Daily (before lunch). fenofibrate nanocrystallized (Tricor) 48 mg tablet  Significant Other Yes   Sig: Take 48 mg by mouth nightly. ferrous sulfate 325 mg (65 mg iron) tablet 2021 at 0800 Significant Other Yes   Sig: Take 325 mg by mouth daily (with lunch). insulin glargine (LANTUS,BASAGLAR) 100 unit/mL (3 mL) inpn  Significant Other Yes   Si Units by SubCUTAneous route nightly. lansoprazole (PREVACID) 30 mg capsule 2021 at 0800 Significant Other Yes   Sig: Take 30 mg by mouth daily. levalbuterol (XOPENEX) 0.63 mg/3 mL nebu 2021 at 0800 Significant Other Yes   Si.63 mg by Nebulization route two (2) times a day. levothyroxine (SYNTHROID) 137 mcg tablet 2021 at 0800 Significant Other Yes   Sig: Take 137 mcg by mouth Daily (before breakfast). lidocaine (Lidoderm) 5 % 2021 at Unknown time Significant Other Yes   Si Patch by TransDERmal route every twenty-four (24) hours. Apply patch to the affected area for 12 hours a day and remove for 12 hours a day. metoprolol succinate (TOPROL-XL) 50 mg XL tablet 2021 at 0800 Significant Other Yes   Sig: Take 1 Tab by mouth daily.    mometasone (Asmanex HFA) 200 mcg/actuation HFAA inhaler 2021 at am Significant Other Yes   Sig: Take 1 Puff by inhalation two (2) times a day.   nitroglycerin (NITRODUR) 0.1 mg/hr 2021 at 0800 Significant Other Yes   Si Patch by TransDERmal route daily. omega 3-DHA-EPA-fish oil 1,000 mg (120 mg-180 mg) capsule  Significant Other Yes   Sig: Take 1 Cap by mouth every evening. potassium 99 mg tablet 2021 at 0800 Significant Other Yes   Sig: Take 99 mg by mouth daily. predniSONE (DELTASONE) 2.5 mg tablet  at 1700 Significant Other Yes   Sig: Take 2.5 mg by mouth every evening. Indications: RA   predniSONE (DELTASONE) 5 mg tablet 2021 at 0800 Significant Other Yes   Sig: Take 5 mg by mouth daily. Indications: RA   turmeric (CURCUMIN) 2021 at 0800 Significant Other Yes   Sig: Take 1 g by mouth every evening. vit C/vit E ac/lut/copper/zinc (PRESERVISION LUTEIN PO)  at 1700 Significant Other Yes   Sig: Take 1 Tablet by mouth daily (with dinner). Facility-Administered Medications: None          Please contact the main inpatient pharmacy with any questions or concerns at [phone] and we will direct you to the clinical pharmacist covering this patient's care while in-house. Trever Padron Pharm. D.

## 2021-06-29 NOTE — PROGRESS NOTES
HISTORY OF PRESENTING ILLNESS      Cheikh Suhkla is a 66 y.o. female with hx of paroxysmal arial fibrillation (s/p ablation x10 years ago with Dr. Ping Saha), previous history of thrombocytopenia, hypertension, COPD, heart failure preserved EF, CAD. She underwent a left atrial appendage ligation on 10/20/2020 by Dr. Anastacio Guillen, s/p PPM and AV jules ablation who underwent upgrade to CRT-P yesterday due to depressed EF of 45%. Upon arrival at home, her  reported continued somnolence, left sided weakness. She returned to ER under code stroke alert and has been admitted for observation. Neurology is following, CTA negative aside from mild decreased perfusion to the right temporal lobe. Her left subclavian PPM site is wnl, dressing is still intact, no ecchymosis or edema/ drainage. Per her , she is more alert at this time. She is answering questions appropriately. Requesting tylenol for mild pain at [facility] site.  Reports that she has had this type of reaction to sedation in the past.          PAST MEDICAL HISTORY     Past Medical History:   Diagnosis Date    Adverse effect of anesthesia     slow to wake up    Arthritis     Asthma 6/29/2009    CAUSED BY MOLD    Atrial fibrillation ([address] 6/29/2009    Atrial flutter ([address]     CAD (coronary artery disease) 06/29/2009    Celiac disease 2010    Chronic chest pain     Chronic obstructive pulmonary disease ([address] 2004-5    right leg    Chronic pain of right ankle     Diabetes ([address]     Drug induced prednisone, DM2    Diastolic heart failure ([address]     DVT (deep venous thrombosis) ([address] 2004    Right leg post surgery    GERD (gastroesophageal reflux disease)     Gluten intolerance     Heart failure ([address]     Hypertension     Hypothyroidism 6/29/2009    Iron deficiency anemia     Lyme disease     Pacemaker     11/20    Personal history of MI (myocardial infarction)     Rheumatoid arthritis ([address]     S/P AV jules ablation     11/20     S/P left atrial appendage ligation     SUHAS clip 10/20     Slow to wake up after anesthesia     Syncope     Post testing- resolved    TIA (transient ischemic attack) 2004 & 2006    Vitamin B12 deficiency 6/29/2009           PAST SURGICAL HISTORY     Past Surgical History:   Procedure Laterality Date    HX ADENOIDECTOMY      HX AFIB ABLATION      HX APPENDECTOMY      HX CHOLECYSTECTOMY  6/16/11    HX COLONOSCOPY      HX CORONARY STENT PLACEMENT      x 2    HX HEART CATHETERIZATION  2010    2 STENTS    HX HEART CATHETERIZATION  03/2020    HX HYSTERECTOMY      HX LUMBAR LAMINECTOMY  2000    L4-L5    HX ORTHOPAEDIC  1993-6/2012    RT.  FOOT SURGERY X 6/calcaneal osteotomy    HX ORTHOPAEDIC Right 09/19/2020    right hand [facility] joint replacement    HX TONSILLECTOMY  1964    IL ICAR CATHETER ABLATION ATRIOVENTR NODE FUNCTION N/A 11/19/2020    ABLATION AV NODE performed by Bijan Olivia MD at [address] CATH LAB    IL INS NEW/RPLCMT PRM PM W/TRANSV ELTRD ATRIAL&VENT N/A 11/19/2020    INSERT PPM DUAL performed by Bijan Olivia MD at [address] CATH LAB    IL INTRACARDIAC ELECTROPHYSIOLOGIC 3D MAPPING N/A 11/19/2020    Ep 3d Mapping performed by Bijan Olivia MD at [address] CATH LAB          ALLERGIES     Allergies   Allergen Reactions    Butter Flavor Anaphylaxis     Butter AND CREME    Keflex [Cephalexin] Anaphylaxis    Milk Containing Products Anaphylaxis     Butter and cream    Pcn [Penicillins] Anaphylaxis    Aspirin Hives and Other (comments)     \"it makes my stomach bleed\"      Cimzia [Certolizumab Pegol] Other (comments)     GI symptoms, blisters in the mouth and esophagus    Clopidogrel Other (comments)     GI bleed    Demerol [Meperidine] Other (comments)     HYPOTENSION    Fentanyl Other (comments)     HYPOTENSION    Ibuprofen Diarrhea     Patient reports that ibuprofen caused diarrhea    Morphine Other (comments)     HYPOTENSION    Nitroglycerin Other (comments)     IN PILL FORM - HYPOTENSION  CAN TOLERATE PATCH FOR SHORT TIME    Sulfa (Sulfonamide Antibiotics) Angioedema     Lips    Tapazole [Methimazole] Unknown (comments)     Patient stated \"it made me feel like I had the flu\"    Tramadol Other (comments)     Patient reports thrush with tramadol use    Adhesive Tape-Silicones Other (comments)     BAD BLISTERS AND TENDS TO GET INFECTED    Albuterol Palpitations    Gluten Diarrhea     bloating    Oxycodone Other (comments)     Hallicination and hypotension          FAMILY HISTORY     Family History   Problem Relation Age of Onset    Delayed Awakening Mother     Heart Disease Mother     Coronary Artery Disease Mother     Hypertension Mother     Stroke Mother     Delayed Awakening Sister     Hypertension Sister     Lung Disease Father     Cancer Father         colon    Hypertension Father     Hypertension Brother     Breast Cancer Maternal Aunt     Breast Cancer Maternal Aunt     Breast Cancer Cousin         [facility] Breast Cancer Maternal Aunt     Breast Cancer Cousin         maternal 1st cousin   [facility] Breast Cancer Cousin         maternal 1st cousin    Deep Vein Thrombosis Neg Hx     Anesth Problems Neg Hx     negative for cardiac disease       SOCIAL HISTORY     Social History     Socioeconomic History    Marital status:      Spouse name: Not on file    Number of children: Not on file    Years of education: Not on file    Highest education level: Not on file   Tobacco Use    Smoking status: Former Smoker     Packs/day: 1.00     Years: 30.00     Pack years: 30.00     Types: Cigarettes     Quit date: 2002     Years since quittin.0    Smokeless tobacco: Never Used   Substance and Sexual Activity    Alcohol use: No    Drug use: Never    Sexual activity: Yes     Partners: Male     Social Determinants of Health     Financial Resource Strain:     Difficulty of Paying Living Expenses:    Food Insecurity:     Worried About Running Out of Food in the Last Year:    [address] in the Last Year:    Transportation Needs:     Lack of Transportation (Medical):  Lack of Transportation (Non-Medical):    Physical Activity:     Days of Exercise per Week:     Minutes of Exercise per Session:    Stress:     Feeling of Stress :    Social Connections:     Frequency of Communication with Friends and Family:     Frequency of Social Gatherings with Friends and Family:     Attends Protestant Services:     Active Member of Clubs or Organizations:     Attends Club or Organization Meetings:     Marital Status:          MEDICATIONS     Current Facility-Administered Medications   Medication Dose Route Frequency    artificial tears (dextran 70-hypromellose) (GENTEAL TEARS MILD) 0.1-0.3 % ophthalmic solution 1 Drop  1 Drop Both Eyes DAILY    ascorbic acid (vitamin C) (VITAMIN C) tablet 500 mg  500 mg Oral DAILY    budesonide (PULMICORT) 500 mcg/2 ml nebulizer suspension  500 mcg Nebulization DAILY    . PHARMACY TO SUBSTITUTE PER PROTOCOL (Reordered from: carboxymethylcell/hypromellose (GENTEAL GEL OP))    Per Protocol    doxycycline (MONODOX) capsule 100 mg  100 mg Oral Q12H    eplerenone (INSPRA) tablet 25 mg  25 mg Oral ACL    . PHARMACY TO SUBSTITUTE PER PROTOCOL (Reordered from: insulin glargine (LANTUS,BASAGLAR) 100 unit/mL (3 mL) inpn)    Per Protocol    pantoprazole (PROTONIX) tablet 40 mg  40 mg Oral ACB    levothyroxine (SYNTHROID) tablet 137 mcg  137 mcg Oral ACB    metoprolol succinate (TOPROL-XL) XL tablet 50 mg  50 mg Oral DAILY    nitroglycerin (NITRODUR) 0.1 mg/hr patch 1 Patch  1 Patch TransDERmal DAILY    predniSONE (DELTASONE) tablet 2.5 mg  2.5 mg Oral QPM    predniSONE (DELTASONE) tablet 5 mg  5 mg Oral DAILY    alogliptin (NESINA) tablet 12.5 mg  12.5 mg Oral DAILY    0.9% sodium chloride infusion  125 mL/hr IntraVENous CONTINUOUS    ondansetron (ZOFRAN) injection 4 mg  4 mg IntraVENous Q6H PRN    atorvastatin (LIPITOR) tablet 80 mg  80 mg Oral QHS    labetaloL (NORMODYNE;TRANDATE) 20 mg/4 mL (5 mg/mL) injection 5 mg  5 mg IntraVENous Q10MIN PRN    bisacodyL (DULCOLAX) suppository 10 mg  10 mg Rectal DAILY PRN    acetaminophen (TYLENOL) tablet 650 mg  650 mg Oral Q4H PRN    sodium chloride (NS) flush 5-10 mL  5-10 mL IntraVENous PRN    glucose chewable tablet 16 g  4 Tablet Oral PRN    dextrose (D50W) injection syrg 12.5-25 g  25-50 mL IntraVENous PRN    glucagon (GLUCAGEN) injection 1 mg  1 mg IntraMUSCular PRN    aztreonam (AZACTAM) 2 g in sterile water (preservative free) 10 mL IV syringe  2 g IntraVENous Q8H    insulin lispro (HUMALOG) injection   SubCUTAneous Q6H    vancomycin (VANCOCIN) 1,250 mg in 0.9% sodium chloride 250 mL (VIAL-MATE)  1,250 mg IntraVENous Q12H       I have reviewed the nurses notes, vitals, problem list, allergy list, medical history, family, social history and medications. REVIEW OF SYMPTOMS      General: +left sided weakness, Pt denies excessive weight gain or loss. Pt is able to conduct ADL's  HEENT: Denies blurred vision, headaches, hearing loss, epistaxis and difficulty swallowing. Respiratory: Denies cough, congestion, shortness of breath, CHACON, wheezing or stridor. Cardiovascular: Denies precordial pain, palpitations, edema or PND  Gastrointestinal: Denies poor appetite, indigestion, abdominal pain or blood in stool  Genitourinary: Denies hematuria, dysuria, increased urinary frequency  Musculoskeletal: Denies joint pain or swelling from muscles or joints  Neurologic: Denies tremor, paresthesias, headache, or sensory motor disturbance  Psychiatric: Denies confusion, insomnia, depression  Integumentray: Denies rash, itching or ulcers. Hematologic: Denies easy bruising, bleeding       PHYSICAL EXAMINATION      Vitals: see vitals section  General: Well developed, in no acute distress.   HEENT: No jaundice, oral mucosa moist, no oral ulcers  Neck: Supple, no stiffness, no lymphadenopathy, supple  Heart:  Normal S1/S2 negative S3 or S4. Regular, no murmur, gallop or rub, no jugular venous distention  Respiratory: Clear bilaterally x 4, no wheezing or rales  Abdomen:   Soft, non-tender, bowel sounds are active. Extremities:  No edema, normal cap refill, no cyanosis. Musculoskeletal: No clubbing, no deformities  Neuro: A&Ox3, speech clear, gait stable, cooperative, no focal neurologic deficits  Skin: Skin color is normal. No rashes or lesions. Non diaphoretic, moist.  Vascular: 2+ pulses symmetric in all extremities       DIAGNOSTIC DATA      EKG:        LABORATORY DATA      Lab Results   Component Value Date/Time    WBC 22.0 (H) 06/29/2021 02:33 AM    Hemoglobin (POC) 15.6 06/09/2014 03:00 AM    HGB 12.1 06/29/2021 02:33 AM    Hematocrit (POC) 49 (H) 11/05/2020 09:33 PM    HCT 38.6 06/29/2021 02:33 AM    PLATELET 338 10/79/4735 02:33 AM    MCV 98.7 06/29/2021 02:33 AM      Lab Results   Component Value Date/Time    Sodium 146 (H) 06/29/2021 02:33 AM    Potassium 3.9 06/29/2021 02:33 AM    Chloride 114 (H) 06/29/2021 02:33 AM    CO2 26 06/29/2021 02:33 AM    Anion gap 6 06/29/2021 02:33 AM    Glucose 148 (H) 06/29/2021 02:33 AM    BUN 15 06/29/2021 02:33 AM    Creatinine 0.63 06/29/2021 02:33 AM    BUN/Creatinine ratio 24 (H) 06/29/2021 02:33 AM    GFR est AA >60 06/29/2021 02:33 AM    GFR est non-AA >60 06/29/2021 02:33 AM    Calcium 7.5 (L) 06/29/2021 02:33 AM    Bilirubin, total 0.5 06/28/2021 07:45 PM    Alk. phosphatase 80 06/28/2021 07:45 PM    Protein, total 6.7 06/28/2021 07:45 PM    Albumin 3.4 (L) 06/28/2021 07:45 PM    Globulin 3.3 06/28/2021 07:45 PM    A-G Ratio 1.0 (L) 06/28/2021 07:45 PM    ALT (SGPT) 24 06/28/2021 07:45 PM           ASSESSMENT      1. Atrial fibrillation with RVR              Paroxysmal                       S/p SUHAS Clip- no anticoagulation  2. Pacemaker   Upgrade to BIV PPM on 6/28/2021  3. CAD               S/p PCI to LAD and RCA  4. PVCs  5. HTN  6. COPD/Asthma          PLAN     Echocardiogram has been ordered already, will evaluate for effusion. [employer] to come perform device check, will make any adjustments necessary. Follow up in clinic as planned if Echo and device interrogation unrevealing. Joleen Herrmann NP    Cardiac Electrophysiology / Cardiology    [address] [facility] [address]  [address] [facility] [place] [facility], [address] [place] [address], [address] [place] [address]    Richard Burns  [phone] / [phone] Fax   [phone] / [phone] Fax

## 2021-06-30 ENCOUNTER — PATIENT OUTREACH (OUTPATIENT)
Dept: CASE MANAGEMENT | Age: 71
End: 2021-06-30

## 2021-06-30 LAB
ATRIAL RATE: 94 BPM
CALCULATED P AXIS, ECG09: 62 DEGREES
CALCULATED R AXIS, ECG10: 138 DEGREES
CALCULATED T AXIS, ECG11: 89 DEGREES
DIAGNOSIS, 93000: NORMAL
P-R INTERVAL, ECG05: 146 MS
Q-T INTERVAL, ECG07: 452 MS
QRS DURATION, ECG06: 174 MS
QTC CALCULATION (BEZET), ECG08: 565 MS
VENTRICULAR RATE, ECG03: 94 BPM

## 2021-06-30 NOTE — PROGRESS NOTES
Care Transitions Initial Call    Call within 2 business days of discharge: Yes     Patient: Chauncey Felipe Patient : 1950 MRN: [id number]    Last Discharge [address]  Facility       Complaint Diagnosis Description Type Department Provider    21 Altered mental status; Extremity Weakness Altered mental status, unspecified altered mental status type ED to Hosp-Admission (Discharged) (ADMIT) Kimber Kay MD; Jaylon Nowak, ... 21  Paroxysmal atrial fibrillation [facility]) Admission (Discharged) Jason Elizalde MD          Was this an external facility discharge? No     Challenges to be reviewed by the provider   Additional needs identified to be addressed with provider:   Spouse reports oral thrush, states contacted PCP, prescription sent to Rx       Method of communication with provider : none    Discussed COVID-19 related testing which was not done at this time. Fully vaccinated    Advance Care Planning:   Does patient have an Advance Directive: on file. Inpatient Readmission Risk score: Unplanned Readmit Risk Score: 32    Was this a readmission? yes   Patient stated reason for the admission: Planned PM implant/AMS    Patients top risk factors for readmission: medical condition-CHF, AMS   Interventions to address risk factors: Scheduled appointment with PCP-, Scheduled appointment with Specialist-, Obtained and reviewed discharge summary and/or continuity of care documents and Communication with home health agencies or other community services the patient is currently DR JACK [facility] Transition Nurse (CTN) contacted the spouse by telephone to perform post hospital discharge assessment. Verified name and  with family as identifiers. Provided introduction to self, and explanation of the CTN role. Spouse reports patient doing ok. Endorses weakness. Denies chest pain, sob, shoulder pain. Reports left arm in sling. No evidence of infection noted to procedure site. Reports oral thrush, has contacted PCP, will send prescription to Rx. Reviewed dc instructions    CTN reviewed discharge instructions, medical action plan and red flags with family who verbalized understanding. Were discharge instructions available to patient? yes. Reviewed appropriate site of care based on symptoms and resources available to patient including: PCP, Specialist and  [facility]. Family given an opportunity to ask questions and does not have any further questions or concerns at this time. The family agrees to contact the PCP office for questions related to their healthcare. Medication reconciliation was performed with family, who verbalizes understanding of administration of home medications. Advised obtaining a 90-day supply of all daily and as-needed medications. Referral to Pharm D needed: no     Home Health/Outpatient orders at discharge: home health care, PT and [address]: [facility]  Date of initial visit: 7/1/2021    Durable Medical Equipment ordered at discharge: None    Was patient discharged with a pulse oximeter? no. Discussed and confirmed pulse oximeter discharge instructions and when to notify provider or seek emergency care. Discussed follow-up appointments. If no appointment was previously scheduled, appointment scheduling offered: no. Is follow up appointment scheduled within 7 days of discharge? yes.    [facility] follow up appointment(s):   Future Appointments   Date Time Provider Inge Reaves   7/13/2021  1:40 PM PACEMAKER, [facility]AV[facility] BS AMB   7/13/2021  2:00 PM WOUND CHECKS, [facility] C[facility] BS AMB   8/3/2021  2:00 PM Cleo Lawler MD CAV[facility] BS AMB   8/31/2021  2:30 PM REMOTE1, [facility] CAVSF BS AMB   9/24/2021  1:00 PM PACEMAKER, STMAKSIM [facility] BS AMB   9/24/2021  1:20 PM Melanie Lewis MD CAV[facility] BS AMB   10/4/2021 10:15 AM [facility] 1 [facility] follow up appointment(s): 6/30 Dr. Traci Tucker (PCP)    Plan for follow-up call in 5-7 days based on severity of symptoms and risk factors. Plan for next call: symptom management-oral thrush and self management-s/p PM implant  CTN provided contact information for future needs. Goals Addressed                 This Visit's Progress     Attend follow up appointments on schedule          06/08/21  [facility] pulmonologist today   Recommended to double Bumex dose to 1 mg BID    06/30/21   Will attend follow up appt with Dr. Ladonna Rubalcava scheduled this afternoon         Prevent complications post hospitalization. 06/30/21   S/p PM upgrade   Will wear sling as instructed   Will monitor for s/sx of infection to procedure site   Will complete 7 day course of abx   Will not lift anything heavier than 10 pounds for 4 weeks with the affected arm.    Will not reach above your head with the affected arm for 4 weeks

## 2021-07-01 ENCOUNTER — TELEPHONE (OUTPATIENT)
Dept: CARDIOLOGY CLINIC | Age: 71
End: 2021-07-01

## 2021-07-01 NOTE — TELEPHONE ENCOUNTER
Received call from patient's , ID verified using two patient identifiers. Patient's  calling because her dressing from her pacemaker is coming up in one area. Advised him that they can apply some tape to secure the dressing to keep it clean and dry. If they would prefer she can come to the office to have a new dressing applied. Patient would prefer to stay home at this time. Patient verbalized understanding and will call with any other questions.       Future Appointments   Date Time Provider Inge Reaves   7/13/2021  1:40 PM PACEMAKERTAPANSF BS AMB   7/13/2021  2:00 PM WOUND CHECKS, [facility] CAVSF BS AMB   8/3/2021  2:00 PM MD CARMEN NevarezF BS AMB   9/24/2021  1:00 PM PACEMAKERTAPANF BS AMB   9/24/2021  1:20 PM MD CARMEN RubinF BS AMB   10/4/2021 10:15 AM [address]   12/28/2021  9:30 AM REMOTE1, [facility] CAVSF BS AMB

## 2021-07-04 LAB
BACTERIA SPEC CULT: NORMAL
BACTERIA SPEC CULT: NORMAL
SERVICE CMNT-IMP: NORMAL
SERVICE CMNT-IMP: NORMAL

## 2021-07-12 ENCOUNTER — PATIENT OUTREACH (OUTPATIENT)
Dept: CASE MANAGEMENT | Age: 71
End: 2021-07-12

## 2021-07-12 NOTE — PROGRESS NOTES
Called patient to follow up    Goals      Attend follow up appointments on schedule        06/08/21  [facility] pulmonologist today   Recommended to double Bumex dose to 1 mg BID    06/30/21   Will attend follow up appt with Dr. Lianne Borrero scheduled this afternoon    07/12/21   Will attend follow up appt with ENT to evaluate tongue scheduled 7/15   Will attend follow up appt with cards tomorrow 7/13         Maintains daily weight. 06/07/21   Reports three pound weight gain since yesterday   Current weight, 166 pounds   Will monitor weight daily and log results   Will elevate LE while sitting   Will monitor fluid intake    06/08/21   Current weight, 165 pounds   Reports mild LE edema, sob    07/12/21   Current weight 163 pounds   Average weight 163-178 pounds       Prevent complications post hospitalization. 06/30/21   S/p PM upgrade   Will wear sling as instructed   Will monitor for s/sx of infection to procedure site   Will complete 7 day course of abx   Will not lift anything heavier than 10 pounds for 4 weeks with the affected arm.  Will not reach above your head with the affected arm for 4 weeks    07/12/21   Completed abx   Denies pain to PM site   No evidence of infection noted         Reduce risk of CHF exacerbations and complications.  Understands and adheres to diet. 06/07/21   Reviewed low sodium diet   Reports using Ms. Valdivia for seasoning    07/12/21   Due to oral thrush, patient limited to jello and pudding

## 2021-07-13 ENCOUNTER — OFFICE VISIT (OUTPATIENT)
Dept: CARDIOLOGY CLINIC | Age: 71
End: 2021-07-13
Payer: MEDICARE

## 2021-07-13 DIAGNOSIS — Z95.0 BIVENTRICULAR CARDIAC PACEMAKER IN SITU: Primary | ICD-10-CM

## 2021-07-13 PROCEDURE — 93284 PRGRMG EVAL IMPLANTABLE DFB: CPT | Performed by: INTERNAL MEDICINE

## 2021-07-13 PROCEDURE — 93284 PRGRMG EVAL IMPLANTABLE DFB: CPT | Performed by: NURSE PRACTITIONER

## 2021-07-13 NOTE — PROGRESS NOTES
chargeable BIV pacer ck/thresholds & dressing removal 2 wks post implant    Pt. States she feels so much better & is not nearly as SOB since LV lead placement. Device functioning normally & appropriately with 90.4% effective adaptive BIV pacing. Adaptive offset 50ms LV -->RV. Battery longevity 2.8 years - shortened due to elevated LV thresholds since implant. LV vector express testing completed - very few options for improved LV threshold. She states she has occasional \"thumping\" in certain positions but not often. Changes this session - reduced LV op from 6.0V to 4.75V (1V safety margin as threshold was 3.75V @ 1.0ms). Encouraged her to let us know prior to next scheduled 3 mo visit if she feels worse so we may reassess LV lead function. Patient presents for wound check post-device implantation. The dressing was removed and the site was inspected. The site appeared to be well-healing without ecchymosis or tenderness. Some tape burn present to superior, medial portion above incision and skin redness around remaining area. Denies pain, fevers, discharge. Post implant reportables & restrictions reviewed. Future Appointments   Date Time Provider Inge Reaves   8/3/2021  2:00 PM MD TULIO Funes BS AMB   9/24/2021  1:00 PM PACEMAKERTAPAN AMB   9/24/2021  1:20 PM MD TULIO Bardales BS AMB   10/4/2021 10:15 AM [address]   12/28/2021  9:30 AM [address] CARMENF BS AMB         Continue follow up in device clinic as planned.

## 2021-07-15 ENCOUNTER — TELEPHONE (OUTPATIENT)
Dept: CARDIOLOGY CLINIC | Age: 71
End: 2021-07-15

## 2021-07-15 DIAGNOSIS — R06.02 SHORTNESS OF BREATH: Primary | ICD-10-CM

## 2021-07-15 NOTE — TELEPHONE ENCOUNTER
Received call from patient, ID verified using two patient identifiers. Patient calling because she isn't feeling very well. She is complaining of dizziness, fatigue, SOB and nausea. Her BP is goodHer device settings were just changed on 7/13/21 in the device clinic and she is worried that may be cause her symptoms. Call forwarded to Cooper Muller RN in the device clinic.

## 2021-07-15 NOTE — TELEPHONE ENCOUNTER
Spoke with Ms. Amena Cox,    She agrees to come in tomorrow, Friday, 7/16/21, for LV assessment. Last clinic visit LV noted to have high threshold at 3.75V. Came into office with LV OP of 6.0V. Lowered to 4.75V. However, given she was feeling so much better & less SOB with proper LV function, I suspect LV pacing OP needs to be raised again. Of note, LV OP of 6.0 V (other leads stable) had battery longevity at 2.8 years. May be worth CXR to assess LV placement as I was unable to use any other vectors on LV lead as they were all very elevated or caused diaphragm stim consistently.

## 2021-07-15 NOTE — TELEPHONE ENCOUNTER
Called pt to notify her of CXR order. She agrees to come to 1st floor at 9:00am to have that completed prior to office visit for device check.

## 2021-07-16 ENCOUNTER — HOSPITAL ENCOUNTER (EMERGENCY)
Age: 71
Discharge: HOME | End: 2021-07-16
Attending: EMERGENCY MEDICINE
Payer: MEDICARE

## 2021-07-16 ENCOUNTER — HOSPITAL ENCOUNTER (OUTPATIENT)
Dept: GENERAL RADIOLOGY | Age: 71
Discharge: HOME | End: 2021-07-16
Payer: MEDICARE

## 2021-07-16 ENCOUNTER — OFFICE VISIT (OUTPATIENT)
Dept: CARDIOLOGY CLINIC | Age: 71
End: 2021-07-16

## 2021-07-16 VITALS
BODY MASS INDEX: 31.28 KG/M2 | TEMPERATURE: 97.8 F | RESPIRATION RATE: 18 BRPM | DIASTOLIC BLOOD PRESSURE: 66 MMHG | HEIGHT: 62 IN | HEART RATE: 80 BPM | WEIGHT: 170 LBS | OXYGEN SATURATION: 95 % | SYSTOLIC BLOOD PRESSURE: 113 MMHG

## 2021-07-16 DIAGNOSIS — Z95.0 STATUS POST BIVENTRICULAR PACEMAKER: Primary | ICD-10-CM

## 2021-07-16 DIAGNOSIS — R06.02 SHORTNESS OF BREATH: ICD-10-CM

## 2021-07-16 DIAGNOSIS — R06.09 DYSPNEA ON EXERTION: Primary | ICD-10-CM

## 2021-07-16 LAB
ALBUMIN SERPL-MCNC: 3.6 G/DL (ref 3.5–5)
ALBUMIN/GLOB SERPL: 1 {RATIO} (ref 1.1–2.2)
ALP SERPL-CCNC: 91 U/L (ref 45–117)
ALT SERPL-CCNC: 30 U/L (ref 12–78)
ANION GAP SERPL CALC-SCNC: 6 MMOL/L (ref 5–15)
APPEARANCE UR: ABNORMAL
AST SERPL-CCNC: 23 U/L (ref 15–37)
BACTERIA URNS QL MICRO: NEGATIVE /HPF
BASOPHILS # BLD: 0.2 K/UL (ref 0–0.1)
BASOPHILS NFR BLD: 1 % (ref 0–1)
BILIRUB SERPL-MCNC: 0.3 MG/DL (ref 0.2–1)
BILIRUB UR QL: NEGATIVE
BNP SERPL-MCNC: 565 PG/ML
BUN SERPL-MCNC: 20 MG/DL (ref 6–20)
BUN/CREAT SERPL: 26 (ref 12–20)
CALCIUM SERPL-MCNC: 9.2 MG/DL (ref 8.5–10.1)
CAOX CRY URNS QL MICRO: ABNORMAL
CHLORIDE SERPL-SCNC: 110 MMOL/L (ref 97–108)
CO2 SERPL-SCNC: 26 MMOL/L (ref 21–32)
COLOR UR: ABNORMAL
COMMENT, HOLDF: NORMAL
COVID-19 RAPID TEST, COVR: NOT DETECTED
CREAT SERPL-MCNC: 0.78 MG/DL (ref 0.55–1.02)
DIFFERENTIAL METHOD BLD: ABNORMAL
EOSINOPHIL # BLD: 0.3 K/UL (ref 0–0.4)
EOSINOPHIL NFR BLD: 1 % (ref 0–7)
EPITH CASTS URNS QL MICRO: ABNORMAL /LPF
ERYTHROCYTE [DISTWIDTH] IN BLOOD BY AUTOMATED COUNT: 13 % (ref 11.5–14.5)
GLOBULIN SER CALC-MCNC: 3.7 G/DL (ref 2–4)
GLUCOSE SERPL-MCNC: 212 MG/DL (ref 65–100)
GLUCOSE UR STRIP.AUTO-MCNC: NEGATIVE MG/DL
HCT VFR BLD AUTO: 45.4 % (ref 35–47)
HGB BLD-MCNC: 14.3 G/DL (ref 11.5–16)
HGB UR QL STRIP: NEGATIVE
HYALINE CASTS URNS QL MICRO: ABNORMAL /LPF (ref 0–5)
IMM GRANULOCYTES # BLD AUTO: 0.2 K/UL (ref 0–0.04)
IMM GRANULOCYTES NFR BLD AUTO: 1 % (ref 0–0.5)
KETONES UR QL STRIP.AUTO: NEGATIVE MG/DL
LEUKOCYTE ESTERASE UR QL STRIP.AUTO: NEGATIVE
LIPASE SERPL-CCNC: 138 U/L (ref 73–393)
LYMPHOCYTES # BLD: 1.4 K/UL (ref 0.8–3.5)
LYMPHOCYTES NFR BLD: 7 % (ref 12–49)
MCH RBC QN AUTO: 31.1 PG (ref 26–34)
MCHC RBC AUTO-ENTMCNC: 31.5 G/DL (ref 30–36.5)
MCV RBC AUTO: 98.7 FL (ref 80–99)
MONOCYTES # BLD: 1.7 K/UL (ref 0–1)
MONOCYTES NFR BLD: 8 % (ref 5–13)
NEUTS SEG # BLD: 17.1 K/UL (ref 1.8–8)
NEUTS SEG NFR BLD: 82 % (ref 32–75)
NITRITE UR QL STRIP.AUTO: NEGATIVE
NRBC # BLD: 0 K/UL (ref 0–0.01)
NRBC BLD-RTO: 0 PER 100 WBC
PH UR STRIP: 5 [PH] (ref 5–8)
PLATELET # BLD AUTO: 270 K/UL (ref 150–400)
PMV BLD AUTO: 10.5 FL (ref 8.9–12.9)
POTASSIUM SERPL-SCNC: 4 MMOL/L (ref 3.5–5.1)
PROT SERPL-MCNC: 7.3 G/DL (ref 6.4–8.2)
PROT UR STRIP-MCNC: NEGATIVE MG/DL
RBC # BLD AUTO: 4.6 M/UL (ref 3.8–5.2)
RBC #/AREA URNS HPF: ABNORMAL /HPF (ref 0–5)
SAMPLES BEING HELD,HOLD: NORMAL
SARS-COV-2, COV2: NORMAL
SODIUM SERPL-SCNC: 142 MMOL/L (ref 136–145)
SOURCE, COVRS: NORMAL
SP GR UR REFRACTOMETRY: 1.03 (ref 1–1.03)
TROPONIN I SERPL-MCNC: <0.05 NG/ML
UR CULT HOLD, URHOLD: NORMAL
UROBILINOGEN UR QL STRIP.AUTO: 1 EU/DL (ref 0.2–1)
WBC # BLD AUTO: 20.8 K/UL (ref 3.6–11)
WBC URNS QL MICRO: ABNORMAL /HPF (ref 0–4)

## 2021-07-16 PROCEDURE — 83690 ASSAY OF LIPASE: CPT

## 2021-07-16 PROCEDURE — 93005 ELECTROCARDIOGRAM TRACING: CPT

## 2021-07-16 PROCEDURE — 84484 ASSAY OF TROPONIN QUANT: CPT

## 2021-07-16 PROCEDURE — 83880 ASSAY OF NATRIURETIC PEPTIDE: CPT

## 2021-07-16 PROCEDURE — 99285 EMERGENCY DEPT VISIT HI MDM: CPT

## 2021-07-16 PROCEDURE — 80053 COMPREHEN METABOLIC PANEL: CPT

## 2021-07-16 PROCEDURE — 81001 URINALYSIS AUTO W/SCOPE: CPT

## 2021-07-16 PROCEDURE — 71046 X-RAY EXAM CHEST 2 VIEWS: CPT

## 2021-07-16 PROCEDURE — 85025 COMPLETE CBC W/AUTO DIFF WBC: CPT

## 2021-07-16 PROCEDURE — 87635 SARS-COV-2 COVID-19 AMP PRB: CPT

## 2021-07-16 PROCEDURE — 36415 COLL VENOUS BLD VENIPUNCTURE: CPT

## 2021-07-16 NOTE — ED NOTES
Patient ambulated within room and exhibited dyspnea with exertion. Patient O2 dropped to 89%. MD Adame aware. no new orders at this time. Will continue to monitor.

## 2021-07-16 NOTE — ED NOTES
2:08 PM  I have evaluated the patient as the Provider in Triage. I have reviewed Her vital signs and the triage nurse assessment. I have talked with the patient and any available family and advised that I am the provider in triage and have ordered the appropriate study to initiate their work up based on the clinical presentation during my assessment. I have advised that the patient will be accommodated in the Main ED as soon as possible. I have also requested to contact the triage nurse or myself immediately if the patient experiences any changes in their condition during this brief waiting period. Sent by Dr. Brooks Kelly office for increased shortness of breath, nausea, jaw pain and dizziness. This started on Tuesday. Recently had a pacemaker placed on June 28, adjusted on Wednesday.  Harlan Acosta NP

## 2021-07-16 NOTE — PROGRESS NOTES
no charge biv pacer f/u post prior changes for c/o dizziness & SOB    See scanned document for details.

## 2021-07-16 NOTE — ED TRIAGE NOTES
Pt sent by Dr Gema Wilcox for SOB. Pt had pacemaker on 6/28 and had it adjusted on Wednesday. Seen by Dr Gema Wilcox today and pacemaker was working well. Had a CXR today and they are concerned about fluids on lungs. Pt states she has episode of feeling weak that are worse when going from sitting to standing. Pt adds intermittent jaw pain, nausea, and dizziness.

## 2021-07-16 NOTE — ED PROVIDER NOTES
75-year-old female with history of CHF, CAD status post stents presents with worsening shortness of breath with exertion for the past 3 days. She had a recent pacemaker adjustment on Tuesday which she attributed to her shortness of breath. She went to Dr. José Miguel Almodovar today and her pacemaker was fine. She reportedly had a chest x-ray that he was concerned that she had fluid on her lungs. She was sent to the emergency department for further evaluation. She has had associated dry cough and nausea. She is also had chest tightness. Is worse when she walks. She denies any fevers or chills or leg swelling. On chart review patient was admitted from June 28 to June 29th for acute encephalopathy, left-sided weakness, bandemia, sirs    The history is provided by the patient and the spouse. Shortness of Breath    Fatigue  Associated symptoms include shortness of breath.         Past Medical History:   Diagnosis Date    Adverse effect of anesthesia     slow to wake up    Arthritis     Asthma 6/29/2009    CAUSED BY MOLD    Atrial fibrillation ([address] 6/29/2009    Atrial flutter ([address]     CAD (coronary artery disease) 06/29/2009    Celiac disease 2010    Chronic chest pain     Chronic obstructive pulmonary disease ([address] 2004-5    right leg    Chronic pain of right ankle     Diabetes ([address]     Drug induced prednisone, DM2    Diastolic heart failure ([address]     DVT (deep venous thrombosis) ([address] 2004    Right leg post surgery    GERD (gastroesophageal reflux disease)     Gluten intolerance     Heart failure ([address]     Hypertension     Hypothyroidism 6/29/2009    Iron deficiency anemia     Lyme disease     Pacemaker     11/20    Personal history of MI (myocardial infarction)     Rheumatoid arthritis ([address]     S/P AV jules ablation     11/20     S/P left atrial appendage ligation     SUHAS clip 10/20     Slow to wake up after anesthesia     Syncope     Post testing- resolved    TIA (transient ischemic attack) 2004 & 2006    Vitamin B12 deficiency 6/29/2009       Past Surgical History:   Procedure Laterality Date    HX ADENOIDECTOMY      HX AFIB ABLATION      HX APPENDECTOMY      HX CHOLECYSTECTOMY  6/16/11    HX COLONOSCOPY      HX CORONARY STENT PLACEMENT      x 2    HX HEART CATHETERIZATION  2010    2 STENTS    HX HEART CATHETERIZATION  03/2020    HX HYSTERECTOMY      HX LUMBAR LAMINECTOMY  2000    L4-L5    HX ORTHOPAEDIC  1993-6/2012    RT.  FOOT SURGERY X 6/calcaneal osteotomy    HX ORTHOPAEDIC Right 09/19/2020    right hand [facility] joint replacement    HX TONSILLECTOMY  1964    NV ICAR CATHETER ABLATION ATRIOVENTR NODE FUNCTION N/A 11/19/2020    ABLATION AV NODE performed by Farida Mcwilliams MD at [address] [address], [facility]/s Dr CATH LAB    NV INS NEW/RPLCMT PRM PM W/TRANSV ELTRD ATRIAL&VENT N/A 11/19/2020    INSERT PPM DUAL performed by Farida Mwcilliams MD at [place] [address], [facility]/s Dr CATH LAB    NV INTRACARDIAC ELECTROPHYSIOLOGIC 3D [address] N/A 11/19/2020    Ep 3d Mapping performed by Farida Mcwilliams MD at [place] [address], [facility]/s Dr CATH LAB         Family History:   Problem Relation Age of Onset    Delayed Awakening Mother     Heart Disease Mother     Coronary Artery Disease Mother     Hypertension Mother     Stroke Mother     Delayed Awakening Sister     Hypertension Sister     Lung Disease Father     Cancer Father         colon    Hypertension Father     Hypertension Brother     Breast Cancer Maternal Aunt     Breast Cancer Maternal Aunt     Breast Cancer Cousin         maternal 1st cousin   Deng Reaper Breast Cancer Maternal Aunt     Breast Cancer Cousin         maternal 1st cousin   Deng Reaper Breast Cancer Cousin         maternal 1st cousin    Deep Vein Thrombosis Neg Hx     Anesth Problems Neg Hx        Social History     Socioeconomic History    Marital status:      Spouse name: Not on file    Number of children: Not on file    Years of education: Not on file    Highest education level: Not on file   Occupational History    Not on file   Tobacco Use    Smoking status: Former Smoker     Packs/day: 1.00     Years: 30.00     Pack years: 30.00     Types: Cigarettes     Quit date: 2002     Years since quittin.1    Smokeless tobacco: Never Used   Substance and Sexual Activity    Alcohol use: No    Drug use: Never    Sexual activity: Yes     Partners: Male   Other Topics Concern    Not on file   Social History Narrative    Not on file     Social Determinants of Health     Financial Resource Strain:     Difficulty of Paying Living Expenses:    Food Insecurity:     Worried About Running Out of Food in the Last Year:     [address] in the Last Year:    Transportation Needs:     Lack of Transportation (Medical):  Lack of Transportation (Non-Medical):    Physical Activity:     Days of Exercise per Week:     Minutes of Exercise per Session:    Stress:     Feeling of Stress :    Social Connections:     Frequency of Communication with Friends and Family:     Frequency of Social Gatherings with Friends and Family:     Attends [employer] Services:     Active Member of Clubs or Organizations:     Attends Club or Organization Meetings:     Marital Status:    Intimate Partner Violence:     Fear of Current or Ex-Partner:     Emotionally Abused:     Physically Abused:     Sexually Abused: ALLERGIES: Butter flavor, Keflex [cephalexin], Milk containing products, Pcn [penicillins], Aspirin, Cimzia [certolizumab pegol], Clopidogrel, Demerol [meperidine], Fentanyl, Ibuprofen, Morphine, Nitroglycerin, Sulfa (sulfonamide antibiotics), Tapazole [methimazole], Tramadol, Celebrex [celecoxib], Codeine, Hydrocodone, Quinolones, Tetracyclines, Warfarin, Adhesive tape-silicones, Albuterol, Gluten, and Oxycodone    Review of Systems   Constitutional: Positive for fatigue. Respiratory: Positive for shortness of breath. All other systems reviewed and are negative.       Vitals:    21 1600 21 1700 21 1717 218   BP: 108/77 113/66     Pulse: 81 80 85 85   Resp: 21 18 25 18   Temp:       SpO2: 97% 97% (!) 89% 91%   Weight:       Height:                Physical Exam  Vitals and nursing note reviewed. Constitutional:       General: She is not in acute distress. HENT:      Head: Normocephalic and atraumatic. Mouth/Throat:      Mouth: Mucous membranes are moist.   Eyes:      General: No scleral icterus. Conjunctiva/sclera: Conjunctivae normal.      Pupils: Pupils are equal, round, and reactive to light. Neck:      Trachea: No tracheal deviation. Cardiovascular:      Rate and Rhythm: Normal rate and regular rhythm. Pulmonary:      Effort: Pulmonary effort is normal. No respiratory distress. Breath sounds: Examination of the right-lower field reveals rales. Examination of the left-lower field reveals rales. Rales present. No wheezing. Abdominal:      General: There is no distension. Palpations: Abdomen is soft. Tenderness: There is no abdominal tenderness. Genitourinary:     Comments: deferred  Musculoskeletal:         General: No deformity. Cervical back: Neck supple. Right lower leg: No edema. Left lower leg: No edema. Skin:     General: Skin is warm and dry. Neurological:      General: No focal deficit present. Mental Status: She is alert. Psychiatric:         Mood and Affect: Mood normal.          [facility]  ED Course as of  Spoke to Dr. Ranjana Sweet. The reason why he sent her to the emergency department was because they start her up and she got a little lightheaded. The chest x-ray did not show pulmonary edema and the purpose of the x-ray was to evaluate the pacemaker leads. If work-up unremarkable he agrees with discharge home and follow-up.     [TT]      ED Course User Index  [TT] Anshul Dunn MD       Procedures      MEDICAL DECISION MAKIN y.o. female presents with Shortness of Breath and Fatigue    Differential diagnosis includes but not limited to: CHF, pneumonia, COVID-19    LABORATORY TESTS:  Labs Reviewed   CBC WITH AUTOMATED DIFF - Abnormal; Notable for the following components:       Result Value    WBC 20.8 (*)     NEUTROPHILS 82 (*)     LYMPHOCYTES 7 (*)     IMMATURE GRANULOCYTES 1 (*)     ABS. NEUTROPHILS 17.1 (*)     ABS. MONOCYTES 1.7 (*)     ABS. BASOPHILS 0.2 (*)     ABS. IMM. GRANS. 0.2 (*)     All other components within normal limits   METABOLIC PANEL, COMPREHENSIVE - Abnormal; Notable for the following components:    Chloride 110 (*)     Glucose 212 (*)     BUN/Creatinine ratio 26 (*)     A-G Ratio 1.0 (*)     All other components within normal limits   URINALYSIS W/MICROSCOPIC - Abnormal; Notable for the following components:    Appearance CLOUDY (*)     Epithelial cells MODERATE (*)     CA Oxalate crystals 1+ (*)     All other components within normal limits   NT-PRO BNP - Abnormal; Notable for the following components:    NT pro- (*)     All other components within normal limits   URINE CULTURE HOLD SAMPLE   COVID-19 RAPID TEST   LIPASE   TROPONIN I   SAMPLES BEING HELD   SARS-COV-2       IMAGING RESULTS:  No orders to display       MEDICATIONS GIVEN:  Medications - No data to display    PROGRESS NOTE:   6:27 PM patient states that she did fine with ambulation despite dropping to 89%. She states that that happens since her first pacemaker was placed. She wishes to be discharged. She states that she will take it easy over the weekend and she has an appointment with Dr. Seema Lee on Thursday. EKG:  Rhythm: paced;  Rate (approx.): 81; Axis: left axis deviation; QRS interval: prolonged; ST/T wave: non-specific changes; Other findings: abnormal ekg. CONSULTS:  Cardiology see above    IMPRESSION:  1.  Dyspnea on exertion        PLAN:  -   Discharge  Current Discharge Medication List        Follow-up Information     Follow up With Specialties Details Why Contact Info Aneesh Amor MD Cardiology Go in 1 week  [address] [address] [phone]      [facility] EMERGENCY DEPT Emergency Medicine  If symptoms worsen or new concerns [address] [facility]  [phone]        Return precautions given    Total critical care time spent exclusive of procedures:  0 minutes    Delroy Quinones MD

## 2021-07-18 LAB
ATRIAL RATE: 81 BPM
CALCULATED R AXIS, ECG10: 136 DEGREES
CALCULATED T AXIS, ECG11: 79 DEGREES
DIAGNOSIS, 93000: NORMAL
P-R INTERVAL, ECG05: 176 MS
Q-T INTERVAL, ECG07: 464 MS
QRS DURATION, ECG06: 174 MS
QTC CALCULATION (BEZET), ECG08: 539 MS
VENTRICULAR RATE, ECG03: 81 BPM

## 2021-07-19 NOTE — PROGRESS NOTES
LV lead position appears unchanged. Will monitor clinically following recent output adjustments. Will arrange for clinic visit this Friday.

## 2021-07-22 NOTE — PROGRESS NOTES
HISTORY OF PRESENTING ILLNESS     Ashlee Goodman is a 66 y. o. female with hx of paroxysmal arial fibrillation (s/p ablation x10 years ago with Dr. Val Lozada), previous history of thrombocytopenia, hypertension, COPD, heart failure preserved EF, CAD, LA ligation, apcemaker s/p AV node ablation.  She experienced chest pain/palpitations post procedure and presented to the ER. Previous cardiac catheterization and stress test in 2019 were negative for ischemia and no significant coronary disease and stents were open.  She had normal stress testing 11/5/2020 due to peaked troponin level. Echocardiogram demonstrated preserved LV function of 55-60%.       Device interrogation reveals normal device functioning; patient remains in complete heart block.  She reports shortness of breath and fatigue that she noticed had improved following her procedure however her symptoms recurred after her lower rate was changed from 80 down to 70 bpm.  Last visit her rate was increased to 80 bpm and an echocardiogram was obtained which demonstrated an LV ejection fraction of 45% compared to an LV function of 55 to 60% on 11/2020. She dealt with SOB following her procedure; LV lead was evaluated with CXR and appeared to be stable in position. Reported CP has resolved and only has occ palps. SOB improving. Denied syncope after recent adjustments in LV lead output. Still has CHACON to the same degree however. Evaluated by pulmonary as well.         PAST MEDICAL HISTORY     Past Medical History:   Diagnosis Date    Adverse effect of anesthesia     slow to wake up    Arthritis     Asthma 6/29/2009    CAUSED BY MOLD    Atrial fibrillation ([address] 6/29/2009    Atrial flutter ([address]     CAD (coronary artery disease) 06/29/2009    Celiac disease 2010    Chronic chest pain     Chronic obstructive pulmonary disease ([address] 2004-5    right leg    Chronic pain of right ankle     Diabetes ([address]     Drug induced prednisone, DM2    Di[facility] failure [facility]     D[facility]deep venous thrombosis) [facility] 2004    Right leg post surgery    GERD (gastroesophageal reflux disease)     Gluten intolerance     Heart failure ([facility]     Hypertension     Hypothyroidism 6/29/2009    Iron deficiency anemia     Lyme disease     Pacemaker     11/20    Personal history of MI (myocardial infarction)     Rheumatoid arthritis ([facility]     S/P AV jules ablation     11/20     S/P left atrial appendage ligation     SUHAS clip 10/20     Slow to wake up after anesthesia     Syncope     Post testing- resolved    TIA (transient ischemic attack) 2004 & 2006    Vitamin B12 deficiency 6/29/2009           PAST SURGICAL HISTORY     Past Surgical History:   Procedure Laterality Date    HX ADENOIDECTOMY      HX AFIB ABLATION      HX APPENDECTOMY      HX CHOLECYSTECTOMY  6/16/11    HX COLONOSCOPY      HX CORONARY STENT PLACEMENT      x 2    HX HEART CATHETERIZATION  2010    2 STENTS    HX HEART CATHETERIZATION  03/2020    HX HYSTERECTOMY      HX LUMBAR LAMINECTOMY  2000    L4-L5    HX ORTHOPAEDIC  1993-6/2012    RT.  FOOT SURGERY X 6/calcaneal osteotomy    HX ORTHOPAEDIC Right 09/19/2020    right hand [facility] joint replacement    HX TONSILLECTOMY  1964    IL ICAR CATHETER ABLATION ATRIOVENTR NODE FUNCTION N/A 11/19/2020    ABLATION AV NODE performed by Aye Poon MD at [place] [address], [address] [address] CATH LAB    IL INS NEW/RPLCMT PRM PM W/TRANSV ELTRD ATRIAL&VENT N/A 11/19/2020    INSERT PPM DUAL performed by Aye Poon MD at [place] [address], [facility] [address] CATH LAB    IL INTRACARDIAC ELECTROPHYSIOLOGIC 3D MAPPING N/A 11/19/2020    Ep 3d Mapping performed by Aye Poon MD at [place] [id number], [facility] [address] CATH LAB          ALLERGIES     Allergies   Allergen Reactions    Butter Flavor Anaphylaxis     Butter AND CREME    Keflex [Cephalexin] Anaphylaxis    Milk Containing Products Anaphylaxis     Pt states not an allergy    Pcn [Penicillins] Anaphylaxis    Aspirin Hives and Other (comments)     \"it makes my stomach bleed\"      Cimzia [Certolizumab Pegol] Other (comments)     GI symptoms, blisters in the mouth and esophagus    Clopidogrel Other (comments)     GI bleed    Demerol [Meperidine] Other (comments)     HYPOTENSION    Fentanyl Other (comments)     HYPOTENSION    Ibuprofen Diarrhea     Patient reports that ibuprofen caused diarrhea    Morphine Other (comments)     HYPOTENSION    Nitroglycerin Other (comments)     IN PILL FORM  - HYPOTENSION  CAN TOLERATE PATCH FOR SHORT TIME    Sulfa (Sulfonamide Antibiotics) Angioedema     Lips    Tapazole [Methimazole] Unknown (comments)     Patient stated \"it made me feel like I had the flu\"    Tramadol Other (comments)     Patient reports thrush with tramadol use    Celebrex [Celecoxib] Other (comments)     GI bleed     Codeine Other (comments)     Low BP    Hydrocodone Other (comments)     Hypotension    Quinolones Unknown (comments)      could not recall reaction, but is was listed on list of allergies     Tetracyclines Other (comments)      could not recall reaction; Was on personal list of allergies.       Warfarin Other (comments)      mentioned allergy but could not recall reaction     Adhesive Tape-Silicones Other (comments)     BAD BLISTERS AND TENDS TO GET INFECTED    Albuterol Palpitations    Gluten Diarrhea     bloating    Oxycodone Other (comments)     Hallicination and hypotension          FAMILY HISTORY     Family History   Problem Relation Age of Onset    Delayed Awakening Mother     Heart Disease Mother     Coronary Artery Disease Mother    [facility] Hypertension Mother    [facility] Stroke Mother     Delayed Awakening Sister     Hypertension Sister     Lung Disease Father     Cancer Father         colon    Hypertension Father     Hypertension Brother     Breast Cancer Maternal Aunt     Breast Cancer Maternal Aunt     Breast Cancer Cousin         maternal 1st cousin   [facility] Breast Cancer Maternal Aunt     Breast Cancer Cousin maternal 1st cousin    Breast Cancer Cousin         maternal 1st cousin    Deep Vein Thrombosis Neg Hx     Anesth Problems Neg Hx     negative for cardiac disease       SOCIAL HISTORY     Social History     Socioeconomic History    Marital status:      Spouse name: Not on file    Number of children: Not on file    Years of education: Not on file    Highest education level: Not on file   Tobacco Use    Smoking status: Former Smoker     Packs/day: 1.00     Years: 30.00     Pack years: 30.00     Types: Cigarettes     Quit date: 2002     Years since quittin.1    Smokeless tobacco: Never Used   Substance and Sexual Activity    Alcohol use: No    Drug use: Never    Sexual activity: Yes     Partners: Male     Social Determinants of Health     Financial Resource Strain:     Difficulty of Paying Living Expenses:    Food Insecurity:     Worried About Running Out of Food in the Last Year:     Ran Out of Food in the Last Year:    Transportation Needs:     Lack of Transportation (Medical):  Lack of Transportation (Non-Medical):    Physical Activity:     Days of Exercise per Week:     Minutes of Exercise per Session:    Stress:     Feeling of Stress :    Social Connections:     Frequency of Communication with Friends and Family:     Frequency of Social Gatherings with Friends and Family:     Attends Mandaeism Services:     Active Member of Clubs or Organizations:     Attends Club or Organization Meetings:     Marital Status:          MEDICATIONS     Current Outpatient Medications   Medication Sig    aclidinium bromide (Tudorza Pressair) 400 mcg/actuation inhaler Take 1 Puff by inhalation two (2) times a day.  mometasone (Asmanex HFA) 200 mcg/actuation HFAA inhaler Take 1 Puff by inhalation two (2) times a day.  bumetanide (BUMEX) 1 mg tablet Take 1 mg by mouth two (2) times a day.  1 mg every am and 0.5 mg in the evening    calcium citrate 200 mg (950 mg) tablet Take 200 mg by mouth daily.  predniSONE (DELTASONE) 2.5 mg tablet Take 2.5 mg by mouth every evening. Indications: RA    eplerenone (INSPRA) 25 mg tablet Take 1 Tab by mouth Daily (before lunch).  metoprolol succinate (TOPROL-XL) 50 mg XL tablet Take 1 Tab by mouth daily.  nitroglycerin (NITRODUR) 0.1 mg/hr 1 Patch by TransDERmal route daily.  lidocaine (Lidoderm) 5 % 1 Patch by TransDERmal route every twenty-four (24) hours. Apply patch to the affected area for 12 hours a day and remove for 12 hours a day.  acetaminophen (TYLENOL) 650 mg TbER Take 1,300 mg by mouth nightly as needed (sleep).  co-enzyme Q-10 (Co Q-10) 100 mg capsule Take 200 mg by mouth daily.  artificial tears, dextran 70-hypromellose, (GenTeal Tears Mild) 0.1-0.3 % ophthalmic solution Administer 1 Drop to both eyes daily.  carboxymethylcell/hypromellose (GENTEAL GEL OP) Administer 1 Drop to both eyes nightly.  omega 3-DHA-EPA-fish oil 1,000 mg (120 mg-180 mg) capsule Take 1 Cap by mouth every evening.  levalbuterol (XOPENEX) 0.63 mg/3 mL nebu 0.63 mg by Nebulization route two (2) times a day.  potassium 99 mg tablet Take 99 mg by mouth daily.  insulin glargine (LANTUS,BASAGLAR) 100 unit/mL (3 mL) inpn 12 Units by SubCUTAneous route nightly.  fenofibrate nanocrystallized (Tricor) 48 mg tablet Take 48 mg by mouth nightly.  levothyroxine (SYNTHROID) 137 mcg tablet Take 137 mcg by mouth Daily (before breakfast).  predniSONE (DELTASONE) 5 mg tablet Take 5 mg by mouth daily. Indications: RA    cholecalciferol (Vitamin D3) (1000 Units /25 mcg) tablet Take 1,000 Units by mouth daily.  turmeric (CURCUMIN) Take 1 g by mouth every evening.  SITagliptin (JANUVIA) 100 mg tablet Take 100 mg by mouth daily (with dinner).  vit C/vit E ac/lut/copper/zinc (PRESERVISION LUTEIN PO) Take 1 Tablet by mouth daily (with dinner).  ascorbic acid (VITAMIN C) 500 mg tablet Take 500 mg by mouth daily.     ferrous sulfate 325 mg (65 mg iron) tablet Take 325 mg by mouth daily (with lunch).  cyanocobalamin (VITAMIN B12) 1,000 mcg/mL injection INJECT 1 ML INTO THE MUSCLE EVERY 30 DAYS    lansoprazole (PREVACID) 30 mg capsule Take 30 mg by mouth daily. No current facility-administered medications for this visit. I have reviewed the nurses notes, vitals, problem list, allergy list, medical history, family, social history and medications. REVIEW OF SYMPTOMS      General: Pt denies excessive weight gain or loss. Pt is able to conduct ADL's  HEENT: Denies blurred vision, headaches, hearing loss, epistaxis and difficulty swallowing. Respiratory: Denies cough, congestion, shortness of breath, CHACON, wheezing or stridor. Cardiovascular: Denies precordial pain, palpitations, edema or PND  Gastrointestinal: Denies poor appetite, indigestion, abdominal pain or blood in stool  Genitourinary: Denies hematuria, dysuria, increased urinary frequency  Musculoskeletal: Denies joint pain or swelling from muscles or joints  Neurologic: Denies tremor, paresthesias, headache, or sensory motor disturbance  Psychiatric: Denies confusion, insomnia, depression  Integumentray: Denies rash, itching or ulcers. Hematologic: Denies easy bruising, bleeding       PHYSICAL EXAMINATION      Vitals: see vitals section  General: Well developed, in no acute distress. HEENT: No jaundice, oral mucosa moist, no oral ulcers  Neck: Supple, no stiffness, no lymphadenopathy, supple  Heart:  Normal S1/S2 negative S3 or S4. Regular, no murmur, gallop or rub, no jugular venous distention  Respiratory: Clear bilaterally x 4, no wheezing or rales  Abdomen:   Soft, non-tender, bowel sounds are active. Extremities:  No edema, normal cap refill, no cyanosis. Musculoskeletal: No clubbing, no deformities  Neuro: A&Ox3, speech clear, gait stable, cooperative, no focal neurologic deficits  Skin: Skin color is normal. No rashes or lesions.  Non diaphoretic, moist.  Vascular: 2+ pulses symmetric in all extremities       DIAGNOSTIC DATA      EKG:   Visit Vitals  /76 (BP 1 Location: Left upper arm, BP Patient Position: Sitting)   Pulse 82   Resp 20   Ht 5' 2\" (1.575 m)   Wt 164 lb 12.8 oz (74.8 kg)   SpO2 97%   BMI 30.14 kg/m²        LABORATORY DATA      Lab Results   Component Value Date/Time    WBC 20.8 (H) 07/16/2021 02:57 PM    Hemoglobin (POC) 15.6 06/09/2014 03:00 AM    HGB 14.3 07/16/2021 02:57 PM    Hematocrit (POC) 49 (H) 11/05/2020 09:33 PM    HCT 45.4 07/16/2021 02:57 PM    PLATELET 559 71/79/2607 02:57 PM    MCV 98.7 07/16/2021 02:57 PM      Lab Results   Component Value Date/Time    Sodium 142 07/16/2021 02:57 PM    Potassium 4.0 07/16/2021 02:57 PM    Chloride 110 (H) 07/16/2021 02:57 PM    CO2 26 07/16/2021 02:57 PM    Anion gap 6 07/16/2021 02:57 PM    Glucose 212 (H) 07/16/2021 02:57 PM    BUN 20 07/16/2021 02:57 PM    Creatinine 0.78 07/16/2021 02:57 PM    BUN/Creatinine ratio 26 (H) 07/16/2021 02:57 PM    GFR est AA >60 07/16/2021 02:57 PM    GFR est non-AA >60 07/16/2021 02:57 PM    Calcium 9.2 07/16/2021 02:57 PM    Bilirubin, total 0.3 07/16/2021 02:57 PM    Alk. phosphatase 91 07/16/2021 02:57 PM    Protein, total 7.3 07/16/2021 02:57 PM    Albumin 3.6 07/16/2021 02:57 PM    Globulin 3.7 07/16/2021 02:57 PM    A-G Ratio 1.0 (L) 07/16/2021 02:57 PM    ALT (SGPT) 30 07/16/2021 02:57 PM           ASSESSMENT         1. Atrial fibrillation with RVR              Paroxysmal                       S/p SUHAS Clip              AV node ablation  2.  CAD               S/p PCI to LAD and RCA  3. PVCs  4. Hypertension  5. COPD/Asthma  6. Pacemaker       PLAN     Continue monitoring in device clinic with a follow up to monitor closely for recurrence of volume overload as well as for monitoring battery longevity given higher programmed LV lead output. ICD-10-CM ICD-9-CM    1.  Biventricular cardiac pacemaker in situ  Z95.0 V45.01      No orders of the defined types were placed in this encounter. FOLLOW-UP     1 month with Nathan Papa + device check; consider echo in 2 months to evaluate LV function. Thank you, Ethyl MD Evelin and Dr. Rekha Shepherd for allowing me to participate in the care of this extraordinarily pleasant female. Please do not hesitate to contact me for further questions/concerns.          Vu Dockery MD  Cardiac Electrophysiology / Cardiology    [address]  [address] [place] [address], [address]  [phone] / [phone] Fax   [phone] / [phone] Fax

## 2021-07-23 ENCOUNTER — OFFICE VISIT (OUTPATIENT)
Dept: CARDIOLOGY CLINIC | Age: 71
End: 2021-07-23
Payer: MEDICARE

## 2021-07-23 VITALS
SYSTOLIC BLOOD PRESSURE: 122 MMHG | BODY MASS INDEX: 30.33 KG/M2 | DIASTOLIC BLOOD PRESSURE: 76 MMHG | HEIGHT: 62 IN | RESPIRATION RATE: 20 BRPM | OXYGEN SATURATION: 97 % | HEART RATE: 82 BPM | WEIGHT: 164.8 LBS

## 2021-07-23 DIAGNOSIS — Z95.0 BIVENTRICULAR CARDIAC PACEMAKER IN SITU: Primary | ICD-10-CM

## 2021-07-23 PROCEDURE — G8399 PT W/DXA RESULTS DOCUMENT: HCPCS | Performed by: INTERNAL MEDICINE

## 2021-07-23 PROCEDURE — 1090F PRES/ABSN URINE INCON ASSESS: CPT | Performed by: INTERNAL MEDICINE

## 2021-07-23 PROCEDURE — 1111F DSCHRG MED/CURRENT MED MERGE: CPT | Performed by: INTERNAL MEDICINE

## 2021-07-23 PROCEDURE — G8536 NO DOC ELDER MAL SCRN: HCPCS | Performed by: INTERNAL MEDICINE

## 2021-07-23 PROCEDURE — G9899 SCRN MAM PERF RSLTS DOC: HCPCS | Performed by: INTERNAL MEDICINE

## 2021-07-23 PROCEDURE — G8432 DEP SCR NOT DOC, RNG: HCPCS | Performed by: INTERNAL MEDICINE

## 2021-07-23 PROCEDURE — 1101F PT FALLS ASSESS-DOCD LE1/YR: CPT | Performed by: INTERNAL MEDICINE

## 2021-07-23 PROCEDURE — G8754 DIAS BP LESS 90: HCPCS | Performed by: INTERNAL MEDICINE

## 2021-07-23 PROCEDURE — G8427 DOCREV CUR MEDS BY ELIG CLIN: HCPCS | Performed by: INTERNAL MEDICINE

## 2021-07-23 PROCEDURE — G0463 HOSPITAL OUTPT CLINIC VISIT: HCPCS | Performed by: INTERNAL MEDICINE

## 2021-07-23 PROCEDURE — G8752 SYS BP LESS 140: HCPCS | Performed by: INTERNAL MEDICINE

## 2021-07-23 PROCEDURE — 99024 POSTOP FOLLOW-UP VISIT: CPT | Performed by: INTERNAL MEDICINE

## 2021-07-23 PROCEDURE — 3017F COLORECTAL CA SCREEN DOC REV: CPT | Performed by: INTERNAL MEDICINE

## 2021-07-23 PROCEDURE — G8417 CALC BMI ABV UP PARAM F/U: HCPCS | Performed by: INTERNAL MEDICINE

## 2021-07-23 NOTE — PROGRESS NOTES
Room EP 5  Visit Vitals  /76 (BP 1 Location: Left upper arm, BP Patient Position: Sitting)   Pulse 82   Resp 20   Ht 5' 2\" (1.575 m)   Wt 164 lb 12.8 oz (74.8 kg)   LMP 09/29/1980 (LMP Unknown)   SpO2 97%   BMI 30.14 kg/m²       Saw pulmonary yesterday  Chest pain: no  Shortness of breath: yes  Edema: no  Palpitations, Skipped beats, Rapid heartbeat: yes  Dizziness: yes, unsteady gait  Fatigue:yes    New diagnosis/Surgeries: no    ER/Hospitalizations: ER [facility] 7/16/21-SOB,FATIGUE    Refills:no

## 2021-07-28 LAB
ALBUMIN SERPL-MCNC: 4.1 G/DL (ref 3.7–4.7)
ALBUMIN/GLOB SERPL: 2 {RATIO} (ref 1.2–2.2)
ALP SERPL-CCNC: 82 IU/L (ref 48–121)
ALT SERPL-CCNC: 19 IU/L (ref 0–32)
AST SERPL-CCNC: 23 IU/L (ref 0–40)
BILIRUB SERPL-MCNC: 0.3 MG/DL (ref 0–1.2)
BUN SERPL-MCNC: 17 MG/DL (ref 8–27)
BUN/CREAT SERPL: 30 (ref 12–28)
CALCIUM SERPL-MCNC: 9.2 MG/DL (ref 8.7–10.3)
CHLORIDE SERPL-SCNC: 104 MMOL/L (ref 96–106)
CO2 SERPL-SCNC: 21 MMOL/L (ref 20–29)
CREAT SERPL-MCNC: 0.57 MG/DL (ref 0.57–1)
ERYTHROCYTE [DISTWIDTH] IN BLOOD BY AUTOMATED COUNT: 12.5 % (ref 11.7–15.4)
GLOBULIN SER CALC-MCNC: 2.1 G/DL (ref 1.5–4.5)
GLUCOSE SERPL-MCNC: 138 MG/DL (ref 65–99)
HCT VFR BLD AUTO: 44.9 % (ref 34–46.6)
HGB BLD-MCNC: 14.6 G/DL (ref 11.1–15.9)
MCH RBC QN AUTO: 30.5 PG (ref 26.6–33)
MCHC RBC AUTO-ENTMCNC: 32.5 G/DL (ref 31.5–35.7)
MCV RBC AUTO: 94 FL (ref 79–97)
NT-PROBNP SERPL-MCNC: 363 PG/ML (ref 0–301)
PLATELET # BLD AUTO: 252 X10E3/UL (ref 150–450)
POTASSIUM SERPL-SCNC: 4 MMOL/L (ref 3.5–5.2)
PROT SERPL-MCNC: 6.2 G/DL (ref 6–8.5)
RBC # BLD AUTO: 4.78 X10E6/UL (ref 3.77–5.28)
SODIUM SERPL-SCNC: 143 MMOL/L (ref 134–144)
WBC # BLD AUTO: 18.6 X10E3/UL (ref 3.4–10.8)

## 2021-07-29 ENCOUNTER — PATIENT OUTREACH (OUTPATIENT)
Dept: CASE MANAGEMENT | Age: 71
End: 2021-07-29

## 2021-07-29 NOTE — PROGRESS NOTES
Contacted patient to follow up  Reports thrush was not thrush, in fact was an allergic rxn to [address] Attend follow up appointments on schedule        06/08/21  Valencia Villalba Saw pulmonologist today   Recommended to double Bumex dose to 1 mg BID    06/30/21   Will attend follow up appt with Dr. Luigi Elkins scheduled this afternoon    07/12/21   Will attend follow up appt with ENT to evaluate tongue scheduled 7/15   Will attend follow up appt with cards tomorrow 7/13 07/29/21   Attended follow up appt with cards on 7/13   Will attend follow up appt with Dr. Arianna Mccrary scheduled 8/3       Maintains daily weight. 06/07/21   Reports three pound weight gain since yesterday   Current weight, 166 pounds   Will monitor weight daily and log results   Will elevate LE while sitting   Will monitor fluid intake    06/08/21   Current weight, 165 pounds   Reports mild LE edema, sob    07/12/21   Current weight 163 pounds   Average weight 163-178 pounds    07/29/21   Current weight, 163 pounds       Prevent complications post hospitalization. 06/30/21   S/p PM upgrade   Will wear sling as instructed   Will monitor for s/sx of infection to procedure site   Will complete 7 day course of abx   Will not lift anything heavier than 10 pounds for 4 weeks with the affected arm.  Will not reach above your head with the affected arm for 4 weeks    07/12/21   Completed abx   Denies pain to PM site   No evidence of infection noted         Reduce risk of CHF exacerbations and complications. 07/29/21   Reports lightheadedness/dizziness/dyspnea on exertion   Will not fall   Denies edema         Understands and adheres to diet. 06/07/21   Reviewed low sodium diet   Reports using Ms. Valdivia for seasoning    07/12/21   Due to oral thrush, patient limited to jello and pudding

## 2021-08-03 ENCOUNTER — OFFICE VISIT (OUTPATIENT)
Dept: CARDIOLOGY CLINIC | Age: 71
End: 2021-08-03
Payer: MEDICARE

## 2021-08-03 VITALS
SYSTOLIC BLOOD PRESSURE: 118 MMHG | DIASTOLIC BLOOD PRESSURE: 64 MMHG | BODY MASS INDEX: 29.92 KG/M2 | WEIGHT: 162.6 LBS | HEART RATE: 88 BPM | HEIGHT: 62 IN | OXYGEN SATURATION: 94 %

## 2021-08-03 DIAGNOSIS — I25.10 CORONARY ARTERY DISEASE INVOLVING NATIVE CORONARY ARTERY OF NATIVE HEART WITHOUT ANGINA PECTORIS: Primary | ICD-10-CM

## 2021-08-03 DIAGNOSIS — I48.0 PAF (PAROXYSMAL ATRIAL FIBRILLATION) (HCC): ICD-10-CM

## 2021-08-03 DIAGNOSIS — I50.30 DIASTOLIC HEART FAILURE, UNSPECIFIED HF CHRONICITY (HCC): ICD-10-CM

## 2021-08-03 PROCEDURE — 1090F PRES/ABSN URINE INCON ASSESS: CPT | Performed by: SPECIALIST

## 2021-08-03 PROCEDURE — 99214 OFFICE O/P EST MOD 30 MIN: CPT | Performed by: SPECIALIST

## 2021-08-03 PROCEDURE — G8399 PT W/DXA RESULTS DOCUMENT: HCPCS | Performed by: SPECIALIST

## 2021-08-03 PROCEDURE — G8754 DIAS BP LESS 90: HCPCS | Performed by: SPECIALIST

## 2021-08-03 PROCEDURE — G9899 SCRN MAM PERF RSLTS DOC: HCPCS | Performed by: SPECIALIST

## 2021-08-03 PROCEDURE — G8536 NO DOC ELDER MAL SCRN: HCPCS | Performed by: SPECIALIST

## 2021-08-03 PROCEDURE — G8752 SYS BP LESS 140: HCPCS | Performed by: SPECIALIST

## 2021-08-03 PROCEDURE — G8432 DEP SCR NOT DOC, RNG: HCPCS | Performed by: SPECIALIST

## 2021-08-03 PROCEDURE — 3017F COLORECTAL CA SCREEN DOC REV: CPT | Performed by: SPECIALIST

## 2021-08-03 PROCEDURE — 1101F PT FALLS ASSESS-DOCD LE1/YR: CPT | Performed by: SPECIALIST

## 2021-08-03 PROCEDURE — G8427 DOCREV CUR MEDS BY ELIG CLIN: HCPCS | Performed by: SPECIALIST

## 2021-08-03 PROCEDURE — G0463 HOSPITAL OUTPT CLINIC VISIT: HCPCS | Performed by: SPECIALIST

## 2021-08-03 PROCEDURE — G8417 CALC BMI ABV UP PARAM F/U: HCPCS | Performed by: SPECIALIST

## 2021-08-03 RX ORDER — NITROGLYCERIN 20 MG/1
1 PATCH TRANSDERMAL DAILY
Qty: 90 PATCH | Refills: 3 | Status: SHIPPED | OUTPATIENT
Start: 2021-08-03

## 2021-08-03 NOTE — PROGRESS NOTES
Frank Foreman MD. [facility]              Patient: Jarvis Tian  : 1950      Today's Date: 8/3/2021          HISTORY OF PRESENT ILLNESS:     History of Present Illness:  Doing OK overall. She is feeling better lately. Has a recent cold, otherwise OK. Is feeling much stronger lately. Was dizzy 3 weeks ago which is better. CP is less frequent. PAST MEDICAL HISTORY:     Past Medical History:   Diagnosis Date    Adverse effect of anesthesia     slow to wake up    Arthritis     Asthma 2009    CAUSED BY MOLD    Atrial fibrillation ([address] 2009    Atrial flutter ([address]     CAD (coronary artery disease) 2009    Celiac disease     Chronic chest pain     Chronic obstructive pulmonary disease ([address] -    right leg    Chronic pain of right ankle     Diabetes ([address]     Drug induced prednisone, DM2    Diastolic heart failure ([address]     DVT (deep venous thrombosis) ([address]     Right leg post surgery    GERD (gastroesophageal reflux disease)     Gluten intolerance     Heart failure ([address]     Hypertension     Hypothyroidism 2009    Iron deficiency anemia     Lyme disease     Pacemaker         Personal history of MI (myocardial infarction)     Rheumatoid arthritis ([address]     S/P AV jules ablation          S/P left atrial appendage ligation     SUHAS clip 10/20     Slow to wake up after anesthesia     Syncope     Post testing- resolved    TIA (transient ischemic attack)  &     Vitamin B12 deficiency 2009       Past Surgical History:   Procedure Laterality Date    HX ADENOIDECTOMY      HX AFIB ABLATION      HX APPENDECTOMY      HX CHOLECYSTECTOMY  11    HX COLONOSCOPY      HX CORONARY STENT PLACEMENT      x 2    HX HEART CATHETERIZATION  2010    2 STENTS    HX HEART CATHETERIZATION  2020    HX HYSTERECTOMY      HX LUMBAR LAMINECTOMY  2000    L4-L5    HX ORTHOPAEDIC  -2012    RT.  FOOT SURGERY X 6/calcaneal osteotomy    HX ORTHOPAEDIC Right 09/19/2020    right hand [facility] joint replacement    HX TONSILLECTOMY  1964    [facility] ICAR CATHETER ABLATION ATRIOVENTR NODE FUNCTION N/A 11/19/2020    ABLATION AV NODE performed by Paul Grant MD at [address] [facility] [address] CATH LAB    VA INS NEW/RPLCMT PRM PM W/TRANSV ELTRD ATRIAL&VENT N/A 11/19/2020    INSERT PPM DUAL performed by Paul Grant MD at [address] [address] CATH LAB    VA INTRACARDIAC ELECTROPHYSIOLOGIC 3D MAPPING N/A 11/19/2020    Ep 3d Mapping performed by Paul Grant MD at [address] [facility] [address] CATH LAB           MEDICATIONS:     Current Outpatient Medications   Medication Sig Dispense Refill    aclidinium bromide (Tudorza Pressair) 400 mcg/actuation inhaler Take 1 Puff by inhalation two (2) times a day.  mometasone (Asmanex HFA) 200 mcg/actuation HFAA inhaler Take 1 Puff by inhalation two (2) times a day.  bumetanide (BUMEX) 1 mg tablet Take 1 mg by mouth two (2) times a day. 1 mg every am and 0.5 mg in the evening      calcium citrate 200 mg (950 mg) tablet Take 200 mg by mouth daily.  predniSONE (DELTASONE) 2.5 mg tablet Take 2.5 mg by mouth every evening. Indications: RA      eplerenone (INSPRA) 25 mg tablet Take 1 Tab by mouth Daily (before lunch). 90 Tab 1    metoprolol succinate (TOPROL-XL) 50 mg XL tablet Take 1 Tab by mouth daily. 90 Tab 3    nitroglycerin (NITRODUR) 0.1 mg/hr 1 Patch by TransDERmal route daily. 90 Patch 3    lidocaine (Lidoderm) 5 % 1 Patch by TransDERmal route every twenty-four (24) hours. Apply patch to the affected area for 12 hours a day and remove for 12 hours a day. 1 Each 0    acetaminophen (TYLENOL) 650 mg TbER Take 1,300 mg by mouth nightly as needed (sleep).  co-enzyme Q-10 (Co Q-10) 100 mg capsule Take 200 mg by mouth daily.  artificial tears, dextran 70-hypromellose, (GenTeal Tears Mild) 0.1-0.3 % ophthalmic solution Administer 1 Drop to both eyes daily.       carboxymethylcell/hypromellose (GENTEAL GEL OP) Administer 1 Drop to both eyes nightly.  omega 3-DHA-EPA-fish oil 1,000 mg (120 mg-180 mg) capsule Take 1 Cap by mouth every evening.  levalbuterol (XOPENEX) 0.63 mg/3 mL nebu 0.63 mg by Nebulization route two (2) times a day.  potassium 99 mg tablet Take 99 mg by mouth daily.  insulin glargine (LANTUS,BASAGLAR) 100 unit/mL (3 mL) inpn 12 Units by SubCUTAneous route nightly.  fenofibrate nanocrystallized (Tricor) 48 mg tablet Take 48 mg by mouth nightly.  levothyroxine (SYNTHROID) 137 mcg tablet Take 137 mcg by mouth Daily (before breakfast).  predniSONE (DELTASONE) 5 mg tablet Take 5 mg by mouth daily. Indications: RA      cholecalciferol (Vitamin D3) (1000 Units /25 mcg) tablet Take 1,000 Units by mouth daily.  turmeric (CURCUMIN) Take 1 g by mouth every evening.  SITagliptin (JANUVIA) 100 mg tablet Take 100 mg by mouth daily (with dinner).  vit C/vit E ac/lut/copper/zinc (PRESERVISION LUTEIN PO) Take 1 Tablet by mouth daily (with dinner).  ascorbic acid (VITAMIN C) 500 mg tablet Take 500 mg by mouth daily.  ferrous sulfate 325 mg (65 mg iron) tablet Take 325 mg by mouth daily (with lunch).  cyanocobalamin (VITAMIN B12) 1,000 mcg/mL injection INJECT 1 ML INTO THE MUSCLE EVERY 30 DAYS 10 mL 0    lansoprazole (PREVACID) 30 mg capsule Take 30 mg by mouth daily.          Allergies   Allergen Reactions    Butter Flavor Anaphylaxis     Butter AND CREME    Keflex [Cephalexin] Anaphylaxis    Milk Containing Products Anaphylaxis     Pt states not an allergy    Pcn [Penicillins] Anaphylaxis    Aspirin Hives and Other (comments)     \"it makes my stomach bleed\"      Cimzia [Certolizumab Pegol] Other (comments)     GI symptoms, blisters in the mouth and esophagus    Clopidogrel Other (comments)     GI bleed    Demerol [Meperidine] Other (comments)     HYPOTENSION    Fentanyl Other (comments)     HYPOTENSION    Ibuprofen Diarrhea     Patient reports that ibuprofen caused diarrhea    Morphine Other (comments)     HYPOTENSION    Nitroglycerin Other (comments)     IN PILL FORM  - HYPOTENSION  CAN TOLERATE PATCH FOR SHORT TIME    Sulfa (Sulfonamide Antibiotics) Angioedema     Lips    Tapazole [Methimazole] Unknown (comments)     Patient stated \"it made me feel like I had the flu\"    Tramadol Other (comments)     Patient reports thrush with tramadol use    Celebrex [Celecoxib] Other (comments)     GI bleed     Codeine Other (comments)     Low BP    Hydrocodone Other (comments)     Hypotension    Quinolones Unknown (comments)      could not recall reaction, but is was listed on list of allergies     Tetracyclines Other (comments)      could not recall reaction; Was on personal list of allergies.       Warfarin Other (comments)      mentioned allergy but could not recall reaction     Adhesive Tape-Silicones Other (comments)     BAD BLISTERS AND TENDS TO GET INFECTED    Albuterol Palpitations    Gluten Diarrhea     bloating    Oxycodone Other (comments)     Hallicination and hypotension           SOCIAL HISTORY:     Social History     Tobacco Use    Smoking status: Former Smoker     Packs/day: 1.00     Years: 30.00     Pack years: 30.00     Types: Cigarettes     Quit date: 2002     Years since quittin.1    Smokeless tobacco: Never Used   Substance Use Topics    Alcohol use: No    Drug use: Never         FAMILY HISTORY:     Family History   Problem Relation Age of Onset   Ayleen Draft Delayed Awakening Mother     Heart Disease Mother     Coronary Artery Disease Mother    Ayleen Draft Hypertension Mother    [place] Draft Stroke Mother    [place] Draft Delayed Awakening Sister     Hypertension Sister     Lung Disease Father     Cancer Father         colon    Hypertension Father     Hypertension Brother     Breast Cancer Maternal Aunt     Breast Cancer Maternal Aunt     Breast Cancer Cousin         maternal 1st cousin   [place] Draft Breast Cancer Maternal Aunt     Breast Cancer Cousin maternal 1st cousin    Breast Cancer Cousin         maternal 1st cousin    Deep Vein Thrombosis Neg Hx     Anesth Problems Neg Hx             REVIEW OF SYMPTOMS:      Review of Symptoms:  Constitutional: Negative for fever, chills  HEENT: Negative for nosebleeds, tinnitus  Respiratory: + CHACON   Cardiovascular: Negative for syncope;  + CHACON   Gastrointestinal: Negative for abdominal pain, diarrhea, melena. Genitourinary: Negative for dysuria  Musculoskeletal: + joint pain, RA  Skin: Negative for rash  Heme: No acute bleeding   Neurological: Negative for speech change and focal weakness.                  PHYSICAL EXAM:      Physical Exam:  Visit Vitals  /64 (BP 1 Location: Left upper arm, BP Patient Position: Sitting)   Pulse 88   Ht 5' 2\" (1.575 m)   Wt 162 lb 9.6 oz (73.8 kg)   LMP 09/29/1980 (LMP Unknown)   SpO2 94%   BMI 29.74 kg/m²       Patient appears generally well, mood and affect are appropriate and pleasant. HEENT:  Hearing intact, non-icteric, normocephalic, atraumatic. Neck Exam: Supple, No JVD sitting up  Lung Exam: Clear to auscultation, even breath sounds. Cardiac Exam: Regular rate and rhythm with no murmur    Abdomen: Soft, non-tender, normal bowel sounds. Obese . Extremities: Moves all ext well. Trace lower extremity edema. MSKTL: Overall good ROM ext  Skin: No significant rashes  Psych: Appropriate affect  Neuro - Grossly intact                 LABS / OTHER STUDIES:        Labs 1/19/21 - A1c 7.2, CMP OK, TSH 0.29, T4 14 (H),         CARDIAC DIAGNOSTICS:      Cardiac Evaluation Includes:     Cardiac Cath 6/09 - severe mLCX disease --> stenting     Cath 8/19/10 - RCA and LCX stents patent; MLI in LAD, LVEF 60%     Event Monitor 3/11 - PVC's  Holter 3/16 - PVC's  Echo 3/17 - LVEF 60%  Lexiscan Cardiolite 3/17 - normal MPI, LVEF 58%     [facility] records reviewed.  Went there for chest pain on 1/3/19.    Labs 1/3/19 - Trop < 0.02, BNP 18, Cr 0.61, Hgb 13  Cath 1/4/19 - LAD stent patent, LCX without sig disease, RCA stent patent.       Holter 1/23/19 - NSR, normal study      Stress Echo 2/11/19 - walked 3:21 (2.4 METS), baseline /90, Max /90, normal stress EKG and stress echo      CXR 2/22/19 - normal      Echo 3/21/19 - LVEF 61%; grade 1 diastology (normal for age), AV sclerosis.  RVSP 26 mmHg.       PARDEEP's with exercise 6/7/19 - normal       Event Monitor 5/15/19-6/6/19 - normal study; symptoms correlated with sinus      Right Heart Cath 6/28/19 -[facility],[address] findings:   RAP=   Mean 17    mmHg  RVSP= 40/20    mmHg  PAP=     42/30/34  mmHg  PCWP=  27 mmHg  CO=        Thermal 4.1  L/min,             Faraz pending  CI=           Thermal 3.08  L/min/m2,     Faraz pending      Findings:  1.   Elevated right and left sided filling pressure  2.   Pulmonary hypertension, WHO group 2  3.   Perserved cardiac index by Thermal     Echo 8/13/19 - LVEF 50-55%, \"The following segments are hypokinetic: basal inferior, basal inferolateral and mid inferolateral\"     Cardiac Cath 8/13/19 - Non-obstructive CAD with patent stents in LCX and RCA.  LVEDP 16 mmHg.       Cardiac MRI 9/30/19 - 1. Normal left ventricular cavity size with preserved left ventricular systolic function. There is no significant regional wall motion abnormalities. LVEF 60%. 2. Normal right ventricular size and systolic motion. RVEF 60%. 3. Trace to mild mitral regurgitation. 4. On EGE and LGE study, there is a small thin subendocardial infarct involving 25% to 50% thickness of the subendocardial wall of the basal inferior wall. This infarct is in RCA territory. There is reasonable viability surrounding this infarct. The mid to distal inferior wall and inferoseptal wall does not demonstrate any infarct. There are no other infarct. There is no features of infiltrative sarcoidosis or cardiac amyloidosis. There is no features of inflammatory myocarditis. All other myocardial walls are otherwise completely viable.  There is no features of hemochromatosis.  5. Normal pericardium without significant pericardial effusion.      Venous Doppler 9/9/19 - No DVT Bilat      Lexiscan Cardiolite 12/9/19 - Normal MPI, LVEF 66%     PFT's 1/14/20 - Spirometry reveals severe airflow obstruction with moderate reduction in forced vital capacity which may be due to a restrictive ventilatory defect or air trapping     COY 10/15/20 - normal, LVEF 55%     Surgical SUHAS clip 10/20/20 Video-assisted thoracoscopic placement of left atrial appendage clip     Echo 10/29/20 - LVEF 55-60%, PASP 41 mmHg      Echo 11/3/20 - LVEF 55-60%     Lexiscan Cardiolite 11/5/20 - Normal MPI, LVEF 64%     Dual PPM / AV node ablation 11/19/20     Echo 3/23/21 - LVEF 45%, septal bounce, grade 1 diastology      BIV upgrade with LV lead placed 6/28/21     Echo 6/29/21 - LVEF 45-50%        EKG 2/22/19 - NSR, normal   EKG 8/12/19 - NSR, NSST changes   EKG 11/4/20 - Atrial flutter with RVR           ASSESSMENT AND PLAN:      Assessment and Plan:  1) Chronic Chest pain and SOB   - She had CAD with prior stenting in RCA and LCX (2009 ? )   - She notes CP/SOB symptoms since Jan 2019   -[facility] cath 1/19 showed patent vessels. - She saw Pulmonary ([facility]) and she says workup was OK.    - Cardiac cath repeated 8/13/19 showed non-obs CAD with LVEDP 16 mmHg  - Cardiac MRI 9/30/19 - There is no features of infiltrative sarcoidosis or cardiac amyloidosis.   - Aujazmine Potprema considered but due to problem with blood thinners she declined proceeding with one   - PFTs with severe airflow obstruction with no improvement following bronchodilators  - DLCO corrected to normal when considering alveolar volume  - No evidence of mold toxicity by Dr. Samina Pace  - Completed therapy for Lyme Disease  - Follow up with Dr. Melilssa Wan  - Her symptoms could be due to small vessel disease and/or diastolic dysfunction (see below)      2) HFpEF - NYHA Class III  - RHC with PCWP 27 mmHg on 6/19   - SPEP - no M spike  - Iron profile - normal  - ATTR negative  - Continue bumex, eplerenone, metoprolol  - She takes Bumex 1 in am and 0.5 in PM (she had \"kidney pain\" at 1 mg BID) - she cam take extra Bumex as needed   - volume status seems fair for her (will take extra Bumex when she feels more volume overloaded)   - On 8/3/21 - she is feeling much after BiV upgrade June 2021      3) CAD s/p PCI to LAD , RCA  - BR[facility] RETREAT in 8/19 - no intervention  - Lexiscan Cardiolite 11/5/20 - Normal MPI, LVEF 64%  - Intolerant to statins d/t myalgias  - ASA intolerant d/t GI bleeding  - Developed plavix induced thrombocytopenia - advised by hematology to avoid antiplatelets  - has chronic chest pain (maybe from small vessel disease)   - On 1/21/21 she returns with chronic chest pain ---> Increase Toprol XL to 50 mg daily and have her try to use NTG patch daily   - On 3/25/21 - she says CP is controlled --- NTG patch seems to help the most   - On 8/3/21 - she feels much better with less CP after BiV upgrade      4) History of Iron Deficiency Anemia   - She takes no blood thinners (not even aspirin) due to anemia in past   - Dr. Eliseo Castaneda record 12/16 reviewed - He was seeing her for iron deficiency anemia thought to be from GI Bleed from aspirin and plavix      5) Lipids -   - Does not take a statin due to having muscle aches (from RA at baseline)  - Tried to get her on PSCK9 Inhibitor in past bur she is not taking one   - She is now only on Tricor      6) Atrial flutter with RVR 11/20  S/p PPM   - Dual PPM / AV node ablation 11/19/20   - not on any blood thinners due to problems with bleeding   - Surgical SUHAS clip 10/20/20   - Echo 3/23/21 - LVEF 45%,   - BIV upgrade with LV lead placed 6/28/21 for reducing LVEF ----> she feels much better afterwards      7) HTN  - BP looks OK - continue meds      8) RA  - she is on prednisone      9) History of Tick Bite and RMSF - [place]  - Follow up with Dr. Claudetta Bidding     10) See me in 6 months.   Patient expressed understanding of the plan - questions were answered. On a side note, I see her brother.             Caden Nicole MD, [facility] [address] [place] [address] [address], [address]      [id number] [facility] [address]  [facility], [address] [place] [address]  Ph: [phone]                                [phone]

## 2021-08-03 NOTE — PROGRESS NOTES
Romayne Lips is a 66 y.o. female    Chief Complaint   Patient presents with    Follow-up     3 month f/u, PAF    Coronary Artery Disease    Shortness of Breath    CHF     Patient has to have a tooth extracted around 9/21. Is this okay with pacemaker? Chest pain some chest pain yesterday     SOB is getting better     Dizziness if she moves too fast    Swelling No    Refills nitro patch    Visit Vitals  /64 (BP 1 Location: Left upper arm, BP Patient Position: Sitting)   Pulse 88   Ht 5' 2\" (1.575 m)   Wt 162 lb 9.6 oz (73.8 kg)   LMP 09/29/1980 (LMP Unknown)   SpO2 94%   BMI 29.74 kg/m²       1. Have you been to the ER, urgent care clinic since your last visit? Hospitalized since your last visit? ED 7/16    2. Have you seen or consulted any other health care providers outside of the [address] [place] [address] since your last visit? Include any pap smears or colon screening.   No

## 2021-08-12 ENCOUNTER — PATIENT OUTREACH (OUTPATIENT)
Dept: CASE MANAGEMENT | Age: 71
End: 2021-08-12

## 2021-08-12 NOTE — PROGRESS NOTES
Contacted the patient to follow up,   reports doing well.   Recovering from a cold  Strep test and COVID tests were negative  Current weight, 160 pounds

## 2021-08-13 RX ORDER — FENOFIBRATE 48 MG/1
48 TABLET, COATED ORAL
Qty: 90 TABLET | Refills: 3 | Status: SHIPPED | OUTPATIENT
Start: 2021-08-13 | End: 2022-06-23

## 2021-08-13 NOTE — TELEPHONE ENCOUNTER
Refill per VO of Dr. Samra Tomlinson  Last appt: 8/3/2021  Future Appointments   Date Time Provider Inge Reaves   8/30/2021  1:00 PM PACEMAKER, TAPAN [facility]   8/30/2021  1:20 PM Anibal Pierre NP [facility]   10/4/2021 10:15 AM [address]   12/28/2021  9:30 AM [address] [facility]   2/10/2022 11:20 AM Shakira Akbar MD [facility]       Requested Prescriptions     Pending Prescriptions Disp Refills    fenofibrate nanocrystallized (Tricor) 48 mg tablet       Sig: Take 1 Tablet by mouth nightly.

## 2021-09-01 ENCOUNTER — PATIENT OUTREACH (OUTPATIENT)
Dept: CASE MANAGEMENT | Age: 71
End: 2021-09-01

## 2021-09-01 NOTE — PROGRESS NOTES
Patient has graduated from the Bundle program on 9/1/2021. Patient/family has the ability to self-manage at this time Care management goals have been completed. Patient was not referred to the [facility] team for further management. Goals Addressed                 This Visit's Progress     COMPLETED: Attend follow up appointments on schedule          06/08/21  Celine Gonzalez Saw pulmonologist today   Recommended to double Bumex dose to 1 mg BID    06/30/21   Will attend follow up appt with Dr. Carlee Salgado scheduled this afternoon    07/12/21   Will attend follow up appt with ENT to evaluate tongue scheduled 7/15   Will attend follow up appt with cards tomorrow 7/13 07/29/21   Attended follow up appt with cards on 7/13   Will attend follow up appt with Dr. Cheikh Loyola scheduled 8/3       COMPLETED: Maintains daily weight. 06/07/21   Reports three pound weight gain since yesterday   Current weight, 166 pounds   Will monitor weight daily and log results   Will elevate LE while sitting   Will monitor fluid intake    06/08/21   Current weight, 165 pounds   Reports mild LE edema, sob    07/12/21   Current weight 163 pounds   Average weight 163-178 pounds    07/29/21   Current weight, 163 pounds       COMPLETED: Prevent complications post hospitalization. 06/30/21   S/p PM upgrade   Will wear sling as instructed   Will monitor for s/sx of infection to procedure site   Will complete 7 day course of abx   Will not lift anything heavier than 10 pounds for 4 weeks with the affected arm.  Will not reach above your head with the affected arm for 4 weeks    07/12/21   Completed abx   Denies pain to PM site   No evidence of infection noted         COMPLETED: Reduce risk of CHF exacerbations and complications. 07/29/21   Reports lightheadedness/dizziness/dyspnea on exertion   Will not fall   Denies edema         COMPLETED: Understands and adheres to diet.           06/07/21   Reviewed low sodium diet   Reports using Ms. Dash for seasoning    07/12/21   Due to oral thrush, patient limited to jello and pudding            Patient has Care Transition Nurse's contact information for any further questions, concerns, or needs.   Patients upcoming visits:    Future Appointments   Date Time Provider Inge Jelly   9/3/2021 11:00 AM PACEMAKER, TAPAN [facility]   9/3/2021 11:20 AM Shanita Flores NP [facility]   10/4/2021 10:15 AM [address]   12/28/2021  9:30 AM [address] [facility]   2/10/2022 11:20 AM Edin Maldonado MD [facility]

## 2021-09-03 ENCOUNTER — OFFICE VISIT (OUTPATIENT)
Dept: CARDIOLOGY CLINIC | Age: 71
End: 2021-09-03
Payer: MEDICARE

## 2021-09-03 VITALS
OXYGEN SATURATION: 96 % | BODY MASS INDEX: 30.14 KG/M2 | SYSTOLIC BLOOD PRESSURE: 122 MMHG | HEIGHT: 62 IN | HEART RATE: 84 BPM | RESPIRATION RATE: 16 BRPM | WEIGHT: 163.8 LBS | DIASTOLIC BLOOD PRESSURE: 80 MMHG

## 2021-09-03 DIAGNOSIS — I48.0 PAF (PAROXYSMAL ATRIAL FIBRILLATION) (HCC): Primary | ICD-10-CM

## 2021-09-03 DIAGNOSIS — Z95.0 BIVENTRICULAR CARDIAC PACEMAKER IN SITU: Primary | ICD-10-CM

## 2021-09-03 DIAGNOSIS — I42.9 CARDIOMYOPATHY, UNSPECIFIED TYPE (HCC): ICD-10-CM

## 2021-09-03 PROCEDURE — G8752 SYS BP LESS 140: HCPCS | Performed by: NURSE PRACTITIONER

## 2021-09-03 PROCEDURE — 1090F PRES/ABSN URINE INCON ASSESS: CPT | Performed by: NURSE PRACTITIONER

## 2021-09-03 PROCEDURE — G0463 HOSPITAL OUTPT CLINIC VISIT: HCPCS | Performed by: NURSE PRACTITIONER

## 2021-09-03 PROCEDURE — G8427 DOCREV CUR MEDS BY ELIG CLIN: HCPCS | Performed by: NURSE PRACTITIONER

## 2021-09-03 PROCEDURE — 1101F PT FALLS ASSESS-DOCD LE1/YR: CPT | Performed by: NURSE PRACTITIONER

## 2021-09-03 PROCEDURE — 3017F COLORECTAL CA SCREEN DOC REV: CPT | Performed by: NURSE PRACTITIONER

## 2021-09-03 PROCEDURE — 93281 PM DEVICE PROGR EVAL MULTI: CPT | Performed by: INTERNAL MEDICINE

## 2021-09-03 PROCEDURE — G8399 PT W/DXA RESULTS DOCUMENT: HCPCS | Performed by: NURSE PRACTITIONER

## 2021-09-03 PROCEDURE — G8754 DIAS BP LESS 90: HCPCS | Performed by: NURSE PRACTITIONER

## 2021-09-03 PROCEDURE — G8417 CALC BMI ABV UP PARAM F/U: HCPCS | Performed by: NURSE PRACTITIONER

## 2021-09-03 PROCEDURE — G8432 DEP SCR NOT DOC, RNG: HCPCS | Performed by: NURSE PRACTITIONER

## 2021-09-03 PROCEDURE — 93281 PM DEVICE PROGR EVAL MULTI: CPT | Performed by: NURSE PRACTITIONER

## 2021-09-03 PROCEDURE — G9899 SCRN MAM PERF RSLTS DOC: HCPCS | Performed by: NURSE PRACTITIONER

## 2021-09-03 PROCEDURE — 99215 OFFICE O/P EST HI 40 MIN: CPT | Performed by: NURSE PRACTITIONER

## 2021-09-03 PROCEDURE — G8536 NO DOC ELDER MAL SCRN: HCPCS | Performed by: NURSE PRACTITIONER

## 2021-09-03 NOTE — PROGRESS NOTES
HISTORY OF PRESENTING ILLNESS     Milagro Goodman is a 66 y. o. female with hx of paroxysmal arial fibrillation (s/p ablation x10 years ago with Dr. Asia Erazo), previous history of thrombocytopenia, hypertension, COPD, heart failure preserved EF, CAD, LA ligation, apcemaker s/p AV node ablation. Normal stress testing in 11/2020.    Delmus Adie rate was increased to 80 bpm, she felt worse at 70bpm,  and an echocardiogram was obtained which demonstrated an LV ejection fraction of 45% compared to an LV function of 55 to 60% on 11/2020. She dealt with SOB following her procedure; LV lead was evaluated with CXR and appeared to be stable in position. Her sob has been stable. She reports improvement in her symptoms since last visit. She has been increasing her activity. She did have an episode of fast heartbeats, weakness and fatigue on this past Tuesday evening. It resolved on it's own and she is going to follow up with her PCP for abnormal thyroid levels.      PAST MEDICAL HISTORY     Past Medical History:   Diagnosis Date    Adverse effect of anesthesia     slow to wake up    Arthritis     Asthma 6/29/2009    CAUSED BY MOLD    Atrial fibrillation ([address] 6/29/2009    Atrial flutter ([address]     CAD (coronary artery disease) 06/29/2009    Celiac disease 2010    Chronic chest pain     Chronic obstructive pulmonary disease ([address] 2004-5    right leg    Chronic pain of right ankle     Diabetes ([address]     Drug induced prednisone, DM2    Diastolic heart failure ([address]     DVT (deep venous thrombosis) ([address] 2004    Right leg post surgery    GERD (gastroesophageal reflux disease)     Gluten intolerance     Heart failure ([address]     Hypertension     Hypothyroidism 6/29/2009    Iron deficiency anemia     Lyme disease     Pacemaker     11/20    Personal history of MI (myocardial infarction)     Rheumatoid arthritis ([address]     S/P AV jules ablation     11/20     S/P left atrial appendage ligation     SUHAS clip 10/20  Slow to wake up after anesthesia     Syncope     Post testing- resolved    TIA (transient ischemic attack) 2004 & 2006    Vitamin B12 deficiency 6/29/2009           PAST SURGICAL HISTORY     Past Surgical History:   Procedure Laterality Date    HX ADENOIDECTOMY      HX AFIB ABLATION      HX APPENDECTOMY      HX CHOLECYSTECTOMY  6/16/11    HX COLONOSCOPY      HX CORONARY STENT PLACEMENT      x 2    HX HEART CATHETERIZATION  2010    2 STENTS    HX HEART CATHETERIZATION  03/2020    HX HYSTERECTOMY      HX LUMBAR LAMINECTOMY  2000    L4-L5    HX ORTHOPAEDIC  1993-6/2012    RT.  FOOT SURGERY X 6/calcaneal osteotomy    HX ORTHOPAEDIC Right 09/19/2020    right hand [facility] joint replacement    HX TONSILLECTOMY  1964    WA ICAR CATHETER ABLATION ATRIOVENTR NODE FUNCTION N/A 11/19/2020    ABLATION AV NODE performed by Adrian Carter MD at [address], [facility]/s Dr CATH LAB    WA INS NEW/RPLCMT PRM PM W/TRANSV ELTRD ATRIAL&VENT N/A 11/19/2020    INSERT PPM DUAL performed by Adrian Carter MD at [address], [facility] Dr CATH LAB    [place] INTRACARDIAC ELECTROPHYSIOLOGIC 3D MAPPING N/A 11/19/2020    Ep 3d Mapping performed by Adrian Carter MD at [address], [facility]/s Dr CATH LAB          ALLERGIES     Allergies   Allergen Reactions    Butter Flavor Anaphylaxis     Butter AND CREME    Keflex [Cephalexin] Anaphylaxis    Milk Containing Products Anaphylaxis     Pt states not an allergy    Pcn [Penicillins] Anaphylaxis    Aspirin Hives and Other (comments)     \"it makes my stomach bleed\"      Cimzia [Certolizumab Pegol] Other (comments)     GI symptoms, blisters in the mouth and esophagus    Clopidogrel Other (comments)     GI bleed    Demerol [Meperidine] Other (comments)     HYPOTENSION    Fentanyl Other (comments)     HYPOTENSION    Ibuprofen Diarrhea     Patient reports that ibuprofen caused diarrhea    Morphine Other (comments)     HYPOTENSION    Nitroglycerin Other (comments)     IN PILL FORM  - HYPOTENSION  CAN TOLERATE PATCH FOR SHORT TIME    Sulfa (Sulfonamide Antibiotics) Angioedema     Lips    Tapazole [Methimazole] Unknown (comments)     Patient stated \"it made me feel like I had the flu\"    Tramadol Other (comments)     Patient reports thrush with tramadol use    Celebrex [Celecoxib] Other (comments)     GI bleed     Codeine Other (comments)     Low BP    Hydrocodone Other (comments)     Hypotension    Quinolones Unknown (comments)      could not recall reaction, but is was listed on list of allergies     Tetracyclines Other (comments)      could not recall reaction; Was on personal list of allergies.       Warfarin Other (comments)      mentioned allergy but could not recall reaction     Adhesive Tape-Silicones Other (comments)     BAD BLISTERS AND TENDS TO GET INFECTED    Albuterol Palpitations    Gluten Diarrhea     bloating    Oxycodone Other (comments)     Hallicination and hypotension          FAMILY HISTORY     Family History   Problem Relation Age of Onset    Delayed Awakening Mother     Heart Disease Mother     Coronary Artery Disease Mother     Hypertension Mother     Stroke Mother     Delayed Awakening Sister     Hypertension Sister     Lung Disease Father     Cancer Father         colon    Hypertension Father     Hypertension Brother     Breast Cancer Maternal Aunt     Breast Cancer Maternal Aunt     Breast Cancer Cousin         maternal 1st cousin    Breast Cancer Maternal Aunt     Breast Cancer Cousin         maternal 1st cousin   [facility] Breast Cancer Cousin         maternal 1st cousin    Deep Vein Thrombosis Neg Hx     Anesth Problems Neg Hx     negative for cardiac disease       SOCIAL HISTORY     Social History     Socioeconomic History    Marital status:      Spouse name: Not on file    Number of children: Not on file    Years of education: Not on file    Highest education level: Not on file   Tobacco Use    Smoking status: Former Smoker     Packs/day: 1.00 Years: 30.00     Pack years: 30.00     Types: Cigarettes     Quit date: 2002     Years since quittin.2    Smokeless tobacco: Never Used   Substance and Sexual Activity    Alcohol use: No    Drug use: Never    Sexual activity: Yes     Partners: Male     Social Determinants of Health     Financial Resource Strain:     Difficulty of Paying Living Expenses:    Food Insecurity:     Worried About Running Out of Food in the Last Year:     [address] in the Last Year:    Transportation Needs:     Lack of Transportation (Medical):  Lack of Transportation (Non-Medical):    Physical Activity:     Days of Exercise per Week:     Minutes of Exercise per Session:    Stress:     Feeling of Stress :    Social Connections:     Frequency of Communication with Friends and Family:     Frequency of Social Gatherings with Friends and Family:     Attends Jew Services:     Active Member of Clubs or Organizations:     Attends Club or Organization Meetings:     Marital Status:          MEDICATIONS     Current Outpatient Medications   Medication Sig    fenofibrate nanocrystallized (Tricor) 48 mg tablet Take 1 Tablet by mouth nightly.  nitroglycerin (NITRODUR) 0.1 mg/hr 1 Patch by TransDERmal route daily.  aclidinium bromide (Tudorza Pressair) 400 mcg/actuation inhaler Take 1 Puff by inhalation two (2) times a day.  mometasone (Asmanex HFA) 200 mcg/actuation HFAA inhaler Take 1 Puff by inhalation two (2) times a day.  bumetanide (BUMEX) 1 mg tablet Take 1 mg by mouth two (2) times a day. 1 mg every am and 0.5 mg in the evening    calcium citrate 200 mg (950 mg) tablet Take 200 mg by mouth daily.  predniSONE (DELTASONE) 2.5 mg tablet Take 2.5 mg by mouth every evening. Indications: RA    eplerenone (INSPRA) 25 mg tablet Take 1 Tab by mouth Daily (before lunch).  metoprolol succinate (TOPROL-XL) 50 mg XL tablet Take 1 Tab by mouth daily.     lidocaine (Lidoderm) 5 % 1 Patch by TransDERmal route every twenty-four (24) hours. Apply patch to the affected area for 12 hours a day and remove for 12 hours a day.  acetaminophen (TYLENOL) 650 mg TbER Take 1,300 mg by mouth nightly as needed (sleep).  co-enzyme Q-10 (Co Q-10) 100 mg capsule Take 200 mg by mouth daily.  artificial tears, dextran 70-hypromellose, (GenTeal Tears Mild) 0.1-0.3 % ophthalmic solution Administer 1 Drop to both eyes daily.  carboxymethylcell/hypromellose (GENTEAL GEL OP) Administer 1 Drop to both eyes nightly.  omega 3-DHA-EPA-fish oil 1,000 mg (120 mg-180 mg) capsule Take 1 Cap by mouth every evening.  levalbuterol (XOPENEX) 0.63 mg/3 mL nebu 0.63 mg by Nebulization route two (2) times a day.  potassium 99 mg tablet Take 99 mg by mouth daily.  insulin glargine (LANTUS,BASAGLAR) 100 unit/mL (3 mL) inpn 12 Units by SubCUTAneous route nightly.  levothyroxine (SYNTHROID) 137 mcg tablet Take 137 mcg by mouth Daily (before breakfast).  predniSONE (DELTASONE) 5 mg tablet Take 5 mg by mouth daily. Indications: RA    cholecalciferol (Vitamin D3) (1000 Units /25 mcg) tablet Take 1,000 Units by mouth daily.  turmeric (CURCUMIN) Take 1 g by mouth every evening.  SITagliptin (JANUVIA) 100 mg tablet Take 100 mg by mouth daily (with dinner).  vit C/vit E ac/lut/copper/zinc (PRESERVISION LUTEIN PO) Take 1 Tablet by mouth daily (with dinner).  ascorbic acid (VITAMIN C) 500 mg tablet Take 500 mg by mouth daily.  ferrous sulfate 325 mg (65 mg iron) tablet Take 325 mg by mouth daily (with lunch).  cyanocobalamin (VITAMIN B12) 1,000 mcg/mL injection INJECT 1 ML INTO THE MUSCLE EVERY 30 DAYS    lansoprazole (PREVACID) 30 mg capsule Take 30 mg by mouth daily. No current facility-administered medications for this visit. I have reviewed the nurses notes, vitals, problem list, allergy list, medical history, family, social history and medications.        REVIEW OF SYMPTOMS      General: Pt denies excessive weight gain or loss. Pt is able to conduct ADL's  HEENT: Denies blurred vision, headaches, hearing loss, epistaxis and difficulty swallowing. Respiratory: Denies cough, congestion, shortness of breath, CHACON, wheezing or stridor. Cardiovascular: Denies precordial pain, palpitations, edema or PND  Gastrointestinal: Denies poor appetite, indigestion, abdominal pain or blood in stool  Genitourinary: Denies hematuria, dysuria, increased urinary frequency  Musculoskeletal: Denies joint pain or swelling from muscles or joints  Neurologic: Denies tremor, paresthesias, headache, or sensory motor disturbance  Psychiatric: Denies confusion, insomnia, depression  Integumentray: Denies rash, itching or ulcers. Hematologic: Denies easy bruising, bleeding       PHYSICAL EXAMINATION      Vitals: see vitals section  General: Well developed, in no acute distress. HEENT: No jaundice, oral mucosa moist, no oral ulcers  Neck: Supple, no stiffness, no lymphadenopathy, supple  Heart:  Normal S1/S2 negative S3 or S4. Regular, no murmur, gallop or rub, no jugular venous distention  Respiratory: Clear bilaterally x 4, no wheezing or rales  Abdomen:   Soft, non-tender, bowel sounds are active. Extremities:  No edema, normal cap refill, no cyanosis. Musculoskeletal: No clubbing, no deformities  Neuro: A&Ox3, speech clear, gait stable, cooperative, no focal neurologic deficits  Skin: Skin color is normal. No rashes or lesions.  Non diaphoretic, moist.  Vascular: 2+ pulses symmetric in all extremities       DIAGNOSTIC DATA      EKG:   Visit Vitals  /80 (BP 1 Location: Left upper arm, BP Patient Position: Sitting)   Pulse 84   Resp 16   Ht 5' 2\" (1.575 m)   Wt 163 lb 12.8 oz (74.3 kg)   SpO2 96%   BMI 29.96 kg/m²        LABORATORY DATA      Lab Results   Component Value Date/Time    WBC 18.6 (H) 07/27/2021 02:23 PM    Hemoglobin (POC) 15.6 06/09/2014 03:00 AM    HGB 14.6 07/27/2021 02:23 PM    Hematocrit (POC) 49 (H) 11/05/2020 09:33 PM    HCT 44.9 07/27/2021 02:23 PM    PLATELET 899 54/24/1753 02:23 PM    MCV 94 07/27/2021 02:23 PM      Lab Results   Component Value Date/Time    Sodium 143 07/27/2021 02:23 PM    Potassium 4.0 07/27/2021 02:23 PM    Chloride 104 07/27/2021 02:23 PM    CO2 21 07/27/2021 02:23 PM    Anion gap 6 07/16/2021 02:57 PM    Glucose 138 (H) 07/27/2021 02:23 PM    BUN 17 07/27/2021 02:23 PM    Creatinine 0.57 07/27/2021 02:23 PM    BUN/Creatinine ratio 30 (H) 07/27/2021 02:23 PM    GFR est  07/27/2021 02:23 PM    GFR est non-AA 92 07/27/2021 02:23 PM    Calcium 9.2 07/27/2021 02:23 PM    Bilirubin, total 0.3 07/27/2021 02:23 PM    Alk. phosphatase 82 07/27/2021 02:23 PM    Protein, total 6.2 07/27/2021 02:23 PM    Albumin 4.1 07/27/2021 02:23 PM    Globulin 3.7 07/16/2021 02:57 PM    A-G Ratio 2.0 07/27/2021 02:23 PM    ALT (SGPT) 19 07/27/2021 02:23 PM           ASSESSMENT         1. Atrial fibrillation with RVR              Paroxysmal                       S/p SUHAS Clip              AV node ablation  2.  CAD               S/p PCI to LAD and RCA  3. PVCs  4. Hypertension  5. COPD/Asthma  6. Pacemaker       PLAN     Continue monitoring in device clinic with a follow up to monitor closely for recurrence of volume overload as well as for monitoring battery longevity given higher programmed LV lead output. She will follow up with PCP for evaluation of her thyroid. Will update echocardiogram to observe for any improvement in her EF. Consider increase in Bumex should her optivol levels continue to rise or she develop symptoms which we reviewed today. ICD-10-CM ICD-9-CM    1. PAF (paroxysmal atrial fibrillation) (HCC)  I48.0 427.31 ECHO ADULT COMPLETE   2. Cardiomyopathy, unspecified type ([address]  I42.9 425.4 ECHO ADULT COMPLETE     No orders of the defined types were placed in this encounter.          FOLLOW-UP   1 year    Thank you, Kelton Navarrete MD and Dr. Leandro Magallanes for allowing me to participate in the care of this extraordinarily pleasant female. Please do not hesitate to contact me for further questions/concerns.      LESLIE Carey 92.  [address] [place] [address], [place] [address]  Justo Cruz     Zoie Quinteros  [phone] / [phone] Fax   [phone] / [phone] Fax

## 2021-09-03 NOTE — LETTER
9/3/2021    Patient: Moni Patel   YOB: 1950   Date of Visit: 9/3/2021     Lynn Lovell MD  [address] [place] [id number]  Via Fax: [phone]    Dear Lynn Lovell MD,      Thank you for referring Ms. Hipolito Gibbs to [facility] for evaluation. My notes for this consultation are attached. If you have questions, please do not hesitate to call me. I look forward to following your patient along with you.       Sincerely,    Mily Landaverde NP

## 2021-09-03 NOTE — PROGRESS NOTES
Room EP 3  Visit Vitals  /80 (BP 1 Location: Left upper arm, BP Patient Position: Sitting)   Pulse 84   Resp 16   Ht 5' 2\" (1.575 m)   Wt 163 lb 12.8 oz (74.3 kg)   LMP 09/29/1980 (LMP Unknown)   SpO2 96%   BMI 29.96 kg/m²     Chest pain: yes, last episode Tuesday night, 148/101, HR 92, dizzy, slight SOB, irregular heart beat lasted about 90 minutes  Shortness of breath: no  Edema: no  Palpitations, Skipped beats, Rapid heartbeat: no  Dizziness: yes, some days worse that others  Fatigue:tires easily    New diagnosis/Surgeries: no    ER/Hospitalizations: no    Refills:no

## 2021-09-22 ENCOUNTER — ANCILLARY PROCEDURE (OUTPATIENT)
Dept: CARDIOLOGY CLINIC | Age: 71
End: 2021-09-22
Payer: MEDICARE

## 2021-09-22 VITALS
HEIGHT: 62 IN | DIASTOLIC BLOOD PRESSURE: 78 MMHG | SYSTOLIC BLOOD PRESSURE: 120 MMHG | WEIGHT: 163 LBS | BODY MASS INDEX: 30 KG/M2

## 2021-09-22 DIAGNOSIS — I48.0 PAF (PAROXYSMAL ATRIAL FIBRILLATION) (HCC): ICD-10-CM

## 2021-09-22 DIAGNOSIS — I42.9 CARDIOMYOPATHY, UNSPECIFIED TYPE (HCC): ICD-10-CM

## 2021-09-22 LAB
ECHO EST RA PRESSURE: 3 MMHG
ECHO LA AREA 4C: 15.13 CM2
ECHO LA MAJOR AXIS: 3.32 CM
ECHO LA MINOR AXIS: 1.9 CM
ECHO LA VOL 2C: 42.05 ML (ref 22–52)
ECHO LA VOL 4C: 34.32 ML (ref 22–52)
ECHO LA VOL BP: 42.49 ML (ref 22–52)
ECHO LA VOL/BSA BIPLANE: 24.28 ML/M2 (ref 16–28)
ECHO LA VOLUME INDEX A2C: 24.03 ML/M2 (ref 16–28)
ECHO LA VOLUME INDEX A4C: 19.61 ML/M2 (ref 16–28)
ECHO LV E' LATERAL VELOCITY: 6.03 CM/S
ECHO LV E' SEPTAL VELOCITY: 6.74 CM/S
ECHO LV EDV A2C: 60.9 ML
ECHO LV EDV A4C: 68.56 ML
ECHO LV EDV BP: 64.89 ML (ref 56–104)
ECHO LV EDV INDEX A4C: 39.2 ML/M2
ECHO LV EDV INDEX BP: 37.1 ML/M2
ECHO LV EDV NDEX A2C: 34.8 ML/M2
ECHO LV EJECTION FRACTION A2C: 56 PERCENT
ECHO LV EJECTION FRACTION A4C: 53 PERCENT
ECHO LV EJECTION FRACTION BIPLANE: 54.3 PERCENT (ref 55–100)
ECHO LV ESV A2C: 26.74 ML
ECHO LV ESV A4C: 32.07 ML
ECHO LV ESV BP: 29.64 ML (ref 19–49)
ECHO LV ESV INDEX A2C: 15.3 ML/M2
ECHO LV ESV INDEX A4C: 18.3 ML/M2
ECHO LV ESV INDEX BP: 16.9 ML/M2
ECHO LV INTERNAL DIMENSION DIASTOLIC: 5.16 CM (ref 3.9–5.3)
ECHO LV INTERNAL DIMENSION SYSTOLIC: 3.91 CM
ECHO LV IVSD: 1.13 CM (ref 0.6–0.9)
ECHO LV MASS 2D: 212.6 G (ref 67–162)
ECHO LV MASS INDEX 2D: 121.5 G/M2 (ref 43–95)
ECHO LV POSTERIOR WALL DIASTOLIC: 1.03 CM (ref 0.6–0.9)
ECHO MV A VELOCITY: 70.29 CM/S
ECHO MV AREA PHT: 5.51 CM2
ECHO MV E DECELERATION TIME (DT): 137.65 MS
ECHO MV E VELOCITY: 49.67 CM/S
ECHO MV E/A RATIO: 0.71
ECHO MV E/E' LATERAL: 8.24
ECHO MV E/E' RATIO (AVERAGED): 7.8
ECHO MV E/E' SEPTAL: 7.37
ECHO MV PRESSURE HALF TIME (PHT): 39.92 MS
ECHO RIGHT VENTRICULAR SYSTOLIC PRESSURE (RVSP): 24.29 MMHG
ECHO TV REGURGITANT MAX VELOCITY: 230.7 CM/S
ECHO TV REGURGITANT PEAK GRADIENT: 21.29 MMHG
LA VOL DISK BP: 38.77 ML (ref 22–52)

## 2021-09-22 PROCEDURE — 93325 DOPPLER ECHO COLOR FLOW MAPG: CPT | Performed by: SPECIALIST

## 2021-09-22 PROCEDURE — 93321 DOPPLER ECHO F-UP/LMTD STD: CPT | Performed by: SPECIALIST

## 2021-09-22 PROCEDURE — 93308 TTE F-UP OR LMTD: CPT | Performed by: SPECIALIST

## 2021-10-04 ENCOUNTER — HOSPITAL ENCOUNTER (OUTPATIENT)
Dept: MAMMOGRAPHY | Age: 71
Discharge: HOME | End: 2021-10-04
Attending: FAMILY MEDICINE
Payer: MEDICARE

## 2021-10-04 DIAGNOSIS — Z12.31 VISIT FOR SCREENING MAMMOGRAM: ICD-10-CM

## 2021-10-04 PROCEDURE — 77067 SCR MAMMO BI INCL CAD: CPT

## 2021-10-23 LAB — HBA1C MFR BLD HPLC: 7.5 %

## 2021-11-08 ENCOUNTER — APPOINTMENT (OUTPATIENT)
Dept: GENERAL RADIOLOGY | Age: 71
End: 2021-11-08
Attending: EMERGENCY MEDICINE
Payer: MEDICARE

## 2021-11-08 ENCOUNTER — HOSPITAL ENCOUNTER (EMERGENCY)
Age: 71
Discharge: HOME | End: 2021-11-08
Attending: STUDENT IN AN ORGANIZED HEALTH CARE EDUCATION/TRAINING PROGRAM
Payer: MEDICARE

## 2021-11-08 ENCOUNTER — APPOINTMENT (OUTPATIENT)
Dept: CT IMAGING | Age: 71
End: 2021-11-08
Attending: STUDENT IN AN ORGANIZED HEALTH CARE EDUCATION/TRAINING PROGRAM
Payer: MEDICARE

## 2021-11-08 VITALS
RESPIRATION RATE: 17 BRPM | DIASTOLIC BLOOD PRESSURE: 71 MMHG | BODY MASS INDEX: 30 KG/M2 | WEIGHT: 163 LBS | OXYGEN SATURATION: 97 % | HEART RATE: 84 BPM | TEMPERATURE: 97.8 F | HEIGHT: 62 IN | SYSTOLIC BLOOD PRESSURE: 131 MMHG

## 2021-11-08 DIAGNOSIS — M54.6 ACUTE MIDLINE THORACIC BACK PAIN: Primary | ICD-10-CM

## 2021-11-08 DIAGNOSIS — M62.830 BACK SPASM: ICD-10-CM

## 2021-11-08 LAB
ALBUMIN SERPL-MCNC: 3.3 G/DL (ref 3.5–5)
ALBUMIN/GLOB SERPL: 1 {RATIO} (ref 1.1–2.2)
ALP SERPL-CCNC: 91 U/L (ref 45–117)
ALT SERPL-CCNC: 24 U/L (ref 12–78)
ANION GAP SERPL CALC-SCNC: 7 MMOL/L (ref 5–15)
AST SERPL-CCNC: 17 U/L (ref 15–37)
BASOPHILS # BLD: 0.1 K/UL (ref 0–0.1)
BASOPHILS NFR BLD: 1 % (ref 0–1)
BILIRUB SERPL-MCNC: 0.3 MG/DL (ref 0.2–1)
BNP SERPL-MCNC: 405 PG/ML
BUN SERPL-MCNC: 21 MG/DL (ref 6–20)
BUN/CREAT SERPL: 27 (ref 12–20)
CALCIUM SERPL-MCNC: 9.3 MG/DL (ref 8.5–10.1)
CHLORIDE SERPL-SCNC: 108 MMOL/L (ref 97–108)
CO2 SERPL-SCNC: 27 MMOL/L (ref 21–32)
COMMENT, HOLDF: NORMAL
CREAT SERPL-MCNC: 0.78 MG/DL (ref 0.55–1.02)
D DIMER PPP FEU-MCNC: 0.96 MG/L FEU (ref 0–0.65)
DIFFERENTIAL METHOD BLD: ABNORMAL
EOSINOPHIL # BLD: 0.2 K/UL (ref 0–0.4)
EOSINOPHIL NFR BLD: 1 % (ref 0–7)
ERYTHROCYTE [DISTWIDTH] IN BLOOD BY AUTOMATED COUNT: 13.7 % (ref 11.5–14.5)
GLOBULIN SER CALC-MCNC: 3.4 G/DL (ref 2–4)
GLUCOSE SERPL-MCNC: 154 MG/DL (ref 65–100)
HCT VFR BLD AUTO: 45.3 % (ref 35–47)
HGB BLD-MCNC: 14.7 G/DL (ref 11.5–16)
IMM GRANULOCYTES # BLD AUTO: 0.3 K/UL (ref 0–0.04)
IMM GRANULOCYTES NFR BLD AUTO: 1 % (ref 0–0.5)
LYMPHOCYTES # BLD: 1.5 K/UL (ref 0.8–3.5)
LYMPHOCYTES NFR BLD: 7 % (ref 12–49)
MCH RBC QN AUTO: 30.6 PG (ref 26–34)
MCHC RBC AUTO-ENTMCNC: 32.5 G/DL (ref 30–36.5)
MCV RBC AUTO: 94.4 FL (ref 80–99)
MONOCYTES # BLD: 1.7 K/UL (ref 0–1)
MONOCYTES NFR BLD: 8 % (ref 5–13)
NEUTS SEG # BLD: 17.3 K/UL (ref 1.8–8)
NEUTS SEG NFR BLD: 82 % (ref 32–75)
NRBC # BLD: 0 K/UL (ref 0–0.01)
NRBC BLD-RTO: 0 PER 100 WBC
PLATELET # BLD AUTO: 246 K/UL (ref 150–400)
PMV BLD AUTO: 10.8 FL (ref 8.9–12.9)
POTASSIUM SERPL-SCNC: 3.8 MMOL/L (ref 3.5–5.1)
PROT SERPL-MCNC: 6.7 G/DL (ref 6.4–8.2)
RBC # BLD AUTO: 4.8 M/UL (ref 3.8–5.2)
SAMPLES BEING HELD,HOLD: NORMAL
SODIUM SERPL-SCNC: 142 MMOL/L (ref 136–145)
TROPONIN-HIGH SENSITIVITY: 9 NG/L (ref 0–51)
WBC # BLD AUTO: 21 K/UL (ref 3.6–11)

## 2021-11-08 PROCEDURE — 74011000636 HC RX REV CODE- 636: Performed by: RADIOLOGY

## 2021-11-08 PROCEDURE — 71046 X-RAY EXAM CHEST 2 VIEWS: CPT

## 2021-11-08 PROCEDURE — 80053 COMPREHEN METABOLIC PANEL: CPT

## 2021-11-08 PROCEDURE — 84484 ASSAY OF TROPONIN QUANT: CPT

## 2021-11-08 PROCEDURE — 36415 COLL VENOUS BLD VENIPUNCTURE: CPT

## 2021-11-08 PROCEDURE — 85379 FIBRIN DEGRADATION QUANT: CPT

## 2021-11-08 PROCEDURE — 71275 CT ANGIOGRAPHY CHEST: CPT

## 2021-11-08 PROCEDURE — 85025 COMPLETE CBC W/AUTO DIFF WBC: CPT

## 2021-11-08 PROCEDURE — 99283 EMERGENCY DEPT VISIT LOW MDM: CPT

## 2021-11-08 PROCEDURE — 74011250637 HC RX REV CODE- 250/637: Performed by: STUDENT IN AN ORGANIZED HEALTH CARE EDUCATION/TRAINING PROGRAM

## 2021-11-08 PROCEDURE — 83880 ASSAY OF NATRIURETIC PEPTIDE: CPT

## 2021-11-08 RX ORDER — TRAMADOL HYDROCHLORIDE 50 MG/1
50 TABLET ORAL
Qty: 18 TABLET | Refills: 0 | Status: SHIPPED | OUTPATIENT
Start: 2021-11-08 | End: 2021-11-11

## 2021-11-08 RX ORDER — TRAMADOL HYDROCHLORIDE 50 MG/1
50 TABLET ORAL
Status: COMPLETED | OUTPATIENT
Start: 2021-11-08 | End: 2021-11-08

## 2021-11-08 RX ORDER — CYCLOBENZAPRINE HCL 5 MG
5 TABLET ORAL
Qty: 15 TABLET | Refills: 0 | Status: SHIPPED | OUTPATIENT
Start: 2021-11-08 | End: 2021-11-13

## 2021-11-08 RX ADMIN — TRAMADOL HYDROCHLORIDE 50 MG: 50 TABLET, FILM COATED ORAL at 20:18

## 2021-11-08 RX ADMIN — IOPAMIDOL 61 ML: 755 INJECTION, SOLUTION INTRAVENOUS at 20:34

## 2021-11-08 NOTE — ED TRIAGE NOTES
Patient reports spasms on the chest, between shoulder blades, and ribs started last night with nausea and SOB. Patient has a pacemaker. Patient in in the wheelchair in Triage.

## 2021-11-08 NOTE — ED NOTES
2:20 PM    67 yo F with chest pain and exertional shortness of breath with onset last night. Pain in the anterior chest and between her shoulder blades. Also noticed some swelling in her feet last night. Hx of prior DVT, not on anticoagulation. I have evaluated the patient as the Provider in Triage. I have reviewed Her vital signs and the triage nurse assessment. I have talked with the patient and any available family and advised that I am the provider in triage and have ordered the appropriate study to initiate their work up based on the clinical presentation during my assessment. I have advised that the patient will be accommodated in the Main ED as soon as possible. I have also requested to contact the triage nurse or myself immediately if the patient experiences any changes in their condition during this brief waiting period.   ANA Garcia

## 2021-11-09 NOTE — ED PROVIDER NOTES
Patient is a 64-year-old female presented emergency department with chest pain, back pain. Patient describes the chest pain as a squeezing sensation radiating to her back states that the pain is more pronounced in her back and between her shoulder blades describes as a \"like someone is needing bread in between the shoulder blades\". Patient says that the pain started last night she had associated nausea and shortness of breath. Patient denies any dizziness or lightheadedness.            Past Medical History:   Diagnosis Date    Adverse effect of anesthesia     slow to wake up    Arthritis     Asthma 6/29/2009    CAUSED BY MOLD    Atrial fibrillation ([address] 6/29/2009    Atrial flutter ([address]     CAD (coronary artery disease) 06/29/2009    Celiac disease 2010    Chronic chest pain     Chronic obstructive pulmonary disease ([address] 2004-5    right leg    Chronic pain of right ankle     Diabetes ([address]     Drug induced prednisone, DM2    Diastolic heart failure ([address]     DVT (deep venous thrombosis) ([address] 2004    Right leg post surgery    GERD (gastroesophageal reflux disease)     Gluten intolerance     Heart failure ([address]     Hypertension     Hypothyroidism 6/29/2009    Iron deficiency anemia     Lyme disease     Pacemaker     11/20    Personal history of MI (myocardial infarction)     Rheumatoid arthritis ([address]     S/P AV jules ablation     11/20     S/P left atrial appendage ligation     SUHAS clip 10/20     Slow to wake up after anesthesia     Syncope     Post testing- resolved    TIA (transient ischemic attack) 2004 & 2006    Vitamin B12 deficiency 6/29/2009       Past Surgical History:   Procedure Laterality Date    HX ADENOIDECTOMY      HX AFIB ABLATION      HX APPENDECTOMY      HX CHOLECYSTECTOMY  6/16/11    HX COLONOSCOPY      HX CORONARY STENT PLACEMENT      x 2    HX HEART CATHETERIZATION  2010    2 STENTS    HX HEART CATHETERIZATION  03/2020    HX HYSTERECTOMY      HX LUMBAR LAMINECTOMY  2000    L4-L5    HX ORTHOPAEDIC  -2012    RT.  FOOT SURGERY X 6/calcaneal osteotomy    HX ORTHOPAEDIC Right 2020    right hand [facility] joint replacement    HX TONSILLECTOMY  1964    NM ICAR CATHETER ABLATION ATRIOVENTR NODE FUNCTION N/A 2020    ABLATION AV NODE performed by Chloe Zee MD at [facility] [address] CATH LAB    NM INS NEW/RPLCMT PRM PM W/TRANSV ELTRD ATRIAL&VENT N/A 2020    INSERT PPM DUAL performed by Chloe Zee MD at [address] [facility] [address] CATH LAB    NM INTRACARDIAC ELECTROPHYSIOLOGIC 3D [address] [facility] [place] N/A 2020    Ep 3d Mapping [place] [address] CATH LAB         Family History:   Problem Relation Age of Onset    Delayed Awakening Mother     Heart Disease Mother     Coronary Art Dis Mother     Hypertension Mother     Stroke Mother     Delayed Awakening Sister     Hypertension Sister     Lung Disease Father     Cancer Father         colon    Hypertension Father     Hypertension Brother     Breast Cancer Maternal Aunt     Breast Cancer Maternal Aunt     Breast Cancer Cousin         maternal 1st cousin    Breast Cancer Maternal Aunt     Breast Cancer Cousin         maternal 1st cousin   [address] [place] [address] Breast Cancer Cousin         maternal 1st cousin    Deep Vein Thrombosis Neg Hx     Anesth Problems Neg Hx        Social History     Socioeconomic History    Marital status:      Spouse name: Not on file    Number of children: Not on file    Years of education: Not on file    Highest education level: Not on file   Occupational History    Not on file   Tobacco Use    Smoking status: Former Smoker     Packs/day: 1.00     Years: 30.00     Pack years: 30.00     Types: Cigarettes     Quit date: 2002     Years since quittin.4    Smokeless tobacco: Never Used   Substance and Sexual Activity    Alcohol use: No    Drug use: Never    Sexual activity: Yes     Partners: Male   Other Topics Concern    Not on file   Social History Narrative    Not on file     Social Determinants of Health     Financial Resource Strain:     Difficulty of Paying Living Expenses: Not on file   Food Insecurity:     Worried About Running Out of Food in the Last Year: Not on file    Michael of Food in the Last Year: Not on file   Transportation Needs:     Lack of Transportation (Medical): Not on file    Lack of Transportation (Non-Medical): Not on file   Physical Activity:     Days of Exercise per Week: Not on file    Minutes of Exercise per Session: Not on file   Stress:     Feeling of Stress : Not on file   Social Connections:     Frequency of Communication with Friends and Family: Not on file    Frequency of Social Gatherings with Friends and Family: Not on file    Attends Rastafarian Services: Not on file    Active Member of [address] [place] [address] or Organizations: Not on file    Attends Club or Organization Meetings: Not on file    Marital Status: Not on file   Intimate Partner Violence:     Fear of Current or Ex-Partner: Not on file    Emotionally Abused: Not on file    Physically Abused: Not on file    Sexually Abused: Not on file   Housing Stability:     Unable to Pay for Housing in the Last Year: Not on file    Number of [place] in the Last Year: Not on file    Unstable Housing in the Last Year: Not on file         ALLERGIES: Butter flavor, Keflex [cephalexin], Milk containing products, Pcn [penicillins], Aspirin, Cimzia [certolizumab pegol], Clopidogrel, Demerol [meperidine], Fentanyl, Ibuprofen, Morphine, Nitroglycerin, Sulfa (sulfonamide antibiotics), Tapazole [methimazole], Tramadol, Celebrex [celecoxib], Codeine, Hydrocodone, Quinolones, Tetracyclines, Warfarin, Adhesive tape-silicones, Albuterol, Gluten, and Oxycodone    Review of Systems   Respiratory: Positive for shortness of breath. Cardiovascular: Positive for chest pain. Gastrointestinal: Positive for nausea. Musculoskeletal: Positive for back pain.    All other systems reviewed and are negative. Vitals:    11/08/21 1417 11/08/21 1635   BP: 130/79 116/69   Pulse: 81 84   Resp: 16 16   Temp: 97.3 °F (36.3 °C) 97.9 °F (36.6 °C)   SpO2: 95% 96%   Weight: 73.9 kg (163 lb)    Height: 5' 2\" (1.575 m)             Physical Exam  Vitals and nursing note reviewed. Constitutional:       Appearance: She is well-developed. HENT:      Head: Normocephalic and atraumatic. Eyes:      Extraocular Movements: Extraocular movements intact. Pupils: Pupils are equal, round, and reactive to light. Cardiovascular:      Rate and Rhythm: Normal rate and regular rhythm. Heart sounds: Normal heart sounds. Pulmonary:      Effort: Pulmonary effort is normal.      Breath sounds: Normal breath sounds. Abdominal:      Palpations: Abdomen is soft. Musculoskeletal:      Cervical back: Normal range of motion and neck supple. Thoracic back: Spasms and tenderness present. Decreased range of motion. Back:       Comments: Tender to touch no T-spine tenderness or step-off paraspinal tenderness throughout. Skin:     General: Skin is warm. Neurological:      General: No focal deficit present. Mental Status: She is alert and oriented to person, place, and time. MDM  Number of Diagnoses or Management Options  Diagnosis management comments: A/P: ACS, PE, musculoskeletal back pain. 66-year-old female presenting with chest pain stating that it radiates to her back physical exam reassuring appears to be more musculoskeletal however patient does have risk factors for ACS D-dimer was ordered slightly elevated will further evaluate for aortic dissection, PE with CT PE study. EKG reassuring.        Amount and/or Complexity of Data Reviewed  Clinical lab tests: ordered and reviewed  Tests in the radiology section of CPT®: ordered and reviewed           Procedures

## 2021-11-09 NOTE — ED NOTES
Pt. Discharged by provider. Pt. Ambulatory out of ED prior to nursing assessment and revitalization.

## 2021-11-10 ENCOUNTER — TELEPHONE (OUTPATIENT)
Dept: CARDIOLOGY CLINIC | Age: 71
End: 2021-11-10

## 2021-11-10 NOTE — TELEPHONE ENCOUNTER
Patient needing to talk with Quin Haywood about her ER visit and her meds    She can be reached at [phone]    [facility]

## 2021-11-10 NOTE — TELEPHONE ENCOUNTER
Sunday pm bad CP  She stared taking Fosamax; they stated that it may cause mx pains. They came on very strong that she went to ED. Chest pain c/o under R breast and into back. ED gave her Flexeril, tramadol for the mx spasms. She is requesting MD to review labs, testing from ED to make sure nothing was missed.

## 2021-12-28 ENCOUNTER — OFFICE VISIT (OUTPATIENT)
Dept: CARDIOLOGY CLINIC | Age: 71
End: 2021-12-28
Payer: MEDICARE

## 2021-12-28 DIAGNOSIS — Z95.0 BIVENTRICULAR CARDIAC PACEMAKER IN SITU: Primary | ICD-10-CM

## 2021-12-28 PROCEDURE — 93296 REM INTERROG EVL PM/IDS: CPT | Performed by: INTERNAL MEDICINE

## 2022-01-14 RX ORDER — METOPROLOL SUCCINATE 50 MG/1
TABLET, EXTENDED RELEASE ORAL
Qty: 90 TABLET | Refills: 0 | Status: SHIPPED | OUTPATIENT
Start: 2022-01-14 | End: 2022-03-16

## 2022-01-14 NOTE — TELEPHONE ENCOUNTER
Refill per VO of Dr. Trice Reynoso  Last appt: 2/21/2020  Future Appointments   Date Time Provider Inge Reaves   2/25/2022 11:00 AM Gloriajean Aschoff, MD [facility]AV[facility] BS AMB   3/31/2022  9:00 AM REMOTE1, [facility] BS AMB   9/16/2022 11:00 AM PAC[facility]   9/16/2022 11:20 AM Dulcie January, NP [facility] AMB       Requested Prescriptions     Pending Prescriptions Disp Refills    metoprolol succinate (TOPROL-XL) 50 mg XL tablet [Pharmacy Med Name: METOPROL SUC TAB 50MG ER] 90 Tablet 3     Sig: TAKE 1 TABLET DAILY

## 2022-02-01 ENCOUNTER — DOCUMENTATION ONLY (OUTPATIENT)
Dept: CARDIOLOGY CLINIC | Age: 72
End: 2022-02-01

## 2022-02-25 ENCOUNTER — OFFICE VISIT (OUTPATIENT)
Dept: CARDIOLOGY CLINIC | Age: 72
End: 2022-02-25
Payer: MEDICARE

## 2022-02-25 VITALS
HEIGHT: 62 IN | OXYGEN SATURATION: 98 % | BODY MASS INDEX: 30.91 KG/M2 | HEART RATE: 68 BPM | DIASTOLIC BLOOD PRESSURE: 68 MMHG | SYSTOLIC BLOOD PRESSURE: 108 MMHG | WEIGHT: 168 LBS

## 2022-02-25 DIAGNOSIS — I48.0 PAF (PAROXYSMAL ATRIAL FIBRILLATION) (HCC): ICD-10-CM

## 2022-02-25 DIAGNOSIS — R07.9 CHEST PAIN, UNSPECIFIED TYPE: ICD-10-CM

## 2022-02-25 DIAGNOSIS — I25.118 CORONARY ARTERY DISEASE OF NATIVE ARTERY OF NATIVE HEART WITH STABLE ANGINA PECTORIS (HCC): Primary | ICD-10-CM

## 2022-02-25 DIAGNOSIS — I25.10 CORONARY ARTERY DISEASE INVOLVING NATIVE CORONARY ARTERY OF NATIVE HEART WITHOUT ANGINA PECTORIS: ICD-10-CM

## 2022-02-25 DIAGNOSIS — I10 HTN (HYPERTENSION), BENIGN: ICD-10-CM

## 2022-02-25 DIAGNOSIS — R06.02 SOB (SHORTNESS OF BREATH): ICD-10-CM

## 2022-02-25 PROCEDURE — G8510 SCR DEP NEG, NO PLAN REQD: HCPCS | Performed by: SPECIALIST

## 2022-02-25 PROCEDURE — G8752 SYS BP LESS 140: HCPCS | Performed by: SPECIALIST

## 2022-02-25 PROCEDURE — 1090F PRES/ABSN URINE INCON ASSESS: CPT | Performed by: SPECIALIST

## 2022-02-25 PROCEDURE — G0463 HOSPITAL OUTPT CLINIC VISIT: HCPCS | Performed by: SPECIALIST

## 2022-02-25 PROCEDURE — 3017F COLORECTAL CA SCREEN DOC REV: CPT | Performed by: SPECIALIST

## 2022-02-25 PROCEDURE — 99214 OFFICE O/P EST MOD 30 MIN: CPT | Performed by: SPECIALIST

## 2022-02-25 PROCEDURE — G8427 DOCREV CUR MEDS BY ELIG CLIN: HCPCS | Performed by: SPECIALIST

## 2022-02-25 PROCEDURE — G8417 CALC BMI ABV UP PARAM F/U: HCPCS | Performed by: SPECIALIST

## 2022-02-25 PROCEDURE — G8754 DIAS BP LESS 90: HCPCS | Performed by: SPECIALIST

## 2022-02-25 PROCEDURE — G8536 NO DOC ELDER MAL SCRN: HCPCS | Performed by: SPECIALIST

## 2022-02-25 PROCEDURE — 1101F PT FALLS ASSESS-DOCD LE1/YR: CPT | Performed by: SPECIALIST

## 2022-02-25 PROCEDURE — G9899 SCRN MAM PERF RSLTS DOC: HCPCS | Performed by: SPECIALIST

## 2022-02-25 PROCEDURE — G8399 PT W/DXA RESULTS DOCUMENT: HCPCS | Performed by: SPECIALIST

## 2022-02-25 RX ORDER — LEVOTHYROXINE SODIUM 125 UG/1
125 TABLET ORAL
Qty: 1 TABLET | Refills: 0
Start: 2022-02-25

## 2022-02-25 NOTE — PROGRESS NOTES
Room 6    Visit Vitals  /68 (BP 1 Location: Left upper arm, BP Patient Position: Sitting, BP Cuff Size: Large adult)   Pulse 68   Ht 5' 2\" (1.575 m)   Wt 168 lb (76.2 kg)   LMP 09/29/1980 (LMP Unknown)   SpO2 98%   BMI 30.73 kg/m²         Chest pain: off and on, no worse   Shortness of breath: still winded   Edema: no  Palpitations: no  Dizziness: no    New diagnosis/Surgeries: no    ER/Hospitalizations: no    Refills: no

## 2022-02-25 NOTE — PROGRESS NOTES
Rabia Baltazar MD. [facility]              Patient: Herbert Terry  : 1950      Today's Date: 2022          HISTORY OF PRESENT ILLNESS:     History of Present Illness:  Doing OK overall. Having SOB when walking short distances because she walks fast. If she walks slower, it is better. Does have chest pain daily, but lasts only a second and then go away - will happen at any time of day. Will have dizziness if walking far. Is not bothered by the dizziness. Doing much better since had BiV upgrade.        PAST MEDICAL HISTORY:     Past Medical History:   Diagnosis Date    Adverse effect of anesthesia     slow to wake up    Arthritis     Asthma 2009    CAUSED BY MOLD    Atrial fibrillation ([address] 2009    Atrial flutter ([address]     CAD (coronary artery disease) 2009    Celiac disease     Chronic chest pain     Chronic obstructive pulmonary disease ([address] -    right leg    Chronic pain of right ankle     Diabetes ([address]     Drug induced prednisone, DM2    Diastolic heart failure ([address]     DVT (deep venous thrombosis) ([address]     Right leg post surgery    GERD (gastroesophageal reflux disease)     Gluten intolerance     Heart failure ([address]     Hypertension     Hypothyroidism 2009    Iron deficiency anemia     Lyme disease     Pacemaker         Personal history of MI (myocardial infarction)     Rheumatoid arthritis ([address]     S/P AV jules ablation          S/P left atrial appendage ligation     SUHAS clip 10/20     Slow to wake up after anesthesia     Syncope     Post testing- resolved    TIA (transient ischemic attack)  &     Vitamin B12 deficiency 2009       Past Surgical History:   Procedure Laterality Date    HX ADENOIDECTOMY      HX AFIB ABLATION      HX APPENDECTOMY      HX CHOLECYSTECTOMY  11    HX COLONOSCOPY      HX CORONARY STENT PLACEMENT      x 2    HX HEART CATHETERIZATION      2 STENTS    HX HEART CATHETERIZATION  03/2020    HX HYSTERECTOMY      HX LUMBAR LAMINECTOMY  2000    L4-L5    HX ORTHOPAEDIC  1993-6/2012    RT. FOOT SURGERY X 6/calcaneal osteotomy    HX ORTHOPAEDIC Right 09/19/2020    right hand [facility] joint replacement    HX TONSILLECTOMY  1964    MD ICAR CATHETER ABLATION ATRIOVENTR NODE FUNCTION N/A 11/19/2020    ABLATION AV NODE performed by Morales Humphrey MD at [address] CATH LAB    MD INS NEW/RPLCMT PRM PM W/TRANSV ELTRD ATRIAL&VENT N/A 11/19/2020    INSERT PPM DUAL performed by Morales Humphrey MD at [address] CATH LAB    MD INTRACARDIAC ELECTROPHYSIOLOGIC 3D MAPPING N/A 11/19/2020    Ep 3d Mapping performed by Morales Humphrey MD at [address] [facility] CATH LAB           MEDICATIONS:     Current Outpatient Medications   Medication Sig Dispense Refill    levothyroxine (SYNTHROID) 125 mcg tablet Take 1 Tablet by mouth Daily (before breakfast). 1 Tablet 0    metoprolol succinate (TOPROL-XL) 50 mg XL tablet TAKE 1 TABLET DAILY 90 Tablet 0    fenofibrate nanocrystallized (Tricor) 48 mg tablet Take 1 Tablet by mouth nightly. 90 Tablet 3    nitroglycerin (NITRODUR) 0.1 mg/hr 1 Patch by TransDERmal route daily. 90 Patch 3    aclidinium bromide (Tudorza Pressair) 400 mcg/actuation inhaler Take 1 Puff by inhalation two (2) times a day.  mometasone (Asmanex HFA) 200 mcg/actuation HFAA inhaler Take 1 Puff by inhalation two (2) times a day.  bumetanide (BUMEX) 1 mg tablet Take  by mouth. 1 mg every am and 0.5 mg in the evening      calcium citrate 200 mg (950 mg) tablet Take 200 mg by mouth daily.  predniSONE (DELTASONE) 2.5 mg tablet Take 2.5 mg by mouth nightly. Indications: RA      eplerenone (INSPRA) 25 mg tablet Take 1 Tab by mouth Daily (before lunch). 90 Tab 1    lidocaine (Lidoderm) 5 % 1 Patch by TransDERmal route every twenty-four (24) hours. Apply patch to the affected area for 12 hours a day and remove for 12 hours a day.  1 Each 0    acetaminophen (TYLENOL) 650 mg TbER Take 1,300 mg by mouth nightly as needed (sleep).  co-enzyme Q-10 (Co Q-10) 100 mg capsule Take 200 mg by mouth daily.  artificial tears, dextran 70-hypromellose, (GenTeal Tears Mild) 0.1-0.3 % ophthalmic solution Administer 1 Drop to both eyes daily.  carboxymethylcell/hypromellose (GENTEAL GEL OP) Administer 1 Drop to both eyes nightly.  omega 3-DHA-EPA-fish oil 1,000 mg (120 mg-180 mg) capsule Take 1 Cap by mouth every evening.  levalbuterol (XOPENEX) 0.63 mg/3 mL nebu 0.63 mg by Nebulization route two (2) times a day.  potassium 99 mg tablet Take 99 mg by mouth daily. When taking bumex      insulin glargine (LANTUS,BASAGLAR) 100 unit/mL (3 mL) inpn 16 Units by SubCUTAneous route nightly.  predniSONE (DELTASONE) 5 mg tablet Take 5 mg by mouth daily. In the morning  Indications: RA      cholecalciferol (Vitamin D3) (1000 Units /25 mcg) tablet Take 1,000 Units by mouth daily.  turmeric (CURCUMIN) Take 1 g by mouth every evening.  SITagliptin (JANUVIA) 100 mg tablet Take 100 mg by mouth daily (with dinner).  vit C/vit E ac/lut/copper/zinc (PRESERVISION LUTEIN PO) Take 1 Tablet by mouth daily (with dinner).  ascorbic acid (VITAMIN C) 500 mg tablet Take 500 mg by mouth daily.  ferrous sulfate 325 mg (65 mg iron) tablet Take 325 mg by mouth daily (with lunch).  cyanocobalamin (VITAMIN B12) 1,000 mcg/mL injection INJECT 1 ML INTO THE MUSCLE EVERY 30 DAYS 10 mL 0    lansoprazole (PREVACID) 30 mg capsule Take 30 mg by mouth daily.          Allergies   Allergen Reactions    Butter Flavor Anaphylaxis     Butter AND CREME    Keflex [Cephalexin] Anaphylaxis    Milk Containing Products Anaphylaxis     Pt states not an allergy    Pcn [Penicillins] Anaphylaxis    Aspirin Hives and Other (comments)     \"it makes my stomach bleed\"      Cimzia [Certolizumab Pegol] Other (comments)     GI symptoms, blisters in the mouth and esophagus    Clopidogrel Other (comments) GI bleed    Demerol [Meperidine] Other (comments)     HYPOTENSION    Fentanyl Other (comments)     HYPOTENSION    Ibuprofen Diarrhea     Patient reports that ibuprofen caused diarrhea    Morphine Other (comments)     HYPOTENSION    Nitroglycerin Other (comments)     IN PILL FORM  - HYPOTENSION  CAN TOLERATE PATCH FOR SHORT TIME    Sulfa (Sulfonamide Antibiotics) Angioedema     Lips    Tapazole [Methimazole] Unknown (comments)     Patient stated \"it made me feel like I had the flu\"    Tramadol Other (comments)     Patient reports thrush with tramadol use    Celebrex [Celecoxib] Other (comments)     GI bleed     Codeine Other (comments)     Low BP    Hydrocodone Other (comments)     Hypotension    Quinolones Unknown (comments)      could not recall reaction, but is was listed on list of allergies     Tetracyclines Other (comments)      could not recall reaction; Was on personal list of allergies.       Warfarin Other (comments)      mentioned allergy but could not recall reaction     Adhesive Tape-Silicones Other (comments)     BAD BLISTERS AND TENDS TO GET INFECTED    Albuterol Palpitations    Gluten Diarrhea     bloating    Oxycodone Other (comments)     Hallicination and hypotension           SOCIAL HISTORY:     Social History     Tobacco Use    Smoking status: Former Smoker     Packs/day: 1.00     Years: 30.00     Pack years: 30.00     Types: Cigarettes     Quit date: 2002     Years since quittin.7    Smokeless tobacco: Never Used   Substance Use Topics    Alcohol use: No    Drug use: Never         FAMILY HISTORY:     Family History   Problem Relation Age of Onset    Delayed Awakening Mother     Heart Disease Mother     Coronary Art Dis Mother     Hypertension Mother     Stroke Mother     Delayed Awakening Sister     Hypertension Sister     Lung Disease Father     Cancer Father         colon    Hypertension Father     Hypertension Brother    Vivi Carpio Breast Cancer Maternal Aunt     Breast Cancer Maternal Aunt     Breast Cancer Cousin         maternal 1st cousin    Breast Cancer Maternal Aunt     Breast Cancer Cousin         maternal 1st cousin    Breast Cancer Cousin         maternal 1st cousin    Deep Vein Thrombosis Neg Hx     Anesth Problems Neg Hx             REVIEW OF SYMPTOMS:      Review of Symptoms:  Constitutional: Negative for fever, chills  HEENT: Negative for nosebleeds, tinnitus  Respiratory: + CHACON   Cardiovascular: Negative for syncope;  + CHACON   Gastrointestinal: Negative for abdominal pain, diarrhea, melena. Genitourinary: Negative for dysuria  Musculoskeletal: + joint pain, RA  Skin: Negative for rash  Heme: No acute bleeding   Neurological: Negative for speech change and focal weakness.                  PHYSICAL EXAM:      Physical Exam:  Visit Vitals  /68 (BP 1 Location: Left upper arm, BP Patient Position: Sitting, BP Cuff Size: Large adult)   Pulse 68   Ht 5' 2\" (1.575 m)   Wt 168 lb (76.2 kg)   LMP 09/29/1980 (LMP Unknown)   SpO2 98%   BMI 30.73 kg/m²       Patient appears generally well, mood and affect are appropriate and pleasant. HEENT:  Hearing intact, non-icteric, normocephalic, atraumatic. Neck Exam: Supple, No JVD sitting up  Lung Exam: Clear to auscultation, even breath sounds. Cardiac Exam: Regular rate and rhythm with no murmur    Abdomen: Soft, non-tender, normal bowel sounds. Obese . Extremities: Moves all ext well. Trace lower extremity edema.   MSKTL: Overall good ROM ext  Skin: No significant rashes  Psych: Appropriate affect  Neuro - Grossly intact            LABS / OTHER STUDIES:        Labs 1/19/21 - A1c 7.2, CMP OK, TSH 0.29, T4 14 (H),     Labs 2/9/22 - A1c 6.9, CMP okay, WBC 22.07, Hgb 15.5, UA neg        CARDIAC DIAGNOSTICS:      Cardiac Evaluation Includes:     Cardiac Cath 6/09 - severe mLCX disease --> stenting     Cath 8/19/10 - RCA and LCX stents patent; MLI in LAD, LVEF 60%     Event Monitor 3/11 - PVC's  Holter 3/16 - PVC's  Echo 3/17 - LVEF 60%  Lexiscan Cardiolite 3/17 - normal MPI, LVEF 58%     [facility] records reviewed.  Went there for chest pain on 1/3/19. Labs 1/3/19 - Trop < 0.02, BNP 18, Cr 0.61, Hgb 13  Cath 1/4/19 - LAD stent patent, LCX without sig disease, RCA stent patent.       Holter 1/23/19 - NSR, normal study      Stress Echo 2/11/19 - walked 3:21 (2.4 METS), baseline /90, Max /90, normal stress EKG and stress echo      CXR 2/22/19 - normal      Echo 3/21/19 - LVEF 61%; grade 1 diastology (normal for age), AV sclerosis.  RVSP 26 mmHg.       PARDEEP's with exercise 6/7/19 - normal       Event Monitor 5/15/19-6/6/19 - normal study; symptoms correlated with sinus      Right Heart Cath 6/28/19 -[facility],[address] findings:   RAP=   Mean 17    mmHg  RVSP= 40/20    mmHg  PAP=     42/30/34  mmHg  PCWP=  27 mmHg  CO=        Thermal 4.1  L/min,             Faraz pending  CI=           Thermal 3.08  L/min/m2,     Faraz pending      Findings:  1.   Elevated right and left sided filling pressure  2.   Pulmonary hypertension, WHO group 2  3.   Perserved cardiac index by Thermal     Echo 8/13/19 - LVEF 50-55%, \"The following segments are hypokinetic: basal inferior, basal inferolateral and mid inferolateral\"     Cardiac Cath 8/13/19 - Non-obstructive CAD with patent stents in LCX and RCA.  LVEDP 16 mmHg.       Cardiac MRI 9/30/19 - 1. Normal left ventricular cavity size with preserved left ventricular systolic function. There is no significant regional wall motion abnormalities. LVEF 60%. 2. Normal right ventricular size and systolic motion. RVEF 60%. 3. Trace to mild mitral regurgitation. 4. On EGE and LGE study, there is a small thin subendocardial infarct involving 25% to 50% thickness of the subendocardial wall of the basal inferior wall. This infarct is in RCA territory. There is reasonable viability surrounding this infarct.  The mid to distal inferior wall and inferoseptal wall does not demonstrate any infarct. There are no other infarct. There is no features of infiltrative sarcoidosis or cardiac amyloidosis. There is no features of inflammatory myocarditis. All other myocardial walls are otherwise completely viable. There is no features of hemochromatosis.  5. Normal pericardium without significant pericardial effusion.      Venous Doppler 9/9/19 - No DVT Bilat      Lexiscan Cardiolite 12/9/19 - Normal MPI, LVEF 66%     PFT's 1/14/20 - Spirometry reveals severe airflow obstruction with moderate reduction in forced vital capacity which may be due to a restrictive ventilatory defect or air trapping     COY 10/15/20 - normal, LVEF 55%     Surgical SUHAS clip 10/20/20 Video-assisted thoracoscopic placement of left atrial appendage clip     Echo 10/29/20 - LVEF 55-60%, PASP 41 mmHg      Echo 11/3/20 - LVEF 55-60%     Lexiscan Cardiolite 11/5/20 - Normal MPI, LVEF 64%     Dual PPM / AV node ablation 11/19/20     Echo 3/23/21 - LVEF 45%, septal bounce, grade 1 diastology      BIV upgrade with LV lead placed 6/28/21     Echo 6/29/21 - LVEF 45-50%    Echo 9/22/21 - LVEF 50-55%          EKG 2/22/19 - NSR, normal   EKG 8/12/19 - NSR, NSST changes   EKG 11/4/20 - Atrial flutter with RVR  EKG 7/16/21 - AV paced            ASSESSMENT AND PLAN:      Assessment and Plan:  1) Chronic Chest pain and SOB   - She had CAD with prior stenting in RCA and LCX (2009 ? )   - She notes CP/SOB symptoms since Jan 2019   -[facility] cath 1/19 showed patent vessels. - She saw Pulmonary ([facility]) and she says workup was OK.    - Cardiac cath repeated 8/13/19 showed non-obs CAD with LVEDP 16 mmHg  - Cardiac MRI 9/30/19 - There is no features of infiltrative sarcoidosis or cardiac amyloidosis.   - Jake Mcgeedaniel considered but due to problem with blood thinners she declined proceeding with one   - PFTs with severe airflow obstruction with no improvement following bronchodilators  - DLCO corrected to normal when considering alveolar volume  - No evidence of mold toxicity by Dr. Flip Hackett  - Completed therapy for Lyme Disease  - Follow up with Dr. Mora Roa  - Her symptoms could be due to small vessel disease and/or diastolic dysfunction (see below)   - she is feeling better lately      2) HFpEF - NYHA Class III  - RHC with PCWP 27 mmHg on 6/19   - SPEP - no M spike  - Iron profile - normal  - ATTR negative  - Continue bumex, eplerenone, metoprolol  - She takes Bumex 1 in am and 0.5 in PM (she had \"kidney pain\" at 1 mg BID) - she can take extra Bumex as needed   - volume status seems fair for her   - Lately she is feeling much after BiV upgrade June 2021 - cont regimen above      3) CAD s/p PCI to LAD , RCA  - BRAEARLENEBORO RETREAT in 8/19 - no intervention  - Lexiscan Cardiolite 11/5/20 - Normal MPI, LVEF 64%  - Intolerant to statins d/t myalgias  - ASA intolerant d/t GI bleeding  - Developed plavix induced thrombocytopenia - advised by hematology to avoid antiplatelets  - has chronic chest pain (maybe from small vessel disease)   - she feels much better with less CP after BiV upgrade   - cont meds (NTG, BB)      4) History of Iron Deficiency Anemia   - She takes no blood thinners (not even aspirin) due to anemia in past   - Dr. Cleo Garcia record 12/16 reviewed - He was seeing her for iron deficiency anemia thought to be from GI Bleed from aspirin and plavix      5) Lipids -   - Does not take a statin due to having muscle aches (from RA at baseline)  - Tried to get her on PSCK9 Inhibitor in past bur she is not taking one   - She is now only on Tricor      6) Atrial flutter with RVR 11/20  S/p PPM   - Dual PPM / AV node ablation 11/19/20   - not on any blood thinners due to problems with bleeding   - Surgical SUHAS clip 10/20/20   - Echo 3/23/21 - LVEF 45%,   - BIV upgrade with LV lead placed 6/28/21 for reducing LVEF ----> she feels much better afterwards      7) HTN  - BP looks OK - continue meds      8) RA  - she is on prednisone      9) History of Tick Bite and RMSF - Bartonella  - Follow up with Dr. Sharyle Bays     10) See me in 6 months with an echo then.   Patient expressed understanding of the plan - questions were answered. On a side note, I see her brother.             Jose Angel Nuñez MD, [address] [place], [address]      [address] [facility]  [address]  [address]  Ph: [phone]                                [phone]

## 2022-03-01 ENCOUNTER — TELEPHONE (OUTPATIENT)
Dept: CARDIOLOGY CLINIC | Age: 72
End: 2022-03-01

## 2022-03-01 ENCOUNTER — TRANSCRIBE ORDER (OUTPATIENT)
Dept: SCHEDULING | Age: 72
End: 2022-03-01

## 2022-03-01 DIAGNOSIS — M81.0 AGE-RELATED OSTEOPOROSIS WITHOUT CURRENT PATHOLOGICAL FRACTURE: Primary | ICD-10-CM

## 2022-03-01 NOTE — TELEPHONE ENCOUNTER
Called Dr. Emiliano Sifuentes office at [phone]. She is planning to start infusions for osteoporosis (Reclast?). They will fax over confirmation of medication to be used to our confirmed fax number.

## 2022-03-01 NOTE — TELEPHONE ENCOUNTER
Patient states in two weeks she is going to take a medicine that has the side effect of causing palpitations, edema, elevated blood pressure, Severeas well as dizziness. Dr. Deirdre Alonso prescribed this med through an infusion.              PHONE: [phone]

## 2022-03-02 NOTE — TELEPHONE ENCOUNTER
Per Dr. Sharif Marsh: \"  Venus Job to start med from cardiac standpoint --- she'll have to watch to see if she has worsening symptoms on the new medication.   It's up to her (and her endocrine doctor) weighing risks / benefits of this medicine if  she wants to take it. There are no cardiac contraindications to this med. \"  Started pill form that caused esophagus tightening. She is concerned of the potential side effects that could be. Let her know we couldn't make this decision for her; encouraged her to discuss pro/cons with Dr. Latonia Hough.

## 2022-03-03 NOTE — TELEPHONE ENCOUNTER
Adam from [facility] r/t call. Confirmed that they were discussing starting Reclast.  Discussed that there are no cardiac contraindications.

## 2022-03-16 RX ORDER — METOPROLOL SUCCINATE 50 MG/1
TABLET, EXTENDED RELEASE ORAL
Qty: 90 TABLET | Refills: 4 | Status: SHIPPED | OUTPATIENT
Start: 2022-03-16

## 2022-03-18 PROBLEM — I25.118 CORONARY ARTERY DISEASE WITH STABLE ANGINA PECTORIS (HCC): Status: ACTIVE | Noted: 2019-09-04

## 2022-03-18 PROBLEM — E86.0 DEHYDRATION: Status: ACTIVE | Noted: 2021-06-29

## 2022-03-19 PROBLEM — E11.9 DIABETES MELLITUS TYPE 2, CONTROLLED (HCC): Status: ACTIVE | Noted: 2019-08-12

## 2022-03-19 PROBLEM — G93.40 ACUTE ENCEPHALOPATHY: Status: ACTIVE | Noted: 2021-06-28

## 2022-03-19 PROBLEM — Z95.0 PACEMAKER: Status: ACTIVE | Noted: 2021-05-28

## 2022-03-19 PROBLEM — Z79.52 IMMUNOCOMPROMISED DUE TO CORTICOSTEROIDS (HCC): Status: ACTIVE | Noted: 2021-06-29

## 2022-03-19 PROBLEM — G93.41 ACUTE METABOLIC ENCEPHALOPATHY: Status: ACTIVE | Noted: 2021-06-29

## 2022-03-19 PROBLEM — D72.825 BANDEMIA: Status: ACTIVE | Noted: 2021-06-28

## 2022-03-19 PROBLEM — M06.9 RHEUMATOID ARTHRITIS (HCC): Status: ACTIVE | Noted: 2019-08-12

## 2022-03-19 PROBLEM — D72.829 LEUKOCYTOSIS: Status: ACTIVE | Noted: 2019-08-12

## 2022-03-19 PROBLEM — I10 HTN (HYPERTENSION), BENIGN: Status: ACTIVE | Noted: 2017-03-02

## 2022-03-19 PROBLEM — D84.821 IMMUNOCOMPROMISED DUE TO CORTICOSTEROIDS (HCC): Status: ACTIVE | Noted: 2021-06-29

## 2022-03-19 PROBLEM — R53.1 LEFT-SIDED WEAKNESS: Status: ACTIVE | Noted: 2021-06-28

## 2022-03-19 PROBLEM — T38.0X5A IMMUNOCOMPROMISED DUE TO CORTICOSTEROIDS (HCC): Status: ACTIVE | Noted: 2021-06-29

## 2022-03-20 PROBLEM — R65.10 SIRS (SYSTEMIC INFLAMMATORY RESPONSE SYNDROME) (HCC): Status: ACTIVE | Noted: 2021-06-28

## 2022-03-31 ENCOUNTER — OFFICE VISIT (OUTPATIENT)
Dept: CARDIOLOGY CLINIC | Age: 72
End: 2022-03-31
Payer: MEDICARE

## 2022-03-31 DIAGNOSIS — Z95.0 BIVENTRICULAR CARDIAC PACEMAKER IN SITU: Primary | ICD-10-CM

## 2022-03-31 PROCEDURE — 93296 REM INTERROG EVL PM/IDS: CPT | Performed by: INTERNAL MEDICINE

## 2022-03-31 NOTE — LETTER
4/1/2022 11:30 AM    Ms. Pia Mcmanus 89      Dear Ms. Pia Au,    We have received your recent remote monitor check of your implanted device on 3/31/22. Your remaining estimated battery life is   and your device is working normally & appropriately. Your next remote monitor check is scheduled for 7/7/22 . This is NOT an in-clinic appointment. This transmission is sent from your home monitor. Please make sure your home monitor is plugged into power and within 10 feet of where you sleep. If you have difficulty sending a transmission, please do NOT call our office. Instead, call tech support for your device as they are better able to assist.    [employer]   [phone]    Your next clinic/office check is scheduled for Friday, 9/16/22 at 11:00 am.  You will have your device checked then see the provider. Please bring a complete list of your medications with strengths and dosages to this appointment. If you have any questions, please call the Pacemaker/ICD clinic at the [facility] location at [phone]. We appreciate you staying remotely connected!     Sincerely,    Forrest Chung RN, BSN  Device Coordinator  [phone]

## 2022-04-01 NOTE — PROGRESS NOTES
chargeable CRTP remote    Normal CRTP device function with 98.9% effective true biv pacing. See scanned report in  for details.

## 2022-04-15 ENCOUNTER — TELEPHONE (OUTPATIENT)
Dept: CARDIOLOGY CLINIC | Age: 72
End: 2022-04-15

## 2022-04-15 NOTE — TELEPHONE ENCOUNTER
Returned patient call, ID verified using two patient identifiers. Advised patient that she can disregard the lab orders from 5/10/21. Patient verbalizes understanding of all information.

## 2022-04-15 NOTE — TELEPHONE ENCOUNTER
Pt has lab orders active from 5/2021 and is wondering is she still needs them. Please advise.        [phone]

## 2022-05-14 LAB — HBA1C MFR BLD HPLC: 7.4 %

## 2022-06-15 ENCOUNTER — HOSPITAL ENCOUNTER (OUTPATIENT)
Dept: MAMMOGRAPHY | Age: 72
Discharge: HOME | End: 2022-06-15
Attending: INTERNAL MEDICINE
Payer: MEDICARE

## 2022-06-15 DIAGNOSIS — M81.0 AGE-RELATED OSTEOPOROSIS WITHOUT CURRENT PATHOLOGICAL FRACTURE: ICD-10-CM

## 2022-06-15 PROCEDURE — 77080 DXA BONE DENSITY AXIAL: CPT

## 2022-06-23 RX ORDER — FENOFIBRATE 48 MG/1
TABLET, COATED ORAL
Qty: 90 TABLET | Refills: 2 | Status: SHIPPED | OUTPATIENT
Start: 2022-06-23

## 2022-06-23 NOTE — TELEPHONE ENCOUNTER
Refill per VO of Dr. Powers Ke  Last appt: 2/25/2022  Future Appointments   Date Time Provider Inge Reaves   7/7/2022  8:45 AM CLARENCE [facility] AMB   8/26/2022 11:00 AM CHRISTOPHER HOSKINS [facility] AMB   8/26/2022 11:40 AM April MD Josh [facility] AMB   9/16/2022 11:00 AM TAPAN ALVAREZ [facility] AMB       Requested Prescriptions     Signed Prescriptions Disp Refills    fenofibrate nanocrystallized (TRICOR) 48 mg tablet 90 Tablet 2     Sig: TAKE 1 TABLET NIGHTLY     Authorizing Provider: Nick Gonsales     Ordering User: Lilliam Cervantes

## 2022-07-07 ENCOUNTER — OFFICE VISIT (OUTPATIENT)
Dept: CARDIOLOGY CLINIC | Age: 72
End: 2022-07-07
Payer: MEDICARE

## 2022-07-07 DIAGNOSIS — Z95.0 BIVENTRICULAR CARDIAC PACEMAKER IN SITU: Primary | ICD-10-CM

## 2022-07-07 PROCEDURE — 93296 REM INTERROG EVL PM/IDS: CPT | Performed by: INTERNAL MEDICINE

## 2022-07-14 NOTE — PROGRESS NOTES
Orders for See [facility] in 6 months with an echo    per Dr. Karina Davies.   Dx: sob, cp, chf, cad 63 y.o. M c/o feeling of "lightheadedness" which he states happens when he cracks his neck, he feels a quick adrenaline feeling go up the left side of his head, lasts seconds, recurs if cracks neck again, has noted this for the past few days.    also has been fatigued for months, but more for the past 1.5 wks 63 y.o. M c/o feeling of "lightheadedness" which he states happens when he cracks his neck, he feels a quick adrenaline feeling go up the left side of his head, lasts seconds, recurs if cracks neck again, has noted this for the past few days.    also has been fatigued for months, but more for the past 1.5 wks, feels both of his eyes move slower than normal when he looks all around. pt was just at his pcp a couple of days ago and had outpt labs for the fatigue, pt has results, low vitamin d

## 2022-08-26 ENCOUNTER — OFFICE VISIT (OUTPATIENT)
Dept: CARDIOLOGY CLINIC | Age: 72
End: 2022-08-26
Payer: MEDICARE

## 2022-08-26 ENCOUNTER — ANCILLARY PROCEDURE (OUTPATIENT)
Dept: CARDIOLOGY CLINIC | Age: 72
End: 2022-08-26
Payer: MEDICARE

## 2022-08-26 VITALS
SYSTOLIC BLOOD PRESSURE: 146 MMHG | HEIGHT: 62 IN | WEIGHT: 172.2 LBS | BODY MASS INDEX: 31.69 KG/M2 | DIASTOLIC BLOOD PRESSURE: 94 MMHG

## 2022-08-26 VITALS
HEART RATE: 81 BPM | WEIGHT: 172 LBS | OXYGEN SATURATION: 96 % | HEIGHT: 62 IN | BODY MASS INDEX: 31.65 KG/M2 | DIASTOLIC BLOOD PRESSURE: 80 MMHG | SYSTOLIC BLOOD PRESSURE: 128 MMHG

## 2022-08-26 DIAGNOSIS — I25.118 CORONARY ARTERY DISEASE OF NATIVE ARTERY OF NATIVE HEART WITH STABLE ANGINA PECTORIS (HCC): ICD-10-CM

## 2022-08-26 DIAGNOSIS — R07.9 CHEST PAIN, UNSPECIFIED TYPE: ICD-10-CM

## 2022-08-26 DIAGNOSIS — I25.118 CORONARY ARTERY DISEASE OF NATIVE ARTERY OF NATIVE HEART WITH STABLE ANGINA PECTORIS (HCC): Primary | ICD-10-CM

## 2022-08-26 DIAGNOSIS — R06.02 SOB (SHORTNESS OF BREATH): ICD-10-CM

## 2022-08-26 DIAGNOSIS — Z95.0 BIVENTRICULAR CARDIAC PACEMAKER IN SITU: Primary | ICD-10-CM

## 2022-08-26 DIAGNOSIS — I50.32 CHRONIC DIASTOLIC HEART FAILURE (HCC): ICD-10-CM

## 2022-08-26 DIAGNOSIS — I48.0 PAF (PAROXYSMAL ATRIAL FIBRILLATION) (HCC): ICD-10-CM

## 2022-08-26 LAB
ECHO AO ASC DIAM: 3.1 CM
ECHO AO ASCENDING AORTA INDEX: 1.73 CM/M2
ECHO AO ROOT DIAM: 3.5 CM
ECHO AO ROOT INDEX: 1.96 CM/M2
ECHO AV AREA PEAK VELOCITY: 3.3 CM2
ECHO AV AREA VTI: 3.1 CM2
ECHO AV AREA/BSA PEAK VELOCITY: 1.8 CM2/M2
ECHO AV AREA/BSA VTI: 1.7 CM2/M2
ECHO AV MEAN GRADIENT: 4 MMHG
ECHO AV MEAN VELOCITY: 0.9 M/S
ECHO AV PEAK GRADIENT: 6 MMHG
ECHO AV PEAK VELOCITY: 1.3 M/S
ECHO AV VELOCITY RATIO: 0.77
ECHO AV VTI: 24.5 CM
ECHO EST RA PRESSURE: 3 MMHG
ECHO LA DIAMETER INDEX: 2.46 CM/M2
ECHO LA DIAMETER: 4.4 CM
ECHO LA TO AORTIC ROOT RATIO: 1.26
ECHO LA VOL 2C: 41 ML (ref 22–52)
ECHO LA VOL 4C: 35 ML (ref 22–52)
ECHO LA VOLUME AREA LENGTH: 41 ML
ECHO LA VOLUME INDEX A2C: 23 ML/M2 (ref 16–34)
ECHO LA VOLUME INDEX A4C: 20 ML/M2 (ref 16–34)
ECHO LA VOLUME INDEX AREA LENGTH: 23 ML/M2 (ref 16–34)
ECHO LV E' LATERAL VELOCITY: 7 CM/S
ECHO LV E' SEPTAL VELOCITY: 6 CM/S
ECHO LV EDV A2C: 98 ML
ECHO LV EDV A4C: 69 ML
ECHO LV EDV BP: 83 ML (ref 56–104)
ECHO LV EDV INDEX A4C: 39 ML/M2
ECHO LV EDV INDEX BP: 46 ML/M2
ECHO LV EDV NDEX A2C: 55 ML/M2
ECHO LV EJECTION FRACTION A2C: 52 %
ECHO LV EJECTION FRACTION A4C: 50 %
ECHO LV EJECTION FRACTION BIPLANE: 51 % (ref 55–100)
ECHO LV ESV A2C: 47 ML
ECHO LV ESV A4C: 35 ML
ECHO LV ESV BP: 41 ML (ref 19–49)
ECHO LV ESV INDEX A2C: 26 ML/M2
ECHO LV ESV INDEX A4C: 20 ML/M2
ECHO LV ESV INDEX BP: 23 ML/M2
ECHO LV FRACTIONAL SHORTENING: 27 % (ref 28–44)
ECHO LV INTERNAL DIMENSION DIASTOLE INDEX: 2.91 CM/M2
ECHO LV INTERNAL DIMENSION DIASTOLIC: 5.2 CM (ref 3.9–5.3)
ECHO LV INTERNAL DIMENSION SYSTOLIC INDEX: 2.12 CM/M2
ECHO LV INTERNAL DIMENSION SYSTOLIC: 3.8 CM
ECHO LV IVSD: 1 CM (ref 0.6–0.9)
ECHO LV MASS 2D: 181.4 G (ref 67–162)
ECHO LV MASS INDEX 2D: 101.3 G/M2 (ref 43–95)
ECHO LV POSTERIOR WALL DIASTOLIC: 0.9 CM (ref 0.6–0.9)
ECHO LV RELATIVE WALL THICKNESS RATIO: 0.35
ECHO LVOT AREA: 4.2 CM2
ECHO LVOT AV VTI INDEX: 0.79
ECHO LVOT DIAM: 2.3 CM
ECHO LVOT MEAN GRADIENT: 2 MMHG
ECHO LVOT PEAK GRADIENT: 4 MMHG
ECHO LVOT PEAK VELOCITY: 1 M/S
ECHO LVOT STROKE VOLUME INDEX: 44.8 ML/M2
ECHO LVOT SV: 80.1 ML
ECHO LVOT VTI: 19.3 CM
ECHO MV A VELOCITY: 0.55 M/S
ECHO MV AREA PHT: 3.1 CM2
ECHO MV AREA VTI: 5.9 CM2
ECHO MV E DECELERATION TIME (DT): 248.5 MS
ECHO MV E VELOCITY: 0.41 M/S
ECHO MV E/A RATIO: 0.75
ECHO MV E/E' LATERAL: 5.86
ECHO MV E/E' RATIO (AVERAGED): 6.35
ECHO MV E/E' SEPTAL: 6.83
ECHO MV LVOT VTI INDEX: 0.71
ECHO MV MAX VELOCITY: 0.7 M/S
ECHO MV MEAN GRADIENT: 1 MMHG
ECHO MV MEAN VELOCITY: 0.5 M/S
ECHO MV PEAK GRADIENT: 2 MMHG
ECHO MV PRESSURE HALF TIME (PHT): 72.1 MS
ECHO MV VTI: 13.7 CM
ECHO RIGHT VENTRICULAR SYSTOLIC PRESSURE (RVSP): 29 MMHG
ECHO RV FREE WALL PEAK S': 6 CM/S
ECHO RV INTERNAL DIMENSION: 3.7 CM
ECHO RV TAPSE: 1.1 CM (ref 1.7–?)
ECHO TV REGURGITANT MAX VELOCITY: 2.53 M/S
ECHO TV REGURGITANT PEAK GRADIENT: 26 MMHG

## 2022-08-26 PROCEDURE — G8399 PT W/DXA RESULTS DOCUMENT: HCPCS | Performed by: SPECIALIST

## 2022-08-26 PROCEDURE — G8754 DIAS BP LESS 90: HCPCS | Performed by: SPECIALIST

## 2022-08-26 PROCEDURE — 1123F ACP DISCUSS/DSCN MKR DOCD: CPT | Performed by: SPECIALIST

## 2022-08-26 PROCEDURE — G8417 CALC BMI ABV UP PARAM F/U: HCPCS | Performed by: SPECIALIST

## 2022-08-26 PROCEDURE — G8536 NO DOC ELDER MAL SCRN: HCPCS | Performed by: SPECIALIST

## 2022-08-26 PROCEDURE — 93306 TTE W/DOPPLER COMPLETE: CPT | Performed by: SPECIALIST

## 2022-08-26 PROCEDURE — G0463 HOSPITAL OUTPT CLINIC VISIT: HCPCS | Performed by: SPECIALIST

## 2022-08-26 PROCEDURE — 1090F PRES/ABSN URINE INCON ASSESS: CPT | Performed by: SPECIALIST

## 2022-08-26 PROCEDURE — 1101F PT FALLS ASSESS-DOCD LE1/YR: CPT | Performed by: SPECIALIST

## 2022-08-26 PROCEDURE — G8427 DOCREV CUR MEDS BY ELIG CLIN: HCPCS | Performed by: SPECIALIST

## 2022-08-26 PROCEDURE — 99214 OFFICE O/P EST MOD 30 MIN: CPT | Performed by: SPECIALIST

## 2022-08-26 PROCEDURE — 3017F COLORECTAL CA SCREEN DOC REV: CPT | Performed by: SPECIALIST

## 2022-08-26 PROCEDURE — G9899 SCRN MAM PERF RSLTS DOC: HCPCS | Performed by: SPECIALIST

## 2022-08-26 PROCEDURE — G8752 SYS BP LESS 140: HCPCS | Performed by: SPECIALIST

## 2022-08-26 PROCEDURE — 93288 INTERROG EVL PM/LDLS PM IP: CPT | Performed by: INTERNAL MEDICINE

## 2022-08-26 PROCEDURE — G8432 DEP SCR NOT DOC, RNG: HCPCS | Performed by: SPECIALIST

## 2022-08-26 NOTE — PROGRESS NOTES
Annual device check  Device functioning appropriately as programmed. No events.  98.8% effective Biv Pacing  See scanned documents

## 2022-08-26 NOTE — PROGRESS NOTES
Verta Holstein, MD. [facility] [place]              Patient: Haile Monet  : 1950      Today's Date: 2022          HISTORY OF PRESENT ILLNESS:     History of Present Illness:  Has chronic CHACON. Maybe worse lately. PAST MEDICAL HISTORY:     Past Medical History:   Diagnosis Date    Adverse effect of anesthesia     slow to wake up    Arthritis     Asthma 2009    CAUSED BY MOLD    Atrial fibrillation ([address] 2009    Atrial flutter (HCC)     CAD (coronary artery disease) 2009    Celiac disease     Chronic chest pain     Chronic obstructive pulmonary disease ([address] -    right leg    Chronic pain of right ankle     Diabetes ([address]     Drug induced prednisone, DM2    Diastolic heart failure ([address]     DVT (deep venous thrombosis) ([address]     Right leg post surgery    GERD (gastroesophageal reflux disease)     Gluten intolerance     Heart failure ([address]     Hypertension     Hypothyroidism 2009    Iron deficiency anemia     Lyme disease     Pacemaker         Personal history of MI (myocardial infarction)     Rheumatoid arthritis ([address]     S/P AV jules ablation          S/P left atrial appendage ligation     SUHAS clip 10/20     Slow to wake up after anesthesia     Syncope     Post testing- resolved    TIA (transient ischemic attack)  &     Vitamin B12 deficiency 2009       Past Surgical History:   Procedure Laterality Date    HX ADENOIDECTOMY      HX AFIB ABLATION      HX APPENDECTOMY      HX CHOLECYSTECTOMY  11    HX COLONOSCOPY      HX CORONARY STENT PLACEMENT      x 2    HX HEART CATHETERIZATION      2 STENTS    HX HEART CATHETERIZATION  2020    HX HYSTERECTOMY      HX LUMBAR LAMINECTOMY  2000    L4-L5    HX ORTHOPAEDIC  -2012    RT.  FOOT SURGERY X 6/calcaneal osteotomy    HX ORTHOPAEDIC Right 2020    right hand [facility] joint replacement    HX TONSILLECTOMY  1964    IA ICAR CATHETER ABLATION ATRIOVENTR NODE FUNCTION N/A 2020 ABLATION AV NODE performed by Sanya Vega MD at [address], [facility] [address] CATH LAB    CO INS NEW/RPLCMT PRM PM W/TRANSV ELTRD ATRIAL&VENT N/A 11/19/2020    INSERT PPM DUAL performed by Sanya Vega MD at [address], [facility] CATH LAB    CO INTRACARDIAC ELECTROPHYSIOLOGIC 3D MAPPING N/A 11/19/2020    Ep 3d Mapping performed by Sanya Vega MD at [address], [facility] CATH LAB         Ms. Allergies   Allergen Reactions    Butter Flavor Anaphylaxis     Butter AND CREME    Keflex [Cephalexin] Anaphylaxis    Milk Containing Products Anaphylaxis     Pt states not an allergy    Pcn [Penicillins] Anaphylaxis    Aspirin Hives and Other (comments)     \"it makes my stomach bleed\"      Cimzia [Certolizumab Pegol] Other (comments)     GI symptoms, blisters in the mouth and esophagus    Clopidogrel Other (comments)     GI bleed    Demerol [Meperidine] Other (comments)     HYPOTENSION    Fentanyl Other (comments)     HYPOTENSION    Ibuprofen Diarrhea     Patient reports that ibuprofen caused diarrhea    Morphine Other (comments)     HYPOTENSION    Nitroglycerin Other (comments)     IN PILL FORM  - HYPOTENSION  CAN TOLERATE PATCH FOR SHORT TIME    Sulfa (Sulfonamide Antibiotics) Angioedema     Lips    Tapazole [Methimazole] Unknown (comments)     Patient stated \"it made me feel like I had the flu\"    Tramadol Other (comments)     Patient reports thrush with tramadol use    Celebrex [Celecoxib] Other (comments)     GI bleed     Codeine Other (comments)     Low BP    Hydrocodone Other (comments)     Hypotension    Quinolones Unknown (comments)      could not recall reaction, but is was listed on list of allergies     Tetracyclines Other (comments)      could not recall reaction; Was on personal list of allergies.       Warfarin Other (comments)      mentioned allergy but could not recall reaction     Adhesive Tape-Silicones Other (comments)     BAD BLISTERS AND TENDS TO GET INFECTED    Albuterol Palpitations    Gluten Diarrhea bloating    Oxycodone Other (comments)     Hallicination and hypotension           SOCIAL HISTORY:     Social History     Tobacco Use    Smoking status: Former     Packs/day: 1.00     Years: 30.00     Pack years: 30.00     Types: Cigarettes     Quit date: 2002     Years since quittin.2    Smokeless tobacco: Never   Substance Use Topics    Alcohol use: No    Drug use: Never         FAMILY HISTORY:     Family History   Problem Relation Age of Onset    Delayed Awakening Mother     Heart Disease Mother     Coronary Art Dis Mother     Hypertension Mother     Stroke Mother     Delayed Awakening Sister     Hypertension Sister     Lung Disease Father     Cancer Father         colon    Hypertension Father     Hypertension Brother     Breast Cancer Maternal Aunt     Breast Cancer Maternal Aunt     Breast Cancer Cousin         maternal 1st cousin    Breast Cancer Maternal Aunt     Breast Cancer Cousin         maternal 1st cousin    Breast Cancer Cousin         maternal 1st cousin    Deep Vein Thrombosis Neg Hx     Anesth Problems Neg Hx             REVIEW OF SYMPTOMS:      Review of Symptoms:  Constitutional: Negative for fever, chills  HEENT: Negative for nosebleeds, tinnitus  Respiratory: + CHACON   Cardiovascular: Negative for syncope;  + CHACON   Gastrointestinal: Negative for abdominal pain, diarrhea, melena. Genitourinary: Negative for dysuria  Musculoskeletal: + joint pain, RA  Skin: Negative for rash  Heme: No acute bleeding   Neurological: Negative for speech change and focal weakness. PHYSICAL EXAM:      Physical Exam:  Visit Vitals  /80 (BP 1 Location: Left upper arm, BP Patient Position: Sitting)   Pulse 81   Ht 5' 2\" (1.575 m)   Wt 172 lb (78 kg)   LMP 1980 (LMP Unknown)   SpO2 96%   BMI 31.46 kg/m²       Patient appears generally well, mood and affect are appropriate and pleasant. HEENT:  Hearing intact, non-icteric, normocephalic, atraumatic.    Neck Exam: Supple, No JVD sitting up  Lung Exam: Clear to auscultation, even breath sounds. Cardiac Exam: Regular rate and rhythm with no murmur    Abdomen: Soft, non-tender, normal bowel sounds. Obese . Extremities: Moves all ext well. Trace lower extremity edema. MSKTL: Overall good ROM ext  Skin: No significant rashes  Psych: Appropriate affect  Neuro - Grossly intact            LABS / OTHER STUDIES:        Labs 1/19/21 - A1c 7.2, CMP OK, TSH 0.29, T4 14 (H),     Labs 2/9/22 - A1c 6.9, CMP okay, WBC 22.07, Hgb 15.5, UA neg        CARDIAC DIAGNOSTICS:      Cardiac Evaluation Includes:     Cardiac Cath 6/09 - severe mLCX disease --> stenting     Cath 8/19/10 - RCA and LCX stents patent; MLI in LAD, LVEF 60%     Event Monitor 3/11 - PVC's  Holter 3/16 - PVC's  Echo 3/17 - LVEF 60%  Lexiscan Cardiolite 3/17 - normal MPI, LVEF 58%     [facility] records reviewed. Went there for chest pain on 1/3/19. Labs 1/3/19 - Trop < 0.02, BNP 18, Cr 0.61, Hgb 13  Cath 1/4/19 - LAD stent patent, LCX without sig disease, RCA stent patent. Holter 1/23/19 - NSR, normal study      Stress Echo 2/11/19 - walked 3:21 (2.4 METS), baseline /90, Max /90, normal stress EKG and stress echo      CXR 2/22/19 - normal      Echo 3/21/19 - LVEF 61%; grade 1 diastology (normal for age), AV sclerosis. RVSP 26 mmHg. PARDEEP's with exercise 6/7/19 - normal       Event Monitor 5/15/19-6/6/19 - normal study; symptoms correlated with sinus      Right Heart Cath 6/28/19 - RHC findings:   RAP=   Mean 17    mmHg  RVSP= 40/20    mmHg  PAP=     42/30/34  mmHg  PCWP=  27 mmHg  CO=        Thermal 4.1  L/min,             Faraz pending  CI=           Thermal 3.08  L/min/m2,     Faraz pending      Findings:  1. Elevated right and left sided filling pressure  2. Pulmonary hypertension, WHO group 2  3.    Perserved cardiac index by Thermal     Echo 8/13/19 - LVEF 50-55%, \"The following segments are hypokinetic: basal inferior, basal inferolateral and mid inferolateral\" Cardiac Cath 8/13/19 - Non-obstructive CAD with patent stents in LCX and RCA. LVEDP 16 mmHg. Cardiac MRI 9/30/19 - 1. Normal left ventricular cavity size with preserved left ventricular systolic function. There is no significant regional wall motion abnormalities. LVEF 60%. 2. Normal right ventricular size and systolic motion. RVEF 60%. 3. Trace to mild mitral regurgitation. 4. On EGE and LGE study, there is a small thin subendocardial infarct involving 25% to 50% thickness of the subendocardial wall of the basal inferior wall. This infarct is in RCA territory. There is reasonable viability surrounding this infarct. The mid to distal inferior wall and inferoseptal wall does not demonstrate any infarct. There are no other infarct. There is no features of infiltrative sarcoidosis or cardiac amyloidosis. There is no features of inflammatory myocarditis. All other myocardial walls are otherwise completely viable. There is no features of hemochromatosis. 5. Normal pericardium without significant pericardial effusion.       Venous Doppler 9/9/19 - No DVT Bilat      Lexiscan Cardiolite 12/9/19 - Normal MPI, LVEF 66%     PFT's 1/14/20 - Spirometry reveals severe airflow obstruction with moderate reduction in forced vital capacity which may be due to a restrictive ventilatory defect or air trapping     COY 10/15/20 - normal, LVEF 55%     Surgical SUHAS clip 10/20/20 Video-assisted thoracoscopic placement of left atrial appendage clip     Echo 10/29/20 - LVEF 55-60%, PASP 41 mmHg      Echo 11/3/20 - LVEF 55-60%     Lexiscan Cardiolite 11/5/20 - Normal MPI, LVEF 64%     Dual PPM / AV node ablation 11/19/20     Echo 3/23/21 - LVEF 45%, septal bounce, grade 1 diastology      BIV upgrade with LV lead placed 6/28/21     Echo 6/29/21 - LVEF 45-50%    Echo 9/22/21 - LVEF 50-55%     Echo 8/26/22 -  LVEF 50-55%, grade 1 diastllogy, normal LA size, RVSP 29 mmHg          EKG 2/22/19 - NSR, normal   EKG 8/12/19 - NSR, NSST changes EKG 11/4/20 - Atrial flutter with RVR  EKG 7/16/21 - AV paced            ASSESSMENT AND PLAN:      Assessment and Plan:  1) Chronic Chest pain and SOB   - She had CAD with prior stenting in RCA and LCX (2009 ? )   - She notes CP/SOB symptoms since Jan 2019   - Cardiac cath 1/19 showed patent vessels. - She saw Pulmonary ([facility]) and she says workup was OK.    - Cardiac cath repeated 8/13/19 showed non-obs CAD with LVEDP 16 mmHg  - Cardiac MRI 9/30/19 - There is no features of infiltrative sarcoidosis or cardiac amyloidosis.   - CardioMems was considered but due to problem with blood thinners she declined proceeding with one   - PFTs with severe airflow obstruction with no improvement following bronchodilators  - DLCO corrected to normal when considering alveolar volume  - No evidence of mold toxicity by Dr. Anastacio Reece  - Completed therapy for Lyme Disease  - Follow up with Dr. Richards Neither  - Her symptoms could be due to small vessel disease and/or diastolic dysfunction (see below) ---- However as her symptoms persist despite cardiac treatment, her symptoms are multifactorial (pulmonary, etc)      2) HFpEF   - class 3 CHACON (multifactorial)   - RHC with PCWP 27 mmHg on 6/19   - SPEP - no M spike  - Iron profile - normal  - ATTR negative  - Continue bumex, eplerenone, metoprolol  - She takes Bumex 1 in am  - she can take extra Bumex as needed   ---> I discussed adding Jardiance but she has had frequent UTI's and is worried about worsening infections   - On 8/26/22 - I suspect her current problems are mostly lung related as her filling pressure seems normal on echo     3) CAD s/p PCI to LAD , RCA  -  LHC in 8/19 - no intervention  - Lexiscan Cardiolite 11/5/20 - Normal MPI, LVEF 64%  - Intolerant to statins d/t myalgias  - ASA intolerant d/t GI bleeding  - Developed plavix induced thrombocytopenia - advised by hematology to avoid antiplatelets  - has chronic chest pain (maybe from small vessel disease)   - she feels much better with less CP after BiV upgrade   - cont meds (NTG, BB)      4) COPD ?  - Sees Dr. Johnson Nixon     5) History of Iron Deficiency Anemia   - She takes no blood thinners (not even aspirin) due to anemia in past   - Dr. Jim Kraft record 12/16 reviewed - He was seeing her for iron deficiency anemia thought to be from GI Bleed from aspirin and plavix      6) Lipids -   - Does not take a statin due to having muscle aches (from RA at baseline)  - Tried to get her on PSCK9 Inhibitor in past bur she is not taking one   - She is now only on Tricor      7) Atrial flutter with RVR 11/20  S/p PPM   - Dual PPM / AV node ablation 11/19/20   - not on any blood thinners due to problems with bleeding   - Surgical SUHAS clip 10/20/20   - Echo 3/23/21 - LVEF 45%,   - BIV upgrade with LV lead placed 6/28/21 for reducing LVEF ----> she felt much better afterwards      8) HTN  - BP looks OK - continue meds      9) RA  - she is on prednisone      10) History of Tick Bite and RMSF - Bartonella  - Follow up with Dr. Valentín Washington     11) See me in 6 months. On a side note, I see her brother. Jose Knutson MD, [place] [address]  [address] [facility] [facility], [address]      [place] [address]  [address]  [address]  Ph: [phone]                               Ph [phone]

## 2022-08-26 NOTE — PROGRESS NOTES
Chief Complaint   Patient presents with    Follow-up     6 months w PM check, Elidia Mccray Most    Irregular Heart Beat    Coronary Artery Disease    Hypertension     Vitals:    08/26/22 1135 08/26/22 1140   BP: (!) 146/94 128/80   BP 1 Location: Left upper arm Left upper arm   BP Patient Position: Sitting Sitting   Pulse:  81   Height: 5' 2\" (1.575 m)    Weight: 172 lb (78 kg)    SpO2:  96%         Chest pain denied   SOB yes.    Palpitations denied   Swelling in hands/feet denied   Dizziness denied   Recent hospital stays denied   Refills denied

## 2022-09-29 NOTE — TELEPHONE ENCOUNTER
Discharge Summary/Instructions after an Endoscopic Procedure  Patient Name: Bossman Daniels  Patient MRN: [id number]  Patient YOB: 1980  Thursday, September 29, 2022  Roby Fabian MD  Dear patient,  As a result of recent federal legislation (The Federal Cures Act), you may   receive lab or pathology results from your procedure in your [facility]   account before your physician is able to contact you. Your physician or   their representative will relay the results to you with their   recommendations at their soonest availability.  Thank you,  RESTRICTIONS:  During your procedure today, you received medications for sedation.  These   medications may affect your judgment, balance and coordination.  Therefore,   for 24 hours, you have the following restrictions:   - DO NOT drive a car, operate machinery, make legal/financial decisions,   sign important papers or drink alcohol.    ACTIVITY:  Today: no heavy lifting, straining or running due to procedural   sedation/anesthesia.  The following day: return to full activity including work.  DIET:  Eat and drink normally unless instructed otherwise.     TREATMENT FOR COMMON SIDE EFFECTS:  - Mild abdominal pain, nausea, belching, bloating or excessive gas:  rest,   eat lightly and use a heating pad.  - Sore Throat: treat with throat lozenges and/or gargle with warm salt   water.  - Because air was used during the procedure, expelling large amounts of air   from your rectum or belching is normal.  - If a bowel prep was taken, you may not have a bowel movement for 1-3 days.    This is normal.  SYMPTOMS TO WATCH FOR AND REPORT TO YOUR PHYSICIAN:  1. Abdominal pain or bloating, other than gas cramps.  2. Chest pain.  3. Back pain.  4. Signs of infection such as: chills or fever occurring within 24 hours   after the procedure.  5. Rectal bleeding, which would show as bright red, maroon, or black stools.   (A tablespoon of blood from the rectum is not serious, especially 
Spoke with pt concerning cardioMems procedure. (Pt IDx2). Instructions given to pt per Jake. Pt NPO after midnight; hold Bumex and take all other meds with sip of water in AM; have someone available to drive pt to and from procedure; pack a bag in case an overnight stay is warranted. cath scheduled for 0800 on 1/28/2020. Pt advised to arrive 2hrs prior to procedure for prep. Labs CBC,BMP. Pt expressed understanding. Opportunities for questions, clarifications, and concerns provided.
if   hemorrhoids are present.)  6. Vomiting.  7. Weakness or dizziness.  GO DIRECTLY TO THE NEAREST EMERGENCY ROOM IF YOU HAVE ANY OF THE FOLLOWING:      Difficulty breathing              Chills and/or fever over 101 F   Persistent vomiting and/or vomiting blood   Severe abdominal pain   Severe chest pain   Black, tarry stools   Bleeding- more than one tablespoon   Any other symptom or condition that you feel may need urgent attention  Your doctor recommends these additional instructions:  If any biopsies were taken, your doctors clinic will contact you in 1 to 2   weeks with any results.  - Discharge patient to home (with escort).   - Clear liquid diet today, then advance as tolerated to resume previous   diet.   - Continue present medications.   - Observe patient's clinical course.   - Await cytology results.   - Avoid ETOH & Smoking  - Refer to a surgeon, Dr. Sohan Sosa, at appointment to be scheduled.   - Plan for either cholecystectomy followed by trial of fully covered metal   stent placement for benign distal CBD stricture in setting of chronic   pancreatitis vs biliary bypass.   - The findings and recommendations were discussed with the patient.  For questions, problems or results please call your physician - Roby Fabian MD at Work:  [phone].  [facility], EMERGENCY ROOM PHONE NUMBER: [phone]  IF A COMPLICATION OR EMERGENCY SITUATION ARISES AND YOU ARE UNABLE TO REACH   YOUR PHYSICIAN - GO DIRECTLY TO THE EMERGENCY ROOM.  Roby Fabian MD  9/29/2022 11:17:02 AM  This report has been verified and signed electronically.  Dear patient,  As a result of recent federal legislation (The Federal Cures Act), you may   receive lab or pathology results from your procedure in your [facility]   account before your physician is able to contact you. Your physician or   their representative will relay the results to you with their   recommendations at their soonest availability.  Thank 
you,  [employer]OVATION  
[phone]

## 2022-10-24 ENCOUNTER — HOSPITAL ENCOUNTER (OUTPATIENT)
Dept: MAMMOGRAPHY | Age: 72
Discharge: HOME | End: 2022-10-24
Attending: FAMILY MEDICINE
Payer: MEDICARE

## 2022-10-24 ENCOUNTER — TRANSCRIBE ORDER (OUTPATIENT)
Dept: MAMMOGRAPHY | Age: 72
End: 2022-10-24

## 2022-10-24 DIAGNOSIS — Z12.31 VISIT FOR SCREENING MAMMOGRAM: ICD-10-CM

## 2022-10-24 DIAGNOSIS — Z12.31 VISIT FOR SCREENING MAMMOGRAM: Primary | ICD-10-CM

## 2022-10-24 PROCEDURE — 77067 SCR MAMMO BI INCL CAD: CPT

## 2022-11-22 ENCOUNTER — OFFICE VISIT (OUTPATIENT)
Dept: CARDIOLOGY CLINIC | Age: 72
End: 2022-11-22
Payer: MEDICARE

## 2022-11-22 DIAGNOSIS — Z95.0 BIVENTRICULAR CARDIAC PACEMAKER IN SITU: Primary | ICD-10-CM

## 2023-01-03 ENCOUNTER — TRANSCRIBE ORDER (OUTPATIENT)
Dept: SCHEDULING | Age: 73
End: 2023-01-03

## 2023-01-03 DIAGNOSIS — Z12.31 SCREENING MAMMOGRAM FOR HIGH-RISK PATIENT: Primary | ICD-10-CM

## 2023-01-16 RX ORDER — FENOFIBRATE 48 MG/1
TABLET, COATED ORAL
Qty: 90 TABLET | Refills: 1 | Status: SHIPPED | OUTPATIENT
Start: 2023-01-16

## 2023-01-16 RX ORDER — METOPROLOL SUCCINATE 50 MG/1
TABLET, EXTENDED RELEASE ORAL
Qty: 90 TABLET | Refills: 0 | Status: SHIPPED | OUTPATIENT
Start: 2023-01-16

## 2023-01-16 NOTE — TELEPHONE ENCOUNTER
Refill per VO of Dr. Cece Calix  Last appt: Visit date not found  Future Appointments   Date Time Provider Inge Jelly   2/28/2023  3:45 PM REMOTE1, [facility] AMB   3/2/2023 10:20 AM Rishi Fernandes MD [facility]   8/16/2023  9:20 AM PACEMAKER, STFRDAVID [facility]   8/16/2023  9:40 AM Brigitte Pinto MD [facility]   10/26/2023  8:00 AM [facility] 1 [facility]       Requested Prescriptions     Signed Prescriptions Disp Refills    metoprolol succinate (TOPROL-XL) 50 mg XL tablet 90 Tablet 0     Sig: TAKE 1 TABLET DAILY     Authorizing Provider: Hayden Grant     Ordering User: Taco Coker

## 2023-01-16 NOTE — TELEPHONE ENCOUNTER
Refill per VO of Dr. Ameya Resendiz  Last appt: 8/26/2022  Future Appointments   Date Time Provider Inge Soleri   2/28/2023  3:45 PM [place] [address], [facility] AMB   3/2/2023 10:20 AM [facility]   8/16/2023  9:20 AM PACEMAKER, STFRDAVID ANTUNEZ  AMB   8/16/2023  9:40 [facility]   10/26/2023  8:00 AM [facility] 1 [facility]       Requested Prescriptions     Signed Prescriptions Disp Refills    fenofibrate nanocrystallized (TRICOR) 48 mg tablet 90 Tablet 1     Sig: TAKE 1 TABLET NIGHTLY     Authorizing Provider: Aneta Valentine     Ordering User: Jose Long

## 2023-02-28 ENCOUNTER — OFFICE VISIT (OUTPATIENT)
Dept: CARDIOLOGY CLINIC | Age: 73
End: 2023-02-28

## 2023-02-28 DIAGNOSIS — Z95.0 BIVENTRICULAR CARDIAC PACEMAKER IN SITU: Primary | ICD-10-CM

## 2023-04-22 DIAGNOSIS — Z12.31 SCREENING MAMMOGRAM FOR HIGH-RISK PATIENT: Primary | ICD-10-CM

## 2023-05-09 NOTE — PROGRESS NOTES
BSI: MED RECONCILIATION    Comments/Recommendations:   Patient appears to be a very good historian and knows her medications very well. She was able to make corrections to her medication list when appropriate without prompting. Patient reports that she last took her medications earlier today, but has not taken her evening meds. RA medications (MTX & folic acid) - Of note, patient has prescriptions for methotrexate 15 mg once weekly (Rx instructions are to take 7.5 mg in the morning and 7.5 mg in the evening once weekly) and folic acid 4 mg PO daily. These were prescribed on 10/5/2020 for RA but the patient has not yet started taking them yet. She reports that she is waiting to be weaned off prednisone before starting. Added tramadol (thrush) and ibuprofen (diarrhea) as allergies  Confirmed anaphylaxis with butter flavoring, Keflex, and PCN. She reports that most of her other allergies are related to hypotension.    Confirmed [facility] pharmacy in [place] as her preferred pharmacy    Medications added:     Levalbuterol 0.63 mg nebs QID  Omega 3 fish oil 1 capsule daily    Medications removed:    APAP 325 mg PRN - patient uses the 650 mg tablets currently  Ibuprofen - patient reports she had diarrhea  Levofloxacin - reported as \"not taking\"  Flaxseed oil    Medications adjusted:    APAP 650 mg ER x 2 tablets PO BID (instead of APAP 325 mg)  Bumetanide 1 mg in the morning and 0.5 mg in the evening  Co-Q10 200 mg PO daily (instead of 100 mg)  Lantus 8 units SQ in the morning (instead of 10 units)    Information obtained from: patient, Rx query, hand-written medication list from home (somewhat up to date, but mostly c/w Rx Query)    Significant PMH/Disease States:   Past Medical History:   Diagnosis Date    Adverse effect of anesthesia     slow to wake up    Arthritis     Asthma 6/29/2009    CAUSED BY MOLD    Atrial fibrillation ([facility] [address] 6/29/2009    CAD (coronary artery disease) 06/29/2009    Celiac disease 2010 Chronic obstructive pulmonary disease ([facility] [address] 2004-5    right leg    Chronic pain of right ankle     Diabetes ([facility] [address]     Drug induced prednisone, DM2    Diastolic heart failure ([facility] [address]     DVT (deep venous thrombosis) ([facility] [address] 2004    Right leg post surgery    GERD (gastroesophageal reflux disease)     Gluten intolerance     Heart failure ([facility] [address]     Hypertension     Hypothyroidism 6/29/2009    Iron deficiency anemia     Lyme disease     Personal history of MI (myocardial infarction)     Rheumatoid arthritis ([facility] [address]     Slow to wake up after anesthesia     Syncope     Post testing- resolved    TIA (transient ischemic attack) 2004 & 2006    Vitamin B12 deficiency 6/29/2009     Chief Complaint for this Admission:   Chief Complaint   Patient presents with    Wheezing     Allergies: Butter flavor; Keflex [cephalexin]; Pcn [penicillins]; Aspirin; Cimzia [certolizumab pegol]; Clopidogrel; Demerol [meperidine]; Fentanyl; Ibuprofen; Morphine; Nitroglycerin; Sulfa (sulfonamide antibiotics); Tapazole [methimazole]; Tramadol; Adhesive tape-silicones; Albuterol; Gluten; and Oxycodone    Prior to Admission Medications:     Medication Documentation Review Audit       Reviewed by Kings Bergeron PHARMD (Pharmacist) on 11/02/20 at 2320      Medication Sig Documenting Provider Last Dose Status Taking?   acetaminophen (TYLENOL) 650 mg TbER Take 1,300 mg by mouth two (2) times a day. Provider, Historical 11/2/2020 am Active Yes   aclidinium bromide Jackie Quinn Pressair) 400 mcg/actuation inhaler Take 1 Puff by inhalation. Provider, Historical 11/2/2020 am Active Yes   artificial tears, dextran 70-hypromellose, (GenTeal Tears Mild) 0.1-0.3 % ophthalmic solution Administer 1 Drop to both eyes daily as needed. Provider, Historical 11/2/2020 am Active Yes   ascorbic acid (VITAMIN C) 500 mg tablet Take 500 mg by mouth daily. Other, MD Yolanda 11/2/2020 am Active Yes   bumetanide (BUMEX) 1 mg tablet Take 1 mg by mouth daily.  Provider, Historical 11/2/2020 am Active Yes           Med Note (Mi Cookser   Mon Nov 2, 2020 11:09 PM) Takes 1 mg in the morning and 0.5 mg in the evening   bumetanide (BUMEX) 1 mg tablet Take 0.5 mg by mouth every evening. 1/2 tablet = 0.5 mg Provider, Historical 11/1/2020 pm Active Yes           Med Note (Mi Cookser   Mon Nov 2, 2020 11:10 PM) Takes 1 mg in the morning and 0.5 mg in the evening   calcium citrate-vitamin d3 (CITRACAL+D) 315 mg-5 mcg (200 unit) tab Take 1 Tab by mouth daily (with breakfast). Provider, Historical 11/2/2020 am Active Yes   carboxymethylcell/hypromellose (GENTEAL GEL OP) Apply  to eye nightly. Provider, Historical 11/1/2020 pm Active Yes   cholecalciferol, vitamin D3, (VITAMIN D3) 2,000 unit tab Take 2,000 Units by mouth daily. Provider, Historical 11/2/2020 am Active Yes   coenzyme Q-10 (Co Q-10) 200 mg capsule Take 200 mg by mouth daily. Provider, Historical 11/2/2020 am Active Yes   cyanocobalamin (VITAMIN B12) 1,000 mcg/mL injection INJECT 1 ML INTO THE MUSCLE EVERY 30 DAYS Corey Espinosa MD 11/1/2020 am Active Yes           Med Note (Mi Page   Mon Nov 2, 2020 11:11 PM) 1st of each month   denosumab (PROLIA SC) by SubCUTAneous route every 6 months. Provider, Historical 5/1/2020 Active Yes           Med Note Elena Welch Nov 2, 2020 11:11 PM) Next dose due November 2020   eplerenone (INSPRA) 25 mg tablet Take 25 mg by mouth Daily (before lunch). Provider, Historical 11/2/2020 am Active Yes   fenofibrate nanocrystallized (Tricor) 48 mg tablet Take 48 mg by mouth nightly. Provider, Historical 11/1/2020 pm Active Yes   ferrous sulfate 325 mg (65 mg iron) tablet Take 325 mg by mouth daily (with lunch). Yolanda Lujan MD 11/2/2020 am Active Yes   gabapentin (NEURONTIN) 100 mg capsule Take 100 mg by mouth nightly. Provider, Historical 11/1/2020 pm Active Yes   insulin glargine (LANTUS,BASAGLAR) 100 unit/mL (3 mL) inpn 8 Units by SubCUTAneous route Daily (before breakfast).  30 minutes before breakfast  Indications: \"I set the pen on 8\" Provider, Historical 11/2/2020 am Active Yes   lansoprazole (PREVACID) 30 mg capsule Take 30 mg by mouth daily. Provider, Historical 11/2/2020 am Active Yes           Med Note (Mariza Erickson Mar 12, 2019  9:04 AM)     levalbuterol (XOPENEX) 0.63 mg/3 mL nebu 0.63 mg by Nebulization route four (4) times daily. Provider, Historical 11/2/2020 Unknown time Active Yes   levothyroxine (SYNTHROID) 150 mcg tablet Take 150 mcg by mouth Daily (before breakfast). Provider, Historical 11/2/2020 am Active Yes   lidocaine (LIDODERM) 5 % 1 Patch by TransDERmal route every twenty-four (24) hours. Apply patch to the affected area for 12 hours a day and remove for 12 hours a day. Other, MD Yolanda 11/1/2020 pm Active Yes           Med Note (Theo Vega   Mon Nov 2, 2020 11:15 PM) Applies to foot every evening. Does NOT currently have patch on.    metoprolol succinate (TOPROL-XL) 25 mg XL tablet Take 1 Tab by mouth daily (after lunch). [place], [place] 11/2/2020 lunch Active Yes   mometasone furoate (ASMANEX HFA IN) Take 100 mcg by inhalation two (2) times a day. Provider, Historical 11/2/2020 am Active Yes   nitroglycerin (NITRODUR) 0.1 mg/hr 1 Patch by TransDERmal route daily as needed. Indications: Oral form \"causes severe BP drop  BAD\" Drs got scared, Provider, Historical  Active Yes           Med Note (Theo Vega   Mon Nov 2, 2020 11:16 PM) Only as needed   omega 3-DHA-EPA-fish oil 1,000 mg (120 mg-180 mg) capsule Take 1 Cap by mouth every evening. Provider, Historical 11/1/2020 pm Active Yes   potassium 99 mg tablet Take 99 mg by mouth daily. Provider, Historical 11/1/2020 Unknown time Active Yes   predniSONE (DELTASONE) 2.5 mg tablet Take 2.5 mg by mouth daily (after lunch). 5 mg in morning and 2.5 mg afternoon Provider, Historical 11/2/2020 afternoon Active Yes   predniSONE (DELTASONE) 5 mg tablet Take 5 mg by mouth daily.  5 mg in morning and 2.5 mg afternoon Provider, Historical 11/2/2020 am Active Yes   SITagliptin (JANUVIA) 100 mg tablet Take 100 mg by mouth daily (with dinner). Provider, Historical 11/1/2020 dinner Active Yes   turmeric (CURCUMIN) Take 1 g by mouth every evening. Provider, Historical 11/1/2020 pm Active Yes   vit C/vit E ac/lut/copper/zinc (PRESERVISION LUTEIN PO) Take 1 Tab by mouth daily (with dinner). Once per week Provider, Historical 11/1/2020 dinner Active Yes                Thank you for the consult,  Shane SAPP [facility] details

## 2023-05-25 ENCOUNTER — OFFICE VISIT (OUTPATIENT)
Age: 73
End: 2023-05-25
Payer: MEDICARE

## 2023-05-25 VITALS
OXYGEN SATURATION: 95 % | HEIGHT: 62 IN | BODY MASS INDEX: 31.47 KG/M2 | WEIGHT: 171 LBS | DIASTOLIC BLOOD PRESSURE: 80 MMHG | HEART RATE: 83 BPM | SYSTOLIC BLOOD PRESSURE: 138 MMHG

## 2023-05-25 DIAGNOSIS — I10 HTN (HYPERTENSION), BENIGN: ICD-10-CM

## 2023-05-25 DIAGNOSIS — I50.33 ACUTE ON CHRONIC DIASTOLIC HEART FAILURE (HCC): ICD-10-CM

## 2023-05-25 DIAGNOSIS — I48.0 PAROXYSMAL ATRIAL FIBRILLATION (HCC): Primary | ICD-10-CM

## 2023-05-25 DIAGNOSIS — I25.118 CORONARY ARTERY DISEASE OF NATIVE ARTERY OF NATIVE HEART WITH STABLE ANGINA PECTORIS (HCC): ICD-10-CM

## 2023-05-25 DIAGNOSIS — R06.02 SHORTNESS OF BREATH: ICD-10-CM

## 2023-05-25 PROCEDURE — 93010 ELECTROCARDIOGRAM REPORT: CPT | Performed by: SPECIALIST

## 2023-05-25 PROCEDURE — G8427 DOCREV CUR MEDS BY ELIG CLIN: HCPCS | Performed by: SPECIALIST

## 2023-05-25 PROCEDURE — 3079F DIAST BP 80-89 MM HG: CPT | Performed by: SPECIALIST

## 2023-05-25 PROCEDURE — 3075F SYST BP GE 130 - 139MM HG: CPT | Performed by: SPECIALIST

## 2023-05-25 PROCEDURE — G8399 PT W/DXA RESULTS DOCUMENT: HCPCS | Performed by: SPECIALIST

## 2023-05-25 PROCEDURE — 1036F TOBACCO NON-USER: CPT | Performed by: SPECIALIST

## 2023-05-25 PROCEDURE — 1090F PRES/ABSN URINE INCON ASSESS: CPT | Performed by: SPECIALIST

## 2023-05-25 PROCEDURE — 93005 ELECTROCARDIOGRAM TRACING: CPT | Performed by: SPECIALIST

## 2023-05-25 PROCEDURE — 99214 OFFICE O/P EST MOD 30 MIN: CPT | Performed by: SPECIALIST

## 2023-05-25 PROCEDURE — 1123F ACP DISCUSS/DSCN MKR DOCD: CPT | Performed by: SPECIALIST

## 2023-05-25 PROCEDURE — 3017F COLORECTAL CA SCREEN DOC REV: CPT | Performed by: SPECIALIST

## 2023-05-25 PROCEDURE — G8417 CALC BMI ABV UP PARAM F/U: HCPCS | Performed by: SPECIALIST

## 2023-05-25 RX ORDER — INSULIN GLARGINE 100 [IU]/ML
INJECTION, SOLUTION SUBCUTANEOUS NIGHTLY
COMMUNITY

## 2023-05-25 NOTE — PROGRESS NOTES
Chief Complaint   Patient presents with    Follow-up     9 months    Coronary Artery Disease    Atrial Fibrillation    Hypertension     Vitals:    05/25/23 1106   BP: 138/80   Site: Left Upper Arm   Position: Sitting   Cuff Size: Medium Adult   Pulse: 83   SpO2: 95%   Weight: 171 lb (77.6 kg)   Height: 5' 2\" (1.575 m)      /80 (Site: Left Upper Arm, Position: Sitting, Cuff Size: Medium Adult)   Pulse 83   Ht 5' 2\" (1.575 m)   Wt 171 lb (77.6 kg)   SpO2 95%   BMI 31.28 kg/m²      Chest pain             YES  SOB                       YES  Swelling                 FEET, HANDS  Dizziness               NO  Recent hospital     NO  Refills                    NO         Covid vaccination  YES  Covid                     NO

## 2023-05-25 NOTE — PROGRESS NOTES
Dotty Devi MD. [facility]          Patient: Anayeli Mckeon  : 1950      Today's Date: 2023        HISTORY OF PRESENT ILLNESS:     History of Present Illness:  She has been feeling OK. Chronic SOB and fatigue - but doing better. Some palpitations.          PAST MEDICAL HISTORY:     Past Medical History:   Diagnosis Date    Adverse effect of anesthesia     slow to wake up    Arthritis     Asthma 2009    CAUSED BY MOLD    Atrial fibrillation ([address] 2009    Atrial flutter ([address]     CAD (coronary artery disease) 2009    Celiac disease 2010    Chronic chest pain     Chronic obstructive pulmonary disease ([address] -    right leg    Chronic pain of right ankle     Diabetes ([address]     Drug induced prednisone, DM2    Diastolic heart failure ([address]     DVT (deep venous thrombosis) ([address]     Right leg post surgery    GERD (gastroesophageal reflux disease)     Gluten intolerance     Heart failure ([address]     Hypertension     Hypothyroidism 2009    Iron deficiency anemia     Lyme disease     Pacemaker         Personal history of MI (myocardial infarction)     Rheumatoid arthritis ([address]     S/P AV felicia ablation          S/P left atrial appendage ligation     RUSTY clip 10/20     Slow to wake up after anesthesia     Syncope     Post testing- resolved    TIA (transient ischemic attack)  &     Vitamin B12 deficiency 2009       Past Surgical History:   Procedure Laterality Date    ABLATION OF DYSRHYTHMIC FOCUS      ADENOIDECTOMY      APPENDECTOMY      CARDIAC CATHETERIZATION  2020    CARDIAC CATHETERIZATION  2010    2 STENTS    CHOLECYSTECTOMY  11    COLONOSCOPY      CORONARY ANGIOPLASTY WITH STENT PLACEMENT      x 2    HYSTERECTOMY (CERVIX STATUS UNKNOWN)      ICAR CATHETER ABLATION ATRIOVENTR NODE FUNCTION N/A 2020    ABLATION AV NODE performed by Jordan Farias MD at [address] CATH LAB    INS NEW/RPLCMT [address] [facility] [place] PM W/TRANSV ELTRD ATRIAL&VENT N/A 2020

## 2023-05-30 ENCOUNTER — NURSE ONLY (OUTPATIENT)
Age: 73
End: 2023-05-30

## 2023-05-30 DIAGNOSIS — Z95.0 PRESENCE OF CARDIAC PACEMAKER: Primary | ICD-10-CM

## 2023-07-05 RX ORDER — NITROGLYCERIN 0.1MG/HR
PATCH, TRANSDERMAL 24 HOURS TRANSDERMAL
Qty: 90 PATCH | Refills: 2 | Status: SHIPPED | OUTPATIENT
Start: 2023-07-05

## 2023-07-05 NOTE — TELEPHONE ENCOUNTER
Refill per VO of Dr. Dani Galvez  Last appt: 5/25/2023    Future Appointments   Date Time Provider [address]   8/16/2023  9:20 AM PACEMAKER, RICKI JONESSF BS AMB   8/16/2023  9:40 AM Skinny Freeman MD CAVSF BS AMB   10/26/2023  8:00 AM [facility] MAM 1 [facility] [place]   11/16/2023  9:30 AM [address] CAVSF BS AMB   11/27/2023 11:00 AM [facility] ECHO 2 CAVSF BS AMB   11/27/2023 11:40 AM Sachin Alvarenga MD CAVSF BS AMB       Requested Prescriptions     Pending Prescriptions Disp Refills    nitroGLYCERIN (NITRODUR) 0.1 MG/HR [Pharmacy Med Name: Nitroglycerin 0.1 MG/HR Transdermal Patch 24 Hour] 90 patch 0     Sig: APPLY 1 PATCH TOPICALLY ONCE DAILY

## 2023-07-27 LAB — HBA1C MFR BLD HPLC: 8.2 %

## 2023-08-16 ENCOUNTER — PROCEDURE VISIT (OUTPATIENT)
Age: 73
End: 2023-08-16
Payer: MEDICARE

## 2023-08-16 ENCOUNTER — OFFICE VISIT (OUTPATIENT)
Age: 73
End: 2023-08-16
Payer: MEDICARE

## 2023-08-16 VITALS
BODY MASS INDEX: 31.28 KG/M2 | RESPIRATION RATE: 22 BRPM | OXYGEN SATURATION: 96 % | SYSTOLIC BLOOD PRESSURE: 116 MMHG | DIASTOLIC BLOOD PRESSURE: 80 MMHG | WEIGHT: 170 LBS | HEIGHT: 62 IN | HEART RATE: 84 BPM

## 2023-08-16 DIAGNOSIS — Z95.0 BIVENTRICULAR CARDIAC PACEMAKER IN SITU: Primary | ICD-10-CM

## 2023-08-16 DIAGNOSIS — I10 HTN (HYPERTENSION), BENIGN: ICD-10-CM

## 2023-08-16 DIAGNOSIS — Z79.4 INSULIN DEPENDENT TYPE 2 DIABETES MELLITUS (HCC): ICD-10-CM

## 2023-08-16 DIAGNOSIS — Z98.890 S/P AV NODAL ABLATION: ICD-10-CM

## 2023-08-16 DIAGNOSIS — I48.0 PAROXYSMAL ATRIAL FIBRILLATION (HCC): ICD-10-CM

## 2023-08-16 DIAGNOSIS — I50.22 CHRONIC SYSTOLIC CHF (CONGESTIVE HEART FAILURE), NYHA CLASS 3 (HCC): ICD-10-CM

## 2023-08-16 DIAGNOSIS — E11.9 INSULIN DEPENDENT TYPE 2 DIABETES MELLITUS (HCC): ICD-10-CM

## 2023-08-16 DIAGNOSIS — I25.10 CORONARY ARTERY DISEASE INVOLVING NATIVE CORONARY ARTERY OF NATIVE HEART WITHOUT ANGINA PECTORIS: ICD-10-CM

## 2023-08-16 PROCEDURE — 99214 OFFICE O/P EST MOD 30 MIN: CPT | Performed by: INTERNAL MEDICINE

## 2023-08-16 PROCEDURE — 93281 PM DEVICE PROGR EVAL MULTI: CPT | Performed by: INTERNAL MEDICINE

## 2023-08-16 RX ORDER — SOTAGLIFLOZIN 200 MG/1
200 TABLET ORAL DAILY
Qty: 30 TABLET | Refills: 5 | Status: SHIPPED | OUTPATIENT
Start: 2023-08-16

## 2023-08-16 RX ORDER — INSULIN DEGLUDEC INJECTION 100 U/ML
18 INJECTION, SOLUTION SUBCUTANEOUS DAILY
COMMUNITY
Start: 2023-06-26

## 2023-08-16 ASSESSMENT — PATIENT HEALTH QUESTIONNAIRE - PHQ9
SUM OF ALL RESPONSES TO PHQ QUESTIONS 1-9: 0
SUM OF ALL RESPONSES TO PHQ QUESTIONS 1-9: 0
1. LITTLE INTEREST OR PLEASURE IN DOING THINGS: 0
SUM OF ALL RESPONSES TO PHQ QUESTIONS 1-9: 0
SUM OF ALL RESPONSES TO PHQ QUESTIONS 1-9: 0
SUM OF ALL RESPONSES TO PHQ9 QUESTIONS 1 & 2: 0
2. FEELING DOWN, DEPRESSED OR HOPELESS: 0

## 2023-08-16 NOTE — PROGRESS NOTES
CARDIAC ELECTROPHYSIOLOGY CLINIC NOTE       HISTORY OF PRESENTING ILLNESS        Parvez Verma is an 74 y.o. female with hx of paroxysmal arial fibrillation (s/p ablation x10 years ago with Dr. Jones Barrios), previous history of thrombocytopenia,  hypertension, COPD, heart failure preserved EF, CAD, RUSTY ligation, biventricular pacemaker s/p AV node ablation. Previous cardiac catheterization and stress test in 2019 were negative  for ischemia and no significant coronary disease and stents were open.      She was seen by Dr Angel De La Torre LVEF 1 year ago 51%    She said she has CARR and fatigue    Biv pacing is 99%  LV pacing threshold 2.5 V @ 1 ms  Battery 2.5 years left                Allergies   Allergen Reactions    Butter Flavor Anaphylaxis     Butter AND CREME    Cephalexin Anaphylaxis    Milk (Cow) Anaphylaxis     Pt states not an allergy    Penicillins Anaphylaxis    Aspirin Hives and Other (See Comments)     \"it makes my stomach bleed\"    Certolizumab Pegol Other (See Comments)     GI symptoms, blisters in the mouth and esophagus    Clopidogrel Other (See Comments)     GI bleed    Fentanyl Other (See Comments)     HYPOTENSION    Ibuprofen Diarrhea     Patient reports that ibuprofen caused diarrhea    Meperidine Other (See Comments)     HYPOTENSION    Methimazole      Other reaction(s): Unknown (comments)  Patient stated \"it made me feel like I had the flu\"    Morphine Other (See Comments)     HYPOTENSION    Nitroglycerin Other (See Comments)     IN PILL FORM  - HYPOTENSION  CAN TOLERATE PATCH FOR SHORT TIME    Sulfa Antibiotics Angioedema     Lips    Tramadol Other (See Comments)     Patient reports thrush with tramadol use    Celecoxib Other (See Comments)     GI bleed     Codeine Other (See Comments)     Low BP    Hydrocodone Other (See Comments)     Hypotension    Quinolones      Other reaction(s): Unknown (comments)   could not recall reaction, but is was listed on list of

## 2023-08-17 ENCOUNTER — TELEPHONE (OUTPATIENT)
Age: 73
End: 2023-08-17

## 2023-08-17 NOTE — TELEPHONE ENCOUNTER
Pt is requesting assistants for inpefa 200mg , pt also stated that her insurance will not approve inpefa due to the low Bp affects the medication may cause the pt to have.      Pt # [phone]

## 2023-08-17 NOTE — TELEPHONE ENCOUNTER
Verified patient with two types of identifiers. Patient states she was just told the medication is not covered by her insurance. Will attempt a PA. Patient verbalized understanding and will call with any other questions. PA completed via covermymeds \"The requested medication is not covered under the [employer] formulary. These non-covered drugs have available covered options in the same therapeutic class which are listed along with information on the formulary exception process for [employer] online. \"

## 2023-08-18 ENCOUNTER — TELEPHONE (OUTPATIENT)
Age: 73
End: 2023-08-18

## 2023-08-18 NOTE — TELEPHONE ENCOUNTER
Identifiers x 2. To inform MD that insurance did not approve impefa. 90 day entresto sent to mail order pharmacy. She will check with insurance/pharmacy regarding cost.  Confirmed upcoming appts.      Future Appointments   Date Time Provider [address]   8/25/2023  8:00 AM [facility] ECHO 3 CAV[facility] BS AMB   10/26/2023  8:00 AM [facility] EST[facility] 1 [facility]   11/22/2023 11:00 AM Aarti Warner MD CAV[facility] BS AMB   11/27/2023 11:00 AM [facility] ECHO 2 CAV[facility] BS AMB   11/27/2023 11:40 AM Patricia Durham MD CAV[facility] BS AMB   8/16/2024  9:40 AM PACEMAKER, STPADILLA CAVSF BS AMB   8/16/2024 10:00 AM PRICILA Blake - TOYIN CAVSF BS AMB

## 2023-08-18 NOTE — TELEPHONE ENCOUNTER
Based on the note, I can't tell what the rationale was behind waiting to start the [facility]. Dr. Emily Kim, can she start it?

## 2023-08-18 NOTE — TELEPHONE ENCOUNTER
Pt is calling because she said that her insurance doesn't cover Inpesa. Pt said that her Chevis Togolese is covered by her insurance. Pt is wondering if the Chevis Togolese comes in a generic brand. Pt said she can get a 90 day supply cheaper than a 30 day supply. Pt would like a new 90 day prescription for Entresto.      Pt would like to know what the status if she will get a new rx.    [phone]

## 2023-08-23 NOTE — TELEPHONE ENCOUNTER
Verified patient with two types of identifiers. Informed patient of response after completing PA. Also informed patient that she should not start entresto until Dr. Anna Arellano has the chance to review her echo results. Patient verbalized understanding and will call with any other questions.

## 2023-08-25 ENCOUNTER — ANCILLARY PROCEDURE (OUTPATIENT)
Age: 73
End: 2023-08-25
Payer: MEDICARE

## 2023-08-25 VITALS
BODY MASS INDEX: 31.28 KG/M2 | WEIGHT: 170 LBS | HEIGHT: 62 IN | DIASTOLIC BLOOD PRESSURE: 80 MMHG | SYSTOLIC BLOOD PRESSURE: 116 MMHG

## 2023-08-25 DIAGNOSIS — Z79.4 INSULIN DEPENDENT TYPE 2 DIABETES MELLITUS (HCC): ICD-10-CM

## 2023-08-25 DIAGNOSIS — I10 HTN (HYPERTENSION), BENIGN: ICD-10-CM

## 2023-08-25 DIAGNOSIS — E11.9 INSULIN DEPENDENT TYPE 2 DIABETES MELLITUS (HCC): ICD-10-CM

## 2023-08-25 DIAGNOSIS — I50.22 CHRONIC SYSTOLIC CHF (CONGESTIVE HEART FAILURE), NYHA CLASS 3 (HCC): ICD-10-CM

## 2023-08-25 DIAGNOSIS — Z95.0 BIVENTRICULAR CARDIAC PACEMAKER IN SITU: ICD-10-CM

## 2023-08-25 PROCEDURE — 93306 TTE W/DOPPLER COMPLETE: CPT

## 2023-08-26 LAB
ECHO AO ASC DIAM: 3.2 CM
ECHO AO ASCENDING AORTA INDEX: 1.8 CM/M2
ECHO AO ROOT DIAM: 4 CM
ECHO AO ROOT INDEX: 2.25 CM/M2
ECHO AV AREA PEAK VELOCITY: 2.5 CM2
ECHO AV AREA VTI: 2.5 CM2
ECHO AV AREA/BSA PEAK VELOCITY: 1.4 CM2/M2
ECHO AV AREA/BSA VTI: 1.4 CM2/M2
ECHO AV MEAN GRADIENT: 2 MMHG
ECHO AV MEAN VELOCITY: 0.7 M/S
ECHO AV PEAK GRADIENT: 4 MMHG
ECHO AV PEAK VELOCITY: 1 M/S
ECHO AV VELOCITY RATIO: 0.8
ECHO AV VTI: 22.3 CM
ECHO BSA: 1.84 M2
ECHO LA DIAMETER INDEX: 2.7 CM/M2
ECHO LA DIAMETER: 4.8 CM
ECHO LA TO AORTIC ROOT RATIO: 1.2
ECHO LA VOL 2C: 65 ML (ref 22–52)
ECHO LA VOL 4C: 54 ML (ref 22–52)
ECHO LA VOL BP: 64 ML (ref 22–52)
ECHO LA VOL/BSA BIPLANE: 36 ML/M2 (ref 16–34)
ECHO LA VOLUME AREA LENGTH: 70 ML
ECHO LA VOLUME INDEX A2C: 37 ML/M2 (ref 16–34)
ECHO LA VOLUME INDEX A4C: 30 ML/M2 (ref 16–34)
ECHO LA VOLUME INDEX AREA LENGTH: 39 ML/M2 (ref 16–34)
ECHO LV E' LATERAL VELOCITY: 5 CM/S
ECHO LV E' SEPTAL VELOCITY: 5 CM/S
ECHO LV EDV A2C: 74 ML
ECHO LV EDV A4C: 102 ML
ECHO LV EDV BP: 94 ML (ref 56–104)
ECHO LV EDV INDEX A4C: 57 ML/M2
ECHO LV EDV INDEX BP: 53 ML/M2
ECHO LV EDV NDEX A2C: 42 ML/M2
ECHO LV EJECTION FRACTION A2C: 51 %
ECHO LV EJECTION FRACTION A4C: 44 %
ECHO LV EJECTION FRACTION BIPLANE: 51 % (ref 55–100)
ECHO LV ESV A2C: 36 ML
ECHO LV ESV A4C: 57 ML
ECHO LV ESV BP: 47 ML (ref 19–49)
ECHO LV ESV INDEX A2C: 20 ML/M2
ECHO LV ESV INDEX A4C: 32 ML/M2
ECHO LV ESV INDEX BP: 26 ML/M2
ECHO LV FRACTIONAL SHORTENING: 25 % (ref 28–44)
ECHO LV INTERNAL DIMENSION DIASTOLE INDEX: 3.15 CM/M2
ECHO LV INTERNAL DIMENSION DIASTOLIC: 5.6 CM (ref 3.9–5.3)
ECHO LV INTERNAL DIMENSION SYSTOLIC INDEX: 2.36 CM/M2
ECHO LV INTERNAL DIMENSION SYSTOLIC: 4.2 CM
ECHO LV IVSD: 0.8 CM (ref 0.6–0.9)
ECHO LV MASS 2D: 264.7 G (ref 67–162)
ECHO LV MASS INDEX 2D: 148.7 G/M2 (ref 43–95)
ECHO LV POSTERIOR WALL DIASTOLIC: 1.5 CM (ref 0.6–0.9)
ECHO LV RELATIVE WALL THICKNESS RATIO: 0.54
ECHO LVOT AREA: 3.5 CM2
ECHO LVOT AV VTI INDEX: 0.74
ECHO LVOT DIAM: 2.1 CM
ECHO LVOT MEAN GRADIENT: 1 MMHG
ECHO LVOT PEAK GRADIENT: 2 MMHG
ECHO LVOT PEAK VELOCITY: 0.8 M/S
ECHO LVOT STROKE VOLUME INDEX: 32.3 ML/M2
ECHO LVOT SV: 57.5 ML
ECHO LVOT VTI: 16.6 CM
ECHO MV A VELOCITY: 0.64 M/S
ECHO MV AREA PHT: 5 CM2
ECHO MV E DECELERATION TIME (DT): 152.8 MS
ECHO MV E VELOCITY: 0.59 M/S
ECHO MV E/A RATIO: 0.92
ECHO MV E/E' LATERAL: 11.8
ECHO MV E/E' RATIO (AVERAGED): 11.8
ECHO MV E/E' SEPTAL: 11.8
ECHO MV PRESSURE HALF TIME (PHT): 44.3 MS
ECHO RV FREE WALL PEAK S': 6 CM/S
ECHO RV INTERNAL DIMENSION: 4 CM
ECHO RV TAPSE: 1.5 CM (ref 1.7–?)
ECHO TV REGURGITANT MAX VELOCITY: 2.55 M/S
ECHO TV REGURGITANT PEAK GRADIENT: 26 MMHG

## 2023-08-29 ENCOUNTER — TELEPHONE (OUTPATIENT)
Age: 73
End: 2023-08-29

## 2023-08-29 NOTE — TELEPHONE ENCOUNTER
Verified patient with two types of identifiers. Notified of results and MD recommendations. Patient verbalized understanding and will call with any other questions.       Future Appointments   Date Time Provider [address]   10/26/2023  8:00 AM [facility] ESTEF[facility] 1 [facility]   11/22/2023 11:00 AM Sabino Goodman MD CAV[facility] BS AMB   11/27/2023 11:00 AM [facility] ECHO 2 CAV[facility] BS AMB   11/27/2023 11:40 AM Lupe Ochoa MD [facility] BS AMB   8/16/2024  9:40 AM PACEMAKER, STFRANCES CAVSF BS AMB   8/16/2024 10:00 AM PRICILA Harrison NP C[facility] BS AMB

## 2023-08-29 NOTE — TELEPHONE ENCOUNTER
----- Message from Danyelle Jain MD sent at 8/26/2023  6:54 AM EDT -----  LVEF remains the same as in 2022  CRT is pacing well  No change in med or follow up appt

## 2023-09-06 LAB — LEFT VENTRICULAR EJECTION FRACTION, EXTERNAL: NORMAL

## 2023-09-07 ENCOUNTER — TELEPHONE (OUTPATIENT)
Age: 73
End: 2023-09-07

## 2023-09-07 NOTE — TELEPHONE ENCOUNTER
Yessica from [facility], Dr. Sarika Brody office calling. They are currently caring for pt. She just had an echo, EF down, PFO noted. Requesting MR to be faxed to [phone], attn Yessica.

## 2023-09-11 ENCOUNTER — TELEPHONE (OUTPATIENT)
Age: 73
End: 2023-09-11

## 2023-09-11 NOTE — TELEPHONE ENCOUNTER
Pt. Went to  ER over the weekend, had a stent placed. Was given Plavix and it is making her stomach upset. Also put on aspirin for 90 days. Wants to discuss.     Phone - [phone]

## 2023-09-13 ENCOUNTER — TELEPHONE (OUTPATIENT)
Age: 73
End: 2023-09-13

## 2023-09-13 RX ORDER — ONDANSETRON 4 MG/1
4 TABLET, FILM COATED ORAL EVERY 12 HOURS PRN
Qty: 14 TABLET | Refills: 0 | Status: SHIPPED | OUTPATIENT
Start: 2023-09-13

## 2023-09-13 RX ORDER — CLOPIDOGREL BISULFATE 75 MG/1
75 TABLET ORAL DAILY
COMMUNITY

## 2023-09-13 NOTE — TELEPHONE ENCOUNTER
R/t call to pt,  Per Dr. Kely Caldwell, please take Plavix with meals. Only comes in 75 mg tablets. Other option is Brilinta and is not cost effective. Can write for Zofran 4 mg BID PRN x14 tabs. Relayed msg to pt, she was disappointed but agreeable to plan. She stated that she was only to take it for 90 days.

## 2023-09-13 NOTE — TELEPHONE ENCOUNTER
Patient calling to see is if she can take 25mg Plavix instead of taking the 75mg because her ef was 31 last week, it made her sick and bad morning sickness     She would like to know if she can get a rx for Zofran to help her feel better    She can be reached [phone]    [facility]

## 2023-09-25 RX ORDER — METOPROLOL SUCCINATE 50 MG/1
50 TABLET, EXTENDED RELEASE ORAL DAILY
Qty: 90 TABLET | Refills: 2 | Status: SHIPPED | OUTPATIENT
Start: 2023-09-25

## 2023-09-25 NOTE — TELEPHONE ENCOUNTER
Refill per VO of Dr. Mamta Worley  Last appt: 5/25/2023    Future Appointments   Date Time Provider [address]   10/23/2023  8:20 AM Jazmine Simpson MD CAVS BS AMB   10/26/2023  8:00 AM [facility] MAM 1 [facility]   11/22/2023 11:00 AM Danyelle Jain MD CAVS BS AMB   11/27/2023 11:00 AM [facility] ECHO 2 CAVSF BS AMB   11/27/2023 11:40 AM Jazmine Simpson MD CAV BS AMB   8/16/2024  9:40 [facility]   8/16/2024 10:00 AM PRICILA Santamaria NP [facility] AMB       Requested Prescriptions     Signed Prescriptions Disp Refills    metoprolol succinate (TOPROL XL) 50 MG extended release tablet 90 tablet 2     Sig: Take 1 tablet by mouth daily     Authorizing Provider: Jazmine Simpson     Ordering User: Amado Chávez

## 2023-09-29 ENCOUNTER — PATIENT MESSAGE (OUTPATIENT)
Age: 73
End: 2023-09-29

## 2023-09-29 RX ORDER — CLOPIDOGREL BISULFATE 75 MG/1
75 TABLET ORAL DAILY
Qty: 30 TABLET | Refills: 0 | OUTPATIENT
Start: 2023-09-29

## 2023-09-29 NOTE — TELEPHONE ENCOUNTER
Refill per VO of Dr. Dia Davidson  Last appt: 5/25/2023    Future Appointments   Date Time Provider [address]   10/23/2023  8:20 AM Khadijah Oglesby MD CAV[facility] BS AMB   10/26/2023  8:00 AM [facility] ESTEFANIA 1 [facility]   11/22/2023 11:00 AM Hollie Phoenix, MD CAV[facility] BS AMB   11/27/2023 11:00 AM [facility] ECHO 2 KARENSF BS AMB   11/27/2023 11:40 AM MD JOSSIE GoffF BS AMB   8/16/2024  9:40 AM PACEMAKER, STFRANCES CAVCHRIS BS AMB   8/16/2024 10:00 AM PRICILA Tomas NPS BS AMB       Requested Prescriptions     Refused Prescriptions Disp Refills    clopidogrel (PLAVIX) 75 MG tablet 30 tablet 0     Sig: Take 1 tablet by mouth daily 90 days s/p stent     Refused By: Alonso Bains     Reason for Refusal: Other

## 2023-10-02 ENCOUNTER — OFFICE VISIT (OUTPATIENT)
Age: 73
End: 2023-10-02
Payer: MEDICARE

## 2023-10-02 VITALS
BODY MASS INDEX: 31.28 KG/M2 | WEIGHT: 170 LBS | HEART RATE: 83 BPM | SYSTOLIC BLOOD PRESSURE: 114 MMHG | DIASTOLIC BLOOD PRESSURE: 80 MMHG | HEIGHT: 62 IN | OXYGEN SATURATION: 94 %

## 2023-10-02 DIAGNOSIS — I48.0 PAF (PAROXYSMAL ATRIAL FIBRILLATION) (HCC): ICD-10-CM

## 2023-10-02 DIAGNOSIS — E78.5 DYSLIPIDEMIA: ICD-10-CM

## 2023-10-02 DIAGNOSIS — I50.32 CHRONIC DIASTOLIC (CONGESTIVE) HEART FAILURE (HCC): ICD-10-CM

## 2023-10-02 DIAGNOSIS — R07.9 CHEST PAIN, UNSPECIFIED TYPE: ICD-10-CM

## 2023-10-02 DIAGNOSIS — I10 HTN (HYPERTENSION), BENIGN: ICD-10-CM

## 2023-10-02 DIAGNOSIS — Z98.890 S/P AV NODAL ABLATION: ICD-10-CM

## 2023-10-02 DIAGNOSIS — Z95.810 BIVENTRICULAR ICD (IMPLANTABLE CARDIOVERTER-DEFIBRILLATOR) IN PLACE: ICD-10-CM

## 2023-10-02 DIAGNOSIS — M06.9 RHEUMATOID ARTHRITIS, INVOLVING UNSPECIFIED SITE, UNSPECIFIED WHETHER RHEUMATOID FACTOR PRESENT (HCC): ICD-10-CM

## 2023-10-02 DIAGNOSIS — I25.119 CORONARY ARTERY DISEASE INVOLVING NATIVE CORONARY ARTERY OF NATIVE HEART WITH ANGINA PECTORIS (HCC): Primary | ICD-10-CM

## 2023-10-02 DIAGNOSIS — D50.9 IRON DEFICIENCY ANEMIA, UNSPECIFIED IRON DEFICIENCY ANEMIA TYPE: ICD-10-CM

## 2023-10-02 DIAGNOSIS — R06.02 SHORTNESS OF BREATH: ICD-10-CM

## 2023-10-02 PROCEDURE — 1123F ACP DISCUSS/DSCN MKR DOCD: CPT

## 2023-10-02 PROCEDURE — 3074F SYST BP LT 130 MM HG: CPT

## 2023-10-02 PROCEDURE — G8427 DOCREV CUR MEDS BY ELIG CLIN: HCPCS

## 2023-10-02 PROCEDURE — 3079F DIAST BP 80-89 MM HG: CPT

## 2023-10-02 PROCEDURE — 1090F PRES/ABSN URINE INCON ASSESS: CPT

## 2023-10-02 PROCEDURE — G8417 CALC BMI ABV UP PARAM F/U: HCPCS

## 2023-10-02 PROCEDURE — G8399 PT W/DXA RESULTS DOCUMENT: HCPCS

## 2023-10-02 PROCEDURE — 3017F COLORECTAL CA SCREEN DOC REV: CPT

## 2023-10-02 PROCEDURE — 1036F TOBACCO NON-USER: CPT

## 2023-10-02 PROCEDURE — 99215 OFFICE O/P EST HI 40 MIN: CPT

## 2023-10-02 PROCEDURE — G8484 FLU IMMUNIZE NO ADMIN: HCPCS

## 2023-10-02 RX ORDER — EZETIMIBE 10 MG/1
10 TABLET ORAL DAILY
Qty: 90 TABLET | Refills: 3 | Status: SHIPPED | OUTPATIENT
Start: 2023-10-02

## 2023-10-02 RX ORDER — METOPROLOL SUCCINATE 25 MG/1
25 TABLET, EXTENDED RELEASE ORAL DAILY
Qty: 90 TABLET | Refills: 3 | Status: SHIPPED | OUTPATIENT
Start: 2023-10-02

## 2023-10-02 RX ORDER — CLOPIDOGREL BISULFATE 75 MG/1
75 TABLET ORAL DAILY
Qty: 90 TABLET | Refills: 0 | Status: SHIPPED | OUTPATIENT
Start: 2023-10-02

## 2023-10-02 NOTE — PROGRESS NOTES
Chief Complaint   Patient presents with    Hypertension    Coronary Artery Disease     Vitals:    10/02/23 1044   BP: 114/80   Site: Left Upper Arm   Position: Sitting   Pulse: 83   SpO2: 94%   Weight: 170 lb (77.1 kg)   Height: 5' 2\" (1.575 m)         Chest pain: ANY ACTIVITY      SOB: ANY ACTIVITY      Palpitations: WHILE SITTING, IRREGULAR     Swelling in hands/feet: FEET     Dizziness: ALL THE TIME     Recent hospital stays: denied     Refills: denied
Hives and Other (See Comments)     \"it makes my stomach bleed\"    Certolizumab Pegol Other (See Comments)     GI symptoms, blisters in the mouth and esophagus    Clopidogrel Other (See Comments)     GI bleed    Fentanyl Other (See Comments)     HYPOTENSION    Ibuprofen Diarrhea     Patient reports that ibuprofen caused diarrhea    Meperidine Other (See Comments)     HYPOTENSION    Methimazole      Other reaction(s): Unknown (comments)  Patient stated \"it made me feel like I had the flu\"    Morphine Other (See Comments)     HYPOTENSION    Nitroglycerin Other (See Comments)     IN PILL FORM  - HYPOTENSION  CAN TOLERATE PATCH FOR SHORT TIME    Sulfa Antibiotics Angioedema     Lips    Tramadol Other (See Comments)     Patient reports thrush with tramadol use    Celecoxib Other (See Comments)     GI bleed     Codeine Other (See Comments)     Low BP    Hydrocodone Other (See Comments)     Hypotension    Quinolones      Other reaction(s): Unknown (comments)   could not recall reaction, but is was listed on list of allergies     Tetracyclines & Related Other (See Comments)      could not recall reaction; Was on personal list of allergies.       Warfarin Other (See Comments)      mentioned allergy but could not recall reaction     Adhesive Tape Other (See Comments)     BAD BLISTERS AND TENDS TO GET INFECTED    Albuterol Palpitations    Gluten Diarrhea     bloating    Oxycodone Other (See Comments)     Hallicination and hypotension         SOCIAL HISTORY:     Social History     Tobacco Use    Smoking status: Former     Packs/day: 1     Types: Cigarettes     Quit date: 2002     Years since quittin.3    Smokeless tobacco: Never   Substance Use Topics    Alcohol use: No    Drug use: Never         FAMILY HISTORY:     Family History   Problem Relation Age of Onset    Anesth Problems Neg Hx     Delayed Awakening Mother     Deep Vein Thrombosis Neg Hx     Breast Cancer Cousin         maternal 1st cousin

## 2023-10-02 NOTE — TELEPHONE ENCOUNTER
Refill per VO of Dr. Dani Galvez  Last appt: 10/2/23    Future Appointments   Date Time Provider [address]   10/5/2023  1:15 PM [facility] MAM 1 [facility]   11/22/2023 11:00 AM Skinny Freeman MD CAV[facility] BS AMB   11/27/2023 11:00 AM [facility] ECHO 2 CAVSF BS AMB   11/27/2023 11:40 AM MD RADU Quinn BS AMB   8/16/2024  9:40 AM PACEMAKER, STFRANCES CAVSF BS AMB   8/16/2024 10:00 AM PRICILA Ivey NP CAV[facility] BS AMB       Requested Prescriptions     Signed Prescriptions Disp Refills    clopidogrel (PLAVIX) 75 MG tablet 90 tablet 0     Sig: Take 1 tablet by mouth daily Discontinue on 12/8/23.      Authorizing Provider: Sachin Alvarenga     Ordering User: Anthony Figueredo
VIEW ALL

## 2023-10-02 NOTE — PATIENT INSTRUCTIONS
Decrease Metoprolol to 25mg (0.5 tablet or new prescription)    Start Zetia at night- cholesterol    Stop Prevacid, Start nexium/esomeprazole while taking Plavix

## 2023-10-25 ENCOUNTER — HOSPITAL ENCOUNTER (OUTPATIENT)
Facility: HOSPITAL | Age: 73
Discharge: HOME | End: 2023-10-28
Payer: MEDICARE

## 2023-10-25 VITALS — BODY MASS INDEX: 31.09 KG/M2 | WEIGHT: 169.97 LBS

## 2023-10-25 DIAGNOSIS — Z12.31 SCREENING MAMMOGRAM FOR HIGH-RISK PATIENT: ICD-10-CM

## 2023-10-25 PROCEDURE — 77067 SCR MAMMO BI INCL CAD: CPT

## 2023-11-22 ENCOUNTER — OFFICE VISIT (OUTPATIENT)
Age: 73
End: 2023-11-22
Payer: MEDICARE

## 2023-11-22 VITALS
HEIGHT: 62 IN | SYSTOLIC BLOOD PRESSURE: 126 MMHG | DIASTOLIC BLOOD PRESSURE: 84 MMHG | HEART RATE: 84 BPM | RESPIRATION RATE: 16 BRPM | BODY MASS INDEX: 31.87 KG/M2 | WEIGHT: 173.2 LBS | OXYGEN SATURATION: 95 %

## 2023-11-22 DIAGNOSIS — Z98.890 S/P AV NODAL ABLATION: ICD-10-CM

## 2023-11-22 DIAGNOSIS — I48.0 PAF (PAROXYSMAL ATRIAL FIBRILLATION) (HCC): ICD-10-CM

## 2023-11-22 DIAGNOSIS — I50.22 CHRONIC SYSTOLIC CHF (CONGESTIVE HEART FAILURE), NYHA CLASS 3 (HCC): ICD-10-CM

## 2023-11-22 DIAGNOSIS — R06.02 SHORTNESS OF BREATH: ICD-10-CM

## 2023-11-22 DIAGNOSIS — Z95.810 BIVENTRICULAR ICD (IMPLANTABLE CARDIOVERTER-DEFIBRILLATOR) IN PLACE: Primary | ICD-10-CM

## 2023-11-22 PROCEDURE — 99214 OFFICE O/P EST MOD 30 MIN: CPT | Performed by: INTERNAL MEDICINE

## 2023-11-22 NOTE — PATIENT INSTRUCTIONS
You are scheduled for the following procedure with Dr. Orlando Davenport:  JAMMIE at [facility], [address]        PLEASE be aware that your procedure date/time is tentative and subject to change due to emergency cases. Procedure date/time:    December 4, 2023  Time: 9:30 am - please arrive by  8:00 am      ARRIVAL time:  (You will need  to be discharged home with.)     [X]  [facility] procedures: please arrive to check in on 2nd floor two hours prior to your procedure the day of your procedure. Pre-procedure Labs/Imaging:    PRE-PROCEDURE LABS NEEDED: YES -  before 12/1/23  Forms for labwork may be given at appointment. If not, they will be mailed to you. Labs should be completed within 5-7 days of procedure. These can be done at any [facility] or [email or web address]. 1200 [facility]  425 [facility], [address] [address] [address]   Monday-Friday 730A-430P (closed 2028-898T)  [phone]    [address] [place] [address], [address] [place] [address]/[facility]  Monday-Friday 8:00AM - 5:00 PM   [phone]    [facility]  530 [facility]., [address] [place] [address]  Monday-Friday 7A-5P  [facility], 1700 [facility], 100 [facility], [facility] [address]   Monday-Friday 033G-394Z  [phone] (closed 1-2P)    [facility]  [address] ,  [facility] [address]  [place], [place]  Monday-Friday 730A-430P ([id number])  [phone]    39[address] [facility]  Monday-Friday 7A-430P  [address] [facility] 281, 270[address] [facility]  Monday-Friday 730A-430P (closed 1-2P)  [phone]        Medication Instructions:    Please hold your diabetic medications the morning of your procedure.   You

## 2023-11-23 LAB
BASOPHILS # BLD AUTO: 0.1 X10E3/UL (ref 0–0.2)
BASOPHILS NFR BLD AUTO: 1 %
BUN SERPL-MCNC: 21 MG/DL (ref 8–27)
BUN/CREAT SERPL: 27 (ref 12–28)
CALCIUM SERPL-MCNC: 9.4 MG/DL (ref 8.7–10.3)
CHLORIDE SERPL-SCNC: 100 MMOL/L (ref 96–106)
CO2 SERPL-SCNC: 22 MMOL/L (ref 20–29)
CREAT SERPL-MCNC: 0.77 MG/DL (ref 0.57–1)
EGFRCR SERPLBLD CKD-EPI 2021: 80 ML/MIN/1.73
EOSINOPHIL # BLD AUTO: 0.2 X10E3/UL (ref 0–0.4)
EOSINOPHIL NFR BLD AUTO: 1 %
ERYTHROCYTE [DISTWIDTH] IN BLOOD BY AUTOMATED COUNT: 12.4 % (ref 11.7–15.4)
GLUCOSE SERPL-MCNC: 172 MG/DL (ref 70–99)
HCT VFR BLD AUTO: 47.1 % (ref 34–46.6)
HGB BLD-MCNC: 15.4 G/DL (ref 11.1–15.9)
IMM GRANULOCYTES # BLD AUTO: 0.2 X10E3/UL (ref 0–0.1)
IMM GRANULOCYTES NFR BLD AUTO: 1 %
LYMPHOCYTES # BLD AUTO: 1.4 X10E3/UL (ref 0.7–3.1)
LYMPHOCYTES NFR BLD AUTO: 8 %
MCH RBC QN AUTO: 30.7 PG (ref 26.6–33)
MCHC RBC AUTO-ENTMCNC: 32.7 G/DL (ref 31.5–35.7)
MCV RBC AUTO: 94 FL (ref 79–97)
MONOCYTES # BLD AUTO: 1.4 X10E3/UL (ref 0.1–0.9)
MONOCYTES NFR BLD AUTO: 8 %
NEUTROPHILS # BLD AUTO: 15.4 X10E3/UL (ref 1.4–7)
NEUTROPHILS NFR BLD AUTO: 81 %
PLATELET # BLD AUTO: 251 X10E3/UL (ref 150–450)
POTASSIUM SERPL-SCNC: 5.1 MMOL/L (ref 3.5–5.2)
RBC # BLD AUTO: 5.01 X10E6/UL (ref 3.77–5.28)
SODIUM SERPL-SCNC: 142 MMOL/L (ref 134–144)
WBC # BLD AUTO: 18.8 X10E3/UL (ref 3.4–10.8)

## 2023-11-29 ENCOUNTER — TELEPHONE (OUTPATIENT)
Age: 73
End: 2023-11-29

## 2023-11-29 LAB
CREATININE, EXTERNAL: 0.64
HBA1C MFR BLD HPLC: 8.9 %

## 2023-11-30 ENCOUNTER — TELEPHONE (OUTPATIENT)
Age: 73
End: 2023-11-30

## 2023-11-30 ENCOUNTER — PREP FOR PROCEDURE (OUTPATIENT)
Age: 73
End: 2023-11-30

## 2023-11-30 RX ORDER — SODIUM CHLORIDE 0.9 % (FLUSH) 0.9 %
5-40 SYRINGE (ML) INJECTION PRN
Status: CANCELLED | OUTPATIENT
Start: 2023-11-30

## 2023-11-30 RX ORDER — SODIUM CHLORIDE 0.9 % (FLUSH) 0.9 %
5-40 SYRINGE (ML) INJECTION EVERY 12 HOURS SCHEDULED
Status: CANCELLED | OUTPATIENT
Start: 2023-12-01

## 2023-11-30 RX ORDER — SODIUM CHLORIDE 9 MG/ML
INJECTION, SOLUTION INTRAVENOUS PRN
Status: CANCELLED | OUTPATIENT
Start: 2023-11-30

## 2023-11-30 NOTE — TELEPHONE ENCOUNTER
Pt is rtc to Mariana about being schedule for an Echocardiogram that was canceled on 11/27/23.    [phone]

## 2023-11-30 NOTE — TELEPHONE ENCOUNTER
S/w pt - she is anxious to get testing done - her echo was sched 11/27/2023 and as she was walking in - her tech was walking out - she is scheduled for echo 12/13, but to see Dr Rebecca Campoverde 12/12. She wants to be evaluated post JAMMIE - I wasn't certain of the best way to make her ok to move visits - she wants to see Rebecca Campoverde - would he be agreeable to the echo after the visit ?

## 2023-12-04 ENCOUNTER — HOSPITAL ENCOUNTER (OUTPATIENT)
Facility: HOSPITAL | Age: 73
Discharge: HOME | End: 2023-12-04
Attending: INTERNAL MEDICINE | Admitting: INTERNAL MEDICINE
Payer: MEDICARE

## 2023-12-04 VITALS
DIASTOLIC BLOOD PRESSURE: 61 MMHG | WEIGHT: 173 LBS | RESPIRATION RATE: 19 BRPM | SYSTOLIC BLOOD PRESSURE: 105 MMHG | HEIGHT: 62 IN | TEMPERATURE: 97.9 F | HEART RATE: 80 BPM | OXYGEN SATURATION: 93 % | BODY MASS INDEX: 31.83 KG/M2

## 2023-12-04 DIAGNOSIS — Z98.890 PERSONAL HISTORY OF SURGERY TO HEART AND GREAT VESSELS, PRESENTING HAZARDS TO HEALTH: ICD-10-CM

## 2023-12-04 PROCEDURE — 6360000002 HC RX W HCPCS: Performed by: INTERNAL MEDICINE

## 2023-12-04 PROCEDURE — 93325 DOPPLER ECHO COLOR FLOW MAPG: CPT

## 2023-12-04 PROCEDURE — 6370000000 HC RX 637 (ALT 250 FOR IP): Performed by: INTERNAL MEDICINE

## 2023-12-04 RX ORDER — SODIUM CHLORIDE 0.9 % (FLUSH) 0.9 %
5-40 SYRINGE (ML) INJECTION PRN
Status: DISCONTINUED | OUTPATIENT
Start: 2023-12-04 | End: 2023-12-04 | Stop reason: HOSPADM

## 2023-12-04 RX ORDER — SODIUM CHLORIDE 0.9 % (FLUSH) 0.9 %
5-40 SYRINGE (ML) INJECTION EVERY 12 HOURS SCHEDULED
Status: DISCONTINUED | OUTPATIENT
Start: 2023-12-04 | End: 2023-12-04 | Stop reason: HOSPADM

## 2023-12-04 RX ORDER — ONDANSETRON 2 MG/ML
4 INJECTION INTRAMUSCULAR; INTRAVENOUS EVERY 6 HOURS PRN
Status: DISCONTINUED | OUTPATIENT
Start: 2023-12-04 | End: 2023-12-04 | Stop reason: HOSPADM

## 2023-12-04 RX ORDER — MIDAZOLAM HYDROCHLORIDE 1 MG/ML
INJECTION INTRAMUSCULAR; INTRAVENOUS PRN
Status: COMPLETED | OUTPATIENT
Start: 2023-12-04 | End: 2023-12-04

## 2023-12-04 RX ORDER — ACETAMINOPHEN 325 MG/1
650 TABLET ORAL EVERY 4 HOURS PRN
Status: DISCONTINUED | OUTPATIENT
Start: 2023-12-04 | End: 2023-12-04 | Stop reason: HOSPADM

## 2023-12-04 RX ORDER — LIDOCAINE HYDROCHLORIDE 20 MG/ML
SOLUTION OROPHARYNGEAL PRN
Status: COMPLETED | OUTPATIENT
Start: 2023-12-04 | End: 2023-12-04

## 2023-12-04 RX ORDER — SODIUM CHLORIDE 9 MG/ML
INJECTION, SOLUTION INTRAVENOUS PRN
Status: DISCONTINUED | OUTPATIENT
Start: 2023-12-04 | End: 2023-12-04 | Stop reason: HOSPADM

## 2023-12-04 RX ADMIN — LIDOCAINE HYDROCHLORIDE 15 ML: 20 SOLUTION ORAL; TOPICAL at 10:57

## 2023-12-04 RX ADMIN — MIDAZOLAM HYDROCHLORIDE 1 MG: 1 INJECTION, SOLUTION INTRAMUSCULAR; INTRAVENOUS at 10:57

## 2023-12-04 RX ADMIN — MIDAZOLAM HYDROCHLORIDE 3 MG: 1 INJECTION, SOLUTION INTRAMUSCULAR; INTRAVENOUS at 10:55

## 2023-12-04 NOTE — PROGRESS NOTES
11:08 AM  TRANSFER - IN REPORT:    Verbal report received from Donna Tsang on Christin Reyna  being received from EP lab for routine post-op      Report consisted of patient's Situation, Background, Assessment and   Recommendations(SBAR). Information from the following report(s) Nurse Handoff Report was reviewed with the receiving nurse. Opportunity for questions and clarification was provided. Assessment completed upon patient's arrival to unit and care assumed.

## 2023-12-04 NOTE — INTERVAL H&P NOTE
Update History & Physical    The patient's History and Physical of November 22, 2023 was reviewed with the patient and I examined the patient. There was no change. The surgical site was confirmed by the patient and me. Plan: The risks, benefits, expected outcome, and alternative to the recommended procedure have been discussed with the patient. Patient understands and wants to proceed with the procedure.      Electronically signed by Remi Bence, MD on 12/4/2023 at 10:49 AM

## 2023-12-04 NOTE — PROGRESS NOTES
TRANSFER - OUT REPORT:    Verbal report given to [address] [place] [address] Hortencia RN(name) [facility]       Report consisted of patient's Situation, Background, Assessment and   Recommendations(SBAR). Information from the following report(s) Nurse Handoff Report was reviewed with the receiving nurse. Opportunity for questions and clarification was provided.       Patient transported with:   Registered Nurse

## 2023-12-04 NOTE — PROGRESS NOTES
200  Pt discharged via wheelchair with . Personal belongings with patient upon discharge. Passed swallow test before discharge. Discharge instructions reviewed with patient and . Ambulated to bathroom.

## 2023-12-04 NOTE — PROCEDURES
Cardiac Procedure Note   Patient: Flaquito Guzman  MRN: [id number]  SSN: [id number]   YOB: 1950 Age: 74 y.o.   Sex: female    Date of Procedure: 12/4/2023   Pre-procedure Diagnosis: Atrial Fibrillation, RUSTY clip  Post-procedure Diagnosis: same  Procedure: JAMMIE  :  Dr. Parris Mattson MD    Assistant(s):  None  Anesthesia: Moderate Sedation   Estimated Blood Loss: none  Specimens Removed: None  Findings: RUSTY closed completely  GARRET  Pacer lead no vegetation  LVEF 50%  Mild MR    No upgrade of pacer is needed  No OAC  Complications: None   Implants:  None  Signed by:  Parris Mattson MD  12/4/2023  11:40 AM

## 2023-12-04 NOTE — DISCHARGE INSTRUCTIONS
Discharge Instructions Post JAMMIE    No driving x 24 hours due to sedation medication that you received during your procedure. Resume medications as previously prescribed. Follow up as noted below. Future Appointments   Date Time Provider [address]   12/4/2023  9:15 AM [facility] EP LAB [facility] [address]   12/12/2023  3:20 PM Eric Brown MD [facility] BS AMB   12/13/2023  9:00 AM [facility] ECHO 3 [facility] BS AMB   8/16/2024  9:40 AM PACEMAKER, STFRANCES [facility] BS AMB   8/16/2024 10:00 AM PRICILA Dodson - NP [facility] BS Savannah Santillan M.D.   Electrophysiology/Cardiology  [facility]  [address] [place] [address], [address] [place] [id number]

## 2023-12-05 LAB — ECHO BSA: 1.85 M2

## 2023-12-05 PROCEDURE — 93312 ECHO TRANSESOPHAGEAL: CPT | Performed by: INTERNAL MEDICINE

## 2023-12-05 PROCEDURE — 93325 DOPPLER ECHO COLOR FLOW MAPG: CPT | Performed by: INTERNAL MEDICINE

## 2023-12-12 ENCOUNTER — OFFICE VISIT (OUTPATIENT)
Age: 73
End: 2023-12-12
Payer: MEDICARE

## 2023-12-12 VITALS
OXYGEN SATURATION: 95 % | HEART RATE: 88 BPM | BODY MASS INDEX: 31.65 KG/M2 | WEIGHT: 172 LBS | HEIGHT: 62 IN | DIASTOLIC BLOOD PRESSURE: 76 MMHG | SYSTOLIC BLOOD PRESSURE: 136 MMHG

## 2023-12-12 DIAGNOSIS — I48.0 PAF (PAROXYSMAL ATRIAL FIBRILLATION) (HCC): ICD-10-CM

## 2023-12-12 DIAGNOSIS — I25.118 CORONARY ARTERY DISEASE OF NATIVE ARTERY OF NATIVE HEART WITH STABLE ANGINA PECTORIS (HCC): Primary | ICD-10-CM

## 2023-12-12 DIAGNOSIS — Z79.4 CONTROLLED TYPE 2 DIABETES MELLITUS WITHOUT COMPLICATION, WITH LONG-TERM CURRENT USE OF INSULIN (HCC): ICD-10-CM

## 2023-12-12 DIAGNOSIS — E11.9 CONTROLLED TYPE 2 DIABETES MELLITUS WITHOUT COMPLICATION, WITH LONG-TERM CURRENT USE OF INSULIN (HCC): ICD-10-CM

## 2023-12-12 PROCEDURE — G8417 CALC BMI ABV UP PARAM F/U: HCPCS | Performed by: SPECIALIST

## 2023-12-12 PROCEDURE — G8399 PT W/DXA RESULTS DOCUMENT: HCPCS | Performed by: SPECIALIST

## 2023-12-12 PROCEDURE — 3078F DIAST BP <80 MM HG: CPT | Performed by: SPECIALIST

## 2023-12-12 PROCEDURE — 99214 OFFICE O/P EST MOD 30 MIN: CPT | Performed by: SPECIALIST

## 2023-12-12 PROCEDURE — 3017F COLORECTAL CA SCREEN DOC REV: CPT | Performed by: SPECIALIST

## 2023-12-12 PROCEDURE — 3046F HEMOGLOBIN A1C LEVEL >9.0%: CPT | Performed by: SPECIALIST

## 2023-12-12 PROCEDURE — 1123F ACP DISCUSS/DSCN MKR DOCD: CPT | Performed by: SPECIALIST

## 2023-12-12 PROCEDURE — G8427 DOCREV CUR MEDS BY ELIG CLIN: HCPCS | Performed by: SPECIALIST

## 2023-12-12 PROCEDURE — G8484 FLU IMMUNIZE NO ADMIN: HCPCS | Performed by: SPECIALIST

## 2023-12-12 PROCEDURE — 2022F DILAT RTA XM EVC RTNOPTHY: CPT | Performed by: SPECIALIST

## 2023-12-12 PROCEDURE — 3075F SYST BP GE 130 - 139MM HG: CPT | Performed by: SPECIALIST

## 2023-12-12 PROCEDURE — 1036F TOBACCO NON-USER: CPT | Performed by: SPECIALIST

## 2023-12-12 PROCEDURE — 1090F PRES/ABSN URINE INCON ASSESS: CPT | Performed by: SPECIALIST

## 2023-12-12 RX ORDER — FENOFIBRATE 48 MG/1
48 TABLET, COATED ORAL DAILY
Qty: 90 TABLET | Refills: 3 | Status: SHIPPED | OUTPATIENT
Start: 2023-12-12

## 2023-12-12 RX ORDER — FENOFIBRATE 48 MG/1
48 TABLET, COATED ORAL DAILY
Qty: 90 TABLET | Refills: 3 | Status: SHIPPED | OUTPATIENT
Start: 2023-12-12 | End: 2023-12-12 | Stop reason: SDUPTHER

## 2024-04-01 RX ORDER — NITROGLYCERIN 0.1MG/HR
PATCH, TRANSDERMAL 24 HOURS TRANSDERMAL
Qty: 90 PATCH | Refills: 1 | Status: SHIPPED | OUTPATIENT
Start: 2024-04-01

## 2024-04-01 NOTE — TELEPHONE ENCOUNTER
Refill per VO of Dr. Cintron  Last appt: 12/12/2023    Future Appointments   Date Time Provider Department Center   6/20/2024 11:20 AM Robbin Cintron MD [facility] BS AMB   8/16/2024  9:40 AM PACEMAKER, RICKI LOVELACE BS AMB   8/16/2024 10:00 AM Shirley Garcia APRN - NP [facility] BS AMB       Requested Prescriptions     Signed Prescriptions Disp Refills    nitroGLYCERIN (NITRODUR) 0.1 MG/HR 90 patch 1     Sig: APPLY 1 PATCH TOPICALLY ONCE DAILY     Authorizing Provider: ROBBIN CINTRON     Ordering User: REY MACDONALD

## 2024-04-13 ENCOUNTER — OFFICE VISIT (OUTPATIENT)
Age: 74
End: 2024-04-13

## 2024-04-13 VITALS
HEIGHT: 63 IN | HEART RATE: 98 BPM | TEMPERATURE: 97.7 F | BODY MASS INDEX: 29.48 KG/M2 | DIASTOLIC BLOOD PRESSURE: 82 MMHG | SYSTOLIC BLOOD PRESSURE: 137 MMHG | OXYGEN SATURATION: 90 % | WEIGHT: 166.4 LBS

## 2024-04-13 DIAGNOSIS — S51.812A SKIN TEAR OF LEFT FOREARM WITHOUT COMPLICATION, INITIAL ENCOUNTER: Primary | ICD-10-CM

## 2024-04-13 DIAGNOSIS — Z86.19 HISTORY OF CANDIDAL VULVOVAGINITIS: ICD-10-CM

## 2024-04-13 DIAGNOSIS — I10 PRIMARY HYPERTENSION: ICD-10-CM

## 2024-04-13 RX ORDER — FLUCONAZOLE 150 MG/1
150 TABLET ORAL ONCE
Qty: 1 TABLET | Refills: 0 | Status: SHIPPED | OUTPATIENT
Start: 2024-04-13 | End: 2024-04-13

## 2024-04-13 RX ORDER — DOXYCYCLINE HYCLATE 100 MG
100 TABLET ORAL 2 TIMES DAILY
Qty: 14 TABLET | Refills: 0 | Status: SHIPPED | OUTPATIENT
Start: 2024-04-13 | End: 2024-04-20

## 2024-04-13 NOTE — PROGRESS NOTES
Subjective     Chief Complaint   Patient presents with    Bleeding/Bruising     Pt cut her Left arm on dryer a week ago and pt noticed that it looked infection 3-4 days ago. Red with pus         Patient is 74 year old female presenting with skin tear to left forearm.  She reports hitting her arm on the dryer at home one week ago.  She had a bandage on it but when she took it off, skin was removed with the bandage.  She endorses some drainage coming out of the wound.  Denies fever or chills.   Tetanus up to date.          Review of Systems   Constitutional:  Negative for chills and fever.   Skin:  Positive for wound.       Past Medical History:   Diagnosis Date    Adverse effect of anesthesia     slow to wake up    Arthritis     Asthma 6/29/2009    CAUSED BY MOLD    Atrial fibrillation (HCC) 6/29/2009    Atrial flutter (HCC)     CAD (coronary artery disease) 06/29/2009    Celiac disease 2010    Chronic chest pain     Chronic obstructive pulmonary disease (HCC) 2004-5    right leg    Chronic pain of right ankle     Diabetes (HCC)     Drug induced prednisone, DM2    Diastolic heart failure ([facility])     DVT (deep venous thrombosis) ([facility]) 2004    Right leg post surgery    GERD (gastroesophageal reflux disease)     Gluten intolerance     Heart failure ([facility])     Hypertension     Hypothyroidism 6/29/2009    Iron deficiency anemia     Lyme disease     Pacemaker     11/20    Personal history of MI (myocardial infarction)     Rheumatoid arthritis (HCC)     S/P AV felicia ablation     11/20     S/P left atrial appendage ligation     RUSTY clip 10/20     Slow to wake up after anesthesia     Syncope     Post testing- resolved    TIA (transient ischemic attack) 2004 & 2006    Vitamin B12 deficiency 6/29/2009       Past Surgical History:   Procedure Laterality Date    ABLATION OF DYSRHYTHMIC FOCUS      ADENOIDECTOMY      APPENDECTOMY      CARDIAC CATHETERIZATION  03/2020    CARDIAC CATHETERIZATION  2010    2 STENTS    CHOLECYSTECTOMY

## 2024-04-13 NOTE — PATIENT INSTRUCTIONS
September 20, 2023               Content Version: 14.0  © 2006-2024 [employer].   Care instructions adapted under license by [employer]. If you have questions about a medical condition or this instruction, always ask your healthcare professional. [employer] disclaims any warranty or liability for your use of this information.

## 2024-04-23 NOTE — ED NOTES
Patient  discharged with instructions per Dr Elizabeth Gross.
Pt states she feels less dizzy after meclizine. Dr Brad Dela Cruz aware.
I will START or STAY ON the medications listed below when I get home from the hospital:    ibuprofen 600 mg oral tablet  -- 1 tab(s) by mouth every 6 hours  -- Indication: For mild to moderate pain    acetaminophen 325 mg oral tablet  -- 3 tab(s) by mouth every 6 hours  -- Indication: For mild to moderate pain    Prenatal Multivitamins oral tablet  -- 1 tab(s) by mouth once a day  -- Indication: For Supplement    ferrous sulfate 325 mg (65 mg elemental iron) oral tablet  -- 1 tab(s) by mouth 3 times a day  -- Indication: For Supplement    ascorbic acid 500 mg oral tablet  -- 1 tab(s) by mouth once a day  -- Indication: For Supplement

## 2024-05-15 ENCOUNTER — TELEPHONE (OUTPATIENT)
Age: 74
End: 2024-05-15

## 2024-05-15 RX ORDER — EPLERENONE 25 MG/1
25 TABLET, FILM COATED ORAL DAILY
Qty: 90 TABLET | Refills: 3 | Status: SHIPPED | OUTPATIENT
Start: 2024-05-15

## 2024-05-15 NOTE — TELEPHONE ENCOUNTER
Patient states I need Dr or NP to send a new prescription for her medication (Eplerenone 25MG) she says she is completely out and she needs them to send it today so she can call and get it over night.    [facility] [phone]

## 2024-05-15 NOTE — TELEPHONE ENCOUNTER
Refill per VO of Dr. Cintron  Last appt: 12/12/2023    Future Appointments   Date Time Provider Department Center   6/20/2024 11:20 AM Robbin Cintron MD [facility] BS AMB   8/16/2024  9:40 AM PACEMAKER, STPADILLA LOVELACE BS AMB   8/16/2024 10:00 AM Shirley Garcia APRN - NP [place] BS AMB       Requested Prescriptions     Signed Prescriptions Disp Refills    eplerenone (INSPRA) 25 MG tablet 90 tablet 3     Sig: Take 1 tablet by mouth daily     Authorizing Provider: ROBBIN CINTRON     Ordering User: REY MACDONALD

## 2024-05-20 RX ORDER — EPLERENONE 25 MG/1
25 TABLET, FILM COATED ORAL DAILY
Qty: 90 TABLET | Refills: 1 | Status: SHIPPED | OUTPATIENT
Start: 2024-05-20

## 2024-05-20 NOTE — TELEPHONE ENCOUNTER
Refill per VO of Dr. Cintron  Last appt: 12/12/2023    Future Appointments   Date Time Provider Department Center   6/20/2024 11:20 AM Robbin Cintron MD [facility] BS AMB   8/16/2024  9:40 AM PACEMAKER, STPADILLA LOVELACE BS AMB   8/16/2024 10:00 AM [facility]       Requested Prescriptions     Signed Prescriptions Disp Refills    eplerenone (INSPRA) 25 MG tablet 90 tablet 1     Sig: Take 1 tablet by mouth daily     Authorizing Provider: ROBBIN CINTRON     Ordering User: REY MACDONALD

## 2024-06-20 ENCOUNTER — OFFICE VISIT (OUTPATIENT)
Age: 74
End: 2024-06-20
Payer: MEDICARE

## 2024-06-20 VITALS
SYSTOLIC BLOOD PRESSURE: 104 MMHG | OXYGEN SATURATION: 94 % | HEIGHT: 63 IN | WEIGHT: 160 LBS | HEART RATE: 82 BPM | BODY MASS INDEX: 28.35 KG/M2 | DIASTOLIC BLOOD PRESSURE: 50 MMHG

## 2024-06-20 DIAGNOSIS — R06.02 SHORTNESS OF BREATH: ICD-10-CM

## 2024-06-20 DIAGNOSIS — I48.0 PAF (PAROXYSMAL ATRIAL FIBRILLATION) (HCC): ICD-10-CM

## 2024-06-20 DIAGNOSIS — I25.118 CORONARY ARTERY DISEASE OF NATIVE ARTERY OF NATIVE HEART WITH STABLE ANGINA PECTORIS (HCC): Primary | ICD-10-CM

## 2024-06-20 DIAGNOSIS — I50.32 CHRONIC DIASTOLIC HEART FAILURE (HCC): ICD-10-CM

## 2024-06-20 DIAGNOSIS — I50.9 CONGESTIVE HEART FAILURE (HCC): ICD-10-CM

## 2024-06-20 PROCEDURE — 3078F DIAST BP <80 MM HG: CPT | Performed by: SPECIALIST

## 2024-06-20 PROCEDURE — 93005 ELECTROCARDIOGRAM TRACING: CPT | Performed by: SPECIALIST

## 2024-06-20 PROCEDURE — 99214 OFFICE O/P EST MOD 30 MIN: CPT | Performed by: SPECIALIST

## 2024-06-20 PROCEDURE — G8399 PT W/DXA RESULTS DOCUMENT: HCPCS | Performed by: SPECIALIST

## 2024-06-20 PROCEDURE — 93010 ELECTROCARDIOGRAM REPORT: CPT | Performed by: SPECIALIST

## 2024-06-20 PROCEDURE — 1123F ACP DISCUSS/DSCN MKR DOCD: CPT | Performed by: SPECIALIST

## 2024-06-20 PROCEDURE — 1090F PRES/ABSN URINE INCON ASSESS: CPT | Performed by: SPECIALIST

## 2024-06-20 PROCEDURE — 1036F TOBACCO NON-USER: CPT | Performed by: SPECIALIST

## 2024-06-20 PROCEDURE — G8427 DOCREV CUR MEDS BY ELIG CLIN: HCPCS | Performed by: SPECIALIST

## 2024-06-20 PROCEDURE — G8417 CALC BMI ABV UP PARAM F/U: HCPCS | Performed by: SPECIALIST

## 2024-06-20 PROCEDURE — 3074F SYST BP LT 130 MM HG: CPT | Performed by: SPECIALIST

## 2024-06-20 RX ORDER — LANSOPRAZOLE 30 MG/1
30 TABLET, ORALLY DISINTEGRATING, DELAYED RELEASE ORAL DAILY
COMMUNITY

## 2024-06-20 NOTE — PROGRESS NOTES
Ralph Cintron MD. [facility]          Patient: Felicia Rangel  : 1950      Today's Date: 2024        HISTORY OF PRESENT ILLNESS:     History of Present Illness:    Admitted to  9/9-10 for right sided facial droop and slurred speech. MRI was negative for acute stroke. CTA of head and neck showed no evidence of occlusion.She underwent a cath s/p PCI to mid RCA and her Echo showed reduced function 30-35%.     On 23 - still with chronic CARR.  Infrequent CP.  BP OK.       On 24 - says she is doing \"very well\" - breathing is good          PAST MEDICAL HISTORY:     Past Medical History:   Diagnosis Date    Adverse effect of anesthesia     slow to wake up    Arthritis     Asthma 2009    CAUSED BY MOLD    Atrial fibrillation (HCC) 2009    Atrial flutter (HCC)     CAD (coronary artery disease) 2009    Celiac disease     Chronic chest pain     Chronic obstructive pulmonary disease (HCC) -    right leg    Chronic pain of right ankle     Diabetes (HCC)     Drug induced prednisone, DM2    Diastolic heart failure (HCC)     DVT (deep venous thrombosis) ([facility])     Right leg post surgery    GERD (gastroesophageal reflux disease)     Gluten intolerance     Heart failure (HCC)     Hypertension     Hypothyroidism 2009    Iron deficiency anemia     Lyme disease     Pacemaker         Personal history of MI (myocardial infarction)     Rheumatoid arthritis (HCC)     S/P AV felicia ablation          S/P left atrial appendage ligation     RUSTY clip 10/20     Slow to wake up after anesthesia     Syncope     Post testing- resolved    TIA (transient ischemic attack)  &     Vitamin B12 deficiency 2009       Past Surgical History:   Procedure Laterality Date    ABLATION OF DYSRHYTHMIC FOCUS      ADENOIDECTOMY      APPENDECTOMY      CARDIAC CATHETERIZATION  2020    CARDIAC CATHETERIZATION  2010    2 STENTS    CHOLECYSTECTOMY  11    COLONOSCOPY      CORONARY

## 2024-06-20 NOTE — PROGRESS NOTES
Chief Complaint   Patient presents with    Follow-up     6 months    Coronary Artery Disease    Atrial Fibrillation    Hypertension     Vitals:    06/20/24 1130   BP: (!) 104/50   Site: Right Upper Arm   Position: Sitting   Cuff Size: Medium Adult   Pulse: 82   SpO2: 94%   Weight: 72.6 kg (160 lb)   Height: 1.6 m (5' 3\")       Chest pain NO     ER, urgent care, or hospitalized outside of [facility] since your last visit?  NO     Refills NO     Taking bumex about 3 x per week  Nitro patch 4 days out of 7    Fall on Tue, hurt foot & ankle bad.    PT's brother Fantasma passed away last year.

## 2024-07-21 PROCEDURE — 93294 REM INTERROG EVL PM/LDLS PM: CPT | Performed by: INTERNAL MEDICINE

## 2024-07-26 ENCOUNTER — APPOINTMENT (OUTPATIENT)
Facility: HOSPITAL | Age: 74
End: 2024-07-26
Payer: MEDICARE

## 2024-07-26 ENCOUNTER — HOSPITAL ENCOUNTER (EMERGENCY)
Facility: HOSPITAL | Age: 74
Discharge: HOME | End: 2024-07-27
Attending: EMERGENCY MEDICINE
Payer: MEDICARE

## 2024-07-26 VITALS
BODY MASS INDEX: 28.35 KG/M2 | WEIGHT: 160 LBS | RESPIRATION RATE: 16 BRPM | OXYGEN SATURATION: 96 % | SYSTOLIC BLOOD PRESSURE: 118 MMHG | DIASTOLIC BLOOD PRESSURE: 88 MMHG | HEIGHT: 63 IN | HEART RATE: 83 BPM | TEMPERATURE: 97.8 F

## 2024-07-26 DIAGNOSIS — R07.81 RIB PAIN ON LEFT SIDE: ICD-10-CM
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
## 2024-07-26 LAB
BASOPHILS # BLD: 0.1 K/UL (ref 0–0.1)
BASOPHILS NFR BLD: 1 % (ref 0–1)
DIFFERENTIAL METHOD BLD: ABNORMAL
EOSINOPHIL # BLD: 0.2 K/UL (ref 0–0.4)
EOSINOPHIL NFR BLD: 1 % (ref 0–7)
ERYTHROCYTE [DISTWIDTH] IN BLOOD BY AUTOMATED COUNT: 13.4 % (ref 11.5–14.5)
HCT VFR BLD AUTO: 44.4 % (ref 35–47)
HGB BLD-MCNC: 15.1 G/DL (ref 11.5–16)
IMM GRANULOCYTES # BLD AUTO: 0.2 K/UL (ref 0–0.04)
IMM GRANULOCYTES NFR BLD AUTO: 1 % (ref 0–0.5)
LYMPHOCYTES # BLD: 1.5 K/UL (ref 0.8–3.5)
LYMPHOCYTES NFR BLD: 7 % (ref 12–49)
MCH RBC QN AUTO: 31.1 PG (ref 26–34)
MCHC RBC AUTO-ENTMCNC: 34 G/DL (ref 30–36.5)
MCV RBC AUTO: 91.4 FL (ref 80–99)
MONOCYTES # BLD: 1.7 K/UL (ref 0–1)
MONOCYTES NFR BLD: 8 % (ref 5–13)
NEUTS SEG # BLD: 17.7 K/UL (ref 1.8–8)
NEUTS SEG NFR BLD: 82 % (ref 32–75)
NRBC # BLD: 0 K/UL (ref 0–0.01)
NRBC BLD-RTO: 0 PER 100 WBC
PLATELET # BLD AUTO: 194 K/UL (ref 150–400)
PMV BLD AUTO: 11.8 FL (ref 8.9–12.9)
RBC # BLD AUTO: 4.86 M/UL (ref 3.8–5.2)
WBC # BLD AUTO: 21.3 K/UL (ref 3.6–11)

## 2024-07-26 PROCEDURE — 36415 COLL VENOUS BLD VENIPUNCTURE: CPT

## 2024-07-26 PROCEDURE — 71250 CT THORAX DX C-: CPT

## 2024-07-26 PROCEDURE — 6370000000 HC RX 637 (ALT 250 FOR IP): Performed by: EMERGENCY MEDICINE

## 2024-07-26 PROCEDURE — 93005 ELECTROCARDIOGRAM TRACING: CPT | Performed by: EMERGENCY MEDICINE

## 2024-07-26 PROCEDURE — 99284 EMERGENCY DEPT VISIT MOD MDM: CPT

## 2024-07-26 PROCEDURE — 85025 COMPLETE CBC W/AUTO DIFF WBC: CPT

## 2024-07-26 RX ORDER — ACETAMINOPHEN 325 MG/1
650 TABLET ORAL
Status: COMPLETED | OUTPATIENT
Start: 2024-07-26 | End: 2024-07-26

## 2024-07-26 RX ADMIN — ACETAMINOPHEN 650 MG: 325 TABLET ORAL at 23:10

## 2024-07-26 ASSESSMENT — PAIN DESCRIPTION - FREQUENCY: FREQUENCY: CONTINUOUS

## 2024-07-26 ASSESSMENT — PAIN DESCRIPTION - ORIENTATION: ORIENTATION: LOWER;LEFT

## 2024-07-26 ASSESSMENT — PAIN DESCRIPTION - LOCATION: LOCATION: RIB CAGE

## 2024-07-26 ASSESSMENT — PAIN DESCRIPTION - PAIN TYPE: TYPE: ACUTE PAIN

## 2024-07-26 ASSESSMENT — PAIN SCALES - GENERAL: PAINLEVEL_OUTOF10: 10

## 2024-07-26 ASSESSMENT — PAIN DESCRIPTION - DESCRIPTORS: DESCRIPTORS: ACHING;SHARP

## 2024-07-27 LAB
ALBUMIN SERPL-MCNC: 3.4 G/DL (ref 3.5–5)
ALBUMIN/GLOB SERPL: 1.1 (ref 1.1–2.2)
ALP SERPL-CCNC: 103 U/L (ref 45–117)
ALT SERPL-CCNC: 21 U/L (ref 12–78)
ANION GAP SERPL CALC-SCNC: 11 MMOL/L (ref 5–15)
AST SERPL-CCNC: 18 U/L (ref 15–37)
BILIRUB SERPL-MCNC: 0.5 MG/DL (ref 0.2–1)
BUN SERPL-MCNC: 22 MG/DL (ref 6–20)
BUN/CREAT SERPL: 28 (ref 12–20)
CALCIUM SERPL-MCNC: 9 MG/DL (ref 8.5–10.1)
CHLORIDE SERPL-SCNC: 102 MMOL/L (ref 97–108)
CO2 SERPL-SCNC: 26 MMOL/L (ref 21–32)
CREAT SERPL-MCNC: 0.79 MG/DL (ref 0.55–1.02)
EKG ATRIAL RATE: 81 BPM
EKG DIAGNOSIS: NORMAL
EKG P AXIS: 96 DEGREES
EKG P-R INTERVAL: 162 MS
EKG Q-T INTERVAL: 472 MS
EKG QRS DURATION: 172 MS
EKG QTC CALCULATION (BAZETT): 548 MS
EKG R AXIS: -47 DEGREES
EKG T AXIS: 38 DEGREES
EKG VENTRICULAR RATE: 81 BPM
GLOBULIN SER CALC-MCNC: 3.1 G/DL (ref 2–4)
GLUCOSE SERPL-MCNC: 160 MG/DL (ref 65–100)
POTASSIUM SERPL-SCNC: 3.8 MMOL/L (ref 3.5–5.1)
PROT SERPL-MCNC: 6.5 G/DL (ref 6.4–8.2)
SODIUM SERPL-SCNC: 139 MMOL/L (ref 136–145)

## 2024-07-27 PROCEDURE — 80053 COMPREHEN METABOLIC PANEL: CPT

## 2024-07-27 PROCEDURE — 6370000000 HC RX 637 (ALT 250 FOR IP): Performed by: EMERGENCY MEDICINE

## 2024-07-27 PROCEDURE — 36415 COLL VENOUS BLD VENIPUNCTURE: CPT

## 2024-07-27 RX ORDER — LIDOCAINE 50 MG/G
1 PATCH TOPICAL DAILY
Qty: 10 PATCH | Refills: 0 | Status: SHIPPED | OUTPATIENT
Start: 2024-07-27 | End: 2024-08-06

## 2024-07-27 RX ORDER — NAPROXEN 250 MG/1
250 TABLET ORAL EVERY 12 HOURS PRN
Qty: 10 TABLET | Refills: 0 | Status: SHIPPED | OUTPATIENT
Start: 2024-07-27

## 2024-07-27 RX ORDER — LIDOCAINE 4 G/G
1 PATCH TOPICAL
Status: DISCONTINUED | OUTPATIENT
Start: 2024-07-27 | End: 2024-07-27 | Stop reason: HOSPADM

## 2024-07-27 ASSESSMENT — PAIN SCALES - GENERAL: PAINLEVEL_OUTOF10: 7

## 2024-07-27 ASSESSMENT — PAIN - FUNCTIONAL ASSESSMENT: PAIN_FUNCTIONAL_ASSESSMENT: 0-10

## 2024-07-27 NOTE — ED TRIAGE NOTES
Pt reports experiencing an episode of dizziness at home about 90 minutes ago. Pt reports falling and landing on her L side, injuring her L ribcage.

## 2024-07-27 NOTE — ED PROVIDER NOTES
[facility] EMERGENCY DEPT  EMERGENCY DEPARTMENT ENCOUNTER      Pt Name: Felicia Rangel  MRN: [id number]  Birthdate 1950  Date of evaluation: 7/26/2024  Provider: Barak Devine MD      HISTORY OF PRESENT ILLNESS      74 old female history of asthma, A-fib, coronary disease, COPD, diabetes, GERD, hypertension presents to the emergency department after ground-level fall.  She reports that she was changing close and got lightheaded and fell down landing on her left rib cage.  She complains of some left forearm pain, bilateral ankle pain, left rib cage pain.    The history is provided by the patient and medical records.           Nursing Notes were reviewed.    REVIEW OF SYSTEMS         Review of Systems        PAST MEDICAL HISTORY     Past Medical History:   Diagnosis Date    Adverse effect of anesthesia     slow to wake up    Arthritis     Asthma 6/29/2009    CAUSED BY MOLD    Atrial fibrillation (HCC) 6/29/2009    Atrial flutter (HCC)     CAD (coronary artery disease) 06/29/2009    Celiac disease 2010    Chronic chest pain     Chronic obstructive pulmonary disease (HCC) 2004-5    right leg    Chronic pain of right ankle     Diabetes (HCC)     Drug induced prednisone, DM2    Diastolic heart failure (HCC)     DVT (deep venous thrombosis) (HCC) 2004    Right leg post surgery    GERD (gastroesophageal reflux disease)     Gluten intolerance     Heart failure (HCC)     Hypertension     Hypothyroidism 6/29/2009    Iron deficiency anemia     Lyme disease     Pacemaker     11/20    Personal history of MI (myocardial infarction)     Rheumatoid arthritis (HCC)     S/P AV felicia ablation     11/20     S/P left atrial appendage ligation     RUSTY clip 10/20     Slow to wake up after anesthesia     Syncope     Post testing- resolved    TIA (transient ischemic attack) 2004 & 2006    Vitamin B12 deficiency 6/29/2009         SURGICAL HISTORY       Past Surgical History:   Procedure Laterality Date    ABLATION OF DYSRHYTHMIC FOCUS

## 2024-07-29 LAB
EKG ATRIAL RATE: 81 BPM
EKG DIAGNOSIS: NORMAL
EKG P AXIS: 96 DEGREES
EKG P-R INTERVAL: 162 MS
EKG Q-T INTERVAL: 472 MS
EKG QRS DURATION: 172 MS
EKG QTC CALCULATION (BAZETT): 548 MS
EKG R AXIS: -47 DEGREES
EKG T AXIS: 38 DEGREES
EKG VENTRICULAR RATE: 81 BPM

## 2024-07-29 PROCEDURE — 93010 ELECTROCARDIOGRAM REPORT: CPT | Performed by: INTERNAL MEDICINE

## 2024-08-02 ENCOUNTER — TELEPHONE (OUTPATIENT)
Age: 74
End: 2024-08-02

## 2024-08-02 NOTE — TELEPHONE ENCOUNTER
Pt. Had a fall and is having some soreness around her pacemaker. Wants to discuss.    Pt. Phone - [phone] - Felicia

## 2024-08-02 NOTE — TELEPHONE ENCOUNTER
Verified patient with two types of identifiers.   Patient called stating that she had a fall on Friday 07/24 in her home after 3 days of dizziness and lightheadedness. She states that she fell while changing her clothes and landed on her left side. She is very sore and bruised. She is asking for us to check and make sure her pacemaker is working properly and if there has been any episodes.   Advised her to send transmission for review.   She has not been keeping track of her blood pressures, advised her to do so.   Let her know that we will call back with any recommendations.  Patient verbalized understanding and will call with any other questions.

## 2024-08-28 ENCOUNTER — TELEPHONE (OUTPATIENT)
Age: 74
End: 2024-08-28

## 2024-08-28 RX ORDER — METOPROLOL SUCCINATE 25 MG/1
25 TABLET, EXTENDED RELEASE ORAL DAILY
Qty: 90 TABLET | Refills: 3 | Status: SHIPPED | OUTPATIENT
Start: 2024-08-28 | End: 2024-08-29 | Stop reason: SDUPTHER

## 2024-08-28 NOTE — TELEPHONE ENCOUNTER
Patient stated her neck was hurting on 8/27 and this morning she experienced shortness of breath and chest tightness, bp was 145/73, patient has also been feeling sluggish, patient has not been taking metoprolol, stated is was cancelled.       Pt# [phone]

## 2024-08-28 NOTE — TELEPHONE ENCOUNTER
Metoprolol 25 mg qd [facility] mail order year supply 10/2/23.      Spoke to pt,  BP was running low when in last.  Has a week and a half.  Confirmed pharmacy.    BP has been up and down: 101/51, 140/73  Chest pressure, sob, dizziness.  Low BP mostly in the morning, even fell the other day.  Encouraged her to drink a full glass of water before getting out of bed in the morning to help boost her BP in the morning.  She will send mychart msg with BP log.    Discussed that she had a cath w PCI almost a year ago.  Will discuss with MD if we want updated testing.    Refill per VO of Dr. Cintron  Last appt: 6/20/2024    Next OV needed x1 yr w echo.  Future Appointments   Date Time Provider Department Center   9/30/2024  1:40 PM PACEMAKER, STPADILLA ELIZABETH AMB   9/30/2024  2:00 PM Lavinia Richmond APRN - CNP [facility] BS AMB       Requested Prescriptions     Signed Prescriptions Disp Refills    metoprolol succinate (TOPROL XL) 25 MG extended release tablet 90 tablet 3     Sig: Take 1 tablet by mouth daily     Authorizing Provider: ROBBIN CINTRON     Ordering User: REY MACDONALD

## 2024-08-29 ENCOUNTER — OFFICE VISIT (OUTPATIENT)
Age: 74
End: 2024-08-29
Payer: MEDICARE

## 2024-08-29 VITALS
DIASTOLIC BLOOD PRESSURE: 60 MMHG | BODY MASS INDEX: 29.77 KG/M2 | OXYGEN SATURATION: 94 % | WEIGHT: 168 LBS | SYSTOLIC BLOOD PRESSURE: 106 MMHG | HEIGHT: 63 IN | HEART RATE: 85 BPM

## 2024-08-29 DIAGNOSIS — I25.118 CORONARY ARTERY DISEASE OF NATIVE ARTERY OF NATIVE HEART WITH STABLE ANGINA PECTORIS (HCC): Primary | ICD-10-CM

## 2024-08-29 DIAGNOSIS — I10 HTN (HYPERTENSION), BENIGN: ICD-10-CM

## 2024-08-29 DIAGNOSIS — R06.02 SHORTNESS OF BREATH: ICD-10-CM

## 2024-08-29 DIAGNOSIS — I50.32 CHRONIC DIASTOLIC HEART FAILURE (HCC): ICD-10-CM

## 2024-08-29 DIAGNOSIS — R07.9 CHEST PAIN, UNSPECIFIED TYPE: ICD-10-CM

## 2024-08-29 PROCEDURE — 99214 OFFICE O/P EST MOD 30 MIN: CPT | Performed by: SPECIALIST

## 2024-08-29 PROCEDURE — G8427 DOCREV CUR MEDS BY ELIG CLIN: HCPCS | Performed by: SPECIALIST

## 2024-08-29 PROCEDURE — G8399 PT W/DXA RESULTS DOCUMENT: HCPCS | Performed by: SPECIALIST

## 2024-08-29 PROCEDURE — 1036F TOBACCO NON-USER: CPT | Performed by: SPECIALIST

## 2024-08-29 PROCEDURE — 1090F PRES/ABSN URINE INCON ASSESS: CPT | Performed by: SPECIALIST

## 2024-08-29 PROCEDURE — G8417 CALC BMI ABV UP PARAM F/U: HCPCS | Performed by: SPECIALIST

## 2024-08-29 PROCEDURE — 1123F ACP DISCUSS/DSCN MKR DOCD: CPT | Performed by: SPECIALIST

## 2024-08-29 PROCEDURE — 3078F DIAST BP <80 MM HG: CPT | Performed by: SPECIALIST

## 2024-08-29 PROCEDURE — 3074F SYST BP LT 130 MM HG: CPT | Performed by: SPECIALIST

## 2024-08-29 RX ORDER — INSULIN GLARGINE 100 [IU]/ML
14 INJECTION, SOLUTION SUBCUTANEOUS NIGHTLY
COMMUNITY

## 2024-08-29 RX ORDER — METOPROLOL SUCCINATE 25 MG/1
25 TABLET, EXTENDED RELEASE ORAL DAILY
Qty: 90 TABLET | Refills: 3 | Status: SHIPPED | OUTPATIENT
Start: 2024-08-29

## 2024-08-29 NOTE — PROGRESS NOTES
Chief Complaint   Patient presents with    Chest Pain    Shortness of Breath    Hypertension    Coronary Artery Disease     Vitals:    08/29/24 1421   BP: 106/60   Site: Left Upper Arm   Position: Sitting   Cuff Size: Medium Adult   Pulse: 85   SpO2: 94%   Weight: 76.2 kg (168 lb)   Height: 1.6 m (5' 3\")      /60 (Site: Left Upper Arm, Position: Sitting, Cuff Size: Medium Adult)   Pulse 85   Ht 1.6 m (5' 3\")   Wt 76.2 kg (168 lb)   SpO2 94%   BMI 29.76 kg/m²

## 2024-08-29 NOTE — TELEPHONE ENCOUNTER
R/t call to pt,  Made apt for this afternoon  Future Appointments   Date Time Provider Department Center   8/29/2024  2:40 PM Ralph Cintron MD [facility] BS AMB   9/30/2024  1:40 PM RICKI ANGELES BS AMB   9/30/2024  2:00 PM Lavinia Richmond APRN - CNP [facility] BS AMB

## 2024-08-29 NOTE — PROGRESS NOTES
Meperidine Other (See Comments)     HYPOTENSION    Methimazole      Other reaction(s): Unknown (comments)  Patient stated \"it made me feel like I had the flu\"    Morphine Other (See Comments)     HYPOTENSION    Nitroglycerin Other (See Comments)     IN PILL FORM  - HYPOTENSION  CAN TOLERATE PATCH FOR SHORT TIME    Sulfa Antibiotics Angioedema     Lips    Tramadol Other (See Comments)     Patient reports thrush with tramadol use    Celecoxib Other (See Comments)     GI bleed     Codeine Other (See Comments)     Low BP    Hydrocodone Other (See Comments)     Hypotension    Quinolones      Other reaction(s): Unknown (comments)   could not recall reaction, but is was listed on list of allergies     Warfarin Other (See Comments)      mentioned allergy but could not recall reaction     Adhesive Tape Other (See Comments)     BAD BLISTERS AND TENDS TO GET INFECTED    Albuterol Palpitations    Gluten Diarrhea     bloating    Oxycodone Other (See Comments)     Hallicination and hypotension         SOCIAL HISTORY:     Social History     Tobacco Use    Smoking status: Former     Current packs/day: 0.00     Average packs/day: 1 pack/day for 1 year (1.0 ttl pk-yrs)     Types: Cigarettes     Quit date: 2002     Years since quittin.2     Passive exposure: Never    Smokeless tobacco: Never   Substance Use Topics    Alcohol use: No    Drug use: Never         FAMILY HISTORY:     Family History   Problem Relation Age of Onset    Anesth Problems Neg Hx     Delayed Awakening Mother     Deep Vein Thrombosis Neg Hx     Breast Cancer Cousin         maternal 1st cousin    Breast Cancer Cousin         maternal 1st cousin    Breast Cancer Maternal Aunt     Breast Cancer Cousin         maternal 1st cousin    Breast Cancer Maternal Aunt     Breast Cancer Maternal Aunt     Hypertension Brother     Hypertension Father     Cancer Father         colon    Lung Disease Father     Hypertension Sister     Delayed Awakening Sister  RCA ---> she completed 90 day course of DAPT (had nausea on plavix)   - On 8/29/24 - had some chest pain yesterday -- of note, she fell a few weeks ago and hurt her back and side --> She does have msktl pain (+ chest wall tender) and seeing PT.   Can do further CAD testing if she has anginal type symptoms. Check an echo which was due.   - cont meds (NTG, BB)       4) COPD ?  - Sees Dr. Carpenter      5) History of Iron Deficiency Anemia   - She takes no blood thinners (not even aspirin) due to anemia in past   - Dr. You record 12/16 reviewed - He was seeing her for iron deficiency anemia thought to be from GI Bleed from aspirin and plavix       6) Lipids -   - Does not take a statin due to having muscle aches (from RA at baseline)  - Tried to get her on PSCK9 Inhibitor in past but she is not taking one   - she could not tolerate Zetia either (joint pain)   - Labs 9/5/23 HDL 40,  LDL 73,   ---> Added Livalo 1 mg daily after labs -- which she is tolerating - also continue low dose fibrate   - FU labs with PCP     7) Atrial flutter with RVR 11/20  S/p PPM   - Dual PPM / AV node ablation 11/19/20   - not on any blood thinners due to problems with bleeding   - Surgical RUSTY clip 10/20/20   - Echo 3/23/21 - LVEF 45%,   - BIV upgrade with LV lead placed 6/28/21 for reducing LVEF ----> she felt much better afterwards   - Endorses palpitations that are not bothersome      8) HTN  - BP controlled  - Decreased Toprol to 25mg at night due to variable BP with dizziness.      9) RA  - she is on prednisone       10) History of Tick Bite and RMSF - Bartonella  - Follow up with Dr. Alatorre      11) Stroke like symptoms 9/23   - Stroke like sx with aphasia and weakness and drooling but MRI at [facility] said negative  - She is not tolerating any antiplatelet or OAC  - Hx of bleeding and allergy  - She had RUSTY clip in 2020  - JAMMIE 12/3/23 shows RUSTY completely occluded     12) See me in 6 months - check an echo now (she is due one)

## 2024-09-19 RX ORDER — NITROGLYCERIN 0.1MG/HR
PATCH, TRANSDERMAL 24 HOURS TRANSDERMAL
Qty: 90 PATCH | Refills: 3 | Status: SHIPPED | OUTPATIENT
Start: 2024-09-19

## 2024-09-30 ENCOUNTER — HOSPITAL ENCOUNTER (INPATIENT)
Facility: HOSPITAL | Age: 74
LOS: 2 days | Discharge: HOME | DRG: 069 | End: 2024-10-02
Attending: EMERGENCY MEDICINE | Admitting: INTERNAL MEDICINE
Payer: MEDICARE

## 2024-09-30 ENCOUNTER — APPOINTMENT (OUTPATIENT)
Facility: HOSPITAL | Age: 74
DRG: 069 | End: 2024-09-30
Payer: MEDICARE

## 2024-09-30 ENCOUNTER — OFFICE VISIT (OUTPATIENT)
Age: 74
End: 2024-09-30
Payer: MEDICARE

## 2024-09-30 ENCOUNTER — PROCEDURE VISIT (OUTPATIENT)
Age: 74
End: 2024-09-30

## 2024-09-30 VITALS
SYSTOLIC BLOOD PRESSURE: 116 MMHG | OXYGEN SATURATION: 94 % | WEIGHT: 163.6 LBS | DIASTOLIC BLOOD PRESSURE: 78 MMHG | BODY MASS INDEX: 28.99 KG/M2 | HEIGHT: 63 IN | HEART RATE: 88 BPM

## 2024-09-30 DIAGNOSIS — I48.0 PAF (PAROXYSMAL ATRIAL FIBRILLATION) (HCC): ICD-10-CM

## 2024-09-30 DIAGNOSIS — R53.1 RIGHT SIDED WEAKNESS: ICD-10-CM

## 2024-09-30 DIAGNOSIS — I10 HTN (HYPERTENSION), BENIGN: ICD-10-CM

## 2024-09-30 DIAGNOSIS — I25.118 CORONARY ARTERY DISEASE OF NATIVE ARTERY OF NATIVE HEART WITH STABLE ANGINA PECTORIS (HCC): ICD-10-CM

## 2024-09-30 DIAGNOSIS — I50.22 CHRONIC SYSTOLIC CHF (CONGESTIVE HEART FAILURE), NYHA CLASS 3 (HCC): Primary | ICD-10-CM

## 2024-09-30 DIAGNOSIS — G45.9 TIA (TRANSIENT ISCHEMIC ATTACK): Primary | ICD-10-CM

## 2024-09-30 DIAGNOSIS — Z95.810 BIVENTRICULAR ICD (IMPLANTABLE CARDIOVERTER-DEFIBRILLATOR) IN PLACE: ICD-10-CM

## 2024-09-30 DIAGNOSIS — Z98.890 S/P AV NODAL ABLATION: ICD-10-CM

## 2024-09-30 DIAGNOSIS — Z95.810 BIVENTRICULAR ICD (IMPLANTABLE CARDIOVERTER-DEFIBRILLATOR) IN PLACE: Primary | ICD-10-CM

## 2024-09-30 PROBLEM — E87.1 HYPONATREMIA: Status: ACTIVE | Noted: 2024-09-30

## 2024-09-30 PROBLEM — G93.40 ACUTE ENCEPHALOPATHY: Status: RESOLVED | Noted: 2021-06-28 | Resolved: 2024-09-30

## 2024-09-30 PROBLEM — E88.09 HYPOALBUMINEMIA: Status: ACTIVE | Noted: 2024-09-30

## 2024-09-30 PROBLEM — T38.0X5A IMMUNOCOMPROMISED DUE TO CORTICOSTEROIDS (HCC): Status: RESOLVED | Noted: 2021-06-29 | Resolved: 2024-09-30

## 2024-09-30 PROBLEM — R47.01 EXPRESSIVE APHASIA: Status: ACTIVE | Noted: 2024-09-30

## 2024-09-30 PROBLEM — D72.825 BANDEMIA: Status: RESOLVED | Noted: 2021-06-28 | Resolved: 2024-09-30

## 2024-09-30 PROBLEM — E86.0 DEHYDRATION: Status: RESOLVED | Noted: 2021-06-29 | Resolved: 2024-09-30

## 2024-09-30 PROBLEM — R65.10 SIRS (SYSTEMIC INFLAMMATORY RESPONSE SYNDROME) (HCC): Status: RESOLVED | Noted: 2021-06-28 | Resolved: 2024-09-30

## 2024-09-30 PROBLEM — G93.41 ACUTE METABOLIC ENCEPHALOPATHY: Status: RESOLVED | Noted: 2021-06-29 | Resolved: 2024-09-30

## 2024-09-30 PROBLEM — M19.90 ARTHRITIS: Status: ACTIVE | Noted: 2019-08-12

## 2024-09-30 PROBLEM — Z79.52 IMMUNOCOMPROMISED DUE TO CORTICOSTEROIDS (HCC): Status: RESOLVED | Noted: 2021-06-29 | Resolved: 2024-09-30

## 2024-09-30 PROBLEM — D84.821 IMMUNOCOMPROMISED DUE TO CORTICOSTEROIDS (HCC): Status: RESOLVED | Noted: 2021-06-29 | Resolved: 2024-09-30

## 2024-09-30 LAB
ALBUMIN SERPL-MCNC: 3.1 G/DL (ref 3.5–5)
ALBUMIN/GLOB SERPL: 1 (ref 1.1–2.2)
ALP SERPL-CCNC: 101 U/L (ref 45–117)
ALT SERPL-CCNC: 22 U/L (ref 12–78)
AMPHET UR QL SCN: NEGATIVE
ANION GAP SERPL CALC-SCNC: 3 MMOL/L (ref 2–12)
APPEARANCE UR: CLEAR
AST SERPL-CCNC: 20 U/L (ref 15–37)
BARBITURATES UR QL SCN: NEGATIVE
BASOPHILS # BLD: 0.1 K/UL (ref 0–0.1)
BASOPHILS NFR BLD: 1 % (ref 0–1)
BENZODIAZ UR QL: NEGATIVE
BILIRUB SERPL-MCNC: 0.4 MG/DL (ref 0.2–1)
BILIRUB UR QL: NEGATIVE
BUN SERPL-MCNC: 12 MG/DL (ref 6–20)
BUN/CREAT SERPL: 16 (ref 12–20)
CALCIUM SERPL-MCNC: 8.5 MG/DL (ref 8.5–10.1)
CANNABINOIDS UR QL SCN: NEGATIVE
CHLORIDE SERPL-SCNC: 101 MMOL/L (ref 97–108)
CHOLEST SERPL-MCNC: 197 MG/DL
CO2 SERPL-SCNC: 29 MMOL/L (ref 21–32)
COCAINE UR QL SCN: NEGATIVE
COLOR UR: NORMAL
CREAT SERPL-MCNC: 0.73 MG/DL (ref 0.55–1.02)
DIFFERENTIAL METHOD BLD: ABNORMAL
EKG ATRIAL RATE: 98 BPM
EKG DIAGNOSIS: NORMAL
EKG P AXIS: 38 DEGREES
EKG Q-T INTERVAL: 460 MS
EKG QRS DURATION: 168 MS
EKG QTC CALCULATION (BAZETT): 587 MS
EKG R AXIS: -47 DEGREES
EKG T AXIS: 47 DEGREES
EKG VENTRICULAR RATE: 98 BPM
EOSINOPHIL # BLD: 0.1 K/UL (ref 0–0.4)
EOSINOPHIL NFR BLD: 1 % (ref 0–7)
ERYTHROCYTE [DISTWIDTH] IN BLOOD BY AUTOMATED COUNT: 12.7 % (ref 11.5–14.5)
ETHANOL SERPL-MCNC: <10 MG/DL (ref 0–0.08)
FLUAV RNA SPEC QL NAA+PROBE: NOT DETECTED
FLUBV RNA SPEC QL NAA+PROBE: NOT DETECTED
FOLATE SERPL-MCNC: 9.7 NG/ML (ref 5–21)
GLOBULIN SER CALC-MCNC: 3.1 G/DL (ref 2–4)
GLUCOSE BLD STRIP.AUTO-MCNC: 192 MG/DL (ref 65–117)
GLUCOSE BLD STRIP.AUTO-MCNC: 227 MG/DL (ref 65–117)
GLUCOSE BLD STRIP.AUTO-MCNC: 229 MG/DL (ref 65–117)
GLUCOSE SERPL-MCNC: 235 MG/DL (ref 65–100)
GLUCOSE UR STRIP.AUTO-MCNC: NEGATIVE MG/DL
HCT VFR BLD AUTO: 42.9 % (ref 35–47)
HDLC SERPL-MCNC: 51 MG/DL
HDLC SERPL: 3.9 (ref 0–5)
HGB BLD-MCNC: 13.9 G/DL (ref 11.5–16)
HGB UR QL STRIP: NEGATIVE
IMM GRANULOCYTES # BLD AUTO: 0.1 K/UL (ref 0–0.04)
IMM GRANULOCYTES NFR BLD AUTO: 1 % (ref 0–0.5)
INR PPP: 1 (ref 0.9–1.1)
KETONES UR QL STRIP.AUTO: NEGATIVE MG/DL
LDLC SERPL CALC-MCNC: 92.8 MG/DL (ref 0–100)
LEUKOCYTE ESTERASE UR QL STRIP.AUTO: NEGATIVE
LYMPHOCYTES # BLD: 1 K/UL (ref 0.8–3.5)
LYMPHOCYTES NFR BLD: 8 % (ref 12–49)
Lab: NORMAL
MAGNESIUM SERPL-MCNC: 1.9 MG/DL (ref 1.6–2.4)
MCH RBC QN AUTO: 30.8 PG (ref 26–34)
MCHC RBC AUTO-ENTMCNC: 32.4 G/DL (ref 30–36.5)
MCV RBC AUTO: 94.9 FL (ref 80–99)
METHADONE UR QL: NEGATIVE
MONOCYTES # BLD: 0.8 K/UL (ref 0–1)
MONOCYTES NFR BLD: 6 % (ref 5–13)
NEUTS SEG # BLD: 9.6 K/UL (ref 1.8–8)
NEUTS SEG NFR BLD: 83 % (ref 32–75)
NITRITE UR QL STRIP.AUTO: NEGATIVE
NRBC # BLD: 0 K/UL (ref 0–0.01)
NRBC BLD-RTO: 0 PER 100 WBC
OPIATES UR QL: NEGATIVE
PCP UR QL: NEGATIVE
PH UR STRIP: 7 (ref 5–8)
PHOSPHATE SERPL-MCNC: 2.8 MG/DL (ref 2.6–4.7)
PLATELET # BLD AUTO: 193 K/UL (ref 150–400)
PMV BLD AUTO: 10.6 FL (ref 8.9–12.9)
POTASSIUM SERPL-SCNC: 4.3 MMOL/L (ref 3.5–5.1)
PROCALCITONIN SERPL-MCNC: 0.1 NG/ML
PROT SERPL-MCNC: 6.2 G/DL (ref 6.4–8.2)
PROT UR STRIP-MCNC: NEGATIVE MG/DL
PROTHROMBIN TIME: 10.9 SEC (ref 9–11.1)
RBC # BLD AUTO: 4.52 M/UL (ref 3.8–5.2)
SARS-COV-2 RNA RESP QL NAA+PROBE: NOT DETECTED
SERVICE CMNT-IMP: ABNORMAL
SODIUM SERPL-SCNC: 133 MMOL/L (ref 136–145)
SOURCE: NORMAL
SP GR UR REFRACTOMETRY: <1.005 (ref 1–1.03)
TRIGL SERPL-MCNC: 266 MG/DL
TROPONIN I SERPL HS-MCNC: 10 NG/L (ref 0–51)
UROBILINOGEN UR QL STRIP.AUTO: 0.2 EU/DL (ref 0.2–1)
VIT B12 SERPL-MCNC: 271 PG/ML (ref 193–986)
VLDLC SERPL CALC-MCNC: 53.2 MG/DL
WBC # BLD AUTO: 11.6 K/UL (ref 3.6–11)

## 2024-09-30 PROCEDURE — 6370000000 HC RX 637 (ALT 250 FOR IP): Performed by: INTERNAL MEDICINE

## 2024-09-30 PROCEDURE — 82607 VITAMIN B-12: CPT

## 2024-09-30 PROCEDURE — 99213 OFFICE O/P EST LOW 20 MIN: CPT

## 2024-09-30 PROCEDURE — 84145 PROCALCITONIN (PCT): CPT

## 2024-09-30 PROCEDURE — 83036 HEMOGLOBIN GLYCOSYLATED A1C: CPT

## 2024-09-30 PROCEDURE — 0042T CT BRAIN PERFUSION: CPT

## 2024-09-30 PROCEDURE — G8427 DOCREV CUR MEDS BY ELIG CLIN: HCPCS

## 2024-09-30 PROCEDURE — 1036F TOBACCO NON-USER: CPT

## 2024-09-30 PROCEDURE — 87636 SARSCOV2 & INF A&B AMP PRB: CPT

## 2024-09-30 PROCEDURE — 1123F ACP DISCUSS/DSCN MKR DOCD: CPT

## 2024-09-30 PROCEDURE — 70496 CT ANGIOGRAPHY HEAD: CPT

## 2024-09-30 PROCEDURE — 82746 ASSAY OF FOLIC ACID SERUM: CPT

## 2024-09-30 PROCEDURE — 70450 CT HEAD/BRAIN W/O DYE: CPT

## 2024-09-30 PROCEDURE — 2060000000 HC ICU INTERMEDIATE R&B

## 2024-09-30 PROCEDURE — 3078F DIAST BP <80 MM HG: CPT

## 2024-09-30 PROCEDURE — 6360000002 HC RX W HCPCS: Performed by: INTERNAL MEDICINE

## 2024-09-30 PROCEDURE — 80053 COMPREHEN METABOLIC PANEL: CPT

## 2024-09-30 PROCEDURE — 93005 ELECTROCARDIOGRAM TRACING: CPT | Performed by: EMERGENCY MEDICINE

## 2024-09-30 PROCEDURE — 93010 ELECTROCARDIOGRAM REPORT: CPT | Performed by: SPECIALIST

## 2024-09-30 PROCEDURE — 80307 DRUG TEST PRSMV CHEM ANLYZR: CPT

## 2024-09-30 PROCEDURE — 87086 URINE CULTURE/COLONY COUNT: CPT

## 2024-09-30 PROCEDURE — 84484 ASSAY OF TROPONIN QUANT: CPT

## 2024-09-30 PROCEDURE — G8399 PT W/DXA RESULTS DOCUMENT: HCPCS

## 2024-09-30 PROCEDURE — 3074F SYST BP LT 130 MM HG: CPT

## 2024-09-30 PROCEDURE — 4A03X5D MEASUREMENT OF ARTERIAL FLOW, INTRACRANIAL, EXTERNAL APPROACH: ICD-10-PCS | Performed by: RADIOLOGY

## 2024-09-30 PROCEDURE — 36415 COLL VENOUS BLD VENIPUNCTURE: CPT

## 2024-09-30 PROCEDURE — 80061 LIPID PANEL: CPT

## 2024-09-30 PROCEDURE — 82962 GLUCOSE BLOOD TEST: CPT

## 2024-09-30 PROCEDURE — 85610 PROTHROMBIN TIME: CPT

## 2024-09-30 PROCEDURE — 85025 COMPLETE CBC W/AUTO DIFF WBC: CPT

## 2024-09-30 PROCEDURE — 71045 X-RAY EXAM CHEST 1 VIEW: CPT

## 2024-09-30 PROCEDURE — 99285 EMERGENCY DEPT VISIT HI MDM: CPT

## 2024-09-30 PROCEDURE — 81002 URINALYSIS NONAUTO W/O SCOPE: CPT

## 2024-09-30 PROCEDURE — G8417 CALC BMI ABV UP PARAM F/U: HCPCS

## 2024-09-30 PROCEDURE — 1090F PRES/ABSN URINE INCON ASSESS: CPT

## 2024-09-30 PROCEDURE — 84100 ASSAY OF PHOSPHORUS: CPT

## 2024-09-30 PROCEDURE — 80074 ACUTE HEPATITIS PANEL: CPT

## 2024-09-30 PROCEDURE — 82077 ASSAY SPEC XCP UR&BREATH IA: CPT

## 2024-09-30 PROCEDURE — 2580000003 HC RX 258: Performed by: INTERNAL MEDICINE

## 2024-09-30 PROCEDURE — 83735 ASSAY OF MAGNESIUM: CPT

## 2024-09-30 PROCEDURE — 70551 MRI BRAIN STEM W/O DYE: CPT

## 2024-09-30 PROCEDURE — 6360000004 HC RX CONTRAST MEDICATION: Performed by: EMERGENCY MEDICINE

## 2024-09-30 RX ORDER — IOPAMIDOL 755 MG/ML
150 INJECTION, SOLUTION INTRAVASCULAR
Status: COMPLETED | OUTPATIENT
Start: 2024-09-30 | End: 2024-09-30

## 2024-09-30 RX ORDER — METOPROLOL SUCCINATE 25 MG/1
25 TABLET, EXTENDED RELEASE ORAL DAILY
Status: DISCONTINUED | OUTPATIENT
Start: 2024-10-01 | End: 2024-10-02 | Stop reason: HOSPADM

## 2024-09-30 RX ORDER — FENOFIBRATE 54 MG/1
54 TABLET ORAL DAILY
Status: DISCONTINUED | OUTPATIENT
Start: 2024-10-01 | End: 2024-10-02 | Stop reason: HOSPADM

## 2024-09-30 RX ORDER — ATORVASTATIN CALCIUM 20 MG/1
20 TABLET, FILM COATED ORAL NIGHTLY
Status: DISCONTINUED | OUTPATIENT
Start: 2024-09-30 | End: 2024-10-01

## 2024-09-30 RX ORDER — EPLERENONE 25 MG/1
25 TABLET, FILM COATED ORAL DAILY
Status: DISCONTINUED | OUTPATIENT
Start: 2024-10-01 | End: 2024-10-02 | Stop reason: HOSPADM

## 2024-09-30 RX ORDER — SODIUM CHLORIDE 0.9 % (FLUSH) 0.9 %
5-40 SYRINGE (ML) INJECTION PRN
Status: DISCONTINUED | OUTPATIENT
Start: 2024-09-30 | End: 2024-10-02 | Stop reason: HOSPADM

## 2024-09-30 RX ORDER — ACETAMINOPHEN 650 MG/1
650 SUPPOSITORY RECTAL EVERY 6 HOURS PRN
Status: DISCONTINUED | OUTPATIENT
Start: 2024-09-30 | End: 2024-10-02 | Stop reason: HOSPADM

## 2024-09-30 RX ORDER — ONDANSETRON 4 MG/1
4 TABLET, ORALLY DISINTEGRATING ORAL EVERY 8 HOURS PRN
Status: DISCONTINUED | OUTPATIENT
Start: 2024-09-30 | End: 2024-10-02 | Stop reason: HOSPADM

## 2024-09-30 RX ORDER — IPRATROPIUM BROMIDE AND ALBUTEROL SULFATE 2.5; .5 MG/3ML; MG/3ML
1 SOLUTION RESPIRATORY (INHALATION) EVERY 6 HOURS PRN
Status: DISCONTINUED | OUTPATIENT
Start: 2024-09-30 | End: 2024-10-02 | Stop reason: HOSPADM

## 2024-09-30 RX ORDER — PREDNISONE 5 MG/1
2.5 TABLET ORAL 2 TIMES DAILY
Status: DISCONTINUED | OUTPATIENT
Start: 2024-09-30 | End: 2024-10-02 | Stop reason: HOSPADM

## 2024-09-30 RX ORDER — POLYETHYLENE GLYCOL 3350 17 G/17G
17 POWDER, FOR SOLUTION ORAL DAILY PRN
Status: DISCONTINUED | OUTPATIENT
Start: 2024-09-30 | End: 2024-10-02 | Stop reason: HOSPADM

## 2024-09-30 RX ORDER — ACETAMINOPHEN 325 MG/1
650 TABLET ORAL EVERY 6 HOURS PRN
Status: DISCONTINUED | OUTPATIENT
Start: 2024-09-30 | End: 2024-10-02 | Stop reason: HOSPADM

## 2024-09-30 RX ORDER — INSULIN LISPRO 100 [IU]/ML
0.05 INJECTION, SOLUTION INTRAVENOUS; SUBCUTANEOUS
Status: DISCONTINUED | OUTPATIENT
Start: 2024-09-30 | End: 2024-10-02

## 2024-09-30 RX ORDER — SODIUM CHLORIDE 9 MG/ML
INJECTION, SOLUTION INTRAVENOUS PRN
Status: DISCONTINUED | OUTPATIENT
Start: 2024-09-30 | End: 2024-10-02 | Stop reason: HOSPADM

## 2024-09-30 RX ORDER — INSULIN LISPRO 100 [IU]/ML
0-4 INJECTION, SOLUTION INTRAVENOUS; SUBCUTANEOUS NIGHTLY
Status: DISCONTINUED | OUTPATIENT
Start: 2024-09-30 | End: 2024-10-02 | Stop reason: HOSPADM

## 2024-09-30 RX ORDER — ENOXAPARIN SODIUM 100 MG/ML
40 INJECTION SUBCUTANEOUS EVERY 24 HOURS
Status: DISCONTINUED | OUTPATIENT
Start: 2024-09-30 | End: 2024-10-02 | Stop reason: HOSPADM

## 2024-09-30 RX ORDER — ONDANSETRON 2 MG/ML
4 INJECTION INTRAMUSCULAR; INTRAVENOUS EVERY 6 HOURS PRN
Status: DISCONTINUED | OUTPATIENT
Start: 2024-09-30 | End: 2024-10-02 | Stop reason: HOSPADM

## 2024-09-30 RX ORDER — DEXTROSE MONOHYDRATE 100 MG/ML
INJECTION, SOLUTION INTRAVENOUS CONTINUOUS PRN
Status: DISCONTINUED | OUTPATIENT
Start: 2024-09-30 | End: 2024-10-02 | Stop reason: HOSPADM

## 2024-09-30 RX ORDER — INSULIN GLARGINE 100 [IU]/ML
14 INJECTION, SOLUTION SUBCUTANEOUS NIGHTLY
Status: DISCONTINUED | OUTPATIENT
Start: 2024-09-30 | End: 2024-10-02

## 2024-09-30 RX ORDER — CLOPIDOGREL BISULFATE 75 MG/1
75 TABLET ORAL DAILY
Status: DISCONTINUED | OUTPATIENT
Start: 2024-09-30 | End: 2024-10-01

## 2024-09-30 RX ORDER — INSULIN LISPRO 100 [IU]/ML
0-8 INJECTION, SOLUTION INTRAVENOUS; SUBCUTANEOUS
Status: DISCONTINUED | OUTPATIENT
Start: 2024-09-30 | End: 2024-10-02 | Stop reason: HOSPADM

## 2024-09-30 RX ORDER — SODIUM CHLORIDE 0.9 % (FLUSH) 0.9 %
5-40 SYRINGE (ML) INJECTION EVERY 12 HOURS SCHEDULED
Status: DISCONTINUED | OUTPATIENT
Start: 2024-09-30 | End: 2024-10-02 | Stop reason: HOSPADM

## 2024-09-30 RX ADMIN — INSULIN GLARGINE 14 UNITS: 100 INJECTION, SOLUTION SUBCUTANEOUS at 22:31

## 2024-09-30 RX ADMIN — ATORVASTATIN CALCIUM 20 MG: 20 TABLET, FILM COATED ORAL at 22:31

## 2024-09-30 RX ADMIN — SODIUM CHLORIDE, PRESERVATIVE FREE 10 ML: 5 INJECTION INTRAVENOUS at 22:32

## 2024-09-30 RX ADMIN — ENOXAPARIN SODIUM 40 MG: 100 INJECTION SUBCUTANEOUS at 19:14

## 2024-09-30 RX ADMIN — PREDNISONE 2.5 MG: 5 TABLET ORAL at 22:31

## 2024-09-30 RX ADMIN — IOPAMIDOL 150 ML: 755 INJECTION, SOLUTION INTRAVENOUS at 15:15

## 2024-09-30 RX ADMIN — INSULIN LISPRO 4 UNITS: 100 INJECTION, SOLUTION INTRAVENOUS; SUBCUTANEOUS at 19:14

## 2024-09-30 ASSESSMENT — LIFESTYLE VARIABLES
HOW OFTEN DO YOU HAVE A DRINK CONTAINING ALCOHOL: NEVER
HOW MANY STANDARD DRINKS CONTAINING ALCOHOL DO YOU HAVE ON A TYPICAL DAY: PATIENT DOES NOT DRINK

## 2024-09-30 ASSESSMENT — PAIN - FUNCTIONAL ASSESSMENT: PAIN_FUNCTIONAL_ASSESSMENT: NONE - DENIES PAIN

## 2024-09-30 NOTE — PROGRESS NOTES
CRT-P checked in office, Device functioning appropriately as programmed. No events recorded. Uploaded interrogation to media.     Pt developed difficulty with word finding and a stutter. R sided weakness noted (+RUE drift). C/o palpitations and headache. Reports similar events happening at home Saturday lasting up to 30 minutes. States she usually just \"waits it out\". Last known well 1418. LAAC in place.

## 2024-09-30 NOTE — ED NOTES
TRANSFER - OUT REPORT:    Verbal report given to Nazario on Felicia Rangel  being transferred to [facility] RM [id number] for routine progression of patient care       Report consisted of patient’s Situation, Background, Assessment and   Recommendations(SBAR).     Information from the following report(s) Nurse Handoff Report, ED Encounter Summary, ED SBAR, MAR, and Neuro Assessment was reviewed with the receiving nurse.    Opportunity for questions and clarification was provided.      Patient transported with:   Monitor    Lines: 20g PIV LAC     1740: Attempted report. Nurse stated their other patient started seizing.

## 2024-09-30 NOTE — ED TRIAGE NOTES
Patient to ER for complaint of sudden onset of stuttering.     Patient reports left facial numbness. Patient reports right arm weakness and numbness.     Endorses nausea and headache.     Level 1 called at 1441.    LKW: 1340    S/S: right arm, right leg weakness and numbness, aphasia, and left facial numbness.     BP: 176/81    Blood sugar: 229    Denies blood thinners    VAN positive.     Dr. Alfaro in triage to assess patient.

## 2024-09-30 NOTE — CARE COORDINATION
09/30/24  5:09 PM    CM attempted to see patient x3 however patient PABLO for CT, with imaging, and physician/specialists at the bedside. PT/OT eval orders in. CM following for initial assessment and for PT/OT evaluations with recommendations.    Arlette Kaplan MA, BSW, [facility]  [facility]      Available via [place]

## 2024-09-30 NOTE — ED NOTES
Stroke Education provided to patient and the following topics were discussed    1. Patients personal risk factors for stroke are atrial fibrillation, diabetes mellitus, and hypertension    2. Warning signs of Stroke:        * Sudden numbness or weakness of the face, arm or leg, especially on one side of          The body            * Sudden confusion, trouble speaking or understanding        * Sudden trouble seeing in one or both eyes        * Sudden trouble walking, dizziness, loss of balance or coordination        * Sudden severe headache with no known cause      3. Importance of activation Emergency Medical Services ( 9-1-1 ) immediately if experience any warning signs of stroke.    4. Be sure and schedule a follow-up appointment with your primary care doctor or any specialists as instructed.     5. You must take medicine every day to treat your risk factors for stroke.  Be sure to take your medicines exactly as your doctor tells you: no more, no less.  Know what your medicines are for , what they do.  Anti-thrombotics /anticoagulants can help prevent strokes.  You are taking the following medicine(s)  Plavix (reports no longer taking)     6.  Smoking and second-hand smoke greatly increase your risk of stroke, cardiovascular disease and death. Smoking history never    7. Information provided was Verbal Education    8. Documentation of teaching completed in Patient Education Activity and on Care Plan with teaching response noted?  yes

## 2024-09-30 NOTE — PROGRESS NOTES
Chief Complaint   Patient presents with    Shortness of Breath     Vitals:    09/30/24 1346   BP: 116/78   Site: Left Upper Arm   Position: Sitting   Pulse: 88   SpO2: 94%   Weight: 74.2 kg (163 lb 9.6 oz)   Height: 1.6 m (5' 3\")     Chest pain: DENIED     Recent hospital stays: DENIED     Refills: DENIED   
mouth nightly    fenofibrate (TRICOR) 48 MG tablet Take 1 tablet by mouth daily    insulin lispro (HUMALOG) 100 UNIT/ML injection cartridge Inject 4 Units into the skin 3 times daily (before meals)    acetaminophen (TYLENOL) 650 MG extended release tablet Take by mouth    aclidinium (TUDORZA PRESSAIR) 400 MCG/ACT AEPB inhaler Inhale 1 puff into the lungs 2 times daily as needed    bumetanide (BUMEX) 1 MG tablet Take 1 tablet by mouth daily as needed    calcium citrate (CALCITRATE) 950 (200 Ca) MG tablet Take by mouth daily    vitamin D (CHOLECALCIFEROL) 25 MCG (1000 UT) TABS tablet Take by mouth daily    cyanocobalamin 1000 MCG/ML injection INJECT 1 ML INTO THE MUSCLE EVERY 30 DAYS    dextran 70-hypromellose (TEARS NATURALE) 0.1-0.3 % SOLN opthalmic solution Apply 1 drop to eye daily    levalbuterol (XOPENEX) 0.63 MG/3ML nebulization Inhale into the lungs 2 times daily as needed for Shortness of Breath    levothyroxine (SYNTHROID) 137 MCG tablet Take 1 tablet by mouth every morning (before breakfast)    potassium gluconate 550 mg tablet Take 1 tablet by mouth    predniSONE (DELTASONE) 5 MG tablet Take 0.5 tablets by mouth 2 times daily    SITagliptin (JANUVIA) 100 MG tablet Take by mouth Daily with supper     No current facility-administered medications for this visit.      Diagnostic Data Review:     No valid procedures specified.  No valid procedures specified.  No valid procedures specified.  [unfilled]      Lab Review:     Lab Results   Component Value Date/Time    CHOL 136 06/29/2021 02:33 AM    HDL 36 06/29/2021 02:33 AM    LDL 48.6 06/29/2021 02:33 AM     No results found for: \"DELROY\", \"CREAPOC\"  Lab Results   Component Value Date/Time    BUN 22 07/27/2024 12:10 AM    IBUN 38 11/05/2020 09:33 PM     Lab Results   Component Value Date/Time    K 3.8 07/27/2024 12:10 AM     No results found for: \"HBA1C\"  Lab Results   Component Value Date/Time    HGB 15.1 07/26/2024 11:12 PM     Lab Results   Component

## 2024-09-30 NOTE — H&P
[facility]    Hospitalist Admission Note                                                                                                                                  NAME:  Felicia Rangel   :   1950   MRN:  [id number]     PCP:  Barak Mendez MD     Date/Time of service:  2024 4:12 PM  To assist coordination of care and communication with nursing and staff, this note may be preliminary early in the day, but finalized by end of the day.        Subjective:     CHIEF COMPLAINT: expressive aphasia and weakness     HISTORY OF PRESENT ILLNESS:     Ms. Rangel is a 74 y.o. female who presented to the Emergency Department complaining of stuttering, left face numb and right side weak.  Noted at cardiology appointment as they were adjusting her ICD.  ER called code stroke.  She mentions having a \"stroke\" at [facility] this year, for which she was transferred to [facility] to have a heart cath and stent.  She doesn't remember if MRI brain showed the stroke. We will admit her for management.    Past Medical History:   Diagnosis Date    Arthritis     Atrial fibrillation (HCC)     CAD (coronary artery disease)     Celiac disease     CHF (congestive heart failure), NYHA class I, chronic, systolic (HCC)     Chronic obstructive pulmonary disease (HCC)     Diastolic heart failure (HCC)     GERD (gastroesophageal reflux disease)     Hx of deep venous thrombosis     Hypertension     Hypothyroidism     Iron deficiency anemia     Lyme disease     Pacemaker         Rheumatoid arthritis (HCC)     S/P AV felicia ablation          S/P left atrial appendage ligation     RUSTY clip 10/20         Past Surgical History:   Procedure Laterality Date    ABLATION OF DYSRHYTHMIC FOCUS      ADENOIDECTOMY      APPENDECTOMY      CARDIAC CATHETERIZATION  2020    CARDIAC CATHETERIZATION  2010    2 STENTS    CHOLECYSTECTOMY  11    COLONOSCOPY      CORONARY ANGIOPLASTY WITH STENT PLACEMENT      x 2

## 2024-09-30 NOTE — ED PROVIDER NOTES
CATHETERIZATION  03/2020    CARDIAC CATHETERIZATION  2010    2 STENTS    CHOLECYSTECTOMY  6/16/11    COLONOSCOPY      CORONARY ANGIOPLASTY WITH STENT PLACEMENT      x 2    HYSTERECTOMY (CERVIX STATUS UNKNOWN)      ICAR CATHETER ABLATION ATRIOVENTR NODE FUNCTION N/A 11/19/2020    ABLATION AV NODE performed by Angus Flower MD at [facility] CARDIAC CATH LAB    INS NEW/RPLCMT PRM PM W/TRANSV ELTRD ATRIAL&VENT N/A 11/19/2020    INSERT PPM DUAL performed by Angus Flower MD at [facility] CARDIAC CATH LAB    INTRACARDIAC ELECTROPHYSIOLOGIC 3D MAPPING N/A 11/19/2020    Ep 3d Mapping performed by Angus Flower MD at [facility] CARDIAC CATH LAB    LUMBAR LAMINECTOMY  2000    L4-L5    ORTHOPEDIC SURGERY Right 09/19/2020    right hand [facility] joint replacement    ORTHOPEDIC SURGERY  1993-6/2012    RT. FOOT SURGERY X 6/calcaneal osteotomy    TONSILLECTOMY  1964         CURRENT MEDICATIONS       Current Discharge Medication List        CONTINUE these medications which have NOT CHANGED    Details   nitroGLYCERIN (NITRODUR) 0.1 MG/HR APPLY 1 PATCH TOPICALLY ONCE DAILY  Qty: 90 patch, Refills: 3      insulin glargine (LANTUS) 100 UNIT/ML injection vial Inject 14 Units into the skin nightly      metoprolol succinate (TOPROL XL) 25 MG extended release tablet Take 1 tablet by mouth daily  Qty: 90 tablet, Refills: 3      lansoprazole (PREVACID SOLUTAB) 30 MG disintegrating tablet Take 1 tablet by mouth daily      eplerenone (INSPRA) 25 MG tablet Take 1 tablet by mouth daily  Qty: 90 tablet, Refills: 1      pitavastatin (LIVALO) 1 MG TABS tablet Take 1 tablet by mouth nightly  Qty: 90 tablet, Refills: 3      fenofibrate (TRICOR) 48 MG tablet Take 1 tablet by mouth daily  Qty: 90 tablet, Refills: 3      insulin lispro (HUMALOG) 100 UNIT/ML injection cartridge Inject 4 Units into the skin 3 times daily (before meals)      acetaminophen (TYLENOL) 650 MG extended release tablet Take by mouth      aclidinium (TUDORZA PRESSAIR) 400 MCG/ACT AEPB inhaler Inhale 1

## 2024-10-01 ENCOUNTER — APPOINTMENT (OUTPATIENT)
Facility: HOSPITAL | Age: 74
DRG: 069 | End: 2024-10-01
Payer: MEDICARE

## 2024-10-01 PROBLEM — Z86.2 HISTORY OF IRON DEFICIENCY ANEMIA: Status: ACTIVE | Noted: 2024-10-01

## 2024-10-01 LAB
-: NORMAL
ALBUMIN SERPL-MCNC: 3.1 G/DL (ref 3.5–5)
ALBUMIN/GLOB SERPL: 1 (ref 1.1–2.2)
ALP SERPL-CCNC: 90 U/L (ref 45–117)
ALT SERPL-CCNC: 22 U/L (ref 12–78)
ANION GAP SERPL CALC-SCNC: 4 MMOL/L (ref 2–12)
AST SERPL-CCNC: 15 U/L (ref 15–37)
BACTERIA SPEC CULT: NORMAL
BILIRUB SERPL-MCNC: 0.6 MG/DL (ref 0.2–1)
BUN SERPL-MCNC: 9 MG/DL (ref 6–20)
BUN/CREAT SERPL: 16 (ref 12–20)
CALCIUM SERPL-MCNC: 9 MG/DL (ref 8.5–10.1)
CHLORIDE SERPL-SCNC: 108 MMOL/L (ref 97–108)
CO2 SERPL-SCNC: 28 MMOL/L (ref 21–32)
CREAT SERPL-MCNC: 0.57 MG/DL (ref 0.55–1.02)
ERYTHROCYTE [DISTWIDTH] IN BLOOD BY AUTOMATED COUNT: 12.8 % (ref 11.5–14.5)
EST. AVERAGE GLUCOSE BLD GHB EST-MCNC: 220 MG/DL
GLOBULIN SER CALC-MCNC: 3 G/DL (ref 2–4)
GLUCOSE BLD STRIP.AUTO-MCNC: 163 MG/DL (ref 65–117)
GLUCOSE BLD STRIP.AUTO-MCNC: 176 MG/DL (ref 65–117)
GLUCOSE BLD STRIP.AUTO-MCNC: 188 MG/DL (ref 65–117)
GLUCOSE BLD STRIP.AUTO-MCNC: 253 MG/DL (ref 65–117)
GLUCOSE SERPL-MCNC: 182 MG/DL (ref 65–100)
HAV IGM SER QL: NONREACTIVE
HBA1C MFR BLD: 9.3 % (ref 4–5.6)
HBV CORE IGM SER QL: NONREACTIVE
HBV SURFACE AG SER QL: <0.1 INDEX
HBV SURFACE AG SER QL: NEGATIVE
HCT VFR BLD AUTO: 43.4 % (ref 35–47)
HCV AB SER IA-ACNC: 0.08 INDEX
HCV AB SERPL QL IA: NONREACTIVE
HGB BLD-MCNC: 14.1 G/DL (ref 11.5–16)
MAGNESIUM SERPL-MCNC: 2.2 MG/DL (ref 1.6–2.4)
MCH RBC QN AUTO: 30.5 PG (ref 26–34)
MCHC RBC AUTO-ENTMCNC: 32.5 G/DL (ref 30–36.5)
MCV RBC AUTO: 93.7 FL (ref 80–99)
NRBC # BLD: 0 K/UL (ref 0–0.01)
NRBC BLD-RTO: 0 PER 100 WBC
PHOSPHATE SERPL-MCNC: 3.4 MG/DL (ref 2.6–4.7)
PLATELET # BLD AUTO: 200 K/UL (ref 150–400)
PMV BLD AUTO: 10.5 FL (ref 8.9–12.9)
POTASSIUM SERPL-SCNC: 3.9 MMOL/L (ref 3.5–5.1)
PROT SERPL-MCNC: 6.1 G/DL (ref 6.4–8.2)
RBC # BLD AUTO: 4.63 M/UL (ref 3.8–5.2)
SERVICE CMNT-IMP: ABNORMAL
SERVICE CMNT-IMP: NORMAL
SODIUM SERPL-SCNC: 140 MMOL/L (ref 136–145)
WBC # BLD AUTO: 11.9 K/UL (ref 3.6–11)

## 2024-10-01 PROCEDURE — 97530 THERAPEUTIC ACTIVITIES: CPT

## 2024-10-01 PROCEDURE — 36415 COLL VENOUS BLD VENIPUNCTURE: CPT

## 2024-10-01 PROCEDURE — 97116 GAIT TRAINING THERAPY: CPT

## 2024-10-01 PROCEDURE — 85027 COMPLETE CBC AUTOMATED: CPT

## 2024-10-01 PROCEDURE — 92521 EVALUATION OF SPEECH FLUENCY: CPT

## 2024-10-01 PROCEDURE — 70450 CT HEAD/BRAIN W/O DYE: CPT

## 2024-10-01 PROCEDURE — 97165 OT EVAL LOW COMPLEX 30 MIN: CPT

## 2024-10-01 PROCEDURE — 94761 N-INVAS EAR/PLS OXIMETRY MLT: CPT

## 2024-10-01 PROCEDURE — 97161 PT EVAL LOW COMPLEX 20 MIN: CPT

## 2024-10-01 PROCEDURE — 2580000003 HC RX 258: Performed by: INTERNAL MEDICINE

## 2024-10-01 PROCEDURE — 99221 1ST HOSP IP/OBS SF/LOW 40: CPT

## 2024-10-01 PROCEDURE — 2060000000 HC ICU INTERMEDIATE R&B

## 2024-10-01 PROCEDURE — 84100 ASSAY OF PHOSPHORUS: CPT

## 2024-10-01 PROCEDURE — 83735 ASSAY OF MAGNESIUM: CPT

## 2024-10-01 PROCEDURE — 97535 SELF CARE MNGMENT TRAINING: CPT

## 2024-10-01 PROCEDURE — 82962 GLUCOSE BLOOD TEST: CPT

## 2024-10-01 PROCEDURE — 80053 COMPREHEN METABOLIC PANEL: CPT

## 2024-10-01 PROCEDURE — 99222 1ST HOSP IP/OBS MODERATE 55: CPT | Performed by: INTERNAL MEDICINE

## 2024-10-01 PROCEDURE — 6370000000 HC RX 637 (ALT 250 FOR IP): Performed by: INTERNAL MEDICINE

## 2024-10-01 PROCEDURE — 6370000000 HC RX 637 (ALT 250 FOR IP): Performed by: NURSE PRACTITIONER

## 2024-10-01 RX ORDER — ASPIRIN 81 MG/1
81 TABLET, CHEWABLE ORAL DAILY
Qty: 30 TABLET | Refills: 3 | Status: SHIPPED | OUTPATIENT
Start: 2024-10-02

## 2024-10-01 RX ORDER — ATORVASTATIN CALCIUM 20 MG/1
40 TABLET, FILM COATED ORAL NIGHTLY
Status: DISCONTINUED | OUTPATIENT
Start: 2024-10-01 | End: 2024-10-02 | Stop reason: HOSPADM

## 2024-10-01 RX ORDER — ASPIRIN 81 MG/1
81 TABLET, CHEWABLE ORAL DAILY
Status: DISCONTINUED | OUTPATIENT
Start: 2024-10-01 | End: 2024-10-02 | Stop reason: HOSPADM

## 2024-10-01 RX ADMIN — PREDNISONE 2.5 MG: 5 TABLET ORAL at 23:03

## 2024-10-01 RX ADMIN — SODIUM CHLORIDE, PRESERVATIVE FREE 10 ML: 5 INJECTION INTRAVENOUS at 09:19

## 2024-10-01 RX ADMIN — INSULIN LISPRO 4 UNITS: 100 INJECTION, SOLUTION INTRAVENOUS; SUBCUTANEOUS at 09:23

## 2024-10-01 RX ADMIN — ATORVASTATIN CALCIUM 40 MG: 20 TABLET, FILM COATED ORAL at 23:03

## 2024-10-01 RX ADMIN — SODIUM CHLORIDE, PRESERVATIVE FREE 10 ML: 5 INJECTION INTRAVENOUS at 23:04

## 2024-10-01 RX ADMIN — CLOPIDOGREL BISULFATE 75 MG: 75 TABLET ORAL at 09:14

## 2024-10-01 RX ADMIN — LEVOTHYROXINE SODIUM 137 MCG: 0.11 TABLET ORAL at 06:03

## 2024-10-01 RX ADMIN — ONDANSETRON 4 MG: 4 TABLET, ORALLY DISINTEGRATING ORAL at 19:04

## 2024-10-01 RX ADMIN — INSULIN GLARGINE 14 UNITS: 100 INJECTION, SOLUTION SUBCUTANEOUS at 23:04

## 2024-10-01 RX ADMIN — INSULIN LISPRO 4 UNITS: 100 INJECTION, SOLUTION INTRAVENOUS; SUBCUTANEOUS at 13:05

## 2024-10-01 RX ADMIN — PREDNISONE 2.5 MG: 5 TABLET ORAL at 09:15

## 2024-10-01 RX ADMIN — METOPROLOL SUCCINATE 25 MG: 25 TABLET, EXTENDED RELEASE ORAL at 09:14

## 2024-10-01 RX ADMIN — ASPIRIN 81 MG: 81 TABLET, CHEWABLE ORAL at 13:05

## 2024-10-01 RX ADMIN — INSULIN LISPRO 4 UNITS: 100 INJECTION, SOLUTION INTRAVENOUS; SUBCUTANEOUS at 13:04

## 2024-10-01 RX ADMIN — FENOFIBRATE 54 MG: 54 TABLET ORAL at 09:14

## 2024-10-01 RX ADMIN — LANSOPRAZOLE 30 MG: 15 TABLET, ORALLY DISINTEGRATING, DELAYED RELEASE ORAL at 09:14

## 2024-10-01 NOTE — THERAPY EVALUATION
post neurologic event can be used to predict UE recovery at six months post neurologic event.  Severe = 0-21 points   Moderately Severe = 22-33 points   Moderate = 34-47 points   Mild = 48-66 points  HALEY Bennett, TARAN العلي, MARICEL Mueller, MAGGIE Buckley, & ELMA Thompson (1992). Measurement of motor recovery after stroke: Outcome assessment and sample size requirements. Stroke, 23, pp. 4469-8101.   --------------------------------------------------------------------------------------------------------------------------------------------------------------------  MCID:  Stroke:   (Valentino et al, 2001; n = 171; mean age 70 (11) years; assessed within 17 (12) days of stroke, Acute Stroke)  FMA Motor Scores from Admission to Discharge   10 point increase in FMA Upper Extremity = 1.5 change in discharge FIM   10 point increase in FMA Lower Extremity = 1.9 change in discharge FIM  MDC:   Stroke:   (Wallace et al, 2008, n = 14, mean age = 59.9 (14.6) years, assessed on average 14 (6.5) months post stroke, Chronic Stroke)   FMA = 5.2 points for the Upper Extremity portion of the assessment     Pain Rating:  Pt denied pain   Pain Intervention(s):   pain is at a level acceptable to the patient    Activity Tolerance:   WNL    After treatment:   Patient left in no apparent distress sitting up in chair, Call bell within reach, Caregiver / family present, and SLP and RN Present    COMMUNICATION/EDUCATION:   The patient's plan of care was discussed with: physical therapist, speech therapist, registered nurse, and physician    Patient Education  Education Given To: Patient;Family  Education Provided: Role of Therapy;Plan of Care  Education Method: Verbal  Barriers to Learning: None  Education Outcome: Verbalized understanding    Thank you for this referral.  Noemi Porter, OT  Minutes: 33

## 2024-10-01 NOTE — DISCHARGE INSTRUCTIONS
[address]  [facility] [id number] [phone]    Schedule an appointment as soon as possible for a visit in 1 week(s)        For questions regarding your Hospitalization or to contact the Hospital Medicine team, please call [phone].      Information obtained by :  I understand that if any problems occur once I am at home I am to contact my physician.    I understand and acknowledge receipt of the instructions indicated above.                                                                                                                                           Physician's or R.N.'s Signature                                                                  Date/Time                                                                                                                                              Patient or Representative Signature                                                          Date/Time

## 2024-10-01 NOTE — CONSULTS
DM consulted on Felicia Rangel, who is a 74 y.o. female with T2DM, RA, chronic systolic CHF, PAF, HTN, CAD, ICD, who was admitted with facial numbness and stuttering. Stroke work up done. Diagnosed with TIA. As for her diabetes, she states she \"never had diabetes until I started taking steroids\". This occurred about 4 years ago. She was started on Januvia 4 years ago and states that her BG stayed mostly \"in the 100s\". Over last few years has had harder time controlling BG. She now sees endocrinologist, Dr. Guzman, who started her on basal/bolus insulin (Lantus 14 units nightly/lispro SS with meals). She also has Freestyle Bakari CGM for BG monitoring and feels that her BG has remained high (200-350) \"no matter what I do\". She often skips breakfast, but will occasionally have eggs, turkey murdock, toast or oatmeal; lunch is usually a PB sandwich with a few chips; dinner is usually a meat, vegetable, and starch (such as potato, mac-n-cheese, or rice). We discussed portion control with her carbs and she emphasized that uses a small plate and only has small portions. Snacks might be cashews, celery with PB and drinks mainly coffee or tea without any sugar. She is unable to exercise due to getting SOB with too much movement.     Current A1c is 9.3%. BG yesterday in the 200s. States she took her Lantus the night before and did not eat all day.  this am with her home dose of Lantus last evening and then BG back up to 253 before lunch. For breakfast has eggs, turkey murdock, grits and fruit. Denies having issues giving her insulin (pens), rotates sites. She says on average she gives 4-6 units Humalog at lunch and 5-7 unit with dinner at home. Suspect she likely needs higher doses of insulin given her A1c and BG trends. She has upcoming appointment with Dr. Guzman on November 4th, but advised patient (and  ) to reach out to her sooner if BG still seems high.     She would likely tolerate going up to 18 units on

## 2024-10-01 NOTE — DISCHARGE SUMMARY
Physician Discharge Summary     Patient ID:  Felicia Rangel  [id number]  74 y.o.  1950    Admit date: 9/30/2024    Discharge date of service and time: 10/2/2024  Greater than 30 minutes were spent providing discharge related services for this patient    Admission Diagnoses: TIA (transient ischemic attack) [G45.9]  Right sided weakness [R53.1]    Discharge Diagnoses:    Principal Diagnosis   TIA (transient ischemic attack)                                             Hospital Course and other diagnoses  Transient ischemic attack / Right sided weakness / Expressive aphasia - POA, and prior presentation in 2020 with negative workup. Symptoms resolving.  Not a TNK candidate. Stable vitals and neuro checks. Neurology consulted.  Will order outpatient PT/OT/speech therapies.  Negative MRI brain, showing only \"chronic small vessel ischemic disease\".  She has had multiple prior ECHOs.  CT/CTA with only \"bilateral V4 stenosis\". LDL 93, trig 266. Start aspirin and continue statin. She reports bleeding with aspirin and plavix     Coronary artery disease / Hypertension / Congestive heart failure NYHA class I, chronic, systolic - POA and stable.  Was just at cardiology visit.  Continue metoprolol, hold bumex and januvia     Paroxysmal atrial fibrillation / S/P AV felicia ablation / S/P left atrial appendage ligation / Pacemaker - POA and stable.  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  SSI per protocol.  Continue home Lantus. A1c 9.3.     Rheumatoid arthritis / Osteoarthritis - Tyelnol prn. Continue prednisone     Gastroesophageal reflux disease - PPI      Hypothyroidism - Continue synthroid. Normal TSH      Hx of deep venous thrombosis - None suspected currently      Hyponatremia - Hydrated     Celiac disease / Hypoalbuminemia - Nutritional support, gluten free     Leukocytosis - POA and chronic due to steroids.      Chronic obstructive pulmonary

## 2024-10-01 NOTE — CONSULTS
[facility]: [facility]    Claudia Morgan, MSHA, CNRN, ACNP-BC  [facility]  [address]  [phone]        Name:   Felicia Rangel   Medical record #: [id number]  Admission Date: 9/30/2024       Consult requested by: Jef Díaz MD     Reason for Consult:  Stroke symptoms      HISTORY OF PRESENT ILLNESS:     This is a 74 y.o. female who is admitted for expressive aphasia and weakness.    Felicia Rangel presented to the ED on 9/30/2024 from her cardiologist office with sudden onset stuttering, left facial weakness, right arm weakness and numbness, headache.  She tells me that her symptoms started with stuttering speech followed by facial numbness followed by arm and leg weakness.  Her presenting blood pressure was 176/81 with a blood sugar of 229.  Review of admission labs shows a presenting sodium of 133, no evidence of transaminitis, urine tox negative, white blood cell count 11.6, urine unremarkable.  At the time of exam she continued to have stuttering speech but her other symptoms had resolved.  The Neurology Service is asked to evaluate for stroke.    In discussion with Mrs. Rangel she tells me that several years ago she had extensive GI bleeding for which she was referred to Dr. Serrato from oncology who told her that she was never to take aspirin or Plavix again.  After long discussion we agreed that we would ask the oncology team to review her records and opine on the bleeding risk of restarting daily aspirin.  Patient agrees that if the oncology team feels it is safe she will start daily aspirin but she will not except Plavix.    Patient was last seen by the [facility] team on 6/29/2021 by Dr. Rodriguez for altered mental status and left-sided weakness after pacemaker placement.  She was unable to take aspirin or Plavix due to GI bleeding.  At that time she had right upper extremity pronator drift and decreased sensation to light touch of the

## 2024-10-01 NOTE — CONSULTS
[facility]  [address], [address] [facility] [address]  W: [phone]  F: [phone]      Reason for Visit:   Felicia Rangel is a 74 y.o. female who is seen today for evaluation for pt told by hematologist to never take antiplatelet medication now has intracranial stenosis . Need help with risk/benefit.     Hematology/Oncology Treatment History:   Followed by [facility] in the past/ Dr Saldana Last office note dated 12/13/2016 for iron def anemia  Summarized below  At that time was off Plavix, aspirin and not on any antiplatelet drug . Was taking an iron preparation daily.   Hgb 14.0 MCV 88.5 plt 256 K ; white count runs high at 11,000. Fe saturation in the 20% range over the last 2 years. Maintaining her iron and do not believe that she is losing it now that she is off Plavix and aspirin . Do not think that Felicia needs to return here but if she were to develop further anemia would cont to follow.     History of Present Illness:   Felicia Rangel is a pleasant 74 y.o. female who was admitted on 9/30/24  after being directed to ED for stuttering, left face numbness and right sided weakness noted at cardiology appt. ED initiated code stroke.   Neurology consulted; work up with findings of intracranial stenosis and recommending ASA . We have consulted to obtain and review past hematologist notes.   Pt reports left facial numbness and rt arm weakness have resolved. Stuttering continues but has improved. This is her 3rd episode; first one was Labor Day 2023; found to have 90% blockage; stent was placed and stayed on Plavix x 30 days.; tolerated well. Second episode was on Sat and symptoms resolved ; 3 rd episode was Monday. Shares her mother had a stroke and lasted for months unable to speak. She does not want to be like that.   Hx of anemia. Was sent to Dr Saldana when her cardiologist saw her counts so low and was concerned for blood disease. At that time pt denies seeing

## 2024-10-01 NOTE — CARE COORDINATION
Care Management Initial Assessment  10/1/2024 11:41 AM  If patient is discharged prior to next notation, then this note serves as note for discharge by case management.    Reason for Admission:   TIA (transient ischemic attack) [G45.9]  Right sided weakness [R53.1]         Patient Admission Status: Inpatient  Date Admitted to [place]: 09/30/24  RUR: Readmission Risk Score: 12.9    Hospitalization in the last 30 days (Readmission):  No        Advance Care Planning:  Code Status: Full Code  Primary Healthcare Decision Maker:    Primary Decision Maker: Tommy Rangel - Spouse - [phone]   Advance Directive: has ACP     __________________________________________________________________________  Assessment:      10/01/24 1140   Service Assessment   Patient Orientation Alert and Oriented   History Provided By Medical Record   Primary Caregiver Self   Support Systems Spouse/Significant Other   Prior Functional Level Independent in ADLs/IADLs   Social/Functional History   Lives With Spouse   Discharge Planning   Patient expects to be discharged to: House   Services At/After Discharge   Transition of Care Consult (CM Consult) N/A   Services At/After Discharge None   Mode of Transport at Discharge Other (see comment)  (family)   Confirm Follow Up Transport Self         Comments: Patient will dc home today with family and OP therapy services    Discharge Concerns: []Yes [x]No []Unknown   Describe:    Financial concerns/barriers: []Yes, explain: [x]No []Unknown/Not discussed  __________________________________________________________________________    Insurer:   Active Insurance as of 9/30/2024       Primary Coverage       Payor Plan Insurance Group Employer/Plan Group    MEDICARE MEDICARE PART A AND B        Payor Address Payor Phone Number Payor Fax Number Effective Dates    [address] [address] [phone]  2/1/2007 - None Entered    [facility] [id number]         Subscriber Name Subscriber Birth Date Member ID       RANGELNATE FINNEGAN

## 2024-10-01 NOTE — SIGNIFICANT EVENT
Called to bedside to see pt for concern for possible stroke - pt was scheduled to be discharged this afternoon, when spouse was attempting to get her dressed when she started having a HA, some dysarthria- pt not able to form clear words, light hurts her eyes so she is keeping her eyes closed. Able to follow commands, able to perform all functions of eval of stroke -     Called and spoke with Mónica Morgan- advised to monitor, advised to hold treating at this time. Nursing updated on plan.

## 2024-10-01 NOTE — PLAN OF CARE
Problem: Physical Therapy - Adult  Goal: By Discharge: Performs mobility at highest level of function for planned discharge setting.  See evaluation for individualized goals.  Description: FUNCTIONAL STATUS PRIOR TO ADMISSION: Patient was modified independent using a wall support/furniture walking for functional mobility.    HOME SUPPORT PRIOR TO ADMISSION: The patient lived with spouse but did not require assistance.    Physical Therapy Goals  Initiated 10/1/2024  1.  Patient will perform sit to stand with modified independence within 7 day(s).  2.  Patient will transfer from bed to chair and chair to bed with modified independence using the least restrictive device within 7 day(s).  3.  Patient will ambulate with modified independence for 150 feet with the least restrictive device within 7 day(s).   4.  Patient will ascend/descend 6 stairs with b/l handrail(s) with modified independence within 7 day(s).   Outcome: Progressing    PHYSICAL THERAPY EVALUATION    Patient: Felicia Rangel (74 y.o. female)  Date: 10/1/2024  Primary Diagnosis: TIA (transient ischemic attack) [G45.9]  Right sided weakness [R53.1]       Precautions: Restrictions/Precautions: Fall Risk                      ASSESSMENT :   DEFICITS/IMPAIRMENTS:   The patient is limited by decreased functional mobility, strength, activity tolerance, endurance, balance. Pt admitted on  on 9/30/24 for stuttering, left face numbness and right side weakness. MRI brain negative for any acute intracranial abnormality. Pt reports 2 falls in past 3 months.    Based on the impairments listed above, pt presents with decreased activity tolerance, elevated HR and SOB with mobility. Pt with intermittent stuttering, R ankle weakness (from prior surgeries, per pt report), coordination intact and no c/o numbness at this time. Overall, pt requires SBA/CGA for transfers, is able to ambulate - 100' with no AD, reaching for the wall rail for support, CGA, LOB x1, required

## 2024-10-02 ENCOUNTER — TELEPHONE (OUTPATIENT)
Facility: HOSPITAL | Age: 74
End: 2024-10-02

## 2024-10-02 VITALS
OXYGEN SATURATION: 94 % | TEMPERATURE: 97.7 F | HEART RATE: 81 BPM | WEIGHT: 176.8 LBS | RESPIRATION RATE: 16 BRPM | BODY MASS INDEX: 31.33 KG/M2 | SYSTOLIC BLOOD PRESSURE: 120 MMHG | HEIGHT: 63 IN | DIASTOLIC BLOOD PRESSURE: 64 MMHG

## 2024-10-02 LAB
GLUCOSE BLD STRIP.AUTO-MCNC: 181 MG/DL (ref 65–117)
SERVICE CMNT-IMP: ABNORMAL

## 2024-10-02 PROCEDURE — 6370000000 HC RX 637 (ALT 250 FOR IP): Performed by: NURSE PRACTITIONER

## 2024-10-02 PROCEDURE — 2580000003 HC RX 258: Performed by: INTERNAL MEDICINE

## 2024-10-02 PROCEDURE — 82962 GLUCOSE BLOOD TEST: CPT

## 2024-10-02 PROCEDURE — 6370000000 HC RX 637 (ALT 250 FOR IP): Performed by: INTERNAL MEDICINE

## 2024-10-02 PROCEDURE — 6370000000 HC RX 637 (ALT 250 FOR IP)

## 2024-10-02 RX ORDER — INSULIN GLARGINE 100 [IU]/ML
18 INJECTION, SOLUTION SUBCUTANEOUS NIGHTLY
Status: DISCONTINUED | OUTPATIENT
Start: 2024-10-02 | End: 2024-10-02 | Stop reason: HOSPADM

## 2024-10-02 RX ORDER — INSULIN LISPRO 100 [IU]/ML
6 INJECTION, SOLUTION INTRAVENOUS; SUBCUTANEOUS
Status: DISCONTINUED | OUTPATIENT
Start: 2024-10-02 | End: 2024-10-02 | Stop reason: HOSPADM

## 2024-10-02 RX ADMIN — SODIUM CHLORIDE, PRESERVATIVE FREE 10 ML: 5 INJECTION INTRAVENOUS at 08:07

## 2024-10-02 RX ADMIN — PREDNISONE 2.5 MG: 5 TABLET ORAL at 08:06

## 2024-10-02 RX ADMIN — ASPIRIN 81 MG: 81 TABLET, CHEWABLE ORAL at 08:05

## 2024-10-02 RX ADMIN — LANSOPRAZOLE 30 MG: 15 TABLET, ORALLY DISINTEGRATING, DELAYED RELEASE ORAL at 08:05

## 2024-10-02 RX ADMIN — METOPROLOL SUCCINATE 25 MG: 25 TABLET, EXTENDED RELEASE ORAL at 08:07

## 2024-10-02 RX ADMIN — LEVOTHYROXINE SODIUM 137 MCG: 0.11 TABLET ORAL at 06:34

## 2024-10-02 RX ADMIN — ACETAMINOPHEN 650 MG: 325 TABLET ORAL at 06:46

## 2024-10-02 RX ADMIN — INSULIN LISPRO 6 UNITS: 100 INJECTION, SOLUTION INTRAVENOUS; SUBCUTANEOUS at 08:08

## 2024-10-02 RX ADMIN — FENOFIBRATE 54 MG: 54 TABLET ORAL at 08:06

## 2024-10-02 ASSESSMENT — PAIN DESCRIPTION - DESCRIPTORS: DESCRIPTORS: ACHING

## 2024-10-02 ASSESSMENT — PAIN SCALES - GENERAL: PAINLEVEL_OUTOF10: 2

## 2024-10-02 ASSESSMENT — PAIN DESCRIPTION - LOCATION: LOCATION: HEAD

## 2024-10-02 NOTE — PROGRESS NOTES
Physician Progress Note      PATIENT:               NATE DAS  CSN #:                  [id number]  :                       1950  ADMIT DATE:       2024 2:32 PM  DISCH DATE:  RESPONDING  PROVIDER #:        Jef Díaz MD          QUERY TEXT:    Pt presented with right sided weakness and expressive aphasia and admitted   with TIA. Please document in progress notes and discharge summary if you are   evaluating and /or treating any of the following:    The medical record reflects the following:  Risk Factors: Paroxysmal atrial fibrillation, Pacemaker, T2DM    Clinical Indicators:  LDL 93, trig 266, CT/CTA with only \"bilateral V4 stenosis\" (Discharge summary)    brain MRI: No restricted diffusion to suggest acute infarction.   Periventricular and subcortical white matter signal abnormalities are   consistent with chronic small vessel ischemic disease. The ventricles and   sulci are appropriate for age. The main intracranial arterial flow-voids are   normal. No hemorrhage.    Treatment: CT/CTA, Brain MRI, Will order outpatient PT/OT/speech therapies      Thank you,  Montse Duque RN, CDI, CCDS  Options provided:  -- TIA due to, please document etiology  -- Other - I will add my own diagnosis  -- Disagree - Not applicable / Not valid  -- Disagree - Clinically unable to determine / Unknown  -- Refer to Clinical Documentation Reviewer    PROVIDER RESPONSE TEXT:    This patient had a TIA due to unknown cause    Query created by: MONTSE DUQUE on 10/1/2024 11:27 AM      Electronically signed by:  Jef Díaz MD 10/1/2024 11:29 AM          
  [facility]    Hospitalist Progress Note    NAME: Felicia Rangel   :  1950  MRM:  [id number]    Date/Time of service 10/1/2024  7:18 AM    To assist coordination of care and communication with nursing and staff, this note may be preliminary early in the day, but finalized by end of the day.        Assessment and Plan:     Transient ischemic attack / Right sided weakness / Expressive aphasia - POA, and prior presentation in  with negative workup. Symptoms resolving.  Not a TNK candidate. Stable vitals and neuro checks. Neurology consulted.  Fall precautions. PT/OT/speech evaluation.  Negative MRI brain, showing only \"chronic small vessel ischemic disease\".  She has had prior ECHO.  CT/CTA with only \"bilateral V4 stenosis\". LDL 93, trig 266. Start plavix and statin. She reports multiple confounding allergies.    Coronary artery disease / Hypertension / Congestive heart failure NYHA class I, chronic, systolic - POA and stable.  Was just at cardiology visit.  Continue metoprolol, hold bumex and januvia    Paroxysmal atrial fibrillation / S/P AV felicia ablation / S/P left atrial appendage ligation / Pacemaker - POA and stable.  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  SSI per protocol.  Continue home Lantus. A1c 9.3.    Rheumatoid arthritis / Osteoarthritis - Tyelnol prn. Continue prednisone    Gastroesophageal reflux disease - PPI      Hypothyroidism - Continue synthroid. Normal TSH      Hx of deep venous thrombosis - None suspected currently      Hyponatremia - Hydrated    Celiac disease / Hypoalbuminemia - Nutritional support, gluten free    Leukocytosis - POA and chronic due to steroids.      Chronic obstructive pulmonary disease / Asthma - POA and stable. Prn duoneb       Subjective:     Chief Complaint:  stuttering    ROS:  (bold if positive, if negative)    Tolerating PT  Tolerating Diet      
@1945: NP at bedside to evaluate pt for possible neuro status change. Nursing handoff completed and pt fully assessed. Pt is a/ox4, follow all commands and moves all extremities. NIH complete. Pt showing expressive aphasia with a stutter and reports black spots in her vision. Pt is V Paced and normotensive. All lines and drains intact. Pt taken down for Head CT with no acute events. Upon returning to room, pt reports symptoms improved.     @0000: No acute changes. Pt stutter remains present. All other symptoms resolved. All lines and drains intact.    @0400: No acute changes.  All lines and drains intact.   
Bedside and Verbal shift change report given to JOHNNIE Villagran (oncoming nurse) by JOHNNIE Alexis (offgoing nurse). Report included the following information Nurse Handoff Report, Index, Adult Overview, Intake/Output, MAR, and Recent Results.     1840 - Patient getting dressed to leave, began feeling lightheaded/dizzy. RN assisted patient back in bed. Patient stating she is feeling better, but is \"hot.\" Vitals and BG obtained - /62, HR 87, 02 93, . Dr. Díaz notified. Per MD, have patient drink fluids and discharge in an hour.     1906 - Patient stuttering on/off throughout shift, stuttering worse at this time. Patient with eyes closed in bed, speaking, stating \"no\" when asked if feeling drowsy. Family expressing concern. RRT RN to come to bedside to assess patient. Ann العلي NP also notified and to come to bedside.     Bedside and Verbal shift change report given to JOHNNIE Hernandez (oncoming nurse) by JOHNNIE Rangel (offgoing nurse). Report included the following information Nurse Handoff Report, Index, Adult Overview, Intake/Output, MAR, and Recent Results.     
Day primary nurse reached out to RRT for pt neuro assessment regarding family concerns of increased generalized weakness, trouble getting dressed and increased stutter speech pattern and nausea. Pt alert, oriented, skin warm and dry, good color, strong upper ext equally, no drift, good leg strength equally no drift, facial symmetry noted. Provider S Severiano notified due to pt being d/c'd and family concerns about safety going home with change. Plan to cancel d/c, head ct and place back on telemetry. New primary nurse at bedside.  Family encouraged to reach out with further concerns.  
MRI notified of completed screening  
Pacemaker Information    Device CRT-P:  Percepta Quad CRT-P [id number] S/N: [id number]     Left Ventricle Lead: [employer] 4598 Attain Performa S MRI SureScan (2021-06-28)   Right Atrium Lead: [employer] 5076 CapSureFix Novus MRI SureScan (2020-11-19)   Right Ventricle Lead:  [employer] 5076 CapSureFix Novus MRI SureScan (2020-11-19)    Remote transmission last received 9/9/24 demonstrated normal device function with estimated longevity of 1.6 years. No arrhythmias noted. AP 98.1%, effective Biventricular pacing 98.8%.      Device is MRI compatible. CXR reviewed, no abandon lead(s) noted.   
Pt rounding with provider.  Pt sleeping upon entering room, pt alerts and is oriented, skin warm and dry, good color. Intermittent stutter noted. Pt reports L sided facial numbness, R shoulder discomfort but R arm and R leg numbness has resolved. MRI results reviewed with no acute abnormality.  S/W primary nurse regarding findings with provider.    
Read and reviewed discharge instructions with patient and patients family. Patient and patients family demonstrate understanding of discharge instructions.     Stroke Education provided to patient and the following topics were discussed    1. Patients personal risk factors for stroke are atrial fibrillation and hypertension    2. Warning signs of Stroke:        * Sudden numbness or weakness of the face, arm or leg, especially on one side of          The body            * Sudden confusion, trouble speaking or understanding        * Sudden trouble seeing in one or both eyes        * Sudden trouble walking, dizziness, loss of balance or coordination        * Sudden severe headache with no known cause      3. Importance of activation Emergency Medical Services ( 9-1-1 ) immediately if experience any warning signs of stroke.    4. Be sure and schedule a follow-up appointment with your primary care doctor or any specialists as instructed.     6.  Smoking and second-hand smoke greatly increase your risk of stroke, cardiovascular disease and death. Smoking history never    7. Information provided was [facility] Stroke Education Binder, Stroke Handouts, or Verbal Education    8. Documentation of teaching completed in Patient Education Activity and on Care Plan with teaching response noted?  yes    
Speech LAnguage Pathology EVALUATION/DISCHARGE    Patient: Felicia Rangel (74 y.o. female)  Date: 10/1/2024  Primary Diagnosis: TIA (transient ischemic attack) [G45.9]  Right sided weakness [R53.1]       Precautions:  Fall Risk                  ASSESSMENT :  Patient with mild-moderate dysfluency characterized by part-word repetitions, most noticeably with /b/, /m/, and /t/ sounds. Patient with excellent awareness as this has occurred in the past, and independently verbalized and demonstrated use of strategies, including slow rate of speech, pausing and starting again, and pausing and using a different word with easier sounds. Discussed OP SLP treatment at discharge, however patient confident that deficits will resolve as they have in the past and SLP treatment will not be needed. Note MRI negative.     Patient will be discharged from skilled speech-language pathology services at this time.     PLAN :  Recommendations and Planned Interventions:  OP SLP for dysfluency if it does not resolve       Acute SLP Services: No, patient will be discharged from acute skilled speech-language pathology at this time.  Discharge Recommendations: Yes, recommend SLP treatment at next level of care     SUBJECTIVE:   Patient stated, “This has happened before.”    OBJECTIVE:     Past Medical History:   Diagnosis Date    Arthritis     Atrial fibrillation (HCC)     CAD (coronary artery disease)     Celiac disease     CHF (congestive heart failure), NYHA class I, chronic, systolic (HCC)     Chronic obstructive pulmonary disease (HCC)     Diastolic heart failure (HCC)     GERD (gastroesophageal reflux disease)     Hx of deep venous thrombosis     Hypertension     Hypothyroidism     Iron deficiency anemia     Lyme disease     Pacemaker     11/20    Rheumatoid arthritis (HCC)     S/P AV felicia ablation     11/20     S/P left atrial appendage ligation     RUSTY clip 10/20      Past Surgical History:   Procedure Laterality Date    ABLATION OF 
spent with patient: 30 Minutes I personally reviewed chart, notes, data and current medications in the medical record.  I have personally examined and treated the patient at bedside during this period.                  Care Plan discussed with: Patient, Care Manager, Nursing Staff, Consultant/Specialist, and >50% of time spent in counseling and coordination of care    Discussed:  Care Plan and D/C Planning    Prophylaxis:  Lovenox and H2B/PPI    Disposition:  Home w/Family           ___________________________________________________    Attending Physician: Jef Díaz MD

## 2024-10-02 NOTE — TELEPHONE ENCOUNTER
Pt needs a hospital follow up appointment  Provider: Poppy Boykin NP  When: 8-12 weeks  Diagnosis/reason for follow up:  TIA, intracranial stenosis

## 2024-10-02 NOTE — CARE COORDINATION
Care Management Discharge Note:      10/02/24 0946   Services At/After Discharge   Transition of Care Consult (CM Consult) Other  (OP therapy)   Services At/After Discharge PT;Outpatient   Mode of Transport at Discharge Other (see comment)  (family)   Confirm Follow Up Transport Self     Patient with dc orders. Recs for OP therapy at dc. Family will transport at time of discharge.  ______________________  Dalia MURPHY, RN  Care Management  10/2/2024

## 2024-10-17 ENCOUNTER — ANCILLARY PROCEDURE (OUTPATIENT)
Age: 74
End: 2024-10-17
Payer: MEDICARE

## 2024-10-17 VITALS
HEART RATE: 82 BPM | WEIGHT: 176 LBS | HEIGHT: 62 IN | SYSTOLIC BLOOD PRESSURE: 120 MMHG | BODY MASS INDEX: 32.39 KG/M2 | DIASTOLIC BLOOD PRESSURE: 72 MMHG

## 2024-10-17 DIAGNOSIS — R07.9 CHEST PAIN, UNSPECIFIED TYPE: ICD-10-CM

## 2024-10-17 DIAGNOSIS — R06.02 SHORTNESS OF BREATH: ICD-10-CM

## 2024-10-17 LAB
ECHO AO ROOT DIAM: 3.5 CM
ECHO AO ROOT INDEX: 1.93 CM/M2
ECHO AV MEAN GRADIENT: 3 MMHG
ECHO AV MEAN VELOCITY: 0.8 M/S
ECHO AV PEAK GRADIENT: 5 MMHG
ECHO AV PEAK VELOCITY: 1.1 M/S
ECHO AV VELOCITY RATIO: 0.82
ECHO AV VTI: 19.9 CM
ECHO BSA: 1.87 M2
ECHO EST RA PRESSURE: 3 MMHG
ECHO LA DIAMETER INDEX: 1.88 CM/M2
ECHO LA DIAMETER: 3.4 CM
ECHO LA TO AORTIC ROOT RATIO: 0.97
ECHO LA VOL A-L A2C: 48 ML (ref 22–52)
ECHO LA VOL A-L A4C: 48 ML (ref 22–52)
ECHO LA VOL BP: 45 ML (ref 22–52)
ECHO LA VOL MOD A2C: 47 ML (ref 22–52)
ECHO LA VOL MOD A4C: 44 ML (ref 22–52)
ECHO LA VOL/BSA BIPLANE: 25 ML/M2 (ref 16–34)
ECHO LA VOLUME AREA LENGTH: 48 ML
ECHO LA VOLUME INDEX A-L A2C: 27 ML/M2 (ref 16–34)
ECHO LA VOLUME INDEX A-L A4C: 27 ML/M2 (ref 16–34)
ECHO LA VOLUME INDEX AREA LENGTH: 27 ML/M2 (ref 16–34)
ECHO LA VOLUME INDEX MOD A2C: 26 ML/M2 (ref 16–34)
ECHO LA VOLUME INDEX MOD A4C: 24 ML/M2 (ref 16–34)
ECHO LV E' LATERAL VELOCITY: 9.5 CM/S
ECHO LV E' SEPTAL VELOCITY: 6.5 CM/S
ECHO LV EDV A2C: 63 ML
ECHO LV EDV A4C: 62 ML
ECHO LV EDV BP: 63 ML (ref 56–104)
ECHO LV EDV INDEX A4C: 34 ML/M2
ECHO LV EDV INDEX BP: 35 ML/M2
ECHO LV EDV NDEX A2C: 35 ML/M2
ECHO LV EF PHYSICIAN: 50 %
ECHO LV EJECTION FRACTION A2C: 44 %
ECHO LV EJECTION FRACTION A4C: 53 %
ECHO LV ESV A2C: 35 ML
ECHO LV ESV A4C: 29 ML
ECHO LV ESV BP: 33 ML (ref 19–49)
ECHO LV ESV INDEX A2C: 19 ML/M2
ECHO LV ESV INDEX A4C: 16 ML/M2
ECHO LV ESV INDEX BP: 18 ML/M2
ECHO LV FRACTIONAL SHORTENING: 28 % (ref 28–44)
ECHO LV INTERNAL DIMENSION DIASTOLE INDEX: 3.2 CM/M2
ECHO LV INTERNAL DIMENSION DIASTOLIC: 5.8 CM (ref 3.9–5.3)
ECHO LV INTERNAL DIMENSION SYSTOLIC INDEX: 2.32 CM/M2
ECHO LV INTERNAL DIMENSION SYSTOLIC: 4.2 CM
ECHO LV IVSD: 1.1 CM (ref 0.6–0.9)
ECHO LV MASS 2D: 280.4 G (ref 67–162)
ECHO LV MASS INDEX 2D: 154.9 G/M2 (ref 43–95)
ECHO LV POSTERIOR WALL DIASTOLIC: 1.2 CM (ref 0.6–0.9)
ECHO LV RELATIVE WALL THICKNESS RATIO: 0.41
ECHO LVOT AV VTI INDEX: 0.74
ECHO LVOT MEAN GRADIENT: 2 MMHG
ECHO LVOT PEAK GRADIENT: 3 MMHG
ECHO LVOT PEAK VELOCITY: 0.9 M/S
ECHO LVOT VTI: 14.7 CM
ECHO MV A VELOCITY: 0.87 M/S
ECHO MV AREA PHT: 4.2 CM2
ECHO MV E DECELERATION TIME (DT): 182.3 MS
ECHO MV E VELOCITY: 0.57 M/S
ECHO MV E/A RATIO: 0.66
ECHO MV E/E' LATERAL: 6
ECHO MV E/E' RATIO (AVERAGED): 7.38
ECHO MV E/E' SEPTAL: 8.77
ECHO MV PRESSURE HALF TIME (PHT): 52.9 MS
ECHO RIGHT VENTRICULAR SYSTOLIC PRESSURE (RVSP): 31 MMHG
ECHO RV FREE WALL PEAK S': 10.2 CM/S
ECHO RV INTERNAL DIMENSION: 3.1 CM
ECHO RV TAPSE: 1.1 CM (ref 1.7–?)
ECHO TV REGURGITANT MAX VELOCITY: 2.63 M/S
ECHO TV REGURGITANT PEAK GRADIENT: 28 MMHG

## 2024-10-17 PROCEDURE — 93306 TTE W/DOPPLER COMPLETE: CPT | Performed by: SPECIALIST

## 2024-10-18 ENCOUNTER — PATIENT MESSAGE (OUTPATIENT)
Age: 74
End: 2024-10-18

## 2024-10-23 RX ORDER — NITROGLYCERIN 0.1MG/HR
1 PATCH, TRANSDERMAL 24 HOURS TRANSDERMAL DAILY
Qty: 90 PATCH | Refills: 1 | Status: SHIPPED | OUTPATIENT
Start: 2024-10-23

## 2024-10-23 NOTE — TELEPHONE ENCOUNTER
Refill per VO of Dr. Cintron  Last appt: 8/29/2024    Future Appointments   Date Time Provider Department [place]   10/28/2024 10:15 AM [facility]   11/25/2024 10:00 AM Poppy Boykin, APRN - CNP [facility] BS AMB   2/28/2025  9:20 AM Robbin Cintron MD [facility] BS AMB   10/8/2025 11:20 AM PACEMAKER, STFRANCES [facility] BS AMB   10/8/2025 11:40 AM Harrison Townsend MD [facility] BS AMB       Requested Prescriptions     Signed Prescriptions Disp Refills    nitroGLYCERIN (NITRODUR) 0.1 MG/HR 90 patch 1     Sig: Place 1 patch onto the skin daily     Authorizing Provider: ROBBIN CINTRON     Ordering User: REY MACDONALD

## 2024-10-28 ENCOUNTER — HOSPITAL ENCOUNTER (OUTPATIENT)
Facility: HOSPITAL | Age: 74
Discharge: HOME | End: 2024-10-31
Payer: MEDICARE

## 2024-10-28 VITALS — BODY MASS INDEX: 32.19 KG/M2 | WEIGHT: 176 LBS

## 2024-10-28 DIAGNOSIS — Z12.31 VISIT FOR SCREENING MAMMOGRAM: ICD-10-CM

## 2024-10-28 PROCEDURE — 77063 BREAST TOMOSYNTHESIS BI: CPT

## 2024-11-28 ENCOUNTER — APPOINTMENT (OUTPATIENT)
Facility: HOSPITAL | Age: 74
DRG: 092 | End: 2024-11-28
Payer: MEDICARE

## 2024-11-28 ENCOUNTER — HOSPITAL ENCOUNTER (INPATIENT)
Facility: HOSPITAL | Age: 74
LOS: 5 days | Discharge: HOME | DRG: 092 | End: 2024-12-03
Attending: EMERGENCY MEDICINE | Admitting: STUDENT IN AN ORGANIZED HEALTH CARE EDUCATION/TRAINING PROGRAM
Payer: MEDICARE

## 2024-11-28 DIAGNOSIS — I63.9 CEREBROVASCULAR ACCIDENT (CVA), UNSPECIFIED MECHANISM (HCC): ICD-10-CM

## 2024-11-28 DIAGNOSIS — R47.9 DIFFICULTY SPEAKING: Primary | ICD-10-CM

## 2024-11-28 PROBLEM — R47.1 DYSARTHRIA: Status: ACTIVE | Noted: 2024-11-28

## 2024-11-28 LAB
ALBUMIN SERPL-MCNC: 3.2 G/DL (ref 3.5–5)
ALBUMIN/GLOB SERPL: 0.9 (ref 1.1–2.2)
ALP SERPL-CCNC: 99 U/L (ref 45–117)
ALT SERPL-CCNC: 20 U/L (ref 12–78)
ANION GAP SERPL CALC-SCNC: 8 MMOL/L (ref 2–12)
APPEARANCE UR: CLEAR
AST SERPL-CCNC: 17 U/L (ref 15–37)
BACTERIA URNS QL MICRO: NEGATIVE /HPF
BASOPHILS # BLD: 0.1 K/UL (ref 0–0.1)
BASOPHILS NFR BLD: 1 % (ref 0–1)
BILIRUB SERPL-MCNC: 0.4 MG/DL (ref 0.2–1)
BILIRUB UR QL: NEGATIVE
BUN SERPL-MCNC: 13 MG/DL (ref 6–20)
BUN/CREAT SERPL: 16 (ref 12–20)
CALCIUM SERPL-MCNC: 9.2 MG/DL (ref 8.5–10.1)
CHLORIDE SERPL-SCNC: 102 MMOL/L (ref 97–108)
CO2 SERPL-SCNC: 31 MMOL/L (ref 21–32)
COLOR UR: ABNORMAL
COMMENT:: NORMAL
CREAT SERPL-MCNC: 0.8 MG/DL (ref 0.55–1.02)
DIFFERENTIAL METHOD BLD: ABNORMAL
EOSINOPHIL # BLD: 0.2 K/UL (ref 0–0.4)
EOSINOPHIL NFR BLD: 2 % (ref 0–7)
EPITH CASTS URNS QL MICRO: ABNORMAL /LPF
ERYTHROCYTE [DISTWIDTH] IN BLOOD BY AUTOMATED COUNT: 12.6 % (ref 11.5–14.5)
GLOBULIN SER CALC-MCNC: 3.5 G/DL (ref 2–4)
GLUCOSE BLD STRIP.AUTO-MCNC: 165 MG/DL (ref 65–117)
GLUCOSE BLD STRIP.AUTO-MCNC: 169 MG/DL (ref 65–117)
GLUCOSE BLD STRIP.AUTO-MCNC: 172 MG/DL (ref 65–117)
GLUCOSE SERPL-MCNC: 178 MG/DL (ref 65–100)
GLUCOSE UR STRIP.AUTO-MCNC: NEGATIVE MG/DL
HCT VFR BLD AUTO: 44.5 % (ref 35–47)
HGB BLD-MCNC: 14.8 G/DL (ref 11.5–16)
HGB UR QL STRIP: NEGATIVE
IMM GRANULOCYTES # BLD AUTO: 0.1 K/UL (ref 0–0.04)
IMM GRANULOCYTES NFR BLD AUTO: 1 % (ref 0–0.5)
INR PPP: 1 (ref 0.9–1.1)
KETONES UR QL STRIP.AUTO: NEGATIVE MG/DL
LEUKOCYTE ESTERASE UR QL STRIP.AUTO: NEGATIVE
LYMPHOCYTES # BLD: 2 K/UL (ref 0.8–3.5)
LYMPHOCYTES NFR BLD: 15 % (ref 12–49)
MCH RBC QN AUTO: 30.9 PG (ref 26–34)
MCHC RBC AUTO-ENTMCNC: 33.3 G/DL (ref 30–36.5)
MCV RBC AUTO: 92.9 FL (ref 80–99)
MONOCYTES # BLD: 1.2 K/UL (ref 0–1)
MONOCYTES NFR BLD: 10 % (ref 5–13)
NEUTS SEG # BLD: 9.3 K/UL (ref 1.8–8)
NEUTS SEG NFR BLD: 71 % (ref 32–75)
NITRITE UR QL STRIP.AUTO: NEGATIVE
NRBC # BLD: 0 K/UL (ref 0–0.01)
NRBC BLD-RTO: 0 PER 100 WBC
PH UR STRIP: 7 (ref 5–8)
PLATELET # BLD AUTO: 209 K/UL (ref 150–400)
PMV BLD AUTO: 10.2 FL (ref 8.9–12.9)
POTASSIUM SERPL-SCNC: 4.1 MMOL/L (ref 3.5–5.1)
PROT SERPL-MCNC: 6.7 G/DL (ref 6.4–8.2)
PROT UR STRIP-MCNC: NEGATIVE MG/DL
PROTHROMBIN TIME: 9.9 SEC (ref 9–11.1)
RBC # BLD AUTO: 4.79 M/UL (ref 3.8–5.2)
RBC #/AREA URNS HPF: ABNORMAL /HPF (ref 0–5)
SERVICE CMNT-IMP: ABNORMAL
SODIUM SERPL-SCNC: 141 MMOL/L (ref 136–145)
SP GR UR REFRACTOMETRY: >1.03 (ref 1–1.03)
SPECIMEN HOLD: NORMAL
TROPONIN I SERPL HS-MCNC: 9 NG/L (ref 0–51)
URINE CULTURE IF INDICATED: ABNORMAL
UROBILINOGEN UR QL STRIP.AUTO: 0.2 EU/DL (ref 0.2–1)
WBC # BLD AUTO: 13 K/UL (ref 3.6–11)
WBC URNS QL MICRO: ABNORMAL /HPF (ref 0–4)

## 2024-11-28 PROCEDURE — 2060000000 HC ICU INTERMEDIATE R&B

## 2024-11-28 PROCEDURE — 70496 CT ANGIOGRAPHY HEAD: CPT

## 2024-11-28 PROCEDURE — 84484 ASSAY OF TROPONIN QUANT: CPT

## 2024-11-28 PROCEDURE — 6370000000 HC RX 637 (ALT 250 FOR IP): Performed by: STUDENT IN AN ORGANIZED HEALTH CARE EDUCATION/TRAINING PROGRAM

## 2024-11-28 PROCEDURE — 2580000003 HC RX 258: Performed by: STUDENT IN AN ORGANIZED HEALTH CARE EDUCATION/TRAINING PROGRAM

## 2024-11-28 PROCEDURE — 85610 PROTHROMBIN TIME: CPT

## 2024-11-28 PROCEDURE — 6370000000 HC RX 637 (ALT 250 FOR IP): Performed by: NURSE PRACTITIONER

## 2024-11-28 PROCEDURE — 81001 URINALYSIS AUTO W/SCOPE: CPT

## 2024-11-28 PROCEDURE — 85025 COMPLETE CBC W/AUTO DIFF WBC: CPT

## 2024-11-28 PROCEDURE — 80053 COMPREHEN METABOLIC PANEL: CPT

## 2024-11-28 PROCEDURE — 36415 COLL VENOUS BLD VENIPUNCTURE: CPT

## 2024-11-28 PROCEDURE — 99285 EMERGENCY DEPT VISIT HI MDM: CPT

## 2024-11-28 PROCEDURE — 70450 CT HEAD/BRAIN W/O DYE: CPT

## 2024-11-28 PROCEDURE — 6360000002 HC RX W HCPCS: Performed by: STUDENT IN AN ORGANIZED HEALTH CARE EDUCATION/TRAINING PROGRAM

## 2024-11-28 PROCEDURE — 82962 GLUCOSE BLOOD TEST: CPT

## 2024-11-28 PROCEDURE — 93005 ELECTROCARDIOGRAM TRACING: CPT | Performed by: EMERGENCY MEDICINE

## 2024-11-28 PROCEDURE — 6360000004 HC RX CONTRAST MEDICATION: Performed by: EMERGENCY MEDICINE

## 2024-11-28 RX ORDER — ASPIRIN 81 MG/1
81 TABLET, CHEWABLE ORAL DAILY
Status: DISCONTINUED | OUTPATIENT
Start: 2024-11-28 | End: 2024-12-03 | Stop reason: HOSPADM

## 2024-11-28 RX ORDER — METOPROLOL SUCCINATE 25 MG/1
25 TABLET, EXTENDED RELEASE ORAL DAILY
Status: DISCONTINUED | OUTPATIENT
Start: 2024-11-29 | End: 2024-12-03 | Stop reason: HOSPADM

## 2024-11-28 RX ORDER — CLOPIDOGREL BISULFATE 75 MG/1
75 TABLET ORAL DAILY
Status: DISCONTINUED | OUTPATIENT
Start: 2024-11-28 | End: 2024-11-28

## 2024-11-28 RX ORDER — ATORVASTATIN CALCIUM 20 MG/1
80 TABLET, FILM COATED ORAL NIGHTLY
Status: DISCONTINUED | OUTPATIENT
Start: 2024-11-28 | End: 2024-12-03 | Stop reason: HOSPADM

## 2024-11-28 RX ORDER — FENOFIBRATE 54 MG/1
54 TABLET ORAL DAILY
Status: DISCONTINUED | OUTPATIENT
Start: 2024-11-29 | End: 2024-12-03 | Stop reason: HOSPADM

## 2024-11-28 RX ORDER — ACETAMINOPHEN 325 MG/1
650 TABLET ORAL EVERY 4 HOURS PRN
Status: DISCONTINUED | OUTPATIENT
Start: 2024-11-28 | End: 2024-12-03 | Stop reason: HOSPADM

## 2024-11-28 RX ORDER — ATORVASTATIN CALCIUM 10 MG/1
10 TABLET, FILM COATED ORAL DAILY
Status: DISCONTINUED | OUTPATIENT
Start: 2024-11-28 | End: 2024-11-28

## 2024-11-28 RX ORDER — ONDANSETRON 2 MG/ML
4 INJECTION INTRAMUSCULAR; INTRAVENOUS EVERY 6 HOURS PRN
Status: DISCONTINUED | OUTPATIENT
Start: 2024-11-28 | End: 2024-12-03 | Stop reason: HOSPADM

## 2024-11-28 RX ORDER — IOPAMIDOL 755 MG/ML
100 INJECTION, SOLUTION INTRAVASCULAR
Status: COMPLETED | OUTPATIENT
Start: 2024-11-28 | End: 2024-11-28

## 2024-11-28 RX ORDER — ARTIFICIAL TEARS 1; 2; 3 MG/ML; MG/ML; MG/ML
1 SOLUTION/ DROPS OPHTHALMIC DAILY
Status: DISCONTINUED | OUTPATIENT
Start: 2024-11-29 | End: 2024-12-03 | Stop reason: HOSPADM

## 2024-11-28 RX ORDER — LIDOCAINE 4 G/G
1 PATCH TOPICAL DAILY
Status: DISCONTINUED | OUTPATIENT
Start: 2024-11-28 | End: 2024-11-30

## 2024-11-28 RX ORDER — IPRATROPIUM BROMIDE AND ALBUTEROL SULFATE 2.5; .5 MG/3ML; MG/3ML
1 SOLUTION RESPIRATORY (INHALATION)
Status: DISCONTINUED | OUTPATIENT
Start: 2024-11-28 | End: 2024-11-29

## 2024-11-28 RX ORDER — ENOXAPARIN SODIUM 100 MG/ML
40 INJECTION SUBCUTANEOUS DAILY
Status: DISCONTINUED | OUTPATIENT
Start: 2024-11-28 | End: 2024-12-03 | Stop reason: HOSPADM

## 2024-11-28 RX ORDER — BUMETANIDE 1 MG/1
1 TABLET ORAL DAILY
Status: DISCONTINUED | OUTPATIENT
Start: 2024-11-28 | End: 2024-11-29

## 2024-11-28 RX ORDER — SODIUM CHLORIDE 9 MG/ML
INJECTION, SOLUTION INTRAVENOUS PRN
Status: DISCONTINUED | OUTPATIENT
Start: 2024-11-28 | End: 2024-12-03 | Stop reason: HOSPADM

## 2024-11-28 RX ORDER — INSULIN GLARGINE 100 [IU]/ML
14 INJECTION, SOLUTION SUBCUTANEOUS NIGHTLY
Status: DISCONTINUED | OUTPATIENT
Start: 2024-11-28 | End: 2024-12-03 | Stop reason: HOSPADM

## 2024-11-28 RX ORDER — INSULIN LISPRO 100 [IU]/ML
0-8 INJECTION, SOLUTION INTRAVENOUS; SUBCUTANEOUS
Status: DISCONTINUED | OUTPATIENT
Start: 2024-11-28 | End: 2024-12-03 | Stop reason: HOSPADM

## 2024-11-28 RX ORDER — ONDANSETRON 4 MG/1
4 TABLET, ORALLY DISINTEGRATING ORAL EVERY 8 HOURS PRN
Status: DISCONTINUED | OUTPATIENT
Start: 2024-11-28 | End: 2024-12-03 | Stop reason: HOSPADM

## 2024-11-28 RX ORDER — SODIUM CHLORIDE 0.9 % (FLUSH) 0.9 %
5-40 SYRINGE (ML) INJECTION PRN
Status: DISCONTINUED | OUTPATIENT
Start: 2024-11-28 | End: 2024-12-03 | Stop reason: HOSPADM

## 2024-11-28 RX ORDER — SODIUM CHLORIDE 0.9 % (FLUSH) 0.9 %
5-40 SYRINGE (ML) INJECTION EVERY 12 HOURS SCHEDULED
Status: DISCONTINUED | OUTPATIENT
Start: 2024-11-28 | End: 2024-12-03 | Stop reason: HOSPADM

## 2024-11-28 RX ORDER — LABETALOL HYDROCHLORIDE 5 MG/ML
10 INJECTION, SOLUTION INTRAVENOUS EVERY 6 HOURS PRN
Status: DISCONTINUED | OUTPATIENT
Start: 2024-11-28 | End: 2024-12-03 | Stop reason: HOSPADM

## 2024-11-28 RX ORDER — PREDNISONE 5 MG/1
2.5 TABLET ORAL 2 TIMES DAILY
Status: DISCONTINUED | OUTPATIENT
Start: 2024-11-28 | End: 2024-12-03 | Stop reason: HOSPADM

## 2024-11-28 RX ADMIN — PREDNISONE 2.5 MG: 5 TABLET ORAL at 19:58

## 2024-11-28 RX ADMIN — ATORVASTATIN CALCIUM 80 MG: 20 TABLET, FILM COATED ORAL at 19:58

## 2024-11-28 RX ADMIN — SODIUM CHLORIDE, PRESERVATIVE FREE 10 ML: 5 INJECTION INTRAVENOUS at 19:59

## 2024-11-28 RX ADMIN — ENOXAPARIN SODIUM 40 MG: 100 INJECTION SUBCUTANEOUS at 14:33

## 2024-11-28 RX ADMIN — ACETAMINOPHEN 650 MG: 325 TABLET ORAL at 23:16

## 2024-11-28 RX ADMIN — IOPAMIDOL 100 ML: 755 INJECTION, SOLUTION INTRAVENOUS at 13:24

## 2024-11-28 ASSESSMENT — PAIN DESCRIPTION - DESCRIPTORS: DESCRIPTORS: ACHING

## 2024-11-28 ASSESSMENT — PAIN DESCRIPTION - LOCATION: LOCATION: HEAD

## 2024-11-28 ASSESSMENT — PAIN SCALES - GENERAL
PAINLEVEL_OUTOF10: 2
PAINLEVEL_OUTOF10: 5

## 2024-11-28 NOTE — ED NOTES
TRANSFER - OUT REPORT:    Verbal report given to NRP with [facility] on Felicia Rangel  being transferred to [facility]  for routine progression of patient care       Report consisted of patient's Situation, Background, Assessment and   Recommendations(SBAR).     Information from the following report(s) ED SBAR was reviewed with the NRP.    [place] Fall Assessment:    Presents to emergency department  because of falls (Syncope, seizure, or loss of consciousness): No  Age > 70: Yes  Altered Mental Status, Intoxication with alcohol or substance confusion (Disorientation, impaired judgment, poor safety awaremess, or inability to follow instructions): No  Impaired Mobility: Ambulates or transfers with assistive devices or assistance; Unable to ambulate or transer.: No  Nursing Judgement: Yes          Lines:   Peripheral IV 11/28/24 Left Antecubital (Active)   Site Assessment Clean, dry & intact 11/28/24 1229   Line Status Blood return noted;Brisk blood return 11/28/24 1229   Phlebitis Assessment No symptoms 11/28/24 1229   Infiltration Assessment 0 11/28/24 1229   Alcohol Cap Used Yes 11/28/24 1229   Dressing Status New dressing applied 11/28/24 1229   Dressing Type Transparent 11/28/24 1229        Opportunity for questions and clarification was provided.      Patient transported with:GENO ALS monitoring reassessment at this time is unchanged pt is stable for transport as ordered.

## 2024-11-28 NOTE — ED NOTES
TRANSFER - OUT REPORT:    Verbal report given to Shahab RN on Felicia Rangel  being transferred to [facility] for routine progression of patient care       Report consisted of patient's Situation, Background, Assessment and   Recommendations(SBAR).     Information from the following report(s) ED SBAR, MAR, NIH, frequent neuro checks, all test results, Vitals, telemetry, fall precautions was reviewed with the receiving nurse.    [place] Fall Assessment:    Presents to emergency department  because of falls (Syncope, seizure, or loss of consciousness): No  Age > 70: Yes  Altered Mental Status, Intoxication with alcohol or substance confusion (Disorientation, impaired judgment, poor safety awaremess, or inability to follow instructions): No  Impaired Mobility: Ambulates or transfers with assistive devices or assistance; Unable to ambulate or transer.: No  Nursing Judgement: Yes          Lines:   Peripheral IV 11/28/24 Left Antecubital (Active)   Site Assessment Clean, dry & intact 11/28/24 1229   Line Status Blood return noted;Brisk blood return 11/28/24 1229   Phlebitis Assessment No symptoms 11/28/24 1229   Infiltration Assessment 0 11/28/24 1229   Alcohol Cap Used Yes 11/28/24 1229   Dressing Status New dressing applied 11/28/24 1229   Dressing Type Transparent 11/28/24 1229        Opportunity for questions and clarification was provided.      Patient transported with: GENO ALS monitoring reassessment at this time is unchanged pt is stable for transport as ordered.

## 2024-11-28 NOTE — ED PROVIDER NOTES
Sexual Activity    Alcohol use: No    Drug use: Never     Social Determinants of Health     Food Insecurity: No Food Insecurity (10/1/2024)    Hunger Vital Sign     Worried About Running Out of Food in the Last Year: Never true     Ran Out of Food in the Last Year: Never true   Transportation Needs: No Transportation Needs (10/1/2024)    PRAPARE - Transportation     Lack of Transportation (Medical): No     Lack of Transportation (Non-Medical): No   Physical Activity: Low Risk  (12/6/2023)    Received [facility], [facility]    PCARE Exercise SDOH     Exercise: Active Lifestyle Only   Housing Stability: Low Risk  (10/1/2024)    Housing Stability Vital Sign     Unable to Pay for Housing in the Last Year: No     Number of Times Moved in the Last Year: 0     Homeless in the Last Year: No           PHYSICAL EXAM    (up to 7 for level 4, 8 or more for level 5)     ED Triage Vitals   BP Systolic BP Percentile Diastolic BP Percentile Temp Temp src Pulse Resp SpO2   -- -- -- -- -- -- -- --      Height Weight         -- --             There is no height or weight on file to calculate BMI.    Physical Exam  Vitals and nursing note reviewed.   Constitutional:       Appearance: Normal appearance.   HENT:      Head: Normocephalic and atraumatic.      Mouth/Throat:      Mouth: Mucous membranes are moist.   Eyes:      Conjunctiva/sclera: Conjunctivae normal.   Cardiovascular:      Rate and Rhythm: Normal rate and regular rhythm.      Heart sounds: No murmur heard.     No friction rub. No gallop.   Pulmonary:      Effort: Pulmonary effort is normal.      Breath sounds: Normal breath sounds.   Abdominal:      General: There is no distension.      Tenderness: There is no abdominal tenderness.   Musculoskeletal:         General: No swelling or deformity.      Cervical back: No rigidity.   Skin:     General: Skin is warm and dry.   Neurological:      Mental Status: She is alert.      Comments:

## 2024-11-28 NOTE — H&P
Hospitalist Admission Note    NAME:  Felicia Rangel   :  1950   MRN:  [id number]     Date/Time:  2024 1:57 PM    Patient PCP: Barak Mendez MD  ________________________________________________________________________    Given the patient's current clinical presentation, I have a high level of concern for decompensation if discharged from the emergency department.  Complex decision making was performed, which includes reviewing the patient's available past medical records, laboratory results, and x-ray films.       My assessment of this patient's clinical condition and my plan of care is as follows.    Assessment / Plan:    74-year-old with a history of hypertension, diabetes, hyperlipidemia, atrial fibrillation, coronary disease, status post pacemaker, GERD, arthritis, hypothyroidism, asthma, anemia, TIA, celiac disease, congestive heart failure, COPD, DVT. She presents with complaints of a 1 hour history of dysarthria    # CVA/TIA rule out:  # Hx TIA  Patient presented with 1 hour of dysarthria\  CT  Head . No evidence of acute intracranial abnormality.Unchanged moderate chronic microvascular ischemic disease.  CTA: No acute large vessel occlusion or flow-limiting stenosis   Risk factors include DM, HTN, Hx A-fib CAD.   Trop neg. Teleneuro consulted and recommended admit for full stroke workup.  Plan  - MRI brain, after clearance as patient has pacemaker[patient does not have any cards, she had [facility] placed [facility], discussed with the nurse to reach out and try to get the clearance before the MRI  - Echo  - TSH, lipid panel, A1c, LDL goal <70  - ASA 81mg after dysphagia screen  - Permissive HTN for the first 24-48 hours [  HTN for the first 24-48 hours SBP<220 and DBP<110  ]  - Consulted PT/OT for rehab eval and CM for disposition planning  - NPO until dysphagia screen  - Neuro checks q4h   - Daily CBC, CMP    # HTN  Will allow for permissive hypertension  Continue patient's BB    #

## 2024-11-28 NOTE — ED TRIAGE NOTES
Code Stroke Level: 1 called at 1142  Signs and symptoms: pt wheeled to treatment area pt states \"starting 1 hour ago trouble getting words out and left face feels different.  Code Stroke activation time:1142  Provider at bedside time: 1141  VAN score: Negative  Last Known Well (Time): I hour prior to arrival  Blood Glucose Result/Time: 172  Blood Pressure: 141/91  Anticoagulants (List medications): baby asa

## 2024-11-29 ENCOUNTER — APPOINTMENT (OUTPATIENT)
Facility: HOSPITAL | Age: 74
DRG: 092 | End: 2024-11-29
Attending: STUDENT IN AN ORGANIZED HEALTH CARE EDUCATION/TRAINING PROGRAM
Payer: MEDICARE

## 2024-11-29 LAB
ANION GAP SERPL CALC-SCNC: 6 MMOL/L (ref 2–12)
BUN SERPL-MCNC: 9 MG/DL (ref 6–20)
BUN/CREAT SERPL: 19 (ref 12–20)
CALCIUM SERPL-MCNC: 8.4 MG/DL (ref 8.5–10.1)
CHLORIDE SERPL-SCNC: 107 MMOL/L (ref 97–108)
CHOLEST SERPL-MCNC: 179 MG/DL
CO2 SERPL-SCNC: 26 MMOL/L (ref 21–32)
CREAT SERPL-MCNC: 0.47 MG/DL (ref 0.55–1.02)
ECHO AO ARCH DIAM: 1.8 CM
ECHO AO ASC DIAM: 3.7 CM
ECHO AO ASCENDING AORTA INDEX: 2.15 CM/M2
ECHO AO ROOT DIAM: 4 CM
ECHO AO ROOT INDEX: 2.33 CM/M2
ECHO AR MAX VEL PISA: 1.8 M/S
ECHO AV AREA PEAK VELOCITY: 2.2 CM2
ECHO AV AREA PEAK VELOCITY: 2.6 CM2
ECHO AV AREA VTI: 1.9 CM2
ECHO AV AREA/BSA VTI: 1.1 CM2/M2
ECHO AV MEAN GRADIENT: 4 MMHG
ECHO AV MEAN VELOCITY: 1 M/S
ECHO AV PEAK GRADIENT: 7 MMHG
ECHO AV PEAK GRADIENT: 9 MMHG
ECHO AV PEAK VELOCITY: 1.3 M/S
ECHO AV PEAK VELOCITY: 1.5 M/S
ECHO AV REGURGITANT PHT: 386 MILLISECOND
ECHO AV VTI: 26.8 CM
ECHO BSA: 1.76 M2
ECHO LA DIAMETER INDEX: 1.86 CM/M2
ECHO LA DIAMETER: 3.2 CM
ECHO LA TO AORTIC ROOT RATIO: 0.8
ECHO LA VOL A-L A2C: 26 ML (ref 22–52)
ECHO LA VOL A-L A4C: 20 ML (ref 22–52)
ECHO LA VOL BP: 21 ML (ref 22–52)
ECHO LA VOL MOD A2C: 23 ML (ref 22–52)
ECHO LA VOL MOD A4C: 18 ML (ref 22–52)
ECHO LA VOL/BSA BIPLANE: 12 ML/M2 (ref 16–34)
ECHO LA VOLUME AREA LENGTH: 24 ML
ECHO LA VOLUME INDEX A-L A2C: 15 ML/M2 (ref 16–34)
ECHO LA VOLUME INDEX A-L A4C: 12 ML/M2 (ref 16–34)
ECHO LA VOLUME INDEX AREA LENGTH: 14 ML/M2 (ref 16–34)
ECHO LA VOLUME INDEX MOD A2C: 13 ML/M2 (ref 16–34)
ECHO LA VOLUME INDEX MOD A4C: 10 ML/M2 (ref 16–34)
ECHO LV E' LATERAL VELOCITY: 8.7 CM/S
ECHO LV E' SEPTAL VELOCITY: 5.8 CM/S
ECHO LV EDV A2C: 52 ML
ECHO LV EDV A4C: 80 ML
ECHO LV EDV BP: 66 ML (ref 56–104)
ECHO LV EDV INDEX A4C: 47 ML/M2
ECHO LV EDV INDEX BP: 38 ML/M2
ECHO LV EDV NDEX A2C: 30 ML/M2
ECHO LV EJECTION FRACTION A2C: 56 %
ECHO LV EJECTION FRACTION A4C: 64 %
ECHO LV EJECTION FRACTION BIPLANE: 60 % (ref 55–100)
ECHO LV ESV A2C: 23 ML
ECHO LV ESV A4C: 29 ML
ECHO LV ESV BP: 27 ML (ref 19–49)
ECHO LV ESV INDEX A2C: 13 ML/M2
ECHO LV ESV INDEX A4C: 17 ML/M2
ECHO LV ESV INDEX BP: 16 ML/M2
ECHO LV FRACTIONAL SHORTENING: 23 % (ref 28–44)
ECHO LV INTERNAL DIMENSION DIASTOLE INDEX: 2.73 CM/M2
ECHO LV INTERNAL DIMENSION DIASTOLIC: 4.7 CM (ref 3.9–5.3)
ECHO LV INTERNAL DIMENSION SYSTOLIC INDEX: 2.09 CM/M2
ECHO LV INTERNAL DIMENSION SYSTOLIC: 3.6 CM
ECHO LV IVSD: 1 CM (ref 0.6–0.9)
ECHO LV MASS 2D: 164.5 G (ref 67–162)
ECHO LV MASS INDEX 2D: 95.6 G/M2 (ref 43–95)
ECHO LV POSTERIOR WALL DIASTOLIC: 1 CM (ref 0.6–0.9)
ECHO LV RELATIVE WALL THICKNESS RATIO: 0.43
ECHO LVOT AREA: 3.8 CM2
ECHO LVOT AV VTI INDEX: 0.48
ECHO LVOT DIAM: 2.2 CM
ECHO LVOT MEAN GRADIENT: 1 MMHG
ECHO LVOT PEAK GRADIENT: 3 MMHG
ECHO LVOT PEAK VELOCITY: 0.9 M/S
ECHO LVOT STROKE VOLUME INDEX: 28.5 ML/M2
ECHO LVOT SV: 49 ML
ECHO LVOT VTI: 12.9 CM
ECHO MV A VELOCITY: 0.73 M/S
ECHO MV E DECELERATION TIME (DT): 228.4 MS
ECHO MV E VELOCITY: 0.47 M/S
ECHO MV E/A RATIO: 0.64
ECHO MV E/E' LATERAL: 5.4
ECHO MV E/E' RATIO (AVERAGED): 6.75
ECHO MV E/E' SEPTAL: 8.1
ECHO MV REGURGITANT PEAK GRADIENT: 104 MMHG
ECHO MV REGURGITANT PEAK VELOCITY: 5.1 M/S
ECHO PULMONARY ARTERY END DIASTOLIC PRESSURE: 1 MMHG
ECHO PV MAX VELOCITY: 0.8 M/S
ECHO PV PEAK GRADIENT: 3 MMHG
ECHO PV REGURGITANT MAX VELOCITY: 0.6 M/S
ECHO RV FREE WALL PEAK S': 8.5 CM/S
ECHO RV INTERNAL DIMENSION: 2.9 CM
ECHO RV TAPSE: 1.5 CM (ref 1.7–?)
ECHO TV REGURGITANT MAX VELOCITY: 2.34 M/S
ECHO TV REGURGITANT PEAK GRADIENT: 22 MMHG
ERYTHROCYTE [DISTWIDTH] IN BLOOD BY AUTOMATED COUNT: 12.5 % (ref 11.5–14.5)
EST. AVERAGE GLUCOSE BLD GHB EST-MCNC: 203 MG/DL
GLUCOSE BLD STRIP.AUTO-MCNC: 150 MG/DL (ref 65–117)
GLUCOSE BLD STRIP.AUTO-MCNC: 203 MG/DL (ref 65–117)
GLUCOSE BLD STRIP.AUTO-MCNC: 225 MG/DL (ref 65–117)
GLUCOSE BLD STRIP.AUTO-MCNC: 234 MG/DL (ref 65–117)
GLUCOSE SERPL-MCNC: 154 MG/DL (ref 65–100)
HBA1C MFR BLD: 8.7 % (ref 4–5.6)
HCT VFR BLD AUTO: 42 % (ref 35–47)
HDLC SERPL-MCNC: 45 MG/DL
HDLC SERPL: 4 (ref 0–5)
HGB BLD-MCNC: 13.7 G/DL (ref 11.5–16)
LDLC SERPL CALC-MCNC: 62 MG/DL (ref 0–100)
MCH RBC QN AUTO: 30.4 PG (ref 26–34)
MCHC RBC AUTO-ENTMCNC: 32.6 G/DL (ref 30–36.5)
MCV RBC AUTO: 93.1 FL (ref 80–99)
NRBC # BLD: 0 K/UL (ref 0–0.01)
NRBC BLD-RTO: 0 PER 100 WBC
PLATELET # BLD AUTO: 192 K/UL (ref 150–400)
PMV BLD AUTO: 10.3 FL (ref 8.9–12.9)
POTASSIUM SERPL-SCNC: 4.1 MMOL/L (ref 3.5–5.1)
RBC # BLD AUTO: 4.51 M/UL (ref 3.8–5.2)
SERVICE CMNT-IMP: ABNORMAL
SODIUM SERPL-SCNC: 139 MMOL/L (ref 136–145)
TRIGL SERPL-MCNC: 360 MG/DL
VLDLC SERPL CALC-MCNC: 72 MG/DL
WBC # BLD AUTO: 12.5 K/UL (ref 3.6–11)

## 2024-11-29 PROCEDURE — 80048 BASIC METABOLIC PNL TOTAL CA: CPT

## 2024-11-29 PROCEDURE — 85027 COMPLETE CBC AUTOMATED: CPT

## 2024-11-29 PROCEDURE — 93306 TTE W/DOPPLER COMPLETE: CPT | Performed by: INTERNAL MEDICINE

## 2024-11-29 PROCEDURE — 97535 SELF CARE MNGMENT TRAINING: CPT

## 2024-11-29 PROCEDURE — 36415 COLL VENOUS BLD VENIPUNCTURE: CPT

## 2024-11-29 PROCEDURE — 99222 1ST HOSP IP/OBS MODERATE 55: CPT | Performed by: PSYCHIATRY & NEUROLOGY

## 2024-11-29 PROCEDURE — 6370000000 HC RX 637 (ALT 250 FOR IP): Performed by: STUDENT IN AN ORGANIZED HEALTH CARE EDUCATION/TRAINING PROGRAM

## 2024-11-29 PROCEDURE — 6370000000 HC RX 637 (ALT 250 FOR IP): Performed by: NURSE PRACTITIONER

## 2024-11-29 PROCEDURE — 6360000002 HC RX W HCPCS: Performed by: STUDENT IN AN ORGANIZED HEALTH CARE EDUCATION/TRAINING PROGRAM

## 2024-11-29 PROCEDURE — 93306 TTE W/DOPPLER COMPLETE: CPT

## 2024-11-29 PROCEDURE — 94761 N-INVAS EAR/PLS OXIMETRY MLT: CPT

## 2024-11-29 PROCEDURE — 2580000003 HC RX 258: Performed by: STUDENT IN AN ORGANIZED HEALTH CARE EDUCATION/TRAINING PROGRAM

## 2024-11-29 PROCEDURE — 2060000000 HC ICU INTERMEDIATE R&B

## 2024-11-29 PROCEDURE — 97161 PT EVAL LOW COMPLEX 20 MIN: CPT

## 2024-11-29 PROCEDURE — 97165 OT EVAL LOW COMPLEX 30 MIN: CPT

## 2024-11-29 PROCEDURE — 83036 HEMOGLOBIN GLYCOSYLATED A1C: CPT

## 2024-11-29 PROCEDURE — 97116 GAIT TRAINING THERAPY: CPT

## 2024-11-29 PROCEDURE — 82962 GLUCOSE BLOOD TEST: CPT

## 2024-11-29 PROCEDURE — 80061 LIPID PANEL: CPT

## 2024-11-29 RX ORDER — IPRATROPIUM BROMIDE AND ALBUTEROL SULFATE 2.5; .5 MG/3ML; MG/3ML
1 SOLUTION RESPIRATORY (INHALATION) EVERY 4 HOURS PRN
Status: DISCONTINUED | OUTPATIENT
Start: 2024-11-29 | End: 2024-12-03 | Stop reason: HOSPADM

## 2024-11-29 RX ADMIN — LANSOPRAZOLE 30 MG: 15 TABLET, ORALLY DISINTEGRATING, DELAYED RELEASE ORAL at 10:01

## 2024-11-29 RX ADMIN — ENOXAPARIN SODIUM 40 MG: 100 INJECTION SUBCUTANEOUS at 09:43

## 2024-11-29 RX ADMIN — ATORVASTATIN CALCIUM 80 MG: 20 TABLET, FILM COATED ORAL at 21:04

## 2024-11-29 RX ADMIN — INSULIN LISPRO 2 UNITS: 100 INJECTION, SOLUTION INTRAVENOUS; SUBCUTANEOUS at 18:55

## 2024-11-29 RX ADMIN — INSULIN GLARGINE 14 UNITS: 100 INJECTION, SOLUTION SUBCUTANEOUS at 21:05

## 2024-11-29 RX ADMIN — INSULIN LISPRO 2 UNITS: 100 INJECTION, SOLUTION INTRAVENOUS; SUBCUTANEOUS at 21:05

## 2024-11-29 RX ADMIN — ACETAMINOPHEN 650 MG: 325 TABLET ORAL at 04:53

## 2024-11-29 RX ADMIN — ACETAMINOPHEN 650 MG: 325 TABLET ORAL at 23:48

## 2024-11-29 RX ADMIN — FENOFIBRATE 54 MG: 54 TABLET ORAL at 09:41

## 2024-11-29 RX ADMIN — ASPIRIN 81 MG: 81 TABLET, CHEWABLE ORAL at 09:41

## 2024-11-29 RX ADMIN — PREDNISONE 2.5 MG: 5 TABLET ORAL at 09:42

## 2024-11-29 RX ADMIN — SODIUM CHLORIDE, PRESERVATIVE FREE 10 ML: 5 INJECTION INTRAVENOUS at 09:42

## 2024-11-29 RX ADMIN — SODIUM CHLORIDE, PRESERVATIVE FREE 10 ML: 5 INJECTION INTRAVENOUS at 21:08

## 2024-11-29 RX ADMIN — PREDNISONE 2.5 MG: 5 TABLET ORAL at 21:03

## 2024-11-29 RX ADMIN — LEVOTHYROXINE SODIUM 137 MCG: 0.11 TABLET ORAL at 10:00

## 2024-11-29 RX ADMIN — INSULIN LISPRO 2 UNITS: 100 INJECTION, SOLUTION INTRAVENOUS; SUBCUTANEOUS at 11:26

## 2024-11-29 ASSESSMENT — PAIN DESCRIPTION - ORIENTATION: ORIENTATION: LEFT;RIGHT

## 2024-11-29 ASSESSMENT — PAIN SCALES - GENERAL
PAINLEVEL_OUTOF10: 6
PAINLEVEL_OUTOF10: 0
PAINLEVEL_OUTOF10: 4
PAINLEVEL_OUTOF10: 2

## 2024-11-29 ASSESSMENT — PAIN DESCRIPTION - LOCATION: LOCATION: FOOT

## 2024-11-29 NOTE — CONSULTS
Avg Glucose 203 mg/dL   Lipid Panel    Collection Time: 11/29/24  4:36 AM   Result Value Ref Range    Cholesterol, Total 179 <200 MG/DL    Triglycerides 360 (H) <150 MG/DL    HDL 45 MG/DL    LDL Cholesterol 62 0 - 100 MG/DL    VLDL Cholesterol Calculated 72 MG/DL    Chol/HDL Ratio 4.0 0.0 - 5.0     POCT Glucose    Collection Time: 11/29/24  7:45 AM   Result Value Ref Range    POC Glucose 150 (H) 65 - 117 mg/dL    Performed by: Aigou        DIAGNOSTICS:  All neuro Images were reviewed in detail       IMPRESSION:    It is a great pleasure to see Felicia Rangel, a 74 y.o. female today in the hospital. Based on history, physical, lab and imaging data, the most likely suspected mechanism is  stroke   CTA head and neck negative for Large vessel occlusion  ?  Recommendations/ plan:  Frequent neurochecks, vital sign parameters  MRI Brain without contrast to evaluate for any  structural abnormalities that may be contributing to symptoms.   Aspirin 81 mg po q day and high intensity statin  Fasting lipid panel, HbA1c, TTE ECHO,  If TTE ECHO is positive consult cardiology and call Neurology back,  We will continue permissive hypertension for typically 48 hrs    Additional Stroke Care (or Document Contraindication) as Follows:  Bedside dysphagia screen; if patient does not pass, maintain NPO and order formal speech evaluation   Antithrombotic therapy (ensure appropriate route, eg. PO, NGT, PEG, or MT)  Lab work including CBC, CMP, PT/INR, aPTT  Markers of cardiac ischemia (eg. Troponin) and obtain EKG  Lipid panel and initiate high intensity statin therapy for LDL goal less than 70  Control blood sugar for goal less than 180, and preferably less than 140, and check HbA1c  DVT prophylaxis  P.T./O.T./Speech/Rehabilitation consultations  Stroke education (contact stroke nurse if necessary)      - Please do not hesitate to call with questions or concerns.  - Thank you for the opportunity to participate in the care of your

## 2024-11-29 NOTE — PROGRESS NOTES
Request for medical records faxed to [phone] [facility] medical records department per patients request. Document in chart. Pending cardiac stent record for date of service 9/8/2023, provider: Dr. Mcgrath.  Doug, RN

## 2024-11-29 NOTE — CARE COORDINATION
Care Management Initial Assessment  11/29/2024 10:52 AM  If patient is discharged prior to next notation, then this note serves as note for discharge by case management.    Reason for Admission:   Dysarthria [R47.1]  Difficulty speaking [R47.9]  Cerebrovascular accident (CVA), unspecified mechanism (HCC) [I63.9]         Patient Admission Status: Inpatient  Date Admitted to INP: 11/28/24  RUR: Readmission Risk Score: 16.7    Hospitalization in the last 30 days (Readmission):  No        Advance Care Planning:  Code Status: Full Code  Primary Healthcare Decision Maker: (P) Named in Scanned ACP Document  Primary Decision Maker: Tommy Rangel - Spouse - [phone]   Advance Directive: has an advanced directive - a copy has been provided     __________________________________________________________________________  Assessment:      11/29/24 1049   Service Assessment   Patient Orientation Alert and Oriented   Cognition Alert   History Provided By Patient   Primary Caregiver Self   Accompanied By/Relationship  Tommy   Support Systems Spouse/Significant Other   Patient's Healthcare Decision Maker is: Named in Scanned ACP Document   PCP Verified by CM Yes   Last Visit to PCP Within last 6 months   Prior Functional Level Independent in ADLs/IADLs   Current Functional Level Independent in ADLs/IADLs   Can patient return to prior living arrangement Yes   Ability to make needs known: Good   Family able to assist with home care needs: Yes   Would you like for me to discuss the discharge plan with any other family members/significant others, and if so, who? Yes  ( Tommy)   Financial Resources Medicare   Community Resources None   Social/Functional History   Lives With Spouse   Type of Home House   Home Layout One level   Bathroom Accessibility Wheelchair accessible   Home Equipment Walker - Rolling   Receives Help From Family   ADL Assistance Independent   Homemaking Assistance Independent   Ambulation

## 2024-11-29 NOTE — PROGRESS NOTES
Patient with  stuttering. She declined speech therapy b/c \"It will get better. It usually takes about 3 days\".  SLP provided names of outpt SLP clinics that she could seek OP SLP for dysfluency if it does not resolve.

## 2024-11-30 LAB
EKG ATRIAL RATE: 80 BPM
EKG DIAGNOSIS: NORMAL
EKG Q-T INTERVAL: 478 MS
EKG QRS DURATION: 166 MS
EKG QTC CALCULATION (BAZETT): 551 MS
EKG R AXIS: -41 DEGREES
EKG T AXIS: 33 DEGREES
EKG VENTRICULAR RATE: 80 BPM
GLUCOSE BLD STRIP.AUTO-MCNC: 158 MG/DL (ref 65–117)
GLUCOSE BLD STRIP.AUTO-MCNC: 175 MG/DL (ref 65–117)
GLUCOSE BLD STRIP.AUTO-MCNC: 189 MG/DL (ref 65–117)
GLUCOSE BLD STRIP.AUTO-MCNC: 277 MG/DL (ref 65–117)
SERVICE CMNT-IMP: ABNORMAL

## 2024-11-30 PROCEDURE — 6360000002 HC RX W HCPCS: Performed by: STUDENT IN AN ORGANIZED HEALTH CARE EDUCATION/TRAINING PROGRAM

## 2024-11-30 PROCEDURE — 6370000000 HC RX 637 (ALT 250 FOR IP): Performed by: NURSE PRACTITIONER

## 2024-11-30 PROCEDURE — 82962 GLUCOSE BLOOD TEST: CPT

## 2024-11-30 PROCEDURE — 93010 ELECTROCARDIOGRAM REPORT: CPT | Performed by: INTERNAL MEDICINE

## 2024-11-30 PROCEDURE — 6370000000 HC RX 637 (ALT 250 FOR IP): Performed by: STUDENT IN AN ORGANIZED HEALTH CARE EDUCATION/TRAINING PROGRAM

## 2024-11-30 PROCEDURE — 2580000003 HC RX 258: Performed by: STUDENT IN AN ORGANIZED HEALTH CARE EDUCATION/TRAINING PROGRAM

## 2024-11-30 PROCEDURE — 2060000000 HC ICU INTERMEDIATE R&B

## 2024-11-30 PROCEDURE — 94761 N-INVAS EAR/PLS OXIMETRY MLT: CPT

## 2024-11-30 PROCEDURE — 99233 SBSQ HOSP IP/OBS HIGH 50: CPT | Performed by: PSYCHIATRY & NEUROLOGY

## 2024-11-30 RX ORDER — LIDOCAINE 4 G/G
1 PATCH TOPICAL NIGHTLY
Status: DISCONTINUED | OUTPATIENT
Start: 2024-12-01 | End: 2024-12-03 | Stop reason: HOSPADM

## 2024-11-30 RX ADMIN — METOPROLOL SUCCINATE 25 MG: 25 TABLET, EXTENDED RELEASE ORAL at 08:36

## 2024-11-30 RX ADMIN — PREDNISONE 2.5 MG: 5 TABLET ORAL at 21:25

## 2024-11-30 RX ADMIN — LEVOTHYROXINE SODIUM 137 MCG: 0.11 TABLET ORAL at 06:50

## 2024-11-30 RX ADMIN — ACETAMINOPHEN 650 MG: 325 TABLET ORAL at 08:36

## 2024-11-30 RX ADMIN — ASPIRIN 81 MG: 81 TABLET, CHEWABLE ORAL at 08:36

## 2024-11-30 RX ADMIN — LANSOPRAZOLE 30 MG: 15 TABLET, ORALLY DISINTEGRATING, DELAYED RELEASE ORAL at 06:51

## 2024-11-30 RX ADMIN — PREDNISONE 2.5 MG: 5 TABLET ORAL at 08:36

## 2024-11-30 RX ADMIN — INSULIN LISPRO 2 UNITS: 100 INJECTION, SOLUTION INTRAVENOUS; SUBCUTANEOUS at 21:27

## 2024-11-30 RX ADMIN — SODIUM CHLORIDE, PRESERVATIVE FREE 10 ML: 5 INJECTION INTRAVENOUS at 08:37

## 2024-11-30 RX ADMIN — DEXTRAN 70, GLYCERIN, HYPROMELLOSE 1 DROP: 1; 2; 3 SOLUTION/ DROPS OPHTHALMIC at 08:37

## 2024-11-30 RX ADMIN — FENOFIBRATE 54 MG: 54 TABLET ORAL at 08:36

## 2024-11-30 RX ADMIN — SODIUM CHLORIDE, PRESERVATIVE FREE 10 ML: 5 INJECTION INTRAVENOUS at 21:29

## 2024-11-30 RX ADMIN — INSULIN LISPRO 4 UNITS: 100 INJECTION, SOLUTION INTRAVENOUS; SUBCUTANEOUS at 17:31

## 2024-11-30 RX ADMIN — ATORVASTATIN CALCIUM 80 MG: 20 TABLET, FILM COATED ORAL at 21:26

## 2024-11-30 RX ADMIN — INSULIN GLARGINE 14 UNITS: 100 INJECTION, SOLUTION SUBCUTANEOUS at 21:27

## 2024-11-30 RX ADMIN — ACETAMINOPHEN 650 MG: 325 TABLET ORAL at 21:32

## 2024-11-30 RX ADMIN — ENOXAPARIN SODIUM 40 MG: 100 INJECTION SUBCUTANEOUS at 08:37

## 2024-11-30 ASSESSMENT — PAIN DESCRIPTION - ORIENTATION
ORIENTATION: RIGHT
ORIENTATION: LEFT;RIGHT

## 2024-11-30 ASSESSMENT — PAIN SCALES - GENERAL
PAINLEVEL_OUTOF10: 3
PAINLEVEL_OUTOF10: 5
PAINLEVEL_OUTOF10: 3

## 2024-11-30 ASSESSMENT — PAIN DESCRIPTION - LOCATION
LOCATION: FOOT
LOCATION: FOOT

## 2024-11-30 NOTE — PROGRESS NOTES
INPATIENT NEUROLOGY FOLLOW-UP NOTE    NAME:  Felicia Rangel  MRN:  [id number]  : 1950      ADMIT DATE:   2024 11:39 AM    REASON FOR FOLLOW UP: Episode of speech difficulty     ===========    24  I reviewed the Neurology Consult performed by Dr Kwan on 2024.   Pt with PMHx A Fib, HTN, DM, HLD, Pacemaker, CHF, etc.      Pt awake, alert.  Tells me that on , she /  were getting ready to go to her Son's for dinner when she started feeling \"an earthquake\" inside her body, trembling inside body, but no visible shaking/ trembling.  Denies any baseline hx of tremors.  Says then she started to feel weak.  Was standing when this happened.  Held onto  but felt like she was going to go down so sat down. Since then she's had intermittent speech difficulty, clearly stuttering in nature (not dysarthric, no aphasic).  She denies any recent stressors.       Reports having a TIA in the past.  Pertinent meds at time of admission were: ASA 81 mg/ day, Pitavastatin 1 mg QHS.  Allergy section lists Warfarin (unknown reaction), Plavix (GI bleed), among many other med allergies. During admission, has been placed on Lipitor 80 mg QHS as Pitavastatin not on formulary.  Tchol 179, Trig 360 HH, HDL 45, LDL 62    CTA Head/ Neck here didn't show any cervical or intracranial aneurysm, any LVO, any hemodynamically significant stenosis.       Brain MRI is pending clearance of pacemaker +/- cardiac stent, though patient says she has had Cardiac MRI after the pacemaker was put in.    Exam: awake, alert, conversant, mixed normal speech and stuttering, no aphasia, no dysarthria, no facial droop, moving all exts w/o difficulty     ASSESSMENT/ PLAN:      Episode of internal trembling, \"earthquake\" without any visible tremor, followed by generalized weakness, no LOC, and development of intermittent speech difficulty/ Stuttering.    The above hx does not sound like TIA or CVA.  New onset stuttering in an

## 2024-11-30 NOTE — PROGRESS NOTES
[facility]  [address] [place] [address]  [phone]      Hospitalist  Progress Note      NAME:       Felicia Rangel   :        1950  MRM:        [id number]    Date of service: 2024      Subjective: Patient seen and examined by me. Patient admitted with persistent intermittent dysarthria. No vision changes or focal weakness.      Objective:    Vital Signs:    BP (!) 141/77   Pulse 82   Temp 97.7 °F (36.5 °C) (Oral)   Resp 16   Ht 1.575 m (5' 2\")   Wt 74.3 kg (163 lb 12.8 oz)   SpO2 94%   BMI 29.96 kg/m²        Intake/Output Summary (Last 24 hours) at 2024 1114  Last data filed at 2024 1631  Gross per 24 hour   Intake 460 ml   Output --   Net 460 ml        Current inpatient medications reviewed:  Current Facility-Administered Medications   Medication Dose Route Frequency    [START ON 2024] lidocaine 4 % external patch 1 patch  1 patch TransDERmal Nightly    ipratropium 0.5 mg-albuterol 2.5 mg (DUONEB) nebulizer solution 1 Dose  1 Dose Inhalation Q4H PRN    sodium chloride flush 0.9 % injection 5-40 mL  5-40 mL IntraVENous 2 times per day    sodium chloride flush 0.9 % injection 5-40 mL  5-40 mL IntraVENous PRN    0.9 % sodium chloride infusion   IntraVENous PRN    ondansetron (ZOFRAN-ODT) disintegrating tablet 4 mg  4 mg Oral Q8H PRN    Or    ondansetron (ZOFRAN) injection 4 mg  4 mg IntraVENous Q6H PRN    atorvastatin (LIPITOR) tablet 80 mg  80 mg Oral Nightly    enoxaparin (LOVENOX) injection 40 mg  40 mg SubCUTAneous Daily    labetalol (NORMODYNE;TRANDATE) injection 10 mg  10 mg IntraVENous Q6H PRN    aspirin chewable tablet 81 mg  81 mg Oral Daily    artificial tears (dextran-hypromellose-glycerin) ophthalmic solution 1 drop  1 drop Both Eyes Daily    fenofibrate (TRICOR) tablet 54 mg  54 mg Oral Daily    insulin glargine (LANTUS) injection vial 14 Units  14 Units

## 2024-11-30 NOTE — PROGRESS NOTES
0700 Bedside and Verbal shift change report given to JOHNNIE Peralta (oncoming nurse) by JOHNNIE Wright (offgoing nurse). Report included the following information Nurse Handoff Report, Recent Results, Med Rec Status, and Cardiac Rhythm NSR .     1900 Bedside and Verbal shift change report given to JOHNNIE Wright (oncoming nurse) by JOHNNIE Peralta (offgoing nurse). Report included the following information Nurse Handoff Report, Recent Results, Med Rec Status, and Cardiac Rhythm NSR .

## 2024-12-01 LAB
GLUCOSE BLD STRIP.AUTO-MCNC: 150 MG/DL (ref 65–117)
GLUCOSE BLD STRIP.AUTO-MCNC: 177 MG/DL (ref 65–117)
GLUCOSE BLD STRIP.AUTO-MCNC: 193 MG/DL (ref 65–117)
GLUCOSE BLD STRIP.AUTO-MCNC: 230 MG/DL (ref 65–117)
SERVICE CMNT-IMP: ABNORMAL

## 2024-12-01 PROCEDURE — 2580000003 HC RX 258: Performed by: STUDENT IN AN ORGANIZED HEALTH CARE EDUCATION/TRAINING PROGRAM

## 2024-12-01 PROCEDURE — 6360000002 HC RX W HCPCS: Performed by: STUDENT IN AN ORGANIZED HEALTH CARE EDUCATION/TRAINING PROGRAM

## 2024-12-01 PROCEDURE — 6370000000 HC RX 637 (ALT 250 FOR IP): Performed by: NURSE PRACTITIONER

## 2024-12-01 PROCEDURE — 2060000000 HC ICU INTERMEDIATE R&B

## 2024-12-01 PROCEDURE — 6370000000 HC RX 637 (ALT 250 FOR IP): Performed by: STUDENT IN AN ORGANIZED HEALTH CARE EDUCATION/TRAINING PROGRAM

## 2024-12-01 PROCEDURE — 82962 GLUCOSE BLOOD TEST: CPT

## 2024-12-01 PROCEDURE — 94761 N-INVAS EAR/PLS OXIMETRY MLT: CPT

## 2024-12-01 RX ADMIN — FENOFIBRATE 54 MG: 54 TABLET ORAL at 08:23

## 2024-12-01 RX ADMIN — INSULIN GLARGINE 14 UNITS: 100 INJECTION, SOLUTION SUBCUTANEOUS at 21:10

## 2024-12-01 RX ADMIN — SODIUM CHLORIDE, PRESERVATIVE FREE 10 ML: 5 INJECTION INTRAVENOUS at 08:24

## 2024-12-01 RX ADMIN — INSULIN LISPRO 2 UNITS: 100 INJECTION, SOLUTION INTRAVENOUS; SUBCUTANEOUS at 21:29

## 2024-12-01 RX ADMIN — ENOXAPARIN SODIUM 40 MG: 100 INJECTION SUBCUTANEOUS at 08:22

## 2024-12-01 RX ADMIN — ACETAMINOPHEN 650 MG: 325 TABLET ORAL at 03:53

## 2024-12-01 RX ADMIN — ASPIRIN 81 MG: 81 TABLET, CHEWABLE ORAL at 08:23

## 2024-12-01 RX ADMIN — PREDNISONE 2.5 MG: 5 TABLET ORAL at 08:22

## 2024-12-01 RX ADMIN — ACETAMINOPHEN 650 MG: 325 TABLET ORAL at 15:13

## 2024-12-01 RX ADMIN — DEXTRAN 70, GLYCERIN, HYPROMELLOSE 1 DROP: 1; 2; 3 SOLUTION/ DROPS OPHTHALMIC at 08:22

## 2024-12-01 RX ADMIN — ACETAMINOPHEN 650 MG: 325 TABLET ORAL at 21:29

## 2024-12-01 RX ADMIN — LEVOTHYROXINE SODIUM 137 MCG: 0.11 TABLET ORAL at 06:38

## 2024-12-01 RX ADMIN — PREDNISONE 2.5 MG: 5 TABLET ORAL at 21:10

## 2024-12-01 RX ADMIN — METOPROLOL SUCCINATE 25 MG: 25 TABLET, EXTENDED RELEASE ORAL at 08:23

## 2024-12-01 RX ADMIN — ACETAMINOPHEN 650 MG: 325 TABLET ORAL at 08:23

## 2024-12-01 RX ADMIN — SODIUM CHLORIDE, PRESERVATIVE FREE 10 ML: 5 INJECTION INTRAVENOUS at 21:11

## 2024-12-01 RX ADMIN — LANSOPRAZOLE 30 MG: 15 TABLET, ORALLY DISINTEGRATING, DELAYED RELEASE ORAL at 06:38

## 2024-12-01 RX ADMIN — INSULIN LISPRO 2 UNITS: 100 INJECTION, SOLUTION INTRAVENOUS; SUBCUTANEOUS at 17:33

## 2024-12-01 ASSESSMENT — PAIN SCALES - GENERAL
PAINLEVEL_OUTOF10: 5
PAINLEVEL_OUTOF10: 6
PAINLEVEL_OUTOF10: 5
PAINLEVEL_OUTOF10: 3
PAINLEVEL_OUTOF10: 3
PAINLEVEL_OUTOF10: 2
PAINLEVEL_OUTOF10: 5
PAINLEVEL_OUTOF10: 4

## 2024-12-01 ASSESSMENT — PAIN DESCRIPTION - LOCATION
LOCATION: FOOT
LOCATION: LEG

## 2024-12-01 ASSESSMENT — PAIN DESCRIPTION - ORIENTATION
ORIENTATION: RIGHT
ORIENTATION: RIGHT
ORIENTATION: RIGHT;LEFT

## 2024-12-01 NOTE — PROGRESS NOTES
[facility]  [address] [place] [address]  [phone]      Hospitalist  Progress Note      NAME:       Felicia Rangel   :        1950  MRM:        [id number]    Date of service: 2024      Subjective: Patient seen and examined by me. Patient admitted with persistent intermittent dysarthria. No vision changes or focal weakness.      Objective:    Vital Signs:    /82   Pulse 81   Temp 97.2 °F (36.2 °C) (Oral)   Resp 17   Ht 1.575 m (5' 2\")   Wt 75.5 kg (166 lb 6.4 oz)   SpO2 96%   BMI 30.43 kg/m²      No intake or output data in the 24 hours ending 24 1025       Current inpatient medications reviewed:  Current Facility-Administered Medications   Medication Dose Route Frequency    lidocaine 4 % external patch 1 patch  1 patch TransDERmal Nightly    ipratropium 0.5 mg-albuterol 2.5 mg (DUONEB) nebulizer solution 1 Dose  1 Dose Inhalation Q4H PRN    sodium chloride flush 0.9 % injection 5-40 mL  5-40 mL IntraVENous 2 times per day    sodium chloride flush 0.9 % injection 5-40 mL  5-40 mL IntraVENous PRN    0.9 % sodium chloride infusion   IntraVENous PRN    ondansetron (ZOFRAN-ODT) disintegrating tablet 4 mg  4 mg Oral Q8H PRN    Or    ondansetron (ZOFRAN) injection 4 mg  4 mg IntraVENous Q6H PRN    atorvastatin (LIPITOR) tablet 80 mg  80 mg Oral Nightly    enoxaparin (LOVENOX) injection 40 mg  40 mg SubCUTAneous Daily    labetalol (NORMODYNE;TRANDATE) injection 10 mg  10 mg IntraVENous Q6H PRN    aspirin chewable tablet 81 mg  81 mg Oral Daily    artificial tears (dextran-hypromellose-glycerin) ophthalmic solution 1 drop  1 drop Both Eyes Daily    fenofibrate (TRICOR) tablet 54 mg  54 mg Oral Daily    insulin glargine (LANTUS) injection vial 14 Units  14 Units SubCUTAneous Nightly    lansoprazole (PREVACID SOLUTAB) disintegrating tablet 30 mg  30 mg Oral QAM AC    levothyroxine

## 2024-12-01 NOTE — PROGRESS NOTES
Nutrition Note    RD charting remotely. Pt reports to diet office she does not have milk allergy. The allergy to \"butter flavor\" includes butter fat which includes milk, therefore removing allergy should be addressed by Pharmacy or MD with in-person confirmation.     Electronically signed by NIMA MUÑOZ MD, RD on 12/1/24 at 8:45 AM EST  Contact via [employer]

## 2024-12-01 NOTE — PROGRESS NOTES
0700 Bedside and Verbal shift change report given to JOHNNIE Peralta (oncoming nurse) by JOHNNIE Wright (offgoing nurse). Report included the following information Nurse Handoff Report, Recent Results, Med Rec Status, and Cardiac Rhythm NSR .

## 2024-12-02 ENCOUNTER — APPOINTMENT (OUTPATIENT)
Facility: HOSPITAL | Age: 74
DRG: 092 | End: 2024-12-02
Payer: MEDICARE

## 2024-12-02 ENCOUNTER — TELEPHONE (OUTPATIENT)
Age: 74
End: 2024-12-02

## 2024-12-02 PROBLEM — R47.1 DYSARTHRIA: Status: RESOLVED | Noted: 2024-11-28 | Resolved: 2024-12-02

## 2024-12-02 LAB
GLUCOSE BLD STRIP.AUTO-MCNC: 147 MG/DL (ref 65–117)
GLUCOSE BLD STRIP.AUTO-MCNC: 177 MG/DL (ref 65–117)
GLUCOSE BLD STRIP.AUTO-MCNC: 184 MG/DL (ref 65–117)
GLUCOSE BLD STRIP.AUTO-MCNC: 202 MG/DL (ref 65–117)
GLUCOSE BLD STRIP.AUTO-MCNC: 215 MG/DL (ref 65–117)
SERVICE CMNT-IMP: ABNORMAL

## 2024-12-02 PROCEDURE — 94761 N-INVAS EAR/PLS OXIMETRY MLT: CPT

## 2024-12-02 PROCEDURE — 2580000003 HC RX 258: Performed by: STUDENT IN AN ORGANIZED HEALTH CARE EDUCATION/TRAINING PROGRAM

## 2024-12-02 PROCEDURE — 6360000002 HC RX W HCPCS: Performed by: STUDENT IN AN ORGANIZED HEALTH CARE EDUCATION/TRAINING PROGRAM

## 2024-12-02 PROCEDURE — 70450 CT HEAD/BRAIN W/O DYE: CPT

## 2024-12-02 PROCEDURE — 70551 MRI BRAIN STEM W/O DYE: CPT

## 2024-12-02 PROCEDURE — 6370000000 HC RX 637 (ALT 250 FOR IP): Performed by: NURSE PRACTITIONER

## 2024-12-02 PROCEDURE — 97530 THERAPEUTIC ACTIVITIES: CPT

## 2024-12-02 PROCEDURE — 99232 SBSQ HOSP IP/OBS MODERATE 35: CPT | Performed by: PSYCHIATRY & NEUROLOGY

## 2024-12-02 PROCEDURE — 6370000000 HC RX 637 (ALT 250 FOR IP): Performed by: STUDENT IN AN ORGANIZED HEALTH CARE EDUCATION/TRAINING PROGRAM

## 2024-12-02 PROCEDURE — 97535 SELF CARE MNGMENT TRAINING: CPT | Performed by: OCCUPATIONAL THERAPIST

## 2024-12-02 PROCEDURE — 82962 GLUCOSE BLOOD TEST: CPT

## 2024-12-02 PROCEDURE — 2060000000 HC ICU INTERMEDIATE R&B

## 2024-12-02 RX ADMIN — INSULIN LISPRO 2 UNITS: 100 INJECTION, SOLUTION INTRAVENOUS; SUBCUTANEOUS at 18:13

## 2024-12-02 RX ADMIN — SODIUM CHLORIDE, PRESERVATIVE FREE 10 ML: 5 INJECTION INTRAVENOUS at 21:44

## 2024-12-02 RX ADMIN — PREDNISONE 2.5 MG: 5 TABLET ORAL at 08:59

## 2024-12-02 RX ADMIN — PREDNISONE 2.5 MG: 5 TABLET ORAL at 21:41

## 2024-12-02 RX ADMIN — ACETAMINOPHEN 650 MG: 325 TABLET ORAL at 21:42

## 2024-12-02 RX ADMIN — LEVOTHYROXINE SODIUM 137 MCG: 0.11 TABLET ORAL at 06:23

## 2024-12-02 RX ADMIN — ENOXAPARIN SODIUM 40 MG: 100 INJECTION SUBCUTANEOUS at 09:00

## 2024-12-02 RX ADMIN — INSULIN LISPRO 2 UNITS: 100 INJECTION, SOLUTION INTRAVENOUS; SUBCUTANEOUS at 11:40

## 2024-12-02 RX ADMIN — ASPIRIN 81 MG: 81 TABLET, CHEWABLE ORAL at 08:59

## 2024-12-02 RX ADMIN — ATORVASTATIN CALCIUM 80 MG: 20 TABLET, FILM COATED ORAL at 21:42

## 2024-12-02 RX ADMIN — DEXTRAN 70, GLYCERIN, HYPROMELLOSE 1 DROP: 1; 2; 3 SOLUTION/ DROPS OPHTHALMIC at 09:06

## 2024-12-02 RX ADMIN — ACETAMINOPHEN 650 MG: 325 TABLET ORAL at 14:52

## 2024-12-02 RX ADMIN — INSULIN GLARGINE 14 UNITS: 100 INJECTION, SOLUTION SUBCUTANEOUS at 21:42

## 2024-12-02 RX ADMIN — METOPROLOL SUCCINATE 25 MG: 25 TABLET, EXTENDED RELEASE ORAL at 08:57

## 2024-12-02 RX ADMIN — FENOFIBRATE 54 MG: 54 TABLET ORAL at 08:56

## 2024-12-02 RX ADMIN — SODIUM CHLORIDE, PRESERVATIVE FREE 10 ML: 5 INJECTION INTRAVENOUS at 09:07

## 2024-12-02 RX ADMIN — LANSOPRAZOLE 30 MG: 15 TABLET, ORALLY DISINTEGRATING, DELAYED RELEASE ORAL at 06:23

## 2024-12-02 ASSESSMENT — PAIN DESCRIPTION - LOCATION
LOCATION: FOOT
LOCATION: ARM

## 2024-12-02 ASSESSMENT — PAIN SCALES - GENERAL
PAINLEVEL_OUTOF10: 4
PAINLEVEL_OUTOF10: 7
PAINLEVEL_OUTOF10: 6

## 2024-12-02 ASSESSMENT — PAIN DESCRIPTION - ORIENTATION: ORIENTATION: RIGHT

## 2024-12-02 NOTE — PLAN OF CARE
Problem: Chronic Conditions and Co-morbidities  Goal: Patient's chronic conditions and co-morbidity symptoms are monitored and maintained or improved  11/30/2024 1033 by Kathryn Miranda RN  Outcome: Progressing  11/30/2024 0507 by Kyle Stallworth RN  Outcome: Progressing  Flowsheets (Taken 11/29/2024 2015)  Care Plan - Patient's Chronic Conditions and Co-Morbidity Symptoms are Monitored and Maintained or Improved: Monitor and assess patient's chronic conditions and comorbid symptoms for stability, deterioration, or improvement     Problem: Discharge Planning  Goal: Discharge to home or other facility with appropriate resources  11/30/2024 1033 by Kathryn Miranda RN  Outcome: Progressing  11/30/2024 0507 by Kyle Stallworth RN  Outcome: Progressing  Flowsheets (Taken 11/29/2024 2015)  Discharge to home or other facility with appropriate resources:   Identify barriers to discharge with patient and caregiver   Identify discharge learning needs (meds, wound care, etc)   Refer to discharge planning if patient needs post-hospital services based on physician order or complex needs related to functional status, cognitive ability or social support system     Problem: Pain  Goal: Verbalizes/displays adequate comfort level or baseline comfort level  11/30/2024 1033 by Kathryn Miranda RN  Outcome: Progressing  11/30/2024 0507 by Kyle Stallworth RN  Outcome: Progressing     Problem: Safety - Adult  Goal: Free from fall injury  11/30/2024 1033 by Kathryn Miranda RN  Outcome: Progressing  11/30/2024 0507 by Kyle Stallworth RN  Outcome: Progressing     
  Problem: Chronic Conditions and Co-morbidities  Goal: Patient's chronic conditions and co-morbidity symptoms are monitored and maintained or improved  12/1/2024 0711 by Kathryn Miranda RN  Outcome: Progressing  12/1/2024 0408 by Kyle Stallworth RN  Outcome: Progressing  Flowsheets (Taken 11/30/2024 2000)  Care Plan - Patient's Chronic Conditions and Co-Morbidity Symptoms are Monitored and Maintained or Improved: Monitor and assess patient's chronic conditions and comorbid symptoms for stability, deterioration, or improvement     Problem: Discharge Planning  Goal: Discharge to home or other facility with appropriate resources  12/1/2024 0711 by Kathryn Miranda RN  Outcome: Progressing  12/1/2024 0408 by Kyle Stallworth RN  Outcome: Progressing  Flowsheets (Taken 11/30/2024 2000)  Discharge to home or other facility with appropriate resources:   Identify barriers to discharge with patient and caregiver   Identify discharge learning needs (meds, wound care, etc)   Refer to discharge planning if patient needs post-hospital services based on physician order or complex needs related to functional status, cognitive ability or social support system     Problem: Pain  Goal: Verbalizes/displays adequate comfort level or baseline comfort level  12/1/2024 0711 by Kathryn Miranda RN  Outcome: Progressing  12/1/2024 0408 by Kyle Stallworth RN  Outcome: Progressing     Problem: Safety - Adult  Goal: Free from fall injury  12/1/2024 0711 by Kathryn Miranda RN  Outcome: Progressing  12/1/2024 0408 by Kyle Stallworth RN  Outcome: Progressing     
  Problem: Chronic Conditions and Co-morbidities  Goal: Patient's chronic conditions and co-morbidity symptoms are monitored and maintained or improved  Outcome: Progressing  Flowsheets (Taken 11/29/2024 2015)  Care Plan - Patient's Chronic Conditions and Co-Morbidity Symptoms are Monitored and Maintained or Improved: Monitor and assess patient's chronic conditions and comorbid symptoms for stability, deterioration, or improvement     Problem: Discharge Planning  Goal: Discharge to home or other facility with appropriate resources  Outcome: Progressing  Flowsheets (Taken 11/29/2024 2015)  Discharge to home or other facility with appropriate resources:   Identify barriers to discharge with patient and caregiver   Identify discharge learning needs (meds, wound care, etc)   Refer to discharge planning if patient needs post-hospital services based on physician order or complex needs related to functional status, cognitive ability or social support system     Problem: Pain  Goal: Verbalizes/displays adequate comfort level or baseline comfort level  Outcome: Progressing     Problem: Safety - Adult  Goal: Free from fall injury  Outcome: Progressing     
  Problem: Chronic Conditions and Co-morbidities  Goal: Patient's chronic conditions and co-morbidity symptoms are monitored and maintained or improved  Outcome: Progressing  Flowsheets (Taken 11/30/2024 2000)  Care Plan - Patient's Chronic Conditions and Co-Morbidity Symptoms are Monitored and Maintained or Improved: Monitor and assess patient's chronic conditions and comorbid symptoms for stability, deterioration, or improvement     Problem: Discharge Planning  Goal: Discharge to home or other facility with appropriate resources  Outcome: Progressing  Flowsheets (Taken 11/30/2024 2000)  Discharge to home or other facility with appropriate resources:   Identify barriers to discharge with patient and caregiver   Identify discharge learning needs (meds, wound care, etc)   Refer to discharge planning if patient needs post-hospital services based on physician order or complex needs related to functional status, cognitive ability or social support system     Problem: Pain  Goal: Verbalizes/displays adequate comfort level or baseline comfort level  Outcome: Progressing     Problem: Safety - Adult  Goal: Free from fall injury  Outcome: Progressing     
  Problem: Occupational Therapy - Adult  Goal: By Discharge: Performs self-care activities at highest level of function for planned discharge setting.  See evaluation for individualized goals.  Description: FUNCTIONAL STATUS PRIOR TO ADMISSION:  Patient was mod I for ADLs and transfers.  She uses rollator occasionally.  Receives Help From: Family, ADL Assistance: Independent,  ,  ,  ,  ,  , Homemaking Assistance: Independent, Ambulation Assistance: Independent, Transfer Assistance: Independent, Active : Yes     HOME SUPPORT: Patient lived with spouse, but did not require assistance.    Occupational Therapy Goals:  Initiated 11/29/2024  1.  Patient will perform lower body dressing with Modified [place] within 7 day(s).  2.  Patient will perform grooming, standing at sink, with Modified [place] within 7 day(s).  3.  Patient will perform toilet transfers with Modified [place]  within 7 day(s).  4.  Patient will perform all aspects of toileting with Modified [place] within 7 day(s).  5.  Patient will participate in upper extremity therapeutic exercise/activities with Modified [place] for 10 minutes within 7 day(s).      Outcome: Progressing   OCCUPATIONAL THERAPY EVALUATION    Patient: Felicia Rangel (74 y.o. female)  Date: 11/29/2024  Primary Diagnosis: Dysarthria [R47.1]  Difficulty speaking [R47.9]  Cerebrovascular accident (CVA), unspecified mechanism (HCC) [I63.9]         Precautions: Fall Risk                  ASSESSMENT :  The patient is limited by decreased functional mobility, activity tolerance, endurance, balance, and episodes of stuttering following admission for CVA work up . Patient reports difficulty speaking at home, and now with stuttering which has been present 3 times prior.  Patient reports symptoms resolve in 3 days.  Patient received on phone, in NAD.  Patient continues conversation and noted no speech difficulty.  However, with participation during therapy, patient 
  Problem: Occupational Therapy - Adult  Goal: By Discharge: Performs self-care activities at highest level of function for planned discharge setting.  See evaluation for individualized goals.  Description: FUNCTIONAL STATUS PRIOR TO ADMISSION:  Patient was mod I for ADLs and transfers.  She uses rollator occasionally.  Receives Help From: Family, ADL Assistance: Independent, Homemaking Assistance: Independent, Ambulation Assistance: Independent, Transfer Assistance: Independent, Active : Yes     HOME SUPPORT: Patient lived with spouse, but did not require assistance.    Occupational Therapy Goals:  Initiated 11/29/2024  1.  Patient will perform lower body dressing with Modified [place] within 7 day(s).  2.  Patient will perform grooming, standing at sink, with Modified [place] within 7 day(s).  3.  Patient will perform toilet transfers with Modified [place]  within 7 day(s).  4.  Patient will perform all aspects of toileting with Modified [place] within 7 day(s).  5.  Patient will participate in upper extremity therapeutic exercise/activities with Modified [place] for 10 minutes within 7 day(s).      Outcome: Completed      OCCUPATIONAL THERAPY TREATMENT/DISCHARGE  Patient: Felicia Rangel (74 y.o. female)  Date: 12/2/2024  Primary Diagnosis: Dysarthria [R47.1]  Difficulty speaking [R47.9]  Cerebrovascular accident (CVA), unspecified mechanism (HCC) [I63.9]       Precautions: Fall Risk                  Chart, occupational therapy assessment, plan of care, and goals were reviewed.    ASSESSMENT  Patient continues with skilled OT services and has not progressed towards goals.  Pt is now at an overall indepdnent level with ADLs and functional mobility without AD.  She has good safety awareness and no LOB. Pt with no verbal impairments noted this session.  No further skilled OT required at this time.           PLAN :  Discharge from acute OT    Recommendation for discharge: (in order for 
  Problem: Physical Therapy - Adult  Goal: By Discharge: Performs mobility at highest level of function for planned discharge setting.  See evaluation for individualized goals.  Description: FUNCTIONAL STATUS PRIOR TO ADMISSION: Patient was modified independent using \"furniture walking\" for functional mobility.  Reports occasionally with use her rollator \"if I need it\".    HOME SUPPORT PRIOR TO ADMISSION: The patient lived with her  but did not require assistance.    Physical Therapy Goals  Initiated 11/29/2024  1.  Patient will move from supine to sit and sit to supine, scoot up and down, and roll side to side in bed with modified independence within 7 day(s).    2.  Patient will perform sit to stand with modified independence within 7 day(s).  3.  Patient will transfer from bed to chair and chair to bed with modified independence using the least restrictive device within 7 day(s).  4.  Patient will ambulate with modified independence for 150 feet with the least restrictive device within 7 day(s).   5.  Patient will ascend/descend 5 stairs with 2 handrail(s) with supervision/set-up within 7 day(s).    Outcome: Progressing   PHYSICAL THERAPY EVALUATION    Patient: Felicia Rangel (74 y.o. female)  Date: 11/29/2024  Primary Diagnosis: Dysarthria [R47.1]  Difficulty speaking [R47.9]  Cerebrovascular accident (CVA), unspecified mechanism (HCC) [I63.9]       Precautions: Restrictions/Precautions: Fall Risk                      ASSESSMENT :   DEFICITS/IMPAIRMENTS:   The patient is limited by decreased functional mobility, endurance, safety awareness, balance, dysarthria/stuttering and gait dysfunction s/p admission for CVA workup. Patient reports having this stuttering issue three times prior and it \"always goes away in 3 days\". Strength and sensation intact and patient noted to be speaking on cell phone very clearly for extended time period when therapists arrived to her room.  Stuttering was present, however, 
Given To: Patient;Family  Education Provided: Role of Therapy;Plan of Care;Precautions;Transfer Training;Fall Prevention Strategies  Education Method: Verbal;Teach Back  Education Outcome: Continued education needed      Sanam Mcfarlane PT  Minutes: 17

## 2024-12-02 NOTE — PROGRESS NOTES
INPATIENT NEUROLOGY FOLLOW-UP NOTE    NAME:  Felicia Rangel  MRN:  [id number]  : 1950      ADMIT DATE:   2024 11:39 AM    REASON FOR FOLLOW UP: Episode of speech difficulty     ===========    24  I reviewed the Neurology Consult performed by Dr Kwan on 2024.   Pt with PMHx A Fib, HTN, DM, HLD, Pacemaker, CHF, etc.      Pt awake, alert.  Tells me that on , she /  were getting ready to go to her Son's for dinner when she started feeling \"an earthquake\" inside her body, trembling inside body, but no visible shaking/ trembling.  Denies any baseline hx of tremors.  Says then she started to feel weak.  Was standing when this happened.  Held onto  but felt like she was going to go down so sat down. Since then she's had intermittent speech difficulty, clearly stuttering in nature (not dysarthric, no aphasic).  She denies any recent stressors.       Reports having a TIA in the past.  Pertinent meds at time of admission were: ASA 81 mg/ day, Pitavastatin 1 mg QHS.  Allergy section lists Warfarin (unknown reaction), Plavix (GI bleed), among many other med allergies. During admission, has been placed on Lipitor 80 mg QHS as Pitavastatin not on formulary.  Tchol 179, Trig 360 HH, HDL 45, LDL 62    CTA Head/ Neck here didn't show any cervical or intracranial aneurysm, any LVO, any hemodynamically significant stenosis.  Brain MRI is pending clearance of pacemaker +/- cardiac stent, though patient says she has had Cardiac MRI after the pacemaker was put in.  Exam: awake, alert, conversant, mixed normal speech and stuttering, no aphasia, no dysarthria, no facial droop, moving all exts w/o difficulty.  ASSESSMENT/ PLAN:   Episode of internal trembling, \"earthquake\" without any visible tremor, followed by generalized weakness, no LOC, and development of intermittent speech difficulty/ Stuttering. The above hx does not sound like TIA or CVA.  New onset stuttering in an adult is most

## 2024-12-02 NOTE — PROGRESS NOTES
[facility] LAN[facility]  [address] [place] [address]  [phone]      Hospitalist  Progress Note      NAME:       Felicia Rangel   :        1950  MRM:        [id number]    Date of service: 2024      Subjective: Patient seen and examined by me. Patient admitted with persistent intermittent dysarthria. No vision changes or focal weakness.    Patient has been stable with stable vital signs, today while getting the MRI done patient had another episode of right upper extremity weakness and slurred speech    Objective:    Vital Signs:    /78   Pulse 84   Temp 97.7 °F (36.5 °C) (Oral)   Resp 17   Ht 1.575 m (5' 2\")   Wt 75.1 kg (165 lb 8 oz)   SpO2 96%   BMI 30.27 kg/m²        Intake/Output Summary (Last 24 hours) at 2024 1444  Last data filed at 2024 0824  Gross per 24 hour   Intake 240 ml   Output --   Net 240 ml          Current inpatient medications reviewed:  Current Facility-Administered Medications   Medication Dose Route Frequency    lidocaine 4 % external patch 1 patch  1 patch TransDERmal Nightly    ipratropium 0.5 mg-albuterol 2.5 mg (DUONEB) nebulizer solution 1 Dose  1 Dose Inhalation Q4H PRN    sodium chloride flush 0.9 % injection 5-40 mL  5-40 mL IntraVENous 2 times per day    sodium chloride flush 0.9 % injection 5-40 mL  5-40 mL IntraVENous PRN    0.9 % sodium chloride infusion   IntraVENous PRN    ondansetron (ZOFRAN-ODT) disintegrating tablet 4 mg  4 mg Oral Q8H PRN    Or    ondansetron (ZOFRAN) injection 4 mg  4 mg IntraVENous Q6H PRN    atorvastatin (LIPITOR) tablet 80 mg  80 mg Oral Nightly    enoxaparin (LOVENOX) injection 40 mg  40 mg SubCUTAneous Daily    labetalol (NORMODYNE;TRANDATE) injection 10 mg  10 mg IntraVENous Q6H PRN    aspirin chewable tablet 81 mg  81 mg Oral Daily    artificial tears (dextran-hypromellose-glycerin) ophthalmic solution 1 drop  1 drop Both

## 2024-12-02 NOTE — TELEPHONE ENCOUNTER
Patient needs a hospital follow up appointment    Provider: ANY  In person or virtual: in person at [place]  When: 6 weeks  Diagnosis/ Reason for follow-up: recurring TIA-like episodes, speech change, no abnormality on Brain MRI.     Additional information (I.e, best phone number or family member to call, etc):     PATIENT

## 2024-12-02 NOTE — SIGNIFICANT EVENT
Responded to rapid response at 2 PM or patient was taken down for MRI, as been pending over the weekend    As patient was in the MRI she reports weakness of her right upper extremity, and slurred speech was noted    Code stroke was called    I spoke with teleneurology and reported the case to him, I went at bedside in the CT and evaluated the patient patient is alert oriented x 3  Motor exam 5/5 right upper extremity left upper extremity  Per nursing staff episode has resolved by the time she was transferred for another CT    Teleneurology evaluated the patient and reported MRI is negative for acute stroke, and no need to repeat any further imaging    And recommended routine EEG    40 minutes of critical care time spent with the patient other than procedures

## 2024-12-02 NOTE — CARE COORDINATION
12/2/24  2:42 PM    Care Management Progress Note    Reason for Admission:   Dysarthria [R47.1]  Difficulty speaking [R47.9]  Cerebrovascular accident (CVA), unspecified mechanism (HCC) [I63.9]         Patient Admission Status: Inpatient  RUR: 16%  Hospitalization in the last 30 days (Readmission):  No        Transition of care plan:  Ongoing medical management  Home with family at dc- no skilled PT/OT needs  Discharge plan communicated with patient and/or discharge caregiver: Yes    Date 1st IMM letter given: 11/28/24  Outpatient follow-up.  Transport at discharge: Family    SUMI Harding  [facility]      Available via [facility]

## 2024-12-02 NOTE — PROGRESS NOTES
1353: RRT called in MRI. Patient slurring/stuttering words and unable to move right arm. VSS.     1401: Patient transported to CT. Code Stroke Activation. Dr. Armenta notified. Primary RN notified. Patient continues to stutter but is now able to move right arm with equal .     Signs and symptoms: Stuttering   Time of Code Stroke Activation:1401  Provider at bedside time:  Dr. Armenta  VAN score: negative   Last Known Well (Time): 1353  Blood Glucose Result/Time: 215   Blood Pressure: 151/74  Anticoagulants (List medications): Aspirin, Lovenox    CT completed.Tele Neuro assessed patient. No other RRT interventions at this time. EEG ordered. Assisted primary RN with transfer back to room 313.

## 2024-12-02 NOTE — PROGRESS NOTES
0700: Bedside and Verbal shift change report given to JOHNNIE Fink (oncoming nurse) by JOHNNIE Kate (offgoing nurse). Report included the following information Nurse Handoff Report, Adult Overview, Recent Results, Cardiac Rhythm av paced, and Neuro Assessment.

## 2024-12-03 VITALS
SYSTOLIC BLOOD PRESSURE: 118 MMHG | RESPIRATION RATE: 17 BRPM | HEART RATE: 85 BPM | OXYGEN SATURATION: 94 % | TEMPERATURE: 97.2 F | WEIGHT: 163.6 LBS | HEIGHT: 62 IN | DIASTOLIC BLOOD PRESSURE: 72 MMHG | BODY MASS INDEX: 30.11 KG/M2

## 2024-12-03 LAB
GLUCOSE BLD STRIP.AUTO-MCNC: 157 MG/DL (ref 65–117)
SERVICE CMNT-IMP: ABNORMAL

## 2024-12-03 PROCEDURE — 6370000000 HC RX 637 (ALT 250 FOR IP): Performed by: STUDENT IN AN ORGANIZED HEALTH CARE EDUCATION/TRAINING PROGRAM

## 2024-12-03 PROCEDURE — 95816 EEG AWAKE AND DROWSY: CPT

## 2024-12-03 PROCEDURE — 82962 GLUCOSE BLOOD TEST: CPT

## 2024-12-03 PROCEDURE — 94761 N-INVAS EAR/PLS OXIMETRY MLT: CPT

## 2024-12-03 PROCEDURE — 6360000002 HC RX W HCPCS: Performed by: STUDENT IN AN ORGANIZED HEALTH CARE EDUCATION/TRAINING PROGRAM

## 2024-12-03 PROCEDURE — 95816 EEG AWAKE AND DROWSY: CPT | Performed by: PSYCHIATRY & NEUROLOGY

## 2024-12-03 RX ADMIN — LEVOTHYROXINE SODIUM 137 MCG: 0.11 TABLET ORAL at 06:06

## 2024-12-03 RX ADMIN — DEXTRAN 70, GLYCERIN, HYPROMELLOSE 1 DROP: 1; 2; 3 SOLUTION/ DROPS OPHTHALMIC at 09:45

## 2024-12-03 RX ADMIN — ENOXAPARIN SODIUM 40 MG: 100 INJECTION SUBCUTANEOUS at 09:43

## 2024-12-03 RX ADMIN — LANSOPRAZOLE 30 MG: 15 TABLET, ORALLY DISINTEGRATING, DELAYED RELEASE ORAL at 06:06

## 2024-12-03 RX ADMIN — FENOFIBRATE 54 MG: 54 TABLET ORAL at 09:42

## 2024-12-03 RX ADMIN — PREDNISONE 2.5 MG: 5 TABLET ORAL at 09:42

## 2024-12-03 RX ADMIN — ASPIRIN 81 MG: 81 TABLET, CHEWABLE ORAL at 09:42

## 2024-12-03 RX ADMIN — METOPROLOL SUCCINATE 25 MG: 25 TABLET, EXTENDED RELEASE ORAL at 09:42

## 2024-12-03 NOTE — DISCHARGE INSTRUCTIONS
HOSPITALIST DISCHARGE INSTRUCTIONS  NAME:  Felicia Rangel   :  1950   MRN:  [id number]     Date/Time:  12/3/2024 10:36 AM    ADMIT DATE: 2024     DISCHARGE DATE: 12/3/2024     DISCHARGE DIAGNOSIS:      DISCHARGE INSTRUCTIONS:  Thank you for allowing us to participate in your care. Your discharging Hospitalist is Kervin Armenta MD. You were admitted for evaluation and treatment of the above.     Dysarthria POA: concerning for an acute CVA on admission.   Recurrent episode of internal trembling, \"earthquake\" without any visible tremor, followed by generalized weakness, no LOC, and development of intermittent speech difficulty/ Stuttering   CT scan head, CTA head and neck as noted above showing no acute changes.   MRI done today, pending official reading however evaluated by teleneurology and reported no stroke  LDL 62.   A1c 8.7.   Echo done 10/2024 showed no shunt. Normal EF of 50%.  Repeat TTE: eft Ventricle: Normal left ventricular systolic function. EF by 2D Simpsons Biplane is 60%. Left ventricle size is normal. Increased wall thickness. Findings consistent with concentric remodeling. Normal wall motion. Diastolic dysfunction present with normal LV EF.   : Patient had an acute episode of slurred speech and right upper extremity weakness when she was reevaluated by teleneurology recommended EEG  -Routine EEG: normal, no further work up needed   Plan  -Continue aspirin and statin  -Follow-up with neurology outpatient     Atrial flutter sp AV felicia ablation / sp dual chamber pacemaker POA: rate controlled. Continue Toprol XL, Asprin     CAD (coronary artery disease) / HTN (hypertension), benign POA: has prior PCI to LAD and RCA. Intolerant to statins due to myalgias.  On Tricor, currently atorvastatin 80     Diabetes mellitus type 2, controlled POA: A1c 8.7. BG stable. Continue DM diet, Lantus, Lispro sliding scale     Chronic HFrEF (heart failure with reduced ejection fraction) POA: EF 50% on

## 2024-12-03 NOTE — DISCHARGE SUMMARY
breath sounds without crackles or wheezes  Card:  no JVD or murmurs, has regular and normal S1, S2 without thrills, bruits or peripheral edema  Abd:  Soft, non-tender, non-distended, normoactive bowel sounds   Musc:  No cyanosis, clubbing, atrophy or deformities.  Skin:  No rashes, bruising or ulcers.   Neuro: Awake and alert. Dysarthric. Generally a non focal exam. Follows commands appropriately  Psych:  Has a good insight and is oriented x 3        Disposition: home    Patient Instructions:   Current Discharge Medication List        CONTINUE these medications which have NOT CHANGED    Details   nitroGLYCERIN (NITRODUR) 0.1 MG/HR Place 1 patch onto the skin daily  Qty: 90 patch, Refills: 1      aspirin 81 MG chewable tablet Take 1 tablet by mouth daily  Qty: 30 tablet, Refills: 3      insulin glargine (LANTUS) 100 UNIT/ML injection vial Inject 14 Units into the skin nightly      metoprolol succinate (TOPROL XL) 25 MG extended release tablet Take 1 tablet by mouth daily  Qty: 90 tablet, Refills: 3      lansoprazole (PREVACID SOLUTAB) 30 MG disintegrating tablet Take 1 tablet by mouth daily      eplerenone (INSPRA) 25 MG tablet Take 1 tablet by mouth daily  Qty: 90 tablet, Refills: 1      pitavastatin (LIVALO) 1 MG TABS tablet Take 1 tablet by mouth nightly  Qty: 90 tablet, Refills: 3      fenofibrate (TRICOR) 48 MG tablet Take 1 tablet by mouth daily  Qty: 90 tablet, Refills: 3      insulin lispro (HUMALOG) 100 UNIT/ML injection cartridge Inject 4 Units into the skin 3 times daily (before meals)      acetaminophen (TYLENOL) 650 MG extended release tablet Take by mouth      aclidinium (TUDORZA PRESSAIR) 400 MCG/ACT AEPB inhaler Inhale 1 puff into the lungs 2 times daily as needed      bumetanide (BUMEX) 1 MG tablet Take 1 tablet by mouth daily as needed      calcium citrate (CALCITRATE) 950 (200 Ca) MG tablet Take by mouth daily      vitamin D (CHOLECALCIFEROL) 25 MCG (1000 UT) TABS tablet Take by mouth daily

## 2024-12-03 NOTE — PROCEDURES
Routine EEG      [place], [place]        [phone] (Main)  [phone] (Medical Records)     Routine EEG (16-channel)    Date of Study:   12-3-2024  Date of Interpretation: 12/3/24    Indication:    74 y.o. female  Dysarthria [R47.1]  Difficulty speaking [R47.9]  Cerebrovascular accident (CVA), unspecified mechanism (HCC) [I63.9]    PSHx lists Cranial Surgery: No    Impression: Normal awake and drowsy EEG recording.  No epileptiform discharges were seen during this recording.  Clinical and Neuro-Imaging correlation is necessary    =================================  Technical: 16-channel, multiple montages, digital EEG, 10-20 international placement system format.   Simultaneous video recording is performed.  The technical quality of the study was adequate.  Single lead EKG was recorded.     Interpretation:  The patient is described as awake and eyes closed at the beginning of the recording.  The background is symmetric and medium to low in amplitude posterior dominant rhythm is 8 Hz on both sides.  Photic stimulation does not result in a clear driving response in either side.  Hyperventilation is not performed.  Drowsiness is recorded and is normal.  Sleep is not recorded.  There are no focal areas of slowing, epileptiform discharge or subclinical seizures during this recording.  The single-lead EKG shows a paced cardiac rhythm.    =================================    Home Meds    Medications Prior to Admission: nitroGLYCERIN (NITRODUR) 0.1 MG/HR, Place 1 patch onto the skin daily  aspirin 81 MG chewable tablet, Take 1 tablet by mouth daily  insulin glargine (LANTUS) 100 UNIT/ML injection vial, Inject 14 Units into the skin nightly  metoprolol succinate (TOPROL XL) 25 MG extended release tablet, Take 1 tablet by mouth daily  lansoprazole (PREVACID SOLUTAB) 30 MG disintegrating tablet, Take 1 tablet by mouth daily  eplerenone (INSPRA) 25 MG tablet, Take 1 tablet by mouth

## 2024-12-03 NOTE — PROGRESS NOTES
0700: Bedside and Verbal shift change report given to JOHNNIE Fink (oncoming nurse) by Lavinia (offgoing nurse). Report included the following information Nurse Handoff Report, Adult Overview, Recent Results, Cardiac Rhythm AV pace, and Neuro Assessment.

## 2024-12-04 NOTE — TELEPHONE ENCOUNTER
Reached out to the patient on the two numbers on file to schedule a hospital follow up for TIA. LVM to call and schedule. If the patient calls back please schedule.

## 2024-12-28 ENCOUNTER — APPOINTMENT (OUTPATIENT)
Facility: HOSPITAL | Age: 74
DRG: 871 | End: 2024-12-28
Payer: MEDICARE

## 2024-12-28 ENCOUNTER — HOSPITAL ENCOUNTER (EMERGENCY)
Facility: HOSPITAL | Age: 74
Discharge: HOME | DRG: 871 | End: 2024-12-31
Payer: MEDICARE

## 2024-12-28 ENCOUNTER — HOSPITAL ENCOUNTER (INPATIENT)
Facility: HOSPITAL | Age: 74
LOS: 6 days | Discharge: HOME | DRG: 871 | End: 2025-01-03
Attending: EMERGENCY MEDICINE | Admitting: INTERNAL MEDICINE
Payer: MEDICARE

## 2024-12-28 PROBLEM — A41.9 SEVERE SEPSIS (HCC): Status: ACTIVE | Noted: 2024-12-28

## 2024-12-28 PROBLEM — R65.20 SEVERE SEPSIS (HCC): Status: ACTIVE | Noted: 2024-12-28

## 2024-12-28 LAB
ALBUMIN SERPL-MCNC: 3.2 G/DL (ref 3.5–5)
ALBUMIN/GLOB SERPL: 0.9 (ref 1.1–2.2)
ALP SERPL-CCNC: 127 U/L (ref 45–117)
ALT SERPL-CCNC: 26 U/L (ref 12–78)
ANION GAP SERPL CALC-SCNC: 13 MMOL/L (ref 2–12)
APPEARANCE UR: ABNORMAL
ARTERIAL PATENCY WRIST A: POSITIVE
AST SERPL-CCNC: 19 U/L (ref 15–37)
BACTERIA URNS QL MICRO: ABNORMAL /HPF
BASE DEFICIT BLD-SCNC: 0.8 MMOL/L
BASOPHILS # BLD: 0 K/UL (ref 0–0.1)
BASOPHILS NFR BLD: 0 % (ref 0–1)
BDY SITE: ABNORMAL
BILIRUB SERPL-MCNC: 0.6 MG/DL (ref 0.2–1)
BILIRUB UR QL: NEGATIVE
BUN SERPL-MCNC: 21 MG/DL (ref 6–20)
BUN/CREAT SERPL: 24 (ref 12–20)
CALCIUM SERPL-MCNC: 8.5 MG/DL (ref 8.5–10.1)
CHLORIDE SERPL-SCNC: 101 MMOL/L (ref 97–108)
CO2 SERPL-SCNC: 25 MMOL/L (ref 21–32)
COLOR UR: ABNORMAL
CREAT SERPL-MCNC: 0.86 MG/DL (ref 0.55–1.02)
DIFFERENTIAL METHOD BLD: ABNORMAL
EOSINOPHIL # BLD: 0 K/UL (ref 0–0.4)
EOSINOPHIL NFR BLD: 0 % (ref 0–7)
EPITH CASTS URNS QL MICRO: ABNORMAL /LPF
ERYTHROCYTE [DISTWIDTH] IN BLOOD BY AUTOMATED COUNT: 12.8 % (ref 11.5–14.5)
FLUAV RNA SPEC QL NAA+PROBE: NOT DETECTED
FLUBV RNA SPEC QL NAA+PROBE: NOT DETECTED
GAS FLOW.O2 O2 DELIVERY SYS: ABNORMAL
GLOBULIN SER CALC-MCNC: 3.4 G/DL (ref 2–4)
GLUCOSE BLD STRIP.AUTO-MCNC: 205 MG/DL (ref 65–117)
GLUCOSE BLD STRIP.AUTO-MCNC: 265 MG/DL (ref 65–117)
GLUCOSE BLD STRIP.AUTO-MCNC: 323 MG/DL (ref 65–117)
GLUCOSE SERPL-MCNC: 313 MG/DL (ref 65–100)
GLUCOSE UR STRIP.AUTO-MCNC: 250 MG/DL
HCO3 BLD-SCNC: 24.3 MMOL/L (ref 21–28)
HCT VFR BLD AUTO: 47.1 % (ref 35–47)
HGB BLD-MCNC: 16 G/DL (ref 11.5–16)
HGB UR QL STRIP: NEGATIVE
IMM GRANULOCYTES # BLD AUTO: 0 K/UL
IMM GRANULOCYTES NFR BLD AUTO: 0 %
KETONES UR QL STRIP.AUTO: ABNORMAL MG/DL
LACTATE SERPL-SCNC: 2.8 MMOL/L (ref 0.4–2)
LACTATE SERPL-SCNC: 3.3 MMOL/L (ref 0.4–2)
LACTATE SERPL-SCNC: 3.3 MMOL/L (ref 0.4–2)
LEUKOCYTE ESTERASE UR QL STRIP.AUTO: ABNORMAL
LIPASE SERPL-CCNC: 29 U/L (ref 13–75)
LYMPHOCYTES # BLD: 0.7 K/UL (ref 0.8–3.5)
LYMPHOCYTES NFR BLD: 5 % (ref 12–49)
MAGNESIUM SERPL-MCNC: 1.5 MG/DL (ref 1.6–2.4)
MCH RBC QN AUTO: 30.7 PG (ref 26–34)
MCHC RBC AUTO-ENTMCNC: 34 G/DL (ref 30–36.5)
MCV RBC AUTO: 90.2 FL (ref 80–99)
MONOCYTES # BLD: 0.5 K/UL (ref 0–1)
MONOCYTES NFR BLD: 4 % (ref 5–13)
MYELOCYTES NFR BLD MANUAL: 1 %
NEUTS BAND NFR BLD MANUAL: 6 % (ref 0–6)
NEUTS SEG # BLD: 12 K/UL (ref 1.8–8)
NEUTS SEG NFR BLD: 84 % (ref 32–75)
NITRITE UR QL STRIP.AUTO: POSITIVE
NRBC # BLD: 0 K/UL (ref 0–0.01)
NRBC BLD-RTO: 0 PER 100 WBC
PCO2 BLD: 40.9 MMHG (ref 35–48)
PH BLD: 7.38 (ref 7.35–7.45)
PH UR STRIP: 6 (ref 5–8)
PLATELET # BLD AUTO: 201 K/UL (ref 150–400)
PMV BLD AUTO: 10.6 FL (ref 8.9–12.9)
PO2 BLD: 65 MMHG (ref 83–108)
POTASSIUM SERPL-SCNC: 4.3 MMOL/L (ref 3.5–5.1)
PROT SERPL-MCNC: 6.6 G/DL (ref 6.4–8.2)
PROT UR STRIP-MCNC: ABNORMAL MG/DL
RBC # BLD AUTO: 5.22 M/UL (ref 3.8–5.2)
RBC #/AREA URNS HPF: ABNORMAL /HPF (ref 0–5)
RBC MORPH BLD: ABNORMAL
SAO2 % BLD: 92 % (ref 92–97)
SARS-COV-2 RNA RESP QL NAA+PROBE: NOT DETECTED
SERVICE CMNT-IMP: ABNORMAL
SODIUM SERPL-SCNC: 139 MMOL/L (ref 136–145)
SOURCE: NORMAL
SP GR UR REFRACTOMETRY: 1.03 (ref 1–1.03)
SPECIMEN TYPE: ABNORMAL
TSH SERPL DL<=0.05 MIU/L-ACNC: 0.52 UIU/ML (ref 0.36–3.74)
URINE CULTURE IF INDICATED: ABNORMAL
UROBILINOGEN UR QL STRIP.AUTO: 0.2 EU/DL (ref 0.2–1)
WBC # BLD AUTO: 13.3 K/UL (ref 3.6–11)
WBC URNS QL MICRO: ABNORMAL /HPF (ref 0–4)

## 2024-12-28 PROCEDURE — 2500000003 HC RX 250 WO HCPCS: Performed by: INTERNAL MEDICINE

## 2024-12-28 PROCEDURE — 74176 CT ABD & PELVIS W/O CONTRAST: CPT

## 2024-12-28 PROCEDURE — 87088 URINE BACTERIA CULTURE: CPT

## 2024-12-28 PROCEDURE — 6370000000 HC RX 637 (ALT 250 FOR IP): Performed by: EMERGENCY MEDICINE

## 2024-12-28 PROCEDURE — 81001 URINALYSIS AUTO W/SCOPE: CPT

## 2024-12-28 PROCEDURE — 71045 X-RAY EXAM CHEST 1 VIEW: CPT

## 2024-12-28 PROCEDURE — 6360000002 HC RX W HCPCS: Performed by: INTERNAL MEDICINE

## 2024-12-28 PROCEDURE — 83735 ASSAY OF MAGNESIUM: CPT

## 2024-12-28 PROCEDURE — 99285 EMERGENCY DEPT VISIT HI MDM: CPT

## 2024-12-28 PROCEDURE — 87186 SC STD MICRODIL/AGAR DIL: CPT

## 2024-12-28 PROCEDURE — 87086 URINE CULTURE/COLONY COUNT: CPT

## 2024-12-28 PROCEDURE — 83690 ASSAY OF LIPASE: CPT

## 2024-12-28 PROCEDURE — 2580000003 HC RX 258: Performed by: INTERNAL MEDICINE

## 2024-12-28 PROCEDURE — 96375 TX/PRO/DX INJ NEW DRUG ADDON: CPT

## 2024-12-28 PROCEDURE — 70450 CT HEAD/BRAIN W/O DYE: CPT

## 2024-12-28 PROCEDURE — 36600 WITHDRAWAL OF ARTERIAL BLOOD: CPT

## 2024-12-28 PROCEDURE — 93005 ELECTROCARDIOGRAM TRACING: CPT | Performed by: EMERGENCY MEDICINE

## 2024-12-28 PROCEDURE — 6370000000 HC RX 637 (ALT 250 FOR IP): Performed by: INTERNAL MEDICINE

## 2024-12-28 PROCEDURE — 2060000000 HC ICU INTERMEDIATE R&B

## 2024-12-28 PROCEDURE — 96365 THER/PROPH/DIAG IV INF INIT: CPT

## 2024-12-28 PROCEDURE — 85025 COMPLETE CBC W/AUTO DIFF WBC: CPT

## 2024-12-28 PROCEDURE — 87636 SARSCOV2 & INF A&B AMP PRB: CPT

## 2024-12-28 PROCEDURE — 84443 ASSAY THYROID STIM HORMONE: CPT

## 2024-12-28 PROCEDURE — 2580000003 HC RX 258: Performed by: EMERGENCY MEDICINE

## 2024-12-28 PROCEDURE — 87040 BLOOD CULTURE FOR BACTERIA: CPT

## 2024-12-28 PROCEDURE — 36415 COLL VENOUS BLD VENIPUNCTURE: CPT

## 2024-12-28 PROCEDURE — 80053 COMPREHEN METABOLIC PANEL: CPT

## 2024-12-28 PROCEDURE — 94761 N-INVAS EAR/PLS OXIMETRY MLT: CPT

## 2024-12-28 PROCEDURE — 6360000002 HC RX W HCPCS: Performed by: EMERGENCY MEDICINE

## 2024-12-28 PROCEDURE — 82803 BLOOD GASES ANY COMBINATION: CPT

## 2024-12-28 PROCEDURE — 83605 ASSAY OF LACTIC ACID: CPT

## 2024-12-28 PROCEDURE — 82962 GLUCOSE BLOOD TEST: CPT

## 2024-12-28 RX ORDER — INSULIN GLARGINE 100 [IU]/ML
15 INJECTION, SOLUTION SUBCUTANEOUS NIGHTLY
Status: DISCONTINUED | OUTPATIENT
Start: 2024-12-28 | End: 2025-01-01

## 2024-12-28 RX ORDER — DEXTROSE MONOHYDRATE 100 MG/ML
INJECTION, SOLUTION INTRAVENOUS CONTINUOUS PRN
Status: DISCONTINUED | OUTPATIENT
Start: 2024-12-28 | End: 2025-01-03 | Stop reason: HOSPADM

## 2024-12-28 RX ORDER — SODIUM CHLORIDE 0.9 % (FLUSH) 0.9 %
5-40 SYRINGE (ML) INJECTION PRN
Status: DISCONTINUED | OUTPATIENT
Start: 2024-12-28 | End: 2025-01-03 | Stop reason: HOSPADM

## 2024-12-28 RX ORDER — SODIUM CHLORIDE 9 MG/ML
INJECTION, SOLUTION INTRAVENOUS CONTINUOUS
Status: DISCONTINUED | OUTPATIENT
Start: 2024-12-28 | End: 2024-12-31

## 2024-12-28 RX ORDER — SODIUM CHLORIDE 0.9 % (FLUSH) 0.9 %
5-40 SYRINGE (ML) INJECTION EVERY 12 HOURS SCHEDULED
Status: DISCONTINUED | OUTPATIENT
Start: 2024-12-28 | End: 2025-01-03 | Stop reason: HOSPADM

## 2024-12-28 RX ORDER — ONDANSETRON 2 MG/ML
4 INJECTION INTRAMUSCULAR; INTRAVENOUS EVERY 6 HOURS PRN
Status: DISCONTINUED | OUTPATIENT
Start: 2024-12-28 | End: 2025-01-03 | Stop reason: HOSPADM

## 2024-12-28 RX ORDER — ONDANSETRON 2 MG/ML
4 INJECTION INTRAMUSCULAR; INTRAVENOUS
Status: COMPLETED | OUTPATIENT
Start: 2024-12-28 | End: 2024-12-28

## 2024-12-28 RX ORDER — ACETAMINOPHEN 650 MG/1
650 SUPPOSITORY RECTAL
Status: COMPLETED | OUTPATIENT
Start: 2024-12-28 | End: 2024-12-28

## 2024-12-28 RX ORDER — ENOXAPARIN SODIUM 100 MG/ML
40 INJECTION SUBCUTANEOUS DAILY
Status: DISCONTINUED | OUTPATIENT
Start: 2024-12-29 | End: 2025-01-03 | Stop reason: HOSPADM

## 2024-12-28 RX ORDER — 0.9 % SODIUM CHLORIDE 0.9 %
2000 INTRAVENOUS SOLUTION INTRAVENOUS ONCE
Status: COMPLETED | OUTPATIENT
Start: 2024-12-28 | End: 2024-12-28

## 2024-12-28 RX ORDER — SODIUM CHLORIDE 9 MG/ML
INJECTION, SOLUTION INTRAVENOUS PRN
Status: DISCONTINUED | OUTPATIENT
Start: 2024-12-28 | End: 2025-01-03 | Stop reason: HOSPADM

## 2024-12-28 RX ORDER — ACETAMINOPHEN 650 MG/1
650 SUPPOSITORY RECTAL EVERY 6 HOURS PRN
Status: DISCONTINUED | OUTPATIENT
Start: 2024-12-28 | End: 2025-01-03 | Stop reason: HOSPADM

## 2024-12-28 RX ORDER — IOPAMIDOL 755 MG/ML
100 INJECTION, SOLUTION INTRAVASCULAR
Status: DISCONTINUED | OUTPATIENT
Start: 2024-12-28 | End: 2025-01-03 | Stop reason: HOSPADM

## 2024-12-28 RX ORDER — MAGNESIUM SULFATE IN WATER 40 MG/ML
2000 INJECTION, SOLUTION INTRAVENOUS ONCE
Status: COMPLETED | OUTPATIENT
Start: 2024-12-28 | End: 2024-12-28

## 2024-12-28 RX ORDER — INSULIN LISPRO 100 [IU]/ML
0-8 INJECTION, SOLUTION INTRAVENOUS; SUBCUTANEOUS
Status: DISCONTINUED | OUTPATIENT
Start: 2024-12-28 | End: 2025-01-03 | Stop reason: HOSPADM

## 2024-12-28 RX ADMIN — MEROPENEM 1000 MG: 1 INJECTION INTRAVENOUS at 21:30

## 2024-12-28 RX ADMIN — INSULIN GLARGINE 15 UNITS: 100 INJECTION, SOLUTION SUBCUTANEOUS at 21:15

## 2024-12-28 RX ADMIN — VANCOMYCIN HYDROCHLORIDE 1000 MG: 1 INJECTION, POWDER, LYOPHILIZED, FOR SOLUTION INTRAVENOUS at 13:22

## 2024-12-28 RX ADMIN — SODIUM CHLORIDE 2000 ML: 9 INJECTION, SOLUTION INTRAVENOUS at 12:16

## 2024-12-28 RX ADMIN — ACETAMINOPHEN 650 MG: 650 SUPPOSITORY RECTAL at 12:21

## 2024-12-28 RX ADMIN — ONDANSETRON 4 MG: 2 INJECTION, SOLUTION INTRAMUSCULAR; INTRAVENOUS at 10:51

## 2024-12-28 RX ADMIN — SODIUM CHLORIDE: 9 INJECTION, SOLUTION INTRAVENOUS at 17:47

## 2024-12-28 RX ADMIN — MAGNESIUM SULFATE HEPTAHYDRATE 2000 MG: 40 INJECTION, SOLUTION INTRAVENOUS at 18:04

## 2024-12-28 RX ADMIN — VANCOMYCIN HYDROCHLORIDE 750 MG: 750 INJECTION, POWDER, LYOPHILIZED, FOR SOLUTION INTRAVENOUS at 13:23

## 2024-12-28 RX ADMIN — SODIUM CHLORIDE, PRESERVATIVE FREE 10 ML: 5 INJECTION INTRAVENOUS at 21:26

## 2024-12-28 RX ADMIN — INSULIN LISPRO 2 UNITS: 100 INJECTION, SOLUTION INTRAVENOUS; SUBCUTANEOUS at 21:25

## 2024-12-28 RX ADMIN — MEROPENEM 1000 MG: 1 INJECTION, POWDER, FOR SOLUTION INTRAVENOUS at 12:19

## 2024-12-28 ASSESSMENT — PAIN - FUNCTIONAL ASSESSMENT: PAIN_FUNCTIONAL_ASSESSMENT: NONE - DENIES PAIN

## 2024-12-28 NOTE — H&P
Hospitalist Admission Note    NAME:  Felicia Rangel   :  1950   MRN:  [id number]     Date/Time:  2024 5:53 PM    Patient PCP: Barak Mendez MD  ________________________________________________________________________    Assessment / Plan:    This is a 73 y/o F PMHx of A.fib, CAD, CHF (s/p pacemaker), who presents to the ER for nausea, vomiting and altered mental status.    #Severe Sepsis- POA  UTI   -f/u blood, urine cultures  -c/w broad spectrum Abx with Vanc/Meropenem; multiple Abx allergies noted on EMR  -monitor hemodynamics  -trend lactate until normalizes     #Acute Encephalopathy  #Chronic Dysarthria   -CT head negative for acute process  -likely in setting of infection as above  -multiple risk factors for CVA  -recent admission, with MRI brain  negative for acute process, recent EEG wnl  -neurochecks/telemonitoring. Repeat MRI b/EEG vs. LP per neuro if no improvement   -treat underlying UTI  -COVID/Influenza negative   -check TSH, ammonia, B12  -failed bedside swallow, c/w NPO, check ABG  -PT/OT/SLP evals  -appreciate Neurology eval    #Hx of Atrial Flutter s/p AV Shayne Ablation, s/p dual chamber pacemaker POA  -BP soft, holding home Toprol. C/w ASA when tolerating PO    #Coronary Artery Disease  -Hx of PCI to LAD and RCA.   -ASA/Statin when tolerating PO     #Diabetes Mellutis Type 2, uncontrolled  -sugars above goal  -add ISS with accuchecks. Add Lantus, uptitrate based on trend  -recent HbA1c 8.7     #Chronic HFrEF (EF 50% in 2024)  -c/w IVF hydration in setting of sepsis and monitor for fluid overload    #Hypomagnesemia  -repleted     #COPD  -not in exacerbation  -Duonebs prn    #GERD  -c/w PPI    #Hypothyroidism  -c/w Levothyroxine when tolerating PO     FULL CODE  DVT ppx: Lovenox subq  NEXT OF KIN:  , updated at bedside   Shared Decision Making. Patient/family are agreeable for this admission. They verbalized understanding and are agreeable with plan of care.

## 2024-12-28 NOTE — ED TRIAGE NOTES
Pt arrived via EMS from home c/o multiple episodes of vomiting since 1am. Pt  called concerned for increasing lethargy. Ems states hx of diabetes, glucose upper 200's.     Pt presents ill appearing, delayed responses to questions    Pt  states pts mental status is not at baseline.

## 2024-12-29 LAB
ALBUMIN SERPL-MCNC: 2.3 G/DL (ref 3.5–5)
ALBUMIN/GLOB SERPL: 1.1 (ref 1.1–2.2)
ALP SERPL-CCNC: 68 U/L (ref 45–117)
ALT SERPL-CCNC: 20 U/L (ref 12–78)
AMMONIA PLAS-SCNC: 29 UMOL/L
ANION GAP SERPL CALC-SCNC: 4 MMOL/L (ref 2–12)
AST SERPL-CCNC: 23 U/L (ref 15–37)
BASOPHILS # BLD: 0 K/UL (ref 0–0.1)
BASOPHILS NFR BLD: 0 % (ref 0–1)
BILIRUB SERPL-MCNC: 0.5 MG/DL (ref 0.2–1)
BUN SERPL-MCNC: 18 MG/DL (ref 6–20)
BUN/CREAT SERPL: 37 (ref 12–20)
CALCIUM SERPL-MCNC: 7.2 MG/DL (ref 8.5–10.1)
CHLORIDE SERPL-SCNC: 114 MMOL/L (ref 97–108)
CO2 SERPL-SCNC: 25 MMOL/L (ref 21–32)
CREAT SERPL-MCNC: 0.49 MG/DL (ref 0.55–1.02)
DIFFERENTIAL METHOD BLD: ABNORMAL
EKG ATRIAL RATE: 106 BPM
EKG DIAGNOSIS: NORMAL
EKG P AXIS: 78 DEGREES
EKG Q-T INTERVAL: 394 MS
EKG QRS DURATION: 154 MS
EKG QTC CALCULATION (BAZETT): 523 MS
EKG R AXIS: -42 DEGREES
EKG T AXIS: 70 DEGREES
EKG VENTRICULAR RATE: 106 BPM
EOSINOPHIL # BLD: 0 K/UL (ref 0–0.4)
EOSINOPHIL NFR BLD: 0 % (ref 0–7)
ERYTHROCYTE [DISTWIDTH] IN BLOOD BY AUTOMATED COUNT: 13.2 % (ref 11.5–14.5)
GLOBULIN SER CALC-MCNC: 2.1 G/DL (ref 2–4)
GLUCOSE BLD STRIP.AUTO-MCNC: 119 MG/DL (ref 65–117)
GLUCOSE BLD STRIP.AUTO-MCNC: 138 MG/DL (ref 65–117)
GLUCOSE BLD STRIP.AUTO-MCNC: 149 MG/DL (ref 65–117)
GLUCOSE BLD STRIP.AUTO-MCNC: 153 MG/DL (ref 65–117)
GLUCOSE SERPL-MCNC: 177 MG/DL (ref 65–100)
HCT VFR BLD AUTO: 40.8 % (ref 35–47)
HGB BLD-MCNC: 13.2 G/DL (ref 11.5–16)
IMM GRANULOCYTES # BLD AUTO: 0.1 K/UL (ref 0–0.04)
IMM GRANULOCYTES NFR BLD AUTO: 1 % (ref 0–0.5)
LYMPHOCYTES # BLD: 0.6 K/UL (ref 0.8–3.5)
LYMPHOCYTES NFR BLD: 8 % (ref 12–49)
MCH RBC QN AUTO: 30.3 PG (ref 26–34)
MCHC RBC AUTO-ENTMCNC: 32.4 G/DL (ref 30–36.5)
MCV RBC AUTO: 93.6 FL (ref 80–99)
MONOCYTES # BLD: 1.1 K/UL (ref 0–1)
MONOCYTES NFR BLD: 15 % (ref 5–13)
NEUTS SEG # BLD: 5.6 K/UL (ref 1.8–8)
NEUTS SEG NFR BLD: 76 % (ref 32–75)
NRBC # BLD: 0 K/UL (ref 0–0.01)
NRBC BLD-RTO: 0 PER 100 WBC
PLATELET # BLD AUTO: 172 K/UL (ref 150–400)
PMV BLD AUTO: 10.2 FL (ref 8.9–12.9)
POTASSIUM SERPL-SCNC: 3.8 MMOL/L (ref 3.5–5.1)
PROCALCITONIN SERPL-MCNC: 0.35 NG/ML
PROT SERPL-MCNC: 4.4 G/DL (ref 6.4–8.2)
RBC # BLD AUTO: 4.36 M/UL (ref 3.8–5.2)
RBC MORPH BLD: ABNORMAL
SERVICE CMNT-IMP: ABNORMAL
SODIUM SERPL-SCNC: 143 MMOL/L (ref 136–145)
T4 FREE SERPL-MCNC: 1.5 NG/DL (ref 0.8–1.5)
TSH SERPL DL<=0.05 MIU/L-ACNC: 0.16 UIU/ML (ref 0.36–3.74)
VIT B12 SERPL-MCNC: 178 PG/ML (ref 193–986)
WBC # BLD AUTO: 7.4 K/UL (ref 3.6–11)

## 2024-12-29 PROCEDURE — 6370000000 HC RX 637 (ALT 250 FOR IP): Performed by: NURSE PRACTITIONER

## 2024-12-29 PROCEDURE — 36415 COLL VENOUS BLD VENIPUNCTURE: CPT

## 2024-12-29 PROCEDURE — 6360000002 HC RX W HCPCS: Performed by: FAMILY MEDICINE

## 2024-12-29 PROCEDURE — 82140 ASSAY OF AMMONIA: CPT

## 2024-12-29 PROCEDURE — 84145 PROCALCITONIN (PCT): CPT

## 2024-12-29 PROCEDURE — 93010 ELECTROCARDIOGRAM REPORT: CPT | Performed by: INTERNAL MEDICINE

## 2024-12-29 PROCEDURE — 84443 ASSAY THYROID STIM HORMONE: CPT

## 2024-12-29 PROCEDURE — 6360000002 HC RX W HCPCS: Performed by: INTERNAL MEDICINE

## 2024-12-29 PROCEDURE — 82607 VITAMIN B-12: CPT

## 2024-12-29 PROCEDURE — 80053 COMPREHEN METABOLIC PANEL: CPT

## 2024-12-29 PROCEDURE — 99222 1ST HOSP IP/OBS MODERATE 55: CPT | Performed by: PSYCHIATRY & NEUROLOGY

## 2024-12-29 PROCEDURE — 94761 N-INVAS EAR/PLS OXIMETRY MLT: CPT

## 2024-12-29 PROCEDURE — 2060000000 HC ICU INTERMEDIATE R&B

## 2024-12-29 PROCEDURE — 6370000000 HC RX 637 (ALT 250 FOR IP): Performed by: INTERNAL MEDICINE

## 2024-12-29 PROCEDURE — 84439 ASSAY OF FREE THYROXINE: CPT

## 2024-12-29 PROCEDURE — 82962 GLUCOSE BLOOD TEST: CPT

## 2024-12-29 PROCEDURE — 2580000003 HC RX 258: Performed by: INTERNAL MEDICINE

## 2024-12-29 PROCEDURE — 2500000003 HC RX 250 WO HCPCS: Performed by: INTERNAL MEDICINE

## 2024-12-29 PROCEDURE — 85025 COMPLETE CBC W/AUTO DIFF WBC: CPT

## 2024-12-29 RX ORDER — CYANOCOBALAMIN 1000 UG/ML
1000 INJECTION, SOLUTION INTRAMUSCULAR; SUBCUTANEOUS ONCE
Status: COMPLETED | OUTPATIENT
Start: 2024-12-29 | End: 2024-12-29

## 2024-12-29 RX ORDER — MULTIVITAMIN WITH IRON
500 TABLET ORAL DAILY
Status: DISCONTINUED | OUTPATIENT
Start: 2024-12-30 | End: 2025-01-03 | Stop reason: HOSPADM

## 2024-12-29 RX ORDER — NYSTATIN 100000 [USP'U]/ML
5 SUSPENSION ORAL 4 TIMES DAILY
Status: DISCONTINUED | OUTPATIENT
Start: 2024-12-29 | End: 2025-01-03 | Stop reason: HOSPADM

## 2024-12-29 RX ADMIN — MEROPENEM 1000 MG: 1 INJECTION INTRAVENOUS at 04:47

## 2024-12-29 RX ADMIN — MEROPENEM 1000 MG: 1 INJECTION INTRAVENOUS at 14:23

## 2024-12-29 RX ADMIN — MEROPENEM 1000 MG: 1 INJECTION INTRAVENOUS at 21:42

## 2024-12-29 RX ADMIN — SODIUM CHLORIDE, PRESERVATIVE FREE 10 ML: 5 INJECTION INTRAVENOUS at 07:58

## 2024-12-29 RX ADMIN — VANCOMYCIN HYDROCHLORIDE 1000 MG: 1 INJECTION, POWDER, LYOPHILIZED, FOR SOLUTION INTRAVENOUS at 05:51

## 2024-12-29 RX ADMIN — ENOXAPARIN SODIUM 40 MG: 100 INJECTION SUBCUTANEOUS at 07:48

## 2024-12-29 RX ADMIN — ACETAMINOPHEN 650 MG: 650 SUPPOSITORY RECTAL at 11:20

## 2024-12-29 RX ADMIN — CYANOCOBALAMIN 1000 MCG: 1000 INJECTION, SOLUTION INTRAMUSCULAR; SUBCUTANEOUS at 17:28

## 2024-12-29 RX ADMIN — SODIUM CHLORIDE, PRESERVATIVE FREE 10 ML: 5 INJECTION INTRAVENOUS at 21:48

## 2024-12-29 RX ADMIN — NYSTATIN 500000 UNITS: 100000 SUSPENSION ORAL at 21:47

## 2024-12-29 RX ADMIN — INSULIN GLARGINE 15 UNITS: 100 INJECTION, SOLUTION SUBCUTANEOUS at 21:42

## 2024-12-29 ASSESSMENT — PAIN SCALES - GENERAL
PAINLEVEL_OUTOF10: 6
PAINLEVEL_OUTOF10: 4

## 2024-12-29 ASSESSMENT — PAIN DESCRIPTION - LOCATION: LOCATION: VAGINA

## 2024-12-30 ENCOUNTER — APPOINTMENT (OUTPATIENT)
Facility: HOSPITAL | Age: 74
DRG: 871 | End: 2024-12-30
Payer: MEDICARE

## 2024-12-30 LAB
ANION GAP SERPL CALC-SCNC: 2 MMOL/L (ref 2–12)
BACTERIA SPEC CULT: ABNORMAL
BACTERIA SPEC CULT: NORMAL
BACTERIA SPEC CULT: NORMAL
BASOPHILS # BLD: 0 K/UL (ref 0–0.1)
BASOPHILS NFR BLD: 0 % (ref 0–1)
BUN SERPL-MCNC: 16 MG/DL (ref 6–20)
BUN/CREAT SERPL: 32 (ref 12–20)
C DIFF GDH STL QL: NEGATIVE
C DIFF TOX A+B STL QL IA: NEGATIVE
C DIFF TOXIN INTERPRETATION: NORMAL
CALCIUM SERPL-MCNC: 7.8 MG/DL (ref 8.5–10.1)
CC UR VC: ABNORMAL
CHLORIDE SERPL-SCNC: 114 MMOL/L (ref 97–108)
CO2 SERPL-SCNC: 28 MMOL/L (ref 21–32)
CREAT SERPL-MCNC: 0.5 MG/DL (ref 0.55–1.02)
DIFFERENTIAL METHOD BLD: ABNORMAL
EOSINOPHIL # BLD: 0.1 K/UL (ref 0–0.4)
EOSINOPHIL NFR BLD: 1 % (ref 0–7)
ERYTHROCYTE [DISTWIDTH] IN BLOOD BY AUTOMATED COUNT: 13.6 % (ref 11.5–14.5)
GLUCOSE BLD STRIP.AUTO-MCNC: 105 MG/DL (ref 65–117)
GLUCOSE BLD STRIP.AUTO-MCNC: 110 MG/DL (ref 65–117)
GLUCOSE BLD STRIP.AUTO-MCNC: 120 MG/DL (ref 65–117)
GLUCOSE BLD STRIP.AUTO-MCNC: 99 MG/DL (ref 65–117)
GLUCOSE SERPL-MCNC: 116 MG/DL (ref 65–100)
HCT VFR BLD AUTO: 40.7 % (ref 35–47)
HEMOCCULT STL QL: POSITIVE
HGB BLD-MCNC: 13 G/DL (ref 11.5–16)
IMM GRANULOCYTES # BLD AUTO: 0.1 K/UL (ref 0–0.04)
IMM GRANULOCYTES NFR BLD AUTO: 1 % (ref 0–0.5)
LACTATE SERPL-SCNC: 0.9 MMOL/L (ref 0.4–2)
LYMPHOCYTES # BLD: 1.2 K/UL (ref 0.8–3.5)
LYMPHOCYTES NFR BLD: 11 % (ref 12–49)
MCH RBC QN AUTO: 30.2 PG (ref 26–34)
MCHC RBC AUTO-ENTMCNC: 31.9 G/DL (ref 30–36.5)
MCV RBC AUTO: 94.7 FL (ref 80–99)
MONOCYTES # BLD: 1.3 K/UL (ref 0–1)
MONOCYTES NFR BLD: 13 % (ref 5–13)
NEUTS SEG # BLD: 7.7 K/UL (ref 1.8–8)
NEUTS SEG NFR BLD: 74 % (ref 32–75)
NRBC # BLD: 0 K/UL (ref 0–0.01)
NRBC BLD-RTO: 0 PER 100 WBC
PLATELET # BLD AUTO: 163 K/UL (ref 150–400)
PMV BLD AUTO: 10.4 FL (ref 8.9–12.9)
POTASSIUM SERPL-SCNC: 3.4 MMOL/L (ref 3.5–5.1)
RBC # BLD AUTO: 4.3 M/UL (ref 3.8–5.2)
SERVICE CMNT-IMP: ABNORMAL
SERVICE CMNT-IMP: ABNORMAL
SERVICE CMNT-IMP: NORMAL
SODIUM SERPL-SCNC: 144 MMOL/L (ref 136–145)
WBC # BLD AUTO: 10.3 K/UL (ref 3.6–11)

## 2024-12-30 PROCEDURE — 2580000003 HC RX 258: Performed by: INTERNAL MEDICINE

## 2024-12-30 PROCEDURE — 83605 ASSAY OF LACTIC ACID: CPT

## 2024-12-30 PROCEDURE — 82272 OCCULT BLD FECES 1-3 TESTS: CPT

## 2024-12-30 PROCEDURE — 6370000000 HC RX 637 (ALT 250 FOR IP): Performed by: NURSE PRACTITIONER

## 2024-12-30 PROCEDURE — 6360000002 HC RX W HCPCS: Performed by: INTERNAL MEDICINE

## 2024-12-30 PROCEDURE — 6360000002 HC RX W HCPCS: Performed by: FAMILY MEDICINE

## 2024-12-30 PROCEDURE — 97116 GAIT TRAINING THERAPY: CPT

## 2024-12-30 PROCEDURE — 87324 CLOSTRIDIUM AG IA: CPT

## 2024-12-30 PROCEDURE — 97161 PT EVAL LOW COMPLEX 20 MIN: CPT

## 2024-12-30 PROCEDURE — 2060000000 HC ICU INTERMEDIATE R&B

## 2024-12-30 PROCEDURE — 94761 N-INVAS EAR/PLS OXIMETRY MLT: CPT

## 2024-12-30 PROCEDURE — 82962 GLUCOSE BLOOD TEST: CPT

## 2024-12-30 PROCEDURE — 70551 MRI BRAIN STEM W/O DYE: CPT

## 2024-12-30 PROCEDURE — 6370000000 HC RX 637 (ALT 250 FOR IP): Performed by: INTERNAL MEDICINE

## 2024-12-30 PROCEDURE — 80048 BASIC METABOLIC PNL TOTAL CA: CPT

## 2024-12-30 PROCEDURE — 36415 COLL VENOUS BLD VENIPUNCTURE: CPT

## 2024-12-30 PROCEDURE — 92610 EVALUATE SWALLOWING FUNCTION: CPT

## 2024-12-30 PROCEDURE — 85025 COMPLETE CBC W/AUTO DIFF WBC: CPT

## 2024-12-30 PROCEDURE — 2580000003 HC RX 258: Performed by: FAMILY MEDICINE

## 2024-12-30 PROCEDURE — 87449 NOS EACH ORGANISM AG IA: CPT

## 2024-12-30 PROCEDURE — 2500000003 HC RX 250 WO HCPCS: Performed by: INTERNAL MEDICINE

## 2024-12-30 PROCEDURE — 6370000000 HC RX 637 (ALT 250 FOR IP): Performed by: FAMILY MEDICINE

## 2024-12-30 PROCEDURE — 1100000000 HC RM PRIVATE

## 2024-12-30 RX ORDER — LACTOBACILLUS RHAMNOSUS GG 10B CELL
1 CAPSULE ORAL
Status: DISCONTINUED | OUTPATIENT
Start: 2024-12-30 | End: 2025-01-03 | Stop reason: HOSPADM

## 2024-12-30 RX ADMIN — MEROPENEM 1000 MG: 1 INJECTION INTRAVENOUS at 04:48

## 2024-12-30 RX ADMIN — NYSTATIN 500000 UNITS: 100000 SUSPENSION ORAL at 18:08

## 2024-12-30 RX ADMIN — SODIUM CHLORIDE, PRESERVATIVE FREE 10 ML: 5 INJECTION INTRAVENOUS at 08:46

## 2024-12-30 RX ADMIN — VANCOMYCIN HYDROCHLORIDE 1000 MG: 1 INJECTION, POWDER, LYOPHILIZED, FOR SOLUTION INTRAVENOUS at 13:03

## 2024-12-30 RX ADMIN — CYANOCOBALAMIN TAB 500 MCG 500 MCG: 500 TAB at 08:45

## 2024-12-30 RX ADMIN — Medication 1 CAPSULE: at 13:08

## 2024-12-30 RX ADMIN — NYSTATIN 500000 UNITS: 100000 SUSPENSION ORAL at 13:08

## 2024-12-30 RX ADMIN — MEROPENEM 1000 MG: 1 INJECTION INTRAVENOUS at 12:55

## 2024-12-30 RX ADMIN — VANCOMYCIN HYDROCHLORIDE 1000 MG: 1 INJECTION, POWDER, LYOPHILIZED, FOR SOLUTION INTRAVENOUS at 00:28

## 2024-12-30 RX ADMIN — INSULIN GLARGINE 15 UNITS: 100 INJECTION, SOLUTION SUBCUTANEOUS at 21:14

## 2024-12-30 RX ADMIN — NYSTATIN 500000 UNITS: 100000 SUSPENSION ORAL at 08:45

## 2024-12-30 RX ADMIN — NYSTATIN 500000 UNITS: 100000 SUSPENSION ORAL at 21:11

## 2024-12-30 RX ADMIN — ENOXAPARIN SODIUM 40 MG: 100 INJECTION SUBCUTANEOUS at 08:45

## 2024-12-30 RX ADMIN — SODIUM CHLORIDE, PRESERVATIVE FREE 10 ML: 5 INJECTION INTRAVENOUS at 21:14

## 2024-12-30 NOTE — CARE COORDINATION
Care Management Initial Assessment  12/30/2024 3:07 PM  If patient is discharged prior to next notation, then this note serves as note for discharge by case management.    Reason for Admission:   Severe sepsis (HCC) [A41.9, R65.20]         Patient Admission Status: Inpatient  Date Admitted to INP: 12/28  RUR: Readmission Risk Score: 22.5    Hospitalization in the last 30 days (Readmission):  Yes        Advance Care Planning:  Code Status: Full Code  Primary Healthcare Decision Maker: (P) Named in Scanned ACP Document  Primary Decision Maker: Tommy Rangel - Spouse - [phone]   Advance Directive: has an advanced directive - a copy HAS  been provided.     __________________________________________________________________________  Assessment:      12/30/24 1505   Service Assessment   Patient Orientation Alert and Oriented   Cognition Alert   History Provided By Patient   Primary Caregiver Self   Support Systems Spouse/Significant Other   Patient's Healthcare Decision Maker is: Named in Scanned ACP Document   PCP Verified by CM Yes   Last Visit to PCP Within last 3 months   Prior Functional Level Independent in ADLs/IADLs   Current Functional Level Independent in ADLs/IADLs   Can patient return to prior living arrangement Yes   Family able to assist with home care needs: Yes   Financial Resources Medicare   Social/Functional History   Lives With Spouse   Type of Home House   Home Layout One level   Home Access Stairs to enter with rails   Entrance Stairs - Number of Steps 3   Entrance Stairs - Rails Both   Bathroom Accessibility Accessible   Home Equipment None   Prior Level of Assist for ADLs Independent   Prior Level of Assist for Homemaking Independent   Ambulation Assistance Independent   Prior Level of Assist for Transfers Independent   Active  Yes   Mode of Transportation Car   Occupation Retired   Discharge Planning   Type of Residence House   Living Arrangements Spouse/Significant Other   Current

## 2024-12-30 NOTE — CASE COMMUNICATION
COMMUNICATION NOTE - Neurology Service    Name:  Felicia Rangel     MRN: [id number]         Communication Note:   MRI brain - No acute intracranial abnormality.     Correction of any underlying residual infectious and/or metabolic abnormalities per primary team.  Avoid use of any brain impairing medications as much as possible, including benzodiazepines, anticholinergic medications, opioid pain medications.   - conservative management for now.?  - delirium precautions    I explained in detail about the current condition.   Rest of the management per primary team.  Neurology will sign off.   Please do not hesitate to call with questions or concerns.  Please let us know if we can provide any additional information.

## 2024-12-31 LAB
ANION GAP SERPL CALC-SCNC: 2 MMOL/L (ref 2–12)
BUN SERPL-MCNC: 12 MG/DL (ref 6–20)
BUN/CREAT SERPL: 27 (ref 12–20)
CALCIUM SERPL-MCNC: 7.5 MG/DL (ref 8.5–10.1)
CHLORIDE SERPL-SCNC: 114 MMOL/L (ref 97–108)
CO2 SERPL-SCNC: 28 MMOL/L (ref 21–32)
CREAT SERPL-MCNC: 0.45 MG/DL (ref 0.55–1.02)
GLUCOSE BLD STRIP.AUTO-MCNC: 100 MG/DL (ref 65–117)
GLUCOSE BLD STRIP.AUTO-MCNC: 154 MG/DL (ref 65–117)
GLUCOSE BLD STRIP.AUTO-MCNC: 77 MG/DL (ref 65–117)
GLUCOSE BLD STRIP.AUTO-MCNC: 94 MG/DL (ref 65–117)
GLUCOSE SERPL-MCNC: 83 MG/DL (ref 65–100)
POTASSIUM SERPL-SCNC: 3.2 MMOL/L (ref 3.5–5.1)
SERVICE CMNT-IMP: ABNORMAL
SERVICE CMNT-IMP: NORMAL
SODIUM SERPL-SCNC: 144 MMOL/L (ref 136–145)

## 2024-12-31 PROCEDURE — 2500000003 HC RX 250 WO HCPCS: Performed by: INTERNAL MEDICINE

## 2024-12-31 PROCEDURE — 97165 OT EVAL LOW COMPLEX 30 MIN: CPT

## 2024-12-31 PROCEDURE — 1100000000 HC RM PRIVATE

## 2024-12-31 PROCEDURE — 80048 BASIC METABOLIC PNL TOTAL CA: CPT

## 2024-12-31 PROCEDURE — 36415 COLL VENOUS BLD VENIPUNCTURE: CPT

## 2024-12-31 PROCEDURE — 6370000000 HC RX 637 (ALT 250 FOR IP): Performed by: FAMILY MEDICINE

## 2024-12-31 PROCEDURE — 6360000002 HC RX W HCPCS: Performed by: INTERNAL MEDICINE

## 2024-12-31 PROCEDURE — 97535 SELF CARE MNGMENT TRAINING: CPT

## 2024-12-31 PROCEDURE — 6370000000 HC RX 637 (ALT 250 FOR IP): Performed by: NURSE PRACTITIONER

## 2024-12-31 PROCEDURE — 82962 GLUCOSE BLOOD TEST: CPT

## 2024-12-31 PROCEDURE — 6360000002 HC RX W HCPCS: Performed by: FAMILY MEDICINE

## 2024-12-31 PROCEDURE — 94761 N-INVAS EAR/PLS OXIMETRY MLT: CPT

## 2024-12-31 PROCEDURE — 6370000000 HC RX 637 (ALT 250 FOR IP): Performed by: INTERNAL MEDICINE

## 2024-12-31 RX ORDER — FLUCONAZOLE 2 MG/ML
200 INJECTION, SOLUTION INTRAVENOUS EVERY 24 HOURS
Status: DISCONTINUED | OUTPATIENT
Start: 2024-12-31 | End: 2025-01-01

## 2024-12-31 RX ORDER — POTASSIUM CHLORIDE 750 MG/1
40 TABLET, EXTENDED RELEASE ORAL ONCE
Status: COMPLETED | OUTPATIENT
Start: 2024-12-31 | End: 2024-12-31

## 2024-12-31 RX ORDER — LIDOCAINE 4 G/G
1 PATCH TOPICAL EVERY 24 HOURS
Status: DISCONTINUED | OUTPATIENT
Start: 2024-12-31 | End: 2025-01-03 | Stop reason: HOSPADM

## 2024-12-31 RX ADMIN — FLUCONAZOLE 200 MG: 200 INJECTION, SOLUTION INTRAVENOUS at 13:33

## 2024-12-31 RX ADMIN — NYSTATIN 500000 UNITS: 100000 SUSPENSION ORAL at 18:38

## 2024-12-31 RX ADMIN — NYSTATIN 500000 UNITS: 100000 SUSPENSION ORAL at 21:24

## 2024-12-31 RX ADMIN — ACETAMINOPHEN 650 MG: 650 SUPPOSITORY RECTAL at 02:04

## 2024-12-31 RX ADMIN — SODIUM CHLORIDE, PRESERVATIVE FREE 10 ML: 5 INJECTION INTRAVENOUS at 08:12

## 2024-12-31 RX ADMIN — SODIUM CHLORIDE, PRESERVATIVE FREE 10 ML: 5 INJECTION INTRAVENOUS at 21:24

## 2024-12-31 RX ADMIN — NYSTATIN 500000 UNITS: 100000 SUSPENSION ORAL at 08:12

## 2024-12-31 RX ADMIN — POTASSIUM CHLORIDE 40 MEQ: 750 TABLET, EXTENDED RELEASE ORAL at 12:59

## 2024-12-31 RX ADMIN — ENOXAPARIN SODIUM 40 MG: 100 INJECTION SUBCUTANEOUS at 08:11

## 2024-12-31 RX ADMIN — Medication 1 CAPSULE: at 08:11

## 2024-12-31 RX ADMIN — CYANOCOBALAMIN TAB 500 MCG 500 MCG: 500 TAB at 08:11

## 2024-12-31 RX ADMIN — NYSTATIN 500000 UNITS: 100000 SUSPENSION ORAL at 13:00

## 2024-12-31 ASSESSMENT — PAIN SCALES - GENERAL
PAINLEVEL_OUTOF10: 3
PAINLEVEL_OUTOF10: 0

## 2024-12-31 ASSESSMENT — PAIN DESCRIPTION - DESCRIPTORS: DESCRIPTORS: ACHING

## 2024-12-31 ASSESSMENT — PAIN DESCRIPTION - LOCATION: LOCATION: HEAD

## 2024-12-31 NOTE — CARE COORDINATION
Care Management Progress Note    Reason for Admission:   Severe sepsis (HCC) [A41.9, R65.20]         Patient Admission Status: Inpatient  RUR:   Hospitalization in the last 30 days (Readmission):  Yes        Transition of care plan:  Per IDR, patient with GI consult pending. Patient set up with  with [facility]  as well as RW which was delivered to bedside by [facility]. SANDY 01/02 pending improvement in clinical status.  ______________________  Dalia MURPHY, RN  Care Management  12/31/2024

## 2025-01-01 LAB
ANION GAP SERPL CALC-SCNC: 4 MMOL/L (ref 2–12)
BUN SERPL-MCNC: 7 MG/DL (ref 6–20)
BUN/CREAT SERPL: 19 (ref 12–20)
CALCIUM SERPL-MCNC: 7.9 MG/DL (ref 8.5–10.1)
CHLORIDE SERPL-SCNC: 112 MMOL/L (ref 97–108)
CO2 SERPL-SCNC: 26 MMOL/L (ref 21–32)
CREAT SERPL-MCNC: 0.36 MG/DL (ref 0.55–1.02)
EKG ATRIAL RATE: 0 BPM
EKG DIAGNOSIS: NORMAL
EKG P AXIS: 0 DEGREES
EKG Q-T INTERVAL: 0 MS
EKG QRS DURATION: 0 MS
EKG QTC CALCULATION (BAZETT): 0 MS
EKG R AXIS: 0 DEGREES
EKG T AXIS: 0 DEGREES
EKG VENTRICULAR RATE: 0 BPM
GLUCOSE BLD STRIP.AUTO-MCNC: 105 MG/DL (ref 65–117)
GLUCOSE BLD STRIP.AUTO-MCNC: 112 MG/DL (ref 65–117)
GLUCOSE BLD STRIP.AUTO-MCNC: 114 MG/DL (ref 65–117)
GLUCOSE BLD STRIP.AUTO-MCNC: 145 MG/DL (ref 65–117)
GLUCOSE SERPL-MCNC: 102 MG/DL (ref 65–100)
MAGNESIUM SERPL-MCNC: 1.8 MG/DL (ref 1.6–2.4)
POTASSIUM SERPL-SCNC: 3.3 MMOL/L (ref 3.5–5.1)
SERVICE CMNT-IMP: ABNORMAL
SERVICE CMNT-IMP: NORMAL
SODIUM SERPL-SCNC: 142 MMOL/L (ref 136–145)
TROPONIN I SERPL HS-MCNC: 14 NG/L (ref 0–51)

## 2025-01-01 PROCEDURE — 84484 ASSAY OF TROPONIN QUANT: CPT

## 2025-01-01 PROCEDURE — 2500000003 HC RX 250 WO HCPCS: Performed by: INTERNAL MEDICINE

## 2025-01-01 PROCEDURE — 94761 N-INVAS EAR/PLS OXIMETRY MLT: CPT

## 2025-01-01 PROCEDURE — 80048 BASIC METABOLIC PNL TOTAL CA: CPT

## 2025-01-01 PROCEDURE — 93005 ELECTROCARDIOGRAM TRACING: CPT | Performed by: STUDENT IN AN ORGANIZED HEALTH CARE EDUCATION/TRAINING PROGRAM

## 2025-01-01 PROCEDURE — 6370000000 HC RX 637 (ALT 250 FOR IP): Performed by: FAMILY MEDICINE

## 2025-01-01 PROCEDURE — 36415 COLL VENOUS BLD VENIPUNCTURE: CPT

## 2025-01-01 PROCEDURE — 2060000000 HC ICU INTERMEDIATE R&B

## 2025-01-01 PROCEDURE — 6370000000 HC RX 637 (ALT 250 FOR IP): Performed by: STUDENT IN AN ORGANIZED HEALTH CARE EDUCATION/TRAINING PROGRAM

## 2025-01-01 PROCEDURE — 6360000002 HC RX W HCPCS: Performed by: INTERNAL MEDICINE

## 2025-01-01 PROCEDURE — 6370000000 HC RX 637 (ALT 250 FOR IP): Performed by: NURSE PRACTITIONER

## 2025-01-01 PROCEDURE — 82962 GLUCOSE BLOOD TEST: CPT

## 2025-01-01 PROCEDURE — 83735 ASSAY OF MAGNESIUM: CPT

## 2025-01-01 RX ORDER — LOPERAMIDE HYDROCHLORIDE 2 MG/1
2 CAPSULE ORAL 4 TIMES DAILY PRN
Status: DISCONTINUED | OUTPATIENT
Start: 2025-01-01 | End: 2025-01-03 | Stop reason: HOSPADM

## 2025-01-01 RX ORDER — ATORVASTATIN CALCIUM 10 MG/1
10 TABLET, FILM COATED ORAL DAILY
Status: DISCONTINUED | OUTPATIENT
Start: 2025-01-01 | End: 2025-01-03 | Stop reason: HOSPADM

## 2025-01-01 RX ORDER — METOPROLOL SUCCINATE 25 MG/1
25 TABLET, EXTENDED RELEASE ORAL DAILY
Status: DISCONTINUED | OUTPATIENT
Start: 2025-01-02 | End: 2025-01-03 | Stop reason: HOSPADM

## 2025-01-01 RX ORDER — FLUCONAZOLE 100 MG/1
200 TABLET ORAL DAILY
Status: DISCONTINUED | OUTPATIENT
Start: 2025-01-01 | End: 2025-01-03 | Stop reason: HOSPADM

## 2025-01-01 RX ORDER — LIDOCAINE HYDROCHLORIDE 20 MG/ML
15 SOLUTION OROPHARYNGEAL
Status: DISCONTINUED | OUTPATIENT
Start: 2025-01-01 | End: 2025-01-03 | Stop reason: HOSPADM

## 2025-01-01 RX ORDER — FENOFIBRATE 54 MG/1
54 TABLET ORAL DAILY
Status: DISCONTINUED | OUTPATIENT
Start: 2025-01-01 | End: 2025-01-03 | Stop reason: HOSPADM

## 2025-01-01 RX ORDER — INSULIN GLARGINE 100 [IU]/ML
14 INJECTION, SOLUTION SUBCUTANEOUS NIGHTLY
Status: DISCONTINUED | OUTPATIENT
Start: 2025-01-01 | End: 2025-01-03 | Stop reason: HOSPADM

## 2025-01-01 RX ADMIN — LIDOCAINE HYDROCHLORIDE 15 ML: 20 SOLUTION ORAL at 17:14

## 2025-01-01 RX ADMIN — Medication 1 CAPSULE: at 09:06

## 2025-01-01 RX ADMIN — SODIUM CHLORIDE, PRESERVATIVE FREE 10 ML: 5 INJECTION INTRAVENOUS at 21:01

## 2025-01-01 RX ADMIN — LIDOCAINE HYDROCHLORIDE 15 ML: 20 SOLUTION ORAL at 09:07

## 2025-01-01 RX ADMIN — LEVOTHYROXINE SODIUM 137 MCG: 0.11 TABLET ORAL at 06:42

## 2025-01-01 RX ADMIN — POTASSIUM BICARBONATE 40 MEQ: 391 TABLET, EFFERVESCENT ORAL at 09:07

## 2025-01-01 RX ADMIN — FLUCONAZOLE 200 MG: 100 TABLET ORAL at 09:07

## 2025-01-01 RX ADMIN — LOPERAMIDE HYDROCHLORIDE 2 MG: 2 CAPSULE ORAL at 11:01

## 2025-01-01 RX ADMIN — NYSTATIN 500000 UNITS: 100000 SUSPENSION ORAL at 17:14

## 2025-01-01 RX ADMIN — NYSTATIN 500000 UNITS: 100000 SUSPENSION ORAL at 13:12

## 2025-01-01 RX ADMIN — ATORVASTATIN CALCIUM 10 MG: 10 TABLET, FILM COATED ORAL at 09:07

## 2025-01-01 RX ADMIN — CYANOCOBALAMIN TAB 500 MCG 500 MCG: 500 TAB at 09:06

## 2025-01-01 RX ADMIN — NYSTATIN 500000 UNITS: 100000 SUSPENSION ORAL at 09:06

## 2025-01-01 RX ADMIN — SODIUM CHLORIDE, PRESERVATIVE FREE 10 ML: 5 INJECTION INTRAVENOUS at 09:08

## 2025-01-01 RX ADMIN — ENOXAPARIN SODIUM 40 MG: 100 INJECTION SUBCUTANEOUS at 09:06

## 2025-01-01 RX ADMIN — LIDOCAINE HYDROCHLORIDE 15 ML: 20 SOLUTION ORAL at 13:12

## 2025-01-01 RX ADMIN — FENOFIBRATE 54 MG: 54 TABLET ORAL at 09:07

## 2025-01-01 RX ADMIN — NYSTATIN 500000 UNITS: 100000 SUSPENSION ORAL at 21:01

## 2025-01-01 ASSESSMENT — PAIN DESCRIPTION - LOCATION: LOCATION: THROAT

## 2025-01-01 ASSESSMENT — PAIN SCALES - WONG BAKER
WONGBAKER_NUMERICALRESPONSE: HURTS A LITTLE BIT
WONGBAKER_NUMERICALRESPONSE: NO HURT

## 2025-01-02 PROBLEM — E11.65 TYPE 2 DIABETES MELLITUS WITH HYPERGLYCEMIA (HCC): Status: ACTIVE | Noted: 2025-01-02

## 2025-01-02 PROBLEM — I47.29 NSVT (NONSUSTAINED VENTRICULAR TACHYCARDIA) (HCC): Status: ACTIVE | Noted: 2025-01-02

## 2025-01-02 LAB
ANION GAP SERPL CALC-SCNC: 5 MMOL/L (ref 2–12)
BACTERIA SPEC CULT: NORMAL
BACTERIA SPEC CULT: NORMAL
BASOPHILS # BLD: 0.1 K/UL (ref 0–0.1)
BASOPHILS NFR BLD: 1 % (ref 0–1)
BUN SERPL-MCNC: 6 MG/DL (ref 6–20)
BUN/CREAT SERPL: 21 (ref 12–20)
CALCIUM SERPL-MCNC: 7.8 MG/DL (ref 8.5–10.1)
CHLORIDE SERPL-SCNC: 109 MMOL/L (ref 97–108)
CO2 SERPL-SCNC: 28 MMOL/L (ref 21–32)
CREAT SERPL-MCNC: 0.29 MG/DL (ref 0.55–1.02)
DIFFERENTIAL METHOD BLD: ABNORMAL
EKG ATRIAL RATE: 78 BPM
EKG DIAGNOSIS: NORMAL
EKG P-R INTERVAL: 120 MS
EKG Q-T INTERVAL: 210 MS
EKG QRS DURATION: 138 MS
EKG QTC CALCULATION (BAZETT): 300 MS
EKG R AXIS: 91 DEGREES
EKG T AXIS: 270 DEGREES
EKG VENTRICULAR RATE: 123 BPM
EOSINOPHIL # BLD: 0.3 K/UL (ref 0–0.4)
EOSINOPHIL NFR BLD: 3 % (ref 0–7)
ERYTHROCYTE [DISTWIDTH] IN BLOOD BY AUTOMATED COUNT: 12.7 % (ref 11.5–14.5)
GLUCOSE BLD STRIP.AUTO-MCNC: 112 MG/DL (ref 65–117)
GLUCOSE BLD STRIP.AUTO-MCNC: 120 MG/DL (ref 65–117)
GLUCOSE BLD STRIP.AUTO-MCNC: 121 MG/DL (ref 65–117)
GLUCOSE BLD STRIP.AUTO-MCNC: 121 MG/DL (ref 65–117)
GLUCOSE SERPL-MCNC: 116 MG/DL (ref 65–100)
HCT VFR BLD AUTO: 38.4 % (ref 35–47)
HGB BLD-MCNC: 12.9 G/DL (ref 11.5–16)
IMM GRANULOCYTES # BLD AUTO: 0.1 K/UL (ref 0–0.04)
IMM GRANULOCYTES NFR BLD AUTO: 1 % (ref 0–0.5)
LYMPHOCYTES # BLD: 1.7 K/UL (ref 0.8–3.5)
LYMPHOCYTES NFR BLD: 16 % (ref 12–49)
MCH RBC QN AUTO: 30.2 PG (ref 26–34)
MCHC RBC AUTO-ENTMCNC: 33.6 G/DL (ref 30–36.5)
MCV RBC AUTO: 89.9 FL (ref 80–99)
MONOCYTES # BLD: 0.9 K/UL (ref 0–1)
MONOCYTES NFR BLD: 9 % (ref 5–13)
NEUTS SEG # BLD: 7.5 K/UL (ref 1.8–8)
NEUTS SEG NFR BLD: 72 % (ref 32–75)
NRBC # BLD: 0 K/UL (ref 0–0.01)
NRBC BLD-RTO: 0 PER 100 WBC
PLATELET # BLD AUTO: 186 K/UL (ref 150–400)
PMV BLD AUTO: 10.2 FL (ref 8.9–12.9)
POTASSIUM SERPL-SCNC: 3.4 MMOL/L (ref 3.5–5.1)
RBC # BLD AUTO: 4.27 M/UL (ref 3.8–5.2)
SERVICE CMNT-IMP: ABNORMAL
SERVICE CMNT-IMP: NORMAL
SODIUM SERPL-SCNC: 142 MMOL/L (ref 136–145)
WBC # BLD AUTO: 10.5 K/UL (ref 3.6–11)

## 2025-01-02 PROCEDURE — 2500000003 HC RX 250 WO HCPCS: Performed by: INTERNAL MEDICINE

## 2025-01-02 PROCEDURE — 2060000000 HC ICU INTERMEDIATE R&B

## 2025-01-02 PROCEDURE — APPSS45 APP SPLIT SHARED TIME 31-45 MINUTES

## 2025-01-02 PROCEDURE — 82962 GLUCOSE BLOOD TEST: CPT

## 2025-01-02 PROCEDURE — 6370000000 HC RX 637 (ALT 250 FOR IP): Performed by: FAMILY MEDICINE

## 2025-01-02 PROCEDURE — 80048 BASIC METABOLIC PNL TOTAL CA: CPT

## 2025-01-02 PROCEDURE — 99221 1ST HOSP IP/OBS SF/LOW 40: CPT

## 2025-01-02 PROCEDURE — 6360000002 HC RX W HCPCS: Performed by: STUDENT IN AN ORGANIZED HEALTH CARE EDUCATION/TRAINING PROGRAM

## 2025-01-02 PROCEDURE — 97116 GAIT TRAINING THERAPY: CPT

## 2025-01-02 PROCEDURE — 99223 1ST HOSP IP/OBS HIGH 75: CPT | Performed by: INTERNAL MEDICINE

## 2025-01-02 PROCEDURE — 85025 COMPLETE CBC W/AUTO DIFF WBC: CPT

## 2025-01-02 PROCEDURE — 6370000000 HC RX 637 (ALT 250 FOR IP): Performed by: STUDENT IN AN ORGANIZED HEALTH CARE EDUCATION/TRAINING PROGRAM

## 2025-01-02 PROCEDURE — 6360000002 HC RX W HCPCS: Performed by: INTERNAL MEDICINE

## 2025-01-02 PROCEDURE — 6370000000 HC RX 637 (ALT 250 FOR IP): Performed by: NURSE PRACTITIONER

## 2025-01-02 PROCEDURE — 93010 ELECTROCARDIOGRAM REPORT: CPT | Performed by: INTERNAL MEDICINE

## 2025-01-02 PROCEDURE — 94761 N-INVAS EAR/PLS OXIMETRY MLT: CPT

## 2025-01-02 PROCEDURE — 97530 THERAPEUTIC ACTIVITIES: CPT

## 2025-01-02 RX ORDER — ACETAMINOPHEN 325 MG/1
650 TABLET ORAL EVERY 4 HOURS PRN
Status: DISCONTINUED | OUTPATIENT
Start: 2025-01-02 | End: 2025-01-03 | Stop reason: HOSPADM

## 2025-01-02 RX ORDER — POTASSIUM CHLORIDE 7.45 MG/ML
10 INJECTION INTRAVENOUS
Status: COMPLETED | OUTPATIENT
Start: 2025-01-02 | End: 2025-01-02

## 2025-01-02 RX ADMIN — POTASSIUM CHLORIDE 10 MEQ: 7.45 INJECTION INTRAVENOUS at 08:35

## 2025-01-02 RX ADMIN — FENOFIBRATE 54 MG: 54 TABLET ORAL at 08:22

## 2025-01-02 RX ADMIN — CYANOCOBALAMIN TAB 500 MCG 500 MCG: 500 TAB at 08:22

## 2025-01-02 RX ADMIN — NYSTATIN 500000 UNITS: 100000 SUSPENSION ORAL at 08:25

## 2025-01-02 RX ADMIN — FLUCONAZOLE 200 MG: 100 TABLET ORAL at 08:22

## 2025-01-02 RX ADMIN — SODIUM CHLORIDE, PRESERVATIVE FREE 10 ML: 5 INJECTION INTRAVENOUS at 19:56

## 2025-01-02 RX ADMIN — Medication 1 CAPSULE: at 08:22

## 2025-01-02 RX ADMIN — LIDOCAINE HYDROCHLORIDE 15 ML: 20 SOLUTION ORAL at 19:49

## 2025-01-02 RX ADMIN — ACETAMINOPHEN 650 MG: 325 TABLET ORAL at 10:58

## 2025-01-02 RX ADMIN — NYSTATIN 500000 UNITS: 100000 SUSPENSION ORAL at 23:02

## 2025-01-02 RX ADMIN — NYSTATIN 500000 UNITS: 100000 SUSPENSION ORAL at 19:41

## 2025-01-02 RX ADMIN — LIDOCAINE HYDROCHLORIDE 15 ML: 20 SOLUTION ORAL at 09:47

## 2025-01-02 RX ADMIN — POTASSIUM CHLORIDE 10 MEQ: 7.45 INJECTION INTRAVENOUS at 11:00

## 2025-01-02 RX ADMIN — POTASSIUM CHLORIDE 10 MEQ: 7.45 INJECTION INTRAVENOUS at 11:52

## 2025-01-02 RX ADMIN — ATORVASTATIN CALCIUM 10 MG: 10 TABLET, FILM COATED ORAL at 08:22

## 2025-01-02 RX ADMIN — INSULIN GLARGINE 14 UNITS: 100 INJECTION, SOLUTION SUBCUTANEOUS at 21:32

## 2025-01-02 RX ADMIN — METOPROLOL SUCCINATE 25 MG: 25 TABLET, EXTENDED RELEASE ORAL at 08:22

## 2025-01-02 RX ADMIN — ONDANSETRON 4 MG: 2 INJECTION, SOLUTION INTRAMUSCULAR; INTRAVENOUS at 08:30

## 2025-01-02 RX ADMIN — NYSTATIN 500000 UNITS: 100000 SUSPENSION ORAL at 13:59

## 2025-01-02 RX ADMIN — POTASSIUM CHLORIDE 10 MEQ: 7.45 INJECTION INTRAVENOUS at 09:49

## 2025-01-02 RX ADMIN — ENOXAPARIN SODIUM 40 MG: 100 INJECTION SUBCUTANEOUS at 08:22

## 2025-01-02 RX ADMIN — SODIUM CHLORIDE, PRESERVATIVE FREE 10 ML: 5 INJECTION INTRAVENOUS at 08:25

## 2025-01-02 RX ADMIN — LEVOTHYROXINE SODIUM 137 MCG: 0.11 TABLET ORAL at 08:22

## 2025-01-02 RX ADMIN — LOPERAMIDE HYDROCHLORIDE 2 MG: 2 CAPSULE ORAL at 08:30

## 2025-01-02 ASSESSMENT — PAIN DESCRIPTION - LOCATION
LOCATION: MOUTH
LOCATION: MOUTH
LOCATION: ARM

## 2025-01-02 ASSESSMENT — PAIN DESCRIPTION - DESCRIPTORS
DESCRIPTORS: BURNING

## 2025-01-02 ASSESSMENT — PAIN SCALES - GENERAL
PAINLEVEL_OUTOF10: 3
PAINLEVEL_OUTOF10: 1
PAINLEVEL_OUTOF10: 1
PAINLEVEL_OUTOF10: 3

## 2025-01-02 ASSESSMENT — PAIN - FUNCTIONAL ASSESSMENT
PAIN_FUNCTIONAL_ASSESSMENT: ACTIVITIES ARE NOT PREVENTED
PAIN_FUNCTIONAL_ASSESSMENT: ACTIVITIES ARE NOT PREVENTED

## 2025-01-02 ASSESSMENT — PAIN DESCRIPTION - ORIENTATION: ORIENTATION: LEFT

## 2025-01-02 NOTE — CARE COORDINATION
3:06 PM  01/02/25    Care Management Progress Note    Reason for Admission:   Severe sepsis (HCC) [A41.9, R65.20]         Patient Admission Status: Inpatient  Date Admitted to INP:  12/28/24       RUR: Readmission Risk Score: 20.6    Hospitalization in the last 30 days (Readmission):  Yes        Transition of care plan:  Ongoing medical management- pt discussed during IDR; cardiology following.  Anticipated discharge plan: home with family assistance as needed and home health. Care [facility] in [facility] and will contact pt to arrange SOC.  Date IM given: 12/28   Outpatient follow-up.  Discharge transport: Family will transport at dc      CM will continue to follow and assist with discharge needs.    SUMI Caballero

## 2025-01-03 VITALS
OXYGEN SATURATION: 94 % | TEMPERATURE: 98.4 F | BODY MASS INDEX: 28.8 KG/M2 | HEART RATE: 80 BPM | SYSTOLIC BLOOD PRESSURE: 137 MMHG | WEIGHT: 156.53 LBS | DIASTOLIC BLOOD PRESSURE: 82 MMHG | HEIGHT: 62 IN | RESPIRATION RATE: 16 BRPM

## 2025-01-03 LAB
GLUCOSE BLD STRIP.AUTO-MCNC: 108 MG/DL (ref 65–117)
GLUCOSE BLD STRIP.AUTO-MCNC: 92 MG/DL (ref 65–117)
SERVICE CMNT-IMP: NORMAL
SERVICE CMNT-IMP: NORMAL

## 2025-01-03 PROCEDURE — 82962 GLUCOSE BLOOD TEST: CPT

## 2025-01-03 PROCEDURE — 6370000000 HC RX 637 (ALT 250 FOR IP): Performed by: FAMILY MEDICINE

## 2025-01-03 PROCEDURE — 6360000002 HC RX W HCPCS: Performed by: INTERNAL MEDICINE

## 2025-01-03 PROCEDURE — 6370000000 HC RX 637 (ALT 250 FOR IP): Performed by: STUDENT IN AN ORGANIZED HEALTH CARE EDUCATION/TRAINING PROGRAM

## 2025-01-03 PROCEDURE — 2500000003 HC RX 250 WO HCPCS: Performed by: INTERNAL MEDICINE

## 2025-01-03 PROCEDURE — 94761 N-INVAS EAR/PLS OXIMETRY MLT: CPT

## 2025-01-03 RX ORDER — GUAIFENESIN 600 MG/1
600 TABLET, EXTENDED RELEASE ORAL 2 TIMES DAILY PRN
Status: DISCONTINUED | OUTPATIENT
Start: 2025-01-03 | End: 2025-01-03 | Stop reason: HOSPADM

## 2025-01-03 RX ORDER — FLUCONAZOLE 200 MG/1
200 TABLET ORAL DAILY
Qty: 10 TABLET | Refills: 0 | Status: SHIPPED | OUTPATIENT
Start: 2025-01-04 | End: 2025-01-14

## 2025-01-03 RX ORDER — LIDOCAINE HYDROCHLORIDE 20 MG/ML
15 SOLUTION OROPHARYNGEAL
Qty: 100 ML | Refills: 0 | Status: SHIPPED | OUTPATIENT
Start: 2025-01-03

## 2025-01-03 RX ADMIN — LEVOTHYROXINE SODIUM 137 MCG: 0.11 TABLET ORAL at 06:20

## 2025-01-03 RX ADMIN — CYANOCOBALAMIN TAB 500 MCG 500 MCG: 500 TAB at 08:15

## 2025-01-03 RX ADMIN — ATORVASTATIN CALCIUM 10 MG: 10 TABLET, FILM COATED ORAL at 08:15

## 2025-01-03 RX ADMIN — LIDOCAINE HYDROCHLORIDE 15 ML: 20 SOLUTION ORAL at 10:48

## 2025-01-03 RX ADMIN — FENOFIBRATE 54 MG: 54 TABLET ORAL at 08:15

## 2025-01-03 RX ADMIN — GUAIFENESIN 600 MG: 600 TABLET ORAL at 08:28

## 2025-01-03 RX ADMIN — METOPROLOL SUCCINATE 25 MG: 25 TABLET, EXTENDED RELEASE ORAL at 08:15

## 2025-01-03 RX ADMIN — ENOXAPARIN SODIUM 40 MG: 100 INJECTION SUBCUTANEOUS at 08:16

## 2025-01-03 RX ADMIN — NYSTATIN 500000 UNITS: 100000 SUSPENSION ORAL at 08:16

## 2025-01-03 RX ADMIN — Medication 1 CAPSULE: at 08:15

## 2025-01-03 RX ADMIN — FLUCONAZOLE 200 MG: 100 TABLET ORAL at 08:15

## 2025-01-03 RX ADMIN — SODIUM CHLORIDE, PRESERVATIVE FREE 10 ML: 5 INJECTION INTRAVENOUS at 08:16

## 2025-01-03 NOTE — DISCHARGE SUMMARY
tablet  Commonly known as: PREVACID SOLUTAB     levalbuterol 0.63 MG/3ML nebulization  Commonly known as: XOPENEX     levothyroxine 137 MCG tablet  Commonly known as: SYNTHROID     metoprolol succinate 25 MG extended release tablet  Commonly known as: TOPROL XL  Take 1 tablet by mouth daily     nitroGLYCERIN 0.1 MG/HR  Commonly known as: NITRODUR  Place 1 patch onto the skin daily     pitavastatin 1 MG Tabs tablet  Commonly known as: LIVALO  Take 1 tablet by mouth nightly     potassium gluconate 550 mg tablet     SITagliptin 100 MG tablet  Commonly known as: JANUVIA     Tudorza Pressair 400 MCG/ACT Aepb inhaler  Generic drug: aclidinium     vitamin D 25 MCG (1000 UT) Tabs tablet  Commonly known as: CHOLECALCIFEROL            STOP taking these medications      aspirin 81 MG chewable tablet     eplerenone 25 MG tablet  Commonly known as: INSPRA     predniSONE 5 MG tablet  Commonly known as: DELTASONE               Where to Get Your Medications        These medications were sent to [facility] [address] [place] [address] - [id number] [place] [address] - P [phone] - F [phone]  [address] [place] [address]      Phone: [phone]   fluconazole 200 MG tablet  lidocaine viscous hcl 2 % Soln solution        Activity: activity as tolerated  Diet:  easy to chew, escalate back to regular as symptoms improve  Wound Care: none needed    Follow-up with Barak Mendez MD in 1 weeks.    Approximate time spent in patient care on day of discharge: 40    Signed:  Genesis vela MD  1/3/2025  11:07 AM

## 2025-01-03 NOTE — PROGRESS NOTES
Nutrition Note    Kitchen notified RD that pt is requesting Pureed diet -     Ordered Pureed diet but SLP recs are for Easy to Chew. If pt wants to upgrade diet, please change diet back to Easy to chew, or have SLP re-eval if pt does not seem to tolerate diet.     Electronically signed by Jose Miguel Ortiz RD on 12/31/24 at 11:17 AM EST    Contact: [phone]    
     Nutrition Note    Modified ONS for correct provision of supplement; Provide Banatrol to aid in stool management BID (1 packet: 45 kcal, 9 g carb with 5 g fiber, 2 g protein)      Brenda Centeno MS, RD, [facility]  Ext: [phone], or via PerfectServe      
    Hospitalist Progress Note      NAME:  Felicia Rangel   :  1950  MRM:  [id number]    Date/Time: 2025  7:39 AM           Assessment / Plan:     75 y/o F PMHx of A.fib, CAD, CHF (s/p pacemaker), who presented on  for nausea, vomiting and altered mental status.     Severe Sepsis- POA / UTI   - Urine cultures growing pansensitive Klebsiella. Multiple abx allergies   - s/p Meropenem x 3 days   - Resolved      Oral thrush  - Possibly esophageal as well  - No improvement with nystatin swish/swallow & diflucan added   - Diflucan 200 mg QD x 14 days   - Diet adjusted for patient comfort, has had minimal PO intake due to pain     Mucositis   - Erythematous, plaque on lower lip with grayish center. Similar occurrence last time she had a drug reaction. May be result of thrush vs fixed drug eruption.   - No longer on any antibiotics. No other skin findings. Protecting airway.   - Pain control & thrush management as above   - Continue to monitor, if stable and tolerating PO, can DC 25     Acute Encephalopathy  Chronic Dysarthria   - CT head negative for acute process. MRI negative. Neurology evaluated. Suspect metabolic in setting of infection.   - COVID/Influenza negative   - TSH slightly over suppressed with free T4 in normal range  - B12 low  - PT/OT > HH  - SLP: easy to chew, thin liquids    Diarrhea   - Likely due to antibiotics as above. Timing and consistency not consistent with c diff  - On banaflakes, imodium PRN (tolerates imodium at home)   - On probiotic   - CTM      Occult blood positive  Anemia   - Hb at baseline   - GI consulted, no acute endoscopy warranted. Recommend OP follow up as long as Hb stable. If Hb declining, would obtain CTA and re-consult GI     Hx of Atrial Flutter   s/p AV Shayne Ablation  s/p dual chamber pacemaker   - BP improved, resume home BB     Coronary Artery Disease  - Hx of PCI to LAD and RCA.   - Continue statin/tricor  - No longer takes asa      Diabetes Mellutis 
    Hospitalist Progress Note      NAME:  Felicia Rangel   :  1950  MRM:  [id number]    Date/Time: 2025  7:25 AM           Assessment / Plan:     75 y/o F PMHx of A.fib, CAD, CHF (s/p pacemaker), who presented on  for nausea, vomiting and altered mental status.     Nonsustained V tach   - Overnight, patient with tachycardia and irregular rhythm on telemetry. Resolved without intervention. Seen by cardiology. Pacemaker interrogation reviewed and functioning appropriately.  - Continue PO BB   - Continue telemetry      Severe Sepsis- POA  UTI   - Urine cultures growing pansensitive Klebsiella. Multiple abx allergies   - s/p Meropenem x 3 days   - Resolved      Oral thrush  - Possibly esophageal as well. Impacting ability to take PO.   - No improvement with nystatin swish/swallow & diflucan added   - Diflucan 200 mg QD x 14 days   - Viscous lidocaine PRN   - Diet adjusted for patient comfort, has had minimal PO intake due to pain. Continue to monitor, if stable and tolerating PO, can DC 1/3/25    Mucositis   - Erythematous, plaque on lower lip with grayish center. Similar occurrence last time she had a drug reaction. May be result of thrush vs fixed drug eruption.   - No longer on any antibiotics. No other skin findings. Protecting airway.   - Pain control & thrush management as above   - Continue to monitor, if stable and tolerating PO, can DC 1/3/25     Acute Encephalopathy  Chronic Dysarthria   - CT head negative for acute process. MRI negative. Neurology evaluated. Suspect metabolic in setting of infection.   - COVID/Influenza negative   - TSH slightly over suppressed with free T4 in normal range  - B12 low  - PT/OT > HH  - SLP: easy to chew, thin liquids    Diarrhea   - Likely due to antibiotics as above. Timing and consistency not consistent with c diff  - On banaflakes, imodium PRN (tolerates imodium at home)   - On probiotic   - CTM      Occult blood positive  Anemia   - Hb at baseline   - GI 
  Speech Therapy Note:  Chart review completed and spoke with RN. Pt is currently off the floor for MRI. Will attempt evaluation at a later time.    Thanks, Brittany Georges MS CCC-SLP  
0700 Bedside and Verbal shift change report given to JOHNNIE Peralta (oncoming nurse) by JOHNNIE Comer (offgoing nurse). Report included the following information Nurse Handoff Report, Recent Results, Med Rec Status, and Cardiac Rhythm NSR .     1152 Notified MD of pt's low BP readings today. MAPs have been over 65. Low readings are 99/52, 93/57  
1605 TRANSFER - IN REPORT:    Verbal report received from JOHNNIE Jay on Felicia Rangel  being received from [facility] for routine progression of patient care      Report consisted of patient's Situation, Background, Assessment and   Recommendations(SBAR).     Information from the following report(s) Recent Results, Med Rec Status, and Cardiac Rhythm NSR  was reviewed with the receiving nurse.    Opportunity for questions and clarification was provided.      Assessment completed upon patient's arrival to unit and care assumed.     
1850- Tele called concerning pt having a run of Vtach lasting 1 min. Syed SALES contacted and informed .  RN at bedside getting EKG when Pt started having seizure like activity lasting for 5 seconds. Rapid called.    1900- Rapid response nurse at bedside  Provider at bedside  EKG done  BG - 114  
I have reviewed discharge instructions with the patient and .  The patient and  verbalized understanding. The opportunity for questions was presented.    
[facility]  [address] [place] [address] [phone]    [facility] Adult  Hospitalist Group                                                                                          Hospitalist Progress Note  Karen Donaldson MD        Date of Service:  2024  NAME:  Felicia Rangel  :  1950  MRN:  [id number]      Admission Summary:   75 y/o F PMHx of A.fib, CAD, CHF (s/p pacemaker), who presents to the ER for nausea, vomiting and altered mental status.     Interval history / Subjective:   Reports significantly increased pain in her mouth and throat.  Cannot eat, hurts to talk and swallow     Assessment & Plan:     Severe Sepsis- POA 2/2 UTI   -Urine cultures growing pansensitive Klebsiella  -c/w Meropenem; multiple Abx allergies noted on EMR.  Stop vancomycin.  Received 3 days of treatment, will stop since blood cultures have remained negative  -monitor hemodynamics  -Lactate normalized    Oral thrush  -Possibly esophageal as well  -No improvement with nystatin swish/swallow  -Will add IV Diflucan  -Diet adjusted for patient comfort     Acute Encephalopathy  Chronic Dysarthria   -CT head negative for acute process  -likely in setting of infection as above  -recent admission, with MRI brain  negative for acute process, recent EEG wnl  -COVID/Influenza negative   -TSH slightly over suppressed with free T4 in normal range  -B12 low  -SLP evaluated  -Continue PT/OT  -appreciate Neurology eval    Occult blood positive  -GI consulted to eval for need of endoscopy/colonoscopy     Hx of Atrial Flutter   s/p AV Shayne Ablation  s/p dual chamber pacemaker   -BP soft, holding home Toprol.   -Continue aspirin     Coronary Artery Disease  -Hx of PCI to LAD and RCA.   -ASA/Statin when tolerating PO      Diabetes Mellutis Type 2, uncontrolled  -sugars above goal  -add ISS with accuchecks. Add Lantus, uptitrate based on trend  -recent HbA1c 8.7     Chronic 
Occupational Therapy: defer    Attempted OT evaluation.  Patient received sitting OOB in chair and expressed need for toileting/ LB bathing. She declined this male therapist's offer to assist and expressed preference for female assist. Nursing staff notified.  Will follow-up as able and appropriate with female therapist.    bEen Colin, OTR/L  
Order placed for Neuro consult to be called and MRI to r/o stroke. Dr Kellogg perfect served, to clarify if wanted Code Stroke called. Per Dr Kellogg, no need. If patient starts to present stroke symptoms over night, then call Code Stroke. Will endorse Neuro consult to day shift RN  
Order received and acknowledged. Chart reviewed. RN cleared to see.  Per RN patient is very lethargic. Upon entry spouse at bedside and patient awake. Introductions made and intent of visit expressed with patient declining OT activity due to fatigue and poor sleep last night.  Asking for OT to return Monday. We will continue to follow. Thank you   
Patient refusing IV Merrem ABX. Per patient, due to thrush mouth, day  said she could take something else. Not sure which medication. Dr Pendleton notified. Will endorse to day RN as well.   
Physical Therapy:  1446  Order received and acknowledged. Chart reviewed. RN cleared to see.  Per RN patient is very lethargic. Upon entry spouse at bedside and patient awake. Introductions made and intent of visit expressed with patient declining PT activity due to fatigue and poor sleep last night.  Asking for PT to return Monday. We will continue to follow. Thank you    Amy Lombardo, PT, DPT, CLT    
Rapid Called at 1900    Responded to RRT at 1900 for irregular rhythm. Per primary pt had a run of vtac lasting one minute. Pt is alert and oriented at this time. No CP or SOB.     Provider at bedside: YES  Interventions ordered: Labs and EKG  Transfer to Higher Level of Care: na  Blood Glucose: 114     Vitals:    01/01/25 1525   BP: 118/66   Pulse: 80   Resp: 16   Temp: 98.4 °F (36.9 °C)   SpO2: 94%        Rapid Ended at 1930  RRT RN assisted with transport to accepting unit NA.    Kareem Carmona RN  
[facility]  [address] [place] [address] [phone]    [facility] Adult  Hospitalist Group                                                                                          Hospitalist Progress Note  Karen Donaldson MD        Date of Service:  2024  NAME:  Felicia Rangel  :  1950  MRN:  [id number]      Admission Summary:   75 y/o F PMHx of A.fib, CAD, CHF (s/p pacemaker), who presents to the ER for nausea, vomiting and altered mental status.     Interval history / Subjective:   Patient reports pain in her mouth from thrush.  Otherwise no complaints.  Was started on nystatin overnight     Assessment & Plan:     Severe Sepsis- POA 2/2 UTI   -Blood cultures negative so far.  Urine cultures growing pansensitive Klebsiella  -c/w Meropenem; multiple Abx allergies noted on EMR.  Stop vancomycin.  Will plan on 3 days of antibiotics if blood cultures are negative  -monitor hemodynamics  -Lactate normalized     Acute Encephalopathy  Chronic Dysarthria   -CT head negative for acute process  -likely in setting of infection as above  -recent admission, with MRI brain  negative for acute process, recent EEG wnl  -COVID/Influenza negative   -TSH slightly over suppressed with free T4 in normal range  -B12 low  -SLP evaluated  -Continue PT/OT  -appreciate Neurology eval     Hx of Atrial Flutter   s/p AV Shayne Ablation  s/p dual chamber pacemaker   -BP soft, holding home Toprol. C/w ASA when tolerating PO     Coronary Artery Disease  -Hx of PCI to LAD and RCA.   -ASA/Statin when tolerating PO      Diabetes Mellutis Type 2, uncontrolled  -sugars above goal  -add ISS with accuchecks. Add Lantus, uptitrate based on trend  -recent HbA1c 8.7     Chronic HFrEF (EF 50% in 2024)  -c/w IVF hydration in setting of sepsis and monitor for fluid overload     Hypomagnesemia  -repleted      COPD  -not in exacerbation  -Duonebs prn     GERD  -c/w PPI   
Speech LAnguage Pathology EVALUATION/DISCHARGE    Patient: Felicia Rangel (74 y.o. female)  Date: 12/30/2024  Primary Diagnosis: Severe sepsis (HCC) [A41.9, R65.20]       Precautions:                     ASSESSMENT :  Pt is a 73 yo female presenting with vomiting, lethargy and ams with admission for sepsis and acute encephalopathy. CT and MRI negative. RN reports difficulty swallowing d/t pain from oral thrush. Pt denies any hx of dysphagia.     Pt observed with thin liquids only given her oral thrush and not wanting solids. Pt with guarding noted resulting in hesitant oral transit and mild bolus hold prior to the swallow. No s/s of aspiration were observed. She is requesting a soft diet. Recommend downgrading to an easy to chew with thin liquids with general aspiration precautions. Suspect as the thrush clears her swallow will return to baseline.     Patient will be discharged from skilled speech-language pathology services at this time.     PLAN :  Recommendations and Planned Interventions:  Diet: Easy to chew and thin liquids  -upright for all po intake  -remain upright for at least 30 minutes after po intake    Acute SLP Services: No, patient will be discharged from acute skilled speech-language pathology at this time.  Discharge Recommendations: No, additional SLP treatment not indicated at discharge     SUBJECTIVE:   Patient stated, “hello.”    OBJECTIVE:     Past Medical History:   Diagnosis Date    Arthritis     Atrial fibrillation (HCC)     CAD (coronary artery disease)     Celiac disease     CHF (congestive heart failure), NYHA class I, chronic, systolic (HCC)     Chronic obstructive pulmonary disease (HCC)     Diastolic heart failure (HCC)     GERD (gastroesophageal reflux disease)     Hx of deep venous thrombosis     Hypertension     Hypothyroidism     Iron deficiency anemia     Lyme disease     Pacemaker     11/20    Rheumatoid arthritis (HCC)     S/P AV felicia ablation     11/20     S/P left atrial 
Spiritual Health History and Assessment/Progress Note  [facility]    Initial Encounter,  , Adjustment to illness,      Name: Felicia Rangel MRN: [id number]    Age: 74 y.o.     Sex: female   Language: English   Holiness: Adventist   Severe sepsis (HCC)     Date: 1/1/2025            Total Time Calculated: 13 min              Spiritual Assessment began in [facility]3 INTERMEDIATE CARE UNIT        Referral/Consult From: Rounding   Encounter Overview/Reason: Initial Encounter  Service Provided For: Patient    Dora, Belief, Meaning:   Patient identifies as spiritual, is connected with a dora tradition or spiritual practice, has beliefs or practices that help with coping during difficult times, and Other: Ms Rangel shared that she was a member of a [employer]ist Muslim  Family/Friends No family/friends present      Importance and Influence:  Patient has spiritual/personal beliefs that influence decisions regarding their health  Family/Friends No family/friends present    Community:  Patient is connected with a spiritual community and feels well-supported. Support system includes: Spouse/Partner  Family/Friends No family/friends present    Assessment and Plan of Care:     Patient Interventions include:  Provided spiritual presence and active listening as patient shared about her current health issues. Shared that she was feeling better today and hoped to be discharged tomorrow. Acknowledged her feelings and offered words of support. With her permission had prayer on her behalf and assured her of ongoing  availability for support.  Family/Friends Interventions include: No family/friends present    Patient Plan of Care: Spiritual Care available upon further referral  Family/Friends Plan of Care: No family/friends present      Rev Maryan Smith MDiv, [facility]  To page : [phone])  
10:15 AM        Pharmacist: Jelani Lackey [facility]  [phone]    
LENGTH OF STAY:    ACTUAL LENGTH OF STAY:          1                 Karen Donaldson MD

## 2025-01-03 NOTE — PLAN OF CARE
Problem: Chronic Conditions and Co-morbidities  Goal: Patient's chronic conditions and co-morbidity symptoms are monitored and maintained or improved  1/3/2025 1240 by Charlie Ramon RN  Outcome: Completed  1/3/2025 0758 by Charlie Ramon RN  Outcome: Progressing     Problem: Discharge Planning  Goal: Discharge to home or other facility with appropriate resources  1/3/2025 1240 by Charlie Ramon RN  Outcome: Completed  1/3/2025 0758 by Charlie Ramon RN  Outcome: Progressing     Problem: Skin/Tissue Integrity  Goal: Absence of new skin breakdown  Description: 1.  Monitor for areas of redness and/or skin breakdown  2.  Assess vascular access sites hourly  3.  Every 4-6 hours minimum:  Change oxygen saturation probe site  4.  Every 4-6 hours:  If on nasal continuous positive airway pressure, respiratory therapy assess nares and determine need for appliance change or resting period.  1/3/2025 1240 by Charlie Ramon RN  Outcome: Completed  1/3/2025 0758 by Charlie Ramon RN  Outcome: Progressing     Problem: Safety - Adult  Goal: Free from fall injury  1/3/2025 1240 by Charlie Ramon RN  Outcome: Completed  1/3/2025 0758 by Charlie Ramon RN  Outcome: Progressing     Problem: ABCDS Injury Assessment  Goal: Absence of physical injury  1/3/2025 1240 by Charlie Ramon RN  Outcome: Completed  1/3/2025 0758 by Charlie Ramon RN  Outcome: Progressing     Problem: Pain  Goal: Verbalizes/displays adequate comfort level or baseline comfort level  1/3/2025 1240 by Charlie Ramon RN  Outcome: Completed  1/3/2025 0758 by Charlie Ramon RN  Outcome: Progressing     
  Problem: Chronic Conditions and Co-morbidities  Goal: Patient's chronic conditions and co-morbidity symptoms are monitored and maintained or improved  12/29/2024 0717 by Kathryn Miranda RN  Outcome: Progressing  12/29/2024 0410 by Nahomi Sandoval RN  Outcome: Progressing     Problem: Discharge Planning  Goal: Discharge to home or other facility with appropriate resources  12/29/2024 0717 by Kathryn Miranda RN  Outcome: Progressing  12/29/2024 0410 by Nahomi Sandoval RN  Outcome: Progressing     Problem: Skin/Tissue Integrity  Goal: Absence of new skin breakdown  Description: 1.  Monitor for areas of redness and/or skin breakdown  2.  Assess vascular access sites hourly  3.  Every 4-6 hours minimum:  Change oxygen saturation probe site  4.  Every 4-6 hours:  If on nasal continuous positive airway pressure, respiratory therapy assess nares and determine need for appliance change or resting period.  12/29/2024 0717 by Kathryn Miranda RN  Outcome: Progressing  12/29/2024 0410 by Nahomi Sandoval RN  Outcome: Progressing     Problem: Safety - Adult  Goal: Free from fall injury  12/29/2024 0717 by Kathryn Miranda RN  Outcome: Progressing  12/29/2024 0410 by Nahomi Sandoval RN  Outcome: Progressing     Problem: ABCDS Injury Assessment  Goal: Absence of physical injury  12/29/2024 0717 by Kathryn Miranda RN  Outcome: Progressing  12/29/2024 0410 by Nahomi Sandoval RN  Outcome: Progressing     
  Problem: Chronic Conditions and Co-morbidities  Goal: Patient's chronic conditions and co-morbidity symptoms are monitored and maintained or improved  12/31/2024 0448 by Nahomi Sandoval RN  Outcome: Progressing  12/30/2024 1649 by Cedric Moore RN  Outcome: Progressing     Problem: Discharge Planning  Goal: Discharge to home or other facility with appropriate resources  12/31/2024 0448 by Nahomi Sandoval RN  Outcome: Progressing  12/30/2024 1649 by Cedric Moore RN  Outcome: Progressing     Problem: Skin/Tissue Integrity  Goal: Absence of new skin breakdown  Description: 1.  Monitor for areas of redness and/or skin breakdown  2.  Assess vascular access sites hourly  3.  Every 4-6 hours minimum:  Change oxygen saturation probe site    12/31/2024 0448 by Nahomi Sandoval RN  Outcome: Progressing  12/30/2024 1649 by Cedric Moore RN  Outcome: Progressing     Problem: Safety - Adult  Goal: Free from fall injury  12/31/2024 0448 by Nahomi Sandoval RN  Outcome: Progressing  12/30/2024 1649 by Cedric Moore RN  Outcome: Progressing     Problem: ABCDS Injury Assessment  Goal: Absence of physical injury  12/31/2024 0448 by Nahomi Sandoval RN  Outcome: Progressing  12/30/2024 1649 by Cedric Moore RN  Outcome: Progressing     
  Problem: Chronic Conditions and Co-morbidities  Goal: Patient's chronic conditions and co-morbidity symptoms are monitored and maintained or improved  Outcome: Progressing     Problem: Discharge Planning  Goal: Discharge to home or other facility with appropriate resources  Outcome: Progressing     Problem: Skin/Tissue Integrity  Goal: Absence of new skin breakdown  Description: 1.  Monitor for areas of redness and/or skin breakdown  2.  Assess vascular access sites hourly  3.  Every 4-6 hours minimum:  Change oxygen saturation probe site  4.  Every 4-6 hours:  If on nasal continuous positive airway pressure, respiratory therapy assess nares and determine need for appliance change or resting period.  Outcome: Progressing     Problem: Safety - Adult  Goal: Free from fall injury  Outcome: Progressing     Problem: ABCDS Injury Assessment  Goal: Absence of physical injury  Outcome: Progressing     
  Problem: Chronic Conditions and Co-morbidities  Goal: Patient's chronic conditions and co-morbidity symptoms are monitored and maintained or improved  Outcome: Progressing     Problem: Discharge Planning  Goal: Discharge to home or other facility with appropriate resources  Outcome: Progressing     Problem: Skin/Tissue Integrity  Goal: Absence of new skin breakdown  Description: 1.  Monitor for areas of redness and/or skin breakdown  2.  Assess vascular access sites hourly  3.  Every 4-6 hours minimum:  Change oxygen saturation probe site  4.  Every 4-6 hours:  If on nasal continuous positive airway pressure, respiratory therapy assess nares and determine need for appliance change or resting period.  Outcome: Progressing     Problem: Safety - Adult  Goal: Free from fall injury  Outcome: Progressing     Problem: ABCDS Injury Assessment  Goal: Absence of physical injury  Outcome: Progressing     Problem: Pain  Goal: Verbalizes/displays adequate comfort level or baseline comfort level  Outcome: Progressing     
  Problem: Chronic Conditions and Co-morbidities  Goal: Patient's chronic conditions and co-morbidity symptoms are monitored and maintained or improved  Outcome: Progressing     Problem: Discharge Planning  Goal: Discharge to home or other facility with appropriate resources  Outcome: Progressing     Problem: Skin/Tissue Integrity  Goal: Absence of new skin breakdown  Description: 1.  Monitor for areas of redness and/or skin breakdown  2.  Assess vascular access sites hourly  3.  Every 4-6 hours minimum:  Change oxygen saturation probe site  Outcome: Progressing     Problem: Safety - Adult  Goal: Free from fall injury  Outcome: Progressing     Problem: ABCDS Injury Assessment  Goal: Absence of physical injury  Outcome: Progressing     
  Problem: Chronic Conditions and Co-morbidities  Goal: Patient's chronic conditions and co-morbidity symptoms are monitored and maintained or improved  Outcome: Progressing  Flowsheets  Taken 1/2/2025 0025 by Maryan Medina RN  Care Plan - Patient's Chronic Conditions and Co-Morbidity Symptoms are Monitored and Maintained or Improved: Monitor and assess patient's chronic conditions and comorbid symptoms for stability, deterioration, or improvement  Taken 1/1/2025 2008 by Lavinia Cervantes RN  Care Plan - Patient's Chronic Conditions and Co-Morbidity Symptoms are Monitored and Maintained or Improved: Monitor and assess patient's chronic conditions and comorbid symptoms for stability, deterioration, or improvement     Problem: Discharge Planning  Goal: Discharge to home or other facility with appropriate resources  Outcome: Progressing  Flowsheets  Taken 1/2/2025 0025 by Maryan Medina RN  Discharge to home or other facility with appropriate resources: Identify barriers to discharge with patient and caregiver  Taken 1/1/2025 2008 by Lavinia Cervatnes RN  Discharge to home or other facility with appropriate resources:   Identify barriers to discharge with patient and caregiver   Identify discharge learning needs (meds, wound care, etc)     Problem: Skin/Tissue Integrity  Goal: Absence of new skin breakdown  Description: 1.  Monitor for areas of redness and/or skin breakdown  2.  Assess vascular access sites hourly  3.  Every 4-6 hours minimum:  Change oxygen saturation probe site  4.  Every 4-6 hours:  If on nasal continuous positive airway pressure, respiratory therapy assess nares and determine need for appliance change or resting period.  Outcome: Progressing     Problem: Safety - Adult  Goal: Free from fall injury  Outcome: Progressing     Problem: ABCDS Injury Assessment  Goal: Absence of physical injury  Outcome: Progressing     
  Problem: Physical Therapy - Adult  Goal: By Discharge: Performs mobility at highest level of function for planned discharge setting.  See evaluation for individualized goals.  Description: FUNCTIONAL STATUS PRIOR TO ADMISSION: Patient was independent and active without use of DME.    HOME SUPPORT PRIOR TO ADMISSION: The patient lived with spouse but did not require assistance.    Physical Therapy Goals  Initiated 12/30/2024  1.  Patient will move from supine to sit and sit to supine in bed with modified independence within 7 day(s).    2.  Patient will perform sit to stand with modified independence within 7 day(s).  3.  Patient will transfer from bed to chair and chair to bed with modified independence using the least restrictive device within 7 day(s).  4.  Patient will ambulate with modified independence for 150 feet with the least restrictive device or no AD  within 7 day(s).     Outcome: Progressing   PHYSICAL THERAPY TREATMENT    Patient: Felicia Rangel (74 y.o. female)  Date: 1/2/2025  Diagnosis: Severe sepsis (HCC) [A41.9, R65.20] Severe sepsis (HCC)      Precautions: Fall Risk                      ASSESSMENT:  Patient continues to benefit from skilled PT services and is progressing towards goals. RW has been delivered and height is adjusted. She ambulates increased distance with CGA- SBA. Patient ambulates with decreased bilateral step length and minor path deviations, RW mostly intermittently veering the L. Patient is able to self correct RW. RW gets caughter on throw rug x1 but patient maintains balance with CGA and is able to correct RW independently. She is returned to bedside chair with all needs met.          PLAN:  Patient continues to benefit from skilled intervention to address the above impairments.  Continue treatment per established plan of care.        Recommendation for discharge: (in order for the patient to meet his/her long term goals):   Intermittent physical therapy up to 2-3x/week in 
of session in NAD.  Patient largest complaint at this time is painful mouth, limited conversation and food.       PLAN :  Recommendations and Planned Interventions:   self care training, therapeutic activities, functional mobility training, balance training, therapeutic exercise, endurance activities, patient education, home safety training, and family training/education    Frequency/Duration: OT Plan of Care: 5 times/week, 3 times/week    Recommendation for discharge: (in order for the patient to meet his/her long term goals):   No skilled occupational therapy    Other factors to consider for discharge: no additional factors    IF patient discharges home will need the following DME:  rolling walker has been delivered for discharge       SUBJECTIVE:   Patient stated, “my mouth.” (patient sticking out tongue to show therapist)  OBJECTIVE DATA SUMMARY:     Past Medical History:   Diagnosis Date    Arthritis     Atrial fibrillation (HCC)     CAD (coronary artery disease)     Celiac disease     CHF (congestive heart failure), NYHA class I, chronic, systolic (HCC)     Chronic obstructive pulmonary disease (HCC)     Diastolic heart failure (HCC)     GERD (gastroesophageal reflux disease)     Hx of deep venous thrombosis     Hypertension     Hypothyroidism     Iron deficiency anemia     Lyme disease     Pacemaker     11/20    Rheumatoid arthritis (HCC)     S/P AV felicia ablation     11/20     S/P left atrial appendage ligation     RUSTY clip 10/20     TIA (transient ischemic attack)      Past Surgical History:   Procedure Laterality Date    ABLATION OF DYSRHYTHMIC FOCUS      ADENOIDECTOMY      APPENDECTOMY      CARDIAC CATHETERIZATION  03/2020    CARDIAC CATHETERIZATION  2010    2 STENTS    CHOLECYSTECTOMY  6/16/11    COLONOSCOPY      CORONARY ANGIOPLASTY WITH STENT PLACEMENT      x 2    HYSTERECTOMY (CERVIX STATUS UNKNOWN)      ICAR CATHETER ABLATION ATRIOVENTR NODE FUNCTION N/A 11/19/2020    ABLATION AV NODE performed by 
Mary Barrow. Activity Measure for Post-Acute Care \"6-Clicks\" Basic Mobility Scores Predict Discharge Destination After Acute Care Hospitalization in Select Patient Groups: A Retrospective, Observational Study. Arch Rehabil Res Clin Transl. 2022;4(3):403651. doi: 10.1016/j.arrct..256962. PMID: 66406798; PMCID: XSH9455234.  4. Alesia DODD, Lazara S, Lee W, Gretchen HANEY. AM-PAC Short Forms Manual 4.0. Revised 2020.                                                                                                                                                                                                                              Pain Ratin/10     Activity Tolerance:   Fair     After treatment:   Patient left in no apparent distress sitting up in chair, Call bell within reach, and Bed/ chair alarm activated    COMMUNICATION/EDUCATION:   The patient's plan of care was discussed with: registered nurse    Patient Education  Education Given To: Patient  Education Provided: Role of Therapy;Plan of Care;Transfer Training;Mobility Training;Equipment  Education Method: Verbal  Barriers to Learning: None  Education Outcome: Verbalized understanding;Continued education needed    Thank you for this referral.  M[facility] Long PT  Minutes: 24      Physical Therapy Evaluation Charge Determination   History Examination Presentation Decision-Making   MEDIUM  Complexity : 1-2 comorbidities / personal factors will impact the outcome/ POC  MEDIUM Complexity : 3 Standardized tests and measures addressin body structure, function, activity limitation and / or participation in recreation  LOW Complexity : Stable, uncomplicated  AM-PAC  MEDIUM   Based on the above components, the patient evaluation is determined to be of the following complexity level: Low

## 2025-01-03 NOTE — CONSULTS
[facility]  DIABETES MANAGEMENT CONSULT    Consulted by Provider for advanced nursing evaluation and care for inpatient blood glucose management.    Evaluation and Action Plan   Felicia Rangel is a 74 y.o. female with PMHx of T2DM, Afib, COPD, diastolic heart faillure, CAD, pacemaker , HTN, HLD, RA, SUBHA, TIA, celiac disease, who was admitted with vomiting and AMS. Work up reveals acute encephalopathy r/t sepsis in setting of UTI, oral thrush. Negative for acute stroke. Patient endorses diabetes medication regimen of basal/correctional insulin as well as Januvia at home. BG \"normal\" in the morning but often higher in the afternoon. A1c is 8.7% which is a bit higher than goal for her age and chronic conditions. DM asked to assist in inpatient glycemic management.     1/3 - Patient sleeping at time of visit. BG has been very stable. She did receive basal insulin last evening and . Uncertain if po intake any better with thrush. With A1c of 8.7%, it would seem reasonable to continue current basal, given no hypoglycemia this morning.     Blood glucose pattern    Significant diabetes-related events over the past 24-72 hours  A1C 8.7%  Fasting B  Pre-prandial: 112-121  Basal: 14 units   Bolus: 0  Correction: 0  Total daily insulin dose in the last 24 hours: 14 units      Management Rationale Action Plan   Medication   Basal  0.2 units/kg/D Continue Lantus 14 units nightly   Corrective insulin Using medium dose sensitivity based on weight    Additional orders  Carb consistent diet (60g CHO/meal); dysphagia        Diabetes Discharge Plan   Medication:   Continue Tresiba 10 units nightly  Continue Januvia      Referral  []        Outpatient diabetes education   Additional orders            Initial Presentation   Felicia Rangel is a 74 y.o. female who presented to the ED on  with vomiting and AMS.  LAB:Bg 313, A1c 8.7%, lactic 3.3, WBC 13.3  CXR: No acute process  MRI brain:1. 
[facility]  DIABETES MANAGEMENT CONSULT    Consulted by Provider for advanced nursing evaluation and care for inpatient blood glucose management.    Evaluation and Action Plan   Felicia Rangel is a 74 y.o. female with PMHx of T2DM, Afib, COPD, diastolic heart faillure, CAD, pacemaker , HTN, HLD, RA, SUBHA, TIA, celiac disease, who was admitted with vomiting and AMS. Work up reveals acute encephalopathy r/t sepsis in setting of UTI, oral thrush. Negative for acute stroke. Patient endorses diabetes medication regimen of basal/correctional insulin as well as Januvia at home. BG \"normal\" in the morning but often higher in the afternoon. A1c is 8.7% which is a bit higher than goal for her age and chronic conditions. DM asked to assist in inpatient glycemic management.     Admission BG was 313. With her AMS, it's likely she had not been taking any medication prior to that. Possible omission of insulin in setting of sepsis likely contributed to hyperglycemia. Since admission, patient has had a difficult time eating (dysphagia diet) due to discomfort from oral thrush. She was initially started on basal insulin, but BG running low. Her BG has ranged from  last few days without any insulin. Would continue with holding off on basal insulin for now until eating better given BG trends. Will continue to follow.     Blood glucose pattern    Significant diabetes-related events over the past 24-72 hours  A1C 8.7%  Fasting B  Pre-prandial: 105  Basal: 0  Bolus: 0  Correction: 0  Total daily insulin dose in the last 24 hours:0      Management Rationale Action Plan   Medication   Corrective insulin Using medium dose sensitivity based on weight    Additional orders  Carb consistent diet (60g CHO/meal); dysphagia        Diabetes Discharge Plan   Medication: likely PTA meds     Referral  []        Outpatient diabetes education   Additional orders            Initial Presentation   Felicia Rangel is 
Brief clinical note based on review of chart    GI consulted for decreasing hemoglobin with positive fecal occult    Hemoglobin 13, which is baseline for the patient.  Previously dropped from 16, but this is likely an outlier.  Based on notes, no overt GI bleeding noted.  Currently, patient has severe sepsis likely urinary in origin, acute encephalopathy, CAD, HFrEF, and oral thrush all of which are being addressed while here.     Continue to monitor labs.  EGD/colonoscopy not indicated in the inpatient setting at this time.  If patient becomes hemodynamically unstable, has continued drop in hemoglobin and has overt GI bleeding, obtain CTA and contact GI on-call.    GI signing off  Recommend following up with her outpatient gastroenterologist    Misty Robles PA-C  
CONSULT - Neurology      Name:  Felicia Rangel       MRN: [id number]  Location: B313/01    Date: 12/29/2024  Time:  11:34 AM        Chief Complaint:   Chief Complaint   Patient presents with    Vomiting       HPI:  It is a great pleasure to see Felicia Rangel, a 74 y.o. female today in the hospital . Briefly these are the events happened as per the chart H&P and taken from the chart. The patient was doing fine and the symptoms started with increasingly lethargy. The symptoms fluctuated in the beginning. She subsequently vomitted 2-3 times and EMS was called.  The patient was brought to the emergency room for further evaluation. patient was recently admitted from 11/28/24-12/3/24 for dysarthria, tremors. MRI brain was negative.EEG was also wnl.       PAST MEDICAL HISTORY:  Past Medical History:   Diagnosis Date    Arthritis     Atrial fibrillation (HCC)     CAD (coronary artery disease)     Celiac disease     CHF (congestive heart failure), NYHA class I, chronic, systolic (HCC)     Chronic obstructive pulmonary disease (HCC)     Diastolic heart failure (HCC)     GERD (gastroesophageal reflux disease)     Hx of deep venous thrombosis     Hypertension     Hypothyroidism     Iron deficiency anemia     Lyme disease     Pacemaker     11/20    Rheumatoid arthritis (HCC)     S/P AV felicia ablation     11/20     S/P left atrial appendage ligation     RUSTY clip 10/20     TIA (transient ischemic attack)      PAST SURGICAL HISTORY:    Past Surgical History:   Procedure Laterality Date    ABLATION OF DYSRHYTHMIC FOCUS      ADENOIDECTOMY      APPENDECTOMY      CARDIAC CATHETERIZATION  03/2020    CARDIAC CATHETERIZATION  2010    2 STENTS    CHOLECYSTECTOMY  6/16/11    COLONOSCOPY      CORONARY ANGIOPLASTY WITH STENT PLACEMENT      x 2    HYSTERECTOMY (CERVIX STATUS UNKNOWN)      ICAR CATHETER ABLATION ATRIOVENTR NODE FUNCTION N/A 11/19/2020    ABLATION AV NODE performed by Angus Flower MD at [facility] CARDIAC CATH LAB    INS [employer] [facility] 
suspension 500,000 Units, 5 mL, Oral, 4x Daily, Ann العلي APRN - NP, 500,000 Units at 01/02/25 0825    iopamidol (ISOVUE-370) 76 % injection 100 mL, 100 mL, IntraVENous, ONCE PRN, Gabriel Drake MD    sodium chloride flush 0.9 % injection 5-40 mL, 5-40 mL, IntraVENous, 2 times per day, Roldan Kellogg MD, 10 mL at 01/02/25 0825    sodium chloride flush 0.9 % injection 5-40 mL, 5-40 mL, IntraVENous, PRN, Roldan Kellogg MD    0.9 % sodium chloride infusion, , IntraVENous, PRN, Roldan Kellogg MD    insulin lispro (HUMALOG,ADMELOG) injection vial 0-8 Units, 0-8 Units, SubCUTAneous, 4x Daily AC & HS, Roldan Kellogg MD, 2 Units at 12/28/24 2125    glucose chewable tablet 16 g, 4 tablet, Oral, PRN, Roldan Kellogg MD    dextrose bolus 10% 125 mL, 125 mL, IntraVENous, PRN **OR** dextrose bolus 10% 250 mL, 250 mL, IntraVENous, PRN, Roldan Kellogg MD    glucagon injection 1 mg, 1 mg, SubCUTAneous, PRN, Roldan Kellogg MD    dextrose 10 % infusion, , IntraVENous, Continuous PRN, Roldan Kellogg MD    ondansetron (ZOFRAN) injection 4 mg, 4 mg, IntraVENous, Q6H PRN, Roldan Kellogg MD, 4 mg at 01/02/25 0830    acetaminophen (TYLENOL) suppository 650 mg, 650 mg, Rectal, Q6H PRN, Roldan Kellogg MD, 650 mg at 12/31/24 0204    enoxaparin (LOVENOX) injection 40 mg, 40 mg, SubCUTAneous, Daily, Roldan Kellogg MD, 40 mg at 01/02/25 0822    PRICILA Gomez NP    [facility]  Call center: (P) [phone]  (F) [phone]      CC:Barak Mendez MD

## 2025-01-03 NOTE — DISCHARGE INSTRUCTIONS
my physician.    I understand and acknowledge receipt of the instructions indicated above.                                                                                                                                           Physician's or R.N.'s Signature                                                                  Date/Time                                                                                                                                              Patient or Representative Signature                                                          Date/Time

## 2025-01-07 ENCOUNTER — TELEPHONE (OUTPATIENT)
Age: 75
End: 2025-01-07

## 2025-01-07 NOTE — TELEPHONE ENCOUNTER
Patient had hospital visit and had problem with pacemaker please follow up when you get opportunity at [phone]

## 2025-01-07 NOTE — TELEPHONE ENCOUNTER
Return call to patient. ID verified using two patient identifiers. Patient has concerns about recent interrogation done at the hospital while she was there. Advised patient I reviewed transmission dated 1-1-25. Advised per rep note, \" Device appears to be currently functioning as programmed\". Reminded patient the device team will reach out if we see anything unusual.     Opportunities for questions, clarifications, and concerns provided.  Pt expressed understanding.

## 2025-01-28 ENCOUNTER — TELEMEDICINE (OUTPATIENT)
Age: 75
End: 2025-01-28
Payer: MEDICARE

## 2025-01-28 DIAGNOSIS — J11.81: Primary | ICD-10-CM

## 2025-01-28 PROCEDURE — 1159F MED LIST DOCD IN RCRD: CPT | Performed by: NURSE PRACTITIONER

## 2025-01-28 PROCEDURE — 1160F RVW MEDS BY RX/DR IN RCRD: CPT | Performed by: NURSE PRACTITIONER

## 2025-01-28 PROCEDURE — 1111F DSCHRG MED/CURRENT MED MERGE: CPT | Performed by: NURSE PRACTITIONER

## 2025-01-28 PROCEDURE — 1036F TOBACCO NON-USER: CPT | Performed by: NURSE PRACTITIONER

## 2025-01-28 PROCEDURE — G8417 CALC BMI ABV UP PARAM F/U: HCPCS | Performed by: NURSE PRACTITIONER

## 2025-01-28 PROCEDURE — 1123F ACP DISCUSS/DSCN MKR DOCD: CPT | Performed by: NURSE PRACTITIONER

## 2025-01-28 PROCEDURE — 99214 OFFICE O/P EST MOD 30 MIN: CPT | Performed by: NURSE PRACTITIONER

## 2025-01-28 PROCEDURE — 1090F PRES/ABSN URINE INCON ASSESS: CPT | Performed by: NURSE PRACTITIONER

## 2025-01-28 PROCEDURE — G8399 PT W/DXA RESULTS DOCUMENT: HCPCS | Performed by: NURSE PRACTITIONER

## 2025-01-28 PROCEDURE — G8427 DOCREV CUR MEDS BY ELIG CLIN: HCPCS | Performed by: NURSE PRACTITIONER

## 2025-01-28 NOTE — PROGRESS NOTES
control for dose  modulation. Absence of intravenous contrast limits evaluation of the  mediastinum, liberty, vasculature, and solid organs.    FINDINGS:    Chest wall: Battery pack is in the upper left chest wall. Leads extend into the  right atrium, right ventricle, and coronary sinus.  MEDIASTINUM: No mass or lymphadenopathy.  LIBERTY: No gross pathology.  THORACIC AORTA: Pulsation artifact and calcific atherosclerosis. No aneurysm.  MAIN PULMONARY ARTERY: Normal in caliber.  TRACHEA/BRONCHI: Patent.  ESOPHAGUS: Small sliding-type hiatal hernia.  HEART: Normal cardiac size. No pericardial effusion. Coronary artery calcium:  present  PLEURA: No effusion or pneumothorax.  LUNGS: Mild centrilobular emphysema. Left lower lobe subsegmental chronic  scarring is unchanged. No evidence of pneumonia. No lung mass or suspicious  nodule.    LIVER: Borderline hepatomegaly. Moderate hypoattenuation indicates steatosis.  Smooth surface at this time.  GALLBLADDER: Cholecystectomy. CBD is not dilated.  SPLEEN: Upper normal size.  PANCREAS: Mild atrophy. No inflammation.  ADRENALS: Increased mild hypertrophy. No nodule.  KIDNEYS: No nephrolithiasis or hydronephrosis. Arterial calcific  atherosclerosis.  STOMACH: Unremarkable.  SMALL BOWEL: No obstruction.  COLON: Tortuous, nonobstructed.  APPENDIX: Resected.  PERITONEUM: No ascites or pneumoperitoneum.  RETROPERITONEUM: Aortic atherosclerosis without aneurysm. No lymphadenopathy.  REPRODUCTIVE ORGANS: Hysterectomy.  URINARY BLADDER: No mass or calculus.  BONES: Osteopenia. Lumbar spine surgery.  ADDITIONAL COMMENTS: Moderate diffuse muscle atrophy.    Impression  1. No acute process on noncontrast CT.  2. Moderate hepatic steatosis. No cirrhosis at this time.      Electronically signed by Helder Silva      CT HEAD WO CONTRAST 12/28/2024    Narrative  EXAM:  CT HEAD WO CONTRAST    INDICATION:   altered, vomiting    COMPARISON: CT head 12/2/2024.    TECHNIQUE:  Unenhanced CT of the head

## 2025-01-28 NOTE — ASSESSMENT & PLAN NOTE
Patient with A-fib, CAD, CHF (status post pacemaker) who was admitted to the hospital 12/28/2024-01/03/2025 with acute encephalopathic state and dysarthria. Patient was evaluated by neurology for dysarthria and tremors.       - MRI of the brain was negative    - EEG was also within normal limits.    Patient's symptoms were most likely related to underlying urosepsis with resolution and no recurrence of symptoms.  Patient remains at her neurological baseline.  However she does endorse feeling the lingering effects from her illness as she feels generalized weakness, she is constantly nauseated making it difficult for her to eat.  She was having some dizziness which is improved but the nausea keeps her primarily immobile with her only ambulating as she needs to.    With regards to the nausea I advised Ms. Glasgow to reach out to her PCP  To avoid deconditioning I advised Ms. Glasgow to stay as active in her home she can.    We discussed follow-up, as she remains at her neurological baseline she does not feel the need to schedule a follow-up at this time.

## 2025-02-28 ENCOUNTER — OFFICE VISIT (OUTPATIENT)
Age: 75
End: 2025-02-28
Payer: MEDICARE

## 2025-02-28 VITALS
OXYGEN SATURATION: 95 % | DIASTOLIC BLOOD PRESSURE: 78 MMHG | SYSTOLIC BLOOD PRESSURE: 122 MMHG | HEIGHT: 62 IN | BODY MASS INDEX: 28.89 KG/M2 | WEIGHT: 157 LBS | HEART RATE: 96 BPM

## 2025-02-28 DIAGNOSIS — G45.9 TIA (TRANSIENT ISCHEMIC ATTACK): ICD-10-CM

## 2025-02-28 DIAGNOSIS — E78.5 DYSLIPIDEMIA: ICD-10-CM

## 2025-02-28 DIAGNOSIS — I50.22 CHRONIC HFREF (HEART FAILURE WITH REDUCED EJECTION FRACTION) (HCC): ICD-10-CM

## 2025-02-28 DIAGNOSIS — I48.0 PAF (PAROXYSMAL ATRIAL FIBRILLATION) (HCC): Primary | ICD-10-CM

## 2025-02-28 PROCEDURE — 1123F ACP DISCUSS/DSCN MKR DOCD: CPT | Performed by: SPECIALIST

## 2025-02-28 PROCEDURE — 99215 OFFICE O/P EST HI 40 MIN: CPT | Performed by: SPECIALIST

## 2025-02-28 PROCEDURE — G8417 CALC BMI ABV UP PARAM F/U: HCPCS | Performed by: SPECIALIST

## 2025-02-28 PROCEDURE — G8427 DOCREV CUR MEDS BY ELIG CLIN: HCPCS | Performed by: SPECIALIST

## 2025-02-28 PROCEDURE — 1159F MED LIST DOCD IN RCRD: CPT | Performed by: SPECIALIST

## 2025-02-28 PROCEDURE — 3078F DIAST BP <80 MM HG: CPT | Performed by: SPECIALIST

## 2025-02-28 PROCEDURE — 1090F PRES/ABSN URINE INCON ASSESS: CPT | Performed by: SPECIALIST

## 2025-02-28 PROCEDURE — 1126F AMNT PAIN NOTED NONE PRSNT: CPT | Performed by: SPECIALIST

## 2025-02-28 PROCEDURE — G8399 PT W/DXA RESULTS DOCUMENT: HCPCS | Performed by: SPECIALIST

## 2025-02-28 PROCEDURE — 3074F SYST BP LT 130 MM HG: CPT | Performed by: SPECIALIST

## 2025-02-28 PROCEDURE — 1036F TOBACCO NON-USER: CPT | Performed by: SPECIALIST

## 2025-02-28 RX ORDER — ASPIRIN 81 MG/1
81 TABLET ORAL DAILY
Qty: 90 TABLET | Refills: 3 | Status: SHIPPED | OUTPATIENT
Start: 2025-02-28

## 2025-02-28 RX ORDER — METOPROLOL SUCCINATE 25 MG/1
25 TABLET, EXTENDED RELEASE ORAL DAILY
Qty: 90 TABLET | Refills: 3 | Status: SHIPPED | OUTPATIENT
Start: 2025-02-28

## 2025-02-28 RX ORDER — PITAVASTATIN CALCIUM 1.04 MG/1
1 TABLET, FILM COATED ORAL NIGHTLY
Qty: 90 TABLET | Refills: 3 | Status: SHIPPED | OUTPATIENT
Start: 2025-02-28

## 2025-02-28 RX ORDER — EPLERENONE 25 MG/1
25 TABLET ORAL DAILY
Qty: 90 TABLET | Refills: 3 | Status: SHIPPED | OUTPATIENT
Start: 2025-02-28

## 2025-02-28 RX ORDER — ASPIRIN 81 MG/1
81 TABLET, COATED ORAL DAILY
Refills: 3 | OUTPATIENT
Start: 2025-02-28

## 2025-02-28 NOTE — PATIENT INSTRUCTIONS
Patient Education        Learning About the Mediterranean Diet  What is the Mediterranean diet?     The Mediterranean diet is a style of eating rather than a diet plan. It features foods eaten in [place], [place], southern [place], and other countries along the [place] Sea. It emphasizes eating foods like fish, fruits, vegetables, beans, high-fiber breads and whole grains, nuts, and olive oil. This style of eating includes limited red meat, cheese, and sweets.  Why choose the Mediterranean diet?  A Mediterranean-style diet may improve heart health. It contains more fat than other heart-healthy diets. But the fats are mainly from nuts, unsaturated oils (such as fish oils and olive oil), and certain nut or seed oils (such as canola, soybean, or flaxseed oil). These fats may help protect the heart and blood vessels.  How can you get started on the Mediterranean diet?  Here are some things you can do to switch to a more Mediterranean way of eating.  What to eat  Eat a variety of fruits and vegetables each day, such as grapes, blueberries, tomatoes, broccoli, peppers, figs, olives, spinach, eggplant, beans, lentils, and chickpeas.  Eat a variety of whole-grain foods each day, such as oats, brown rice, and whole wheat bread, pasta, and couscous.  Eat fish at least 2 times a week. Try tuna, salmon, mackerel, lake trout, herring, or sardines.  Eat moderate amounts of low-fat dairy products, such as milk, cheese, or yogurt.  Eat moderate amounts of poultry and eggs.  Choose healthy (unsaturated) fats, such as nuts, olive oil, and certain nut or seed oils like canola, soybean, and flaxseed.  Limit unhealthy (saturated) fats, such as butter, palm oil, and coconut oil. And limit fats found in animal products, such as meat and dairy products made with whole milk. Try to eat red meat only a few times a month in very small amounts.  Limit sweets and desserts to only a few times a week. This includes sugar-sweetened

## 2025-02-28 NOTE — PROGRESS NOTES
Ralph Cintron MD. [facility]          Patient: Felicia Rangel  : 1950      Today's Date: 2025        HISTORY OF PRESENT ILLNESS:     History of Present Illness:    Admitted to  9/9-10 for right sided facial droop and slurred speech. MRI was negative for acute stroke. CTA of head and neck showed no evidence of occlusion.She underwent a cath s/p PCI to mid RCA and her Echo showed reduced function 30-35%.     On 23 - still with chronic CARR.  Infrequent CP.  BP OK.       On 24 - says she is doing \"very well\" - breathing is good    ON 24 - she had some dizzy spells a few days - but then this got better.  Yesterday had some chest pain and SOB - She feels much better today.   She fell in July putting on her gown - hurt back and side in the fall.     TIA admission  and 24 followed by sepsis 24     ON 25 - She is feeling better.          PAST MEDICAL HISTORY:     Past Medical History:   Diagnosis Date    Arthritis     Atrial fibrillation (HCC)     CAD (coronary artery disease)     Celiac disease     CHF (congestive heart failure), NYHA class I, chronic, systolic (HCC)     Chronic obstructive pulmonary disease (HCC)     Diastolic heart failure (HCC)     GERD (gastroesophageal reflux disease)     Hx of deep venous thrombosis     Hypertension     Hypothyroidism     Iron deficiency anemia     Lyme disease     Pacemaker         Rheumatoid arthritis (HCC)     S/P AV felicia ablation          S/P left atrial appendage ligation     RUSTY clip 10/20     TIA (transient ischemic attack)        Past Surgical History:   Procedure Laterality Date    ABLATION OF DYSRHYTHMIC FOCUS      ADENOIDECTOMY      APPENDECTOMY      CARDIAC CATHETERIZATION  2020    CARDIAC CATHETERIZATION      2 STENTS    CHOLECYSTECTOMY  11    COLONOSCOPY      CORONARY ANGIOPLASTY WITH STENT PLACEMENT      x 2    HYSTERECTOMY (CERVIX STATUS UNKNOWN)      ICAR CATHETER ABLATION ATRIOVENTR NODE FUNCTION

## 2025-02-28 NOTE — PROGRESS NOTES
Chief Complaint   Patient presents with    Coronary Artery Disease    Hypertension     CHF      Congestive Heart Failure    CHOL     Vitals:    02/28/25 0923   BP: 122/78   Site: Left Upper Arm   Position: Sitting   Pulse: 96   SpO2: 95%   Weight: 71.2 kg (157 lb)   Height: 1.575 m (5' 2\")       Chest pain NO     ER, urgent care, or hospitalized outside of [facility] since your last visit?  NO     Refills NO

## 2025-02-28 NOTE — TELEPHONE ENCOUNTER
Refill per VO of Dr. Cintron  Last appt: 2/28/2025    Future Appointments   Date Time Provider Department Center   6/9/2025 10:20 AM Ralph Cintron MD [facility] AMB   10/8/2025 11:20 AM KARTHIK, RICKI [facility]OVEL[facility] AMB   10/8/2025 11:40 AM Harrison Townsend MD [facility] AMB       Requested Prescriptions     Refused Prescriptions Disp Refills    ASPIRIN LOW DOSE 81 MG EC tablet [Pharmacy Med Name: ASPIRIN LOW TAB 81MG EC]  3     Sig: TAKE 1 TABLET DAILY.     Refused By: REY MACDONALD     Reason for Refusal: Duplicate Request

## 2025-03-06 ENCOUNTER — TELEPHONE (OUTPATIENT)
Age: 75
End: 2025-03-06

## 2025-03-06 NOTE — TELEPHONE ENCOUNTER
Pharmacy called since aspirin showing patient is allergic, they are requesting documentation proving provider is aware of the allergy and they can fill and release the aspirin to her and best contact # is [phone]    Third attempt trying to reach to office today and when I told her message will be sent to provider team and nurse she mentioned the patient will need to be notified since it couldn't be resolved within the one call , was able to get a nurse after notifying pharmacy that provider will most likely still need to be contacted which would still result in a return call with the requested information, sent through to nurse

## 2025-03-06 NOTE — TELEPHONE ENCOUNTER
Writer spoke with pharmacist from [facility] who wanted to confirm it is okay to fill RX for Aspirin since an allergy for Aspirin is listed.     Per Dr. Cintron's note of 02/28/25;   \"5) History of Iron Deficiency Anemia   - Dr. You record 12/16 reviewed - He was seeing her for iron deficiency anemia thought to be from GI Bleed from aspirin and plavix   - Dr. Pineda noted 9/24 that it is OK to try ASA if no sig bleeding\"

## 2025-03-12 ENCOUNTER — TELEPHONE (OUTPATIENT)
Age: 75
End: 2025-03-12

## 2025-03-12 NOTE — TELEPHONE ENCOUNTER
R/t call to pt,    C/o Livalo causing myalgia x4 days.      /58 drinks 4 glasses.  Asked her to drink more water and have a salty snack this morning.  Goal will be 120/60.    She just saw [facility]; they think her ASA is contributing to GI bleeding.  Received OV from [facility].  Will provide to MD for review.

## 2025-03-12 NOTE — TELEPHONE ENCOUNTER
Patient can barely move and mentioned her medication was changed livalo prescribed by dr. Cintron as well and she doesn't know what to do to fix or off set it, and her bp is 104/58 and follow up with patient at [phone]

## 2025-03-13 ENCOUNTER — APPOINTMENT (OUTPATIENT)
Facility: HOSPITAL | Age: 75
End: 2025-03-13
Payer: MEDICARE

## 2025-03-13 ENCOUNTER — HOSPITAL ENCOUNTER (EMERGENCY)
Facility: HOSPITAL | Age: 75
Discharge: HOME | End: 2025-03-13
Attending: EMERGENCY MEDICINE
Payer: MEDICARE

## 2025-03-13 VITALS
BODY MASS INDEX: 25.23 KG/M2 | DIASTOLIC BLOOD PRESSURE: 78 MMHG | TEMPERATURE: 98.3 F | OXYGEN SATURATION: 97 % | WEIGHT: 157 LBS | HEART RATE: 81 BPM | RESPIRATION RATE: 14 BRPM | HEIGHT: 66 IN | SYSTOLIC BLOOD PRESSURE: 134 MMHG

## 2025-03-13 DIAGNOSIS — R55 SYNCOPE, UNSPECIFIED SYNCOPE TYPE: ICD-10-CM

## 2025-03-13 DIAGNOSIS — G45.9 TIA (TRANSIENT ISCHEMIC ATTACK): Primary | ICD-10-CM

## 2025-03-13 LAB
ALBUMIN SERPL-MCNC: 3.4 G/DL (ref 3.5–5)
ALBUMIN/GLOB SERPL: 1.1 (ref 1.1–2.2)
ALP SERPL-CCNC: 132 U/L (ref 45–117)
ALT SERPL-CCNC: 20 U/L (ref 12–78)
ANION GAP SERPL CALC-SCNC: 4 MMOL/L (ref 2–12)
AST SERPL-CCNC: 15 U/L (ref 15–37)
BASOPHILS # BLD: 0.08 K/UL (ref 0–0.1)
BASOPHILS NFR BLD: 0.5 % (ref 0–1)
BILIRUB SERPL-MCNC: 0.4 MG/DL (ref 0.2–1)
BUN SERPL-MCNC: 17 MG/DL (ref 6–20)
BUN/CREAT SERPL: 17 (ref 12–20)
CALCIUM SERPL-MCNC: 9.4 MG/DL (ref 8.5–10.1)
CHLORIDE SERPL-SCNC: 107 MMOL/L (ref 97–108)
CO2 SERPL-SCNC: 31 MMOL/L (ref 21–32)
CREAT SERPL-MCNC: 1.03 MG/DL (ref 0.55–1.02)
DIFFERENTIAL METHOD BLD: ABNORMAL
EOSINOPHIL # BLD: 0.24 K/UL (ref 0–0.4)
EOSINOPHIL NFR BLD: 1.6 % (ref 0–7)
ERYTHROCYTE [DISTWIDTH] IN BLOOD BY AUTOMATED COUNT: 13.5 % (ref 11.5–14.5)
GLOBULIN SER CALC-MCNC: 3.1 G/DL (ref 2–4)
GLUCOSE SERPL-MCNC: 209 MG/DL (ref 65–100)
HCT VFR BLD AUTO: 44.5 % (ref 35–47)
HGB BLD-MCNC: 14.5 G/DL (ref 11.5–16)
IMM GRANULOCYTES # BLD AUTO: 0.15 K/UL (ref 0–0.04)
IMM GRANULOCYTES NFR BLD AUTO: 1 % (ref 0–0.5)
INR PPP: 1 (ref 0.9–1.1)
LYMPHOCYTES # BLD: 1.33 K/UL (ref 0.8–3.5)
LYMPHOCYTES NFR BLD: 9 % (ref 12–49)
MCH RBC QN AUTO: 30 PG (ref 26–34)
MCHC RBC AUTO-ENTMCNC: 32.6 G/DL (ref 30–36.5)
MCV RBC AUTO: 91.9 FL (ref 80–99)
MONOCYTES # BLD: 1.28 K/UL (ref 0–1)
MONOCYTES NFR BLD: 8.7 % (ref 5–13)
NEUTS SEG # BLD: 11.67 K/UL (ref 1.8–8)
NEUTS SEG NFR BLD: 79.2 % (ref 32–75)
NRBC # BLD: 0 K/UL (ref 0–0.01)
NRBC BLD-RTO: 0 PER 100 WBC
PLATELET # BLD AUTO: 253 K/UL (ref 150–400)
PMV BLD AUTO: 10.1 FL (ref 8.9–12.9)
POTASSIUM SERPL-SCNC: 3.9 MMOL/L (ref 3.5–5.1)
PROT SERPL-MCNC: 6.5 G/DL (ref 6.4–8.2)
PROTHROMBIN TIME: 10.3 SEC (ref 9.2–11.2)
RBC # BLD AUTO: 4.84 M/UL (ref 3.8–5.2)
SODIUM SERPL-SCNC: 142 MMOL/L (ref 136–145)
TROPONIN I SERPL HS-MCNC: 7 NG/L (ref 0–51)
WBC # BLD AUTO: 14.8 K/UL (ref 3.6–11)

## 2025-03-13 PROCEDURE — 6360000004 HC RX CONTRAST MEDICATION: Performed by: EMERGENCY MEDICINE

## 2025-03-13 PROCEDURE — 0042T CT BRAIN PERFUSION: CPT

## 2025-03-13 PROCEDURE — 80053 COMPREHEN METABOLIC PANEL: CPT

## 2025-03-13 PROCEDURE — 70450 CT HEAD/BRAIN W/O DYE: CPT

## 2025-03-13 PROCEDURE — 36415 COLL VENOUS BLD VENIPUNCTURE: CPT

## 2025-03-13 PROCEDURE — 99285 EMERGENCY DEPT VISIT HI MDM: CPT

## 2025-03-13 PROCEDURE — 70498 CT ANGIOGRAPHY NECK: CPT

## 2025-03-13 PROCEDURE — 85025 COMPLETE CBC W/AUTO DIFF WBC: CPT

## 2025-03-13 PROCEDURE — 85610 PROTHROMBIN TIME: CPT

## 2025-03-13 PROCEDURE — 84484 ASSAY OF TROPONIN QUANT: CPT

## 2025-03-13 RX ORDER — IOPAMIDOL 755 MG/ML
140 INJECTION, SOLUTION INTRAVASCULAR
Status: COMPLETED | OUTPATIENT
Start: 2025-03-13 | End: 2025-03-13

## 2025-03-13 RX ADMIN — IOPAMIDOL 140 ML: 755 INJECTION, SOLUTION INTRAVENOUS at 14:36

## 2025-03-13 ASSESSMENT — PAIN - FUNCTIONAL ASSESSMENT
PAIN_FUNCTIONAL_ASSESSMENT: NONE - DENIES PAIN
PAIN_FUNCTIONAL_ASSESSMENT: NONE - DENIES PAIN

## 2025-03-13 ASSESSMENT — ENCOUNTER SYMPTOMS
VOMITING: 0
SHORTNESS OF BREATH: 0
ABDOMINAL PAIN: 0
NAUSEA: 0
COLOR CHANGE: 0
CONSTIPATION: 0
DIARRHEA: 0
BACK PAIN: 0

## 2025-03-13 NOTE — ED NOTES
Patient signed out AMA at this time, Dr Milton explained all risk with patient, pt understands, witnessed by this RN

## 2025-03-13 NOTE — ED PROVIDER NOTES
[facility] EMERGENCY DEPARTMENT  EMERGENCY DEPARTMENT ENCOUNTER      Pt Name: Felicia Rangel  MRN: [id number]  Birthdate 1950  Date of evaluation: 3/13/2025  Provider: Tanner Milton MD    CHIEF COMPLAINT     No chief complaint on file.        HISTORY OF PRESENT ILLNESS   (Location/Symptom, Timing/Onset, Context/Setting, Quality, Duration, Modifying Factors, Severity)  Note limiting factors.   Felicia Rangel is a 75 y.o. female who presents to the emergency department      The history is provided by the patient and the EMS personnel. No  was used.   Extremity Weakness  Severity:  Moderate  Onset quality:  Sudden  Timing:  Constant  Progression:  Unchanged  Chronicity:  Recurrent  Ineffective treatments:  None tried  Associated symptoms: no abdominal pain, no chest pain, no diarrhea, no dizziness, no dysuria, no fever, no headaches, no nausea, no seizures, no shortness of breath, no urgency and no vomiting        Nursing Notes were reviewed.    REVIEW OF SYSTEMS    (2-9 systems for level 4, 10 or more for level 5)     Review of Systems   Constitutional:  Negative for activity change, chills and fever.   HENT:  Negative for nosebleeds.    Eyes:  Negative for visual disturbance.   Respiratory:  Negative for shortness of breath.    Cardiovascular:  Negative for chest pain and palpitations.   Gastrointestinal:  Negative for abdominal pain, constipation, diarrhea, nausea and vomiting.   Genitourinary:  Negative for difficulty urinating, dysuria, hematuria and urgency.   Musculoskeletal:  Negative for back pain, neck pain and neck stiffness.   Skin:  Negative for color change.   Allergic/Immunologic: Negative for immunocompromised state.   Neurological:  Positive for weakness and numbness. Negative for dizziness, seizures, syncope, light-headedness and headaches.   Psychiatric/Behavioral:  Negative for behavioral problems, confusion, hallucinations, self-injury and suicidal ideas.   Prescriptions    No medications on file     Controlled Substances Monitoring:          No data to display                (Please note that portions of this note were completed with a voice recognition program.  Efforts were made to edit the dictations but occasionally words are mis-transcribed.)    Tanner Milton MD (electronically signed)  Attending Emergency Physician           Tanner Milton MD  03/13/25 2018

## 2025-03-17 ENCOUNTER — TELEPHONE (OUTPATIENT)
Age: 75
End: 2025-03-17

## 2025-03-17 NOTE — TELEPHONE ENCOUNTER
3/13 went to ER after seeing Podiatry.  She thinks that Livalo was causing cramping in hands real bad, shoulders, feet.  While still at office, not feeling well, dizziness, pre-syncope.  Stuttering, went to ER.    [facility]: TIA, syncope.      She had discussed with Dr. Warner (3/11/25) the ASA situation.  They stated they couldn't decide yes/no to take ASA; Dr. Cintron would make that decision.      Decided that she was going to take ASA once a week.      DM not stable, x4 shots per day.  She would rather not add on Repatha because of this.  She is also unsure d/t possible side effect of UTI?      Discussed Leqvio as probably the best option for her.    Confirmed she will start taking ASA Tue/Thu    Asked her about follow Was seeing Dr. Burns for Neurology.  He retired, hasn't established with anyone.  She will call PCP for ER follow up apt.    Per last OV, 2/28/25 \"- recheck an echo yearly given that she is V paced\" 11/29/24 last echo.    Future Appointments   Date Time Provider Department Center   6/9/2025 10:20 AM Ralph Cintron MD [facility] AMB   10/8/2025 11:20 AM [facility] AMB   10/8/2025 11:40 AM Harrison Townsend MD [facility] AMB

## 2025-03-19 NOTE — Clinical Note
Left subclavian vein. Accessed successfully. using fluoro guidance. Quality 110: Preventive Care And Screening: Influenza Immunization: Influenza Immunization Administered during Influenza season Quality 130: Documentation Of Current Medications In The Medical Record: Current Medications Documented Quality 431: Preventive Care And Screening: Unhealthy Alcohol Use - Screening: Patient not identified as an unhealthy alcohol user when screened for unhealthy alcohol use using a systematic screening method Detail Level: Detailed Quality 226: Preventive Care And Screening: Tobacco Use: Screening And Cessation Intervention: Patient screened for tobacco use and is an ex/non-smoker

## 2025-04-21 ENCOUNTER — OFFICE VISIT (OUTPATIENT)
Age: 75
End: 2025-04-21

## 2025-04-21 DIAGNOSIS — Z53.21 PATIENT LEFT AFTER TRIAGE: Primary | ICD-10-CM

## 2025-04-22 ENCOUNTER — HOSPITAL ENCOUNTER (OUTPATIENT)
Facility: HOSPITAL | Age: 75
Setting detail: OBSERVATION
Discharge: HOME | End: 2025-04-23
Attending: EMERGENCY MEDICINE | Admitting: FAMILY MEDICINE
Payer: MEDICARE

## 2025-04-22 DIAGNOSIS — R07.9 CHEST PAIN, UNSPECIFIED TYPE: Primary | ICD-10-CM

## 2025-04-22 DIAGNOSIS — I25.112 CORONARY ARTERY DISEASE INVOLVING NATIVE CORONARY ARTERY OF NATIVE HEART WITH REFRACTORY ANGINA PECTORIS: ICD-10-CM

## 2025-04-22 DIAGNOSIS — I20.9 ANGINA PECTORIS: ICD-10-CM

## 2025-04-22 LAB
ALBUMIN SERPL-MCNC: 3.4 G/DL (ref 3.5–5)
ALBUMIN/GLOB SERPL: 1.1 (ref 1.1–2.2)
ALP SERPL-CCNC: 144 U/L (ref 45–117)
ALT SERPL-CCNC: 18 U/L (ref 12–78)
ANION GAP SERPL CALC-SCNC: 9 MMOL/L (ref 2–12)
APPEARANCE UR: ABNORMAL
AST SERPL-CCNC: 21 U/L (ref 15–37)
BACTERIA URNS QL MICRO: ABNORMAL /HPF
BASOPHILS # BLD: 0.08 K/UL (ref 0–0.1)
BASOPHILS NFR BLD: 0.6 % (ref 0–1)
BILIRUB SERPL-MCNC: 0.4 MG/DL (ref 0.2–1)
BILIRUB UR QL: NEGATIVE
BUN SERPL-MCNC: 10 MG/DL (ref 6–20)
BUN/CREAT SERPL: 12 (ref 12–20)
CALCIUM SERPL-MCNC: 8.9 MG/DL (ref 8.5–10.1)
CHLORIDE SERPL-SCNC: 101 MMOL/L (ref 97–108)
CO2 SERPL-SCNC: 28 MMOL/L (ref 21–32)
COLOR UR: ABNORMAL
CREAT SERPL-MCNC: 0.86 MG/DL (ref 0.55–1.02)
DIFFERENTIAL METHOD BLD: ABNORMAL
EOSINOPHIL # BLD: 0.35 K/UL (ref 0–0.4)
EOSINOPHIL NFR BLD: 2.7 % (ref 0–7)
EPITH CASTS URNS QL MICRO: ABNORMAL /LPF
ERYTHROCYTE [DISTWIDTH] IN BLOOD BY AUTOMATED COUNT: 13.4 % (ref 11.5–14.5)
GLOBULIN SER CALC-MCNC: 3.1 G/DL (ref 2–4)
GLUCOSE BLD STRIP.AUTO-MCNC: 259 MG/DL (ref 65–117)
GLUCOSE BLD STRIP.AUTO-MCNC: 342 MG/DL (ref 65–117)
GLUCOSE SERPL-MCNC: 350 MG/DL (ref 65–100)
GLUCOSE UR STRIP.AUTO-MCNC: >1000 MG/DL
HCT VFR BLD AUTO: 40.6 % (ref 35–47)
HGB BLD-MCNC: 13.7 G/DL (ref 11.5–16)
HGB UR QL STRIP: NEGATIVE
IMM GRANULOCYTES # BLD AUTO: 0.08 K/UL (ref 0–0.04)
IMM GRANULOCYTES NFR BLD AUTO: 0.6 % (ref 0–0.5)
KETONES UR QL STRIP.AUTO: NEGATIVE MG/DL
LEUKOCYTE ESTERASE UR QL STRIP.AUTO: ABNORMAL
LYMPHOCYTES # BLD: 1.38 K/UL (ref 0.8–3.5)
LYMPHOCYTES NFR BLD: 10.5 % (ref 12–49)
MCH RBC QN AUTO: 30.2 PG (ref 26–34)
MCHC RBC AUTO-ENTMCNC: 33.7 G/DL (ref 30–36.5)
MCV RBC AUTO: 89.6 FL (ref 80–99)
MONOCYTES # BLD: 1.11 K/UL (ref 0–1)
MONOCYTES NFR BLD: 8.5 % (ref 5–13)
MUCOUS THREADS URNS QL MICRO: ABNORMAL /LPF
NEUTS SEG # BLD: 10.09 K/UL (ref 1.8–8)
NEUTS SEG NFR BLD: 77.1 % (ref 32–75)
NITRITE UR QL STRIP.AUTO: NEGATIVE
NRBC # BLD: 0 K/UL (ref 0–0.01)
NRBC BLD-RTO: 0 PER 100 WBC
PH UR STRIP: 6 (ref 5–8)
PLATELET # BLD AUTO: 227 K/UL (ref 150–400)
PMV BLD AUTO: 10.3 FL (ref 8.9–12.9)
POTASSIUM SERPL-SCNC: 3.8 MMOL/L (ref 3.5–5.1)
PROT SERPL-MCNC: 6.5 G/DL (ref 6.4–8.2)
PROT UR STRIP-MCNC: ABNORMAL MG/DL
RBC # BLD AUTO: 4.53 M/UL (ref 3.8–5.2)
RBC #/AREA URNS HPF: ABNORMAL /HPF (ref 0–5)
SERVICE CMNT-IMP: ABNORMAL
SERVICE CMNT-IMP: ABNORMAL
SODIUM SERPL-SCNC: 138 MMOL/L (ref 136–145)
SP GR UR REFRACTOMETRY: 1.03 (ref 1–1.03)
TROPONIN I SERPL HS-MCNC: 8 NG/L (ref 0–51)
UROBILINOGEN UR QL STRIP.AUTO: 1 EU/DL (ref 0.2–1)
WBC # BLD AUTO: 13.1 K/UL (ref 3.6–11)
WBC URNS QL MICRO: ABNORMAL /HPF (ref 0–4)

## 2025-04-22 PROCEDURE — 81001 URINALYSIS AUTO W/SCOPE: CPT

## 2025-04-22 PROCEDURE — 87086 URINE CULTURE/COLONY COUNT: CPT

## 2025-04-22 PROCEDURE — 85025 COMPLETE CBC W/AUTO DIFF WBC: CPT

## 2025-04-22 PROCEDURE — G0378 HOSPITAL OBSERVATION PER HR: HCPCS

## 2025-04-22 PROCEDURE — 99285 EMERGENCY DEPT VISIT HI MDM: CPT

## 2025-04-22 PROCEDURE — 36415 COLL VENOUS BLD VENIPUNCTURE: CPT

## 2025-04-22 PROCEDURE — 84484 ASSAY OF TROPONIN QUANT: CPT

## 2025-04-22 PROCEDURE — 94761 N-INVAS EAR/PLS OXIMETRY MLT: CPT

## 2025-04-22 PROCEDURE — 6370000000 HC RX 637 (ALT 250 FOR IP): Performed by: FAMILY MEDICINE

## 2025-04-22 PROCEDURE — 93005 ELECTROCARDIOGRAM TRACING: CPT | Performed by: EMERGENCY MEDICINE

## 2025-04-22 PROCEDURE — 2500000003 HC RX 250 WO HCPCS: Performed by: FAMILY MEDICINE

## 2025-04-22 PROCEDURE — 80053 COMPREHEN METABOLIC PANEL: CPT

## 2025-04-22 PROCEDURE — 82962 GLUCOSE BLOOD TEST: CPT

## 2025-04-22 PROCEDURE — 96365 THER/PROPH/DIAG IV INF INIT: CPT

## 2025-04-22 PROCEDURE — 6360000002 HC RX W HCPCS: Performed by: FAMILY MEDICINE

## 2025-04-22 PROCEDURE — 2580000003 HC RX 258: Performed by: FAMILY MEDICINE

## 2025-04-22 RX ORDER — POTASSIUM CHLORIDE 750 MG/1
40 TABLET, EXTENDED RELEASE ORAL PRN
Status: DISCONTINUED | OUTPATIENT
Start: 2025-04-22 | End: 2025-04-23 | Stop reason: HOSPADM

## 2025-04-22 RX ORDER — MAGNESIUM SULFATE IN WATER 40 MG/ML
2000 INJECTION, SOLUTION INTRAVENOUS PRN
Status: DISCONTINUED | OUTPATIENT
Start: 2025-04-22 | End: 2025-04-23 | Stop reason: HOSPADM

## 2025-04-22 RX ORDER — DIPHENHYDRAMINE HYDROCHLORIDE 50 MG/ML
25 INJECTION, SOLUTION INTRAMUSCULAR; INTRAVENOUS EVERY 6 HOURS PRN
Status: DISCONTINUED | OUTPATIENT
Start: 2025-04-22 | End: 2025-04-23 | Stop reason: HOSPADM

## 2025-04-22 RX ORDER — ENOXAPARIN SODIUM 100 MG/ML
40 INJECTION SUBCUTANEOUS DAILY
Status: DISCONTINUED | OUTPATIENT
Start: 2025-04-23 | End: 2025-04-23 | Stop reason: HOSPADM

## 2025-04-22 RX ORDER — SODIUM CHLORIDE 9 MG/ML
INJECTION, SOLUTION INTRAVENOUS PRN
Status: DISCONTINUED | OUTPATIENT
Start: 2025-04-22 | End: 2025-04-23 | Stop reason: HOSPADM

## 2025-04-22 RX ORDER — ONDANSETRON 2 MG/ML
4 INJECTION INTRAMUSCULAR; INTRAVENOUS EVERY 6 HOURS PRN
Status: DISCONTINUED | OUTPATIENT
Start: 2025-04-22 | End: 2025-04-23 | Stop reason: HOSPADM

## 2025-04-22 RX ORDER — BISACODYL 5 MG/1
5 TABLET, DELAYED RELEASE ORAL DAILY PRN
Status: DISCONTINUED | OUTPATIENT
Start: 2025-04-22 | End: 2025-04-23 | Stop reason: HOSPADM

## 2025-04-22 RX ORDER — EPLERENONE 25 MG/1
25 TABLET ORAL DAILY
Status: DISCONTINUED | OUTPATIENT
Start: 2025-04-23 | End: 2025-04-23 | Stop reason: HOSPADM

## 2025-04-22 RX ORDER — SODIUM CHLORIDE 0.9 % (FLUSH) 0.9 %
5-40 SYRINGE (ML) INJECTION PRN
Status: DISCONTINUED | OUTPATIENT
Start: 2025-04-22 | End: 2025-04-23 | Stop reason: HOSPADM

## 2025-04-22 RX ORDER — NITROFURANTOIN 25; 75 MG/1; MG/1
100 CAPSULE ORAL EVERY 12 HOURS SCHEDULED
Status: DISCONTINUED | OUTPATIENT
Start: 2025-04-22 | End: 2025-04-22

## 2025-04-22 RX ORDER — INSULIN LISPRO 100 [IU]/ML
0-8 INJECTION, SOLUTION INTRAVENOUS; SUBCUTANEOUS
Status: DISCONTINUED | OUTPATIENT
Start: 2025-04-22 | End: 2025-04-23 | Stop reason: HOSPADM

## 2025-04-22 RX ORDER — DEXTROSE MONOHYDRATE 100 MG/ML
INJECTION, SOLUTION INTRAVENOUS CONTINUOUS PRN
Status: DISCONTINUED | OUTPATIENT
Start: 2025-04-22 | End: 2025-04-23 | Stop reason: HOSPADM

## 2025-04-22 RX ORDER — SODIUM CHLORIDE 0.9 % (FLUSH) 0.9 %
5-40 SYRINGE (ML) INJECTION EVERY 12 HOURS SCHEDULED
Status: DISCONTINUED | OUTPATIENT
Start: 2025-04-22 | End: 2025-04-23 | Stop reason: HOSPADM

## 2025-04-22 RX ORDER — ACETAMINOPHEN 325 MG/1
650 TABLET ORAL EVERY 6 HOURS PRN
Status: DISCONTINUED | OUTPATIENT
Start: 2025-04-22 | End: 2025-04-23 | Stop reason: HOSPADM

## 2025-04-22 RX ORDER — POTASSIUM CHLORIDE 7.45 MG/ML
10 INJECTION INTRAVENOUS PRN
Status: DISCONTINUED | OUTPATIENT
Start: 2025-04-22 | End: 2025-04-23 | Stop reason: HOSPADM

## 2025-04-22 RX ORDER — ALOGLIPTIN 12.5 MG/1
12.5 TABLET, FILM COATED ORAL DAILY
Status: DISCONTINUED | OUTPATIENT
Start: 2025-04-23 | End: 2025-04-23 | Stop reason: HOSPADM

## 2025-04-22 RX ORDER — ACETAMINOPHEN 650 MG/1
650 SUPPOSITORY RECTAL EVERY 6 HOURS PRN
Status: DISCONTINUED | OUTPATIENT
Start: 2025-04-22 | End: 2025-04-23 | Stop reason: HOSPADM

## 2025-04-22 RX ORDER — ONDANSETRON 4 MG/1
4 TABLET, ORALLY DISINTEGRATING ORAL EVERY 8 HOURS PRN
Status: DISCONTINUED | OUTPATIENT
Start: 2025-04-22 | End: 2025-04-23 | Stop reason: HOSPADM

## 2025-04-22 RX ORDER — ASPIRIN 81 MG/1
81 TABLET ORAL
Status: DISCONTINUED | OUTPATIENT
Start: 2025-04-23 | End: 2025-04-23 | Stop reason: HOSPADM

## 2025-04-22 RX ADMIN — SODIUM CHLORIDE, PRESERVATIVE FREE 10 ML: 5 INJECTION INTRAVENOUS at 22:39

## 2025-04-22 RX ADMIN — DOXYCYCLINE 100 MG: 100 INJECTION, POWDER, LYOPHILIZED, FOR SOLUTION INTRAVENOUS at 23:34

## 2025-04-22 RX ADMIN — ACETAMINOPHEN 650 MG: 325 TABLET ORAL at 23:37

## 2025-04-22 RX ADMIN — SODIUM CHLORIDE: 9 INJECTION, SOLUTION INTRAVENOUS at 23:32

## 2025-04-22 RX ADMIN — INSULIN LISPRO 4 UNITS: 100 INJECTION, SOLUTION INTRAVENOUS; SUBCUTANEOUS at 22:38

## 2025-04-22 ASSESSMENT — PAIN - FUNCTIONAL ASSESSMENT: PAIN_FUNCTIONAL_ASSESSMENT: NONE - DENIES PAIN

## 2025-04-22 ASSESSMENT — HEART SCORE: ECG: NON-SPECIFC REPOLARIZATION DISTURBANCE/LBTB/PM

## 2025-04-22 ASSESSMENT — PAIN SCALES - GENERAL: PAINLEVEL_OUTOF10: 1

## 2025-04-22 NOTE — ED TRIAGE NOTES
Pt ambulatory to treatment area c/o multiple complaints. Pt states she was seen at Urgent Care yesterday and was referred for \"hyperglycemia, leukocytosis, and stypical chest pain.\"   Pt reports nausea, possible UTI, chest pain, left arm and shoulder pain. Pt states \"I have had sepsis in the past and I don't want that again.    supportive/involved/helpful

## 2025-04-22 NOTE — ED NOTES
H2H at bedside for transport to [facility]. Pt travels on monitor, and 20g in left ac. Pt report, facesheet, and summary given to [facility].

## 2025-04-22 NOTE — ED PROVIDER NOTES
[facility] EMERGENCY DEPARTMENT  EMERGENCY DEPARTMENT ENCOUNTER      Pt Name: Felicia Rangel  MRN: [id number]  Birthdate 1950  Date of evaluation: 4/22/2025  Provider: Arlen Briones MD    CHIEF COMPLAINT       Chief Complaint   Patient presents with    Nausea         HISTORY OF PRESENT ILLNESS   (Location/Symptom, Timing/Onset, Context/Setting, Quality, Duration, Modifying Factors, Severity)  Note limiting factors.   Patient is a 75-year-old with a history of coronary artery disease and congestive heart failure who comes into the emergency department with chest pain that radiates to her left shoulder and down her left arm.  She first had the symptoms night before last and they were associated with shortness of breath, nausea, and vomiting.  The symptoms resolved and she was seen at an urgent care where she was diagnosed with atypical chest pain and advised to come into the emergency department for additional evaluation.  Review of the medical records demonstrate that she had a CMP that was done and demonstrated hyperglycemia with a glucose of 359; testing on the CMP was otherwise within normal limits.  CBC was significant for a white blood cell count of 11.9 but otherwise normal.  EKG demonstrated a paced rhythm with 100% capture and no ectopy.  The patient was feeling better so she did not come in yesterday but her chest pain radiated to her shoulder associated with shortness of breath and vomiting recurred again last night, prompting her to come this afternoon.  She is not sure what triggers her symptoms but have not noticed that they are worse with certain positions and with activity.    The history is provided by the patient, the spouse and medical records.         Review of External Medical Records:     Nursing Notes were reviewed.    REVIEW OF SYSTEMS    (2-9 systems for level 4, 10 or more for level 5)     Review of Systems    Except as noted above the remainder of the review of systems was

## 2025-04-22 NOTE — ED NOTES
TRANSFER - OUT REPORT:    Verbal report given to Eben BLAIR on Felicia Rangel  being transferred to [facility] Observation for routine progression of patient care       Report consisted of patient's Situation, Background, Assessment and   Recommendations(SBAR).     Information from the following report(s) ED Encounter Summary was reviewed with the receiving nurse.    [place] Fall Assessment:                           Lines:       Opportunity for questions and clarification was provided.      Patient transported with:  Monitor

## 2025-04-22 NOTE — PROGRESS NOTES
Spiritual Health History and Assessment/Progress Note  [facility]    Initial Encounter,  ,  ,      Name: Felicia Rangel MRN: [id number]    Age: 75 y.o.     Sex: female   Language: English   Restorationism: Hoahaoism   <principal problem not specified>     Date: 4/22/2025            Total Time Calculated: 12 min              Spiritual Assessment began in [place] EMERGENCY DEPARTMENT        Referral/Consult From: Physician   Encounter Overview/Reason: Initial Encounter  Service Provided For: Patient, Family    Dora, Belief, Meaning:   Patient is connected with a dora tradition or spiritual practice  Family/Friends are connected with a dora tradition or spiritual practice      Importance and Influence:  Patient has spiritual/personal beliefs that influence decisions regarding their health  Family/Friends have spiritual/personal beliefs that influence decisions regarding the patient's health    Community:  Patient feels well-supported. Support system includes: Spouse/Partner  Family/Friends feel well-supported. Support system includes: Spouse/Partner    Assessment and Plan of Care:   Patient Interventions include: Facilitated expression of thoughts and feelings and Provided sacramental/Latter day ritual  Family/Friends Interventions include: Provided sacramental/Latter day ritual    Patient Plan of Care: Spiritual Care available upon further referral  Family/Friends Plan of Care: Spiritual Care available upon further referral    Chart review. Checked in with patient's Physician who gave verbal referral. Conducted spiritual assessment. Met and conversed with patient and spouse. Patient was addressed as Felicia. Patient's spouse is at bedside and is named Altaf. Met and conversed with patient and spouse. They have been  for fifty-eight years. They are grateful for their marriage. Patient shared medical issues and historical records. Patient watches [employer] on television. They had been

## 2025-04-23 ENCOUNTER — APPOINTMENT (OUTPATIENT)
Facility: HOSPITAL | Age: 75
End: 2025-04-23
Attending: FAMILY MEDICINE
Payer: MEDICARE

## 2025-04-23 ENCOUNTER — APPOINTMENT (OUTPATIENT)
Facility: HOSPITAL | Age: 75
End: 2025-04-23
Payer: MEDICARE

## 2025-04-23 ENCOUNTER — HOSPITAL ENCOUNTER (OUTPATIENT)
Facility: HOSPITAL | Age: 75
Setting detail: OBSERVATION
Discharge: HOME | End: 2025-04-25
Payer: MEDICARE

## 2025-04-23 VITALS
OXYGEN SATURATION: 96 % | WEIGHT: 169 LBS | RESPIRATION RATE: 18 BRPM | TEMPERATURE: 97.5 F | HEIGHT: 62 IN | SYSTOLIC BLOOD PRESSURE: 155 MMHG | DIASTOLIC BLOOD PRESSURE: 74 MMHG | HEART RATE: 88 BPM | BODY MASS INDEX: 31.1 KG/M2

## 2025-04-23 VITALS
HEIGHT: 62 IN | BODY MASS INDEX: 31.12 KG/M2 | DIASTOLIC BLOOD PRESSURE: 76 MMHG | SYSTOLIC BLOOD PRESSURE: 145 MMHG | HEART RATE: 83 BPM | RESPIRATION RATE: 16 BRPM | WEIGHT: 169.09 LBS

## 2025-04-23 LAB
ANION GAP SERPL CALC-SCNC: 4 MMOL/L (ref 2–12)
BASOPHILS # BLD: 0.08 K/UL (ref 0–0.1)
BASOPHILS NFR BLD: 0.6 % (ref 0–1)
BUN SERPL-MCNC: 12 MG/DL (ref 6–20)
BUN/CREAT SERPL: 20 (ref 12–20)
CALCIUM SERPL-MCNC: 9 MG/DL (ref 8.5–10.1)
CHLORIDE SERPL-SCNC: 108 MMOL/L (ref 97–108)
CO2 SERPL-SCNC: 31 MMOL/L (ref 21–32)
CREAT SERPL-MCNC: 0.59 MG/DL (ref 0.55–1.02)
DIFFERENTIAL METHOD BLD: ABNORMAL
ECHO AO ARCH DIAM: 1.7 CM
ECHO AO ASC DIAM: 3.2 CM
ECHO AO ASCENDING AORTA INDEX: 1.8 CM/M2
ECHO AO ROOT DIAM: 3.6 CM
ECHO AO ROOT INDEX: 2.02 CM/M2
ECHO AV AREA PEAK VELOCITY: 1.8 CM2
ECHO AV AREA PEAK VELOCITY: 1.9 CM2
ECHO AV AREA VTI: 1.8 CM2
ECHO AV AREA/BSA VTI: 1 CM2/M2
ECHO AV MEAN GRADIENT: 4 MMHG
ECHO AV MEAN VELOCITY: 1 M/S
ECHO AV PEAK GRADIENT: 8 MMHG
ECHO AV PEAK GRADIENT: 9 MMHG
ECHO AV PEAK VELOCITY: 1.4 M/S
ECHO AV PEAK VELOCITY: 1.5 M/S
ECHO AV VTI: 27.9 CM
ECHO BSA: 1.83 M2
ECHO BSA: 1.83 M2
ECHO LA DIAMETER INDEX: 1.8 CM/M2
ECHO LA DIAMETER: 3.2 CM
ECHO LA TO AORTIC ROOT RATIO: 0.89
ECHO LA VOL A-L A2C: 34 ML (ref 22–52)
ECHO LA VOL A-L A4C: 25 ML (ref 22–52)
ECHO LA VOL BP: 28 ML (ref 22–52)
ECHO LA VOL MOD A2C: 30 ML (ref 22–52)
ECHO LA VOL MOD A4C: 22 ML (ref 22–52)
ECHO LA VOL/BSA BIPLANE: 16 ML/M2 (ref 16–34)
ECHO LA VOLUME AREA LENGTH: 31 ML
ECHO LA VOLUME INDEX A-L A2C: 19 ML/M2 (ref 16–34)
ECHO LA VOLUME INDEX A-L A4C: 14 ML/M2 (ref 16–34)
ECHO LA VOLUME INDEX AREA LENGTH: 17 ML/M2 (ref 16–34)
ECHO LA VOLUME INDEX MOD A2C: 17 ML/M2 (ref 16–34)
ECHO LA VOLUME INDEX MOD A4C: 12 ML/M2 (ref 16–34)
ECHO LV E' LATERAL VELOCITY: 7.04 CM/S
ECHO LV E' SEPTAL VELOCITY: 6.55 CM/S
ECHO LV EDV A2C: 61 ML
ECHO LV EDV A4C: 62 ML
ECHO LV EDV BP: 62 ML (ref 56–104)
ECHO LV EDV INDEX A4C: 35 ML/M2
ECHO LV EDV INDEX BP: 35 ML/M2
ECHO LV EDV NDEX A2C: 34 ML/M2
ECHO LV EF PHYSICIAN: 55 %
ECHO LV EJECTION FRACTION A2C: 57 %
ECHO LV EJECTION FRACTION A4C: 55 %
ECHO LV EJECTION FRACTION BIPLANE: 55 % (ref 55–100)
ECHO LV ESV A2C: 27 ML
ECHO LV ESV A4C: 28 ML
ECHO LV ESV BP: 28 ML (ref 19–49)
ECHO LV ESV INDEX A2C: 15 ML/M2
ECHO LV ESV INDEX A4C: 16 ML/M2
ECHO LV ESV INDEX BP: 16 ML/M2
ECHO LV FRACTIONAL SHORTENING: 26 % (ref 28–44)
ECHO LV INTERNAL DIMENSION DIASTOLE INDEX: 3.65 CM/M2
ECHO LV INTERNAL DIMENSION DIASTOLIC: 6.5 CM (ref 3.9–5.3)
ECHO LV INTERNAL DIMENSION SYSTOLIC INDEX: 2.7 CM/M2
ECHO LV INTERNAL DIMENSION SYSTOLIC: 4.8 CM
ECHO LV IVSD: 0.8 CM (ref 0.6–0.9)
ECHO LV MASS 2D: 214.3 G (ref 67–162)
ECHO LV MASS INDEX 2D: 120.4 G/M2 (ref 43–95)
ECHO LV POSTERIOR WALL DIASTOLIC: 0.8 CM (ref 0.6–0.9)
ECHO LV RELATIVE WALL THICKNESS RATIO: 0.25
ECHO LVOT AREA: 3.1 CM2
ECHO LVOT AV VTI INDEX: 0.56
ECHO LVOT DIAM: 2 CM
ECHO LVOT MEAN GRADIENT: 1 MMHG
ECHO LVOT PEAK GRADIENT: 3 MMHG
ECHO LVOT PEAK VELOCITY: 0.8 M/S
ECHO LVOT STROKE VOLUME INDEX: 27.5 ML/M2
ECHO LVOT SV: 49 ML
ECHO LVOT VTI: 15.6 CM
ECHO MV A VELOCITY: 0.71 M/S
ECHO MV E DECELERATION TIME (DT): 133.3 MS
ECHO MV E VELOCITY: 0.66 M/S
ECHO MV E/A RATIO: 0.93
ECHO MV E/E' LATERAL: 9.38
ECHO MV E/E' RATIO (AVERAGED): 9.73
ECHO MV E/E' SEPTAL: 10.08
ECHO MV REGURGITANT PEAK GRADIENT: 130 MMHG
ECHO MV REGURGITANT PEAK VELOCITY: 5.7 M/S
ECHO PV MAX VELOCITY: 0.8 M/S
ECHO PV PEAK GRADIENT: 2 MMHG
ECHO RV FREE WALL PEAK S': 7.2 CM/S
ECHO RV INTERNAL DIMENSION: 2.9 CM
ECHO RV TAPSE: 1.1 CM (ref 1.7–?)
ECHO TV REGURGITANT MAX VELOCITY: 2.32 M/S
ECHO TV REGURGITANT PEAK GRADIENT: 22 MMHG
EKG ATRIAL RATE: 85 BPM
EKG DIAGNOSIS: NORMAL
EKG P AXIS: 35 DEGREES
EKG P-R INTERVAL: 156 MS
EKG Q-T INTERVAL: 480 MS
EKG QRS DURATION: 180 MS
EKG QTC CALCULATION (BAZETT): 571 MS
EKG R AXIS: -39 DEGREES
EKG T AXIS: 24 DEGREES
EKG VENTRICULAR RATE: 85 BPM
EOSINOPHIL # BLD: 0.29 K/UL (ref 0–0.4)
EOSINOPHIL NFR BLD: 2.3 % (ref 0–7)
ERYTHROCYTE [DISTWIDTH] IN BLOOD BY AUTOMATED COUNT: 13.2 % (ref 11.5–14.5)
EST. AVERAGE GLUCOSE BLD GHB EST-MCNC: 212 MG/DL
GLUCOSE BLD STRIP.AUTO-MCNC: 161 MG/DL (ref 65–117)
GLUCOSE BLD STRIP.AUTO-MCNC: 242 MG/DL (ref 65–117)
GLUCOSE SERPL-MCNC: 187 MG/DL (ref 65–100)
HBA1C MFR BLD: 9 % (ref 4–5.6)
HCT VFR BLD AUTO: 41.1 % (ref 35–47)
HGB BLD-MCNC: 13.3 G/DL (ref 11.5–16)
IMM GRANULOCYTES # BLD AUTO: 0.07 K/UL (ref 0–0.04)
IMM GRANULOCYTES NFR BLD AUTO: 0.6 % (ref 0–0.5)
LYMPHOCYTES # BLD: 1.84 K/UL (ref 0.8–3.5)
LYMPHOCYTES NFR BLD: 14.9 % (ref 12–49)
MCH RBC QN AUTO: 29.8 PG (ref 26–34)
MCHC RBC AUTO-ENTMCNC: 32.4 G/DL (ref 30–36.5)
MCV RBC AUTO: 91.9 FL (ref 80–99)
MONOCYTES # BLD: 1.06 K/UL (ref 0–1)
MONOCYTES NFR BLD: 8.6 % (ref 5–13)
NEUTS SEG # BLD: 9.05 K/UL (ref 1.8–8)
NEUTS SEG NFR BLD: 73 % (ref 32–75)
NRBC # BLD: 0 K/UL (ref 0–0.01)
NRBC BLD-RTO: 0 PER 100 WBC
NUC STRESS EJECTION FRACTION: 47 %
PLATELET # BLD AUTO: 213 K/UL (ref 150–400)
PMV BLD AUTO: 10.3 FL (ref 8.9–12.9)
POTASSIUM SERPL-SCNC: 4.2 MMOL/L (ref 3.5–5.1)
RBC # BLD AUTO: 4.47 M/UL (ref 3.8–5.2)
SERVICE CMNT-IMP: ABNORMAL
SERVICE CMNT-IMP: ABNORMAL
SODIUM SERPL-SCNC: 143 MMOL/L (ref 136–145)
STRESS BASELINE DIAS BP: 83 MMHG
STRESS BASELINE HR: 82 BPM
STRESS BASELINE SYS BP: 104 MMHG
STRESS ESTIMATED WORKLOAD: 1 METS
STRESS PEAK DIAS BP: 78 MMHG
STRESS PEAK SYS BP: 109 MMHG
STRESS PERCENT HR ACHIEVED: 71 %
STRESS POST PEAK HR: 101 BPM
STRESS RATE PRESSURE PRODUCT: NORMAL BPM*MMHG
STRESS TARGET HR: 143 BPM
WBC # BLD AUTO: 12.4 K/UL (ref 3.6–11)

## 2025-04-23 PROCEDURE — 6370000000 HC RX 637 (ALT 250 FOR IP)

## 2025-04-23 PROCEDURE — 6370000000 HC RX 637 (ALT 250 FOR IP): Performed by: FAMILY MEDICINE

## 2025-04-23 PROCEDURE — 94761 N-INVAS EAR/PLS OXIMETRY MLT: CPT

## 2025-04-23 PROCEDURE — 85025 COMPLETE CBC W/AUTO DIFF WBC: CPT

## 2025-04-23 PROCEDURE — 78452 HT MUSCLE IMAGE SPECT MULT: CPT

## 2025-04-23 PROCEDURE — 82962 GLUCOSE BLOOD TEST: CPT

## 2025-04-23 PROCEDURE — 6360000002 HC RX W HCPCS: Performed by: INTERNAL MEDICINE

## 2025-04-23 PROCEDURE — G0378 HOSPITAL OBSERVATION PER HR: HCPCS

## 2025-04-23 PROCEDURE — 93010 ELECTROCARDIOGRAM REPORT: CPT | Performed by: HOSPITALIST

## 2025-04-23 PROCEDURE — 96366 THER/PROPH/DIAG IV INF ADDON: CPT

## 2025-04-23 PROCEDURE — 2500000003 HC RX 250 WO HCPCS: Performed by: FAMILY MEDICINE

## 2025-04-23 PROCEDURE — A9500 TC99M SESTAMIBI: HCPCS | Performed by: INTERNAL MEDICINE

## 2025-04-23 PROCEDURE — 99223 1ST HOSP IP/OBS HIGH 75: CPT | Performed by: INTERNAL MEDICINE

## 2025-04-23 PROCEDURE — 96372 THER/PROPH/DIAG INJ SC/IM: CPT

## 2025-04-23 PROCEDURE — 93017 CV STRESS TEST TRACING ONLY: CPT

## 2025-04-23 PROCEDURE — 93306 TTE W/DOPPLER COMPLETE: CPT

## 2025-04-23 PROCEDURE — 83036 HEMOGLOBIN GLYCOSYLATED A1C: CPT

## 2025-04-23 PROCEDURE — 6360000002 HC RX W HCPCS: Performed by: FAMILY MEDICINE

## 2025-04-23 PROCEDURE — 3430000000 HC RX DIAGNOSTIC RADIOPHARMACEUTICAL: Performed by: INTERNAL MEDICINE

## 2025-04-23 PROCEDURE — 80048 BASIC METABOLIC PNL TOTAL CA: CPT

## 2025-04-23 RX ORDER — ASPIRIN 81 MG/1
81 TABLET ORAL
Qty: 12 TABLET | Refills: 0 | Status: SHIPPED | OUTPATIENT
Start: 2025-04-23 | End: 2025-05-23

## 2025-04-23 RX ORDER — LACTOBACILLUS RHAMNOSUS GG 10B CELL
1 CAPSULE ORAL
Status: DISCONTINUED | OUTPATIENT
Start: 2025-04-23 | End: 2025-04-23 | Stop reason: HOSPADM

## 2025-04-23 RX ORDER — DOXYCYCLINE HYCLATE 100 MG
100 TABLET ORAL 2 TIMES DAILY
Qty: 10 TABLET | Refills: 0 | Status: SHIPPED | OUTPATIENT
Start: 2025-04-23 | End: 2025-04-28

## 2025-04-23 RX ORDER — TETRAKIS(2-METHOXYISOBUTYLISOCYANIDE)COPPER(I) TETRAFLUOROBORATE 1 MG/ML
8.3 INJECTION, POWDER, LYOPHILIZED, FOR SOLUTION INTRAVENOUS ONCE
Status: COMPLETED | OUTPATIENT
Start: 2025-04-23 | End: 2025-04-23

## 2025-04-23 RX ORDER — LACTOBACILLUS RHAMNOSUS GG 10B CELL
1 CAPSULE ORAL
Qty: 7 CAPSULE | Refills: 0 | Status: SHIPPED | OUTPATIENT
Start: 2025-04-24 | End: 2025-05-01

## 2025-04-23 RX ORDER — BUMETANIDE 1 MG/1
1 TABLET ORAL DAILY
Qty: 30 TABLET | Refills: 0 | Status: SHIPPED | OUTPATIENT
Start: 2025-04-23 | End: 2025-05-23

## 2025-04-23 RX ORDER — REGADENOSON 0.08 MG/ML
0.4 INJECTION, SOLUTION INTRAVENOUS
Status: COMPLETED | OUTPATIENT
Start: 2025-04-23 | End: 2025-04-23

## 2025-04-23 RX ORDER — TETRAKIS(2-METHOXYISOBUTYLISOCYANIDE)COPPER(I) TETRAFLUOROBORATE 1 MG/ML
27 INJECTION, POWDER, LYOPHILIZED, FOR SOLUTION INTRAVENOUS ONCE
Status: COMPLETED | OUTPATIENT
Start: 2025-04-23 | End: 2025-04-23

## 2025-04-23 RX ORDER — FLUCONAZOLE 150 MG/1
150 TABLET ORAL ONCE
Qty: 1 TABLET | Refills: 0 | Status: SHIPPED | OUTPATIENT
Start: 2025-04-23 | End: 2025-04-23

## 2025-04-23 RX ADMIN — REGADENOSON 0.4 MG: 0.08 INJECTION, SOLUTION INTRAVENOUS at 12:42

## 2025-04-23 RX ADMIN — LANSOPRAZOLE 30 MG: 15 TABLET, ORALLY DISINTEGRATING, DELAYED RELEASE ORAL at 08:24

## 2025-04-23 RX ADMIN — ALOGLIPTIN 12.5 MG: 12.5 TABLET, FILM COATED ORAL at 08:24

## 2025-04-23 RX ADMIN — TETRAKIS(2-METHOXYISOBUTYLISOCYANIDE)COPPER(I) TETRAFLUOROBORATE 27 MILLICURIE: 1 INJECTION, POWDER, LYOPHILIZED, FOR SOLUTION INTRAVENOUS at 12:51

## 2025-04-23 RX ADMIN — INSULIN LISPRO 2 UNITS: 100 INJECTION, SOLUTION INTRAVENOUS; SUBCUTANEOUS at 15:01

## 2025-04-23 RX ADMIN — SODIUM CHLORIDE, PRESERVATIVE FREE 10 ML: 5 INJECTION INTRAVENOUS at 08:33

## 2025-04-23 RX ADMIN — TETRAKIS(2-METHOXYISOBUTYLISOCYANIDE)COPPER(I) TETRAFLUOROBORATE 8.3 MILLICURIE: 1 INJECTION, POWDER, LYOPHILIZED, FOR SOLUTION INTRAVENOUS at 11:00

## 2025-04-23 RX ADMIN — Medication 1 CAPSULE: at 08:54

## 2025-04-23 RX ADMIN — ENOXAPARIN SODIUM 40 MG: 100 INJECTION SUBCUTANEOUS at 08:24

## 2025-04-23 RX ADMIN — LEVOTHYROXINE SODIUM 137 MCG: 0.11 TABLET ORAL at 08:24

## 2025-04-23 ASSESSMENT — PAIN SCALES - GENERAL: PAINLEVEL_OUTOF10: 0

## 2025-04-23 NOTE — CONSULTS
[facility] CARDIOLOGY                    Cardiology Care Note     [x]Initial Encounter     []Follow-up    Patient Name: Felicia Rangel - :1950 - MRN:[id number]  Primary Cardiologist: Ralph Cintron MD  Consulting Cardiologist: Terra Novak MD     Reason for encounter: chest pain    HPI:       Felicia Rangel is a 75 y.o. female with PMH significant for CAD sp PCI Cx, versus RCA, iCMP EF 30-35% (unclear what years, maybe , prior to ), PHTN WHO group 2,  normalization of LVEF echo 2024 EF 60%, HFpEF, hx plavix induced thombocytopenia, AFL sp AVN ablation + BIVICD, sp surgical RUSTY clip , RA, TIAs, ASA stopped due to FOBT+ and anemia now restarted presented with left shoulder/chest pain.      Cath  - Chipp - sp PCI RCA, \"patent stent- prox LAD, prox Lcx, Prox RCA \"     JAMMIE 23 - RUSTY closed completely. Pacer lead no vegetation. LVEF 50%. Mild MR.    EKG AV paced.      Device interrogation from 3/2025 reviewed - TriageHF™ (HF Risk) Alert: High (New)  Possible OptiVol fluid accumulation: 2025 -- ongoing. · RV Capture Management: Actual safety margin (3.2 X) > programmed margin (2.0 X).  Atrial lead position check failed on 2025. Check atrial lead position.     proBNP 363.      Presented with CP that radiates to her left shoulder, started 2 nights ago and associated with SOB.  Lasted 40 minutes.      Eventually could nto get out of bed due to shoulder pain - could not oift her shoulder or hand.   pulled her to side of bed, caught her feet on floor.  Sat in recliner, pout heat on back shoulder.  Went away.  Can lift shoudler now.      NSVT noted in device interrogation in , none since.  Was told they were going to watch it.      SOB only if shw walks fast.    Feels like her rhythm is off.      Uses a 0.1 nitro patch every day.      Had a good fall in 2024 - fell backwards into a dressor - shapr pain in chest thereafter.  Wonders if lead dislodged.  Developed

## 2025-04-23 NOTE — DISCHARGE INSTRUCTIONS
Hospital Medicine DISCHARGE INSTRUCTIONS    NAME: Felicia Rangel   :  1950   MRN:  [id number]     Date:     2025    Admission date: 2025     Discharge date:  2025     Reason for your admission:  Chest pain [R07.9]  Chest pain, unspecified type [R07.9]    Discharge Diagnoses:    DISCHARGE INSTRUCTIONS:    Thank you for allowing us to participate in your care. Your discharging Hospitalist is PRICILA Sharpe CNP. You were admitted for evaluation and treatment for the above diagnoses. Please start taking Bumex 1mg daily and take the full course of antibiotics for your UTI. I have also prescribed a probiotic to decrease GI upset. Your urine culture is still in process, expect a phone call in the next few days with these results.     Your A1C was elevated at 9.0 today. This number looks at your blood sugar over the past three monthsPlease follow up with your PCP for further evaluation and management of your diabetes         Urinary Tract Infection (UTI) in Women: Care Instructions  Overview     A urinary tract infection (UTI) is an infection caused by bacteria. It can happen anywhere in the urinary tract. A UTI can happen in the:  Kidneys.  Ureters, the tubes that connect the kidneys to the bladder.  Bladder.  Urethra, where the urine comes out.  Most UTIs are bladder infections. They often cause pain or burning when you urinate.  Most UTIs can be cured with antibiotics. If you are prescribed antibiotics, be sure to complete your treatment so that the infection does not get worse.  Follow-up care is a key part of your treatment and safety. Be sure to make and go to all appointments, and call your doctor if you are having problems. It's also a good idea to know your test results and keep a list of the medicines you take.  How can you care for yourself at home?  Take your antibiotics as directed. Do not stop taking them just because you feel better. You need to take the full course of

## 2025-04-23 NOTE — CARE COORDINATION
Case Management Discharge Note    Discharge Plan:  No CM needs identified.    ECHO completed.     Transportation at DC:  Family    Please reach out to CM if any DC needs arise.    ______________________________________  JEFFREY Tolliver, RN-CM  [facility]- Care Management  Available via [employer]  4/23/2025  2:25 PM

## 2025-04-23 NOTE — H&P
[facility]  [address] [place] [address]  [phone]    [facility] Adult  Hospitalist Group    History & Physical    Date of service: 4/22/2025    Patient name: Felicia Rangel  MRN: [id number]  YOB: 1950  Age: 75 y.o.     Primary care provider:  Barak Mendez MD     Source of Information: patient, medical records                               Chief complain: Chest pain    History of present illness  Felicia Rangel is a 75 y.o. female who presented with chest pain that radiates to her left shoulder and down her left arm.  Symptoms started 2 nights ago and was associated with shortness of breath, nausea and vomiting.  Symptoms resolved and she was seen at urgent care and she was diagnosed with atypical chest pain and advised to come to the ER for evaluation.  She felt better so did not present to the ER immediately but symptoms recurred last night which prompted her to come to the ER this afternoon.  She is not sure what triggers her symptoms but they seem to be worse with certain positions and with activity.    Past Medical History:   Diagnosis Date    Arthritis     Atrial fibrillation (HCC)     CAD (coronary artery disease)     Celiac disease     CHF (congestive heart failure), NYHA class I, chronic, systolic (HCC)     Chronic obstructive pulmonary disease (HCC)     Diastolic heart failure (HCC)     GERD (gastroesophageal reflux disease)     Hx of deep venous thrombosis     Hypertension     Hypothyroidism     Iron deficiency anemia     Lyme disease     Pacemaker     11/20    Rheumatoid arthritis (HCC)     S/P AV felicia ablation     11/20     S/P left atrial appendage ligation     RUSTY clip 10/20     TIA (transient ischemic attack)       Past Surgical History:   Procedure Laterality Date    ABLATION OF DYSRHYTHMIC FOCUS      ADENOIDECTOMY      APPENDECTOMY      CARDIAC CATHETERIZATION  03/2020    CARDIAC CATHETERIZATION  2010    2 STENTS

## 2025-04-23 NOTE — DISCHARGE SUMMARY
[facility]  [address] [place] [address]  Tel: [phone]    Hospital Medicine Discharge Summary    Patient ID:    Felicia Rangel  Age:              75 y.o.    : 1950  MRN:             [id number]     PCP: Barak Mendez MD     Date of Admission: 2025    Date of Discharge:  2025    Discharge Diagnoses:  Principal Problem:    Chest pain  Resolved Problems:    * No resolved hospital problems. *       Reason for admission:    Chest pain [R07.9]  Chest pain, unspecified type [R07.9]    Diagnostic testing:    Laboratory data reviewed and independently interpreted:    Recent Labs     25  1635 25  0343   WBC 13.1* 12.4*   HGB 13.7 13.3   HCT 40.6 41.1   RBC 4.53 4.47   MCV 89.6 91.9   MCH 30.2 29.8    213     Lab Results   Component Value Date/Time    LACTA 1.6 2021 02:37 AM     Recent Labs     25  1635 25  0343    143   K 3.8 4.2    108   CO2 28 31   GLUCOSE 350* 187*   BUN 10 12   CREATININE 0.86 0.59   CALCIUM 8.9 9.0   BILITOT 0.4  --    ALKPHOS 144*  --    AST 21  --    ALT 18  --      No components found for: \"GLUCOSEPOC\"  Lab Results   Component Value Date/Time    CHOL 179 2024 04:36 AM    TRIG 360 2024 04:36 AM    HDL 45 2024 04:36 AM       Imaging data reviewed:    No results found.    Hospital Course:     Felicia Rangel is a 75 y.o female with an extensive past medical history this is significant for atrial fibrillation status post ablation, coronary artery disease s/p PCI,heart failure, COPD, hypertension, iron deficiency anemia, who presented with chest pain that radiates to her left shoulder and down her left arm three days ago. Associated symptoms with shortness of breath, nausea and vomiting.  Symptoms resolved but recurred yesterday night which prompted her to come to the ER. She is not sure what triggers her symptoms but they seem to be

## 2025-04-24 LAB
BACTERIA SPEC CULT: NORMAL
CC UR VC: NORMAL
SERVICE CMNT-IMP: NORMAL

## 2025-05-01 NOTE — ED NOTES
Patient alert and oriented. More easily aroused. Fluids running. Meropenem completed. CT called to take patient at this time.  
Patient alert to self and place. Patient unable to answer date/year.   Patient arousal to voice and answer questions, but extremely lethargic.  Glucose was stated to be 200 en route by EMS. Glucose is 323 at bedside.  MD made aware of situation and patient status.  
Patient had 4th diarrhea-like BM.  Patient cleaned and new pad placed under patient.  
Patient to CT at this time.  
Patient using bed pan at this time.  
Patient using bedpan ATT. BM noted.  
SBAR verbal report given to [facility] at this time.  
TRANSFER - OUT REPORT:    Verbal report given to JOHNNIE Peralta on Felicia Rangel  being transferred to [facility] room 313 for routine progression of patient care       Report consisted of patient's Situation, Background, Assessment and   Recommendations(SBAR).     Information from the following report(s) Nurse Handoff Report, ED Encounter Summary, and ED SBAR was reviewed with the receiving nurse.    [place] Fall Assessment:    Presents to emergency department  because of falls (Syncope, seizure, or loss of consciousness): No  Age > 70: Yes  Altered Mental Status, Intoxication with alcohol or substance confusion (Disorientation, impaired judgment, poor safety awaremess, or inability to follow instructions): No  Impaired Mobility: Ambulates or transfers with assistive devices or assistance; Unable to ambulate or transer.: No  Nursing Judgement: Yes          Lines:   Peripheral IV 12/28/24 Posterior;Right Hand (Active)       Peripheral IV 12/28/24 Right Antecubital (Active)        Opportunity for questions and clarification was provided.      Patient transported with:  Monitor       
show

## 2025-05-22 ENCOUNTER — TELEPHONE (OUTPATIENT)
Age: 75
End: 2025-05-22

## 2025-05-22 NOTE — TELEPHONE ENCOUNTER
R/t call to pt,    Noted Last echo 4/23/25 EF 55-60%    LV 2/28/25 Dr. Cintron,  \"- recheck an echo yearly given that she is V paced \"    Pt c/o fatigue, \"heart rate is down then way up\" (53 to 88).    Concerned for \"abnormal echo & nuclear stress test\" from 4/2025.

## 2025-05-22 NOTE — TELEPHONE ENCOUNTER
Spoke to pt,  Relayed Dr. Cintron's recommendations,  Per Dr. Ralph Cintron: \"  Echo and Nuc looked stable in April.    Can FU in office with any further concerns.   \"  Confirmed NOV.  Future Appointments   Date Time Provider Department Center   6/9/2025 10:20 AM Ralph Cintron MD [facility] AMB   10/8/2025 11:20 AM PACEMAKER, [facility]   10/8/2025 11:40 AM Harrison Townsend MD [facility] AM[facility]

## 2025-06-04 LAB
ALBUMIN SERPL-MCNC: 4.5 G/DL (ref 3.8–4.8)
ALP SERPL-CCNC: 142 IU/L (ref 44–121)
ALT SERPL-CCNC: 16 IU/L (ref 0–32)
AST SERPL-CCNC: 16 IU/L (ref 0–40)
BASOPHILS # BLD AUTO: 0.1 X10E3/UL (ref 0–0.2)
BASOPHILS NFR BLD AUTO: 1 %
BILIRUB SERPL-MCNC: 0.6 MG/DL (ref 0–1.2)
BUN SERPL-MCNC: 13 MG/DL (ref 8–27)
BUN/CREAT SERPL: 19 (ref 12–28)
CALCIUM SERPL-MCNC: 9.5 MG/DL (ref 8.7–10.3)
CHLORIDE SERPL-SCNC: 96 MMOL/L (ref 96–106)
CHOLEST SERPL-MCNC: 206 MG/DL (ref 100–199)
CO2 SERPL-SCNC: 25 MMOL/L (ref 20–29)
CREAT SERPL-MCNC: 0.68 MG/DL (ref 0.57–1)
EGFRCR SERPLBLD CKD-EPI 2021: 90 ML/MIN/1.73
EOSINOPHIL # BLD AUTO: 0.1 X10E3/UL (ref 0–0.4)
EOSINOPHIL NFR BLD AUTO: 1 %
ERYTHROCYTE [DISTWIDTH] IN BLOOD BY AUTOMATED COUNT: 12.8 % (ref 11.7–15.4)
GLOBULIN SER CALC-MCNC: 2.2 G/DL (ref 1.5–4.5)
GLUCOSE SERPL-MCNC: 216 MG/DL (ref 70–99)
HCT VFR BLD AUTO: 49.7 % (ref 34–46.6)
HDL SERPL QN: 9.1 NM
HDL SERPL-SCNC: 35.5 UMOL/L
HDLC SERPL-MCNC: 54 MG/DL
HGB BLD-MCNC: 16.1 G/DL (ref 11.1–15.9)
HLD.LARGE SERPL-SCNC: 5.8 UMOL/L
IMM GRANULOCYTES # BLD AUTO: 0.2 X10E3/UL (ref 0–0.1)
IMM GRANULOCYTES NFR BLD AUTO: 1 %
LDL SERPL QN: 20 NM
LDL SERPL QN: 20 NM
LDL SERPL-SCNC: 1874 NMOL/L
LDL SMALL SERPL-SCNC: 1320 NMOL/L
LDL SMALL SERPL-SCNC: 1320 NMOL/L
LDLC SERPL CALC-MCNC: 112 MG/DL (ref 0–99)
LP-IR SCORE SERPL: 69
LYMPHOCYTES # BLD AUTO: 1.4 X10E3/UL (ref 0.7–3.1)
LYMPHOCYTES NFR BLD AUTO: 7 %
MCH RBC QN AUTO: 30 PG (ref 26.6–33)
MCHC RBC AUTO-ENTMCNC: 32.4 G/DL (ref 31.5–35.7)
MCV RBC AUTO: 93 FL (ref 79–97)
MONOCYTES # BLD AUTO: 1.3 X10E3/UL (ref 0.1–0.9)
MONOCYTES NFR BLD AUTO: 7 %
NEUTROPHILS # BLD AUTO: 15.4 X10E3/UL (ref 1.4–7)
NEUTROPHILS NFR BLD AUTO: 83 %
PLATELET # BLD AUTO: 230 X10E3/UL (ref 150–450)
POTASSIUM SERPL-SCNC: 4.5 MMOL/L (ref 3.5–5.2)
PROT SERPL-MCNC: 6.7 G/DL (ref 6–8.5)
RBC # BLD AUTO: 5.37 X10E6/UL (ref 3.77–5.28)
SODIUM SERPL-SCNC: 142 MMOL/L (ref 134–144)
TRIGL SERPL-MCNC: 235 MG/DL (ref 0–149)
VLDL LARGE SERPL-SCNC: 13 NMOL/L
VLDL SERPL QN: 55.1 NM
WBC # BLD AUTO: 18.5 X10E3/UL (ref 3.4–10.8)

## 2025-06-05 ENCOUNTER — TELEPHONE (OUTPATIENT)
Age: 75
End: 2025-06-05

## 2025-06-05 LAB — IMP & REVIEW OF LAB RESULTS: NORMAL

## 2025-06-05 NOTE — TELEPHONE ENCOUNTER
Patient is calling because she would like to know if she can get an antibiotic based on her lab work.Please advise.Patient said she doesn't feel comfortable waiting until Monday to come into the office.    [phone] patient     Pharmacy:  [facility] [address] [place] [address] [address] - P [phone] - F [phone]

## 2025-06-05 NOTE — TELEPHONE ENCOUNTER
R/t call to pt,  Unable to LVM.  Sent mychart msg.    Unable to rx antibiotics d/t elevated WBC.  Pt needs to call PCP to address.    I faxed labs to PCP's office.

## 2025-06-09 ENCOUNTER — OFFICE VISIT (OUTPATIENT)
Age: 75
End: 2025-06-09
Payer: MEDICARE

## 2025-06-09 VITALS
DIASTOLIC BLOOD PRESSURE: 78 MMHG | HEART RATE: 98 BPM | SYSTOLIC BLOOD PRESSURE: 138 MMHG | BODY MASS INDEX: 30 KG/M2 | HEIGHT: 62 IN | OXYGEN SATURATION: 95 % | WEIGHT: 163 LBS

## 2025-06-09 DIAGNOSIS — I25.118 CORONARY ARTERY DISEASE OF NATIVE ARTERY OF NATIVE HEART WITH STABLE ANGINA PECTORIS: ICD-10-CM

## 2025-06-09 DIAGNOSIS — E78.5 DYSLIPIDEMIA: Primary | ICD-10-CM

## 2025-06-09 DIAGNOSIS — I10 HTN (HYPERTENSION), BENIGN: ICD-10-CM

## 2025-06-09 PROCEDURE — G8427 DOCREV CUR MEDS BY ELIG CLIN: HCPCS | Performed by: SPECIALIST

## 2025-06-09 PROCEDURE — 1090F PRES/ABSN URINE INCON ASSESS: CPT | Performed by: SPECIALIST

## 2025-06-09 PROCEDURE — 99214 OFFICE O/P EST MOD 30 MIN: CPT | Performed by: SPECIALIST

## 2025-06-09 PROCEDURE — 3075F SYST BP GE 130 - 139MM HG: CPT | Performed by: SPECIALIST

## 2025-06-09 PROCEDURE — G8399 PT W/DXA RESULTS DOCUMENT: HCPCS | Performed by: SPECIALIST

## 2025-06-09 PROCEDURE — 1160F RVW MEDS BY RX/DR IN RCRD: CPT | Performed by: SPECIALIST

## 2025-06-09 PROCEDURE — 1123F ACP DISCUSS/DSCN MKR DOCD: CPT | Performed by: SPECIALIST

## 2025-06-09 PROCEDURE — 1159F MED LIST DOCD IN RCRD: CPT | Performed by: SPECIALIST

## 2025-06-09 PROCEDURE — G8417 CALC BMI ABV UP PARAM F/U: HCPCS | Performed by: SPECIALIST

## 2025-06-09 PROCEDURE — 1036F TOBACCO NON-USER: CPT | Performed by: SPECIALIST

## 2025-06-09 PROCEDURE — 3078F DIAST BP <80 MM HG: CPT | Performed by: SPECIALIST

## 2025-06-09 RX ORDER — RANOLAZINE 500 MG/1
500 TABLET, EXTENDED RELEASE ORAL 2 TIMES DAILY
Qty: 180 TABLET | Refills: 3 | Status: SHIPPED | OUTPATIENT
Start: 2025-06-09

## 2025-06-09 NOTE — PROGRESS NOTES
Chief Complaint   Patient presents with    Other     PAF  TIA  HFREF     Vitals:    06/09/25 1016   BP: 138/78   BP Site: Left Upper Arm   Patient Position: Sitting   Pulse: 98   SpO2: 95%   Weight: 73.9 kg (163 lb)   Height: 1.575 m (5' 2\")     Chest pain: DENIED     Recent hospital stays: DENIED     Refills: DENIED   
on prednisone       10) History of Tick Bite and RMSF - [place]  - Follow up with Dr. Alatorre      11) TIA's (weakness, slurred speech, aphasia)    - TIA 9/23 - admitted at  - brain MRI OK   - TIA like symptoms 9/30/24 (right sided weakness, expressive aphasia)  - Brain MRI without acute process - started on ASA then   - Another TIA admission 12/2/24 - brain MRI without acute findings   - Brain MRI 12/28/24 - no acute process   - She has not tolerated blood thinners - due to Hx of bleeding and allergy  - She had RUSTY clip in 2020  - JAMMIE 12/3/23 shows RUSTY completely occluded   - ASA stopped 12/28/24 admission due to + FOBT and anemia   ---> she should take ASA 81 mg daily for stroke prevention as long as OK with GI (Dr. Warner)     12) Elevated WBC -- placed on Abx by Dr. Mendez    13) See NP in 3 months     I used to see her brother. . 2 kids and 4 grand kids.     Ralph Cintron MD, FACC    [facility] [place]  [facility]  [address], [address]  [address]  [place], [place]  [address]  [place] [address]  Ph: [phone]   Ph [phone]

## 2025-06-10 ENCOUNTER — PATIENT MESSAGE (OUTPATIENT)
Age: 75
End: 2025-06-10

## 2025-09-06 LAB
ALBUMIN SERPL-MCNC: 4.1 G/DL (ref 3.8–4.8)
ALP SERPL-CCNC: 112 IU/L (ref 44–121)
ALT SERPL-CCNC: 18 IU/L (ref 0–32)
AST SERPL-CCNC: 20 IU/L (ref 0–40)
BILIRUB SERPL-MCNC: 0.7 MG/DL (ref 0–1.2)
BUN SERPL-MCNC: 19 MG/DL (ref 8–27)
BUN/CREAT SERPL: 28 (ref 12–28)
CALCIUM SERPL-MCNC: 9.1 MG/DL (ref 8.7–10.3)
CHLORIDE SERPL-SCNC: 99 MMOL/L (ref 96–106)
CO2 SERPL-SCNC: 23 MMOL/L (ref 20–29)
CREAT SERPL-MCNC: 0.69 MG/DL (ref 0.57–1)
EGFRCR SERPLBLD CKD-EPI 2021: 89 ML/MIN/1.73
GLOBULIN SER CALC-MCNC: 1.9 G/DL (ref 1.5–4.5)
GLUCOSE SERPL-MCNC: 239 MG/DL (ref 70–99)
POTASSIUM SERPL-SCNC: 4.3 MMOL/L (ref 3.5–5.2)
PROT SERPL-MCNC: 6 G/DL (ref 6–8.5)
SODIUM SERPL-SCNC: 139 MMOL/L (ref 134–144)

## 2025-09-07 LAB
HDL SERPL QN: 8.6 NM
HDL SERPL-SCNC: 29 UMOL/L
HLD.LARGE SERPL-SCNC: 2.8 UMOL/L
LDL SERPL QN: 20.1 NM
LDL SERPL QN: 20.1 NM
LDL SERPL-SCNC: 1745 NMOL/L
LDL SMALL SERPL-SCNC: 1159 NMOL/L
LDL SMALL SERPL-SCNC: 1159 NMOL/L
LP-IR SCORE SERPL: 84
VLDL LARGE SERPL-SCNC: 8.7 NMOL/L
VLDL SERPL QN: 52.1 NM

## (undated) DEVICE — INTRO SHTH 7FR 13X20CM -- TEARAWAY

## (undated) DEVICE — BAG RED 3PLY 2MIL 30X40 IN

## (undated) DEVICE — INTRODUCER SHTH 5 FRX30 CM 7 CM W/ NIT WIRE REG MICRO-STICK

## (undated) DEVICE — Device

## (undated) DEVICE — TR BAND RADIAL ARTERY COMPRESSION DEVICE: Brand: TR BAND

## (undated) DEVICE — RADIFOCUS GLIDEWIRE: Brand: GLIDEWIRE

## (undated) DEVICE — BLADE OPHTH ORNG GRINDLESS SMALLER ALTERNATIVE TO NO15 GEN

## (undated) DEVICE — BIPOLAR FORCEPS CORD: Brand: VALLEYLAB

## (undated) DEVICE — SUTURE ETHBND EXCEL SZ 2 L30IN NONABSORBABLE GRN L40MM V-37 MX69G

## (undated) DEVICE — Z DUPLICATE USE 2275497 DRSG POSTOP PRMSL AG 3.5X6IN

## (undated) DEVICE — DRAIN,WOUND,ROUND,24FR,5/16",FULL-FLUTED: Brand: MEDLINE

## (undated) DEVICE — HANDLE LT SNAP ON ULT DURABLE LENS FOR TRUMPF ALC DISPOSABLE

## (undated) DEVICE — PACK PROCEDURE SURG HRT CATH

## (undated) DEVICE — 3M™ IOBAN™ 2 ANTIMICROBIAL INCISE DRAPE 6650EZ: Brand: IOBAN™ 2

## (undated) DEVICE — SOLUTION IRRIG 1000ML H2O STRL BLT

## (undated) DEVICE — SYR 10ML LUER LOK 1/5ML GRAD --

## (undated) DEVICE — INTENDED FOR TISSUE SEPARATION, AND OTHER PROCEDURES THAT REQUIRE A SHARP SURGICAL BLADE TO PUNCTURE OR CUT.: Brand: BARD-PARKER ® CARBON RIB-BACK BLADES

## (undated) DEVICE — SUTURE STRATAFIX SPRL PDS + SZ 2-0 L9IN ABSRB VLT CT-1 SXPP1B456

## (undated) DEVICE — TUBING, SUCTION, 1/4" X 12', STRAIGHT: Brand: MEDLINE

## (undated) DEVICE — PLASMABLADE PS200-040 4.0: Brand: PLASMABLADE™

## (undated) DEVICE — LAPAROSCOPIC TROCAR SLEEVE/SINGLE USE: Brand: KII® OPTICAL ACCESS SYSTEM

## (undated) DEVICE — CATH DIAG D 6F PIG 155 5 PK -- IMPULSE 16599-42

## (undated) DEVICE — SUTURE ETHLN SZ 4-0 L18IN NONABSORBABLE BLK L19MM PS-2 3/8 1667H

## (undated) DEVICE — KIT,ANTI FOG,W/SPONGE & FLUID,SOFT PACK: Brand: MEDLINE

## (undated) DEVICE — DRAIN SURG L3/8-1/2IN DIA3/16IN SIL CARD CONN 1:1 BLAK

## (undated) DEVICE — PAD GEN USE BORDERED ADH 14IN 2IN AND 12IN 4IN GZ UNIV ST

## (undated) DEVICE — TUBING PRSS MON L12IN PVC RIG NONEXPANDING M TO FEM CONN

## (undated) DEVICE — NEEDLE HYPO 22GA L1.5IN BLK S STL HUB POLYPR SHLD REG BVL

## (undated) DEVICE — REM POLYHESIVE ADULT PATIENT RETURN ELECTRODE: Brand: VALLEYLAB

## (undated) DEVICE — ELECTRODE ES 36CM LAP FLAT L HK COAT DISP CLEANCOAT

## (undated) DEVICE — CATHETER DIAG 6FR L110CM INTRO 6FR BLLN DIA9MM 1CC PULM ART

## (undated) DEVICE — SUTURE DEV SZ 3-0 V-LOC 90 L12IN TO L18IN CV-23 VLT VLOCM0844

## (undated) DEVICE — PREP SKN CHLRAPRP APL 26ML STR --

## (undated) DEVICE — 1010 S-DRAPE TOWEL DRAPE 10/BX: Brand: STERI-DRAPE™

## (undated) DEVICE — DRESSING HEMOSTATIC SFT INTVENT W/O SLT DBL WRP QUIKCLOT LF

## (undated) DEVICE — CLIP INT SM WIDE RED TI TRNSVRS GRV CHEVRON SHP W PRECIS

## (undated) DEVICE — PERCLOSE PROGLIDE™ SUTURE-MEDIATED CLOSURE SYSTEM: Brand: PERCLOSE PROGLIDE™

## (undated) DEVICE — SURGICAL PROCEDURE PACK BASIN MAJ SET CUST NO CAUT

## (undated) DEVICE — SUTURE STRATAFIX SPRL MCRYL + SZ 3-0 L24IN ABSRB UD PS-2 SXMP1B108

## (undated) DEVICE — DISSECTOR LAP DIA5MM BLNT TIP ENDOPATH

## (undated) DEVICE — SKIN PREP TRAY W/CHG: Brand: MEDLINE INDUSTRIES, INC.

## (undated) DEVICE — SUTURE MCRYL SZ 3-0 L27IN ABSRB UD L24MM PS-1 3/8 CIR PRIM Y936H

## (undated) DEVICE — 3M™ IOBAN™ 2 ANTIMICROBIAL INCISE DRAPE 6640EZ: Brand: IOBAN™ 2

## (undated) DEVICE — SELECTOR VEIN 5FR L75CM DIA0.046IN VERT UNIQUE HUB BRAID

## (undated) DEVICE — BLADE OPHTH 180DEG CUT SURF BLU STR SHRP DBL BVL GRINDLESS

## (undated) DEVICE — SUTURE PERMA-HAND 0 L18IN NONABSORBABLE BLK CT-1 L36MM 1/2 C021D

## (undated) DEVICE — 3M™ MEDIPORE™ H SOFT CLOTH SURGICAL TAPE 2864, 4 INCH X 10 YARD (10CM X 9,14M), 12 ROLLS/CASE: Brand: 3M™ MEDIPORE™

## (undated) DEVICE — PROCEDURE KIT FLUID MGMT CUST MAINFOLD STRL

## (undated) DEVICE — SUTURE FIBERWIRE 3-0 L18IN NONABSORBABLE BLU L15MM 3/8 CIR AR722701

## (undated) DEVICE — ZIMMER® STERILE DISPOSABLE TOURNIQUET CUFF WITH PROTECTIVE SLEEVE AND PLC, DUAL PORT, SINGLE BLADDER, 18 IN. (46 CM)

## (undated) DEVICE — BAG PRESSURE INFUSION 500ML -- CONVERT TO ITEM 360124

## (undated) DEVICE — STERILE POLYISOPRENE POWDER-FREE SURGICAL GLOVES: Brand: PROTEXIS

## (undated) DEVICE — ADAPTER CLR ROT AIRLESS W/ SHERLOCK CONN M LUER

## (undated) DEVICE — ZIP 8I SURGICAL SKIN CLOSURE DEVICE: Brand: ZIP 8I SURGICAL SKIN CLOSURE DEVICE

## (undated) DEVICE — GLIDESHEATH SLENDER STAINLESS STEEL KIT: Brand: GLIDESHEATH SLENDER

## (undated) DEVICE — STRAP,POSITIONING,KNEE/BODY,FOAM,4X60": Brand: MEDLINE

## (undated) DEVICE — GUIDEWIRE: Brand: PT²™

## (undated) DEVICE — DBD-PACK,LAPAROTOMY,2 REINFORCED GOWNS: Brand: MEDLINE

## (undated) DEVICE — SPONGE GZ W4XL4IN COT 12 PLY TYP VII WVN C FLD DSGN

## (undated) DEVICE — GUIDE CATH CONVEY 5FR JR4 -- 39228-686

## (undated) DEVICE — SUT SLK 2-0SH 30IN BLK --

## (undated) DEVICE — GUIDE COR SNUS L135CM DIA0.035IN SHTH L50CM DIA9FR BRAID

## (undated) DEVICE — SUTURE PDS II SZ 0 L36IN ABSRB VLT L40MM CT 1/2 CIR Z358T

## (undated) DEVICE — CATH NAVISTAR BIDIR FJ 8MM --

## (undated) DEVICE — LABEL MED CARD MRMC STRL

## (undated) DEVICE — KIT ANGIO CNTRST ADMIN W O BWL WORLEY

## (undated) DEVICE — MEDI-TRACE CADENCE ADULT, DEFIBRILLATION ELECTRODE -RTS  (10 PR/PK) - PHYSIO-CONTROL: Brand: MEDI-TRACE CADENCE

## (undated) DEVICE — Z DUPLICATE USE 2526246 STOPCOCK IV R 3 W HI PRSS

## (undated) DEVICE — PACEMAKER PACK: Brand: MEDLINE INDUSTRIES, INC.

## (undated) DEVICE — SWAN-GANZ TRUE SIZE THERMODULTION CATHETER, 5F: Brand: SWAN-GANZ TRUE SIZE

## (undated) DEVICE — BLADE SAW OSC COARSE 25X9MM --

## (undated) DEVICE — BANDAGE COBAN 4 IN COMPR W4INXL5YD FOAM COHESIVE QUIK STK SELF ADH SFT

## (undated) DEVICE — INFECTION CONTROL KIT SYS

## (undated) DEVICE — SOL IRRIGATION INJ NACL 0.9% 500ML BTL

## (undated) DEVICE — SUTURE SZ 0 27IN 5/8 CIR UR-6  TAPER PT VIOLET ABSRB VICRYL J603H

## (undated) DEVICE — SPONGE LAP 18X18IN STRL -- 5/PK

## (undated) DEVICE — PINNACLE INTRODUCER SHEATH: Brand: PINNACLE

## (undated) DEVICE — BIT DRL TWST 1/8X5IN STRL -- DISP

## (undated) DEVICE — HAND I-LF: Brand: MEDLINE INDUSTRIES, INC.

## (undated) DEVICE — TOWEL SURG W17XL27IN STD BLU COT NONFENESTRATED PREWASHED

## (undated) DEVICE — GOWN,SIRUS,NONRNF,SETINSLV,XL,20/CS: Brand: MEDLINE

## (undated) DEVICE — DRAPE,REIN 53X77,STERILE: Brand: MEDLINE

## (undated) DEVICE — DRESSING GZ FLUF 36X36 IN RND 2 PLY PD GZ AMER WHT CROSS

## (undated) DEVICE — HEMO INTRO 8.5F 60CM SR0 --

## (undated) DEVICE — ELECTROSURGICAL DEVICE HOLSTER;FOR USE WITH MAXIMUM PEAK VOLTAGE OF 4000 V: Brand: FORCE TRIVERSE

## (undated) DEVICE — ZINACTIVE USE 2641837 CLIP LIG M BLU TI HRT SHP WIRE HORZ 600 PER BX

## (undated) DEVICE — CABLE RMFG EP MAP NAVISTAR RED -- F/CARTO 3 SYS - OEM 323590

## (undated) DEVICE — BLADE, TONGUE, 6", STERILE: Brand: MEDLINE

## (undated) DEVICE — INTRO SHTH HMSTAS 8.5FR 60CM --

## (undated) DEVICE — GUIDE CATH CONVEY 5FR JL3.5 -- 39228-661

## (undated) DEVICE — KIT,1200CC CANISTER,3/16"X6' TUBING: Brand: MEDLINE INDUSTRIES, INC.

## (undated) DEVICE — SUTURE FIBERWIRE 4-0 L18IN NONABSORBABLE BLU L12.3MM 3/8 AR723001

## (undated) DEVICE — SUTURE V-LOC 180 SZ 2-0 L9IN ABSRB VLT GS-21 L37MM 1/2 CIR VLOCM0345

## (undated) DEVICE — LIMB HOLDER, WRIST/ANKLE: Brand: DEROYAL

## (undated) DEVICE — VISUALIZATION SYSTEM: Brand: CLEARIFY

## (undated) DEVICE — COVER LT HNDL PLAS RIG 1 PER PK

## (undated) DEVICE — SYRINGE ANGIO 10ML RED FLAT GRP FIX M LUER CONN MEDALLION

## (undated) DEVICE — MEDI-TRACE CADENCE ADULT, DEFIBRILLATION ELECTRODE -RTS  (10 PR/PK) - PHILIPS: Brand: MEDI-TRACE CADENCE

## (undated) DEVICE — ARGO BAGZ FLUID MANAGEMENT SYSTEM: Brand: ARGO BAGZ FLUID MANAGEMENT SYSTEM

## (undated) DEVICE — SOLUTION IV 1000ML 0.9% SOD CHL

## (undated) DEVICE — Device: Brand: PADPRO

## (undated) DEVICE — TROCAR: Brand: KII® OPTICAL ACCESS SYSTEM